# Patient Record
Sex: MALE | Race: WHITE | NOT HISPANIC OR LATINO | URBAN - METROPOLITAN AREA
[De-identification: names, ages, dates, MRNs, and addresses within clinical notes are randomized per-mention and may not be internally consistent; named-entity substitution may affect disease eponyms.]

---

## 2021-01-01 ENCOUNTER — IMMUNIZATION (OUTPATIENT)
Dept: PRIMARY CARE CLINIC | Facility: CLINIC | Age: 61
End: 2021-01-01
Payer: MEDICAID

## 2021-01-01 DIAGNOSIS — Z23 NEED FOR VACCINATION: Primary | ICD-10-CM

## 2021-01-01 PROCEDURE — 0003A COVID-19, MRNA, LNP-S, PF, 30 MCG/0.3 ML DOSE VACCINE: CPT | Mod: PBBFAC,PN

## 2021-01-01 PROCEDURE — 91300 COVID-19, MRNA, LNP-S, PF, 30 MCG/0.3 ML DOSE VACCINE: CPT | Mod: PBBFAC,PN

## 2021-04-26 ENCOUNTER — IMMUNIZATION (OUTPATIENT)
Dept: PRIMARY CARE CLINIC | Facility: CLINIC | Age: 61
End: 2021-04-26
Payer: MEDICAID

## 2021-04-26 DIAGNOSIS — Z23 NEED FOR VACCINATION: Primary | ICD-10-CM

## 2021-04-26 PROCEDURE — 91300 COVID-19, MRNA, LNP-S, PF, 30 MCG/0.3 ML DOSE VACCINE: ICD-10-PCS | Mod: S$GLB,,, | Performed by: EMERGENCY MEDICINE

## 2021-04-26 PROCEDURE — 0001A COVID-19, MRNA, LNP-S, PF, 30 MCG/0.3 ML DOSE VACCINE: ICD-10-PCS | Mod: CV19,S$GLB,, | Performed by: EMERGENCY MEDICINE

## 2021-04-26 PROCEDURE — 0001A COVID-19, MRNA, LNP-S, PF, 30 MCG/0.3 ML DOSE VACCINE: CPT | Mod: CV19,S$GLB,, | Performed by: EMERGENCY MEDICINE

## 2021-04-26 PROCEDURE — 91300 COVID-19, MRNA, LNP-S, PF, 30 MCG/0.3 ML DOSE VACCINE: CPT | Mod: S$GLB,,, | Performed by: EMERGENCY MEDICINE

## 2021-05-17 ENCOUNTER — IMMUNIZATION (OUTPATIENT)
Dept: PRIMARY CARE CLINIC | Facility: CLINIC | Age: 61
End: 2021-05-17
Payer: MEDICAID

## 2021-05-17 DIAGNOSIS — Z23 NEED FOR VACCINATION: Primary | ICD-10-CM

## 2021-05-17 PROCEDURE — 0002A COVID-19, MRNA, LNP-S, PF, 30 MCG/0.3 ML DOSE VACCINE: ICD-10-PCS | Mod: CV19,S$GLB,, | Performed by: FAMILY MEDICINE

## 2021-05-17 PROCEDURE — 91300 COVID-19, MRNA, LNP-S, PF, 30 MCG/0.3 ML DOSE VACCINE: CPT | Mod: S$GLB,,, | Performed by: FAMILY MEDICINE

## 2021-05-17 PROCEDURE — 0002A COVID-19, MRNA, LNP-S, PF, 30 MCG/0.3 ML DOSE VACCINE: CPT | Mod: CV19,S$GLB,, | Performed by: FAMILY MEDICINE

## 2021-05-17 PROCEDURE — 91300 COVID-19, MRNA, LNP-S, PF, 30 MCG/0.3 ML DOSE VACCINE: ICD-10-PCS | Mod: S$GLB,,, | Performed by: FAMILY MEDICINE

## 2022-01-01 ENCOUNTER — PATIENT MESSAGE (OUTPATIENT)
Dept: PSYCHOLOGY | Facility: HOSPITAL | Age: 62
End: 2022-01-01
Payer: MEDICAID

## 2022-01-01 ENCOUNTER — ANESTHESIA EVENT (OUTPATIENT)
Dept: SURGERY | Facility: HOSPITAL | Age: 62
DRG: 521 | End: 2022-01-01
Payer: MEDICAID

## 2022-01-01 ENCOUNTER — ANESTHESIA (OUTPATIENT)
Dept: SURGERY | Facility: HOSPITAL | Age: 62
DRG: 521 | End: 2022-01-01
Payer: MEDICAID

## 2022-01-01 ENCOUNTER — TELEPHONE (OUTPATIENT)
Dept: ORTHOPEDICS | Facility: CLINIC | Age: 62
End: 2022-01-01
Payer: MEDICAID

## 2022-01-01 ENCOUNTER — DOCUMENTATION ONLY (OUTPATIENT)
Dept: CARDIOLOGY | Facility: HOSPITAL | Age: 62
End: 2022-01-01
Payer: MEDICAID

## 2022-01-01 ENCOUNTER — ANESTHESIA EVENT (OUTPATIENT)
Dept: MEDSURG UNIT | Facility: HOSPITAL | Age: 62
DRG: 871 | End: 2022-01-01
Payer: MEDICAID

## 2022-01-01 ENCOUNTER — PATIENT OUTREACH (OUTPATIENT)
Dept: ADMINISTRATIVE | Facility: CLINIC | Age: 62
End: 2022-01-01
Payer: MEDICAID

## 2022-01-01 ENCOUNTER — HOSPITAL ENCOUNTER (INPATIENT)
Facility: HOSPITAL | Age: 62
LOS: 9 days | Discharge: HOME | DRG: 521 | End: 2022-08-27
Attending: ORTHOPAEDIC SURGERY | Admitting: ORTHOPAEDIC SURGERY
Payer: MEDICAID

## 2022-01-01 ENCOUNTER — DOCUMENTATION ONLY (OUTPATIENT)
Dept: ELECTROPHYSIOLOGY | Facility: CLINIC | Age: 62
End: 2022-01-01
Payer: MEDICAID

## 2022-01-01 ENCOUNTER — HOSPITAL ENCOUNTER (INPATIENT)
Facility: HOSPITAL | Age: 62
LOS: 17 days | DRG: 871 | End: 2022-09-21
Attending: EMERGENCY MEDICINE | Admitting: EMERGENCY MEDICINE
Payer: MEDICAID

## 2022-01-01 ENCOUNTER — ANESTHESIA (OUTPATIENT)
Dept: MEDSURG UNIT | Facility: HOSPITAL | Age: 62
DRG: 871 | End: 2022-01-01
Payer: MEDICAID

## 2022-01-01 VITALS
TEMPERATURE: 97 F | HEART RATE: 60 BPM | BODY MASS INDEX: 18.68 KG/M2 | HEIGHT: 69 IN | OXYGEN SATURATION: 95 % | SYSTOLIC BLOOD PRESSURE: 70 MMHG | DIASTOLIC BLOOD PRESSURE: 36 MMHG | WEIGHT: 126.13 LBS | RESPIRATION RATE: 40 BRPM

## 2022-01-01 VITALS
RESPIRATION RATE: 16 BRPM | DIASTOLIC BLOOD PRESSURE: 62 MMHG | TEMPERATURE: 98 F | HEART RATE: 88 BPM | OXYGEN SATURATION: 92 % | WEIGHT: 119.94 LBS | BODY MASS INDEX: 16.24 KG/M2 | HEIGHT: 72 IN | SYSTOLIC BLOOD PRESSURE: 131 MMHG

## 2022-01-01 VITALS
DIASTOLIC BLOOD PRESSURE: 54 MMHG | RESPIRATION RATE: 26 BRPM | HEART RATE: 62 BPM | OXYGEN SATURATION: 100 % | SYSTOLIC BLOOD PRESSURE: 90 MMHG

## 2022-01-01 DIAGNOSIS — R06.02 SOB (SHORTNESS OF BREATH): ICD-10-CM

## 2022-01-01 DIAGNOSIS — J96.01 SEPSIS WITH ACUTE HYPOXIC RESPIRATORY FAILURE: ICD-10-CM

## 2022-01-01 DIAGNOSIS — Z01.811 PRE-OP CHEST EXAM: ICD-10-CM

## 2022-01-01 DIAGNOSIS — I72.9 PSEUDOANEURYSM: ICD-10-CM

## 2022-01-01 DIAGNOSIS — I48.92 ATRIAL FLUTTER: ICD-10-CM

## 2022-01-01 DIAGNOSIS — S06.5XAA SUBDURAL HEMATOMA: ICD-10-CM

## 2022-01-01 DIAGNOSIS — M89.8X5 LYTIC BONE LESION OF HIP: ICD-10-CM

## 2022-01-01 DIAGNOSIS — J96.01 ACUTE HYPOXEMIC RESPIRATORY FAILURE: ICD-10-CM

## 2022-01-01 DIAGNOSIS — Z95.2 H/O MITRAL VALVE REPLACEMENT WITH MECHANICAL VALVE: ICD-10-CM

## 2022-01-01 DIAGNOSIS — Z79.01 ANTICOAGULATED ON WARFARIN: ICD-10-CM

## 2022-01-01 DIAGNOSIS — R65.20 SEPSIS WITH ACUTE HYPOXIC RESPIRATORY FAILURE: ICD-10-CM

## 2022-01-01 DIAGNOSIS — R52 PAIN: ICD-10-CM

## 2022-01-01 DIAGNOSIS — C79.51 METASTASIS TO SPINAL COLUMN: ICD-10-CM

## 2022-01-01 DIAGNOSIS — R91.8 MASS OF LEFT LUNG: ICD-10-CM

## 2022-01-01 DIAGNOSIS — A41.9 SEPSIS WITH ACUTE HYPOXIC RESPIRATORY FAILURE: ICD-10-CM

## 2022-01-01 DIAGNOSIS — E87.5 HYPERKALEMIA: ICD-10-CM

## 2022-01-01 DIAGNOSIS — C34.90 SMALL CELL CARCINOMA OF LUNG: ICD-10-CM

## 2022-01-01 DIAGNOSIS — I63.89 ACUTE ARTERIAL ISCHEMIC STROKE, MULTIFOCAL, MULTIPLE VASCULAR TERRITORIES: ICD-10-CM

## 2022-01-01 DIAGNOSIS — J90 EXUDATIVE PLEURAL EFFUSION: ICD-10-CM

## 2022-01-01 DIAGNOSIS — J80 ARDS (ADULT RESPIRATORY DISTRESS SYNDROME): Primary | ICD-10-CM

## 2022-01-01 DIAGNOSIS — R53.81 DEBILITY: ICD-10-CM

## 2022-01-01 DIAGNOSIS — I48.91 ATRIAL FIBRILLATION WITH RVR: ICD-10-CM

## 2022-01-01 DIAGNOSIS — R94.02 ABNORMAL BRAIN SCAN: ICD-10-CM

## 2022-01-01 DIAGNOSIS — R07.9 CHEST PAIN: ICD-10-CM

## 2022-01-01 DIAGNOSIS — Z71.89 ADVANCE CARE PLANNING: ICD-10-CM

## 2022-01-01 DIAGNOSIS — M84.451A: ICD-10-CM

## 2022-01-01 DIAGNOSIS — M84.451D PATHOLOGICAL FRACTURE OF NECK OF RIGHT FEMUR WITH ROUTINE HEALING, SUBSEQUENT ENCOUNTER: Primary | ICD-10-CM

## 2022-01-01 DIAGNOSIS — I72.4 PSEUDOANEURYSM OF RIGHT FEMORAL ARTERY: ICD-10-CM

## 2022-01-01 DIAGNOSIS — F41.9 ANXIETY: ICD-10-CM

## 2022-01-01 DIAGNOSIS — L30.8 DERMATITIS ASSOCIATED WITH MOISTURE: ICD-10-CM

## 2022-01-01 DIAGNOSIS — E83.52 HYPERCALCEMIA OF MALIGNANCY: ICD-10-CM

## 2022-01-01 DIAGNOSIS — C79.51 MALIGNANT NEOPLASM METASTATIC TO BONE: ICD-10-CM

## 2022-01-01 DIAGNOSIS — Z71.89 GOALS OF CARE, COUNSELING/DISCUSSION: ICD-10-CM

## 2022-01-01 DIAGNOSIS — Z51.5 PALLIATIVE CARE ENCOUNTER: ICD-10-CM

## 2022-01-01 LAB
ABO + RH BLD: NORMAL
ALBUMIN SERPL BCP-MCNC: 1.6 G/DL (ref 3.5–5.2)
ALBUMIN SERPL BCP-MCNC: 1.7 G/DL (ref 3.5–5.2)
ALBUMIN SERPL BCP-MCNC: 1.8 G/DL (ref 3.5–5.2)
ALBUMIN SERPL BCP-MCNC: 1.9 G/DL (ref 3.5–5.2)
ALBUMIN SERPL BCP-MCNC: 2 G/DL (ref 3.5–5.2)
ALBUMIN SERPL BCP-MCNC: 2.1 G/DL (ref 3.5–5.2)
ALBUMIN SERPL BCP-MCNC: 2.1 G/DL (ref 3.5–5.2)
ALBUMIN SERPL BCP-MCNC: 2.2 G/DL (ref 3.5–5.2)
ALBUMIN SERPL BCP-MCNC: 2.3 G/DL (ref 3.5–5.2)
ALBUMIN SERPL BCP-MCNC: 2.4 G/DL (ref 3.5–5.2)
ALBUMIN SERPL BCP-MCNC: 2.5 G/DL (ref 3.5–5.2)
ALBUMIN SERPL BCP-MCNC: 2.7 G/DL (ref 3.5–5.2)
ALBUMIN SERPL BCP-MCNC: 2.8 G/DL (ref 3.5–5.2)
ALBUMIN SERPL BCP-MCNC: 2.8 G/DL (ref 3.5–5.2)
ALBUMIN SERPL ELPH-MCNC: 2.74 G/DL (ref 3.35–5.55)
ALLENS TEST: ABNORMAL
ALP SERPL-CCNC: 101 U/L (ref 55–135)
ALP SERPL-CCNC: 102 U/L (ref 55–135)
ALP SERPL-CCNC: 102 U/L (ref 55–135)
ALP SERPL-CCNC: 109 U/L (ref 55–135)
ALP SERPL-CCNC: 109 U/L (ref 55–135)
ALP SERPL-CCNC: 111 U/L (ref 55–135)
ALP SERPL-CCNC: 111 U/L (ref 55–135)
ALP SERPL-CCNC: 114 U/L (ref 55–135)
ALP SERPL-CCNC: 114 U/L (ref 55–135)
ALP SERPL-CCNC: 120 U/L (ref 55–135)
ALP SERPL-CCNC: 124 U/L (ref 55–135)
ALP SERPL-CCNC: 129 U/L (ref 55–135)
ALP SERPL-CCNC: 133 U/L (ref 55–135)
ALP SERPL-CCNC: 136 U/L (ref 55–135)
ALP SERPL-CCNC: 137 U/L (ref 55–135)
ALP SERPL-CCNC: 139 U/L (ref 55–135)
ALP SERPL-CCNC: 142 U/L (ref 55–135)
ALP SERPL-CCNC: 145 U/L (ref 55–135)
ALP SERPL-CCNC: 148 U/L (ref 55–135)
ALP SERPL-CCNC: 151 U/L (ref 55–135)
ALP SERPL-CCNC: 153 U/L (ref 55–135)
ALP SERPL-CCNC: 163 U/L (ref 55–135)
ALP SERPL-CCNC: 172 U/L (ref 55–135)
ALP SERPL-CCNC: 172 U/L (ref 55–135)
ALP SERPL-CCNC: 180 U/L (ref 55–135)
ALP SERPL-CCNC: 186 U/L (ref 55–135)
ALP SERPL-CCNC: 200 U/L (ref 55–135)
ALP SERPL-CCNC: 229 U/L (ref 55–135)
ALP SERPL-CCNC: 91 U/L (ref 55–135)
ALPHA1 GLOB SERPL ELPH-MCNC: 0.65 G/DL (ref 0.17–0.41)
ALPHA2 GLOB SERPL ELPH-MCNC: 0.62 G/DL (ref 0.43–0.99)
ALT SERPL W/O P-5'-P-CCNC: 11 U/L (ref 10–44)
ALT SERPL W/O P-5'-P-CCNC: 11 U/L (ref 10–44)
ALT SERPL W/O P-5'-P-CCNC: 12 U/L (ref 10–44)
ALT SERPL W/O P-5'-P-CCNC: 12 U/L (ref 10–44)
ALT SERPL W/O P-5'-P-CCNC: 13 U/L (ref 10–44)
ALT SERPL W/O P-5'-P-CCNC: 14 U/L (ref 10–44)
ALT SERPL W/O P-5'-P-CCNC: 15 U/L (ref 10–44)
ALT SERPL W/O P-5'-P-CCNC: 18 U/L (ref 10–44)
ALT SERPL W/O P-5'-P-CCNC: 19 U/L (ref 10–44)
ALT SERPL W/O P-5'-P-CCNC: 20 U/L (ref 10–44)
ALT SERPL W/O P-5'-P-CCNC: 23 U/L (ref 10–44)
ALT SERPL W/O P-5'-P-CCNC: 24 U/L (ref 10–44)
ALT SERPL W/O P-5'-P-CCNC: 25 U/L (ref 10–44)
ALT SERPL W/O P-5'-P-CCNC: 32 U/L (ref 10–44)
ALT SERPL W/O P-5'-P-CCNC: 38 U/L (ref 10–44)
ALT SERPL W/O P-5'-P-CCNC: 49 U/L (ref 10–44)
ALT SERPL W/O P-5'-P-CCNC: 6 U/L (ref 10–44)
ALT SERPL W/O P-5'-P-CCNC: 7 U/L (ref 10–44)
ALT SERPL W/O P-5'-P-CCNC: 8 U/L (ref 10–44)
ANION GAP SERPL CALC-SCNC: 10 MMOL/L (ref 8–16)
ANION GAP SERPL CALC-SCNC: 11 MMOL/L (ref 8–16)
ANION GAP SERPL CALC-SCNC: 12 MMOL/L (ref 8–16)
ANION GAP SERPL CALC-SCNC: 12 MMOL/L (ref 8–16)
ANION GAP SERPL CALC-SCNC: 14 MMOL/L (ref 8–16)
ANION GAP SERPL CALC-SCNC: 15 MMOL/L (ref 8–16)
ANION GAP SERPL CALC-SCNC: 4 MMOL/L (ref 8–16)
ANION GAP SERPL CALC-SCNC: 4 MMOL/L (ref 8–16)
ANION GAP SERPL CALC-SCNC: 5 MMOL/L (ref 8–16)
ANION GAP SERPL CALC-SCNC: 6 MMOL/L (ref 8–16)
ANION GAP SERPL CALC-SCNC: 7 MMOL/L (ref 8–16)
ANION GAP SERPL CALC-SCNC: 8 MMOL/L (ref 8–16)
ANION GAP SERPL CALC-SCNC: 8 MMOL/L (ref 8–16)
ANION GAP SERPL CALC-SCNC: 9 MMOL/L (ref 8–16)
ANISOCYTOSIS BLD QL SMEAR: SLIGHT
APPEARANCE FLD: CLEAR
APTT BLDCRRT: 32.5 SEC (ref 21–32)
APTT BLDCRRT: 32.5 SEC (ref 21–32)
APTT BLDCRRT: 34.4 SEC (ref 21–32)
APTT BLDCRRT: 34.4 SEC (ref 21–32)
APTT BLDCRRT: 35.9 SEC (ref 21–32)
APTT BLDCRRT: 35.9 SEC (ref 21–32)
APTT BLDCRRT: 36.4 SEC (ref 21–32)
APTT BLDCRRT: 36.5 SEC (ref 21–32)
APTT BLDCRRT: 36.7 SEC (ref 21–32)
APTT BLDCRRT: 37.3 SEC (ref 21–32)
APTT BLDCRRT: 37.9 SEC (ref 21–32)
APTT BLDCRRT: 38.1 SEC (ref 21–32)
APTT BLDCRRT: 38.7 SEC (ref 21–32)
APTT BLDCRRT: 39 SEC (ref 21–32)
APTT BLDCRRT: 40.4 SEC (ref 21–32)
APTT BLDCRRT: 40.7 SEC (ref 21–32)
APTT BLDCRRT: 42.6 SEC (ref 21–32)
APTT BLDCRRT: 43.3 SEC (ref 21–32)
APTT BLDCRRT: 43.6 SEC (ref 21–32)
APTT BLDCRRT: 43.9 SEC (ref 21–32)
APTT BLDCRRT: 45.2 SEC (ref 21–32)
APTT BLDCRRT: 45.8 SEC (ref 21–32)
APTT BLDCRRT: 46.7 SEC (ref 21–32)
APTT BLDCRRT: 48.4 SEC (ref 21–32)
APTT BLDCRRT: 48.7 SEC (ref 21–32)
APTT BLDCRRT: 49.3 SEC (ref 21–32)
APTT BLDCRRT: 49.4 SEC (ref 21–32)
APTT BLDCRRT: 49.5 SEC (ref 21–32)
APTT BLDCRRT: 49.5 SEC (ref 21–32)
APTT BLDCRRT: 50 SEC (ref 21–32)
APTT BLDCRRT: 58.1 SEC (ref 21–32)
APTT BLDCRRT: 62.1 SEC (ref 21–32)
APTT BLDCRRT: 64.3 SEC (ref 21–32)
ASCENDING AORTA: 2.8 CM
AST SERPL-CCNC: 10 U/L (ref 10–40)
AST SERPL-CCNC: 10 U/L (ref 10–40)
AST SERPL-CCNC: 11 U/L (ref 10–40)
AST SERPL-CCNC: 14 U/L (ref 10–40)
AST SERPL-CCNC: 14 U/L (ref 10–40)
AST SERPL-CCNC: 20 U/L (ref 10–40)
AST SERPL-CCNC: 21 U/L (ref 10–40)
AST SERPL-CCNC: 22 U/L (ref 10–40)
AST SERPL-CCNC: 23 U/L (ref 10–40)
AST SERPL-CCNC: 24 U/L (ref 10–40)
AST SERPL-CCNC: 25 U/L (ref 10–40)
AST SERPL-CCNC: 25 U/L (ref 10–40)
AST SERPL-CCNC: 26 U/L (ref 10–40)
AST SERPL-CCNC: 27 U/L (ref 10–40)
AST SERPL-CCNC: 27 U/L (ref 10–40)
AST SERPL-CCNC: 28 U/L (ref 10–40)
AST SERPL-CCNC: 29 U/L (ref 10–40)
AST SERPL-CCNC: 30 U/L (ref 10–40)
AST SERPL-CCNC: 31 U/L (ref 10–40)
AST SERPL-CCNC: 36 U/L (ref 10–40)
AST SERPL-CCNC: 38 U/L (ref 10–40)
AST SERPL-CCNC: 41 U/L (ref 10–40)
AST SERPL-CCNC: 44 U/L (ref 10–40)
AST SERPL-CCNC: 60 U/L (ref 10–40)
AST SERPL-CCNC: 9 U/L (ref 10–40)
AV INDEX (PROSTH): 0.37
AV MEAN GRADIENT: 12 MMHG
AV PEAK GRADIENT: 22 MMHG
AV VALVE AREA: 1.31 CM2
AV VELOCITY RATIO: 0.38
B-GLOBULIN SERPL ELPH-MCNC: 0.87 G/DL (ref 0.5–1.1)
B2 MICROGLOB SERPL-MCNC: 3.5 UG/ML (ref 0–2.5)
BACTERIA #/AREA URNS AUTO: ABNORMAL /HPF
BACTERIA #/AREA URNS AUTO: NORMAL /HPF
BACTERIA BLD CULT: NORMAL
BACTERIA BLD CULT: NORMAL
BACTERIA FLD AEROBE CULT: NO GROWTH
BACTERIA SPEC AEROBE CULT: NORMAL
BACTERIA SPEC AEROBE CULT: NORMAL
BASOPHILS # BLD AUTO: 0 K/UL (ref 0–0.2)
BASOPHILS # BLD AUTO: 0.01 K/UL (ref 0–0.2)
BASOPHILS # BLD AUTO: 0.02 K/UL (ref 0–0.2)
BASOPHILS # BLD AUTO: 0.03 K/UL (ref 0–0.2)
BASOPHILS # BLD AUTO: 0.04 K/UL (ref 0–0.2)
BASOPHILS # BLD AUTO: 0.05 K/UL (ref 0–0.2)
BASOPHILS # BLD AUTO: ABNORMAL K/UL (ref 0–0.2)
BASOPHILS # BLD AUTO: ABNORMAL K/UL (ref 0–0.2)
BASOPHILS NFR BLD: 0 % (ref 0–1.9)
BASOPHILS NFR BLD: 0.1 % (ref 0–1.9)
BASOPHILS NFR BLD: 0.2 % (ref 0–1.9)
BASOPHILS NFR BLD: 0.3 % (ref 0–1.9)
BASOPHILS NFR BLD: 0.5 % (ref 0–1.9)
BASOPHILS NFR BLD: 0.9 % (ref 0–1.9)
BILIRUB SERPL-MCNC: 0.3 MG/DL (ref 0.1–1)
BILIRUB SERPL-MCNC: 0.4 MG/DL (ref 0.1–1)
BILIRUB SERPL-MCNC: 0.5 MG/DL (ref 0.1–1)
BILIRUB SERPL-MCNC: 0.6 MG/DL (ref 0.1–1)
BILIRUB SERPL-MCNC: 0.7 MG/DL (ref 0.1–1)
BILIRUB SERPL-MCNC: 0.8 MG/DL (ref 0.1–1)
BILIRUB SERPL-MCNC: 0.9 MG/DL (ref 0.1–1)
BILIRUB SERPL-MCNC: 0.9 MG/DL (ref 0.1–1)
BILIRUB SERPL-MCNC: 1.1 MG/DL (ref 0.1–1)
BILIRUB SERPL-MCNC: 1.1 MG/DL (ref 0.1–1)
BILIRUB UR QL STRIP: NEGATIVE
BILIRUB UR QL STRIP: NEGATIVE
BLD GP AB SCN CELLS X3 SERPL QL: NORMAL
BLD PROD TYP BPU: NORMAL
BLOOD UNIT EXPIRATION DATE: NORMAL
BLOOD UNIT TYPE CODE: 5100
BLOOD UNIT TYPE: NORMAL
BNP SERPL-MCNC: 336 PG/ML (ref 0–99)
BNP SERPL-MCNC: 350 PG/ML (ref 0–99)
BODY FLD TYPE: ABNORMAL
BODY FLUID SOURCE, LDH: NORMAL
BSA FOR ECHO PROCEDURE: 1.67 M2
BSA FOR ECHO PROCEDURE: 1.67 M2
BUN SERPL-MCNC: 13 MG/DL (ref 6–20)
BUN SERPL-MCNC: 13 MG/DL (ref 6–20)
BUN SERPL-MCNC: 14 MG/DL (ref 6–20)
BUN SERPL-MCNC: 15 MG/DL (ref 6–20)
BUN SERPL-MCNC: 16 MG/DL (ref 6–20)
BUN SERPL-MCNC: 16 MG/DL (ref 6–20)
BUN SERPL-MCNC: 17 MG/DL (ref 6–20)
BUN SERPL-MCNC: 18 MG/DL (ref 6–20)
BUN SERPL-MCNC: 18 MG/DL (ref 6–20)
BUN SERPL-MCNC: 19 MG/DL (ref 6–20)
BUN SERPL-MCNC: 20 MG/DL (ref 6–20)
BUN SERPL-MCNC: 21 MG/DL (ref 6–20)
BUN SERPL-MCNC: 22 MG/DL (ref 6–20)
BUN SERPL-MCNC: 23 MG/DL (ref 6–20)
BUN SERPL-MCNC: 25 MG/DL (ref 6–20)
BUN SERPL-MCNC: 25 MG/DL (ref 6–20)
BUN SERPL-MCNC: 26 MG/DL (ref 6–20)
BUN SERPL-MCNC: 27 MG/DL (ref 6–20)
BUN SERPL-MCNC: 29 MG/DL (ref 6–20)
BUN SERPL-MCNC: 32 MG/DL (ref 6–20)
BUN SERPL-MCNC: 32 MG/DL (ref 6–20)
BUN SERPL-MCNC: 34 MG/DL (ref 6–20)
BUN SERPL-MCNC: 35 MG/DL (ref 6–20)
BUN SERPL-MCNC: 35 MG/DL (ref 6–20)
BUN SERPL-MCNC: 36 MG/DL (ref 6–20)
BUN SERPL-MCNC: 37 MG/DL (ref 6–20)
BUN SERPL-MCNC: 38 MG/DL (ref 6–20)
BUN SERPL-MCNC: 42 MG/DL (ref 6–20)
BUN SERPL-MCNC: 42 MG/DL (ref 6–20)
BUN SERPL-MCNC: 43 MG/DL (ref 6–20)
BURR CELLS BLD QL SMEAR: ABNORMAL
CALCIUM SERPL-MCNC: 10 MG/DL (ref 8.7–10.5)
CALCIUM SERPL-MCNC: 10.2 MG/DL (ref 8.7–10.5)
CALCIUM SERPL-MCNC: 10.4 MG/DL (ref 8.7–10.5)
CALCIUM SERPL-MCNC: 6.7 MG/DL (ref 8.7–10.5)
CALCIUM SERPL-MCNC: 7.3 MG/DL (ref 8.7–10.5)
CALCIUM SERPL-MCNC: 7.4 MG/DL (ref 8.7–10.5)
CALCIUM SERPL-MCNC: 7.5 MG/DL (ref 8.7–10.5)
CALCIUM SERPL-MCNC: 7.6 MG/DL (ref 8.7–10.5)
CALCIUM SERPL-MCNC: 7.7 MG/DL (ref 8.7–10.5)
CALCIUM SERPL-MCNC: 7.8 MG/DL (ref 8.7–10.5)
CALCIUM SERPL-MCNC: 7.9 MG/DL (ref 8.7–10.5)
CALCIUM SERPL-MCNC: 8 MG/DL (ref 8.7–10.5)
CALCIUM SERPL-MCNC: 8.1 MG/DL (ref 8.7–10.5)
CALCIUM SERPL-MCNC: 8.2 MG/DL (ref 8.7–10.5)
CALCIUM SERPL-MCNC: 8.3 MG/DL (ref 8.7–10.5)
CALCIUM SERPL-MCNC: 8.4 MG/DL (ref 8.7–10.5)
CALCIUM SERPL-MCNC: 8.4 MG/DL (ref 8.7–10.5)
CALCIUM SERPL-MCNC: 8.6 MG/DL (ref 8.7–10.5)
CALCIUM SERPL-MCNC: 8.7 MG/DL (ref 8.7–10.5)
CALCIUM SERPL-MCNC: 8.9 MG/DL (ref 8.7–10.5)
CALCIUM SERPL-MCNC: 9.1 MG/DL (ref 8.7–10.5)
CALCIUM SERPL-MCNC: 9.5 MG/DL (ref 8.7–10.5)
CEA SERPL-MCNC: 2.9 NG/ML (ref 0–5)
CHLORIDE SERPL-SCNC: 100 MMOL/L (ref 95–110)
CHLORIDE SERPL-SCNC: 101 MMOL/L (ref 95–110)
CHLORIDE SERPL-SCNC: 102 MMOL/L (ref 95–110)
CHLORIDE SERPL-SCNC: 103 MMOL/L (ref 95–110)
CHLORIDE SERPL-SCNC: 104 MMOL/L (ref 95–110)
CHLORIDE SERPL-SCNC: 105 MMOL/L (ref 95–110)
CHLORIDE SERPL-SCNC: 106 MMOL/L (ref 95–110)
CHLORIDE SERPL-SCNC: 106 MMOL/L (ref 95–110)
CHLORIDE SERPL-SCNC: 107 MMOL/L (ref 95–110)
CHLORIDE SERPL-SCNC: 109 MMOL/L (ref 95–110)
CHLORIDE SERPL-SCNC: 111 MMOL/L (ref 95–110)
CHLORIDE SERPL-SCNC: 97 MMOL/L (ref 95–110)
CHLORIDE SERPL-SCNC: 98 MMOL/L (ref 95–110)
CHLORIDE SERPL-SCNC: 99 MMOL/L (ref 95–110)
CHLORIDE UR-SCNC: 48 MMOL/L (ref 25–200)
CLARITY UR REFRACT.AUTO: ABNORMAL
CLARITY UR REFRACT.AUTO: CLEAR
CO2 SERPL-SCNC: 18 MMOL/L (ref 23–29)
CO2 SERPL-SCNC: 18 MMOL/L (ref 23–29)
CO2 SERPL-SCNC: 19 MMOL/L (ref 23–29)
CO2 SERPL-SCNC: 21 MMOL/L (ref 23–29)
CO2 SERPL-SCNC: 21 MMOL/L (ref 23–29)
CO2 SERPL-SCNC: 22 MMOL/L (ref 23–29)
CO2 SERPL-SCNC: 23 MMOL/L (ref 23–29)
CO2 SERPL-SCNC: 24 MMOL/L (ref 23–29)
CO2 SERPL-SCNC: 25 MMOL/L (ref 23–29)
CO2 SERPL-SCNC: 26 MMOL/L (ref 23–29)
CO2 SERPL-SCNC: 26 MMOL/L (ref 23–29)
CO2 SERPL-SCNC: 27 MMOL/L (ref 23–29)
CO2 SERPL-SCNC: 27 MMOL/L (ref 23–29)
CO2 SERPL-SCNC: 28 MMOL/L (ref 23–29)
CO2 SERPL-SCNC: 29 MMOL/L (ref 23–29)
CODING SYSTEM: NORMAL
COLOR FLD: YELLOW
COLOR UR AUTO: YELLOW
COLOR UR AUTO: YELLOW
CREAT SERPL-MCNC: 0.6 MG/DL (ref 0.5–1.4)
CREAT SERPL-MCNC: 0.6 MG/DL (ref 0.5–1.4)
CREAT SERPL-MCNC: 0.7 MG/DL (ref 0.5–1.4)
CREAT SERPL-MCNC: 0.8 MG/DL (ref 0.5–1.4)
CREAT SERPL-MCNC: 0.9 MG/DL (ref 0.5–1.4)
CREAT SERPL-MCNC: 1 MG/DL (ref 0.5–1.4)
CREAT SERPL-MCNC: 1.1 MG/DL (ref 0.5–1.4)
CREAT SERPL-MCNC: 1.2 MG/DL (ref 0.5–1.4)
CREAT SERPL-MCNC: 1.3 MG/DL (ref 0.5–1.4)
CREAT SERPL-MCNC: 1.6 MG/DL (ref 0.5–1.4)
CREAT UR-MCNC: 98 MG/DL (ref 23–375)
CRP SERPL-MCNC: 76.3 MG/L (ref 0–8.2)
CV ECHO LV RWT: 0.66 CM
DELSYS: ABNORMAL
DIFFERENTIAL METHOD: ABNORMAL
DISPENSE STATUS: NORMAL
DOHLE BOD BLD QL SMEAR: PRESENT
DOHLE BOD BLD QL SMEAR: PRESENT
DOP CALC AO PEAK VEL: 2.33 M/S
DOP CALC AO VTI: 37.48 CM
DOP CALC LVOT AREA: 3.5 CM2
DOP CALC LVOT DIAMETER: 2.12 CM
DOP CALC LVOT PEAK VEL: 0.88 M/S
DOP CALC LVOT STROKE VOLUME: 49.15 CM3
DOP CALC MV VTI: 32.15 CM
DOP CALCLVOT PEAK VEL VTI: 13.93 CM
E WAVE DECELERATION TIME: 231.98 MSEC
E/A RATIO: 1.94
E/E' RATIO: 23.27 M/S
ECHO LV POSTERIOR WALL: 1.09 CM (ref 0.6–1.1)
EJECTION FRACTION: 40 %
EJECTION FRACTION: 50 %
EOSINOPHIL # BLD AUTO: 0 K/UL (ref 0–0.5)
EOSINOPHIL # BLD AUTO: 0.1 K/UL (ref 0–0.5)
EOSINOPHIL # BLD AUTO: 0.2 K/UL (ref 0–0.5)
EOSINOPHIL # BLD AUTO: ABNORMAL K/UL (ref 0–0.5)
EOSINOPHIL # BLD AUTO: ABNORMAL K/UL (ref 0–0.5)
EOSINOPHIL NFR BLD: 0 % (ref 0–8)
EOSINOPHIL NFR BLD: 0.1 % (ref 0–8)
EOSINOPHIL NFR BLD: 0.4 % (ref 0–8)
EOSINOPHIL NFR BLD: 0.5 % (ref 0–8)
EOSINOPHIL NFR BLD: 0.7 % (ref 0–8)
EOSINOPHIL NFR BLD: 0.7 % (ref 0–8)
EOSINOPHIL NFR BLD: 0.9 % (ref 0–8)
EOSINOPHIL NFR BLD: 1.1 % (ref 0–8)
EOSINOPHIL NFR BLD: 1.3 % (ref 0–8)
EOSINOPHIL NFR BLD: 1.5 % (ref 0–8)
EOSINOPHIL NFR BLD: 2 % (ref 0–8)
EOSINOPHIL NFR BLD: 2 % (ref 0–8)
EOSINOPHIL NFR BLD: 4.3 % (ref 0–8)
EOSINOPHIL NFR BLD: 5.9 % (ref 0–8)
EP: 6
EP: 8
ERYTHROCYTE [DISTWIDTH] IN BLOOD BY AUTOMATED COUNT: 17 % (ref 11.5–14.5)
ERYTHROCYTE [DISTWIDTH] IN BLOOD BY AUTOMATED COUNT: 17.1 % (ref 11.5–14.5)
ERYTHROCYTE [DISTWIDTH] IN BLOOD BY AUTOMATED COUNT: 17.2 % (ref 11.5–14.5)
ERYTHROCYTE [DISTWIDTH] IN BLOOD BY AUTOMATED COUNT: 17.2 % (ref 11.5–14.5)
ERYTHROCYTE [DISTWIDTH] IN BLOOD BY AUTOMATED COUNT: 17.3 % (ref 11.5–14.5)
ERYTHROCYTE [DISTWIDTH] IN BLOOD BY AUTOMATED COUNT: 17.4 % (ref 11.5–14.5)
ERYTHROCYTE [DISTWIDTH] IN BLOOD BY AUTOMATED COUNT: 17.6 % (ref 11.5–14.5)
ERYTHROCYTE [DISTWIDTH] IN BLOOD BY AUTOMATED COUNT: 18.4 % (ref 11.5–14.5)
ERYTHROCYTE [DISTWIDTH] IN BLOOD BY AUTOMATED COUNT: 18.7 % (ref 11.5–14.5)
ERYTHROCYTE [DISTWIDTH] IN BLOOD BY AUTOMATED COUNT: 18.9 % (ref 11.5–14.5)
ERYTHROCYTE [DISTWIDTH] IN BLOOD BY AUTOMATED COUNT: 19.3 % (ref 11.5–14.5)
ERYTHROCYTE [DISTWIDTH] IN BLOOD BY AUTOMATED COUNT: 19.6 % (ref 11.5–14.5)
ERYTHROCYTE [DISTWIDTH] IN BLOOD BY AUTOMATED COUNT: 19.8 % (ref 11.5–14.5)
ERYTHROCYTE [DISTWIDTH] IN BLOOD BY AUTOMATED COUNT: 19.9 % (ref 11.5–14.5)
ERYTHROCYTE [DISTWIDTH] IN BLOOD BY AUTOMATED COUNT: 20.1 % (ref 11.5–14.5)
ERYTHROCYTE [DISTWIDTH] IN BLOOD BY AUTOMATED COUNT: 20.1 % (ref 11.5–14.5)
ERYTHROCYTE [DISTWIDTH] IN BLOOD BY AUTOMATED COUNT: 20.3 % (ref 11.5–14.5)
ERYTHROCYTE [DISTWIDTH] IN BLOOD BY AUTOMATED COUNT: 20.3 % (ref 11.5–14.5)
ERYTHROCYTE [DISTWIDTH] IN BLOOD BY AUTOMATED COUNT: 20.4 % (ref 11.5–14.5)
ERYTHROCYTE [DISTWIDTH] IN BLOOD BY AUTOMATED COUNT: 20.4 % (ref 11.5–14.5)
ERYTHROCYTE [DISTWIDTH] IN BLOOD BY AUTOMATED COUNT: 20.6 % (ref 11.5–14.5)
ERYTHROCYTE [DISTWIDTH] IN BLOOD BY AUTOMATED COUNT: 20.8 % (ref 11.5–14.5)
ERYTHROCYTE [DISTWIDTH] IN BLOOD BY AUTOMATED COUNT: 21 % (ref 11.5–14.5)
ERYTHROCYTE [SEDIMENTATION RATE] IN BLOOD BY PHOTOMETRIC METHOD: 21 MM/HR (ref 0–23)
ERYTHROCYTE [SEDIMENTATION RATE] IN BLOOD BY WESTERGREN METHOD: 12 MM/H
ERYTHROCYTE [SEDIMENTATION RATE] IN BLOOD BY WESTERGREN METHOD: 16 MM/H
EST. GFR  (NO RACE VARIABLE): 49 ML/MIN/1.73 M^2
EST. GFR  (NO RACE VARIABLE): >60 ML/MIN/1.73 M^2
FERRITIN SERPL-MCNC: 1900 NG/ML (ref 20–300)
FINAL PATHOLOGIC DIAGNOSIS: ABNORMAL
FINAL PATHOLOGIC DIAGNOSIS: NORMAL
FIO2: 100
FIO2: 21
FIO2: 80
FLOW: 40
FRACTIONAL SHORTENING: 3 % (ref 28–44)
GAMMA GLOB SERPL ELPH-MCNC: 0.83 G/DL (ref 0.67–1.58)
GLUCOSE FLD-MCNC: 135 MG/DL
GLUCOSE SERPL-MCNC: 100 MG/DL (ref 70–110)
GLUCOSE SERPL-MCNC: 100 MG/DL (ref 70–110)
GLUCOSE SERPL-MCNC: 104 MG/DL (ref 70–110)
GLUCOSE SERPL-MCNC: 107 MG/DL (ref 70–110)
GLUCOSE SERPL-MCNC: 108 MG/DL (ref 70–110)
GLUCOSE SERPL-MCNC: 114 MG/DL (ref 70–110)
GLUCOSE SERPL-MCNC: 115 MG/DL (ref 70–110)
GLUCOSE SERPL-MCNC: 116 MG/DL (ref 70–110)
GLUCOSE SERPL-MCNC: 119 MG/DL (ref 70–110)
GLUCOSE SERPL-MCNC: 120 MG/DL (ref 70–110)
GLUCOSE SERPL-MCNC: 124 MG/DL (ref 70–110)
GLUCOSE SERPL-MCNC: 129 MG/DL (ref 70–110)
GLUCOSE SERPL-MCNC: 130 MG/DL (ref 70–110)
GLUCOSE SERPL-MCNC: 133 MG/DL (ref 70–110)
GLUCOSE SERPL-MCNC: 133 MG/DL (ref 70–110)
GLUCOSE SERPL-MCNC: 137 MG/DL (ref 70–110)
GLUCOSE SERPL-MCNC: 146 MG/DL (ref 70–110)
GLUCOSE SERPL-MCNC: 151 MG/DL (ref 70–110)
GLUCOSE SERPL-MCNC: 152 MG/DL (ref 70–110)
GLUCOSE SERPL-MCNC: 158 MG/DL (ref 70–110)
GLUCOSE SERPL-MCNC: 74 MG/DL (ref 70–110)
GLUCOSE SERPL-MCNC: 78 MG/DL (ref 70–110)
GLUCOSE SERPL-MCNC: 81 MG/DL (ref 70–110)
GLUCOSE SERPL-MCNC: 82 MG/DL (ref 70–110)
GLUCOSE SERPL-MCNC: 85 MG/DL (ref 70–110)
GLUCOSE SERPL-MCNC: 86 MG/DL (ref 70–110)
GLUCOSE SERPL-MCNC: 88 MG/DL (ref 70–110)
GLUCOSE SERPL-MCNC: 89 MG/DL (ref 70–110)
GLUCOSE SERPL-MCNC: 90 MG/DL (ref 70–110)
GLUCOSE SERPL-MCNC: 92 MG/DL (ref 70–110)
GLUCOSE SERPL-MCNC: 92 MG/DL (ref 70–110)
GLUCOSE SERPL-MCNC: 94 MG/DL (ref 70–110)
GLUCOSE SERPL-MCNC: 98 MG/DL (ref 70–110)
GLUCOSE SERPL-MCNC: 98 MG/DL (ref 70–110)
GLUCOSE UR QL STRIP: NEGATIVE
GLUCOSE UR QL STRIP: NEGATIVE
GRAM STN SPEC: NORMAL
HCO3 UR-SCNC: 19.2 MMOL/L (ref 24–28)
HCO3 UR-SCNC: 21.8 MMOL/L (ref 24–28)
HCO3 UR-SCNC: 22.4 MMOL/L (ref 24–28)
HCO3 UR-SCNC: 24.9 MMOL/L (ref 24–28)
HCO3 UR-SCNC: 28.3 MMOL/L (ref 24–28)
HCT VFR BLD AUTO: 26.9 % (ref 40–54)
HCT VFR BLD AUTO: 27.5 % (ref 40–54)
HCT VFR BLD AUTO: 27.9 % (ref 40–54)
HCT VFR BLD AUTO: 28.5 % (ref 40–54)
HCT VFR BLD AUTO: 28.5 % (ref 40–54)
HCT VFR BLD AUTO: 28.6 % (ref 40–54)
HCT VFR BLD AUTO: 29.1 % (ref 40–54)
HCT VFR BLD AUTO: 29.6 % (ref 40–54)
HCT VFR BLD AUTO: 30 % (ref 40–54)
HCT VFR BLD AUTO: 30.4 % (ref 40–54)
HCT VFR BLD AUTO: 30.6 % (ref 40–54)
HCT VFR BLD AUTO: 30.9 % (ref 40–54)
HCT VFR BLD AUTO: 31.6 % (ref 40–54)
HCT VFR BLD AUTO: 31.6 % (ref 40–54)
HCT VFR BLD AUTO: 31.8 % (ref 40–54)
HCT VFR BLD AUTO: 31.9 % (ref 40–54)
HCT VFR BLD AUTO: 32.1 % (ref 40–54)
HCT VFR BLD AUTO: 32.4 % (ref 40–54)
HCT VFR BLD AUTO: 33.5 % (ref 40–54)
HCT VFR BLD AUTO: 33.5 % (ref 40–54)
HCT VFR BLD AUTO: 34 % (ref 40–54)
HCT VFR BLD AUTO: 34.2 % (ref 40–54)
HCT VFR BLD AUTO: 35.6 % (ref 40–54)
HCT VFR BLD AUTO: 36.3 % (ref 40–54)
HCT VFR BLD AUTO: 36.4 % (ref 40–54)
HCT VFR BLD AUTO: 36.6 % (ref 40–54)
HCT VFR BLD AUTO: 37.9 % (ref 40–54)
HCT VFR BLD CALC: 28 %PCV (ref 36–54)
HCT VFR BLD CALC: 33 %PCV (ref 36–54)
HCV AB SERPL QL IA: NORMAL
HGB BLD-MCNC: 10.2 G/DL (ref 14–18)
HGB BLD-MCNC: 10.2 G/DL (ref 14–18)
HGB BLD-MCNC: 10.3 G/DL (ref 14–18)
HGB BLD-MCNC: 10.4 G/DL (ref 14–18)
HGB BLD-MCNC: 11.1 G/DL (ref 14–18)
HGB BLD-MCNC: 11.2 G/DL (ref 14–18)
HGB BLD-MCNC: 11.2 G/DL (ref 14–18)
HGB BLD-MCNC: 11.3 G/DL (ref 14–18)
HGB BLD-MCNC: 11.5 G/DL (ref 14–18)
HGB BLD-MCNC: 11.7 G/DL (ref 14–18)
HGB BLD-MCNC: 12.2 G/DL (ref 14–18)
HGB BLD-MCNC: 12.3 G/DL (ref 14–18)
HGB BLD-MCNC: 12.7 G/DL (ref 14–18)
HGB BLD-MCNC: 8.5 G/DL (ref 14–18)
HGB BLD-MCNC: 8.8 G/DL (ref 14–18)
HGB BLD-MCNC: 8.9 G/DL (ref 14–18)
HGB BLD-MCNC: 9 G/DL (ref 14–18)
HGB BLD-MCNC: 9 G/DL (ref 14–18)
HGB BLD-MCNC: 9.2 G/DL (ref 14–18)
HGB BLD-MCNC: 9.4 G/DL (ref 14–18)
HGB BLD-MCNC: 9.4 G/DL (ref 14–18)
HGB BLD-MCNC: 9.6 G/DL (ref 14–18)
HGB BLD-MCNC: 9.7 G/DL (ref 14–18)
HGB BLD-MCNC: 9.8 G/DL (ref 14–18)
HGB BLD-MCNC: 9.9 G/DL (ref 14–18)
HGB UR QL STRIP: ABNORMAL
HGB UR QL STRIP: NEGATIVE
HIV 1+2 AB+HIV1 P24 AG SERPL QL IA: NORMAL
HYALINE CASTS UR QL AUTO: 0 /LPF
HYALINE CASTS UR QL AUTO: 5 /LPF
HYPOCHROMIA BLD QL SMEAR: ABNORMAL
IMM GRANULOCYTES # BLD AUTO: 0 K/UL (ref 0–0.04)
IMM GRANULOCYTES # BLD AUTO: 0.01 K/UL (ref 0–0.04)
IMM GRANULOCYTES # BLD AUTO: 0.02 K/UL (ref 0–0.04)
IMM GRANULOCYTES # BLD AUTO: 0.04 K/UL (ref 0–0.04)
IMM GRANULOCYTES # BLD AUTO: 0.05 K/UL (ref 0–0.04)
IMM GRANULOCYTES # BLD AUTO: 0.06 K/UL (ref 0–0.04)
IMM GRANULOCYTES # BLD AUTO: 0.07 K/UL (ref 0–0.04)
IMM GRANULOCYTES # BLD AUTO: 0.07 K/UL (ref 0–0.04)
IMM GRANULOCYTES # BLD AUTO: 0.08 K/UL (ref 0–0.04)
IMM GRANULOCYTES # BLD AUTO: 0.09 K/UL (ref 0–0.04)
IMM GRANULOCYTES # BLD AUTO: 0.09 K/UL (ref 0–0.04)
IMM GRANULOCYTES # BLD AUTO: 0.11 K/UL (ref 0–0.04)
IMM GRANULOCYTES # BLD AUTO: 0.2 K/UL (ref 0–0.04)
IMM GRANULOCYTES # BLD AUTO: 0.21 K/UL (ref 0–0.04)
IMM GRANULOCYTES # BLD AUTO: 0.29 K/UL (ref 0–0.04)
IMM GRANULOCYTES # BLD AUTO: 0.57 K/UL (ref 0–0.04)
IMM GRANULOCYTES # BLD AUTO: ABNORMAL K/UL (ref 0–0.04)
IMM GRANULOCYTES NFR BLD AUTO: 0 % (ref 0–0.5)
IMM GRANULOCYTES NFR BLD AUTO: 0.4 % (ref 0–0.5)
IMM GRANULOCYTES NFR BLD AUTO: 0.6 % (ref 0–0.5)
IMM GRANULOCYTES NFR BLD AUTO: 0.7 % (ref 0–0.5)
IMM GRANULOCYTES NFR BLD AUTO: 0.8 % (ref 0–0.5)
IMM GRANULOCYTES NFR BLD AUTO: 0.8 % (ref 0–0.5)
IMM GRANULOCYTES NFR BLD AUTO: 0.9 % (ref 0–0.5)
IMM GRANULOCYTES NFR BLD AUTO: 0.9 % (ref 0–0.5)
IMM GRANULOCYTES NFR BLD AUTO: 1.1 % (ref 0–0.5)
IMM GRANULOCYTES NFR BLD AUTO: 1.4 % (ref 0–0.5)
IMM GRANULOCYTES NFR BLD AUTO: 1.8 % (ref 0–0.5)
IMM GRANULOCYTES NFR BLD AUTO: 1.8 % (ref 0–0.5)
IMM GRANULOCYTES NFR BLD AUTO: 1.9 % (ref 0–0.5)
IMM GRANULOCYTES NFR BLD AUTO: 2.8 % (ref 0–0.5)
IMM GRANULOCYTES NFR BLD AUTO: 4.3 % (ref 0–0.5)
IMM GRANULOCYTES NFR BLD AUTO: ABNORMAL % (ref 0–0.5)
INFLUENZA A, MOLECULAR: NEGATIVE
INFLUENZA B, MOLECULAR: NEGATIVE
INR PPP: 1.1 (ref 0.8–1.2)
INR PPP: 1.1 (ref 0.8–1.2)
INR PPP: 1.2 (ref 0.8–1.2)
INR PPP: 1.3 (ref 0.8–1.2)
INR PPP: 1.4 (ref 0.8–1.2)
INR PPP: 1.4 (ref 0.8–1.2)
INR PPP: 1.6 (ref 0.8–1.2)
INR PPP: 1.6 (ref 0.8–1.2)
INR PPP: 1.8 (ref 0.8–1.2)
INR PPP: 2 (ref 0.8–1.2)
INR PPP: 2 (ref 0.8–1.2)
INR PPP: 2.6 (ref 0.8–1.2)
INR PPP: 3.2 (ref 0.8–1.2)
INR PPP: 3.7 (ref 0.8–1.2)
INR PPP: 4 (ref 0.8–1.2)
INR PPP: 4 (ref 0.8–1.2)
INR PPP: 5.5 (ref 0.8–1.2)
INR PPP: 5.8 (ref 0.8–1.2)
INR PPP: 7 (ref 0.8–1.2)
INTERVENTRICULAR SEPTUM: 1.28 CM (ref 0.6–1.1)
IP: 12
IP: 16
IRON SERPL-MCNC: 159 UG/DL (ref 45–160)
KETONES UR QL STRIP: NEGATIVE
KETONES UR QL STRIP: NEGATIVE
LA WIDTH: 4.86 CM
LACTATE SERPL-SCNC: 1.7 MMOL/L (ref 0.5–2.2)
LACTATE SERPL-SCNC: 2.3 MMOL/L (ref 0.5–2.2)
LACTATE SERPL-SCNC: 2.4 MMOL/L (ref 0.5–2.2)
LDH FLD L TO P-CCNC: 295 U/L
LDH SERPL L TO P-CCNC: 620 U/L (ref 110–260)
LDH SERPL L TO P-CCNC: 777 U/L (ref 110–260)
LDH SERPL L TO P-CCNC: 897 U/L (ref 110–260)
LDH SERPL L TO P-CCNC: 991 U/L (ref 110–260)
LEFT ATRIUM SIZE: 3.6 CM
LEFT INTERNAL DIMENSION IN SYSTOLE: 3.2 CM (ref 2.1–4)
LEFT VENTRICLE DIASTOLIC VOLUME INDEX: 26.18 ML/M2
LEFT VENTRICLE DIASTOLIC VOLUME: 44.51 ML
LEFT VENTRICLE MASS INDEX: 72 G/M2
LEFT VENTRICLE SYSTOLIC VOLUME INDEX: 24.1 ML/M2
LEFT VENTRICLE SYSTOLIC VOLUME: 40.92 ML
LEFT VENTRICULAR INTERNAL DIMENSION IN DIASTOLE: 3.31 CM (ref 3.5–6)
LEFT VENTRICULAR MASS: 122.89 G
LEUKOCYTE ESTERASE UR QL STRIP: NEGATIVE
LEUKOCYTE ESTERASE UR QL STRIP: NEGATIVE
LV LATERAL E/E' RATIO: 21.33 M/S
LV SEPTAL E/E' RATIO: 25.6 M/S
LYMPHOCYTES # BLD AUTO: 0.1 K/UL (ref 1–4.8)
LYMPHOCYTES # BLD AUTO: 0.2 K/UL (ref 1–4.8)
LYMPHOCYTES # BLD AUTO: 0.3 K/UL (ref 1–4.8)
LYMPHOCYTES # BLD AUTO: 0.5 K/UL (ref 1–4.8)
LYMPHOCYTES # BLD AUTO: 0.7 K/UL (ref 1–4.8)
LYMPHOCYTES # BLD AUTO: 0.8 K/UL (ref 1–4.8)
LYMPHOCYTES # BLD AUTO: 0.8 K/UL (ref 1–4.8)
LYMPHOCYTES # BLD AUTO: 1 K/UL (ref 1–4.8)
LYMPHOCYTES # BLD AUTO: 1.1 K/UL (ref 1–4.8)
LYMPHOCYTES # BLD AUTO: 1.2 K/UL (ref 1–4.8)
LYMPHOCYTES # BLD AUTO: 1.3 K/UL (ref 1–4.8)
LYMPHOCYTES # BLD AUTO: ABNORMAL K/UL (ref 1–4.8)
LYMPHOCYTES # BLD AUTO: ABNORMAL K/UL (ref 1–4.8)
LYMPHOCYTES NFR BLD: 1 % (ref 18–48)
LYMPHOCYTES NFR BLD: 10.4 % (ref 18–48)
LYMPHOCYTES NFR BLD: 10.6 % (ref 18–48)
LYMPHOCYTES NFR BLD: 10.9 % (ref 18–48)
LYMPHOCYTES NFR BLD: 11 % (ref 18–48)
LYMPHOCYTES NFR BLD: 11.4 % (ref 18–48)
LYMPHOCYTES NFR BLD: 11.5 % (ref 18–48)
LYMPHOCYTES NFR BLD: 11.6 % (ref 18–48)
LYMPHOCYTES NFR BLD: 12.2 % (ref 18–48)
LYMPHOCYTES NFR BLD: 12.9 % (ref 18–48)
LYMPHOCYTES NFR BLD: 13.2 % (ref 18–48)
LYMPHOCYTES NFR BLD: 14 % (ref 18–48)
LYMPHOCYTES NFR BLD: 2.3 % (ref 18–48)
LYMPHOCYTES NFR BLD: 2.4 % (ref 18–48)
LYMPHOCYTES NFR BLD: 2.6 % (ref 18–48)
LYMPHOCYTES NFR BLD: 2.7 % (ref 18–48)
LYMPHOCYTES NFR BLD: 20 % (ref 18–48)
LYMPHOCYTES NFR BLD: 3 % (ref 18–48)
LYMPHOCYTES NFR BLD: 3.1 % (ref 18–48)
LYMPHOCYTES NFR BLD: 3.5 % (ref 18–48)
LYMPHOCYTES NFR BLD: 4 % (ref 18–48)
LYMPHOCYTES NFR BLD: 42.9 % (ref 18–48)
LYMPHOCYTES NFR BLD: 5.9 % (ref 18–48)
LYMPHOCYTES NFR BLD: 7.3 % (ref 18–48)
LYMPHOCYTES NFR BLD: 7.6 % (ref 18–48)
LYMPHOCYTES NFR BLD: 7.9 % (ref 18–48)
LYMPHOCYTES NFR BLD: 73.9 % (ref 18–48)
LYMPHOCYTES NFR BLD: 81.8 % (ref 18–48)
LYMPHOCYTES NFR BLD: 82.4 % (ref 18–48)
LYMPHOCYTES NFR FLD MANUAL: 64 %
Lab: ABNORMAL
Lab: NORMAL
MAGNESIUM SERPL-MCNC: 1.7 MG/DL (ref 1.6–2.6)
MAGNESIUM SERPL-MCNC: 1.7 MG/DL (ref 1.6–2.6)
MAGNESIUM SERPL-MCNC: 1.8 MG/DL (ref 1.6–2.6)
MAGNESIUM SERPL-MCNC: 1.9 MG/DL (ref 1.6–2.6)
MAGNESIUM SERPL-MCNC: 2 MG/DL (ref 1.6–2.6)
MAGNESIUM SERPL-MCNC: 2.1 MG/DL (ref 1.6–2.6)
MAGNESIUM SERPL-MCNC: 2.2 MG/DL (ref 1.6–2.6)
MCH RBC QN AUTO: 28.2 PG (ref 27–31)
MCH RBC QN AUTO: 28.4 PG (ref 27–31)
MCH RBC QN AUTO: 28.5 PG (ref 27–31)
MCH RBC QN AUTO: 28.5 PG (ref 27–31)
MCH RBC QN AUTO: 28.6 PG (ref 27–31)
MCH RBC QN AUTO: 28.7 PG (ref 27–31)
MCH RBC QN AUTO: 28.8 PG (ref 27–31)
MCH RBC QN AUTO: 28.9 PG (ref 27–31)
MCH RBC QN AUTO: 28.9 PG (ref 27–31)
MCH RBC QN AUTO: 29 PG (ref 27–31)
MCH RBC QN AUTO: 29.1 PG (ref 27–31)
MCH RBC QN AUTO: 29.1 PG (ref 27–31)
MCH RBC QN AUTO: 29.2 PG (ref 27–31)
MCH RBC QN AUTO: 29.3 PG (ref 27–31)
MCH RBC QN AUTO: 29.3 PG (ref 27–31)
MCH RBC QN AUTO: 29.4 PG (ref 27–31)
MCH RBC QN AUTO: 29.5 PG (ref 27–31)
MCH RBC QN AUTO: 29.5 PG (ref 27–31)
MCH RBC QN AUTO: 29.6 PG (ref 27–31)
MCH RBC QN AUTO: 29.7 PG (ref 27–31)
MCH RBC QN AUTO: 29.9 PG (ref 27–31)
MCHC RBC AUTO-ENTMCNC: 30.6 G/DL (ref 32–36)
MCHC RBC AUTO-ENTMCNC: 31.6 G/DL (ref 32–36)
MCHC RBC AUTO-ENTMCNC: 31.7 G/DL (ref 32–36)
MCHC RBC AUTO-ENTMCNC: 31.7 G/DL (ref 32–36)
MCHC RBC AUTO-ENTMCNC: 31.8 G/DL (ref 32–36)
MCHC RBC AUTO-ENTMCNC: 31.9 G/DL (ref 32–36)
MCHC RBC AUTO-ENTMCNC: 32 G/DL (ref 32–36)
MCHC RBC AUTO-ENTMCNC: 32 G/DL (ref 32–36)
MCHC RBC AUTO-ENTMCNC: 32.2 G/DL (ref 32–36)
MCHC RBC AUTO-ENTMCNC: 32.3 G/DL (ref 32–36)
MCHC RBC AUTO-ENTMCNC: 32.4 G/DL (ref 32–36)
MCHC RBC AUTO-ENTMCNC: 32.4 G/DL (ref 32–36)
MCHC RBC AUTO-ENTMCNC: 32.9 G/DL (ref 32–36)
MCHC RBC AUTO-ENTMCNC: 32.9 G/DL (ref 32–36)
MCHC RBC AUTO-ENTMCNC: 33 G/DL (ref 32–36)
MCHC RBC AUTO-ENTMCNC: 33.1 G/DL (ref 32–36)
MCHC RBC AUTO-ENTMCNC: 33.3 G/DL (ref 32–36)
MCHC RBC AUTO-ENTMCNC: 33.4 G/DL (ref 32–36)
MCHC RBC AUTO-ENTMCNC: 33.5 G/DL (ref 32–36)
MCHC RBC AUTO-ENTMCNC: 33.6 G/DL (ref 32–36)
MCHC RBC AUTO-ENTMCNC: 33.6 G/DL (ref 32–36)
MCHC RBC AUTO-ENTMCNC: 33.7 G/DL (ref 32–36)
MCHC RBC AUTO-ENTMCNC: 33.8 G/DL (ref 32–36)
MCV RBC AUTO: 85 FL (ref 82–98)
MCV RBC AUTO: 86 FL (ref 82–98)
MCV RBC AUTO: 86 FL (ref 82–98)
MCV RBC AUTO: 87 FL (ref 82–98)
MCV RBC AUTO: 87 FL (ref 82–98)
MCV RBC AUTO: 88 FL (ref 82–98)
MCV RBC AUTO: 89 FL (ref 82–98)
MCV RBC AUTO: 90 FL (ref 82–98)
MCV RBC AUTO: 90 FL (ref 82–98)
MCV RBC AUTO: 91 FL (ref 82–98)
MCV RBC AUTO: 92 FL (ref 82–98)
MCV RBC AUTO: 92 FL (ref 82–98)
MCV RBC AUTO: 93 FL (ref 82–98)
MCV RBC AUTO: 95 FL (ref 82–98)
MCV RBC AUTO: 96 FL (ref 82–98)
METAMYELOCYTES NFR BLD MANUAL: 12 %
MICROSCOPIC COMMENT: ABNORMAL
MICROSCOPIC COMMENT: NORMAL
MODE: ABNORMAL
MONOCYTES # BLD AUTO: 0 K/UL (ref 0.3–1)
MONOCYTES # BLD AUTO: 0.1 K/UL (ref 0.3–1)
MONOCYTES # BLD AUTO: 0.2 K/UL (ref 0.3–1)
MONOCYTES # BLD AUTO: 0.4 K/UL (ref 0.3–1)
MONOCYTES # BLD AUTO: 0.6 K/UL (ref 0.3–1)
MONOCYTES # BLD AUTO: 0.7 K/UL (ref 0.3–1)
MONOCYTES # BLD AUTO: 0.8 K/UL (ref 0.3–1)
MONOCYTES # BLD AUTO: ABNORMAL K/UL (ref 0.3–1)
MONOCYTES # BLD AUTO: ABNORMAL K/UL (ref 0.3–1)
MONOCYTES NFR BLD: 0 % (ref 4–15)
MONOCYTES NFR BLD: 0.1 % (ref 4–15)
MONOCYTES NFR BLD: 1 % (ref 4–15)
MONOCYTES NFR BLD: 1.2 % (ref 4–15)
MONOCYTES NFR BLD: 12 % (ref 4–15)
MONOCYTES NFR BLD: 15.2 % (ref 4–15)
MONOCYTES NFR BLD: 2.1 % (ref 4–15)
MONOCYTES NFR BLD: 2.8 % (ref 4–15)
MONOCYTES NFR BLD: 21.4 % (ref 4–15)
MONOCYTES NFR BLD: 3.1 % (ref 4–15)
MONOCYTES NFR BLD: 3.3 % (ref 4–15)
MONOCYTES NFR BLD: 3.9 % (ref 4–15)
MONOCYTES NFR BLD: 4.3 % (ref 4–15)
MONOCYTES NFR BLD: 4.8 % (ref 4–15)
MONOCYTES NFR BLD: 4.8 % (ref 4–15)
MONOCYTES NFR BLD: 4.9 % (ref 4–15)
MONOCYTES NFR BLD: 6 % (ref 4–15)
MONOCYTES NFR BLD: 6.7 % (ref 4–15)
MONOCYTES NFR BLD: 7 % (ref 4–15)
MONOCYTES NFR BLD: 7.1 % (ref 4–15)
MONOCYTES NFR BLD: 7.3 % (ref 4–15)
MONOCYTES NFR BLD: 7.4 % (ref 4–15)
MONOCYTES NFR BLD: 7.5 % (ref 4–15)
MONOCYTES NFR BLD: 7.5 % (ref 4–15)
MONOCYTES NFR BLD: 7.6 % (ref 4–15)
MONOCYTES NFR BLD: 8.2 % (ref 4–15)
MONOCYTES NFR BLD: 9.1 % (ref 4–15)
MONOS+MACROS NFR FLD MANUAL: 12 %
MV MEAN GRADIENT: 1 MMHG
MV PEAK A VEL: 0.66 M/S
MV PEAK E VEL: 1.28 M/S
MV PEAK GRADIENT: 8 MMHG
MV STENOSIS PRESSURE HALF TIME: 67.27 MS
MV VALVE AREA BY CONTINUITY EQUATION: 1.53 CM2
MV VALVE AREA P 1/2 METHOD: 3.27 CM2
MYELOCYTES NFR BLD MANUAL: 8 %
NEUTROPHILS # BLD AUTO: 0 K/UL (ref 1.8–7.7)
NEUTROPHILS # BLD AUTO: 0.1 K/UL (ref 1.8–7.7)
NEUTROPHILS # BLD AUTO: 0.8 K/UL (ref 1.8–7.7)
NEUTROPHILS # BLD AUTO: 10.3 K/UL (ref 1.8–7.7)
NEUTROPHILS # BLD AUTO: 10.6 K/UL (ref 1.8–7.7)
NEUTROPHILS # BLD AUTO: 12.2 K/UL (ref 1.8–7.7)
NEUTROPHILS # BLD AUTO: 12.8 K/UL (ref 1.8–7.7)
NEUTROPHILS # BLD AUTO: 19.1 K/UL (ref 1.8–7.7)
NEUTROPHILS # BLD AUTO: 7.2 K/UL (ref 1.8–7.7)
NEUTROPHILS # BLD AUTO: 7.2 K/UL (ref 1.8–7.7)
NEUTROPHILS # BLD AUTO: 7.5 K/UL (ref 1.8–7.7)
NEUTROPHILS # BLD AUTO: 7.6 K/UL (ref 1.8–7.7)
NEUTROPHILS # BLD AUTO: 7.8 K/UL (ref 1.8–7.7)
NEUTROPHILS # BLD AUTO: 7.9 K/UL (ref 1.8–7.7)
NEUTROPHILS # BLD AUTO: 7.9 K/UL (ref 1.8–7.7)
NEUTROPHILS # BLD AUTO: 8.1 K/UL (ref 1.8–7.7)
NEUTROPHILS # BLD AUTO: 8.4 K/UL (ref 1.8–7.7)
NEUTROPHILS # BLD AUTO: 8.5 K/UL (ref 1.8–7.7)
NEUTROPHILS # BLD AUTO: 8.8 K/UL (ref 1.8–7.7)
NEUTROPHILS # BLD AUTO: 8.9 K/UL (ref 1.8–7.7)
NEUTROPHILS # BLD AUTO: 8.9 K/UL (ref 1.8–7.7)
NEUTROPHILS NFR BLD: 11.7 % (ref 38–73)
NEUTROPHILS NFR BLD: 13.2 % (ref 38–73)
NEUTROPHILS NFR BLD: 35.7 % (ref 38–73)
NEUTROPHILS NFR BLD: 36 % (ref 38–73)
NEUTROPHILS NFR BLD: 70.5 % (ref 38–73)
NEUTROPHILS NFR BLD: 77.4 % (ref 38–73)
NEUTROPHILS NFR BLD: 77.9 % (ref 38–73)
NEUTROPHILS NFR BLD: 78.1 % (ref 38–73)
NEUTROPHILS NFR BLD: 78.6 % (ref 38–73)
NEUTROPHILS NFR BLD: 79.1 % (ref 38–73)
NEUTROPHILS NFR BLD: 80.1 % (ref 38–73)
NEUTROPHILS NFR BLD: 80.3 % (ref 38–73)
NEUTROPHILS NFR BLD: 81.1 % (ref 38–73)
NEUTROPHILS NFR BLD: 81.2 % (ref 38–73)
NEUTROPHILS NFR BLD: 84 % (ref 38–73)
NEUTROPHILS NFR BLD: 85 % (ref 38–73)
NEUTROPHILS NFR BLD: 85.3 % (ref 38–73)
NEUTROPHILS NFR BLD: 87.6 % (ref 38–73)
NEUTROPHILS NFR BLD: 87.9 % (ref 38–73)
NEUTROPHILS NFR BLD: 9.1 % (ref 38–73)
NEUTROPHILS NFR BLD: 91 % (ref 38–73)
NEUTROPHILS NFR BLD: 91.9 % (ref 38–73)
NEUTROPHILS NFR BLD: 92 % (ref 38–73)
NEUTROPHILS NFR BLD: 92.8 % (ref 38–73)
NEUTROPHILS NFR BLD: 93.4 % (ref 38–73)
NEUTROPHILS NFR BLD: 93.6 % (ref 38–73)
NEUTROPHILS NFR BLD: 94.5 % (ref 38–73)
NEUTROPHILS NFR BLD: 96 % (ref 38–73)
NEUTROPHILS NFR BLD: 97 % (ref 38–73)
NEUTROPHILS NFR FLD MANUAL: 22 %
NEUTS BAND NFR BLD MANUAL: 1 %
NEUTS BAND NFR BLD MANUAL: 12 %
NEUTS BAND NFR BLD MANUAL: 3 %
NITRITE UR QL STRIP: NEGATIVE
NITRITE UR QL STRIP: NEGATIVE
NRBC BLD-RTO: 0 /100 WBC
NUM UNITS TRANS FFP: NORMAL
OSMOLALITY UR: 543 MOSM/KG (ref 50–1200)
OTHER CELLS FLD MANUAL: 2 %
OVALOCYTES BLD QL SMEAR: ABNORMAL
PATH INTERP FLD-IMP: NORMAL
PATHOLOGIST INTERPRETATION SPE: NORMAL
PCO2 BLDA: 111.3 MMHG (ref 35–45)
PCO2 BLDA: 37.6 MMHG (ref 35–45)
PCO2 BLDA: 39.9 MMHG (ref 35–45)
PCO2 BLDA: 44.8 MMHG (ref 35–45)
PCO2 BLDA: 46.4 MMHG (ref 35–45)
PH SMN: 7.01 [PH] (ref 7.35–7.45)
PH SMN: 7.28 [PH] (ref 7.35–7.45)
PH SMN: 7.32 [PH] (ref 7.35–7.45)
PH SMN: 7.35 [PH] (ref 7.35–7.45)
PH SMN: 7.36 [PH] (ref 7.35–7.45)
PH UR STRIP: 6 [PH] (ref 5–8)
PH UR STRIP: 6 [PH] (ref 5–8)
PHOSPHATE SERPL-MCNC: 1.4 MG/DL (ref 2.7–4.5)
PHOSPHATE SERPL-MCNC: 1.8 MG/DL (ref 2.7–4.5)
PHOSPHATE SERPL-MCNC: 2 MG/DL (ref 2.7–4.5)
PHOSPHATE SERPL-MCNC: 2.1 MG/DL (ref 2.7–4.5)
PHOSPHATE SERPL-MCNC: 2.1 MG/DL (ref 2.7–4.5)
PHOSPHATE SERPL-MCNC: 2.4 MG/DL (ref 2.7–4.5)
PHOSPHATE SERPL-MCNC: 2.6 MG/DL (ref 2.7–4.5)
PHOSPHATE SERPL-MCNC: 2.8 MG/DL (ref 2.7–4.5)
PHOSPHATE SERPL-MCNC: 2.8 MG/DL (ref 2.7–4.5)
PHOSPHATE SERPL-MCNC: 2.9 MG/DL (ref 2.7–4.5)
PHOSPHATE SERPL-MCNC: 3.2 MG/DL (ref 2.7–4.5)
PHOSPHATE SERPL-MCNC: 3.3 MG/DL (ref 2.7–4.5)
PHOSPHATE SERPL-MCNC: 3.3 MG/DL (ref 2.7–4.5)
PHOSPHATE SERPL-MCNC: 3.4 MG/DL (ref 2.7–4.5)
PHOSPHATE SERPL-MCNC: 3.5 MG/DL (ref 2.7–4.5)
PHOSPHATE SERPL-MCNC: 3.6 MG/DL (ref 2.7–4.5)
PHOSPHATE SERPL-MCNC: 3.7 MG/DL (ref 2.7–4.5)
PHOSPHATE SERPL-MCNC: 4.2 MG/DL (ref 2.7–4.5)
PHOSPHATE SERPL-MCNC: 4.3 MG/DL (ref 2.7–4.5)
PHOSPHATE SERPL-MCNC: 4.3 MG/DL (ref 2.7–4.5)
PHOSPHATE SERPL-MCNC: 4.4 MG/DL (ref 2.7–4.5)
PHOSPHATE SERPL-MCNC: 4.5 MG/DL (ref 2.7–4.5)
PLATELET # BLD AUTO: 125 K/UL (ref 150–450)
PLATELET # BLD AUTO: 126 K/UL (ref 150–450)
PLATELET # BLD AUTO: 18 K/UL (ref 150–450)
PLATELET # BLD AUTO: 186 K/UL (ref 150–450)
PLATELET # BLD AUTO: 189 K/UL (ref 150–450)
PLATELET # BLD AUTO: 19 K/UL (ref 150–450)
PLATELET # BLD AUTO: 20 K/UL (ref 150–450)
PLATELET # BLD AUTO: 205 K/UL (ref 150–450)
PLATELET # BLD AUTO: 248 K/UL (ref 150–450)
PLATELET # BLD AUTO: 262 K/UL (ref 150–450)
PLATELET # BLD AUTO: 268 K/UL (ref 150–450)
PLATELET # BLD AUTO: 270 K/UL (ref 150–450)
PLATELET # BLD AUTO: 285 K/UL (ref 150–450)
PLATELET # BLD AUTO: 285 K/UL (ref 150–450)
PLATELET # BLD AUTO: 289 K/UL (ref 150–450)
PLATELET # BLD AUTO: 294 K/UL (ref 150–450)
PLATELET # BLD AUTO: 30 K/UL (ref 150–450)
PLATELET # BLD AUTO: 302 K/UL (ref 150–450)
PLATELET # BLD AUTO: 304 K/UL (ref 150–450)
PLATELET # BLD AUTO: 305 K/UL (ref 150–450)
PLATELET # BLD AUTO: 310 K/UL (ref 150–450)
PLATELET # BLD AUTO: 347 K/UL (ref 150–450)
PLATELET # BLD AUTO: 400 K/UL (ref 150–450)
PLATELET # BLD AUTO: 417 K/UL (ref 150–450)
PLATELET # BLD AUTO: 47 K/UL (ref 150–450)
PLATELET # BLD AUTO: 491 K/UL (ref 150–450)
PLATELET # BLD AUTO: 65 K/UL (ref 150–450)
PLATELET # BLD AUTO: 71 K/UL (ref 150–450)
PLATELET # BLD AUTO: 87 K/UL (ref 150–450)
PLATELET BLD QL SMEAR: ABNORMAL
PMV BLD AUTO: 10 FL (ref 9.2–12.9)
PMV BLD AUTO: 10 FL (ref 9.2–12.9)
PMV BLD AUTO: 10.1 FL (ref 9.2–12.9)
PMV BLD AUTO: 10.2 FL (ref 9.2–12.9)
PMV BLD AUTO: 10.2 FL (ref 9.2–12.9)
PMV BLD AUTO: 10.4 FL (ref 9.2–12.9)
PMV BLD AUTO: 10.5 FL (ref 9.2–12.9)
PMV BLD AUTO: 10.9 FL (ref 9.2–12.9)
PMV BLD AUTO: 11 FL (ref 9.2–12.9)
PMV BLD AUTO: 11.4 FL (ref 9.2–12.9)
PMV BLD AUTO: 11.5 FL (ref 9.2–12.9)
PMV BLD AUTO: 11.8 FL (ref 9.2–12.9)
PMV BLD AUTO: 12.1 FL (ref 9.2–12.9)
PMV BLD AUTO: 12.7 FL (ref 9.2–12.9)
PMV BLD AUTO: 9.5 FL (ref 9.2–12.9)
PMV BLD AUTO: 9.5 FL (ref 9.2–12.9)
PMV BLD AUTO: 9.7 FL (ref 9.2–12.9)
PMV BLD AUTO: ABNORMAL FL (ref 9.2–12.9)
PO2 BLDA: 123 MMHG (ref 80–100)
PO2 BLDA: 22 MMHG (ref 40–60)
PO2 BLDA: 57 MMHG (ref 40–60)
PO2 BLDA: 62 MMHG (ref 80–100)
PO2 BLDA: 69 MMHG (ref 80–100)
POC ACTIVATED CLOTTING TIME K: 207 SEC (ref 74–137)
POC ACTIVATED CLOTTING TIME K: 242 SEC (ref 74–137)
POC BE: -1 MMOL/L
POC BE: -3 MMOL/L
POC BE: -3 MMOL/L
POC BE: -5 MMOL/L
POC BE: -7 MMOL/L
POC IONIZED CALCIUM: 1.03 MMOL/L (ref 1.06–1.42)
POC IONIZED CALCIUM: 1.23 MMOL/L (ref 1.06–1.42)
POC SATURATED O2: 30 % (ref 95–100)
POC SATURATED O2: 71 % (ref 95–100)
POC SATURATED O2: 89 % (ref 95–100)
POC SATURATED O2: 93 % (ref 95–100)
POC SATURATED O2: 99 % (ref 95–100)
POC TCO2: 20 MMOL/L (ref 23–27)
POC TCO2: 23 MMOL/L (ref 24–29)
POC TCO2: 24 MMOL/L (ref 23–27)
POC TCO2: 26 MMOL/L (ref 23–27)
POC TCO2: 32 MMOL/L (ref 24–29)
POCT GLUCOSE: 102 MG/DL (ref 70–110)
POCT GLUCOSE: 111 MG/DL (ref 70–110)
POCT GLUCOSE: 137 MG/DL (ref 70–110)
POCT GLUCOSE: 72 MG/DL (ref 70–110)
POCT GLUCOSE: 78 MG/DL (ref 70–110)
POCT GLUCOSE: 79 MG/DL (ref 70–110)
POCT GLUCOSE: 88 MG/DL (ref 70–110)
POCT GLUCOSE: 91 MG/DL (ref 70–110)
POCT GLUCOSE: 91 MG/DL (ref 70–110)
POCT GLUCOSE: 92 MG/DL (ref 70–110)
POCT GLUCOSE: 94 MG/DL (ref 70–110)
POCT GLUCOSE: 95 MG/DL (ref 70–110)
POIKILOCYTOSIS BLD QL SMEAR: SLIGHT
POLYCHROMASIA BLD QL SMEAR: ABNORMAL
POTASSIUM BLD-SCNC: 3.7 MMOL/L (ref 3.5–5.1)
POTASSIUM BLD-SCNC: 4.4 MMOL/L (ref 3.5–5.1)
POTASSIUM SERPL-SCNC: 3.1 MMOL/L (ref 3.5–5.1)
POTASSIUM SERPL-SCNC: 3.2 MMOL/L (ref 3.5–5.1)
POTASSIUM SERPL-SCNC: 3.4 MMOL/L (ref 3.5–5.1)
POTASSIUM SERPL-SCNC: 3.4 MMOL/L (ref 3.5–5.1)
POTASSIUM SERPL-SCNC: 3.5 MMOL/L (ref 3.5–5.1)
POTASSIUM SERPL-SCNC: 3.5 MMOL/L (ref 3.5–5.1)
POTASSIUM SERPL-SCNC: 3.6 MMOL/L (ref 3.5–5.1)
POTASSIUM SERPL-SCNC: 3.7 MMOL/L (ref 3.5–5.1)
POTASSIUM SERPL-SCNC: 3.9 MMOL/L (ref 3.5–5.1)
POTASSIUM SERPL-SCNC: 3.9 MMOL/L (ref 3.5–5.1)
POTASSIUM SERPL-SCNC: 4.1 MMOL/L (ref 3.5–5.1)
POTASSIUM SERPL-SCNC: 4.2 MMOL/L (ref 3.5–5.1)
POTASSIUM SERPL-SCNC: 4.3 MMOL/L (ref 3.5–5.1)
POTASSIUM SERPL-SCNC: 4.4 MMOL/L (ref 3.5–5.1)
POTASSIUM SERPL-SCNC: 4.4 MMOL/L (ref 3.5–5.1)
POTASSIUM SERPL-SCNC: 4.5 MMOL/L (ref 3.5–5.1)
POTASSIUM SERPL-SCNC: 4.6 MMOL/L (ref 3.5–5.1)
POTASSIUM SERPL-SCNC: 4.7 MMOL/L (ref 3.5–5.1)
POTASSIUM SERPL-SCNC: 4.8 MMOL/L (ref 3.5–5.1)
POTASSIUM SERPL-SCNC: 5.2 MMOL/L (ref 3.5–5.1)
POTASSIUM SERPL-SCNC: 5.3 MMOL/L (ref 3.5–5.1)
POTASSIUM SERPL-SCNC: 5.3 MMOL/L (ref 3.5–5.1)
POTASSIUM SERPL-SCNC: 5.4 MMOL/L (ref 3.5–5.1)
PREALB SERPL-MCNC: 20 MG/DL (ref 20–43)
PROSTATE SPECIFIC ANTIGEN, TOTAL: 1.9 NG/ML (ref 0–4)
PROT FLD-MCNC: 2.1 G/DL
PROT SERPL-MCNC: 3.7 G/DL (ref 6–8.4)
PROT SERPL-MCNC: 4.2 G/DL (ref 6–8.4)
PROT SERPL-MCNC: 4.2 G/DL (ref 6–8.4)
PROT SERPL-MCNC: 4.3 G/DL (ref 6–8.4)
PROT SERPL-MCNC: 4.4 G/DL (ref 6–8.4)
PROT SERPL-MCNC: 4.5 G/DL (ref 6–8.4)
PROT SERPL-MCNC: 4.5 G/DL (ref 6–8.4)
PROT SERPL-MCNC: 4.7 G/DL (ref 6–8.4)
PROT SERPL-MCNC: 4.9 G/DL (ref 6–8.4)
PROT SERPL-MCNC: 4.9 G/DL (ref 6–8.4)
PROT SERPL-MCNC: 5 G/DL (ref 6–8.4)
PROT SERPL-MCNC: 5 G/DL (ref 6–8.4)
PROT SERPL-MCNC: 5.2 G/DL (ref 6–8.4)
PROT SERPL-MCNC: 5.3 G/DL (ref 6–8.4)
PROT SERPL-MCNC: 5.4 G/DL (ref 6–8.4)
PROT SERPL-MCNC: 5.5 G/DL (ref 6–8.4)
PROT SERPL-MCNC: 5.6 G/DL (ref 6–8.4)
PROT SERPL-MCNC: 5.6 G/DL (ref 6–8.4)
PROT SERPL-MCNC: 5.7 G/DL (ref 6–8.4)
PROT SERPL-MCNC: 5.8 G/DL (ref 6–8.4)
PROT SERPL-MCNC: 5.9 G/DL (ref 6–8.4)
PROT SERPL-MCNC: 5.9 G/DL (ref 6–8.4)
PROT SERPL-MCNC: 6.2 G/DL (ref 6–8.4)
PROT SERPL-MCNC: 6.3 G/DL (ref 6–8.4)
PROT UR QL STRIP: ABNORMAL
PROT UR QL STRIP: ABNORMAL
PROTHROMBIN TIME: 11.4 SEC (ref 9–12.5)
PROTHROMBIN TIME: 11.6 SEC (ref 9–12.5)
PROTHROMBIN TIME: 11.9 SEC (ref 9–12.5)
PROTHROMBIN TIME: 12.2 SEC (ref 9–12.5)
PROTHROMBIN TIME: 12.3 SEC (ref 9–12.5)
PROTHROMBIN TIME: 12.4 SEC (ref 9–12.5)
PROTHROMBIN TIME: 13 SEC (ref 9–12.5)
PROTHROMBIN TIME: 13 SEC (ref 9–12.5)
PROTHROMBIN TIME: 13.8 SEC (ref 9–12.5)
PROTHROMBIN TIME: 13.9 SEC (ref 9–12.5)
PROTHROMBIN TIME: 13.9 SEC (ref 9–12.5)
PROTHROMBIN TIME: 16 SEC (ref 9–12.5)
PROTHROMBIN TIME: 16.2 SEC (ref 9–12.5)
PROTHROMBIN TIME: 18.5 SEC (ref 9–12.5)
PROTHROMBIN TIME: 19.6 SEC (ref 9–12.5)
PROTHROMBIN TIME: 20.4 SEC (ref 9–12.5)
PROTHROMBIN TIME: 25.2 SEC (ref 9–12.5)
PROTHROMBIN TIME: 31.5 SEC (ref 9–12.5)
PROTHROMBIN TIME: 36.1 SEC (ref 9–12.5)
PROTHROMBIN TIME: 38.3 SEC (ref 9–12.5)
PROTHROMBIN TIME: 38.3 SEC (ref 9–12.5)
PROTHROMBIN TIME: 52.5 SEC (ref 9–12.5)
PROTHROMBIN TIME: 55 SEC (ref 9–12.5)
PROTHROMBIN TIME: 66.2 SEC (ref 9–12.5)
PSA FREE MFR SERPL: 15.79 %
PSA FREE SERPL-MCNC: 0.3 NG/ML (ref 0–1.5)
PTH-INTACT SERPL-MCNC: 23.2 PG/ML (ref 9–77)
RA MAJOR: 4.95 CM
RA PRESSURE: 3 MMHG
RBC # BLD AUTO: 2.88 M/UL (ref 4.6–6.2)
RBC # BLD AUTO: 2.97 M/UL (ref 4.6–6.2)
RBC # BLD AUTO: 3.01 M/UL (ref 4.6–6.2)
RBC # BLD AUTO: 3.04 M/UL (ref 4.6–6.2)
RBC # BLD AUTO: 3.06 M/UL (ref 4.6–6.2)
RBC # BLD AUTO: 3.15 M/UL (ref 4.6–6.2)
RBC # BLD AUTO: 3.25 M/UL (ref 4.6–6.2)
RBC # BLD AUTO: 3.27 M/UL (ref 4.6–6.2)
RBC # BLD AUTO: 3.28 M/UL (ref 4.6–6.2)
RBC # BLD AUTO: 3.3 M/UL (ref 4.6–6.2)
RBC # BLD AUTO: 3.31 M/UL (ref 4.6–6.2)
RBC # BLD AUTO: 3.32 M/UL (ref 4.6–6.2)
RBC # BLD AUTO: 3.39 M/UL (ref 4.6–6.2)
RBC # BLD AUTO: 3.43 M/UL (ref 4.6–6.2)
RBC # BLD AUTO: 3.44 M/UL (ref 4.6–6.2)
RBC # BLD AUTO: 3.48 M/UL (ref 4.6–6.2)
RBC # BLD AUTO: 3.5 M/UL (ref 4.6–6.2)
RBC # BLD AUTO: 3.58 M/UL (ref 4.6–6.2)
RBC # BLD AUTO: 3.58 M/UL (ref 4.6–6.2)
RBC # BLD AUTO: 3.59 M/UL (ref 4.6–6.2)
RBC # BLD AUTO: 3.83 M/UL (ref 4.6–6.2)
RBC # BLD AUTO: 3.9 M/UL (ref 4.6–6.2)
RBC # BLD AUTO: 3.91 M/UL (ref 4.6–6.2)
RBC # BLD AUTO: 3.94 M/UL (ref 4.6–6.2)
RBC # BLD AUTO: 3.95 M/UL (ref 4.6–6.2)
RBC # BLD AUTO: 4.11 M/UL (ref 4.6–6.2)
RBC # BLD AUTO: 4.21 M/UL (ref 4.6–6.2)
RBC # BLD AUTO: 4.25 M/UL (ref 4.6–6.2)
RBC # BLD AUTO: 4.32 M/UL (ref 4.6–6.2)
RBC #/AREA URNS AUTO: 2 /HPF (ref 0–4)
RBC #/AREA URNS AUTO: 3 /HPF (ref 0–4)
RIGHT VENTRICULAR END-DIASTOLIC DIMENSION: 2.85 CM
RV TISSUE DOPPLER FREE WALL SYSTOLIC VELOCITY 1 (APICAL 4 CHAMBER VIEW): 9.36 CM/S
SAMPLE: ABNORMAL
SARS-COV-2 RDRP RESP QL NAA+PROBE: NEGATIVE
SATURATED IRON: 54 % (ref 20–50)
SINUS: 3.4 CM
SITE: ABNORMAL
SODIUM BLD-SCNC: 134 MMOL/L (ref 136–145)
SODIUM BLD-SCNC: 136 MMOL/L (ref 136–145)
SODIUM SERPL-SCNC: 131 MMOL/L (ref 136–145)
SODIUM SERPL-SCNC: 132 MMOL/L (ref 136–145)
SODIUM SERPL-SCNC: 133 MMOL/L (ref 136–145)
SODIUM SERPL-SCNC: 133 MMOL/L (ref 136–145)
SODIUM SERPL-SCNC: 134 MMOL/L (ref 136–145)
SODIUM SERPL-SCNC: 135 MMOL/L (ref 136–145)
SODIUM SERPL-SCNC: 136 MMOL/L (ref 136–145)
SODIUM SERPL-SCNC: 137 MMOL/L (ref 136–145)
SODIUM SERPL-SCNC: 137 MMOL/L (ref 136–145)
SODIUM SERPL-SCNC: 138 MMOL/L (ref 136–145)
SODIUM SERPL-SCNC: 139 MMOL/L (ref 136–145)
SODIUM SERPL-SCNC: 140 MMOL/L (ref 136–145)
SODIUM SERPL-SCNC: 141 MMOL/L (ref 136–145)
SODIUM SERPL-SCNC: 142 MMOL/L (ref 136–145)
SODIUM UR-SCNC: 57 MMOL/L (ref 20–250)
SP GR UR STRIP: 1.02 (ref 1–1.03)
SP GR UR STRIP: 1.02 (ref 1–1.03)
SPECIMEN SOURCE: NORMAL
SPHEROCYTES BLD QL SMEAR: ABNORMAL
SPHEROCYTES BLD QL SMEAR: ABNORMAL
SQUAMOUS #/AREA URNS AUTO: 0 /HPF
SQUAMOUS #/AREA URNS AUTO: 0 /HPF
STJ: 2.7 CM
TDI LATERAL: 0.06 M/S
TDI SEPTAL: 0.05 M/S
TDI: 0.06 M/S
TOTAL IRON BINDING CAPACITY: 295 UG/DL (ref 250–450)
TOXIC GRANULES BLD QL SMEAR: PRESENT
TRANS ERYTHROCYTES VOL PATIENT: NORMAL ML
TRANS ERYTHROCYTES VOL PATIENT: NORMAL ML
TRANSFERRIN SERPL-MCNC: 199 MG/DL (ref 200–375)
TRANSFERRIN SERPL-MCNC: 210 MG/DL (ref 200–375)
TRICUSPID ANNULAR PLANE SYSTOLIC EXCURSION: 1.69 CM
TROPONIN I SERPL DL<=0.01 NG/ML-MCNC: 0.01 NG/ML (ref 0–0.03)
TROPONIN I SERPL DL<=0.01 NG/ML-MCNC: 0.03 NG/ML (ref 0–0.03)
TROPONIN I SERPL DL<=0.01 NG/ML-MCNC: 0.03 NG/ML (ref 0–0.03)
TSH SERPL DL<=0.005 MIU/L-ACNC: 1.54 UIU/ML (ref 0.4–4)
URATE SERPL-MCNC: 10.2 MG/DL (ref 3.4–7)
URATE SERPL-MCNC: 4.4 MG/DL (ref 3.4–7)
URATE SERPL-MCNC: 4.4 MG/DL (ref 3.4–7)
URATE SERPL-MCNC: 5.1 MG/DL (ref 3.4–7)
URATE SERPL-MCNC: 5.3 MG/DL (ref 3.4–7)
URATE SERPL-MCNC: 6.5 MG/DL (ref 3.4–7)
URATE SERPL-MCNC: 7.7 MG/DL (ref 3.4–7)
URATE SERPL-MCNC: 7.7 MG/DL (ref 3.4–7)
URATE SERPL-MCNC: 8.9 MG/DL (ref 3.4–7)
URATE SERPL-MCNC: 9.4 MG/DL (ref 3.4–7)
URN SPEC COLLECT METH UR: ABNORMAL
URN SPEC COLLECT METH UR: ABNORMAL
UUN UR-MCNC: 763 MG/DL (ref 140–1050)
VANCOMYCIN TROUGH SERPL-MCNC: 10.6 UG/ML (ref 10–22)
VANCOMYCIN TROUGH SERPL-MCNC: 17.8 UG/ML (ref 10–22)
WBC # BLD AUTO: 0.11 K/UL (ref 3.9–12.7)
WBC # BLD AUTO: 0.14 K/UL (ref 3.9–12.7)
WBC # BLD AUTO: 0.17 K/UL (ref 3.9–12.7)
WBC # BLD AUTO: 0.23 K/UL (ref 3.9–12.7)
WBC # BLD AUTO: 0.37 K/UL (ref 3.9–12.7)
WBC # BLD AUTO: 1.12 K/UL (ref 3.9–12.7)
WBC # BLD AUTO: 1.69 K/UL (ref 3.9–12.7)
WBC # BLD AUTO: 10 K/UL (ref 3.9–12.7)
WBC # BLD AUTO: 10.07 K/UL (ref 3.9–12.7)
WBC # BLD AUTO: 10.22 K/UL (ref 3.9–12.7)
WBC # BLD AUTO: 10.34 K/UL (ref 3.9–12.7)
WBC # BLD AUTO: 10.95 K/UL (ref 3.9–12.7)
WBC # BLD AUTO: 11.08 K/UL (ref 3.9–12.7)
WBC # BLD AUTO: 11.52 K/UL (ref 3.9–12.7)
WBC # BLD AUTO: 13.89 K/UL (ref 3.9–12.7)
WBC # BLD AUTO: 15.02 K/UL (ref 3.9–12.7)
WBC # BLD AUTO: 17.06 K/UL (ref 3.9–12.7)
WBC # BLD AUTO: 20.24 K/UL (ref 3.9–12.7)
WBC # BLD AUTO: 6.79 K/UL (ref 3.9–12.7)
WBC # BLD AUTO: 8.64 K/UL (ref 3.9–12.7)
WBC # BLD AUTO: 8.81 K/UL (ref 3.9–12.7)
WBC # BLD AUTO: 8.98 K/UL (ref 3.9–12.7)
WBC # BLD AUTO: 9.16 K/UL (ref 3.9–12.7)
WBC # BLD AUTO: 9.17 K/UL (ref 3.9–12.7)
WBC # BLD AUTO: 9.18 K/UL (ref 3.9–12.7)
WBC # BLD AUTO: 9.46 K/UL (ref 3.9–12.7)
WBC # BLD AUTO: 9.5 K/UL (ref 3.9–12.7)
WBC # BLD AUTO: 9.69 K/UL (ref 3.9–12.7)
WBC # BLD AUTO: 9.71 K/UL (ref 3.9–12.7)
WBC # FLD: 325 /CU MM
WBC #/AREA URNS AUTO: 1 /HPF (ref 0–5)
WBC #/AREA URNS AUTO: 3 /HPF (ref 0–5)

## 2022-01-01 PROCEDURE — 25000003 PHARM REV CODE 250: Performed by: STUDENT IN AN ORGANIZED HEALTH CARE EDUCATION/TRAINING PROGRAM

## 2022-01-01 PROCEDURE — 94761 N-INVAS EAR/PLS OXIMETRY MLT: CPT

## 2022-01-01 PROCEDURE — 63600175 PHARM REV CODE 636 W HCPCS: Performed by: NURSE ANESTHETIST, CERTIFIED REGISTERED

## 2022-01-01 PROCEDURE — 99233 SBSQ HOSP IP/OBS HIGH 50: CPT | Mod: 95,,, | Performed by: CLINICAL NURSE SPECIALIST

## 2022-01-01 PROCEDURE — 25000242 PHARM REV CODE 250 ALT 637 W/ HCPCS: Performed by: INTERNAL MEDICINE

## 2022-01-01 PROCEDURE — 76937 US GUIDE VASCULAR ACCESS: CPT | Mod: 26,,, | Performed by: ANESTHESIOLOGY

## 2022-01-01 PROCEDURE — 93308 PR ECHO HEART XTHORACIC,LIMITED: ICD-10-PCS | Mod: 26,,, | Performed by: EMERGENCY MEDICINE

## 2022-01-01 PROCEDURE — 84100 ASSAY OF PHOSPHORUS: CPT | Mod: 91

## 2022-01-01 PROCEDURE — 88311 DECALCIFY TISSUE: CPT | Mod: 26,,, | Performed by: PATHOLOGY

## 2022-01-01 PROCEDURE — 63600175 PHARM REV CODE 636 W HCPCS: Performed by: STUDENT IN AN ORGANIZED HEALTH CARE EDUCATION/TRAINING PROGRAM

## 2022-01-01 PROCEDURE — 85025 COMPLETE CBC W/AUTO DIFF WBC: CPT

## 2022-01-01 PROCEDURE — 82803 BLOOD GASES ANY COMBINATION: CPT

## 2022-01-01 PROCEDURE — 97535 SELF CARE MNGMENT TRAINING: CPT

## 2022-01-01 PROCEDURE — 87070 CULTURE OTHR SPECIMN AEROBIC: CPT | Performed by: INTERNAL MEDICINE

## 2022-01-01 PROCEDURE — 63600175 PHARM REV CODE 636 W HCPCS: Performed by: INTERNAL MEDICINE

## 2022-01-01 PROCEDURE — 83605 ASSAY OF LACTIC ACID: CPT | Performed by: STUDENT IN AN ORGANIZED HEALTH CARE EDUCATION/TRAINING PROGRAM

## 2022-01-01 PROCEDURE — 64999 UNLISTED PX NERVOUS SYSTEM: CPT | Mod: ,,, | Performed by: ANESTHESIOLOGY

## 2022-01-01 PROCEDURE — 94668 MNPJ CHEST WALL SBSQ: CPT

## 2022-01-01 PROCEDURE — 85610 PROTHROMBIN TIME: CPT | Performed by: HOSPITALIST

## 2022-01-01 PROCEDURE — 85610 PROTHROMBIN TIME: CPT | Mod: 91

## 2022-01-01 PROCEDURE — 99900035 HC TECH TIME PER 15 MIN (STAT)

## 2022-01-01 PROCEDURE — 36430 TRANSFUSION BLD/BLD COMPNT: CPT

## 2022-01-01 PROCEDURE — 20000000 HC ICU ROOM

## 2022-01-01 PROCEDURE — 36000707: Performed by: SURGERY

## 2022-01-01 PROCEDURE — 99233 PR SUBSEQUENT HOSPITAL CARE,LEVL III: ICD-10-PCS | Mod: 95,,, | Performed by: CLINICAL NURSE SPECIALIST

## 2022-01-01 PROCEDURE — 88341 IMHCHEM/IMCYTCHM EA ADD ANTB: CPT | Mod: 26,,, | Performed by: PATHOLOGY

## 2022-01-01 PROCEDURE — 84153 ASSAY OF PSA TOTAL: CPT

## 2022-01-01 PROCEDURE — 80053 COMPREHEN METABOLIC PANEL: CPT | Performed by: STUDENT IN AN ORGANIZED HEALTH CARE EDUCATION/TRAINING PROGRAM

## 2022-01-01 PROCEDURE — 63600175 PHARM REV CODE 636 W HCPCS

## 2022-01-01 PROCEDURE — 25000003 PHARM REV CODE 250: Performed by: INTERNAL MEDICINE

## 2022-01-01 PROCEDURE — 85027 COMPLETE CBC AUTOMATED: CPT

## 2022-01-01 PROCEDURE — 88305 TISSUE EXAM BY PATHOLOGIST: CPT | Performed by: PATHOLOGY

## 2022-01-01 PROCEDURE — 27100171 HC OXYGEN HIGH FLOW UP TO 24 HOURS

## 2022-01-01 PROCEDURE — 85730 THROMBOPLASTIN TIME PARTIAL: CPT | Mod: 91 | Performed by: HOSPITALIST

## 2022-01-01 PROCEDURE — 99291 PR CRITICAL CARE, E/M 30-74 MINUTES: ICD-10-PCS | Mod: ,,, | Performed by: NURSE PRACTITIONER

## 2022-01-01 PROCEDURE — 99233 PR SUBSEQUENT HOSPITAL CARE,LEVL III: ICD-10-PCS | Mod: ,,, | Performed by: NURSE PRACTITIONER

## 2022-01-01 PROCEDURE — 87040 BLOOD CULTURE FOR BACTERIA: CPT

## 2022-01-01 PROCEDURE — 99223 PR INITIAL HOSPITAL CARE,LEVL III: ICD-10-PCS | Mod: ,,, | Performed by: PSYCHIATRY & NEUROLOGY

## 2022-01-01 PROCEDURE — 27000221 HC OXYGEN, UP TO 24 HOURS

## 2022-01-01 PROCEDURE — 97165 OT EVAL LOW COMPLEX 30 MIN: CPT

## 2022-01-01 PROCEDURE — 99233 PR SUBSEQUENT HOSPITAL CARE,LEVL III: ICD-10-PCS | Mod: ,,, | Performed by: INTERNAL MEDICINE

## 2022-01-01 PROCEDURE — 94660 CPAP INITIATION&MGMT: CPT

## 2022-01-01 PROCEDURE — 99232 PR SUBSEQUENT HOSPITAL CARE,LEVL II: ICD-10-PCS | Mod: ,,, | Performed by: INTERNAL MEDICINE

## 2022-01-01 PROCEDURE — 75710 PR  ANGIO EXTREMITY UNILAT: ICD-10-PCS | Mod: 26,59,, | Performed by: SURGERY

## 2022-01-01 PROCEDURE — P9612 CATHETERIZE FOR URINE SPEC: HCPCS

## 2022-01-01 PROCEDURE — 85610 PROTHROMBIN TIME: CPT | Performed by: STUDENT IN AN ORGANIZED HEALTH CARE EDUCATION/TRAINING PROGRAM

## 2022-01-01 PROCEDURE — 85025 COMPLETE CBC W/AUTO DIFF WBC: CPT | Performed by: NURSE PRACTITIONER

## 2022-01-01 PROCEDURE — 36415 COLL VENOUS BLD VENIPUNCTURE: CPT | Performed by: NURSE PRACTITIONER

## 2022-01-01 PROCEDURE — 25000003 PHARM REV CODE 250

## 2022-01-01 PROCEDURE — 99233 PR SUBSEQUENT HOSPITAL CARE,LEVL III: ICD-10-PCS | Mod: ,,, | Performed by: STUDENT IN AN ORGANIZED HEALTH CARE EDUCATION/TRAINING PROGRAM

## 2022-01-01 PROCEDURE — 63600175 PHARM REV CODE 636 W HCPCS: Performed by: NURSE PRACTITIONER

## 2022-01-01 PROCEDURE — 84466 ASSAY OF TRANSFERRIN: CPT | Performed by: INTERNAL MEDICINE

## 2022-01-01 PROCEDURE — 11000001 HC ACUTE MED/SURG PRIVATE ROOM

## 2022-01-01 PROCEDURE — 93010 ELECTROCARDIOGRAM REPORT: CPT | Mod: ,,, | Performed by: INTERNAL MEDICINE

## 2022-01-01 PROCEDURE — 84100 ASSAY OF PHOSPHORUS: CPT

## 2022-01-01 PROCEDURE — 93010 EKG 12-LEAD: ICD-10-PCS | Mod: ,,, | Performed by: INTERNAL MEDICINE

## 2022-01-01 PROCEDURE — 71000033 HC RECOVERY, INTIAL HOUR: Performed by: ORTHOPAEDIC SURGERY

## 2022-01-01 PROCEDURE — 99497 PR ADVNCD CARE PLAN 30 MIN: ICD-10-PCS | Mod: ,,, | Performed by: CLINICAL NURSE SPECIALIST

## 2022-01-01 PROCEDURE — 99291 CRITICAL CARE FIRST HOUR: CPT | Mod: ,,, | Performed by: NURSE PRACTITIONER

## 2022-01-01 PROCEDURE — 82570 ASSAY OF URINE CREATININE: CPT | Performed by: INTERNAL MEDICINE

## 2022-01-01 PROCEDURE — 20600001 HC STEP DOWN PRIVATE ROOM

## 2022-01-01 PROCEDURE — 32551 INSERTION OF CHEST TUBE: CPT | Mod: LT,,, | Performed by: INTERNAL MEDICINE

## 2022-01-01 PROCEDURE — 83735 ASSAY OF MAGNESIUM: CPT

## 2022-01-01 PROCEDURE — 89051 BODY FLUID CELL COUNT: CPT | Performed by: INTERNAL MEDICINE

## 2022-01-01 PROCEDURE — 88342 IMHCHEM/IMCYTCHM 1ST ANTB: CPT | Mod: 26,,, | Performed by: PATHOLOGY

## 2022-01-01 PROCEDURE — 92960 CARDIOVERSION ELECTRIC EXT: CPT | Performed by: INTERNAL MEDICINE

## 2022-01-01 PROCEDURE — 64448 NJX AA&/STRD FEM NRV NFS IMG: CPT | Performed by: STUDENT IN AN ORGANIZED HEALTH CARE EDUCATION/TRAINING PROGRAM

## 2022-01-01 PROCEDURE — 63600175 PHARM REV CODE 636 W HCPCS: Mod: JG | Performed by: INTERNAL MEDICINE

## 2022-01-01 PROCEDURE — 88305 TISSUE EXAM BY PATHOLOGIST: CPT | Mod: 26,,, | Performed by: PATHOLOGY

## 2022-01-01 PROCEDURE — 83615 LACTATE (LD) (LDH) ENZYME: CPT | Performed by: STUDENT IN AN ORGANIZED HEALTH CARE EDUCATION/TRAINING PROGRAM

## 2022-01-01 PROCEDURE — 36620 ARTERIAL: ICD-10-PCS | Mod: 59,,, | Performed by: ANESTHESIOLOGY

## 2022-01-01 PROCEDURE — 84157 ASSAY OF PROTEIN OTHER: CPT | Performed by: INTERNAL MEDICINE

## 2022-01-01 PROCEDURE — 25000003 PHARM REV CODE 250: Performed by: HOSPITALIST

## 2022-01-01 PROCEDURE — 99152 PR MOD CONSCIOUS SEDATION, SAME PHYS, 5+ YRS, FIRST 15 MIN: ICD-10-PCS | Mod: ,,, | Performed by: EMERGENCY MEDICINE

## 2022-01-01 PROCEDURE — 83735 ASSAY OF MAGNESIUM: CPT | Performed by: INTERNAL MEDICINE

## 2022-01-01 PROCEDURE — 99239 PR HOSPITAL DISCHARGE DAY,>30 MIN: ICD-10-PCS | Mod: ,,, | Performed by: INTERNAL MEDICINE

## 2022-01-01 PROCEDURE — 99233 SBSQ HOSP IP/OBS HIGH 50: CPT | Mod: ,,, | Performed by: INTERNAL MEDICINE

## 2022-01-01 PROCEDURE — 36415 COLL VENOUS BLD VENIPUNCTURE: CPT | Performed by: HOSPITALIST

## 2022-01-01 PROCEDURE — D9220A PRA ANESTHESIA: Mod: ANES,,, | Performed by: ANESTHESIOLOGY

## 2022-01-01 PROCEDURE — 84100 ASSAY OF PHOSPHORUS: CPT | Performed by: HOSPITALIST

## 2022-01-01 PROCEDURE — 31720 CLEARANCE OF AIRWAYS: CPT

## 2022-01-01 PROCEDURE — 37000008 HC ANESTHESIA 1ST 15 MINUTES: Performed by: SURGERY

## 2022-01-01 PROCEDURE — C9113 INJ PANTOPRAZOLE SODIUM, VIA: HCPCS

## 2022-01-01 PROCEDURE — 37000009 HC ANESTHESIA EA ADD 15 MINS: Performed by: SURGERY

## 2022-01-01 PROCEDURE — 99497 PR ADVNCD CARE PLAN 30 MIN: ICD-10-PCS | Mod: ,,, | Performed by: STUDENT IN AN ORGANIZED HEALTH CARE EDUCATION/TRAINING PROGRAM

## 2022-01-01 PROCEDURE — P9017 PLASMA 1 DONOR FRZ W/IN 8 HR: HCPCS | Performed by: INTERNAL MEDICINE

## 2022-01-01 PROCEDURE — 93005 ELECTROCARDIOGRAM TRACING: CPT

## 2022-01-01 PROCEDURE — D9220A PRA ANESTHESIA: ICD-10-PCS | Mod: CRNA,,, | Performed by: NURSE ANESTHETIST, CERTIFIED REGISTERED

## 2022-01-01 PROCEDURE — 36247 PR PLACE CATH SUBSUBSELECT ART,ABD/PEL: ICD-10-PCS | Mod: 51,RT,, | Performed by: SURGERY

## 2022-01-01 PROCEDURE — C1751 CATH, INF, PER/CENT/MIDLINE: HCPCS | Performed by: STUDENT IN AN ORGANIZED HEALTH CARE EDUCATION/TRAINING PROGRAM

## 2022-01-01 PROCEDURE — 94640 AIRWAY INHALATION TREATMENT: CPT

## 2022-01-01 PROCEDURE — 76937 ARTERIAL: ICD-10-PCS | Mod: 26,,, | Performed by: ANESTHESIOLOGY

## 2022-01-01 PROCEDURE — 80053 COMPREHEN METABOLIC PANEL: CPT

## 2022-01-01 PROCEDURE — 97530 THERAPEUTIC ACTIVITIES: CPT

## 2022-01-01 PROCEDURE — D9220A PRA ANESTHESIA: Mod: CRNA,,, | Performed by: NURSE ANESTHETIST, CERTIFIED REGISTERED

## 2022-01-01 PROCEDURE — 97535 SELF CARE MNGMENT TRAINING: CPT | Mod: CO

## 2022-01-01 PROCEDURE — 99497 PR ADVNCD CARE PLAN 30 MIN: ICD-10-PCS | Mod: 25,,, | Performed by: CLINICAL NURSE SPECIALIST

## 2022-01-01 PROCEDURE — 85025 COMPLETE CBC W/AUTO DIFF WBC: CPT | Performed by: STUDENT IN AN ORGANIZED HEALTH CARE EDUCATION/TRAINING PROGRAM

## 2022-01-01 PROCEDURE — 25000003 PHARM REV CODE 250: Performed by: NURSE ANESTHETIST, CERTIFIED REGISTERED

## 2022-01-01 PROCEDURE — 76942 ECHO GUIDE FOR BIOPSY: CPT | Mod: 26,,, | Performed by: ANESTHESIOLOGY

## 2022-01-01 PROCEDURE — 90791 PR PSYCHIATRIC DIAGNOSTIC EVALUATION: ICD-10-PCS | Mod: AH,HB,, | Performed by: PSYCHOLOGIST

## 2022-01-01 PROCEDURE — 99497 ADVNCD CARE PLAN 30 MIN: CPT | Mod: ,,, | Performed by: CLINICAL NURSE SPECIALIST

## 2022-01-01 PROCEDURE — 85610 PROTHROMBIN TIME: CPT | Performed by: NURSE PRACTITIONER

## 2022-01-01 PROCEDURE — 94664 DEMO&/EVAL PT USE INHALER: CPT

## 2022-01-01 PROCEDURE — 99152 MOD SED SAME PHYS/QHP 5/>YRS: CPT | Mod: ,,, | Performed by: EMERGENCY MEDICINE

## 2022-01-01 PROCEDURE — 27236 TREAT THIGH FRACTURE: CPT | Mod: RT,,, | Performed by: ORTHOPAEDIC SURGERY

## 2022-01-01 PROCEDURE — 99233 PR SUBSEQUENT HOSPITAL CARE,LEVL III: ICD-10-PCS | Mod: FS,,, | Performed by: INTERNAL MEDICINE

## 2022-01-01 PROCEDURE — 85730 THROMBOPLASTIN TIME PARTIAL: CPT | Performed by: INTERNAL MEDICINE

## 2022-01-01 PROCEDURE — 27800903 OPTIME MED/SURG SUP & DEVICES OTHER IMPLANTS: Performed by: SURGERY

## 2022-01-01 PROCEDURE — 86901 BLOOD TYPING SEROLOGIC RH(D): CPT | Performed by: INTERNAL MEDICINE

## 2022-01-01 PROCEDURE — 99223 PR INITIAL HOSPITAL CARE,LEVL III: ICD-10-PCS | Mod: ,,, | Performed by: CLINICAL NURSE SPECIALIST

## 2022-01-01 PROCEDURE — 75710 ARTERY X-RAYS ARM/LEG: CPT | Mod: 26,59,, | Performed by: SURGERY

## 2022-01-01 PROCEDURE — 97161 PT EVAL LOW COMPLEX 20 MIN: CPT

## 2022-01-01 PROCEDURE — 32554 PR THORACEN W/O IMAG GUIDANC: ICD-10-PCS | Mod: 59,RT,, | Performed by: INTERNAL MEDICINE

## 2022-01-01 PROCEDURE — 85730 THROMBOPLASTIN TIME PARTIAL: CPT | Performed by: NURSE PRACTITIONER

## 2022-01-01 PROCEDURE — 63600175 PHARM REV CODE 636 W HCPCS: Mod: JG | Performed by: STUDENT IN AN ORGANIZED HEALTH CARE EDUCATION/TRAINING PROGRAM

## 2022-01-01 PROCEDURE — A4216 STERILE WATER/SALINE, 10 ML: HCPCS | Performed by: INTERNAL MEDICINE

## 2022-01-01 PROCEDURE — 88311 DECALCIFY TISSUE: CPT | Performed by: PATHOLOGY

## 2022-01-01 PROCEDURE — 25000242 PHARM REV CODE 250 ALT 637 W/ HCPCS: Performed by: STUDENT IN AN ORGANIZED HEALTH CARE EDUCATION/TRAINING PROGRAM

## 2022-01-01 PROCEDURE — 88112 PR  CYTOPATH, CELL ENHANCE TECH: ICD-10-PCS | Mod: 26,,, | Performed by: PATHOLOGY

## 2022-01-01 PROCEDURE — 82232 ASSAY OF BETA-2 PROTEIN: CPT

## 2022-01-01 PROCEDURE — 84100 ASSAY OF PHOSPHORUS: CPT | Performed by: INTERNAL MEDICINE

## 2022-01-01 PROCEDURE — 99233 SBSQ HOSP IP/OBS HIGH 50: CPT | Mod: ,,, | Performed by: HOSPITALIST

## 2022-01-01 PROCEDURE — D9220A PRA ANESTHESIA: ICD-10-PCS | Mod: ANES,,, | Performed by: ANESTHESIOLOGY

## 2022-01-01 PROCEDURE — 99231 SBSQ HOSP IP/OBS SF/LOW 25: CPT | Mod: ,,, | Performed by: ANESTHESIOLOGY

## 2022-01-01 PROCEDURE — C1887 CATHETER, GUIDING: HCPCS | Performed by: SURGERY

## 2022-01-01 PROCEDURE — 92960 PR CARDIOVERSION, ELECTIVE;EXTERN: ICD-10-PCS | Mod: ,,, | Performed by: INTERNAL MEDICINE

## 2022-01-01 PROCEDURE — 36620 INSERTION CATHETER ARTERY: CPT | Mod: 59,,, | Performed by: ANESTHESIOLOGY

## 2022-01-01 PROCEDURE — 92960 PR CARDIOVERSION, ELECTIVE;EXTERN: ICD-10-PCS | Mod: ,,, | Performed by: EMERGENCY MEDICINE

## 2022-01-01 PROCEDURE — 80053 COMPREHEN METABOLIC PANEL: CPT | Mod: 91 | Performed by: INTERNAL MEDICINE

## 2022-01-01 PROCEDURE — 81001 URINALYSIS AUTO W/SCOPE: CPT | Performed by: STUDENT IN AN ORGANIZED HEALTH CARE EDUCATION/TRAINING PROGRAM

## 2022-01-01 PROCEDURE — 99233 SBSQ HOSP IP/OBS HIGH 50: CPT | Mod: ,,, | Performed by: STUDENT IN AN ORGANIZED HEALTH CARE EDUCATION/TRAINING PROGRAM

## 2022-01-01 PROCEDURE — 93308 TTE F-UP OR LMTD: CPT | Mod: 26,,, | Performed by: EMERGENCY MEDICINE

## 2022-01-01 PROCEDURE — 37000008 HC ANESTHESIA 1ST 15 MINUTES: Performed by: INTERNAL MEDICINE

## 2022-01-01 PROCEDURE — 99222 1ST HOSP IP/OBS MODERATE 55: CPT | Mod: ,,, | Performed by: INTERNAL MEDICINE

## 2022-01-01 PROCEDURE — 27000190 HC CPAP FULL FACE MASK W/VALVE

## 2022-01-01 PROCEDURE — 80202 ASSAY OF VANCOMYCIN: CPT | Performed by: INTERNAL MEDICINE

## 2022-01-01 PROCEDURE — 99223 PR INITIAL HOSPITAL CARE,LEVL III: ICD-10-PCS | Mod: ,,, | Performed by: INTERNAL MEDICINE

## 2022-01-01 PROCEDURE — 84165 PROTEIN E-PHORESIS SERUM: CPT

## 2022-01-01 PROCEDURE — 99233 PR SUBSEQUENT HOSPITAL CARE,LEVL III: ICD-10-PCS | Mod: 95,,, | Performed by: INTERNAL MEDICINE

## 2022-01-01 PROCEDURE — 90832 PR PSYCHOTHERAPY W/PATIENT, 30 MIN: ICD-10-PCS | Mod: AH,HB,, | Performed by: PSYCHOLOGIST

## 2022-01-01 PROCEDURE — 85347 COAGULATION TIME ACTIVATED: CPT

## 2022-01-01 PROCEDURE — 27236 PR FEMORAL FX, OPEN TX: ICD-10-PCS | Mod: RT,,, | Performed by: ORTHOPAEDIC SURGERY

## 2022-01-01 PROCEDURE — 86900 BLOOD TYPING SEROLOGIC ABO: CPT

## 2022-01-01 PROCEDURE — 85730 THROMBOPLASTIN TIME PARTIAL: CPT | Mod: 91

## 2022-01-01 PROCEDURE — 84100 ASSAY OF PHOSPHORUS: CPT | Performed by: STUDENT IN AN ORGANIZED HEALTH CARE EDUCATION/TRAINING PROGRAM

## 2022-01-01 PROCEDURE — 99291 CRITICAL CARE FIRST HOUR: CPT | Mod: 25,CS,, | Performed by: EMERGENCY MEDICINE

## 2022-01-01 PROCEDURE — 99232 SBSQ HOSP IP/OBS MODERATE 35: CPT | Mod: ,,, | Performed by: INTERNAL MEDICINE

## 2022-01-01 PROCEDURE — 27000646 HC AEROBIKA DEVICE

## 2022-01-01 PROCEDURE — 25000003 PHARM REV CODE 250: Performed by: NURSE PRACTITIONER

## 2022-01-01 PROCEDURE — 76937 US GUIDE VASCULAR ACCESS: CPT

## 2022-01-01 PROCEDURE — 86901 BLOOD TYPING SEROLOGIC RH(D): CPT

## 2022-01-01 PROCEDURE — 85730 THROMBOPLASTIN TIME PARTIAL: CPT | Mod: 91 | Performed by: INTERNAL MEDICINE

## 2022-01-01 PROCEDURE — 84100 ASSAY OF PHOSPHORUS: CPT | Mod: 91 | Performed by: INTERNAL MEDICINE

## 2022-01-01 PROCEDURE — 32554 ASPIRATE PLEURA W/O IMAGING: CPT | Mod: 59,RT,, | Performed by: INTERNAL MEDICINE

## 2022-01-01 PROCEDURE — P9047 ALBUMIN (HUMAN), 25%, 50ML: HCPCS | Mod: JG | Performed by: INTERNAL MEDICINE

## 2022-01-01 PROCEDURE — 99497 ADVNCD CARE PLAN 30 MIN: CPT | Mod: 25,,, | Performed by: CLINICAL NURSE SPECIALIST

## 2022-01-01 PROCEDURE — 25500020 PHARM REV CODE 255: Performed by: STUDENT IN AN ORGANIZED HEALTH CARE EDUCATION/TRAINING PROGRAM

## 2022-01-01 PROCEDURE — 88342 CHG IMMUNOCYTOCHEMISTRY: ICD-10-PCS | Mod: 26,,, | Performed by: PATHOLOGY

## 2022-01-01 PROCEDURE — 87205 SMEAR GRAM STAIN: CPT

## 2022-01-01 PROCEDURE — 36247 INS CATH ABD/L-EXT ART 3RD: CPT | Mod: 51,RT,, | Performed by: SURGERY

## 2022-01-01 PROCEDURE — 83735 ASSAY OF MAGNESIUM: CPT | Performed by: STUDENT IN AN ORGANIZED HEALTH CARE EDUCATION/TRAINING PROGRAM

## 2022-01-01 PROCEDURE — 37000009 HC ANESTHESIA EA ADD 15 MINS: Performed by: INTERNAL MEDICINE

## 2022-01-01 PROCEDURE — 85610 PROTHROMBIN TIME: CPT | Performed by: INTERNAL MEDICINE

## 2022-01-01 PROCEDURE — 64999 RIGHT SIFI CATHETER: ICD-10-PCS | Mod: ,,, | Performed by: ANESTHESIOLOGY

## 2022-01-01 PROCEDURE — 88305 TISSUE EXAM BY PATHOLOGIST: ICD-10-PCS | Mod: 26,,, | Performed by: PATHOLOGY

## 2022-01-01 PROCEDURE — 83615 LACTATE (LD) (LDH) ENZYME: CPT

## 2022-01-01 PROCEDURE — 37000008 HC ANESTHESIA 1ST 15 MINUTES: Performed by: ORTHOPAEDIC SURGERY

## 2022-01-01 PROCEDURE — C1729 CATH, DRAINAGE: HCPCS

## 2022-01-01 PROCEDURE — 99233 SBSQ HOSP IP/OBS HIGH 50: CPT | Mod: 25,,, | Performed by: INTERNAL MEDICINE

## 2022-01-01 PROCEDURE — 97116 GAIT TRAINING THERAPY: CPT | Mod: CQ

## 2022-01-01 PROCEDURE — 20982 PR ABLATE, BONE TUMOR(S) PERQ: ICD-10-PCS | Mod: 51,,, | Performed by: ORTHOPAEDIC SURGERY

## 2022-01-01 PROCEDURE — 92960 CARDIOVERSION ELECTRIC EXT: CPT | Mod: ,,, | Performed by: INTERNAL MEDICINE

## 2022-01-01 PROCEDURE — 94799 UNLISTED PULMONARY SVC/PX: CPT

## 2022-01-01 PROCEDURE — 82945 GLUCOSE OTHER FLUID: CPT | Performed by: INTERNAL MEDICINE

## 2022-01-01 PROCEDURE — 97530 THERAPEUTIC ACTIVITIES: CPT | Mod: CQ

## 2022-01-01 PROCEDURE — 96365 THER/PROPH/DIAG IV INF INIT: CPT

## 2022-01-01 PROCEDURE — 97112 NEUROMUSCULAR REEDUCATION: CPT

## 2022-01-01 PROCEDURE — 83615 LACTATE (LD) (LDH) ENZYME: CPT | Performed by: INTERNAL MEDICINE

## 2022-01-01 PROCEDURE — 87205 SMEAR GRAM STAIN: CPT | Performed by: INTERNAL MEDICINE

## 2022-01-01 PROCEDURE — 99292 CRITICAL CARE ADDL 30 MIN: CPT | Mod: 25

## 2022-01-01 PROCEDURE — 32551 PR TUBE THORACOSTOMY INCLUDES WATER SEAL: ICD-10-PCS | Mod: LT,,, | Performed by: INTERNAL MEDICINE

## 2022-01-01 PROCEDURE — 99233 PR SUBSEQUENT HOSPITAL CARE,LEVL III: ICD-10-PCS | Mod: ,,, | Performed by: PSYCHIATRY & NEUROLOGY

## 2022-01-01 PROCEDURE — 94667 MNPJ CHEST WALL 1ST: CPT

## 2022-01-01 PROCEDURE — U0002 COVID-19 LAB TEST NON-CDC: HCPCS | Performed by: STUDENT IN AN ORGANIZED HEALTH CARE EDUCATION/TRAINING PROGRAM

## 2022-01-01 PROCEDURE — 82962 GLUCOSE BLOOD TEST: CPT | Performed by: ORTHOPAEDIC SURGERY

## 2022-01-01 PROCEDURE — 84165 PATHOLOGIST INTERPRETATION SPE: ICD-10-PCS | Mod: 26,,, | Performed by: PATHOLOGY

## 2022-01-01 PROCEDURE — 76942 RIGHT SIFI CATHETER: ICD-10-PCS | Mod: 26,,, | Performed by: ANESTHESIOLOGY

## 2022-01-01 PROCEDURE — 27201423 OPTIME MED/SURG SUP & DEVICES STERILE SUPPLY: Performed by: SURGERY

## 2022-01-01 PROCEDURE — 99223 PR INITIAL HOSPITAL CARE,LEVL III: ICD-10-PCS | Mod: ,,, | Performed by: STUDENT IN AN ORGANIZED HEALTH CARE EDUCATION/TRAINING PROGRAM

## 2022-01-01 PROCEDURE — 25000242 PHARM REV CODE 250 ALT 637 W/ HCPCS: Performed by: NURSE PRACTITIONER

## 2022-01-01 PROCEDURE — 99233 SBSQ HOSP IP/OBS HIGH 50: CPT | Mod: FS,,, | Performed by: INTERNAL MEDICINE

## 2022-01-01 PROCEDURE — 20982 ABLATE BONE TUMOR(S) PERQ: CPT | Mod: 51,,, | Performed by: ORTHOPAEDIC SURGERY

## 2022-01-01 PROCEDURE — 51798 US URINE CAPACITY MEASURE: CPT

## 2022-01-01 PROCEDURE — 97116 GAIT TRAINING THERAPY: CPT

## 2022-01-01 PROCEDURE — 99223 PR INITIAL HOSPITAL CARE,LEVL III: ICD-10-PCS | Mod: 25,,, | Performed by: SURGERY

## 2022-01-01 PROCEDURE — 99233 PR SUBSEQUENT HOSPITAL CARE,LEVL III: ICD-10-PCS | Mod: 25,,, | Performed by: INTERNAL MEDICINE

## 2022-01-01 PROCEDURE — 88341 IMHCHEM/IMCYTCHM EA ADD ANTB: CPT | Performed by: PATHOLOGY

## 2022-01-01 PROCEDURE — 92960 CARDIOVERSION ELECTRIC EXT: CPT | Mod: ,,, | Performed by: EMERGENCY MEDICINE

## 2022-01-01 PROCEDURE — 27200966 HC CLOSED SUCTION SYSTEM

## 2022-01-01 PROCEDURE — 99223 1ST HOSP IP/OBS HIGH 75: CPT | Mod: ,,, | Performed by: NURSE PRACTITIONER

## 2022-01-01 PROCEDURE — 85610 PROTHROMBIN TIME: CPT

## 2022-01-01 PROCEDURE — 25500020 PHARM REV CODE 255: Performed by: ORTHOPAEDIC SURGERY

## 2022-01-01 PROCEDURE — 36600 WITHDRAWAL OF ARTERIAL BLOOD: CPT

## 2022-01-01 PROCEDURE — 25500020 PHARM REV CODE 255: Performed by: SURGERY

## 2022-01-01 PROCEDURE — 71000016 HC POSTOP RECOV ADDL HR: Performed by: ORTHOPAEDIC SURGERY

## 2022-01-01 PROCEDURE — 99223 PR INITIAL HOSPITAL CARE,LEVL III: ICD-10-PCS | Mod: ,,, | Performed by: NURSE PRACTITIONER

## 2022-01-01 PROCEDURE — D9220A PRA ANESTHESIA: ICD-10-PCS | Mod: CRNA,,, | Performed by: STUDENT IN AN ORGANIZED HEALTH CARE EDUCATION/TRAINING PROGRAM

## 2022-01-01 PROCEDURE — 80048 BASIC METABOLIC PNL TOTAL CA: CPT | Mod: XB

## 2022-01-01 PROCEDURE — 84550 ASSAY OF BLOOD/URIC ACID: CPT | Performed by: STUDENT IN AN ORGANIZED HEALTH CARE EDUCATION/TRAINING PROGRAM

## 2022-01-01 PROCEDURE — 99223 1ST HOSP IP/OBS HIGH 75: CPT | Mod: ,,, | Performed by: INTERNAL MEDICINE

## 2022-01-01 PROCEDURE — 37242 PR VASC EMB/OCC INCL S&I/ROADMAPP/IMG GUIDE ART OTHR THAN HEMORR/TUMOR: ICD-10-PCS | Mod: RT,,, | Performed by: SURGERY

## 2022-01-01 PROCEDURE — 36415 COLL VENOUS BLD VENIPUNCTURE: CPT | Performed by: INTERNAL MEDICINE

## 2022-01-01 PROCEDURE — 82378 CARCINOEMBRYONIC ANTIGEN: CPT

## 2022-01-01 PROCEDURE — 80053 COMPREHEN METABOLIC PANEL: CPT | Mod: 91 | Performed by: HOSPITALIST

## 2022-01-01 PROCEDURE — 84484 ASSAY OF TROPONIN QUANT: CPT | Performed by: STUDENT IN AN ORGANIZED HEALTH CARE EDUCATION/TRAINING PROGRAM

## 2022-01-01 PROCEDURE — 36415 COLL VENOUS BLD VENIPUNCTURE: CPT

## 2022-01-01 PROCEDURE — 93926 LOWER EXTREMITY STUDY: CPT | Performed by: SURGERY

## 2022-01-01 PROCEDURE — 84466 ASSAY OF TRANSFERRIN: CPT

## 2022-01-01 PROCEDURE — 85730 THROMBOPLASTIN TIME PARTIAL: CPT | Performed by: HOSPITALIST

## 2022-01-01 PROCEDURE — 71000015 HC POSTOP RECOV 1ST HR: Performed by: ORTHOPAEDIC SURGERY

## 2022-01-01 PROCEDURE — 88342 IMHCHEM/IMCYTCHM 1ST ANTB: CPT | Performed by: PATHOLOGY

## 2022-01-01 PROCEDURE — 99497 ADVNCD CARE PLAN 30 MIN: CPT | Mod: ,,, | Performed by: STUDENT IN AN ORGANIZED HEALTH CARE EDUCATION/TRAINING PROGRAM

## 2022-01-01 PROCEDURE — 81001 URINALYSIS AUTO W/SCOPE: CPT

## 2022-01-01 PROCEDURE — 99233 PR SUBSEQUENT HOSPITAL CARE,LEVL III: ICD-10-PCS | Mod: ,,, | Performed by: HOSPITALIST

## 2022-01-01 PROCEDURE — D9220A PRA ANESTHESIA: Mod: CRNA,,, | Performed by: STUDENT IN AN ORGANIZED HEALTH CARE EDUCATION/TRAINING PROGRAM

## 2022-01-01 PROCEDURE — 63600175 PHARM REV CODE 636 W HCPCS: Performed by: ANESTHESIOLOGY

## 2022-01-01 PROCEDURE — 88112 CYTOPATH CELL ENHANCE TECH: CPT | Mod: 26,,, | Performed by: PATHOLOGY

## 2022-01-01 PROCEDURE — 37000009 HC ANESTHESIA EA ADD 15 MINS: Performed by: ORTHOPAEDIC SURGERY

## 2022-01-01 PROCEDURE — 87502 INFLUENZA DNA AMP PROBE: CPT | Performed by: STUDENT IN AN ORGANIZED HEALTH CARE EDUCATION/TRAINING PROGRAM

## 2022-01-01 PROCEDURE — 99223 PR INITIAL HOSPITAL CARE,LEVL III: ICD-10-PCS | Mod: ,,, | Performed by: NEUROLOGICAL SURGERY

## 2022-01-01 PROCEDURE — 36000706: Performed by: SURGERY

## 2022-01-01 PROCEDURE — 80053 COMPREHEN METABOLIC PANEL: CPT | Performed by: INTERNAL MEDICINE

## 2022-01-01 PROCEDURE — 90791 PSYCH DIAGNOSTIC EVALUATION: CPT | Mod: AH,HB,, | Performed by: PSYCHOLOGIST

## 2022-01-01 PROCEDURE — 99223 1ST HOSP IP/OBS HIGH 75: CPT | Mod: 25,,, | Performed by: SURGERY

## 2022-01-01 PROCEDURE — 84550 ASSAY OF BLOOD/URIC ACID: CPT | Mod: 91 | Performed by: INTERNAL MEDICINE

## 2022-01-01 PROCEDURE — 86803 HEPATITIS C AB TEST: CPT | Performed by: PHYSICIAN ASSISTANT

## 2022-01-01 PROCEDURE — 83735 ASSAY OF MAGNESIUM: CPT | Mod: 91

## 2022-01-01 PROCEDURE — 83735 ASSAY OF MAGNESIUM: CPT | Performed by: HOSPITALIST

## 2022-01-01 PROCEDURE — 96375 TX/PRO/DX INJ NEW DRUG ADDON: CPT

## 2022-01-01 PROCEDURE — 36415 COLL VENOUS BLD VENIPUNCTURE: CPT | Performed by: ORTHOPAEDIC SURGERY

## 2022-01-01 PROCEDURE — 25500020 PHARM REV CODE 255: Performed by: INTERNAL MEDICINE

## 2022-01-01 PROCEDURE — 85007 BL SMEAR W/DIFF WBC COUNT: CPT

## 2022-01-01 PROCEDURE — 99233 SBSQ HOSP IP/OBS HIGH 50: CPT | Mod: 95,,, | Performed by: INTERNAL MEDICINE

## 2022-01-01 PROCEDURE — C1751 CATH, INF, PER/CENT/MIDLINE: HCPCS

## 2022-01-01 PROCEDURE — C1713 ANCHOR/SCREW BN/BN,TIS/BN: HCPCS | Performed by: ORTHOPAEDIC SURGERY

## 2022-01-01 PROCEDURE — 83935 ASSAY OF URINE OSMOLALITY: CPT | Performed by: INTERNAL MEDICINE

## 2022-01-01 PROCEDURE — 99233 SBSQ HOSP IP/OBS HIGH 50: CPT | Mod: ,,, | Performed by: NURSE PRACTITIONER

## 2022-01-01 PROCEDURE — 97110 THERAPEUTIC EXERCISES: CPT

## 2022-01-01 PROCEDURE — 84540 ASSAY OF URINE/UREA-N: CPT | Performed by: INTERNAL MEDICINE

## 2022-01-01 PROCEDURE — 99291 CRITICAL CARE FIRST HOUR: CPT | Mod: 25

## 2022-01-01 PROCEDURE — 82728 ASSAY OF FERRITIN: CPT | Performed by: INTERNAL MEDICINE

## 2022-01-01 PROCEDURE — C1884 EMBOLIZATION PROTECT SYST: HCPCS | Performed by: SURGERY

## 2022-01-01 PROCEDURE — 93010 RHYTHM STRIP: ICD-10-PCS | Mod: ,,, | Performed by: INTERNAL MEDICINE

## 2022-01-01 PROCEDURE — 84295 ASSAY OF SERUM SODIUM: CPT | Performed by: INTERNAL MEDICINE

## 2022-01-01 PROCEDURE — 99239 HOSP IP/OBS DSCHRG MGMT >30: CPT | Mod: ,,, | Performed by: INTERNAL MEDICINE

## 2022-01-01 PROCEDURE — 82800 BLOOD PH: CPT

## 2022-01-01 PROCEDURE — 27299: ICD-10-PCS | Mod: ,,, | Performed by: ORTHOPAEDIC SURGERY

## 2022-01-01 PROCEDURE — 83880 ASSAY OF NATRIURETIC PEPTIDE: CPT | Performed by: INTERNAL MEDICINE

## 2022-01-01 PROCEDURE — 85025 COMPLETE CBC W/AUTO DIFF WBC: CPT | Mod: 91 | Performed by: NURSE PRACTITIONER

## 2022-01-01 PROCEDURE — 12000002 HC ACUTE/MED SURGE SEMI-PRIVATE ROOM

## 2022-01-01 PROCEDURE — 87040 BLOOD CULTURE FOR BACTERIA: CPT | Performed by: STUDENT IN AN ORGANIZED HEALTH CARE EDUCATION/TRAINING PROGRAM

## 2022-01-01 PROCEDURE — 99222 PR INITIAL HOSPITAL CARE,LEVL II: ICD-10-PCS | Mod: ,,, | Performed by: INTERNAL MEDICINE

## 2022-01-01 PROCEDURE — 99291 CRITICAL CARE FIRST HOUR: CPT | Mod: ,,, | Performed by: INTERNAL MEDICINE

## 2022-01-01 PROCEDURE — 86140 C-REACTIVE PROTEIN: CPT

## 2022-01-01 PROCEDURE — 88311 PR  DECALCIFY TISSUE: ICD-10-PCS | Mod: 26,,, | Performed by: PATHOLOGY

## 2022-01-01 PROCEDURE — 99223 1ST HOSP IP/OBS HIGH 75: CPT | Mod: ,,, | Performed by: STUDENT IN AN ORGANIZED HEALTH CARE EDUCATION/TRAINING PROGRAM

## 2022-01-01 PROCEDURE — 82436 ASSAY OF URINE CHLORIDE: CPT | Performed by: INTERNAL MEDICINE

## 2022-01-01 PROCEDURE — 90832 PSYTX W PT 30 MINUTES: CPT | Mod: AH,HB,, | Performed by: PSYCHOLOGIST

## 2022-01-01 PROCEDURE — 84443 ASSAY THYROID STIM HORMONE: CPT

## 2022-01-01 PROCEDURE — C1776 JOINT DEVICE (IMPLANTABLE): HCPCS | Performed by: ORTHOPAEDIC SURGERY

## 2022-01-01 PROCEDURE — C1894 INTRO/SHEATH, NON-LASER: HCPCS | Performed by: SURGERY

## 2022-01-01 PROCEDURE — 86850 RBC ANTIBODY SCREEN: CPT

## 2022-01-01 PROCEDURE — 99291 PR CRITICAL CARE, E/M 30-74 MINUTES: ICD-10-PCS | Mod: 25,CS,, | Performed by: EMERGENCY MEDICINE

## 2022-01-01 PROCEDURE — 84165 PROTEIN E-PHORESIS SERUM: CPT | Mod: 26,,, | Performed by: PATHOLOGY

## 2022-01-01 PROCEDURE — 85025 COMPLETE CBC W/AUTO DIFF WBC: CPT | Performed by: HOSPITALIST

## 2022-01-01 PROCEDURE — 99233 SBSQ HOSP IP/OBS HIGH 50: CPT | Mod: ,,, | Performed by: PSYCHIATRY & NEUROLOGY

## 2022-01-01 PROCEDURE — 99223 1ST HOSP IP/OBS HIGH 75: CPT | Mod: ,,, | Performed by: PSYCHIATRY & NEUROLOGY

## 2022-01-01 PROCEDURE — 99292 CRITICAL CARE ADDL 30 MIN: CPT | Mod: 25,CS,, | Performed by: EMERGENCY MEDICINE

## 2022-01-01 PROCEDURE — 32551 INSERTION OF CHEST TUBE: CPT

## 2022-01-01 PROCEDURE — 37242 VASC EMBOLIZE/OCCLUDE ARTERY: CPT | Mod: RT,,, | Performed by: SURGERY

## 2022-01-01 PROCEDURE — 99223 1ST HOSP IP/OBS HIGH 75: CPT | Mod: ,,, | Performed by: NEUROLOGICAL SURGERY

## 2022-01-01 PROCEDURE — 88112 CYTOPATH CELL ENHANCE TECH: CPT | Performed by: PATHOLOGY

## 2022-01-01 PROCEDURE — C1769 GUIDE WIRE: HCPCS | Performed by: SURGERY

## 2022-01-01 PROCEDURE — 99291 PR CRITICAL CARE, E/M 30-74 MINUTES: ICD-10-PCS | Mod: ,,, | Performed by: INTERNAL MEDICINE

## 2022-01-01 PROCEDURE — 84300 ASSAY OF URINE SODIUM: CPT | Performed by: INTERNAL MEDICINE

## 2022-01-01 PROCEDURE — 99223 1ST HOSP IP/OBS HIGH 75: CPT | Mod: ,,, | Performed by: CLINICAL NURSE SPECIALIST

## 2022-01-01 PROCEDURE — 88341 PR IHC OR ICC EACH ADD'L SINGLE ANTIBODY  STAINPR: ICD-10-PCS | Mod: 26,,, | Performed by: PATHOLOGY

## 2022-01-01 PROCEDURE — 83880 ASSAY OF NATRIURETIC PEPTIDE: CPT | Performed by: STUDENT IN AN ORGANIZED HEALTH CARE EDUCATION/TRAINING PROGRAM

## 2022-01-01 PROCEDURE — 99231 PR SUBSEQUENT HOSPITAL CARE,LEVL I: ICD-10-PCS | Mod: ,,, | Performed by: ANESTHESIOLOGY

## 2022-01-01 PROCEDURE — 83970 ASSAY OF PARATHORMONE: CPT

## 2022-01-01 PROCEDURE — 87070 CULTURE OTHR SPECIMN AEROBIC: CPT

## 2022-01-01 PROCEDURE — 86920 COMPATIBILITY TEST SPIN: CPT

## 2022-01-01 PROCEDURE — 99292 PR CRITICAL CARE, ADDL 30 MIN: ICD-10-PCS | Mod: 25,CS,, | Performed by: EMERGENCY MEDICINE

## 2022-01-01 PROCEDURE — 94644 CONT INHLJ TX 1ST HOUR: CPT

## 2022-01-01 PROCEDURE — 27299 UNLISTED PX PELVIS/HIP JOINT: CPT | Mod: ,,, | Performed by: ORTHOPAEDIC SURGERY

## 2022-01-01 PROCEDURE — C1886 CATHETER, ABLATION: HCPCS | Performed by: ORTHOPAEDIC SURGERY

## 2022-01-01 PROCEDURE — 85652 RBC SED RATE AUTOMATED: CPT

## 2022-01-01 PROCEDURE — 27201423 OPTIME MED/SURG SUP & DEVICES STERILE SUPPLY: Performed by: ORTHOPAEDIC SURGERY

## 2022-01-01 PROCEDURE — 25000003 PHARM REV CODE 250: Performed by: EMERGENCY MEDICINE

## 2022-01-01 PROCEDURE — 63600175 PHARM REV CODE 636 W HCPCS: Performed by: SURGERY

## 2022-01-01 PROCEDURE — 36000710: Performed by: ORTHOPAEDIC SURGERY

## 2022-01-01 PROCEDURE — 97110 THERAPEUTIC EXERCISES: CPT | Mod: CQ

## 2022-01-01 PROCEDURE — 97530 THERAPEUTIC ACTIVITIES: CPT | Mod: CO

## 2022-01-01 PROCEDURE — 27000207 HC ISOLATION

## 2022-01-01 PROCEDURE — 36573 INSJ PICC RS&I 5 YR+: CPT

## 2022-01-01 PROCEDURE — 84134 ASSAY OF PREALBUMIN: CPT

## 2022-01-01 PROCEDURE — 36000711: Performed by: ORTHOPAEDIC SURGERY

## 2022-01-01 PROCEDURE — A9585 GADOBUTROL INJECTION: HCPCS | Performed by: INTERNAL MEDICINE

## 2022-01-01 PROCEDURE — C1760 CLOSURE DEV, VASC: HCPCS | Performed by: SURGERY

## 2022-01-01 PROCEDURE — 71000033 HC RECOVERY, INTIAL HOUR: Performed by: SURGERY

## 2022-01-01 PROCEDURE — 87389 HIV-1 AG W/HIV-1&-2 AB AG IA: CPT | Performed by: PHYSICIAN ASSISTANT

## 2022-01-01 DEVICE — IMPLANTABLE DEVICE: Type: IMPLANTABLE DEVICE | Site: OTHER (ADD COMMENT) | Status: FUNCTIONAL

## 2022-01-01 DEVICE — CEMENT KYPHOPLASTY HI VISC: Type: IMPLANTABLE DEVICE | Site: HIP | Status: FUNCTIONAL

## 2022-01-01 DEVICE — CEMENT BONE SMPLX HV GENTMYCN: Type: IMPLANTABLE DEVICE | Site: HIP | Status: FUNCTIONAL

## 2022-01-01 DEVICE — STEM FEMORAL CEM SZ 7 132 DEG: Type: IMPLANTABLE DEVICE | Site: HIP | Status: FUNCTIONAL

## 2022-01-01 DEVICE — PLUG BONE: Type: IMPLANTABLE DEVICE | Site: HIP | Status: FUNCTIONAL

## 2022-01-01 DEVICE — SLEEVE FEM C-TAPER UNI +0MM: Type: IMPLANTABLE DEVICE | Site: HIP | Status: FUNCTIONAL

## 2022-01-01 DEVICE — HEAD FEM ENDO UNI MOD 50MM: Type: IMPLANTABLE DEVICE | Site: HIP | Status: FUNCTIONAL

## 2022-01-01 RX ORDER — HYDROMORPHONE HYDROCHLORIDE 1 MG/ML
0.5 INJECTION, SOLUTION INTRAMUSCULAR; INTRAVENOUS; SUBCUTANEOUS ONCE
Status: COMPLETED | OUTPATIENT
Start: 2022-01-01 | End: 2022-01-01

## 2022-01-01 RX ORDER — OXYCODONE HYDROCHLORIDE 5 MG/1
5 TABLET ORAL EVERY 4 HOURS PRN
Status: DISCONTINUED | OUTPATIENT
Start: 2022-01-01 | End: 2022-01-01

## 2022-01-01 RX ORDER — CELECOXIB 200 MG/1
400 CAPSULE ORAL ONCE
Status: DISCONTINUED | OUTPATIENT
Start: 2022-01-01 | End: 2022-01-01

## 2022-01-01 RX ORDER — MORPHINE SULFATE 15 MG/1
30 TABLET, FILM COATED, EXTENDED RELEASE ORAL EVERY 12 HOURS
Status: DISCONTINUED | OUTPATIENT
Start: 2022-01-01 | End: 2022-09-22 | Stop reason: HOSPADM

## 2022-01-01 RX ORDER — HYDROCODONE BITARTRATE AND ACETAMINOPHEN 5; 325 MG/1; MG/1
1 TABLET ORAL EVERY 4 HOURS PRN
Status: DISCONTINUED | OUTPATIENT
Start: 2022-01-01 | End: 2022-01-01

## 2022-01-01 RX ORDER — CEFAZOLIN SODIUM 2 G/50ML
2 SOLUTION INTRAVENOUS ONCE
Status: DISCONTINUED | OUTPATIENT
Start: 2022-01-01 | End: 2022-01-01

## 2022-01-01 RX ORDER — SODIUM CHLORIDE 0.9 % (FLUSH) 0.9 %
10 SYRINGE (ML) INJECTION
Status: DISCONTINUED | OUTPATIENT
Start: 2022-01-01 | End: 2022-01-01

## 2022-01-01 RX ORDER — METHOCARBAMOL 500 MG/1
1000 TABLET, FILM COATED ORAL EVERY 6 HOURS PRN
Status: DISCONTINUED | OUTPATIENT
Start: 2022-01-01 | End: 2022-01-01

## 2022-01-01 RX ORDER — TRAMADOL HYDROCHLORIDE 50 MG/1
50 TABLET ORAL EVERY 4 HOURS PRN
Status: DISCONTINUED | OUTPATIENT
Start: 2022-01-01 | End: 2022-01-01

## 2022-01-01 RX ORDER — SODIUM CHLORIDE 0.9 % (FLUSH) 0.9 %
3 SYRINGE (ML) INJECTION
Status: DISCONTINUED | OUTPATIENT
Start: 2022-01-01 | End: 2022-01-01 | Stop reason: HOSPADM

## 2022-01-01 RX ORDER — MIDAZOLAM HYDROCHLORIDE 1 MG/ML
1 INJECTION INTRAMUSCULAR; INTRAVENOUS EVERY 4 HOURS PRN
Status: DISCONTINUED | OUTPATIENT
Start: 2022-01-01 | End: 2022-01-01

## 2022-01-01 RX ORDER — CEFEPIME HYDROCHLORIDE 1 G/50ML
1 INJECTION, SOLUTION INTRAVENOUS
Status: DISCONTINUED | OUTPATIENT
Start: 2022-01-01 | End: 2022-01-01

## 2022-01-01 RX ORDER — MIDAZOLAM HYDROCHLORIDE 1 MG/ML
1 INJECTION INTRAMUSCULAR; INTRAVENOUS 4 TIMES DAILY PRN
Status: DISCONTINUED | OUTPATIENT
Start: 2022-01-01 | End: 2022-01-01

## 2022-01-01 RX ORDER — KETAMINE HYDROCHLORIDE 50 MG/ML
0.4 INJECTION, SOLUTION INTRAMUSCULAR; INTRAVENOUS
Status: DISCONTINUED | OUTPATIENT
Start: 2022-01-01 | End: 2022-01-01

## 2022-01-01 RX ORDER — AMIODARONE HYDROCHLORIDE 200 MG/1
400 TABLET ORAL DAILY
Status: DISCONTINUED | OUTPATIENT
Start: 2022-10-03 | End: 2022-01-01

## 2022-01-01 RX ORDER — FUROSEMIDE 10 MG/ML
40 INJECTION INTRAMUSCULAR; INTRAVENOUS ONCE
Status: COMPLETED | OUTPATIENT
Start: 2022-01-01 | End: 2022-01-01

## 2022-01-01 RX ORDER — FENTANYL CITRATE 50 UG/ML
50 INJECTION, SOLUTION INTRAMUSCULAR; INTRAVENOUS ONCE
Status: COMPLETED | OUTPATIENT
Start: 2022-01-01 | End: 2022-01-01

## 2022-01-01 RX ORDER — EPINEPHRINE 1 MG/ML
0.3 INJECTION INTRAMUSCULAR; INTRAVENOUS; SUBCUTANEOUS ONCE AS NEEDED
Status: DISCONTINUED | OUTPATIENT
Start: 2022-01-01 | End: 2022-01-01

## 2022-01-01 RX ORDER — AMIODARONE HYDROCHLORIDE 200 MG/1
200 TABLET ORAL DAILY
Status: DISCONTINUED | OUTPATIENT
Start: 2022-10-03 | End: 2022-01-01

## 2022-01-01 RX ORDER — HYDROMORPHONE HYDROCHLORIDE 1 MG/ML
1 INJECTION, SOLUTION INTRAMUSCULAR; INTRAVENOUS; SUBCUTANEOUS EVERY 4 HOURS PRN
Status: DISCONTINUED | OUTPATIENT
Start: 2022-01-01 | End: 2022-01-01

## 2022-01-01 RX ORDER — IBUPROFEN 200 MG
600 TABLET ORAL EVERY 6 HOURS PRN
Status: DISCONTINUED | OUTPATIENT
Start: 2022-01-01 | End: 2022-01-01

## 2022-01-01 RX ORDER — FENTANYL CITRATE 50 UG/ML
INJECTION, SOLUTION INTRAMUSCULAR; INTRAVENOUS
Status: DISCONTINUED | OUTPATIENT
Start: 2022-01-01 | End: 2022-01-01

## 2022-01-01 RX ORDER — MUPIROCIN 20 MG/G
OINTMENT TOPICAL 2 TIMES DAILY
Status: DISPENSED | OUTPATIENT
Start: 2022-01-01 | End: 2022-01-01

## 2022-01-01 RX ORDER — ESCITALOPRAM OXALATE 20 MG/1
20 TABLET ORAL DAILY
Status: DISCONTINUED | OUTPATIENT
Start: 2022-01-01 | End: 2022-01-01 | Stop reason: HOSPADM

## 2022-01-01 RX ORDER — ACETAMINOPHEN 325 MG/1
650 TABLET ORAL EVERY 6 HOURS SCHEDULED
Status: DISCONTINUED | OUTPATIENT
Start: 2022-01-01 | End: 2022-01-01

## 2022-01-01 RX ORDER — HEPARIN 100 UNIT/ML
500 SYRINGE INTRAVENOUS
Status: DISCONTINUED | OUTPATIENT
Start: 2022-01-01 | End: 2022-09-22 | Stop reason: HOSPADM

## 2022-01-01 RX ORDER — HEPARIN SODIUM 5000 [USP'U]/ML
5000 INJECTION, SOLUTION INTRAVENOUS; SUBCUTANEOUS EVERY 8 HOURS
Status: DISCONTINUED | OUTPATIENT
Start: 2022-01-01 | End: 2022-01-01

## 2022-01-01 RX ORDER — DRONABINOL 5 MG/1
10 CAPSULE ORAL ONCE
Status: COMPLETED | OUTPATIENT
Start: 2022-01-01 | End: 2022-01-01

## 2022-01-01 RX ORDER — SODIUM CHLORIDE 9 MG/ML
INJECTION, SOLUTION INTRAVENOUS CONTINUOUS
Status: DISCONTINUED | OUTPATIENT
Start: 2022-01-01 | End: 2022-01-01

## 2022-01-01 RX ORDER — OXYCODONE HYDROCHLORIDE 5 MG/1
5 TABLET ORAL EVERY 4 HOURS PRN
Status: DISCONTINUED | OUTPATIENT
Start: 2022-01-01 | End: 2022-01-01 | Stop reason: HOSPADM

## 2022-01-01 RX ORDER — PREGABALIN 75 MG/1
75 CAPSULE ORAL DAILY
Status: DISCONTINUED | OUTPATIENT
Start: 2022-01-01 | End: 2022-09-22 | Stop reason: HOSPADM

## 2022-01-01 RX ORDER — ONDANSETRON 8 MG/1
8 TABLET, ORALLY DISINTEGRATING ORAL EVERY 8 HOURS PRN
Status: DISCONTINUED | OUTPATIENT
Start: 2022-01-01 | End: 2022-01-01 | Stop reason: HOSPADM

## 2022-01-01 RX ORDER — ROPIVACAINE HYDROCHLORIDE 5 MG/ML
INJECTION, SOLUTION EPIDURAL; INFILTRATION; PERINEURAL
Status: COMPLETED | OUTPATIENT
Start: 2022-01-01 | End: 2022-01-01

## 2022-01-01 RX ORDER — SUMATRIPTAN SUCCINATE 25 MG/1
25 TABLET ORAL
Status: ON HOLD | COMMUNITY
Start: 2022-01-01 | End: 2022-01-01 | Stop reason: HOSPADM

## 2022-01-01 RX ORDER — POTASSIUM CHLORIDE 20 MEQ/1
40 TABLET, EXTENDED RELEASE ORAL ONCE
Status: COMPLETED | OUTPATIENT
Start: 2022-01-01 | End: 2022-01-01

## 2022-01-01 RX ORDER — SODIUM CHLORIDE 0.9 % (FLUSH) 0.9 %
10 SYRINGE (ML) INJECTION EVERY 6 HOURS
Status: DISCONTINUED | OUTPATIENT
Start: 2022-01-01 | End: 2022-09-22 | Stop reason: HOSPADM

## 2022-01-01 RX ORDER — DEXAMETHASONE SODIUM PHOSPHATE 4 MG/ML
INJECTION, SOLUTION INTRA-ARTICULAR; INTRALESIONAL; INTRAMUSCULAR; INTRAVENOUS; SOFT TISSUE
Status: DISCONTINUED | OUTPATIENT
Start: 2022-01-01 | End: 2022-01-01

## 2022-01-01 RX ORDER — CEFAZOLIN SODIUM 1 G/3ML
INJECTION, POWDER, FOR SOLUTION INTRAMUSCULAR; INTRAVENOUS
Status: DISCONTINUED | OUTPATIENT
Start: 2022-01-01 | End: 2022-01-01

## 2022-01-01 RX ORDER — LORAZEPAM 1 MG/1
1 TABLET ORAL 3 TIMES DAILY PRN
Status: ON HOLD | COMMUNITY
Start: 2022-01-01 | End: 2022-01-01 | Stop reason: HOSPADM

## 2022-01-01 RX ORDER — IODIXANOL 320 MG/ML
INJECTION, SOLUTION INTRAVASCULAR
Status: DISCONTINUED | OUTPATIENT
Start: 2022-01-01 | End: 2022-01-01 | Stop reason: HOSPADM

## 2022-01-01 RX ORDER — HYDROMORPHONE HYDROCHLORIDE 1 MG/ML
0.5 INJECTION, SOLUTION INTRAMUSCULAR; INTRAVENOUS; SUBCUTANEOUS EVERY 4 HOURS PRN
Status: DISCONTINUED | OUTPATIENT
Start: 2022-01-01 | End: 2022-01-01

## 2022-01-01 RX ORDER — SODIUM CHLORIDE 0.9 % (FLUSH) 0.9 %
10 SYRINGE (ML) INJECTION
Status: DISCONTINUED | OUTPATIENT
Start: 2022-01-01 | End: 2022-09-22 | Stop reason: HOSPADM

## 2022-01-01 RX ORDER — METHOCARBAMOL 500 MG/1
500 TABLET, FILM COATED ORAL EVERY 6 HOURS PRN
Status: DISCONTINUED | OUTPATIENT
Start: 2022-01-01 | End: 2022-09-22 | Stop reason: HOSPADM

## 2022-01-01 RX ORDER — LORAZEPAM 2 MG/ML
2 INJECTION INTRAMUSCULAR ONCE
Status: COMPLETED | OUTPATIENT
Start: 2022-01-01 | End: 2022-01-01

## 2022-01-01 RX ORDER — IBUPROFEN 200 MG
24 TABLET ORAL
Status: DISCONTINUED | OUTPATIENT
Start: 2022-01-01 | End: 2022-01-01 | Stop reason: HOSPADM

## 2022-01-01 RX ORDER — SODIUM CHLORIDE 0.9 % (FLUSH) 0.9 %
10 SYRINGE (ML) INJECTION
Status: CANCELLED | OUTPATIENT
Start: 2022-01-01

## 2022-01-01 RX ORDER — ONDANSETRON 2 MG/ML
8 INJECTION INTRAMUSCULAR; INTRAVENOUS ONCE
Status: COMPLETED | OUTPATIENT
Start: 2022-01-01 | End: 2022-01-01

## 2022-01-01 RX ORDER — DEXAMETHASONE SODIUM PHOSPHATE 4 MG/ML
12 INJECTION, SOLUTION INTRA-ARTICULAR; INTRALESIONAL; INTRAMUSCULAR; INTRAVENOUS; SOFT TISSUE
Status: COMPLETED | OUTPATIENT
Start: 2022-01-01 | End: 2022-01-01

## 2022-01-01 RX ORDER — PHENYLEPHRINE HYDROCHLORIDE 10 MG/ML
INJECTION INTRAVENOUS
Status: DISCONTINUED | OUTPATIENT
Start: 2022-01-01 | End: 2022-01-01

## 2022-01-01 RX ORDER — ACETAMINOPHEN 500 MG
1000 TABLET ORAL EVERY 8 HOURS PRN
Refills: 0 | Status: ON HOLD | COMMUNITY
Start: 2022-01-01 | End: 2022-01-01 | Stop reason: HOSPADM

## 2022-01-01 RX ORDER — METHOCARBAMOL 500 MG/1
500 TABLET, FILM COATED ORAL EVERY 6 HOURS
Status: DISCONTINUED | OUTPATIENT
Start: 2022-01-01 | End: 2022-01-01

## 2022-01-01 RX ORDER — ALBUTEROL SULFATE 2.5 MG/.5ML
10 SOLUTION RESPIRATORY (INHALATION) ONCE
Status: DISCONTINUED | OUTPATIENT
Start: 2022-01-01 | End: 2022-01-01

## 2022-01-01 RX ORDER — ONDANSETRON 8 MG/1
8 TABLET, ORALLY DISINTEGRATING ORAL EVERY 8 HOURS PRN
Status: DISCONTINUED | OUTPATIENT
Start: 2022-01-01 | End: 2022-01-01

## 2022-01-01 RX ORDER — DOCUSATE SODIUM 100 MG/1
100 CAPSULE, LIQUID FILLED ORAL DAILY
Status: DISCONTINUED | OUTPATIENT
Start: 2022-01-01 | End: 2022-01-01 | Stop reason: HOSPADM

## 2022-01-01 RX ORDER — HYDROMORPHONE HYDROCHLORIDE 1 MG/ML
0.6 INJECTION, SOLUTION INTRAMUSCULAR; INTRAVENOUS; SUBCUTANEOUS EVERY 4 HOURS PRN
Status: DISCONTINUED | OUTPATIENT
Start: 2022-01-01 | End: 2022-01-01

## 2022-01-01 RX ORDER — MORPHINE SULFATE 2 MG/ML
3 INJECTION, SOLUTION INTRAMUSCULAR; INTRAVENOUS
Status: DISCONTINUED | OUTPATIENT
Start: 2022-01-01 | End: 2022-01-01

## 2022-01-01 RX ORDER — CHOLECALCIFEROL (VITAMIN D3) 25 MCG
1000 TABLET ORAL DAILY
Status: DISCONTINUED | OUTPATIENT
Start: 2022-01-01 | End: 2022-01-01

## 2022-01-01 RX ORDER — SODIUM CHLORIDE FOR INHALATION 3 %
4 VIAL, NEBULIZER (ML) INHALATION
Status: DISCONTINUED | OUTPATIENT
Start: 2022-01-01 | End: 2022-01-01

## 2022-01-01 RX ORDER — ETOMIDATE 2 MG/ML
INJECTION INTRAVENOUS
Status: DISCONTINUED | OUTPATIENT
Start: 2022-01-01 | End: 2022-01-01

## 2022-01-01 RX ORDER — ESCITALOPRAM OXALATE 20 MG/1
20 TABLET ORAL NIGHTLY
Status: ON HOLD | COMMUNITY
Start: 2022-01-01 | End: 2022-01-01 | Stop reason: HOSPADM

## 2022-01-01 RX ORDER — PROPOFOL 10 MG/ML
VIAL (ML) INTRAVENOUS CONTINUOUS PRN
Status: DISCONTINUED | OUTPATIENT
Start: 2022-01-01 | End: 2022-01-01

## 2022-01-01 RX ORDER — HYDROGEN PEROXIDE 3 %
20 SOLUTION, NON-ORAL MISCELLANEOUS DAILY
Status: ON HOLD | COMMUNITY
End: 2022-01-01 | Stop reason: HOSPADM

## 2022-01-01 RX ORDER — KETAMINE HYDROCHLORIDE 50 MG/ML
0.2 INJECTION, SOLUTION INTRAMUSCULAR; INTRAVENOUS
Status: DISCONTINUED | OUTPATIENT
Start: 2022-01-01 | End: 2022-01-01

## 2022-01-01 RX ORDER — AMIODARONE HYDROCHLORIDE 200 MG/1
400 TABLET ORAL 2 TIMES DAILY
Status: DISCONTINUED | OUTPATIENT
Start: 2022-01-01 | End: 2022-01-01

## 2022-01-01 RX ORDER — LIDOCAINE HYDROCHLORIDE 10 MG/ML
5 INJECTION INFILTRATION; PERINEURAL ONCE
Status: COMPLETED | OUTPATIENT
Start: 2022-01-01 | End: 2022-01-01

## 2022-01-01 RX ORDER — METHOCARBAMOL 500 MG/1
1000 TABLET, FILM COATED ORAL EVERY 6 HOURS
Status: DISCONTINUED | OUTPATIENT
Start: 2022-01-01 | End: 2022-01-01 | Stop reason: HOSPADM

## 2022-01-01 RX ORDER — LORAZEPAM 0.5 MG/1
1 TABLET ORAL
Status: DISCONTINUED | OUTPATIENT
Start: 2022-01-01 | End: 2022-01-01

## 2022-01-01 RX ORDER — MIDAZOLAM HYDROCHLORIDE 1 MG/ML
1 INJECTION INTRAMUSCULAR; INTRAVENOUS
Status: DISCONTINUED | OUTPATIENT
Start: 2022-01-01 | End: 2022-09-22 | Stop reason: HOSPADM

## 2022-01-01 RX ORDER — ESCITALOPRAM OXALATE 10 MG/1
20 TABLET ORAL NIGHTLY
Status: DISCONTINUED | OUTPATIENT
Start: 2022-01-01 | End: 2022-09-22 | Stop reason: HOSPADM

## 2022-01-01 RX ORDER — FUROSEMIDE 10 MG/ML
40 INJECTION INTRAMUSCULAR; INTRAVENOUS
Status: DISCONTINUED | OUTPATIENT
Start: 2022-01-01 | End: 2022-01-01

## 2022-01-01 RX ORDER — WARFARIN SODIUM 5 MG/1
5 TABLET ORAL DAILY
Status: DISCONTINUED | OUTPATIENT
Start: 2022-01-01 | End: 2022-01-01

## 2022-01-01 RX ORDER — HEPARIN SODIUM 1000 [USP'U]/ML
INJECTION, SOLUTION INTRAVENOUS; SUBCUTANEOUS
Status: DISCONTINUED | OUTPATIENT
Start: 2022-01-01 | End: 2022-01-01

## 2022-01-01 RX ORDER — FERROUS GLUCONATE 324(38)MG
1 TABLET ORAL DAILY
Status: ON HOLD | COMMUNITY
Start: 2022-01-01 | End: 2022-01-01 | Stop reason: HOSPADM

## 2022-01-01 RX ORDER — TRANEXAMIC ACID 100 MG/ML
INJECTION, SOLUTION INTRAVENOUS
Status: DISCONTINUED | OUTPATIENT
Start: 2022-01-01 | End: 2022-01-01

## 2022-01-01 RX ORDER — HYDROMORPHONE HYDROCHLORIDE 1 MG/ML
1 INJECTION, SOLUTION INTRAMUSCULAR; INTRAVENOUS; SUBCUTANEOUS
Status: DISCONTINUED | OUTPATIENT
Start: 2022-01-01 | End: 2022-01-01

## 2022-01-01 RX ORDER — PANTOPRAZOLE SODIUM 40 MG/1
40 TABLET, DELAYED RELEASE ORAL DAILY
Status: DISCONTINUED | OUTPATIENT
Start: 2022-01-01 | End: 2022-01-01 | Stop reason: HOSPADM

## 2022-01-01 RX ORDER — OXYCODONE HYDROCHLORIDE 5 MG/1
5 TABLET ORAL EVERY 4 HOURS PRN
Qty: 30 TABLET | Refills: 0 | Status: ON HOLD | OUTPATIENT
Start: 2022-01-01 | End: 2022-01-01 | Stop reason: HOSPADM

## 2022-01-01 RX ORDER — CEFEPIME HYDROCHLORIDE 1 G/50ML
2 INJECTION, SOLUTION INTRAVENOUS
Status: DISCONTINUED | OUTPATIENT
Start: 2022-01-01 | End: 2022-01-01

## 2022-01-01 RX ORDER — HEPARIN SODIUM,PORCINE/D5W 25000/250
0-40 INTRAVENOUS SOLUTION INTRAVENOUS CONTINUOUS
Status: DISCONTINUED | OUTPATIENT
Start: 2022-01-01 | End: 2022-01-01

## 2022-01-01 RX ORDER — METHOCARBAMOL 750 MG/1
750 TABLET, FILM COATED ORAL EVERY 6 HOURS
Status: DISCONTINUED | OUTPATIENT
Start: 2022-01-01 | End: 2022-01-01

## 2022-01-01 RX ORDER — MORPHINE SULFATE 4 MG/ML
4 INJECTION, SOLUTION INTRAMUSCULAR; INTRAVENOUS ONCE
Status: COMPLETED | OUTPATIENT
Start: 2022-01-01 | End: 2022-01-01

## 2022-01-01 RX ORDER — ACETAMINOPHEN 500 MG
1000 TABLET ORAL EVERY 8 HOURS PRN
Status: DISCONTINUED | OUTPATIENT
Start: 2022-01-01 | End: 2022-09-22 | Stop reason: HOSPADM

## 2022-01-01 RX ORDER — ACETAMINOPHEN AND CODEINE PHOSPHATE 300; 60 MG/1; MG/1
1 TABLET ORAL EVERY 6 HOURS PRN
Status: ON HOLD | COMMUNITY
Start: 2022-01-01 | End: 2022-01-01 | Stop reason: HOSPADM

## 2022-01-01 RX ORDER — LABETALOL HCL 20 MG/4 ML
10 SYRINGE (ML) INTRAVENOUS EVERY 6 HOURS PRN
Status: DISCONTINUED | OUTPATIENT
Start: 2022-01-01 | End: 2022-01-01

## 2022-01-01 RX ORDER — PREGABALIN 75 MG/1
75 CAPSULE ORAL 2 TIMES DAILY
Qty: 60 CAPSULE | Refills: 0 | Status: ON HOLD | OUTPATIENT
Start: 2022-01-01 | End: 2022-01-01 | Stop reason: HOSPADM

## 2022-01-01 RX ORDER — SODIUM CHLORIDE 9 MG/ML
INJECTION, SOLUTION INTRAVENOUS CONTINUOUS
Status: DISCONTINUED | OUTPATIENT
Start: 2022-01-01 | End: 2022-09-22 | Stop reason: HOSPADM

## 2022-01-01 RX ORDER — MIDAZOLAM HYDROCHLORIDE 1 MG/ML
INJECTION, SOLUTION INTRAMUSCULAR; INTRAVENOUS
Status: DISCONTINUED | OUTPATIENT
Start: 2022-01-01 | End: 2022-01-01

## 2022-01-01 RX ORDER — MORPHINE SULFATE 2 MG/ML
2 INJECTION, SOLUTION INTRAMUSCULAR; INTRAVENOUS
Status: DISCONTINUED | OUTPATIENT
Start: 2022-01-01 | End: 2022-01-01

## 2022-01-01 RX ORDER — AMIODARONE HYDROCHLORIDE 100 MG/1
TABLET ORAL DAILY
Status: CANCELLED | OUTPATIENT
Start: 2022-01-01

## 2022-01-01 RX ORDER — POLYETHYLENE GLYCOL 3350 17 G/17G
17 POWDER, FOR SOLUTION ORAL DAILY
Status: DISCONTINUED | OUTPATIENT
Start: 2022-01-01 | End: 2022-01-01

## 2022-01-01 RX ORDER — SODIUM CHLORIDE FOR INHALATION 3 %
4 VIAL, NEBULIZER (ML) INHALATION EVERY 8 HOURS
Status: DISCONTINUED | OUTPATIENT
Start: 2022-01-01 | End: 2022-09-22 | Stop reason: HOSPADM

## 2022-01-01 RX ORDER — HYDROMORPHONE HYDROCHLORIDE 1 MG/ML
0.2 INJECTION, SOLUTION INTRAMUSCULAR; INTRAVENOUS; SUBCUTANEOUS ONCE
Status: COMPLETED | OUTPATIENT
Start: 2022-01-01 | End: 2022-01-01

## 2022-01-01 RX ORDER — DEXAMETHASONE SODIUM PHOSPHATE 4 MG/ML
12 INJECTION, SOLUTION INTRA-ARTICULAR; INTRALESIONAL; INTRAMUSCULAR; INTRAVENOUS; SOFT TISSUE ONCE
Status: DISCONTINUED | OUTPATIENT
Start: 2022-01-01 | End: 2022-01-01

## 2022-01-01 RX ORDER — SODIUM CHLORIDE 9 MG/ML
INJECTION, SOLUTION INTRAVENOUS CONTINUOUS
Status: ACTIVE | OUTPATIENT
Start: 2022-01-01 | End: 2022-01-01

## 2022-01-01 RX ORDER — IPRATROPIUM BROMIDE AND ALBUTEROL SULFATE 2.5; .5 MG/3ML; MG/3ML
3 SOLUTION RESPIRATORY (INHALATION) EVERY 8 HOURS
Status: DISCONTINUED | OUTPATIENT
Start: 2022-01-01 | End: 2022-01-01

## 2022-01-01 RX ORDER — ACETAMINOPHEN 500 MG
500 TABLET ORAL 2 TIMES DAILY PRN
Status: ON HOLD | COMMUNITY
End: 2022-01-01 | Stop reason: HOSPADM

## 2022-01-01 RX ORDER — TALC
6 POWDER (GRAM) TOPICAL NIGHTLY
Status: DISCONTINUED | OUTPATIENT
Start: 2022-01-01 | End: 2022-01-01

## 2022-01-01 RX ORDER — PREGABALIN 75 MG/1
75 CAPSULE ORAL 2 TIMES DAILY
Status: DISCONTINUED | OUTPATIENT
Start: 2022-01-01 | End: 2022-01-01

## 2022-01-01 RX ORDER — PROPOFOL 10 MG/ML
VIAL (ML) INTRAVENOUS
Status: DISCONTINUED | OUTPATIENT
Start: 2022-01-01 | End: 2022-01-01

## 2022-01-01 RX ORDER — KETAMINE HCL IN 0.9 % NACL 50 MG/5 ML
50 SYRINGE (ML) INTRAVENOUS ONCE
Status: COMPLETED | OUTPATIENT
Start: 2022-01-01 | End: 2022-01-01

## 2022-01-01 RX ORDER — PANTOPRAZOLE SODIUM 40 MG/1
40 TABLET, DELAYED RELEASE ORAL DAILY
Status: DISCONTINUED | OUTPATIENT
Start: 2022-01-01 | End: 2022-01-01

## 2022-01-01 RX ORDER — MAGNESIUM SULFATE HEPTAHYDRATE 40 MG/ML
2 INJECTION, SOLUTION INTRAVENOUS ONCE
Status: COMPLETED | OUTPATIENT
Start: 2022-01-01 | End: 2022-01-01

## 2022-01-01 RX ORDER — HYDROMORPHONE HYDROCHLORIDE 1 MG/ML
1 INJECTION, SOLUTION INTRAMUSCULAR; INTRAVENOUS; SUBCUTANEOUS
Status: DISCONTINUED | OUTPATIENT
Start: 2022-01-01 | End: 2022-09-22 | Stop reason: HOSPADM

## 2022-01-01 RX ORDER — TALC
9 POWDER (GRAM) TOPICAL NIGHTLY
Status: DISCONTINUED | OUTPATIENT
Start: 2022-01-01 | End: 2022-01-01 | Stop reason: HOSPADM

## 2022-01-01 RX ORDER — METHOCARBAMOL 500 MG/1
1000 TABLET, FILM COATED ORAL EVERY 6 HOURS
Status: DISCONTINUED | OUTPATIENT
Start: 2022-01-01 | End: 2022-01-01

## 2022-01-01 RX ORDER — METHOCARBAMOL 500 MG/1
500 TABLET, FILM COATED ORAL 4 TIMES DAILY PRN
Status: DISCONTINUED | OUTPATIENT
Start: 2022-01-01 | End: 2022-01-01

## 2022-01-01 RX ORDER — ACETAMINOPHEN 325 MG/1
650 TABLET ORAL EVERY 8 HOURS PRN
Status: DISCONTINUED | OUTPATIENT
Start: 2022-01-01 | End: 2022-01-01

## 2022-01-01 RX ORDER — AMIODARONE HYDROCHLORIDE 200 MG/1
400 TABLET ORAL DAILY
Status: DISCONTINUED | OUTPATIENT
Start: 2022-09-26 | End: 2022-01-01

## 2022-01-01 RX ORDER — SODIUM CHLORIDE 0.9 % (FLUSH) 0.9 %
10 SYRINGE (ML) INJECTION
Status: DISCONTINUED | OUTPATIENT
Start: 2022-01-01 | End: 2022-01-01 | Stop reason: HOSPADM

## 2022-01-01 RX ORDER — HEPARIN SODIUM,PORCINE/D5W 25000/250
0-40 INTRAVENOUS SOLUTION INTRAVENOUS CONTINUOUS
Status: DISCONTINUED | OUTPATIENT
Start: 2022-01-01 | End: 2022-01-01 | Stop reason: HOSPADM

## 2022-01-01 RX ORDER — ACETAMINOPHEN 500 MG
1000 TABLET ORAL EVERY 6 HOURS SCHEDULED
Status: DISCONTINUED | OUTPATIENT
Start: 2022-01-01 | End: 2022-01-01 | Stop reason: HOSPADM

## 2022-01-01 RX ORDER — ACETAMINOPHEN 10 MG/ML
INJECTION, SOLUTION INTRAVENOUS
Status: DISCONTINUED | OUTPATIENT
Start: 2022-01-01 | End: 2022-01-01

## 2022-01-01 RX ORDER — ONDANSETRON 2 MG/ML
8 INJECTION INTRAMUSCULAR; INTRAVENOUS ONCE
Status: DISCONTINUED | OUTPATIENT
Start: 2022-01-01 | End: 2022-01-01

## 2022-01-01 RX ORDER — ACETAMINOPHEN 325 MG/1
650 TABLET ORAL EVERY 4 HOURS PRN
Status: DISCONTINUED | OUTPATIENT
Start: 2022-01-01 | End: 2022-01-01

## 2022-01-01 RX ORDER — CHOLECALCIFEROL (VITAMIN D3) 25 MCG
1000 TABLET ORAL DAILY
Status: DISCONTINUED | OUTPATIENT
Start: 2022-01-01 | End: 2022-01-01 | Stop reason: HOSPADM

## 2022-01-01 RX ORDER — FAMOTIDINE 10 MG/ML
INJECTION INTRAVENOUS
Status: DISCONTINUED | OUTPATIENT
Start: 2022-01-01 | End: 2022-01-01

## 2022-01-01 RX ORDER — SODIUM,POTASSIUM PHOSPHATES 280-250MG
2 POWDER IN PACKET (EA) ORAL ONCE
Status: COMPLETED | OUTPATIENT
Start: 2022-01-01 | End: 2022-01-01

## 2022-01-01 RX ORDER — IPRATROPIUM BROMIDE AND ALBUTEROL SULFATE 2.5; .5 MG/3ML; MG/3ML
3 SOLUTION RESPIRATORY (INHALATION) EVERY 6 HOURS
Status: DISCONTINUED | OUTPATIENT
Start: 2022-01-01 | End: 2022-01-01

## 2022-01-01 RX ORDER — PROTAMINE SULFATE 10 MG/ML
INJECTION, SOLUTION INTRAVENOUS
Status: DISCONTINUED | OUTPATIENT
Start: 2022-01-01 | End: 2022-01-01

## 2022-01-01 RX ORDER — METHYLPREDNISOLONE SOD SUCC 125 MG
125 VIAL (EA) INJECTION ONCE
Status: COMPLETED | OUTPATIENT
Start: 2022-01-01 | End: 2022-01-01

## 2022-01-01 RX ORDER — PRAVASTATIN SODIUM 40 MG/1
40 TABLET ORAL NIGHTLY
Status: DISCONTINUED | OUTPATIENT
Start: 2022-01-01 | End: 2022-01-01

## 2022-01-01 RX ORDER — ALLOPURINOL 300 MG/1
300 TABLET ORAL DAILY
Status: DISCONTINUED | OUTPATIENT
Start: 2022-01-01 | End: 2022-01-01

## 2022-01-01 RX ORDER — ONDANSETRON 2 MG/ML
8 INJECTION INTRAMUSCULAR; INTRAVENOUS
Status: COMPLETED | OUTPATIENT
Start: 2022-01-01 | End: 2022-01-01

## 2022-01-01 RX ORDER — CELECOXIB 200 MG/1
200 CAPSULE ORAL DAILY
Status: DISCONTINUED | OUTPATIENT
Start: 2022-01-01 | End: 2022-01-01

## 2022-01-01 RX ORDER — ROCURONIUM BROMIDE 10 MG/ML
INJECTION, SOLUTION INTRAVENOUS
Status: DISCONTINUED | OUTPATIENT
Start: 2022-01-01 | End: 2022-01-01

## 2022-01-01 RX ORDER — METHOCARBAMOL 500 MG/1
500 TABLET, FILM COATED ORAL EVERY 8 HOURS
Status: DISCONTINUED | OUTPATIENT
Start: 2022-01-01 | End: 2022-01-01

## 2022-01-01 RX ORDER — ENOXAPARIN SODIUM 100 MG/ML
1 INJECTION SUBCUTANEOUS
Status: DISCONTINUED | OUTPATIENT
Start: 2022-01-01 | End: 2022-01-01

## 2022-01-01 RX ORDER — ONDANSETRON 2 MG/ML
4 INJECTION INTRAMUSCULAR; INTRAVENOUS EVERY 12 HOURS PRN
Status: DISCONTINUED | OUTPATIENT
Start: 2022-01-01 | End: 2022-01-01

## 2022-01-01 RX ORDER — HEPARIN SODIUM 1000 [USP'U]/ML
INJECTION, SOLUTION INTRAVENOUS; SUBCUTANEOUS
Status: DISCONTINUED | OUTPATIENT
Start: 2022-01-01 | End: 2022-01-01 | Stop reason: HOSPADM

## 2022-01-01 RX ORDER — MUPIROCIN 20 MG/G
OINTMENT TOPICAL 2 TIMES DAILY
Status: DISCONTINUED | OUTPATIENT
Start: 2022-01-01 | End: 2022-01-01 | Stop reason: HOSPADM

## 2022-01-01 RX ORDER — KETAMINE HCL IN 0.9 % NACL 50 MG/5 ML
20 SYRINGE (ML) INTRAVENOUS ONCE
Status: COMPLETED | OUTPATIENT
Start: 2022-01-01 | End: 2022-01-01

## 2022-01-01 RX ORDER — LIDOCAINE HYDROCHLORIDE 10 MG/ML
INJECTION INFILTRATION; PERINEURAL
Status: DISPENSED
Start: 2022-01-01 | End: 2022-01-01

## 2022-01-01 RX ORDER — OXYCODONE HYDROCHLORIDE 5 MG/1
10 TABLET ORAL EVERY 4 HOURS PRN
Status: DISCONTINUED | OUTPATIENT
Start: 2022-01-01 | End: 2022-01-01

## 2022-01-01 RX ORDER — SODIUM,POTASSIUM PHOSPHATES 280-250MG
1 POWDER IN PACKET (EA) ORAL
Status: COMPLETED | OUTPATIENT
Start: 2022-01-01 | End: 2022-01-01

## 2022-01-01 RX ORDER — ONDANSETRON 2 MG/ML
INJECTION INTRAMUSCULAR; INTRAVENOUS
Status: DISCONTINUED | OUTPATIENT
Start: 2022-01-01 | End: 2022-01-01

## 2022-01-01 RX ORDER — PREGABALIN 75 MG/1
75 CAPSULE ORAL 2 TIMES DAILY
Status: DISCONTINUED | OUTPATIENT
Start: 2022-01-01 | End: 2022-01-01 | Stop reason: HOSPADM

## 2022-01-01 RX ORDER — POLYETHYLENE GLYCOL 3350 17 G/17G
17 POWDER, FOR SOLUTION ORAL 2 TIMES DAILY
Status: DISCONTINUED | OUTPATIENT
Start: 2022-01-01 | End: 2022-09-22 | Stop reason: HOSPADM

## 2022-01-01 RX ORDER — ACETAMINOPHEN 500 MG
1000 TABLET ORAL EVERY 6 HOURS SCHEDULED
Status: COMPLETED | OUTPATIENT
Start: 2022-01-01 | End: 2022-01-01

## 2022-01-01 RX ORDER — WARFARIN 7.5 MG/1
7.5 TABLET ORAL DAILY
Status: DISCONTINUED | OUTPATIENT
Start: 2022-01-01 | End: 2022-01-01

## 2022-01-01 RX ORDER — MIDAZOLAM HYDROCHLORIDE 1 MG/ML
INJECTION INTRAMUSCULAR; INTRAVENOUS
Status: DISCONTINUED | OUTPATIENT
Start: 2022-01-01 | End: 2022-01-01

## 2022-01-01 RX ORDER — LORAZEPAM 0.5 MG/1
1 TABLET ORAL 3 TIMES DAILY PRN
Status: DISCONTINUED | OUTPATIENT
Start: 2022-01-01 | End: 2022-01-01

## 2022-01-01 RX ORDER — PRAVASTATIN SODIUM 40 MG/1
40 TABLET ORAL NIGHTLY
Status: ON HOLD | COMMUNITY
Start: 2022-01-01 | End: 2022-01-01 | Stop reason: HOSPADM

## 2022-01-01 RX ORDER — LIDOCAINE HYDROCHLORIDE 10 MG/ML
10 INJECTION INFILTRATION; PERINEURAL ONCE
Status: DISCONTINUED | OUTPATIENT
Start: 2022-01-01 | End: 2022-01-01

## 2022-01-01 RX ORDER — WARFARIN 3 MG/1
3 TABLET ORAL DAILY
Status: ON HOLD | COMMUNITY
Start: 2022-01-01 | End: 2022-01-01 | Stop reason: SDUPTHER

## 2022-01-01 RX ORDER — CEFAZOLIN SODIUM/WATER 2 G/20 ML
SYRINGE (ML) INTRAVENOUS
Status: DISCONTINUED | OUTPATIENT
Start: 2022-01-01 | End: 2022-01-01

## 2022-01-01 RX ORDER — IPRATROPIUM BROMIDE AND ALBUTEROL SULFATE 2.5; .5 MG/3ML; MG/3ML
3 SOLUTION RESPIRATORY (INHALATION) EVERY 4 HOURS
Status: DISCONTINUED | OUTPATIENT
Start: 2022-01-01 | End: 2022-01-01

## 2022-01-01 RX ORDER — LIDOCAINE HYDROCHLORIDE 10 MG/ML
INJECTION, SOLUTION INTRAVENOUS
Status: DISCONTINUED | OUTPATIENT
Start: 2022-01-01 | End: 2022-01-01

## 2022-01-01 RX ORDER — FENTANYL CITRATE 50 UG/ML
25-200 INJECTION, SOLUTION INTRAMUSCULAR; INTRAVENOUS
Status: DISCONTINUED | OUTPATIENT
Start: 2022-01-01 | End: 2022-01-01 | Stop reason: HOSPADM

## 2022-01-01 RX ORDER — DIAZEPAM 10 MG/2ML
2.5 INJECTION INTRAMUSCULAR ONCE
Status: COMPLETED | OUTPATIENT
Start: 2022-01-01 | End: 2022-01-01

## 2022-01-01 RX ORDER — MIDAZOLAM HYDROCHLORIDE 1 MG/ML
.5-4 INJECTION INTRAMUSCULAR; INTRAVENOUS
Status: DISCONTINUED | OUTPATIENT
Start: 2022-01-01 | End: 2022-01-01 | Stop reason: HOSPADM

## 2022-01-01 RX ORDER — PREGABALIN 75 MG/1
75 CAPSULE ORAL NIGHTLY
Status: DISCONTINUED | OUTPATIENT
Start: 2022-01-01 | End: 2022-01-01

## 2022-01-01 RX ORDER — SUMATRIPTAN SUCCINATE 25 MG/1
25 TABLET ORAL
Status: DISCONTINUED | OUTPATIENT
Start: 2022-01-01 | End: 2022-09-22 | Stop reason: HOSPADM

## 2022-01-01 RX ORDER — ONDANSETRON 2 MG/ML
4 INJECTION INTRAMUSCULAR; INTRAVENOUS DAILY PRN
Status: DISCONTINUED | OUTPATIENT
Start: 2022-01-01 | End: 2022-01-01 | Stop reason: HOSPADM

## 2022-01-01 RX ORDER — TALC
6 POWDER (GRAM) TOPICAL NIGHTLY PRN
Status: DISCONTINUED | OUTPATIENT
Start: 2022-01-01 | End: 2022-01-01

## 2022-01-01 RX ORDER — PANTOPRAZOLE SODIUM 40 MG/10ML
40 INJECTION, POWDER, LYOPHILIZED, FOR SOLUTION INTRAVENOUS DAILY
Status: DISCONTINUED | OUTPATIENT
Start: 2022-01-01 | End: 2022-09-22 | Stop reason: HOSPADM

## 2022-01-01 RX ORDER — FERROUS GLUCONATE 324(37.5)
324 TABLET ORAL DAILY
Status: DISCONTINUED | OUTPATIENT
Start: 2022-01-01 | End: 2022-01-01

## 2022-01-01 RX ORDER — SODIUM,POTASSIUM PHOSPHATES 280-250MG
2 POWDER IN PACKET (EA) ORAL EVERY 4 HOURS
Status: COMPLETED | OUTPATIENT
Start: 2022-01-01 | End: 2022-01-01

## 2022-01-01 RX ORDER — CHOLECALCIFEROL (VITAMIN D3) 25 MCG
1000 TABLET ORAL DAILY
Qty: 30 TABLET | Refills: 11 | Status: ON HOLD | OUTPATIENT
Start: 2022-01-01 | End: 2022-01-01 | Stop reason: HOSPADM

## 2022-01-01 RX ORDER — METRONIDAZOLE 500 MG/1
500 TABLET ORAL EVERY 8 HOURS
Status: DISCONTINUED | OUTPATIENT
Start: 2022-01-01 | End: 2022-01-01

## 2022-01-01 RX ORDER — METHOCARBAMOL 500 MG/1
1000 TABLET, FILM COATED ORAL EVERY 6 HOURS
Qty: 80 TABLET | Refills: 0 | Status: ON HOLD | OUTPATIENT
Start: 2022-01-01 | End: 2022-01-01 | Stop reason: HOSPADM

## 2022-01-01 RX ORDER — NEOSTIGMINE METHYLSULFATE 0.5 MG/ML
INJECTION, SOLUTION INTRAVENOUS
Status: DISCONTINUED | OUTPATIENT
Start: 2022-01-01 | End: 2022-01-01

## 2022-01-01 RX ORDER — ALBUMIN HUMAN 250 G/1000ML
25 SOLUTION INTRAVENOUS ONCE
Status: COMPLETED | OUTPATIENT
Start: 2022-01-01 | End: 2022-01-01

## 2022-01-01 RX ORDER — HYDROCODONE BITARTRATE AND ACETAMINOPHEN 500; 5 MG/1; MG/1
TABLET ORAL
Status: DISCONTINUED | OUTPATIENT
Start: 2022-01-01 | End: 2022-01-01

## 2022-01-01 RX ORDER — LIDOCAINE HYDROCHLORIDE 10 MG/ML
1 INJECTION INFILTRATION; PERINEURAL ONCE
Status: DISCONTINUED | OUTPATIENT
Start: 2022-01-01 | End: 2022-01-01

## 2022-01-01 RX ORDER — HYDROMORPHONE HYDROCHLORIDE 1 MG/ML
0.2 INJECTION, SOLUTION INTRAMUSCULAR; INTRAVENOUS; SUBCUTANEOUS EVERY 5 MIN PRN
Status: DISCONTINUED | OUTPATIENT
Start: 2022-01-01 | End: 2022-01-01 | Stop reason: HOSPADM

## 2022-01-01 RX ORDER — TALC
6 POWDER (GRAM) TOPICAL NIGHTLY PRN
Status: DISCONTINUED | OUTPATIENT
Start: 2022-01-01 | End: 2022-09-22 | Stop reason: HOSPADM

## 2022-01-01 RX ORDER — CEFAZOLIN SODIUM/D5W 2 G/100 ML
2 PLASTIC BAG, INJECTION (ML) INTRAVENOUS ONCE
Status: DISCONTINUED | OUTPATIENT
Start: 2022-01-01 | End: 2022-01-01 | Stop reason: HOSPADM

## 2022-01-01 RX ORDER — FENTANYL CITRATE 50 UG/ML
25 INJECTION, SOLUTION INTRAMUSCULAR; INTRAVENOUS EVERY 5 MIN PRN
Status: DISCONTINUED | OUTPATIENT
Start: 2022-01-01 | End: 2022-01-01 | Stop reason: HOSPADM

## 2022-01-01 RX ORDER — PROMETHAZINE HYDROCHLORIDE 25 MG/1
25 TABLET ORAL EVERY 6 HOURS PRN
Status: DISCONTINUED | OUTPATIENT
Start: 2022-01-01 | End: 2022-01-01 | Stop reason: HOSPADM

## 2022-01-01 RX ORDER — GLUCAGON 1 MG
1 KIT INJECTION
Status: DISCONTINUED | OUTPATIENT
Start: 2022-01-01 | End: 2022-01-01 | Stop reason: HOSPADM

## 2022-01-01 RX ORDER — MORPHINE SULFATE 15 MG/1
15 TABLET, FILM COATED, EXTENDED RELEASE ORAL EVERY 12 HOURS
Status: DISCONTINUED | OUTPATIENT
Start: 2022-01-01 | End: 2022-01-01

## 2022-01-01 RX ORDER — HALOPERIDOL 5 MG/ML
1 INJECTION INTRAMUSCULAR EVERY 4 HOURS PRN
Status: DISCONTINUED | OUTPATIENT
Start: 2022-01-01 | End: 2022-09-22 | Stop reason: HOSPADM

## 2022-01-01 RX ORDER — WARFARIN SODIUM 5 MG/1
5 TABLET ORAL DAILY
Status: DISCONTINUED | OUTPATIENT
Start: 2022-01-01 | End: 2022-01-01 | Stop reason: HOSPADM

## 2022-01-01 RX ORDER — GADOBUTROL 604.72 MG/ML
6 INJECTION INTRAVENOUS
Status: COMPLETED | OUTPATIENT
Start: 2022-01-01 | End: 2022-01-01

## 2022-01-01 RX ORDER — ROPIVACAINE HYDROCHLORIDE 2 MG/ML
2 INJECTION, SOLUTION EPIDURAL; INFILTRATION; PERINEURAL CONTINUOUS
Status: DISCONTINUED | OUTPATIENT
Start: 2022-01-01 | End: 2022-01-01

## 2022-01-01 RX ORDER — DIPHENHYDRAMINE HYDROCHLORIDE 50 MG/ML
50 INJECTION INTRAMUSCULAR; INTRAVENOUS ONCE AS NEEDED
Status: DISCONTINUED | OUTPATIENT
Start: 2022-01-01 | End: 2022-01-01

## 2022-01-01 RX ORDER — DEXMEDETOMIDINE HYDROCHLORIDE 100 UG/ML
INJECTION, SOLUTION INTRAVENOUS
Status: DISCONTINUED | OUTPATIENT
Start: 2022-01-01 | End: 2022-01-01

## 2022-01-01 RX ORDER — OXYCODONE HYDROCHLORIDE 5 MG/1
5 TABLET ORAL ONCE
Status: COMPLETED | OUTPATIENT
Start: 2022-01-01 | End: 2022-01-01

## 2022-01-01 RX ORDER — AMOXICILLIN 250 MG
1 CAPSULE ORAL 2 TIMES DAILY
Status: DISCONTINUED | OUTPATIENT
Start: 2022-01-01 | End: 2022-09-22 | Stop reason: HOSPADM

## 2022-01-01 RX ORDER — MIDAZOLAM HYDROCHLORIDE 1 MG/ML
2 INJECTION INTRAMUSCULAR; INTRAVENOUS ONCE
Status: COMPLETED | OUTPATIENT
Start: 2022-01-01 | End: 2022-01-01

## 2022-01-01 RX ORDER — WARFARIN 3 MG/1
4.5 TABLET ORAL DAILY
Status: ON HOLD
Start: 2022-01-01 | End: 2022-01-01 | Stop reason: HOSPADM

## 2022-01-01 RX ORDER — ONDANSETRON 2 MG/ML
4 INJECTION INTRAMUSCULAR; INTRAVENOUS EVERY 8 HOURS PRN
Status: DISCONTINUED | OUTPATIENT
Start: 2022-01-01 | End: 2022-09-22 | Stop reason: HOSPADM

## 2022-01-01 RX ORDER — HYDROCODONE BITARTRATE AND ACETAMINOPHEN 500; 5 MG/1; MG/1
TABLET ORAL
Status: DISCONTINUED | OUTPATIENT
Start: 2022-01-01 | End: 2022-01-01 | Stop reason: HOSPADM

## 2022-01-01 RX ORDER — IBUPROFEN 200 MG
16 TABLET ORAL
Status: DISCONTINUED | OUTPATIENT
Start: 2022-01-01 | End: 2022-01-01 | Stop reason: HOSPADM

## 2022-01-01 RX ORDER — PHENYLEPHRINE HCL IN 0.9% NACL 1 MG/10 ML
SYRINGE (ML) INTRAVENOUS
Status: DISCONTINUED | OUTPATIENT
Start: 2022-01-01 | End: 2022-01-01

## 2022-01-01 RX ORDER — ACETAMINOPHEN 500 MG
1000 TABLET ORAL EVERY 6 HOURS SCHEDULED
Status: DISCONTINUED | OUTPATIENT
Start: 2022-01-01 | End: 2022-01-01

## 2022-01-01 RX ADMIN — PANTOPRAZOLE SODIUM 40 MG: 40 TABLET, DELAYED RELEASE ORAL at 08:09

## 2022-01-01 RX ADMIN — Medication 10 ML: at 06:09

## 2022-01-01 RX ADMIN — METHYLPREDNISOLONE SODIUM SUCCINATE 80 MG: 40 INJECTION, POWDER, FOR SOLUTION INTRAMUSCULAR; INTRAVENOUS at 08:09

## 2022-01-01 RX ADMIN — IOHEXOL 75 ML: 350 INJECTION, SOLUTION INTRAVENOUS at 02:08

## 2022-01-01 RX ADMIN — SENNOSIDES AND DOCUSATE SODIUM 1 TABLET: 50; 8.6 TABLET ORAL at 08:09

## 2022-01-01 RX ADMIN — METHOCARBAMOL 1000 MG: 500 TABLET ORAL at 11:08

## 2022-01-01 RX ADMIN — HYDROMORPHONE HYDROCHLORIDE 1 MG: 1 INJECTION, SOLUTION INTRAMUSCULAR; INTRAVENOUS; SUBCUTANEOUS at 01:09

## 2022-01-01 RX ADMIN — IPRATROPIUM BROMIDE AND ALBUTEROL SULFATE 3 ML: 2.5; .5 SOLUTION RESPIRATORY (INHALATION) at 08:09

## 2022-01-01 RX ADMIN — PREGABALIN 75 MG: 75 CAPSULE ORAL at 09:08

## 2022-01-01 RX ADMIN — MUPIROCIN: 20 OINTMENT TOPICAL at 09:09

## 2022-01-01 RX ADMIN — DEXMEDETOMIDINE HYDROCHLORIDE 8 MCG: 100 INJECTION, SOLUTION, CONCENTRATE INTRAVENOUS at 05:08

## 2022-01-01 RX ADMIN — METRONIDAZOLE 500 MG: 500 TABLET ORAL at 05:09

## 2022-01-01 RX ADMIN — OXYCODONE 5 MG: 5 TABLET ORAL at 08:08

## 2022-01-01 RX ADMIN — HYDROMORPHONE HYDROCHLORIDE 1 MG: 1 INJECTION, SOLUTION INTRAMUSCULAR; INTRAVENOUS; SUBCUTANEOUS at 05:09

## 2022-01-01 RX ADMIN — SODIUM CHLORIDE SOLN NEBU 3% 4 ML: 3 NEBU SOLN at 03:09

## 2022-01-01 RX ADMIN — POLYETHYLENE GLYCOL 3350 17 G: 17 POWDER, FOR SOLUTION ORAL at 08:09

## 2022-01-01 RX ADMIN — IPRATROPIUM BROMIDE AND ALBUTEROL SULFATE 3 ML: 2.5; .5 SOLUTION RESPIRATORY (INHALATION) at 12:09

## 2022-01-01 RX ADMIN — FENTANYL CITRATE 50 MCG: 50 INJECTION, SOLUTION INTRAMUSCULAR; INTRAVENOUS at 01:08

## 2022-01-01 RX ADMIN — METHOCARBAMOL 500 MG: 500 TABLET ORAL at 03:09

## 2022-01-01 RX ADMIN — AMIODARONE HYDROCHLORIDE 0.5 MG/MIN: 1.8 INJECTION, SOLUTION INTRAVENOUS at 02:09

## 2022-01-01 RX ADMIN — PIPERACILLIN SODIUM AND TAZOBACTAM SODIUM 4.5 G: 4; .5 INJECTION, POWDER, LYOPHILIZED, FOR SOLUTION INTRAVENOUS at 07:09

## 2022-01-01 RX ADMIN — HEPARIN SODIUM 27.94 UNITS/KG/HR: 5000 INJECTION INTRAVENOUS; SUBCUTANEOUS at 03:08

## 2022-01-01 RX ADMIN — ROPIVACAINE HYDROCHLORIDE 20 ML: 5 INJECTION, SOLUTION EPIDURAL; INFILTRATION; PERINEURAL at 01:08

## 2022-01-01 RX ADMIN — FUROSEMIDE 40 MG: 10 INJECTION, SOLUTION INTRAMUSCULAR; INTRAVENOUS at 04:09

## 2022-01-01 RX ADMIN — HYDROMORPHONE HYDROCHLORIDE 0.5 MG: 1 INJECTION, SOLUTION INTRAMUSCULAR; INTRAVENOUS; SUBCUTANEOUS at 08:09

## 2022-01-01 RX ADMIN — SENNOSIDES AND DOCUSATE SODIUM 1 TABLET: 50; 8.6 TABLET ORAL at 09:09

## 2022-01-01 RX ADMIN — CEFEPIME HYDROCHLORIDE 1 G: 1 INJECTION, SOLUTION INTRAVENOUS at 05:09

## 2022-01-01 RX ADMIN — AMIODARONE HYDROCHLORIDE 400 MG: 200 TABLET ORAL at 08:09

## 2022-01-01 RX ADMIN — CEFTRIAXONE 2 G: 2 INJECTION, SOLUTION INTRAVENOUS at 04:09

## 2022-01-01 RX ADMIN — ROCURONIUM BROMIDE 50 MG: 10 INJECTION, SOLUTION INTRAVENOUS at 05:08

## 2022-01-01 RX ADMIN — TRANEXAMIC ACID 1000 MG: 100 INJECTION, SOLUTION INTRAVENOUS at 06:08

## 2022-01-01 RX ADMIN — CEFEPIME HYDROCHLORIDE 1 G: 1 INJECTION, SOLUTION INTRAVENOUS at 12:09

## 2022-01-01 RX ADMIN — HYDROMORPHONE HYDROCHLORIDE 0.2 MG: 1 INJECTION, SOLUTION INTRAMUSCULAR; INTRAVENOUS; SUBCUTANEOUS at 01:08

## 2022-01-01 RX ADMIN — HEPARIN SODIUM 30 UNITS/KG/HR: 5000 INJECTION INTRAVENOUS; SUBCUTANEOUS at 07:08

## 2022-01-01 RX ADMIN — SODIUM CHLORIDE SOLN NEBU 3% 4 ML: 3 NEBU SOLN at 11:09

## 2022-01-01 RX ADMIN — PROPOFOL 40 MG: 10 INJECTION, EMULSION INTRAVENOUS at 03:08

## 2022-01-01 RX ADMIN — PREGABALIN 75 MG: 75 CAPSULE ORAL at 08:09

## 2022-01-01 RX ADMIN — MIDAZOLAM 1 MG: 1 INJECTION INTRAMUSCULAR; INTRAVENOUS at 01:09

## 2022-01-01 RX ADMIN — OXYCODONE 5 MG: 5 TABLET ORAL at 10:08

## 2022-01-01 RX ADMIN — ONDANSETRON 8 MG: 8 TABLET, ORALLY DISINTEGRATING ORAL at 08:08

## 2022-01-01 RX ADMIN — CEFEPIME HYDROCHLORIDE 2 G: 2 INJECTION, SOLUTION INTRAVENOUS at 04:09

## 2022-01-01 RX ADMIN — WARFARIN SODIUM 5 MG: 5 TABLET ORAL at 05:09

## 2022-01-01 RX ADMIN — LORAZEPAM 1 MG: 1 TABLET ORAL at 09:09

## 2022-01-01 RX ADMIN — THERA TABS 1 TABLET: TAB at 09:08

## 2022-01-01 RX ADMIN — ACETAMINOPHEN 1000 MG: 325 TABLET ORAL at 11:08

## 2022-01-01 RX ADMIN — SODIUM CHLORIDE SOLN NEBU 3% 4 ML: 3 NEBU SOLN at 07:09

## 2022-01-01 RX ADMIN — ZOLEDRONIC ACID 3.5 MG: 4 INJECTION, SOLUTION, CONCENTRATE INTRAVENOUS at 01:08

## 2022-01-01 RX ADMIN — OXYCODONE 5 MG: 5 TABLET ORAL at 01:08

## 2022-01-01 RX ADMIN — ESCITALOPRAM 20 MG: 5 TABLET, FILM COATED ORAL at 09:09

## 2022-01-01 RX ADMIN — OXYCODONE 5 MG: 5 TABLET ORAL at 05:08

## 2022-01-01 RX ADMIN — AMIODARONE HYDROCHLORIDE 400 MG: 200 TABLET ORAL at 09:09

## 2022-01-01 RX ADMIN — ACETAMINOPHEN 1000 MG: 500 TABLET ORAL at 11:08

## 2022-01-01 RX ADMIN — PANTOPRAZOLE SODIUM 40 MG: 40 TABLET, DELAYED RELEASE ORAL at 09:09

## 2022-01-01 RX ADMIN — HYDROMORPHONE HYDROCHLORIDE 0.5 MG: 1 INJECTION, SOLUTION INTRAMUSCULAR; INTRAVENOUS; SUBCUTANEOUS at 05:09

## 2022-01-01 RX ADMIN — PIPERACILLIN SODIUM AND TAZOBACTAM SODIUM 4.5 G: 4; .5 INJECTION, POWDER, LYOPHILIZED, FOR SOLUTION INTRAVENOUS at 02:09

## 2022-01-01 RX ADMIN — MUPIROCIN: 20 OINTMENT TOPICAL at 08:08

## 2022-01-01 RX ADMIN — Medication 20 MG: at 08:09

## 2022-01-01 RX ADMIN — MORPHINE SULFATE 15 MG: 15 TABLET, EXTENDED RELEASE ORAL at 08:09

## 2022-01-01 RX ADMIN — HYDROMORPHONE HYDROCHLORIDE 1 MG: 1 INJECTION, SOLUTION INTRAMUSCULAR; INTRAVENOUS; SUBCUTANEOUS at 02:09

## 2022-01-01 RX ADMIN — MORPHINE SULFATE 30 MG: 30 TABLET, FILM COATED, EXTENDED RELEASE ORAL at 08:09

## 2022-01-01 RX ADMIN — PREGABALIN 75 MG: 75 CAPSULE ORAL at 09:09

## 2022-01-01 RX ADMIN — OXYCODONE HYDROCHLORIDE 10 MG: 10 TABLET ORAL at 01:08

## 2022-01-01 RX ADMIN — SODIUM CHLORIDE SOLN NEBU 3% 4 ML: 3 NEBU SOLN at 12:09

## 2022-01-01 RX ADMIN — VANCOMYCIN HYDROCHLORIDE 750 MG: 750 INJECTION, POWDER, LYOPHILIZED, FOR SOLUTION INTRAVENOUS at 09:09

## 2022-01-01 RX ADMIN — HEPARIN SODIUM 16.08 UNITS/KG/HR: 5000 INJECTION INTRAVENOUS; SUBCUTANEOUS at 12:09

## 2022-01-01 RX ADMIN — IPRATROPIUM BROMIDE AND ALBUTEROL SULFATE 3 ML: 2.5; .5 SOLUTION RESPIRATORY (INHALATION) at 05:09

## 2022-01-01 RX ADMIN — IPRATROPIUM BROMIDE AND ALBUTEROL SULFATE 3 ML: 2.5; .5 SOLUTION RESPIRATORY (INHALATION) at 01:09

## 2022-01-01 RX ADMIN — METHYLPREDNISOLONE SODIUM SUCCINATE 125 MG: 125 INJECTION, POWDER, FOR SOLUTION INTRAMUSCULAR; INTRAVENOUS at 02:09

## 2022-01-01 RX ADMIN — METHOCARBAMOL 1000 MG: 500 TABLET ORAL at 05:08

## 2022-01-01 RX ADMIN — HYDROMORPHONE HYDROCHLORIDE 1 MG: 1 INJECTION, SOLUTION INTRAMUSCULAR; INTRAVENOUS; SUBCUTANEOUS at 09:09

## 2022-01-01 RX ADMIN — Medication 10 ML: at 05:09

## 2022-01-01 RX ADMIN — NEOSTIGMINE METHYLSULFATE 3 MG: 0.5 INJECTION INTRAVENOUS at 04:08

## 2022-01-01 RX ADMIN — PREGABALIN 75 MG: 25 CAPSULE ORAL at 10:08

## 2022-01-01 RX ADMIN — HEPARIN SODIUM 16.08 UNITS/KG/HR: 5000 INJECTION INTRAVENOUS; SUBCUTANEOUS at 05:09

## 2022-01-01 RX ADMIN — HYDROMORPHONE HYDROCHLORIDE 1 MG: 1 INJECTION, SOLUTION INTRAMUSCULAR; INTRAVENOUS; SUBCUTANEOUS at 10:09

## 2022-01-01 RX ADMIN — ACETAMINOPHEN 1000 MG: 500 TABLET ORAL at 05:08

## 2022-01-01 RX ADMIN — OXYCODONE HYDROCHLORIDE 10 MG: 10 TABLET ORAL at 12:08

## 2022-01-01 RX ADMIN — AMIODARONE HYDROCHLORIDE 1 MG/MIN: 1.8 INJECTION, SOLUTION INTRAVENOUS at 09:09

## 2022-01-01 RX ADMIN — HEPARIN SODIUM 16 UNITS/KG/HR: 5000 INJECTION INTRAVENOUS; SUBCUTANEOUS at 08:09

## 2022-01-01 RX ADMIN — FAMOTIDINE 20 MG: 10 INJECTION, SOLUTION INTRAVENOUS at 08:08

## 2022-01-01 RX ADMIN — CHOLECALCIFEROL TAB 25 MCG (1000 UNIT) 1000 UNITS: 25 TAB at 08:09

## 2022-01-01 RX ADMIN — PIPERACILLIN SODIUM AND TAZOBACTAM SODIUM 4.5 G: 4; .5 INJECTION, POWDER, LYOPHILIZED, FOR SOLUTION INTRAVENOUS at 09:09

## 2022-01-01 RX ADMIN — METHYLPREDNISOLONE SODIUM SUCCINATE 80 MG: 40 INJECTION, POWDER, FOR SOLUTION INTRAMUSCULAR; INTRAVENOUS at 10:09

## 2022-01-01 RX ADMIN — MORPHINE SULFATE 2 MG: 2 INJECTION, SOLUTION INTRAMUSCULAR; INTRAVENOUS at 12:09

## 2022-01-01 RX ADMIN — ESCITALOPRAM OXALATE 20 MG: 20 TABLET ORAL at 08:08

## 2022-01-01 RX ADMIN — SODIUM CHLORIDE, SODIUM GLUCONATE, SODIUM ACETATE, POTASSIUM CHLORIDE, MAGNESIUM CHLORIDE, SODIUM PHOSPHATE, DIBASIC, AND POTASSIUM PHOSPHATE: .53; .5; .37; .037; .03; .012; .00082 INJECTION, SOLUTION INTRAVENOUS at 02:08

## 2022-01-01 RX ADMIN — HEPARIN SODIUM 20 UNITS/KG/HR: 5000 INJECTION INTRAVENOUS; SUBCUTANEOUS at 09:08

## 2022-01-01 RX ADMIN — FILGRASTIM-SNDZ 300 MCG: 300 INJECTION, SOLUTION INTRAVENOUS; SUBCUTANEOUS at 03:09

## 2022-01-01 RX ADMIN — METRONIDAZOLE 500 MG: 500 TABLET ORAL at 11:09

## 2022-01-01 RX ADMIN — SODIUM CHLORIDE SOLN NEBU 3% 4 ML: 3 NEBU SOLN at 08:09

## 2022-01-01 RX ADMIN — POTASSIUM BICARBONATE 40 MEQ: 391 TABLET, EFFERVESCENT ORAL at 05:08

## 2022-01-01 RX ADMIN — AMIODARONE HYDROCHLORIDE 0.5 MG/MIN: 1.8 INJECTION, SOLUTION INTRAVENOUS at 12:09

## 2022-01-01 RX ADMIN — HYDROMORPHONE HYDROCHLORIDE 0.5 MG: 1 INJECTION, SOLUTION INTRAMUSCULAR; INTRAVENOUS; SUBCUTANEOUS at 02:09

## 2022-01-01 RX ADMIN — HEPARIN SODIUM 24 UNITS/KG/HR: 5000 INJECTION INTRAVENOUS; SUBCUTANEOUS at 12:08

## 2022-01-01 RX ADMIN — LORAZEPAM 1 MG: 1 TABLET ORAL at 03:09

## 2022-01-01 RX ADMIN — PRAVASTATIN SODIUM 40 MG: 40 TABLET ORAL at 08:09

## 2022-01-01 RX ADMIN — AMIODARONE HYDROCHLORIDE 0.5 MG/MIN: 1.8 INJECTION, SOLUTION INTRAVENOUS at 03:09

## 2022-01-01 RX ADMIN — HYDROMORPHONE HYDROCHLORIDE 1 MG: 1 INJECTION, SOLUTION INTRAMUSCULAR; INTRAVENOUS; SUBCUTANEOUS at 07:09

## 2022-01-01 RX ADMIN — PROPOFOL 20 MG: 10 INJECTION, EMULSION INTRAVENOUS at 09:09

## 2022-01-01 RX ADMIN — MORPHINE SULFATE 2 MG: 2 INJECTION, SOLUTION INTRAMUSCULAR; INTRAVENOUS at 08:09

## 2022-01-01 RX ADMIN — OXYCODONE HYDROCHLORIDE 10 MG: 10 TABLET ORAL at 08:08

## 2022-01-01 RX ADMIN — LIDOCAINE HYDROCHLORIDE 5 ML: 10 INJECTION, SOLUTION INFILTRATION; PERINEURAL at 10:09

## 2022-01-01 RX ADMIN — ROCURONIUM BROMIDE 20 MG: 10 INJECTION, SOLUTION INTRAVENOUS at 07:08

## 2022-01-01 RX ADMIN — Medication 10 ML: at 12:09

## 2022-01-01 RX ADMIN — PRAVASTATIN SODIUM 40 MG: 40 TABLET ORAL at 09:09

## 2022-01-01 RX ADMIN — Medication 324 MG: at 04:09

## 2022-01-01 RX ADMIN — HEPARIN SODIUM 16 UNITS/KG/HR: 5000 INJECTION INTRAVENOUS; SUBCUTANEOUS at 02:09

## 2022-01-01 RX ADMIN — DEXAMETHASONE SODIUM PHOSPHATE 12 MG: 4 INJECTION INTRA-ARTICULAR; INTRALESIONAL; INTRAMUSCULAR; INTRAVENOUS; SOFT TISSUE at 11:09

## 2022-01-01 RX ADMIN — IPRATROPIUM BROMIDE AND ALBUTEROL SULFATE 3 ML: 2.5; .5 SOLUTION RESPIRATORY (INHALATION) at 07:09

## 2022-01-01 RX ADMIN — MORPHINE SULFATE 3 MG: 2 INJECTION, SOLUTION INTRAMUSCULAR; INTRAVENOUS at 12:08

## 2022-01-01 RX ADMIN — ROPIVACAINE HYDROCHLORIDE 2 ML/HR: 2 INJECTION, SOLUTION EPIDURAL; INFILTRATION at 12:08

## 2022-01-01 RX ADMIN — PHENYLEPHRINE HYDROCHLORIDE 100 MCG: 10 INJECTION INTRAVENOUS at 03:08

## 2022-01-01 RX ADMIN — IPRATROPIUM BROMIDE AND ALBUTEROL SULFATE 3 ML: 2.5; .5 SOLUTION RESPIRATORY (INHALATION) at 03:09

## 2022-01-01 RX ADMIN — Medication 10 ML: at 01:09

## 2022-01-01 RX ADMIN — MUPIROCIN: 20 OINTMENT TOPICAL at 10:08

## 2022-01-01 RX ADMIN — HYDROMORPHONE HYDROCHLORIDE 1 MG: 1 INJECTION, SOLUTION INTRAMUSCULAR; INTRAVENOUS; SUBCUTANEOUS at 08:09

## 2022-01-01 RX ADMIN — SUGAMMADEX 200 MG: 100 INJECTION, SOLUTION INTRAVENOUS at 09:08

## 2022-01-01 RX ADMIN — SODIUM CHLORIDE SOLN NEBU 3% 4 ML: 3 NEBU SOLN at 04:09

## 2022-01-01 RX ADMIN — IPRATROPIUM BROMIDE AND ALBUTEROL SULFATE 3 ML: 2.5; .5 SOLUTION RESPIRATORY (INHALATION) at 11:09

## 2022-01-01 RX ADMIN — MORPHINE SULFATE 3 MG: 2 INJECTION, SOLUTION INTRAMUSCULAR; INTRAVENOUS at 10:08

## 2022-01-01 RX ADMIN — FENTANYL CITRATE 25 MCG: 50 INJECTION, SOLUTION INTRAMUSCULAR; INTRAVENOUS at 08:08

## 2022-01-01 RX ADMIN — HYDROMORPHONE HYDROCHLORIDE 1 MG: 1 INJECTION, SOLUTION INTRAMUSCULAR; INTRAVENOUS; SUBCUTANEOUS at 04:09

## 2022-01-01 RX ADMIN — DRONABINOL 10 MG: 5 CAPSULE ORAL at 08:08

## 2022-01-01 RX ADMIN — CEFEPIME HYDROCHLORIDE 1 G: 1 INJECTION, SOLUTION INTRAVENOUS at 08:09

## 2022-01-01 RX ADMIN — SODIUM CHLORIDE SOLN NEBU 3% 4 ML: 3 NEBU SOLN at 01:09

## 2022-01-01 RX ADMIN — WARFARIN SODIUM 5 MG: 5 TABLET ORAL at 05:08

## 2022-01-01 RX ADMIN — MIDAZOLAM 1 MG: 1 INJECTION INTRAMUSCULAR; INTRAVENOUS at 06:09

## 2022-01-01 RX ADMIN — MUPIROCIN: 20 OINTMENT TOPICAL at 09:08

## 2022-01-01 RX ADMIN — MORPHINE SULFATE 3 MG: 2 INJECTION, SOLUTION INTRAMUSCULAR; INTRAVENOUS at 09:08

## 2022-01-01 RX ADMIN — Medication 324 MG: at 09:09

## 2022-01-01 RX ADMIN — AMIODARONE HYDROCHLORIDE 150 MG: 1.5 INJECTION, SOLUTION INTRAVENOUS at 09:09

## 2022-01-01 RX ADMIN — POTASSIUM CHLORIDE 40 MEQ: 1500 TABLET, EXTENDED RELEASE ORAL at 10:08

## 2022-01-01 RX ADMIN — HYDROMORPHONE HYDROCHLORIDE 0.5 MG: 1 INJECTION, SOLUTION INTRAMUSCULAR; INTRAVENOUS; SUBCUTANEOUS at 04:09

## 2022-01-01 RX ADMIN — HYDROCODONE BITARTRATE AND ACETAMINOPHEN 1 TABLET: 5; 325 TABLET ORAL at 05:08

## 2022-01-01 RX ADMIN — ROPIVACAINE HYDROCHLORIDE 2 ML/HR: 2 INJECTION, SOLUTION EPIDURAL; INFILTRATION at 04:08

## 2022-01-01 RX ADMIN — Medication 2 G: at 06:08

## 2022-01-01 RX ADMIN — MORPHINE SULFATE 2 MG: 2 INJECTION, SOLUTION INTRAMUSCULAR; INTRAVENOUS at 05:09

## 2022-01-01 RX ADMIN — FENTANYL CITRATE 50 MCG: 50 INJECTION, SOLUTION INTRAMUSCULAR; INTRAVENOUS at 02:08

## 2022-01-01 RX ADMIN — SODIUM CHLORIDE: 0.9 INJECTION, SOLUTION INTRAVENOUS at 08:09

## 2022-01-01 RX ADMIN — OXYCODONE 5 MG: 5 TABLET ORAL at 04:08

## 2022-01-01 RX ADMIN — POLYETHYLENE GLYCOL 3350 17 G: 17 POWDER, FOR SOLUTION ORAL at 12:09

## 2022-01-01 RX ADMIN — MIDAZOLAM 2 MG: 1 INJECTION INTRAMUSCULAR; INTRAVENOUS at 05:08

## 2022-01-01 RX ADMIN — METHOCARBAMOL 500 MG: 500 TABLET ORAL at 12:08

## 2022-01-01 RX ADMIN — Medication 6 MG: at 08:09

## 2022-01-01 RX ADMIN — IOHEXOL 100 ML: 350 INJECTION, SOLUTION INTRAVENOUS at 04:09

## 2022-01-01 RX ADMIN — OXYCODONE HYDROCHLORIDE 10 MG: 10 TABLET ORAL at 03:08

## 2022-01-01 RX ADMIN — SODIUM CHLORIDE SOLN NEBU 3% 4 ML: 3 NEBU SOLN at 05:09

## 2022-01-01 RX ADMIN — Medication 324 MG: at 08:09

## 2022-01-01 RX ADMIN — POTASSIUM & SODIUM PHOSPHATES POWDER PACK 280-160-250 MG 2 PACKET: 280-160-250 PACK at 09:09

## 2022-01-01 RX ADMIN — LIDOCAINE HYDROCHLORIDE 5 ML: 10 INJECTION, SOLUTION INFILTRATION; PERINEURAL at 01:09

## 2022-01-01 RX ADMIN — SODIUM CHLORIDE: 0.9 INJECTION, SOLUTION INTRAVENOUS at 02:08

## 2022-01-01 RX ADMIN — Medication 6 MG: at 07:09

## 2022-01-01 RX ADMIN — FUROSEMIDE 40 MG: 10 INJECTION, SOLUTION INTRAMUSCULAR; INTRAVENOUS at 10:09

## 2022-01-01 RX ADMIN — PHYTONADIONE 1 MG: 10 INJECTION, EMULSION INTRAMUSCULAR; INTRAVENOUS; SUBCUTANEOUS at 12:09

## 2022-01-01 RX ADMIN — VANCOMYCIN HYDROCHLORIDE 750 MG: 750 INJECTION, POWDER, LYOPHILIZED, FOR SOLUTION INTRAVENOUS at 01:09

## 2022-01-01 RX ADMIN — FENTANYL CITRATE 25 MCG: 50 INJECTION, SOLUTION INTRAMUSCULAR; INTRAVENOUS at 04:08

## 2022-01-01 RX ADMIN — Medication 100 MCG: at 07:08

## 2022-01-01 RX ADMIN — HEPARIN SODIUM 1000 UNITS: 1000 INJECTION, SOLUTION INTRAVENOUS; SUBCUTANEOUS at 04:08

## 2022-01-01 RX ADMIN — WARFARIN SODIUM 5 MG: 5 TABLET ORAL at 12:09

## 2022-01-01 RX ADMIN — METHYLPREDNISOLONE SODIUM SUCCINATE 80 MG: 40 INJECTION, POWDER, FOR SOLUTION INTRAMUSCULAR; INTRAVENOUS at 03:09

## 2022-01-01 RX ADMIN — ESCITALOPRAM OXALATE 20 MG: 20 TABLET ORAL at 09:08

## 2022-01-01 RX ADMIN — ACETAMINOPHEN 1000 MG: 325 TABLET ORAL at 06:08

## 2022-01-01 RX ADMIN — HYDROCODONE BITARTRATE AND ACETAMINOPHEN 1 TABLET: 5; 325 TABLET ORAL at 08:08

## 2022-01-01 RX ADMIN — HEPARIN SODIUM 22 UNITS/KG/HR: 5000 INJECTION INTRAVENOUS; SUBCUTANEOUS at 05:08

## 2022-01-01 RX ADMIN — LORAZEPAM 1 MG: 1 TABLET ORAL at 08:09

## 2022-01-01 RX ADMIN — ACETAMINOPHEN 1000 MG: 325 TABLET ORAL at 05:08

## 2022-01-01 RX ADMIN — DEXAMETHASONE SODIUM PHOSPHATE 4 MG: 4 INJECTION, SOLUTION INTRAMUSCULAR; INTRAVENOUS at 02:08

## 2022-01-01 RX ADMIN — Medication 100 MCG: at 06:08

## 2022-01-01 RX ADMIN — FENTANYL CITRATE 25 MCG: 50 INJECTION, SOLUTION INTRAMUSCULAR; INTRAVENOUS at 06:08

## 2022-01-01 RX ADMIN — ALLOPURINOL 300 MG: 300 TABLET ORAL at 05:09

## 2022-01-01 RX ADMIN — LIDOCAINE HYDROCHLORIDE 50 MG: 10 INJECTION, SOLUTION INTRAVENOUS at 02:08

## 2022-01-01 RX ADMIN — DOCUSATE SODIUM 100 MG: 100 CAPSULE ORAL at 09:08

## 2022-01-01 RX ADMIN — CHOLECALCIFEROL TAB 25 MCG (1000 UNIT) 1000 UNITS: 25 TAB at 09:08

## 2022-01-01 RX ADMIN — MUPIROCIN: 20 OINTMENT TOPICAL at 08:09

## 2022-01-01 RX ADMIN — FILGRASTIM-SNDZ 300 MCG: 300 INJECTION, SOLUTION INTRAVENOUS; SUBCUTANEOUS at 08:09

## 2022-01-01 RX ADMIN — LORAZEPAM 1 MG: 1 TABLET ORAL at 11:09

## 2022-01-01 RX ADMIN — HYDROMORPHONE HYDROCHLORIDE 1 MG: 1 INJECTION, SOLUTION INTRAMUSCULAR; INTRAVENOUS; SUBCUTANEOUS at 06:09

## 2022-01-01 RX ADMIN — IOHEXOL 50 ML: 350 INJECTION, SOLUTION INTRAVENOUS at 02:08

## 2022-01-01 RX ADMIN — ALBUMIN HUMAN 25 G: 0.25 SOLUTION INTRAVENOUS at 12:09

## 2022-01-01 RX ADMIN — SODIUM CHLORIDE: 0.9 INJECTION, SOLUTION INTRAVENOUS at 01:09

## 2022-01-01 RX ADMIN — METHOCARBAMOL 500 MG: 500 TABLET ORAL at 02:09

## 2022-01-01 RX ADMIN — MIDAZOLAM 1 MG: 1 INJECTION INTRAMUSCULAR; INTRAVENOUS at 09:09

## 2022-01-01 RX ADMIN — MIDAZOLAM 1 MG: 1 INJECTION INTRAMUSCULAR; INTRAVENOUS at 01:08

## 2022-01-01 RX ADMIN — ESCITALOPRAM OXALATE 20 MG: 20 TABLET ORAL at 02:08

## 2022-01-01 RX ADMIN — METHYLPREDNISOLONE SODIUM SUCCINATE 60 MG: 40 INJECTION, POWDER, FOR SOLUTION INTRAMUSCULAR; INTRAVENOUS at 03:09

## 2022-01-01 RX ADMIN — ALTEPLASE 2 MG: 2.2 INJECTION, POWDER, LYOPHILIZED, FOR SOLUTION INTRAVENOUS at 05:09

## 2022-01-01 RX ADMIN — Medication 10 ML: at 04:09

## 2022-01-01 RX ADMIN — ESCITALOPRAM 20 MG: 5 TABLET, FILM COATED ORAL at 08:09

## 2022-01-01 RX ADMIN — ACETAMINOPHEN 1000 MG: 500 TABLET ORAL at 06:08

## 2022-01-01 RX ADMIN — AMIODARONE HYDROCHLORIDE 400 MG: 200 TABLET ORAL at 10:09

## 2022-01-01 RX ADMIN — PANTOPRAZOLE SODIUM 40 MG: 40 TABLET, DELAYED RELEASE ORAL at 08:08

## 2022-01-01 RX ADMIN — POLYETHYLENE GLYCOL 3350 17 G: 17 POWDER, FOR SOLUTION ORAL at 09:09

## 2022-01-01 RX ADMIN — CEFEPIME HYDROCHLORIDE 1 G: 1 INJECTION, SOLUTION INTRAVENOUS at 01:09

## 2022-01-01 RX ADMIN — FUROSEMIDE 40 MG: 10 INJECTION, SOLUTION INTRAMUSCULAR; INTRAVENOUS at 11:09

## 2022-01-01 RX ADMIN — MORPHINE SULFATE 2 MG: 2 INJECTION, SOLUTION INTRAMUSCULAR; INTRAVENOUS at 02:09

## 2022-01-01 RX ADMIN — MORPHINE SULFATE 3 MG: 2 INJECTION, SOLUTION INTRAMUSCULAR; INTRAVENOUS at 03:08

## 2022-01-01 RX ADMIN — LORAZEPAM 1 MG: 1 TABLET ORAL at 10:09

## 2022-01-01 RX ADMIN — OXYCODONE HYDROCHLORIDE 10 MG: 10 TABLET ORAL at 04:08

## 2022-01-01 RX ADMIN — OXYCODONE HYDROCHLORIDE 10 MG: 10 TABLET ORAL at 11:08

## 2022-01-01 RX ADMIN — PROTAMINE SULFATE 30 MG: 10 INJECTION, SOLUTION INTRAVENOUS at 04:08

## 2022-01-01 RX ADMIN — IPRATROPIUM BROMIDE AND ALBUTEROL SULFATE 3 ML: 2.5; .5 SOLUTION RESPIRATORY (INHALATION) at 02:09

## 2022-01-01 RX ADMIN — ALLOPURINOL 300 MG: 300 TABLET ORAL at 08:09

## 2022-01-01 RX ADMIN — ROCURONIUM BROMIDE 10 MG: 10 INJECTION, SOLUTION INTRAVENOUS at 08:08

## 2022-01-01 RX ADMIN — Medication 25 MG: at 08:09

## 2022-01-01 RX ADMIN — IPRATROPIUM BROMIDE AND ALBUTEROL SULFATE 3 ML: 2.5; .5 SOLUTION RESPIRATORY (INHALATION) at 04:09

## 2022-01-01 RX ADMIN — Medication 6 MG: at 09:08

## 2022-01-01 RX ADMIN — CHOLECALCIFEROL TAB 25 MCG (1000 UNIT) 1000 UNITS: 25 TAB at 08:08

## 2022-01-01 RX ADMIN — HEPARIN SODIUM 16.08 UNITS/KG/HR: 5000 INJECTION INTRAVENOUS; SUBCUTANEOUS at 01:09

## 2022-01-01 RX ADMIN — MIDAZOLAM 2 MG: 1 INJECTION INTRAMUSCULAR; INTRAVENOUS at 01:08

## 2022-01-01 RX ADMIN — MORPHINE SULFATE 2 MG: 2 INJECTION, SOLUTION INTRAMUSCULAR; INTRAVENOUS at 03:09

## 2022-01-01 RX ADMIN — POTASSIUM CHLORIDE 40 MEQ: 1500 TABLET, EXTENDED RELEASE ORAL at 09:08

## 2022-01-01 RX ADMIN — Medication 10 ML: at 11:09

## 2022-01-01 RX ADMIN — MIDAZOLAM 1 MG: 1 INJECTION INTRAMUSCULAR; INTRAVENOUS at 08:09

## 2022-01-01 RX ADMIN — ETOPOSIDE 168 MG: 20 INJECTION INTRAVENOUS at 12:09

## 2022-01-01 RX ADMIN — MORPHINE SULFATE 4 MG: 4 INJECTION INTRAVENOUS at 11:08

## 2022-01-01 RX ADMIN — METHOCARBAMOL 500 MG: 500 TABLET ORAL at 11:09

## 2022-01-01 RX ADMIN — OXYCODONE HYDROCHLORIDE 10 MG: 10 TABLET ORAL at 09:08

## 2022-01-01 RX ADMIN — HYDROMORPHONE HYDROCHLORIDE 1 MG: 1 INJECTION, SOLUTION INTRAMUSCULAR; INTRAVENOUS; SUBCUTANEOUS at 12:09

## 2022-01-01 RX ADMIN — MIDAZOLAM HYDROCHLORIDE 2 MG: 1 INJECTION, SOLUTION INTRAMUSCULAR; INTRAVENOUS at 02:08

## 2022-01-01 RX ADMIN — METHOCARBAMOL 1000 MG: 500 TABLET ORAL at 06:08

## 2022-01-01 RX ADMIN — MORPHINE SULFATE 3 MG: 2 INJECTION, SOLUTION INTRAMUSCULAR; INTRAVENOUS at 01:08

## 2022-01-01 RX ADMIN — AMIODARONE HYDROCHLORIDE 0.5 MG/MIN: 1.8 INJECTION, SOLUTION INTRAVENOUS at 01:09

## 2022-01-01 RX ADMIN — PREDNISONE 60 MG: 50 TABLET ORAL at 01:09

## 2022-01-01 RX ADMIN — ACETAMINOPHEN 1000 MG: 10 INJECTION, SOLUTION INTRAVENOUS at 08:08

## 2022-01-01 RX ADMIN — PHENYLEPHRINE HYDROCHLORIDE 100 MCG: 10 INJECTION, SOLUTION INTRAMUSCULAR; INTRAVENOUS; SUBCUTANEOUS at 10:09

## 2022-01-01 RX ADMIN — SODIUM CHLORIDE: 0.9 INJECTION, SOLUTION INTRAVENOUS at 07:09

## 2022-01-01 RX ADMIN — CHOLECALCIFEROL TAB 25 MCG (1000 UNIT) 1000 UNITS: 25 TAB at 09:09

## 2022-01-01 RX ADMIN — LORAZEPAM 1 MG: 1 TABLET ORAL at 04:09

## 2022-01-01 RX ADMIN — METHOCARBAMOL 1000 MG: 500 TABLET ORAL at 01:08

## 2022-01-01 RX ADMIN — ROPIVACAINE HYDROCHLORIDE 2 ML/HR: 2 INJECTION, SOLUTION EPIDURAL; INFILTRATION at 09:08

## 2022-01-01 RX ADMIN — POTASSIUM & SODIUM PHOSPHATES POWDER PACK 280-160-250 MG 1 PACKET: 280-160-250 PACK at 03:08

## 2022-01-01 RX ADMIN — SODIUM CHLORIDE: 0.9 INJECTION, SOLUTION INTRAVENOUS at 04:09

## 2022-01-01 RX ADMIN — PHENYLEPHRINE HYDROCHLORIDE 100 MCG: 10 INJECTION INTRAVENOUS at 04:08

## 2022-01-01 RX ADMIN — DOCUSATE SODIUM 100 MG: 100 CAPSULE ORAL at 08:08

## 2022-01-01 RX ADMIN — PIPERACILLIN SODIUM AND TAZOBACTAM SODIUM 4.5 G: 4; .5 INJECTION, POWDER, LYOPHILIZED, FOR SOLUTION INTRAVENOUS at 04:09

## 2022-01-01 RX ADMIN — OXYCODONE 5 MG: 5 TABLET ORAL at 09:08

## 2022-01-01 RX ADMIN — ONDANSETRON 4 MG: 2 INJECTION INTRAMUSCULAR; INTRAVENOUS at 03:08

## 2022-01-01 RX ADMIN — MORPHINE SULFATE 30 MG: 30 TABLET, FILM COATED, EXTENDED RELEASE ORAL at 09:09

## 2022-01-01 RX ADMIN — AZITHROMYCIN 500 MG: 500 INJECTION, POWDER, LYOPHILIZED, FOR SOLUTION INTRAVENOUS at 11:09

## 2022-01-01 RX ADMIN — LORAZEPAM 1 MG: 2 INJECTION INTRAMUSCULAR; INTRAVENOUS at 06:08

## 2022-01-01 RX ADMIN — FENTANYL CITRATE 50 MCG: 0.05 INJECTION, SOLUTION INTRAMUSCULAR; INTRAVENOUS at 12:09

## 2022-01-01 RX ADMIN — HYDROMORPHONE HYDROCHLORIDE 0.5 MG: 1 INJECTION, SOLUTION INTRAMUSCULAR; INTRAVENOUS; SUBCUTANEOUS at 09:09

## 2022-01-01 RX ADMIN — DEXAMETHASONE SODIUM PHOSPHATE 12 MG: 4 INJECTION INTRA-ARTICULAR; INTRALESIONAL; INTRAMUSCULAR; INTRAVENOUS; SOFT TISSUE at 03:09

## 2022-01-01 RX ADMIN — LORAZEPAM 1 MG: 1 TABLET ORAL at 06:09

## 2022-01-01 RX ADMIN — HEPARIN SODIUM 16.08 UNITS/KG/HR: 5000 INJECTION INTRAVENOUS; SUBCUTANEOUS at 06:09

## 2022-01-01 RX ADMIN — METHOCARBAMOL 500 MG: 500 TABLET ORAL at 07:09

## 2022-01-01 RX ADMIN — PIPERACILLIN SODIUM AND TAZOBACTAM SODIUM 4.5 G: 4; .5 INJECTION, POWDER, LYOPHILIZED, FOR SOLUTION INTRAVENOUS at 05:09

## 2022-01-01 RX ADMIN — ACETAMINOPHEN 650 MG: 325 TABLET ORAL at 12:08

## 2022-01-01 RX ADMIN — ETOMIDATE 6 MG: 2 INJECTION, SOLUTION INTRAVENOUS at 09:09

## 2022-01-01 RX ADMIN — ESCITALOPRAM 20 MG: 5 TABLET, FILM COATED ORAL at 10:09

## 2022-01-01 RX ADMIN — HEPARIN SODIUM 4500 UNITS: 1000 INJECTION, SOLUTION INTRAVENOUS; SUBCUTANEOUS at 03:08

## 2022-01-01 RX ADMIN — OXYCODONE 5 MG: 5 TABLET ORAL at 02:09

## 2022-01-01 RX ADMIN — CEFEPIME HYDROCHLORIDE 2 G: 2 INJECTION, SOLUTION INTRAVENOUS at 09:09

## 2022-01-01 RX ADMIN — OXYCODONE HYDROCHLORIDE 10 MG: 10 TABLET ORAL at 05:08

## 2022-01-01 RX ADMIN — ALTEPLASE 2 MG: 2.2 INJECTION, POWDER, LYOPHILIZED, FOR SOLUTION INTRAVENOUS at 01:09

## 2022-01-01 RX ADMIN — DIAZEPAM 2.5 MG: 5 INJECTION, SOLUTION INTRAMUSCULAR; INTRAVENOUS at 02:09

## 2022-01-01 RX ADMIN — IOHEXOL 70 ML: 350 INJECTION, SOLUTION INTRAVENOUS at 05:09

## 2022-01-01 RX ADMIN — METHOCARBAMOL 500 MG: 500 TABLET ORAL at 06:08

## 2022-01-01 RX ADMIN — METHOCARBAMOL 500 MG: 500 TABLET ORAL at 04:09

## 2022-01-01 RX ADMIN — SODIUM CHLORIDE SOLN NEBU 3% 4 ML: 3 NEBU SOLN at 02:09

## 2022-01-01 RX ADMIN — HYDROMORPHONE HYDROCHLORIDE 0.5 MG: 1 INJECTION, SOLUTION INTRAMUSCULAR; INTRAVENOUS; SUBCUTANEOUS at 01:09

## 2022-01-01 RX ADMIN — IBUPROFEN 600 MG: 600 TABLET, FILM COATED ORAL at 10:08

## 2022-01-01 RX ADMIN — HYDROMORPHONE HYDROCHLORIDE 0.5 MG: 1 INJECTION, SOLUTION INTRAMUSCULAR; INTRAVENOUS; SUBCUTANEOUS at 04:08

## 2022-01-01 RX ADMIN — MAGNESIUM SULFATE 2 G: 2 INJECTION INTRAVENOUS at 09:09

## 2022-01-01 RX ADMIN — POTASSIUM & SODIUM PHOSPHATES POWDER PACK 280-160-250 MG 2 PACKET: 280-160-250 PACK at 08:09

## 2022-01-01 RX ADMIN — POTASSIUM & SODIUM PHOSPHATES POWDER PACK 280-160-250 MG 1 PACKET: 280-160-250 PACK at 09:08

## 2022-01-01 RX ADMIN — MORPHINE SULFATE 3 MG: 2 INJECTION, SOLUTION INTRAMUSCULAR; INTRAVENOUS at 06:08

## 2022-01-01 RX ADMIN — PANTOPRAZOLE SODIUM 40 MG: 40 TABLET, DELAYED RELEASE ORAL at 09:08

## 2022-01-01 RX ADMIN — THERA TABS 1 TABLET: TAB at 11:08

## 2022-01-01 RX ADMIN — ACETAMINOPHEN 650 MG: 325 TABLET ORAL at 06:08

## 2022-01-01 RX ADMIN — METHOCARBAMOL 1000 MG: 500 TABLET ORAL at 01:09

## 2022-01-01 RX ADMIN — SODIUM CHLORIDE, SODIUM GLUCONATE, SODIUM ACETATE, POTASSIUM CHLORIDE, MAGNESIUM CHLORIDE, SODIUM PHOSPHATE, DIBASIC, AND POTASSIUM PHOSPHATE: .53; .5; .37; .037; .03; .012; .00082 INJECTION, SOLUTION INTRAVENOUS at 05:08

## 2022-01-01 RX ADMIN — DOCUSATE SODIUM 100 MG: 100 CAPSULE ORAL at 05:08

## 2022-01-01 RX ADMIN — FILGRASTIM-SNDZ 300 MCG: 300 INJECTION, SOLUTION INTRAVENOUS; SUBCUTANEOUS at 09:09

## 2022-01-01 RX ADMIN — DEXMEDETOMIDINE HYDROCHLORIDE 8 MCG: 100 INJECTION, SOLUTION, CONCENTRATE INTRAVENOUS at 03:08

## 2022-01-01 RX ADMIN — PROPOFOL 40 MG: 10 INJECTION, EMULSION INTRAVENOUS at 04:08

## 2022-01-01 RX ADMIN — MORPHINE SULFATE 30 MG: 30 TABLET, FILM COATED, EXTENDED RELEASE ORAL at 10:09

## 2022-01-01 RX ADMIN — PREGABALIN 75 MG: 25 CAPSULE ORAL at 09:08

## 2022-01-01 RX ADMIN — PREGABALIN 75 MG: 75 CAPSULE ORAL at 08:08

## 2022-01-01 RX ADMIN — HYDROMORPHONE HYDROCHLORIDE 0.5 MG: 1 INJECTION, SOLUTION INTRAMUSCULAR; INTRAVENOUS; SUBCUTANEOUS at 12:09

## 2022-01-01 RX ADMIN — CEFTRIAXONE 2 G: 2 INJECTION, SOLUTION INTRAVENOUS at 02:09

## 2022-01-01 RX ADMIN — PANTOPRAZOLE SODIUM 40 MG: 40 INJECTION, POWDER, FOR SOLUTION INTRAVENOUS at 09:09

## 2022-01-01 RX ADMIN — VANCOMYCIN HYDROCHLORIDE 1500 MG: 1.5 INJECTION, POWDER, LYOPHILIZED, FOR SOLUTION INTRAVENOUS at 07:09

## 2022-01-01 RX ADMIN — MIDAZOLAM 1 MG: 1 INJECTION INTRAMUSCULAR; INTRAVENOUS at 02:09

## 2022-01-01 RX ADMIN — MUPIROCIN: 20 OINTMENT TOPICAL at 10:09

## 2022-01-01 RX ADMIN — HEPARIN SODIUM 27.94 UNITS/KG/HR: 5000 INJECTION INTRAVENOUS; SUBCUTANEOUS at 08:08

## 2022-01-01 RX ADMIN — THERA TABS 1 TABLET: TAB at 08:08

## 2022-01-01 RX ADMIN — MORPHINE SULFATE 15 MG: 15 TABLET, EXTENDED RELEASE ORAL at 09:09

## 2022-01-01 RX ADMIN — HEPARIN SODIUM 24 UNITS/KG/HR: 5000 INJECTION INTRAVENOUS; SUBCUTANEOUS at 05:08

## 2022-01-01 RX ADMIN — LORAZEPAM 1 MG: 1 TABLET ORAL at 12:09

## 2022-01-01 RX ADMIN — SODIUM ZIRCONIUM CYCLOSILICATE 10 G: 5 POWDER, FOR SUSPENSION ORAL at 12:09

## 2022-01-01 RX ADMIN — HYDROCODONE BITARTRATE AND ACETAMINOPHEN 1 TABLET: 5; 325 TABLET ORAL at 07:08

## 2022-01-01 RX ADMIN — ALLOPURINOL 300 MG: 300 TABLET ORAL at 09:09

## 2022-01-01 RX ADMIN — ETOMIDATE 18 MG: 2 INJECTION, SOLUTION INTRAVENOUS at 05:08

## 2022-01-01 RX ADMIN — POTASSIUM & SODIUM PHOSPHATES POWDER PACK 280-160-250 MG 2 PACKET: 280-160-250 PACK at 07:09

## 2022-01-01 RX ADMIN — HYDROMORPHONE HYDROCHLORIDE 0.6 MG: 1 INJECTION, SOLUTION INTRAMUSCULAR; INTRAVENOUS; SUBCUTANEOUS at 01:09

## 2022-01-01 RX ADMIN — WARFARIN SODIUM 7.5 MG: 7.5 TABLET ORAL at 04:09

## 2022-01-01 RX ADMIN — METHOCARBAMOL 1000 MG: 500 TABLET ORAL at 12:08

## 2022-01-01 RX ADMIN — CEFEPIME HYDROCHLORIDE 1 G: 1 INJECTION, SOLUTION INTRAVENOUS at 04:09

## 2022-01-01 RX ADMIN — HEPARIN SODIUM 12 UNITS/KG/HR: 5000 INJECTION INTRAVENOUS; SUBCUTANEOUS at 06:09

## 2022-01-01 RX ADMIN — HEPARIN SODIUM 12 UNITS/KG/HR: 5000 INJECTION INTRAVENOUS; SUBCUTANEOUS at 05:09

## 2022-01-01 RX ADMIN — MUPIROCIN: 20 OINTMENT TOPICAL at 12:09

## 2022-01-01 RX ADMIN — MIDAZOLAM 1 MG: 1 INJECTION INTRAMUSCULAR; INTRAVENOUS at 03:09

## 2022-01-01 RX ADMIN — MIDAZOLAM 2 MG: 1 INJECTION INTRAMUSCULAR; INTRAVENOUS at 12:09

## 2022-01-01 RX ADMIN — CHOLECALCIFEROL TAB 25 MCG (1000 UNIT) 1000 UNITS: 25 TAB at 04:09

## 2022-01-01 RX ADMIN — PANTOPRAZOLE SODIUM 40 MG: 40 INJECTION, POWDER, FOR SOLUTION INTRAVENOUS at 10:09

## 2022-01-01 RX ADMIN — GADOBUTROL 6 ML: 604.72 INJECTION INTRAVENOUS at 04:08

## 2022-01-01 RX ADMIN — ACETAMINOPHEN 1000 MG: 500 TABLET ORAL at 01:08

## 2022-01-01 RX ADMIN — CARBOPLATIN 415 MG: 10 INJECTION INTRAVENOUS at 12:09

## 2022-01-01 RX ADMIN — HYDROMORPHONE HYDROCHLORIDE 0.5 MG: 1 INJECTION, SOLUTION INTRAMUSCULAR; INTRAVENOUS; SUBCUTANEOUS at 07:09

## 2022-01-01 RX ADMIN — Medication 9 MG: at 08:08

## 2022-01-01 RX ADMIN — HYDROMORPHONE HYDROCHLORIDE 1 MG: 1 INJECTION, SOLUTION INTRAMUSCULAR; INTRAVENOUS; SUBCUTANEOUS at 11:09

## 2022-01-01 RX ADMIN — HEPARIN SODIUM 5000 UNITS: 5000 INJECTION INTRAVENOUS; SUBCUTANEOUS at 05:08

## 2022-01-01 RX ADMIN — GLYCOPYRROLATE 0.4 MG: 0.2 INJECTION, SOLUTION INTRAMUSCULAR; INTRAVENOUS at 04:08

## 2022-01-01 RX ADMIN — DEXMEDETOMIDINE HYDROCHLORIDE 4 MCG: 100 INJECTION, SOLUTION, CONCENTRATE INTRAVENOUS at 04:08

## 2022-01-01 RX ADMIN — PROPOFOL 160 MG: 10 INJECTION, EMULSION INTRAVENOUS at 02:08

## 2022-01-01 RX ADMIN — MIDAZOLAM 1 MG: 1 INJECTION INTRAMUSCULAR; INTRAVENOUS at 04:09

## 2022-01-01 RX ADMIN — MIDAZOLAM HYDROCHLORIDE 2 MG: 1 INJECTION, SOLUTION INTRAMUSCULAR; INTRAVENOUS at 09:09

## 2022-01-01 RX ADMIN — MIDAZOLAM 1 MG: 1 INJECTION INTRAMUSCULAR; INTRAVENOUS at 11:09

## 2022-01-01 RX ADMIN — SODIUM CHLORIDE SOLN NEBU 3% 4 ML: 3 NEBU SOLN at 09:09

## 2022-01-01 RX ADMIN — CEFAZOLIN 2 G: 330 INJECTION, POWDER, FOR SOLUTION INTRAMUSCULAR; INTRAVENOUS at 03:08

## 2022-01-01 RX ADMIN — FILGRASTIM-SNDZ 300 MCG: 300 INJECTION, SOLUTION INTRAVENOUS; SUBCUTANEOUS at 04:09

## 2022-01-01 RX ADMIN — LORAZEPAM 1 MG: 1 TABLET ORAL at 03:08

## 2022-01-01 RX ADMIN — ROCURONIUM BROMIDE 50 MG: 10 INJECTION, SOLUTION INTRAVENOUS at 02:08

## 2022-01-01 RX ADMIN — CHOLECALCIFEROL TAB 25 MCG (1000 UNIT) 1000 UNITS: 25 TAB at 12:08

## 2022-01-01 RX ADMIN — MORPHINE SULFATE 3 MG: 2 INJECTION, SOLUTION INTRAMUSCULAR; INTRAVENOUS at 11:08

## 2022-01-01 RX ADMIN — SODIUM CHLORIDE: 9 INJECTION, SOLUTION INTRAVENOUS at 09:09

## 2022-01-01 RX ADMIN — ONDANSETRON 8 MG: 2 INJECTION INTRAMUSCULAR; INTRAVENOUS at 04:09

## 2022-01-01 RX ADMIN — ETOPOSIDE 168 MG: 20 INJECTION INTRAVENOUS at 04:09

## 2022-01-01 RX ADMIN — METHOCARBAMOL 500 MG: 500 TABLET ORAL at 09:09

## 2022-01-01 RX ADMIN — SODIUM CHLORIDE: 0.9 INJECTION, SOLUTION INTRAVENOUS at 09:09

## 2022-01-01 RX ADMIN — HEPARIN SODIUM 27.94 UNITS/KG/HR: 5000 INJECTION INTRAVENOUS; SUBCUTANEOUS at 10:08

## 2022-01-01 RX ADMIN — AZITHROMYCIN 500 MG: 500 INJECTION, POWDER, LYOPHILIZED, FOR SOLUTION INTRAVENOUS at 12:09

## 2022-01-01 RX ADMIN — LORAZEPAM 1 MG: 1 TABLET ORAL at 07:09

## 2022-01-01 RX ADMIN — OXYCODONE 5 MG: 5 TABLET ORAL at 11:08

## 2022-01-01 RX ADMIN — MORPHINE SULFATE 2 MG: 2 INJECTION, SOLUTION INTRAMUSCULAR; INTRAVENOUS at 10:09

## 2022-01-01 RX ADMIN — LIDOCAINE HYDROCHLORIDE 50 MG: 10 INJECTION, SOLUTION INTRAVENOUS at 09:09

## 2022-01-01 RX ADMIN — OXYCODONE HYDROCHLORIDE 10 MG: 10 TABLET ORAL at 02:08

## 2022-01-01 RX ADMIN — DEXAMETHASONE SODIUM PHOSPHATE 12 MG: 4 INJECTION INTRA-ARTICULAR; INTRALESIONAL; INTRAMUSCULAR; INTRAVENOUS; SOFT TISSUE at 10:09

## 2022-01-01 RX ADMIN — PANTOPRAZOLE SODIUM 40 MG: 40 INJECTION, POWDER, FOR SOLUTION INTRAVENOUS at 08:09

## 2022-01-01 RX ADMIN — PROPOFOL 50 MCG/KG/MIN: 10 INJECTION, EMULSION INTRAVENOUS at 10:09

## 2022-01-01 RX ADMIN — PHENYLEPHRINE HYDROCHLORIDE 200 MCG: 10 INJECTION INTRAVENOUS at 02:08

## 2022-01-01 RX ADMIN — PIPERACILLIN SODIUM AND TAZOBACTAM SODIUM 4.5 G: 4; .5 INJECTION, POWDER, LYOPHILIZED, FOR SOLUTION INTRAVENOUS at 01:09

## 2022-01-01 RX ADMIN — PANTOPRAZOLE SODIUM 40 MG: 40 TABLET, DELAYED RELEASE ORAL at 04:09

## 2022-01-01 RX ADMIN — HEPARIN SODIUM 16 UNITS/KG/HR: 5000 INJECTION INTRAVENOUS; SUBCUTANEOUS at 09:08

## 2022-01-01 RX ADMIN — SODIUM CHLORIDE: 0.9 INJECTION, SOLUTION INTRAVENOUS at 05:09

## 2022-01-01 RX ADMIN — ONDANSETRON 4 MG: 2 INJECTION INTRAMUSCULAR; INTRAVENOUS at 08:08

## 2022-01-01 RX ADMIN — HEPARIN SODIUM 30 UNITS/KG/HR: 5000 INJECTION INTRAVENOUS; SUBCUTANEOUS at 01:08

## 2022-01-01 RX ADMIN — VANCOMYCIN HYDROCHLORIDE 1250 MG: 1.25 INJECTION, POWDER, LYOPHILIZED, FOR SOLUTION INTRAVENOUS at 01:09

## 2022-01-01 RX ADMIN — SODIUM CHLORIDE: 0.9 INJECTION, SOLUTION INTRAVENOUS at 12:09

## 2022-01-01 RX ADMIN — TRAMADOL HYDROCHLORIDE 50 MG: 50 TABLET, COATED ORAL at 02:08

## 2022-01-01 RX ADMIN — HYDROCODONE BITARTRATE AND ACETAMINOPHEN 1 TABLET: 5; 325 TABLET ORAL at 03:08

## 2022-01-01 RX ADMIN — HEPARIN SODIUM 25.73 UNITS/KG/HR: 5000 INJECTION INTRAVENOUS; SUBCUTANEOUS at 12:08

## 2022-01-01 RX ADMIN — PROTAMINE SULFATE 5 MG: 10 INJECTION, SOLUTION INTRAVENOUS at 04:08

## 2022-01-01 RX ADMIN — HEPARIN SODIUM 5000 UNITS: 5000 INJECTION INTRAVENOUS; SUBCUTANEOUS at 09:08

## 2022-01-01 RX ADMIN — ONDANSETRON 8 MG: 2 INJECTION INTRAMUSCULAR; INTRAVENOUS at 11:09

## 2022-01-01 RX ADMIN — OXYCODONE 5 MG: 5 TABLET ORAL at 06:08

## 2022-01-01 RX ADMIN — PREGABALIN 75 MG: 75 CAPSULE ORAL at 10:09

## 2022-01-01 RX ADMIN — HEPARIN SODIUM 5000 UNITS: 5000 INJECTION INTRAVENOUS; SUBCUTANEOUS at 04:08

## 2022-01-01 RX ADMIN — FENTANYL CITRATE 50 MCG: 50 INJECTION, SOLUTION INTRAMUSCULAR; INTRAVENOUS at 09:08

## 2022-01-01 RX ADMIN — POTASSIUM PHOSPHATE, MONOBASIC AND POTASSIUM PHOSPHATE, DIBASIC 20 MMOL: 224; 236 INJECTION, SOLUTION, CONCENTRATE INTRAVENOUS at 03:08

## 2022-01-01 RX ADMIN — Medication 6 MG: at 08:08

## 2022-01-01 RX ADMIN — SODIUM CHLORIDE: 9 INJECTION, SOLUTION INTRAVENOUS at 05:08

## 2022-01-01 RX ADMIN — DEXAMETHASONE SODIUM PHOSPHATE 4 MG: 4 INJECTION, SOLUTION INTRAMUSCULAR; INTRAVENOUS at 07:08

## 2022-01-01 RX ADMIN — HEPARIN SODIUM 16.08 UNITS/KG/HR: 5000 INJECTION INTRAVENOUS; SUBCUTANEOUS at 09:09

## 2022-08-18 PROBLEM — M84.451A: Status: ACTIVE | Noted: 2022-01-01

## 2022-08-18 NOTE — CONSULTS
[facility] - [facility]  Orthopedics  Consult Note    Patient Name: Donis Jimenez  MRN: [id number]  Admission Date: 8/18/2022  Hospital Length of Stay: 0 days  Attending Provider: Yung Flores MD  Primary Care Provider: Primary Doctor No        Inpatient consult to Orthopedic Surgery  Consult performed by: Turner Haji MD  Consult ordered by: Turner Haji MD        Subjective:     Principal Problem:Pathologic fracture of neck of right femur    Chief Complaint: No chief complaint on file.       HPI: Donis Jimenez is a 61 y.o. male with PMH significant for bacterial endocarditis, s/p AV and MV mechanical valve replacement (on warfarin), CKD, tobacco abuse presenting with R hip pain. Pt with several weeks R hip pain, found to have bone lesions and was pending outpatient workup / IR biopsy. However, pt experienced a fall yesterday - prior to getting biopsy. Unknown primary neoplasm. He has pain, inability to bear weight on right leg. Patient denies any head trauma or LOC. The patient denies prior hx of falls. Patient denies numbness and tingling. No known prior right hip injury or surgery. Outside radiology reports indicate lytic femoral neck lesion, lesions in pelvis, and lung mass seen on xrays. Transferred for ortho oncology evaluation and management.    Walks w/out assisted devices at baseline. On warfarin at baseline.  1 ppd smoker      Past Medical History:   Diagnosis Date    Endocarditis     Pacemaker     Pneumonia        No past surgical history on file.    Review of patient's allergies indicates:  No Known Allergies    Current Facility-Administered Medications   Medication    acetaminophen tablet 650 mg    ibuprofen tablet 600 mg    melatonin tablet 6 mg    ondansetron disintegrating tablet 8 mg    oxyCODONE immediate release tablet 5 mg    oxyCODONE immediate release tablet Tab 10 mg    promethazine tablet 25 mg    sodium chloride 0.9% flush 10 mL     Family History    None       Tobacco Use     Smoking status: Not on file    Smokeless tobacco: Not on file   Substance and Sexual Activity    Alcohol use: Not on file    Drug use: Not on file    Sexual activity: Not on file     ROS  Constitutional: negative for fevers  Eyes: negative visual changes  ENT: negative for hearing loss  Respiratory: negative for dyspnea  Cardiovascular: negative for chest pain  Gastrointestinal: negative for abdominal pain  Genitourinary: negative for dysuria  Neurological: negative for headaches  Behavioral/Psych: negative for hallucinations  Endocrine: negative for temperature intolerance    Objective:     Vital Signs (Most Recent):  Temp: 98.1 °F (36.7 °C) (08/18/22 0450)  Pulse: 85 (08/18/22 0450)  Resp: 18 (08/18/22 0450)  BP: (!) 171/81 (08/18/22 0450)  SpO2: (!) 90 % (08/18/22 0450)   Vital Signs (24h Range):  Temp:  [96.9 °F (36.1 °C)-98.1 °F (36.7 °C)] 98.1 °F (36.7 °C)  Pulse:  [80-94] 85  Resp:  [16-18] 18  SpO2:  [90 %-95 %] 90 %  BP: (125-171)/(74-81) 171/81     Weight: 54.4 kg (119 lb 14.9 oz)  Height: 6' (182.9 cm)  Body mass index is 16.27 kg/m².    No intake or output data in the 24 hours ending 08/18/22 0646    Ortho/SPM Exam  General:  no acute distress, appears stated age   Neuro: alert and oriented x3  Psych: normal mood  Head: normocephalic, atraumatic.  Eyes: no scleral icterus  Mouth: moist mucous membranes  Cardiovascular: extremities warm and well perfused  Lungs: breathing comfortably, equal chest rise bilat  Skin: clean, dry, intact (any exceptions noted in below musculoskeletal exam)    MSK:  RUE:  - Skin intact throughout, no open wounds  - No swelling  - No ecchymosis, erythema, or signs of cellulitis  - NonTTP throughout  - AROM and PROM of the shoulder, elbow, wrist, and hand intact without pain  - Axillary/AIN/PIN/Radial/Median/Ulnar Nerves assessed in isolation without deficit  - SILT throughout  - Compartments soft  - Radial artery palpated   - Capillary Refill <3s    LUE:  - Skin intact  throughout, no open wounds  - No swelling  - No ecchymosis, erythema, or signs of cellulitis  - NonTTP throughout  - AROM and PROM of the shoulder, elbow, wrist, and hand intact without pain  - Axillary/AIN/PIN/Radial/Median/Ulnar Nerves assessed in isolation without deficit  - SILT throughout  - Compartments soft  - Radial artery palpated   - Capillary Refill <3s    RLE:  Skin intact throughout, no scars present  No swelling  No ecchymosis, erythema, or signs of cellulitis  TTP at hip/proximal femur  No tenderness to palpation of middle or distal femur  No tenderness to palpation of proximal, middle, or distal aspects of tibia or fibula  No tenderness to palpation of foot  Pain with any ROM at hip  Full painless ROM of knee and ankle  Compartments soft  SILT Sa/Kenny/DP/SP/T  Motor intact EHL/FHL/TA/Gastroc  2+ DP  Brisk capillary refill      LLE:  - Skin intact throughout, no open wounds  - No swelling  - No ecchymosis, erythema, or signs of cellulitis  - NonTTP throughout  - AROM and PROM of the hip, knee, ankle, and foot intact without pain  - TA/EHL/Gastroc/FHL assessed in isolation without deficit  - SILT throughout  - Compartments soft  - DP and PT palpated  - Capillary Refill <3s  - Negative Log roll  - Negative Stinchfield    Spine/pelvis/axial body:  No tenderness to palpation of cervical, thoracic, or lumbar spine  No pain with compression of pelvis  No chest wall or abdominal tenderness  No decubitus ulcers    Significant Labs:   Recent Lab Results         08/18/22  0419   08/18/22  0418   08/18/22  0417   08/18/22  0415        Albumin   2.8           Alkaline Phosphatase   148           ALT   8           Anion Gap   11           Appearance, UA       Clear       AST   24           Bacteria, UA       Rare       Baso #   0.03           Basophil %   0.3           Beta-2 Microglobulin   3.5           Bilirubin (UA)       Negative       BILIRUBIN TOTAL   0.7  Comment: For infants and newborns, interpretation of  results should be based  on gestational age, weight and in agreement with clinical  observations.    Premature Infant recommended reference ranges:  Up to 24 hours.............<8.0 mg/dL  Up to 48 hours............<12.0 mg/dL  3-5 days..................<15.0 mg/dL  6-29 days.................<15.0 mg/dL             BUN   16           Calcium   10.4           Chloride   100           CO2   29           Color, UA       Yellow       Creatinine   1.0           CRP   76.3           Differential Method   Automated           eGFR   >60.0           Eos #   0.1           Eosinophil %   0.7           Glucose   94           Glucose, UA       Negative       Gran # (ANC)   8.4           Gran %   81.1           Group & Rh   O POS           Hematocrit   36.3           Hemoglobin   12.2           Hyaline Casts, UA       0       Immature Grans (Abs)   0.06  Comment: Mild elevation in immature granulocytes is non specific and   can be seen in a variety of conditions including stress response,   acute inflammation, trauma and pregnancy. Correlation with other   laboratory and clinical findings is essential.             Immature Granulocytes   0.6           INDIRECT JOEY   NEG           INR     1.8  Comment: Coumadin Therapy:  2.0 - 3.0 for INR for all indicators except mechanical heart valves  and antiphospholipid syndromes which should use 2.5 - 3.5.           Ketones, UA       Negative       LD   620  Comment: Results are increased in hemolyzed samples.           Leukocytes, UA       Negative       Lymph #   1.1           Lymph %   10.6           Magnesium   1.7           MCH   29.0           MCHC   33.6           MCV   86           Microscopic Comment       SEE COMMENT  Comment: Other formed elements not mentioned in the report are not   present in the microscopic examination.          Mono #   0.7           Mono %   6.7           MPV   9.5           NITRITE UA       Negative       nRBC   0           Occult Blood UA       Trace        pH, UA       6.0       Phosphorus   3.2           Platelets   289           Potassium   3.2           Prealbumin   20           PROTEIN TOTAL   6.2           Protein, UA       2+  Comment: Recommend a 24 hour urine protein or a urine   protein/creatinine ratio if globulin induced proteinuria is  clinically suspected.         Protein, Serum   5.7  Comment: Serum protein electrophoresis and immunofixation results should be   interpreted in clinical context in that some therapeutic agents can   result   in false positive results (example, daratumumab). Correlation with   the   patient s therapeutic regimen is required.             Protime     18.5         PSA, Free     0.30  Comment: The testing method is a chemiluminescent microparticle immunoassay   manufactured by [employer] and performed on the [employer]   or   [employer] system. Values obtained with different assay manufacturers   for   methods may be different and cannot be used interchangeably.           PSA, Free %     15.79         PSA Total     1.9  Comment: The testing method is a chemiluminescent microparticle immunoassay   manufactured by [employer] and performed on the [employer]   or   [employer] system. Values obtained with different assay manufacturers   for   methods may be different and cannot be used interchangeably.  PSA Expected levels:  Hormonal Therapy: <0.05 ng/ml  Prostatectomy: <0.01 ng/ml  Radiation Therapy: <1.00 ng/ml           PTH 23.2             RBC   4.21           RBC, UA       3       RDW   17.0           Sed Rate     21         Sodium   140           Specific Gravity, UA       1.020       Specimen UA       Urine, Clean Catch       Squam Epithel, UA       0       Transferrin   210           TSH   1.538           WBC, UA       1       WBC   10.34                 All pertinent labs within the past 24 hours have been reviewed.    Significant Imaging: I have reviewed all pertinent imaging  results/findings.    Assessment/Plan:     * Pathologic fracture of neck of right femur  Donis Jimenez is a 61 y.o. male with PMH of bacterial endocarditis, s/p AV and MV mechanical valve replacement (on warfarin), CKD, tobacco abuse presenting with pathologic R femur fracture. Bony lesions in femur and pelvis, as well as lung mass on CXR at outside hospital. Imaging studies pending. Unknown primary neoplasm. Transferred for ortho oncology evaluation and management. On warfarin. Extensive smoking history.     Metastatic workup initiated  Bed rest, santiago  NPO as precaution  Multimodal pain control  INR 1.8. Will start SQH for short half-life, unsure timing of surgery. Will defer further decisions regarding anticoagulation to    Spoke to  regarding transfer of primary team / medical optimization  Final plan pending imaging, discussion with staff            Turner Haji MD  Orthopedics  [facility] - Surgery

## 2022-08-18 NOTE — NURSING
Nurses Note -- 4 Eyes      8/18/2022   1:30 AM      Skin assessed during: Admit      [x] No Pressure Injuries Present    []Prevention Measures Documented      [] Yes- Altered Skin Integrity Present or Discovered   [] LDA Added if Not in Epic (Describe Wound)   [] New Altered Skin Integrity was Present on Admit and Documented in LDA   [] Wound Image Taken    Wound Care Consulted? No    Attending Nurse:  Rashida Turner RN     Second RN/Staff Member:  Sandra Reeves

## 2022-08-18 NOTE — NURSING
Paged Dr Flores's team again regarding pain meds, anxiety meds and patient back from imaging and wants to know if he can eat.     1630 VTO Spoke with Dr Hagan okay to give oxcycodone 5mg now and regular diet, will message back with clarification on ativan.     1630 messaged Dr Hagan with clarification on Ativan patient and spouse states, patient takes Ativan 1mg three times daily as needed.

## 2022-08-18 NOTE — SUBJECTIVE & OBJECTIVE
Past Medical History:   Diagnosis Date    Endocarditis     Pacemaker     Pneumonia        No past surgical history on file.    Review of patient's allergies indicates:  No Known Allergies    Current Facility-Administered Medications   Medication    acetaminophen tablet 650 mg    ibuprofen tablet 600 mg    melatonin tablet 6 mg    ondansetron disintegrating tablet 8 mg    oxyCODONE immediate release tablet 5 mg    oxyCODONE immediate release tablet Tab 10 mg    promethazine tablet 25 mg    sodium chloride 0.9% flush 10 mL     Family History    None       Tobacco Use    Smoking status: Not on file    Smokeless tobacco: Not on file   Substance and Sexual Activity    Alcohol use: Not on file    Drug use: Not on file    Sexual activity: Not on file     ROS  Constitutional: negative for fevers  Eyes: negative visual changes  ENT: negative for hearing loss  Respiratory: negative for dyspnea  Cardiovascular: negative for chest pain  Gastrointestinal: negative for abdominal pain  Genitourinary: negative for dysuria  Neurological: negative for headaches  Behavioral/Psych: negative for hallucinations  Endocrine: negative for temperature intolerance    Objective:     Vital Signs (Most Recent):  Temp: 98.1 °F (36.7 °C) (08/18/22 0450)  Pulse: 85 (08/18/22 0450)  Resp: 18 (08/18/22 0450)  BP: (!) 171/81 (08/18/22 0450)  SpO2: (!) 90 % (08/18/22 0450)   Vital Signs (24h Range):  Temp:  [96.9 °F (36.1 °C)-98.1 °F (36.7 °C)] 98.1 °F (36.7 °C)  Pulse:  [80-94] 85  Resp:  [16-18] 18  SpO2:  [90 %-95 %] 90 %  BP: (125-171)/(74-81) 171/81     Weight: 54.4 kg (119 lb 14.9 oz)  Height: 6' (182.9 cm)  Body mass index is 16.27 kg/m².    No intake or output data in the 24 hours ending 08/18/22 0646    Ortho/SPM Exam  General:  no acute distress, appears stated age   Neuro: alert and oriented x3  Psych: normal mood  Head: normocephalic, atraumatic.  Eyes: no scleral icterus  Mouth: moist mucous membranes  Cardiovascular: extremities warm and  well perfused  Lungs: breathing comfortably, equal chest rise bilat  Skin: clean, dry, intact (any exceptions noted in below musculoskeletal exam)    MSK:  RUE:  - Skin intact throughout, no open wounds  - No swelling  - No ecchymosis, erythema, or signs of cellulitis  - NonTTP throughout  - AROM and PROM of the shoulder, elbow, wrist, and hand intact without pain  - Axillary/AIN/PIN/Radial/Median/Ulnar Nerves assessed in isolation without deficit  - SILT throughout  - Compartments soft  - Radial artery palpated   - Capillary Refill <3s    LUE:  - Skin intact throughout, no open wounds  - No swelling  - No ecchymosis, erythema, or signs of cellulitis  - NonTTP throughout  - AROM and PROM of the shoulder, elbow, wrist, and hand intact without pain  - Axillary/AIN/PIN/Radial/Median/Ulnar Nerves assessed in isolation without deficit  - SILT throughout  - Compartments soft  - Radial artery palpated   - Capillary Refill <3s    RLE:  Skin intact throughout, no scars present  No swelling  No ecchymosis, erythema, or signs of cellulitis  TTP at hip/proximal femur  No tenderness to palpation of middle or distal femur  No tenderness to palpation of proximal, middle, or distal aspects of tibia or fibula  No tenderness to palpation of foot  Pain with any ROM at hip  Full painless ROM of knee and ankle  Compartments soft  SILT Sa/Kenny/DP/SP/T  Motor intact EHL/FHL/TA/Gastroc  2+ DP  Brisk capillary refill      LLE:  - Skin intact throughout, no open wounds  - No swelling  - No ecchymosis, erythema, or signs of cellulitis  - NonTTP throughout  - AROM and PROM of the hip, knee, ankle, and foot intact without pain  - TA/EHL/Gastroc/FHL assessed in isolation without deficit  - SILT throughout  - Compartments soft  - DP and PT palpated  - Capillary Refill <3s  - Negative Log roll  - Negative Stinchfield    Spine/pelvis/axial body:  No tenderness to palpation of cervical, thoracic, or lumbar spine  No pain with compression of pelvis  No  chest wall or abdominal tenderness  No decubitus ulcers    Significant Labs:   Recent Lab Results         08/18/22  0419   08/18/22  0418   08/18/22  0417   08/18/22  0415        Albumin   2.8           Alkaline Phosphatase   148           ALT   8           Anion Gap   11           Appearance, UA       Clear       AST   24           Bacteria, UA       Rare       Baso #   0.03           Basophil %   0.3           Beta-2 Microglobulin   3.5           Bilirubin (UA)       Negative       BILIRUBIN TOTAL   0.7  Comment: For infants and newborns, interpretation of results should be based  on gestational age, weight and in agreement with clinical  observations.    Premature Infant recommended reference ranges:  Up to 24 hours.............<8.0 mg/dL  Up to 48 hours............<12.0 mg/dL  3-5 days..................<15.0 mg/dL  6-29 days.................<15.0 mg/dL             BUN   16           Calcium   10.4           Chloride   100           CO2   29           Color, UA       Yellow       Creatinine   1.0           CRP   76.3           Differential Method   Automated           eGFR   >60.0           Eos #   0.1           Eosinophil %   0.7           Glucose   94           Glucose, UA       Negative       Gran # (ANC)   8.4           Gran %   81.1           Group & Rh   O POS           Hematocrit   36.3           Hemoglobin   12.2           Hyaline Casts, UA       0       Immature Grans (Abs)   0.06  Comment: Mild elevation in immature granulocytes is non specific and   can be seen in a variety of conditions including stress response,   acute inflammation, trauma and pregnancy. Correlation with other   laboratory and clinical findings is essential.             Immature Granulocytes   0.6           INDIRECT JOEY   NEG           INR     1.8  Comment: Coumadin Therapy:  2.0 - 3.0 for INR for all indicators except mechanical heart valves  and antiphospholipid syndromes which should use 2.5 - 3.5.           Ketones, UA        Negative       LD   620  Comment: Results are increased in hemolyzed samples.           Leukocytes, UA       Negative       Lymph #   1.1           Lymph %   10.6           Magnesium   1.7           MCH   29.0           MCHC   33.6           MCV   86           Microscopic Comment       SEE COMMENT  Comment: Other formed elements not mentioned in the report are not   present in the microscopic examination.          Mono #   0.7           Mono %   6.7           MPV   9.5           NITRITE UA       Negative       nRBC   0           Occult Blood UA       Trace       pH, UA       6.0       Phosphorus   3.2           Platelets   289           Potassium   3.2           Prealbumin   20           PROTEIN TOTAL   6.2           Protein, UA       2+  Comment: Recommend a 24 hour urine protein or a urine   protein/creatinine ratio if globulin induced proteinuria is  clinically suspected.         Protein, Serum   5.7  Comment: Serum protein electrophoresis and immunofixation results should be   interpreted in clinical context in that some therapeutic agents can   result   in false positive results (example, daratumumab). Correlation with   the   patient s therapeutic regimen is required.             Protime     18.5         PSA, Free     0.30  Comment: The testing method is a chemiluminescent microparticle immunoassay   manufactured by [employer] and performed on the [employer]   or   [employer] system. Values obtained with different assay manufacturers   for   methods may be different and cannot be used interchangeably.           PSA, Free %     15.79         PSA Total     1.9  Comment: The testing method is a chemiluminescent microparticle immunoassay   manufactured by [employer] and performed on the [employer]   or   [employer] system. Values obtained with different assay manufacturers   for   methods may be different and cannot be used interchangeably.  PSA Expected levels:  Hormonal Therapy: <0.05  ng/ml  Prostatectomy: <0.01 ng/ml  Radiation Therapy: <1.00 ng/ml           PTH 23.2             RBC   4.21           RBC, UA       3       RDW   17.0           Sed Rate     21         Sodium   140           Specific Gravity, UA       1.020       Specimen UA       Urine, Clean Catch       Squam Epithel, UA       0       Transferrin   210           TSH   1.538           WBC, UA       1       WBC   10.34                 All pertinent labs within the past 24 hours have been reviewed.    Significant Imaging: I have reviewed all pertinent imaging results/findings.

## 2022-08-18 NOTE — NURSING
Paged Dr Flores's team regarding pain medication, patient pain level post oxycodone still 9-10.     1045am paged Dr Stallings's team regarding above    1052am VTO Dr Haji Morphine 4mg IV x1 dose

## 2022-08-18 NOTE — PLAN OF CARE
Patient AAOX4, call light and belongings in reach, siderails up x2, spouse at bedside.  RLE pain not well controlled this shift, patient had multiple images this shift requiring movement, pain meds as per order, (+) pulses.  Patient with non productive cough, encouraged IS as patient states unable to cough deep due to rib pain.   Pending dinner delivery, voiding per urinal and output charted, no BM this shift.  [Patient extremely tearful throughout shift, MD notified of home meds. Remains free from falls and safety maintained.     Outcome: Ongoing, Progressing  Problem: Adult Inpatient Plan of Care  Goal: Plan of Care Review  8/18/2022 1713 by Shreya Molina LPN  Outcome: Ongoing, Progressing    Problem: Adult Inpatient Plan of Care  Goal: Patient-Specific Goal (Individualized)  8/18/2022 1713 by Shreya Molina LPN  Outcome: Ongoing, Progressing    Problem: Adult Inpatient Plan of Care  Goal: Absence of Hospital-Acquired Illness or Injury  8/18/2022 1713 by Shreya Molina LPN  Outcome: Ongoing, Progressing    Problem: Adult Inpatient Plan of Care  Goal: Optimal Comfort and Wellbeing  8/18/2022 1713 by Shreya oMlina LPN  Outcome: Ongoing, Not Progressing    Problem: Skin Injury Risk Increased  Goal: Skin Health and Integrity  8/18/2022 1713 by Shreya Molina LPN  Outcome: Ongoing, Progressing    Problem: Fall Injury Risk  Goal: Absence of Fall and Fall-Related Injury  8/18/2022 1713 by Shreya Molina LPN  Outcome: Ongoing, Progressing    Problem: Anxiety  Goal: Anxiety Reduction or Resolution  8/18/2022 1713 by Shreya Molina LPN  Outcome: Ongoing, Not Progressing    Problem: Coping Ineffective  Goal: Effective Coping  8/18/2022 1713 by Shreya Molina LPN  Outcome: Ongoing, Not Progressing    Problem: Pain Acute  Goal: Acceptable Pain Control and Functional Ability  8/18/2022 1713 by Shreya Molina LPN  Outcome: Ongoing, Not Progressing

## 2022-08-18 NOTE — NURSING
Paged Dr Flores's team to notify of patient anxiety and request medication, patient unwilling to get testing completed at this time, Nuclear medicine arrived to give patient contrast via IV, patient became very emotional and distraught about  Current situation leg pain, stating he is worried about pain, stating he is worried about dying, patient was tearful and not consolable, partner was present. Radiology reps gave number to call when patient is ready for test.     1134 notified Bridget in [place] that patient is ready for contrast    -patient continues to be extremely tearful and not consolable, partner states patient has been on ativan three times daily at home.    -continued explaining care to patient and listening, providing quiet, calm environment  -paged into Dr Flores's team regarding anxiety medications

## 2022-08-18 NOTE — PLAN OF CARE
The PRN SW went to see the pt to complete his assessment.  Pt was off of the unit.  SW will f/u at a later time.    Sonia Hernandez, JASPREETW   PRN

## 2022-08-18 NOTE — HPI
Donis Jimenez is a 61 y.o. male with PMH significant for bacterial endocarditis, s/p AV and MV mechanical valve replacement (on warfarin), CKD, tobacco abuse presenting with R hip pain. Pt with several weeks R hip pain, found to have bone lesions and was pending outpatient workup / IR biopsy. However, pt experienced a fall yesterday - prior to getting biopsy. Unknown primary neoplasm. He has pain, inability to bear weight on right leg. Patient denies any head trauma or LOC. The patient denies prior hx of falls. Patient denies numbness and tingling. No known prior right hip injury or surgery. Outside radiology reports indicate lytic femoral neck lesion, lesions in pelvis, and lung mass seen on xrays. Transferred for ortho oncology evaluation and management.    Walks w/out assisted devices at baseline. On warfarin at baseline.  1 ppd smoker

## 2022-08-18 NOTE — ASSESSMENT & PLAN NOTE
Doins Jimenez is a 61 y.o. male with PMH of bacterial endocarditis, s/p AV and MV mechanical valve replacement (on warfarin), CKD, tobacco abuse presenting with pathologic R femur fracture. Bony lesions in femur and pelvis, as well as lung mass on CXR at outside hospital. Imaging studies pending. Unknown primary neoplasm. Transferred for ortho oncology evaluation and management. On warfarin. Extensive smoking history.     Metastatic workup initiated  Bed rest, santiago  NPO as precaution  Multimodal pain control  INR 1.8. Will start SQH for short half-life, unsure timing of surgery. Will defer further decisions regarding anticoagulation to HM   Spoke to  regarding transfer of primary team / medical optimization  Final plan pending imaging, discussion with staff

## 2022-08-19 NOTE — ANESTHESIA PREPROCEDURE EVALUATION
08/19/2022  Donis Jimenez is a 61 y.o., male.  Pre-operative evaluation for Procedure(s) (LRB):  REPAIR, PSEUDOANEURYSM, ARTERY, FEMORAL (Right)  HPI per vascular surgery:Mr Jimenez is a 61 year old male with a pmh of smoking 40+ pack years, history of bacterial endocarditis s/p AVR and MVR mechanical on warfarin who presents with a pathological right femur neck fracture. Metastatic work up is pending but patient has a suspicious lung mass and supraclavicular lymphadenopathy. Vascular surgery was consulted for an incidentally discovered right femoral artery pseudoaneurysm which appears to be originating off of the posterior branch of the profunda femoris.        Donis Jimenez is a 61 y.o. male     Patient Active Problem List   Diagnosis    Pathologic fracture of neck of right femur    Pseudoaneurysm       Review of patient's allergies indicates:  No Known Allergies    No current facility-administered medications on file prior to encounter.     No current outpatient medications on file prior to encounter.       History reviewed. No pertinent surgical history.    Social History     Socioeconomic History    Marital status:    Tobacco Use    Smoking status: Current Every Day Smoker     Packs/day: 1.00     Types: Cigarettes    Smokeless tobacco: Never Used         CBC:   Recent Labs     08/18/22 0418 08/19/22  0357   WBC 10.34 9.18   RBC 4.21* 4.25*   HGB 12.2* 12.3*   HCT 36.3* 36.4*    294   MCV 86 86   MCH 29.0 28.9   MCHC 33.6 33.8       CMP:   Recent Labs     08/18/22 0418 08/19/22  0357    141   K 3.2* 3.1*    102   CO2 29 27   BUN 16 16   CREATININE 1.0 0.9   GLU 94 88   MG 1.7  --    PHOS 3.2  --    CALCIUM 10.4 10.2   ALBUMIN 2.8* 2.5*   PROT 6.2 5.8*   ALKPHOS 148* 142*   ALT 8* 6*   AST 24 23   BILITOT 0.7 0.8       INR  Recent Labs     08/18/22 0417   INR 1.8*            Diagnostic Studies:      EKD Echo:  No results found for this or any previous visit.        Pre-op Assessment    I have reviewed the Patient Summary Reports.     I have reviewed the Nursing Notes. I have reviewed the NPO Status.   I have reviewed the Medications.     Review of Systems      Physical Exam    Airway:  Mallampati: I   Mouth Opening: Normal  TM Distance: Normal  Tongue: Normal        Anesthesia Plan  Type of Anesthesia, risks & benefits discussed:    Anesthesia Type: Gen ETT  Intra-op Monitoring Plan: Standard ASA Monitors and Art Line  Post Op Pain Control Plan: multimodal analgesia  Induction:  IV  Airway Plan: Direct, Post-Induction  Informed Consent: Informed consent signed with the Patient and all parties understand the risks and agree with anesthesia plan.  All questions answered.   ASA Score: 3 Emergent  Day of Surgery Review of History & Physical: H&P Update referred to the surgeon/provider.    Ready For Surgery From Anesthesia Perspective.     .

## 2022-08-19 NOTE — CONSULTS
[facility] - [facility]  Vascular Surgery  Consult Note    Inpatient consult to Vascular Surgery  Consult performed by: Cruz Marquez MD  Consult ordered by: Cortez Hagan MD        Subjective:     Chief Complaint/Reason for Admission: Right Femur neck fracture    History of Present Illness: Mr Jimenez is a 61 year old male with a pmh of smoking 40+ pack years, history of bacterial endocarditis s/p AVR and MVR mechanical on warfarin who presents with a pathological right femur neck fracture. Metastatic work up is pending but patient has a suspicious lung mass and supraclavicular lymphadenopathy. Vascular surgery was consulted for an incidentally discovered right femoral artery pseudoaneurysm which appears to be originating off of the posterior branch of the profunda femoris.       No medications prior to admission.       Review of patient's allergies indicates:  No Known Allergies    Past Medical History:   Diagnosis Date    Endocarditis     Pacemaker     Pneumonia      No past surgical history on file.  Family History    None       Tobacco Use    Smoking status: Not on file    Smokeless tobacco: Not on file   Substance and Sexual Activity    Alcohol use: Not on file    Drug use: Not on file    Sexual activity: Not on file     Review of Systems   Constitutional: Negative.    Musculoskeletal:  Positive for gait problem and myalgias.   Neurological:  Negative for numbness.   Objective:     Vital Signs (Most Recent):  Temp: 97.7 °F (36.5 °C) (08/19/22 0807)  Pulse: 89 (08/19/22 0807)  Resp: 18 (08/19/22 0851)  BP: (!) 148/78 (08/19/22 0807)  SpO2: (!) 93 % (08/19/22 0807)   Vital Signs (24h Range):  Temp:  [97.3 °F (36.3 °C)-98.2 °F (36.8 °C)] 97.7 °F (36.5 °C)  Pulse:  [77-89] 89  Resp:  [16-20] 18  SpO2:  [90 %-93 %] 93 %  BP: (139-172)/(74-91) 148/78     Weight: 54.4 kg (119 lb 14.9 oz)  Body mass index is 16.27 kg/m².    Physical Exam  Vitals and nursing note reviewed.   Constitutional:       Appearance: Normal  appearance.   Cardiovascular:      Comments: 2+ Femoral pulses bilaterally  2+ DP pulses bilaterally    Palpable pulsatile mass right lower groin  Pulmonary:      Effort: Pulmonary effort is normal.   Musculoskeletal:         General: Signs of injury present.      Comments: Right femur fracture   Skin:     General: Skin is warm and dry.      Capillary Refill: Capillary refill takes less than 2 seconds.   Neurological:      General: No focal deficit present.      Mental Status: He is alert and oriented to person, place, and time. Mental status is at baseline.       Significant Labs:  CBC:   Recent Labs   Lab 08/19/22  0357   WBC 9.18   RBC 4.25*   HGB 12.3*   HCT 36.4*      MCV 86   MCH 28.9   MCHC 33.8     CMP:   Recent Labs   Lab 08/19/22 0357   GLU 88   CALCIUM 10.2   ALBUMIN 2.5*   PROT 5.8*      K 3.1*   CO2 27      BUN 16   CREATININE 0.9   ALKPHOS 142*   ALT 6*   AST 23   BILITOT 0.8       Significant Diagnostics:  CT: No results found in the last 24 hours.  U/S: No results found in the last 24 hours.    Assessment/Plan:     Mr Jimenez is a 61 year old male with a pmh of smoking, bacterial endocarditis on warfarin who presents with a pathologic femur fracture    Right Profunda Femoris Pseudoaneurysm    -- Vas US right groin  -- Stat CTA of lower extremity with runoff  -- Will need intervention today, plan for endovascular approach with covered stent for coverage Left CFA access  -- NPO  -- obtain consent      Thank you for your consult. I will follow-up with patient. Please contact us if you have any additional questions.    Cruz Marquez MD  Vascular Surgery  [facility] - Surgery    Vascular Attending  Agree with above  Traumatic R PFA (3rd branch) pseudoaneurysm --- to OR 11 for emergent coil embolization (or glue) with IR. If I see there is a good distal target, may use a covered stent (Viabahn 5mm)    Thomas Nicolas MD [facility]   Vascular/Endovascular Surgery    I have seen the patient  and reviewed the resident's/fellow's note. I have also personally interviewed and examined the patient at bedside and agree with the findings.

## 2022-08-19 NOTE — HPI
Mr Jimenez is a 61 year old male with a pmh of smoking 40+ pack years, history of bacterial endocarditis s/p AVR and MVR mechanical on warfarin who presents with a pathological right femur neck fracture. Metastatic work up is pending but patient has a suspicious lung mass and supraclavicular lymphadenopathy. Vascular surgery was consulted for an incidentally discovered right femoral artery pseudoaneurysm which appears to be originating off of the posterior branch of the profunda femoris.

## 2022-08-19 NOTE — ASSESSMENT & PLAN NOTE
Donis Jimenez is a 61 y.o. male with PMH of bacterial endocarditis, s/p AV and MV mechanical valve replacement (on warfarin), CKD, tobacco abuse presenting with pathologic R femur fracture. Bony lesions in femur and pelvis, as well as lung mass on CXR at outside hospital. Imaging studies pending. Unknown primary neoplasm. Transferred for ortho oncology evaluation and management. On warfarin. Extensive smoking history.     Metastatic workup initiated  Bed rest, santiago  NPO as precaution  Multimodal pain control  INR 1.8. Will start SQH for short half-life, unsure timing of surgery. Will defer further decisions regarding anticoagulation to    Spoke to  regarding transfer of primary team / medical optimization  Vascular surgery consult for femoral artery pseudoaneurysm   Final plan pending imaging, discussion with staff

## 2022-08-19 NOTE — SUBJECTIVE & OBJECTIVE
No medications prior to admission.       Review of patient's allergies indicates:  No Known Allergies    Past Medical History:   Diagnosis Date    Endocarditis     Pacemaker     Pneumonia      No past surgical history on file.  Family History    None       Tobacco Use    Smoking status: Not on file    Smokeless tobacco: Not on file   Substance and Sexual Activity    Alcohol use: Not on file    Drug use: Not on file    Sexual activity: Not on file     Review of Systems   Constitutional: Negative.    Musculoskeletal:  Positive for gait problem and myalgias.   Neurological:  Negative for numbness.   Objective:     Vital Signs (Most Recent):  Temp: 97.7 °F (36.5 °C) (08/19/22 0807)  Pulse: 89 (08/19/22 0807)  Resp: 18 (08/19/22 0851)  BP: (!) 148/78 (08/19/22 0807)  SpO2: (!) 93 % (08/19/22 0807)   Vital Signs (24h Range):  Temp:  [97.3 °F (36.3 °C)-98.2 °F (36.8 °C)] 97.7 °F (36.5 °C)  Pulse:  [77-89] 89  Resp:  [16-20] 18  SpO2:  [90 %-93 %] 93 %  BP: (139-172)/(74-91) 148/78     Weight: 54.4 kg (119 lb 14.9 oz)  Body mass index is 16.27 kg/m².    Physical Exam  Vitals and nursing note reviewed.   Constitutional:       Appearance: Normal appearance.   Cardiovascular:      Comments: 2+ Femoral pulses bilaterally  2+ DP pulses bilaterally    Palpable pulsatile mass right lower groin  Pulmonary:      Effort: Pulmonary effort is normal.   Musculoskeletal:         General: Signs of injury present.      Comments: Right femur fracture   Skin:     General: Skin is warm and dry.      Capillary Refill: Capillary refill takes less than 2 seconds.   Neurological:      General: No focal deficit present.      Mental Status: He is alert and oriented to person, place, and time. Mental status is at baseline.       Significant Labs:  CBC:   Recent Labs   Lab 08/19/22 0357   WBC 9.18   RBC 4.25*   HGB 12.3*   HCT 36.4*      MCV 86   MCH 28.9   MCHC 33.8     CMP:   Recent Labs   Lab 08/19/22 0357   GLU 88   CALCIUM 10.2    ALBUMIN 2.5*   PROT 5.8*      K 3.1*   CO2 27      BUN 16   CREATININE 0.9   ALKPHOS 142*   ALT 6*   AST 23   BILITOT 0.8       Significant Diagnostics:  CT: No results found in the last 24 hours.  U/S: No results found in the last 24 hours.

## 2022-08-19 NOTE — ANESTHESIA PROCEDURE NOTES
Arterial    Diagnosis: psudoaneurysm    Patient location during procedure: done in OR  Procedure start time: 8/19/2022 2:54 PM  Timeout: 8/19/2022 2:54 PM  Procedure end time: 8/19/2022 2:58 PM    Staffing  Authorizing Provider: Shimon Paez MD  Performing Provider: Neelima Gomez CRNA    Anesthesiologist was present at the time of the procedure.    Preanesthetic Checklist  Completed: patient identified, IV checked, site marked, risks and benefits discussed, surgical consent, monitors and equipment checked, pre-op evaluation, timeout performed and anesthesia consent givenArterial  Skin Prep: chlorhexidine gluconate  Local Infiltration: none  Orientation: left  Location: radial    Catheter Size: 20 G  Catheter placement by Anatomical landmarks. Heme positive aspiration all ports. Insertion Attempts: 1  Assessment  Dressing: secured with tape and tegaderm  Patient: Tolerated well

## 2022-08-19 NOTE — HPI
Dnois Jimenez is a 61 y.o. male with PMH significant for bacterial endocarditis, s/p AV and MV mechanical valve replacement (on warfarin), CKD, tobacco abuse presenting with R hip pain. Pt with several weeks R hip pain, found to have bone lesions and was pending outpatient workup / IR biopsy. However, pt experienced a fall yesterday - prior to getting biopsy. Unknown primary neoplasm. He has pain, inability to bear weight on right leg. Patient denies any head trauma or LOC. The patient denies prior hx of falls. Patient denies numbness and tingling. No known prior right hip injury or surgery. Outside radiology reports indicate lytic femoral neck lesion, lesions in pelvis, and lung mass seen on xrays. Ortho primary team following.     consulted for eval for transfer to  primary team in setting of extensive metastatic dz w/ unclear primary origin.

## 2022-08-19 NOTE — CONSULTS
Medical Oncology Consult Note    Inpatient consult to Hematology/Oncology  Consult performed by: Piper Ann MD  Consult ordered by: Cortez Hagan MD        SUBJECTIVE:     History of Present Illness:  Patient is a 61 y.o. male with hx of smoking for 40+ years and hx of worsening right hip pain for the past few weeks. Xray imaging showed lytic lesions in the pelvic area suspicious for metastatic disease. There was a plan for IR biopsy but it was delayed. He presented to OSH for worsening pain, imaging showed Right femoral neck fracture with possible pathologic component. Lytic and sclerotic changes involving the right iliac bone concerning for infiltrating/neoplastic process. He was transferred to [facility] for higher level of care. Orthopaedic surgery on board and patient is currently in surgery. Medical oncology consulted for co evaluation of the malignancy.    NM bone scan:  Numerous tracer avid foci throughout the axial and appendicular skeleton consistent with metastatic disease.  These lesions correspond to mixed lytic and sclerotic lesions on same day CT chest abdomen pelvis.    CT CAP:  1. Findings of extensive metastatic disease involving soft tissues above and below the diaphragm and osseous structures.  Extensive involvement of the mediastinum with encasement of bronchovascular structures as detailed above.  Suggested potential primary lung and renal with left upper lobe and left renal masses.  2. Pathologic fracture of the right femoral head/neck, L1 vertebral body, and left 6th rib.  3. Small to moderate pericardial effusion, likely malignant.  4. Small left pleural effusion.  5. Heterogeneous opacification of the right jugular vein which could represent contrast mixing however cannot exclude thrombus.  Recommend correlation with upper extremity venous ultrasound.      Review of patient's allergies indicates:  No Known Allergies  Past Medical History:   Diagnosis Date    Endocarditis      Pacemaker     Pneumonia      No past surgical history on file.  No family history on file.       OBJECTIVE:     Vital Signs:  Temp:  [96.7 °F (35.9 °C)-97.7 °F (36.5 °C)]   Pulse:  [84-89]   Resp:  [17-18]   BP: (146-153)/(74-78)   SpO2:  [90 %-93 %]     Laboratory:  Lab Results   Component Value Date    WBC 9.18 08/19/2022    HGB 12.3 (L) 08/19/2022    HCT 36.4 (L) 08/19/2022    MCV 86 08/19/2022     08/19/2022       CMP  Sodium   Date Value Ref Range Status   08/19/2022 141 136 - 145 mmol/L Final     Potassium   Date Value Ref Range Status   08/19/2022 3.1 (L) 3.5 - 5.1 mmol/L Final     Chloride   Date Value Ref Range Status   08/19/2022 102 95 - 110 mmol/L Final     CO2   Date Value Ref Range Status   08/19/2022 27 23 - 29 mmol/L Final     Glucose   Date Value Ref Range Status   08/19/2022 88 70 - 110 mg/dL Final     BUN   Date Value Ref Range Status   08/19/2022 16 6 - 20 mg/dL Final     Creatinine   Date Value Ref Range Status   08/19/2022 0.9 0.5 - 1.4 mg/dL Final     Calcium   Date Value Ref Range Status   08/19/2022 10.2 8.7 - 10.5 mg/dL Final     Total Protein   Date Value Ref Range Status   08/19/2022 5.8 (L) 6.0 - 8.4 g/dL Final     Albumin   Date Value Ref Range Status   08/19/2022 2.5 (L) 3.5 - 5.2 g/dL Final     Total Bilirubin   Date Value Ref Range Status   08/19/2022 0.8 0.1 - 1.0 mg/dL Final     Comment:     For infants and newborns, interpretation of results should be based  on gestational age, weight and in agreement with clinical  observations.    Premature Infant recommended reference ranges:  Up to 24 hours.............<8.0 mg/dL  Up to 48 hours............<12.0 mg/dL  3-5 days..................<15.0 mg/dL  6-29 days.................<15.0 mg/dL       Alkaline Phosphatase   Date Value Ref Range Status   08/19/2022 142 (H) 55 - 135 U/L Final     AST   Date Value Ref Range Status   08/19/2022 23 10 - 40 U/L Final     ALT   Date Value Ref Range Status   08/19/2022 6 (L) 10 - 44 U/L Final      Anion Gap   Date Value Ref Range Status   08/19/2022 12 8 - 16 mmol/L Final       Diagnostic Results:  CT: Reviewed    ASSESSMENT/PLAN:     Overall picture concerning for metastatic lung cancer in patient with hx of smoking  Hypercalcemia likely related to malignancy     1. Please send tissue for pathological evaluation, will need molecular and pdl-1 testing   2. Please start patient on IVF with normal saline for hypercalcemia treatment and recommend Zometa 4 mg IV once. Monitor cmp daily   3. Recommend obtaining MRI brain w/wo contrast for full staging  4. Oncology team will see patient tomorrow    Piper Ann MD  Hematology and Medical Oncology fellow

## 2022-08-19 NOTE — ANESTHESIA PROCEDURE NOTES
Intubation    Date/Time: 8/19/2022 2:52 PM  Performed by: Neelima Gomez CRNA  Authorized by: Shimon Paez MD     Intubation:     Induction:  Intravenous    Intubated:  Postinduction    Mask Ventilation:  Easy mask    Attempts:  1    Attempted By:  CRNA    Method of Intubation:  Direct    Blade:  Crum 2    Laryngeal View Grade: Grade I - full view of cords      Difficult Airway Encountered?: No      Complications:  None    Airway Device:  Oral endotracheal tube    Airway Device Size:  7.5    Style/Cuff Inflation:  Cuffed (inflated to minimal occlusive pressure)    Secured at:  The lips    Placement Verified By:  Capnometry    Complicating Factors:  None    Findings Post-Intubation:  BS equal bilateral and atraumatic/condition of teeth unchanged

## 2022-08-19 NOTE — PLAN OF CARE
Plan of care:    Case discussed with vascular surgery.  Unless there is a complication, vascular surgical team is okay with initiation of heparin drip 6 hours after end of procedure given that the patient has a mechanical mitral valve.  The on-call medical team has been notified of such.

## 2022-08-19 NOTE — SUBJECTIVE & OBJECTIVE
Principal Problem:Pathologic fracture of neck of right femur    Principal Orthopedic Problem: same    Interval History: Pt seen and examined at bedside. THUYEON, VSS, AF. Pain controlled. Denies fevers, chills, chest pain, SOB, N/V/D.   X ray confirmed right femoral neck fracture. Workup ongoing, will discuss transfer to [facility].    Review of patient's allergies indicates:  No Known Allergies    Current Facility-Administered Medications   Medication    acetaminophen tablet 650 mg    heparin (porcine) injection 5,000 Units    ibuprofen tablet 600 mg    melatonin tablet 6 mg    ondansetron disintegrating tablet 8 mg    oxyCODONE immediate release tablet 5 mg    oxyCODONE immediate release tablet Tab 10 mg    promethazine tablet 25 mg    sodium chloride 0.9% flush 10 mL     Objective:     Vital Signs (Most Recent):  Temp: 97.5 °F (36.4 °C) (08/19/22 0351)  Pulse: 84 (08/19/22 0351)  Resp: 18 (08/19/22 0501)  BP: (!) 146/74 (08/19/22 0351)  SpO2: (!) 90 % (08/19/22 0351)   Vital Signs (24h Range):  Temp:  [97.2 °F (36.2 °C)-98.2 °F (36.8 °C)] 97.5 °F (36.4 °C)  Pulse:  [77-89] 84  Resp:  [12-20] 18  SpO2:  [90 %-93 %] 90 %  BP: (139-172)/(74-91) 146/74     Weight: 54.4 kg (119 lb 14.9 oz)  Height: 6' (182.9 cm)  Body mass index is 16.27 kg/m².      Intake/Output Summary (Last 24 hours) at 8/19/2022 0556  Last data filed at 8/18/2022 1700  Gross per 24 hour   Intake 200 ml   Output 600 ml   Net -400 ml       Ortho/SPM Exam  RLE:  Skin intact throughout, no scars present  No swelling  No ecchymosis, erythema, or signs of cellulitis  TTP at hip/proximal femur  No tenderness to palpation of middle or distal femur  No tenderness to palpation of proximal, middle, or distal aspects of tibia or fibula  No tenderness to palpation of foot  Pain with any ROM at hip  Full painless ROM of foot and ankle  Compartments soft  SILT Sa/Kenny/DP/SP/T  Motor intact EHL/FHL/TA/Gastroc  2+ DP  Brisk capillary refill     Significant Labs: CBC:    Recent Labs   Lab 08/18/22  0418 08/19/22  0357   WBC 10.34 9.18   HGB 12.2* 12.3*   HCT 36.3* 36.4*    294     CMP:   Recent Labs   Lab 08/18/22  0418 08/19/22  0357    141   K 3.2* 3.1*    102   CO2 29 27   GLU 94 88   BUN 16 16   CREATININE 1.0 0.9   CALCIUM 10.4 10.2   PROT 6.2 5.8*   ALBUMIN 2.8* 2.5*   BILITOT 0.7 0.8   ALKPHOS 148* 142*   AST 24 23   ALT 8* 6*   ANIONGAP 11 12     All pertinent labs within the past 24 hours have been reviewed.    Significant Imaging: X-Ray: I have reviewed all pertinent results/findings and my personal findings are:  right transcervical femoral neck fracture    Results for orders placed or performed during the hospital encounter of 08/18/22 (from the past 2160 hour(s))   CT Chest Abdomen Pelvis With Contrast (xpd)    Impression    1. Findings of extensive metastatic disease involving soft tissues above and below the diaphragm and osseous structures.  Extensive involvement of the mediastinum with encasement of bronchovascular structures as detailed above.  Suggested potential primary lung and renal with left upper lobe and left renal masses.  2. Pathologic fracture of the right femoral head/neck, L1 vertebral body, and left 6th rib.  3. Small to moderate pericardial effusion, likely malignant.  4. Small left pleural effusion.  5. Heterogeneous opacification of the right jugular vein which could represent contrast mixing however cannot exclude thrombus.  Recommend correlation with upper extremity venous ultrasound.  6. Please see above for additional details.  This report was flagged in Epic as abnormal.    Electronically signed by resident: Jaclyn Ware  Date:    08/18/2022  Time:    15:12    Electronically signed by: George Molina  Date:    08/18/2022  Time:    17:29   CT Thigh W W/O Contrast Right    Impression    1. Heterogeneous appearance of the right femoral head and neck with displaced likely pathologic subcapital fracture of the femoral head.  2.  3.0 cm soft tissue lesion with underlying cortical destruction of the anterior ilium concerning for additional metastases.  3. Enhancing 3.2 cm high density structure in close proximity to the femoral vessels concerning for pseudoaneurysm.  Correlation advised.  4. Additional findings detailed above.  This report was flagged in Epic as abnormal.    Emil Echevarria MD discussed findings with Cortez Hagan MD via [facility] secure chat on 08/18/2022 at 16:47.    Electronically signed by resident: Emil Echevarria MD  Date:    08/18/2022  Time:    15:10    Electronically signed by: Ole Hoff MD  Date:    08/18/2022  Time:    17:04

## 2022-08-19 NOTE — BRIEF OP NOTE
[facility] - Surgery ([facility])  Brief Operative Note    SUMMARY     Surgery Date: 8/19/2022     Surgeon(s) and Role:     * Thomas Nicolas MD - Primary     * Cruz Marquez MD - Fellow     * Josiah Hale MD - IR     * Delvin Steward MD - IR    Assisting Surgeon:     Pre-op Diagnosis:  Pseudoaneurysm [I72.9]    Post-op Diagnosis:  Post-Op Diagnosis Codes:     * Pseudoaneurysm [I72.9]    Procedure(s) (LRB):  1. Aortogram  2. Right lower extremity angiogram, selection of third order vessel Profunda artery  3. Embolization of 3rd order right profunda femoral artery pseudoaneurysm  With N-BCA glue and 5mm coil  4. 5Fr Mynx closure device    Anesthesia: General    Operative Findings: US guided 5Fr access from left CFA, selection of third order profunda vessel, successful coil embolization of right Profunda PSA, no extravasation. Small hematoma in left groin after mynx failed.    Estimated Blood Loss: * No values recorded between 8/19/2022  3:21 PM and 8/19/2022  5:47 PM *    Estimated Blood Loss has not been documented. EBL = 5cc.         Specimens:   Specimen (24h ago, onward)            None          [id number]      Cruz Marquez MD PGY7  Vascular Surgery Fellow  [phone]

## 2022-08-19 NOTE — SUBJECTIVE & OBJECTIVE
Interval History:  No acute events overnight.  Reports pain much better today.  Has ROM of the right limb.  Reports he wants to go home with HH as his significant other would quit work to be with him.  He is very concerned about the diagnosis of possible cancer.    Review of Systems   Constitutional:  Negative for chills, fatigue and fever.   Respiratory:  Negative for cough and shortness of breath.    Cardiovascular:  Negative for chest pain, palpitations and leg swelling.   Gastrointestinal:  Negative for abdominal pain, diarrhea, nausea and vomiting.   Genitourinary:  Negative for dysuria and urgency.   Musculoskeletal:  Positive for gait problem.   Neurological:  Negative for dizziness and headaches.   All other systems reviewed and are negative.  Objective:     Vital Signs (Most Recent):  Temp: 97.7 °F (36.5 °C) (08/19/22 0807)  Pulse: 89 (08/19/22 0807)  Resp: 18 (08/19/22 0851)  BP: (!) 148/78 (08/19/22 0807)  SpO2: (!) 93 % (08/19/22 0807)   Vital Signs (24h Range):  Temp:  [97.5 °F (36.4 °C)-98.2 °F (36.8 °C)] 97.7 °F (36.5 °C)  Pulse:  [77-89] 89  Resp:  [16-18] 18  SpO2:  [90 %-93 %] 93 %  BP: (139-172)/(74-91) 148/78     Weight: 54.4 kg (119 lb 14.9 oz)  Body mass index is 16.27 kg/m².    Physical Exam  Constitutional:       Appearance: Normal appearance. He is well-developed.   HENT:      Head: Normocephalic and atraumatic.   Cardiovascular:      Rate and Rhythm: Normal rate and regular rhythm.      Heart sounds: No murmur heard.  Pulmonary:      Effort: Pulmonary effort is normal. No respiratory distress.      Breath sounds: Normal breath sounds. No wheezing or rales.   Abdominal:      General: There is no distension.      Palpations: Abdomen is soft.      Tenderness: There is no abdominal tenderness.   Musculoskeletal:         General: No deformity.      Comments: Right hip bandages C/D/I   Skin:     General: Skin is warm.   Neurological:      General: No focal deficit present.      Mental Status: He  is alert and oriented to person, place, and time. Mental status is at baseline.       Significant Labs: All pertinent labs within the past 24 hours have been reviewed.    Significant Imaging: I have reviewed all pertinent imaging results/findings within the past 24 hours.

## 2022-08-19 NOTE — PROVIDER TRANSFER
Inpatient consult to Hospital Medicine-General  Consult performed by: Royce Rooney MD  Consult ordered by: Cortez Hagan MD      Hospital medicine consult service note. See previous Progress Notes for full HPI.    Briefly, Mr. Donis Jimenez is a 61 y.o. male w/ PMH significant for tobacco abuse, IE s/p AVR & MVR (mechanical) on warfarin; who presented to [facility] for R hip pain found to have pathologic FNF & R femoral artery pseudoaneurysm now s/p aneurysm repair;,  consulted for eval for transfer to  primary team for management of concerning extensive metastatic disease w/ possible lung or renal primary malignancy.    Of note, CT CAP showed the followin. Findings of extensive metastatic disease involving soft tissues above and below the diaphragm and osseous structures.  Extensive involvement of the mediastinum with encasement of bronchovascular structures as detailed above.  Suggested potential primary lung and renal with left upper lobe and left renal masses.  2. Pathologic fracture of the right femoral head/neck, L1 vertebral body, and left 6th rib.  3. Small to moderate pericardial effusion, likely malignant.  4. Small left pleural effusion.  5. Heterogeneous opacification of the right jugular vein which could represent contrast mixing however cannot exclude thrombus. Recommend correlation with upper extremity venous ultrasound.    Given his dual mechanical valve history, we strongly recommend that pt is restarted on anticoagulation as soon as possible, likely therapeutic heparin given possible Orthopedic intervention in the future. Case discussed w/ Vascular surgery team, Orthopedic surgery team, and  primary team to determine optimal time to restart anticoagulation s/p aneurysm repair procedure.    Additionally, we recommend consulting Palliative Care as well to hold GOC discussions given pt's likely poor prognosis in the setting of extensive metastatic disease.    Thank you for your consult. I will  sign off. Please contact us if you have any additional questions.    Royce Rooney MD

## 2022-08-19 NOTE — PROGRESS NOTES
[facility] - Surgery  Orthopedics  Progress Note    Patient Name: Donis Jimenez  MRN: [id number]  Admission Date: 8/18/2022  Hospital Length of Stay: 1 days  Attending Provider: Yung Flores MD  Primary Care Provider: Primary Doctor No    Subjective:     Principal Problem:Pathologic fracture of neck of right femur    Principal Orthopedic Problem: same    Interval History: Pt seen and examined at bedside. NAEON, VSS, AF. Pain controlled. Denies fevers, chills, chest pain, SOB, N/V/D.   X ray confirmed right femoral neck fracture. Workup ongoing, will discuss transfer to  primary.    Review of patient's allergies indicates:  No Known Allergies    Current Facility-Administered Medications   Medication    acetaminophen tablet 650 mg    heparin (porcine) injection 5,000 Units    ibuprofen tablet 600 mg    melatonin tablet 6 mg    ondansetron disintegrating tablet 8 mg    oxyCODONE immediate release tablet 5 mg    oxyCODONE immediate release tablet Tab 10 mg    promethazine tablet 25 mg    sodium chloride 0.9% flush 10 mL     Objective:     Vital Signs (Most Recent):  Temp: 97.5 °F (36.4 °C) (08/19/22 0351)  Pulse: 84 (08/19/22 0351)  Resp: 18 (08/19/22 0501)  BP: (!) 146/74 (08/19/22 0351)  SpO2: (!) 90 % (08/19/22 0351)   Vital Signs (24h Range):  Temp:  [97.2 °F (36.2 °C)-98.2 °F (36.8 °C)] 97.5 °F (36.4 °C)  Pulse:  [77-89] 84  Resp:  [12-20] 18  SpO2:  [90 %-93 %] 90 %  BP: (139-172)/(74-91) 146/74     Weight: 54.4 kg (119 lb 14.9 oz)  Height: 6' (182.9 cm)  Body mass index is 16.27 kg/m².      Intake/Output Summary (Last 24 hours) at 8/19/2022 0556  Last data filed at 8/18/2022 1700  Gross per 24 hour   Intake 200 ml   Output 600 ml   Net -400 ml       Ortho/SPM Exam  RLE:  Skin intact throughout, no scars present  No swelling  No ecchymosis, erythema, or signs of cellulitis  TTP at hip/proximal femur  No tenderness to palpation of middle or distal femur  No tenderness to palpation of proximal, middle,  or distal aspects of tibia or fibula  No tenderness to palpation of foot  Pain with any ROM at hip  Full painless ROM of foot and ankle  Compartments soft  SILT Sa/Kenny/DP/SP/T  Motor intact EHL/FHL/TA/Gastroc  2+ DP       Significant Labs: CBC:   Recent Labs   Lab 08/18/22 0418 08/19/22  0357   WBC 10.34 9.18   HGB 12.2* 12.3*   HCT 36.3* 36.4*    294     CMP:   Recent Labs   Lab 08/18/22 0418 08/19/22  0357    141   K 3.2* 3.1*    102   CO2 29 27   GLU 94 88   BUN 16 16   CREATININE 1.0 0.9   CALCIUM 10.4 10.2   PROT 6.2 5.8*   ALBUMIN 2.8* 2.5*   BILITOT 0.7 0.8   ALKPHOS 148* 142*   AST 24 23   ALT 8* 6*   ANIONGAP 11 12     All pertinent labs within the past 24 hours have been reviewed.    Significant Imaging: X-Ray: I have reviewed all pertinent results/findings and my personal findings are:  right transcervical femoral neck fracture    Results for orders placed or performed during the hospital encounter of 08/18/22 (from the past 2160 hour(s))   CT Chest Abdomen Pelvis With Contrast (xpd)    Impression    1. Findings of extensive metastatic disease involving soft tissues above and below the diaphragm and osseous structures.  Extensive involvement of the mediastinum with encasement of bronchovascular structures as detailed above.  Suggested potential primary lung and renal with left upper lobe and left renal masses.  2. Pathologic fracture of the right femoral head/neck, L1 vertebral body, and left 6th rib.  3. Small to moderate pericardial effusion, likely malignant.  4. Small left pleural effusion.  5. Heterogeneous opacification of the right jugular vein which could represent contrast mixing however cannot exclude thrombus.  Recommend correlation with upper extremity venous ultrasound.  6. Please see above for additional details.  This report was flagged in Epic as abnormal.    Electronically signed by resident: Jaclyn Ware  Date:    08/18/2022  Time:    15:12    Electronically signed  by: George Molina  Date:    08/18/2022  Time:    17:29   CT Thigh W W/O Contrast Right    Impression    1. Heterogeneous appearance of the right femoral head and neck with displaced likely pathologic subcapital fracture of the femoral head.  2. 3.0 cm soft tissue lesion with underlying cortical destruction of the anterior ilium concerning for additional metastases.  3. Enhancing 3.2 cm high density structure in close proximity to the femoral vessels concerning for pseudoaneurysm.  Correlation advised.  4. Additional findings detailed above.  This report was flagged in Epic as abnormal.    Emil Echevarria MD discussed findings with Cortez Hagan MD via [facility] secure chat on 08/18/2022 at 16:47.    Electronically signed by resident: Emil Echevarria MD  Date:    08/18/2022  Time:    15:10    Electronically signed by: Ole Hoff MD  Date:    08/18/2022  Time:    17:04         Assessment/Plan:     * Pathologic fracture of neck of right femur  Donis Jimenez is a 61 y.o. male with PMH of bacterial endocarditis, s/p AV and MV mechanical valve replacement (on warfarin), CKD, tobacco abuse presenting with pathologic R femur fracture. Bony lesions in femur and pelvis, as well as lung mass on CXR at outside hospital. Imaging studies pending. Unknown primary neoplasm. Transferred for ortho oncology evaluation and management. On warfarin. Extensive smoking history.     Metastatic workup initiated  Bed rest, santiago  NPO as precaution  Multimodal pain control  INR 1.8. Will start SQH for short half-life, unsure timing of surgery. Will defer further decisions regarding anticoagulation to    Spoke to  regarding transfer of primary team / medical optimization  Vascular surgery consult for femoral artery pseudoaneurysm   Final plan pending imaging, discussion with staff          Cortez Hagan MD  Orthopedics  [facility] - Surgery

## 2022-08-19 NOTE — TRANSFER OF CARE
Anesthesia Transfer of Care Note    Patient: Donis Jimenez    Procedure(s) Performed: Procedure(s) (LRB):  REPAIR, PSEUDOANEURYSM, ARTERY, FEMORAL (Right)    Patient location: PACU    Anesthesia Type: general    Transport from OR: Transported from OR on 6-10 L/min O2 by face mask with adequate spontaneous ventilation    Post pain: adequate analgesia    Post assessment: no apparent anesthetic complications and tolerated procedure well    Post vital signs: stable    Level of consciousness: sedated    Nausea/Vomiting: no nausea/vomiting    Complications: none    Transfer of care protocol was followed      Last vitals:   Visit Vitals  /61   Pulse 67   Temp 36.7 °C (98 °F) (Oral)   Resp 13   Ht 6' (1.829 m)   Wt 54.4 kg (119 lb 14.9 oz)   SpO2 97%   BMI 16.27 kg/m²

## 2022-08-20 NOTE — ASSESSMENT & PLAN NOTE
Donis Jimenez is a 61 y.o. male with PMH of bacterial endocarditis, s/p AV and MV mechanical valve replacement (on warfarin), CKD, tobacco abuse presenting with pathologic R femur fracture. Bony lesions in femur and pelvis, as well as lung mass on CXR at outside hospital. Imaging studies pending. Unknown primary neoplasm. Transferred for ortho oncology evaluation and management. On warfarin. Extensive smoking history. Deep femoral artery pseudoaneurysm ablation by vascular on 8/19.     DVT ppx: heparin drip per vascular  Multimodal pain control  Metastatic workup ongoing, likely lung adenocarcinoma  Final plan pending discussion with staff, possible OR Tuesday

## 2022-08-20 NOTE — NURSING
LEFT GROIN DRESSING UNCHANGED. RAISED THE HEAD OF THE BED TO 30 DEGREES. EDUCATED PT, THIS IST THE BED CAN BE RAISED.  WILL CONTINUE TO MONITOR. ZULEMA OREILLY

## 2022-08-20 NOTE — PROGRESS NOTES
[facility] Medicine  [facility]  Daily Progress Note      Recent 24-Hour Events      No pain or shortness of breath. Tolerating oral intake but reduced. C/o b/l UE swelling;     PHYSICAL EXAM    Vitals: /75 (BP Location: Right arm, Patient Position: Lying)   Pulse 73   Temp 98.2 °F (36.8 °C) (Oral)   Resp 14   Ht 6' (1.829 m)   Wt 54.4 kg (119 lb 14.9 oz)   SpO2 95%   BMI 16.27 kg/m²  Body mass index is 16.27 kg/m².    General: NAD, AO3, thin  HEENT: PERRL, EOMI.   Cardiovascular: RRR  Respiratory: lungs CTAB  GI: abdomen S/NT/ND, +BS  Extremities:  B/l UE edema  MSK: no gross joint abnormality    Neuro/Psych: A&OX3. CNII-XII grossly intact.          Medications      Scheduled Meds:   EScitalopram oxalate  20 mg Oral Daily     Continuous Infusions:   heparin (porcine) in D5W 16 Units/kg/hr (08/20/22 1653)     PRN Meds:.acetaminophen, acetaminophen, heparin (PORCINE), heparin (PORCINE), HYDROcodone-acetaminophen, ibuprofen, LORazepam, melatonin, morphine, ondansetron, ondansetron, oxyCODONE, oxyCODONE, promethazine, sodium chloride 0.9%, sodium chloride 0.9%        Labs & Diagnostics    Labs:      Recent Labs     08/18/22  0417 08/18/22  0418 08/18/22  0418 08/19/22  0357 08/19/22  2320 08/20/22  0821   WBC  --  10.34   < > 9.18 8.98 9.71   HGB  --  12.2*   < > 12.3* 11.7* 12.7*   HCT  --  36.3*   < > 36.4* 36.6* 37.9*   MCV  --  86   < > 86 89 88   PLT  --  289   < > 294 310 302   NA  --  140  --  141  --  136   K  --  3.2*  --  3.1*  --  4.1   CO2  --  29  --  27  --  19*   BUN  --  16  --  16  --  21*   CREATININE  --  1.0  --  0.9  --  1.1   ANIONGAP  --  11  --  12  --  14   MG  --  1.7  --   --   --  2.0   PHOS  --  3.2  --   --   --  4.3   ALT  --  8*  --  6*  --  6*   AST  --  24  --  23  --  23   ALKPHOS  --  148*  --  142*  --  139*   BILITOT  --  0.7  --  0.8  --  0.8   PROT  --  6.2  --  5.8*  --  5.9*   INR 1.8*  --   --   --  1.3*  --     < > = values in this interval not  displayed.          EKG, labs and radiographs from the past 24h reviewed and personally interpreted.       Assessment & Plan      1. Pseudoaneurysm   -s/p embolization of right profunda femoral artery pseudoaneurysm with 5 mm coil  -VSx following    2. Possible metastatic malignancy  -MRI per onc  -Has pacemaker but has pacemaker card with him     3. Hip fracture  -Ortho following. Possible surgery Tuesday     4. H/o mechanical MV replacement  -heparin gtt.     5. Hypercalcemia likely of malignancy   -zometa given 8/20    Full a/c.         Dispo/Code status: Full Code.     ?    Miguel Guerra    Date: 8/20/2022    Time: 6:14 PM

## 2022-08-20 NOTE — NURSING
PT TRANSFERRED TO THE UNIT VIA BED.  PT IS FLAT IN [place]SE [place].  PT VERY DROWSY. C/O PAIN IN MEDIAL CHEST.   PROVIDER IS AWARE OF PT C/O PAIN.  DRESSING TO LEFT GROIN CLEAN DRY AND INTACT. AREA IS SOFT AND CLEAN AND INTACT. WILL CONTINUE TO MONITOR SITE AND REMIND PT OF HAVING TO LAY FLAT TIL 2330. PT HAS O2 AT 2L VIA BNC. IV IN LEFT FOREARM AND RIGHT AC SALINE LOCKED.  NO S/S COMPLICATIONS NOTED. ZULEMA OREILLY

## 2022-08-20 NOTE — SUBJECTIVE & OBJECTIVE
Principal Problem:Pathologic fracture of neck of right femur    Principal Orthopedic Problem: same    Interval History: Pt seen and examined at bedside. NAINA, VSS, AF. Pain controlled. S/p deep femoral artery pseudoaneurysm ablation with vascular surgery. Lay flat precautions per vascular.     X ray confirmed right femoral neck fracture. Ortho surgery plan pending.    Review of patient's allergies indicates:  No Known Allergies    Current Facility-Administered Medications   Medication    acetaminophen tablet 650 mg    acetaminophen tablet 650 mg    heparin 25,000 units in dextrose 5% (100 units/ml) IV bolus from bag - ADDITIONAL PRN BOLUS - 30 units/kg    heparin 25,000 units in dextrose 5% (100 units/ml) IV bolus from bag - ADDITIONAL PRN BOLUS - 60 units/kg    heparin 25,000 units in dextrose 5% 250 mL (100 units/mL) infusion LOW INTENSITY nomogram - [facility]    HYDROcodone-acetaminophen 5-325 mg per tablet 1 tablet    ibuprofen tablet 600 mg    melatonin tablet 6 mg    morphine injection 3 mg    ondansetron disintegrating tablet 8 mg    ondansetron disintegrating tablet 8 mg    oxyCODONE immediate release tablet 5 mg    oxyCODONE immediate release tablet Tab 10 mg    promethazine tablet 25 mg    sodium chloride 0.9% flush 10 mL    sodium chloride 0.9% flush 10 mL     Objective:     Vital Signs (Most Recent):  Temp: 96.8 °F (36 °C) (08/20/22 0458)  Pulse: 78 (08/20/22 0458)  Resp: 18 (08/20/22 0458)  BP: (!) 155/83 (08/20/22 0458)  SpO2: 96 % (08/20/22 0458)   Vital Signs (24h Range):  Temp:  [96.5 °F (35.8 °C)-98 °F (36.7 °C)] 96.8 °F (36 °C)  Pulse:  [67-89] 78  Resp:  [13-22] 18  SpO2:  [92 %-98 %] 96 %  BP: (107-159)/(61-90) 155/83     Weight: 54.4 kg (119 lb 14.9 oz)  Height: 6' (182.9 cm)  Body mass index is 16.27 kg/m².      Intake/Output Summary (Last 24 hours) at 8/20/2022 0528  Last data filed at 8/20/2022 0589  Gross per 24 hour   Intake 1185.44 ml   Output 700 ml   Net 485.44 ml         Ortho/SPM  Exam  RLE:  Skin intact throughout, no scars present  No swelling  No ecchymosis, erythema, or signs of cellulitis  TTP at hip/proximal femur  No tenderness to palpation of middle or distal femur  No tenderness to palpation of proximal, middle, or distal aspects of tibia or fibula  No tenderness to palpation of foot  Pain with any ROM at hip  Full painless ROM of foot and ankle  Compartments soft  SILT Sa/Kenny/DP/SP/T  Motor intact EHL/FHL/TA/Gastroc  2+ DP  Brisk capillary refill     Significant Labs: CBC:   Recent Labs   Lab 08/19/22  0357 08/19/22  2320   WBC 9.18 8.98   HGB 12.3* 11.7*   HCT 36.4* 36.6*    310       CMP:   Recent Labs   Lab 08/19/22  0357      K 3.1*      CO2 27   GLU 88   BUN 16   CREATININE 0.9   CALCIUM 10.2   PROT 5.8*   ALBUMIN 2.5*   BILITOT 0.8   ALKPHOS 142*   AST 23   ALT 6*   ANIONGAP 12       All pertinent labs within the past 24 hours have been reviewed.    Significant Imaging: X-Ray: I have reviewed all pertinent results/findings and my personal findings are:  right transcervical femoral neck fracture    Results for orders placed or performed during the hospital encounter of 08/18/22 (from the past 2160 hour(s))   CT Chest Abdomen Pelvis With Contrast (xpd)    Impression    1. Findings of extensive metastatic disease involving soft tissues above and below the diaphragm and osseous structures.  Extensive involvement of the mediastinum with encasement of bronchovascular structures as detailed above.  Suggested potential primary lung and renal with left upper lobe and left renal masses.  2. Pathologic fracture of the right femoral head/neck, L1 vertebral body, and left 6th rib.  3. Small to moderate pericardial effusion, likely malignant.  4. Small left pleural effusion.  5. Heterogeneous opacification of the right jugular vein which could represent contrast mixing however cannot exclude thrombus.  Recommend correlation with upper extremity venous ultrasound.  6.  Please see above for additional details.  This report was flagged in Epic as abnormal.    Electronically signed by resident: Jaclyn Ware  Date:    08/18/2022  Time:    15:12    Electronically signed by: George Molina  Date:    08/18/2022  Time:    17:29   CT Thigh W W/O Contrast Right    Impression    1. Heterogeneous appearance of the right femoral head and neck with displaced likely pathologic subcapital fracture of the femoral head.  2. 3.0 cm soft tissue lesion with underlying cortical destruction of the anterior ilium concerning for additional metastases.  3. Enhancing 3.2 cm high density structure in close proximity to the femoral vessels concerning for pseudoaneurysm.  Correlation advised.  4. Additional findings detailed above.  This report was flagged in Epic as abnormal.    Emil Echevarria MD discussed findings with Cortez Hagan MD via [facility] secure chat on 08/18/2022 at 16:47.    Electronically signed by resident: Emil Echevarria MD  Date:    08/18/2022  Time:    15:10    Electronically signed by: Ole Hoff MD  Date:    08/18/2022  Time:    17:04   CTA Runoff ABD PEL Bilat Lower Ext    Impression    1. 3.2 cm enhancing structure arising from a branch of the right deep femoral artery concerning for pseudoaneurysm.  2. Atherosclerosis of the bilateral lower extremity arterial system.  Prominent plaque within the distal right femoral artery with short segment near occlusion and distal reconstitution.  Multifocal short segment occlusions of the left anterior tibial artery.  3. Right femoral head and neck fracture with heterogeneity suspicious for underlying neoplasm.  4. Destructive lytic lesions of the right ilium and L1 vertebral bodies/posterior elements concerning for metastasis.  5. Left renal, multiple bilateral pararenal, and right adrenal lesions concerning for metastasis.  6. Small pericardial effusion and small left pleural effusion.  7. Please see CT chest abdomen pelvis report 08/18/2022  for more complete assessment.  This report was flagged in Epic as abnormal.      Electronically signed by: George Molina  Date:    08/19/2022  Time:    13:14

## 2022-08-20 NOTE — PROGRESS NOTES
Pharmacist Renal Dose Adjustment Note    Donis Jimenez is a 61 y.o. male being treated with the medication zoledronic acid for bone metastases    Patient Data:    Vital Signs (Most Recent):  Temp: 97.1 °F (36.2 °C) (08/20/22 0729)  Pulse: 81 (08/20/22 0729)  Resp: 17 (08/20/22 1002)  BP: (!) 149/74 (08/20/22 0729)  SpO2: 97 % (08/20/22 0759)   Vital Signs (72h Range):  Temp:  [96.5 °F (35.8 °C)-98.2 °F (36.8 °C)]   Pulse:  [67-94]   Resp:  [12-22]   BP: (107-172)/(61-91)   SpO2:  [90 %-98 %]      Recent Labs   Lab 08/18/22  0418 08/19/22  0357 08/20/22  0821   CREATININE 1.0 0.9 1.1     Serum creatinine: 1.1 mg/dL 08/20/22 0821  Estimated creatinine clearance: 54.9 mL/min    Medication:zoledronic acid  dose: 4mg  frequency once will be changed to medication:zoledronic acid dose:3.5 frequency:once    Pharmacist's Name: Love Tran  Pharmacist's Extension: [phone]

## 2022-08-20 NOTE — NURSING
Nurses Note -- 4 Eyes      8/19/2022   11:08 PM      Skin assessed during: Transfer      [x] No Pressure Injuries Present    [x]Prevention Measures Documented      [] Yes- Altered Skin Integrity Present or Discovered   [] LDA Added if Not in Epic (Describe Wound)   [] New Altered Skin Integrity was Present on Admit and Documented in LDA   [] Wound Image Taken    Wound Care Consulted? No    Attending Nurse:  Beny Lin RN     Second RN/Staff Member:  BRETT GARCÍA RN

## 2022-08-20 NOTE — PROGRESS NOTES
[facility]  Vascular Surgery  Progress Note    Patient Name: Donis Jimenez  MRN: [id number]  Admission Date: 8/18/2022  Primary Care Provider: Primary Doctor No    Subjective:     Interval History: Doing well post op, no issues overnight. Groin is soft flat no hematoma    Post-Op Info:  Procedure(s) (LRB):  REPAIR, PSEUDOANEURYSM, ARTERY, FEMORAL (Right)  COIL EMBOLIZATION (Right)  ANGIOGRAM, LOWER EXTREMITY (Right)   1 Day Post-Op      Medications:  Continuous Infusions:   heparin (porcine) in D5W 14 Units/kg/hr (08/20/22 0950)     Scheduled Meds:   zoledronic acid (ZOMETA) IVPB  4 mg Intravenous Once     PRN Meds:acetaminophen, acetaminophen, heparin (PORCINE), heparin (PORCINE), HYDROcodone-acetaminophen, ibuprofen, melatonin, morphine, ondansetron, ondansetron, oxyCODONE, oxyCODONE, promethazine, sodium chloride 0.9%, sodium chloride 0.9%     Objective:     Vital Signs (Most Recent):  Temp: 97.1 °F (36.2 °C) (08/20/22 0729)  Pulse: 81 (08/20/22 0729)  Resp: 17 (08/20/22 1002)  BP: (!) 149/74 (08/20/22 0729)  SpO2: 97 % (08/20/22 0759) Vital Signs (24h Range):  Temp:  [96.5 °F (35.8 °C)-98 °F (36.7 °C)] 97.1 °F (36.2 °C)  Pulse:  [67-88] 81  Resp:  [13-22] 17  SpO2:  [92 %-98 %] 97 %  BP: (107-159)/(61-90) 149/74     Date 08/20/22 0700 - 08/21/22 0659   Shift 0937-8203 4570-9113 4705-6620 24 Hour Total   INTAKE   Shift Total(mL/kg)       OUTPUT   Urine(mL/kg/hr) 200   200   Shift Total(mL/kg) 200(3.7)   200(3.7)   Weight (kg) 54.4 54.4 54.4 54.4       Physical Exam  Vitals and nursing note reviewed.   Constitutional:       Appearance: Normal appearance.   Cardiovascular:      Rate and Rhythm: Normal rate.      Pulses: Normal pulses.      Comments: 2+ DP on the right  1+ DP on the left  2+ femoral bilateral  Pulmonary:      Effort: Pulmonary effort is normal.   Abdominal:      General: Abdomen is flat.   Musculoskeletal:         General: Tenderness and signs of injury present.   Skin:     General: Skin is  warm and dry.      Capillary Refill: Capillary refill takes less than 2 seconds.      Comments: Groin is soft no erythema no bruising no hematoma    Neurological:      General: No focal deficit present.      Mental Status: He is alert and oriented to person, place, and time.         Significant Labs:  CBC:   Recent Labs   Lab 08/20/22  0821   WBC 9.71   RBC 4.32*   HGB 12.7*   HCT 37.9*      MCV 88   MCH 29.4   MCHC 33.5     CMP:   Recent Labs   Lab 08/20/22  0821   GLU 92   CALCIUM 10.0   ALBUMIN 2.5*   PROT 5.9*      K 4.1   CO2 19*      BUN 21*   CREATININE 1.1   ALKPHOS 139*   ALT 6*   AST 23   BILITOT 0.8       Significant Diagnostics:  I have reviewed all pertinent imaging results/findings within the past 24 hours.    Assessment/Plan:     Active Diagnoses:    Diagnosis Date Noted POA    PRINCIPAL PROBLEM:  Pathologic fracture of neck of right femur [M84.451A] 08/18/2022 Yes    Pseudoaneurysm [I72.9]  Unknown      Problems Resolved During this Admission:       61 year old male with a pmh of pathologic fracture of his right femoral neck with possible metastatic cancer who had a traumatic PSA of his right third order profunda branch  S/P coil embolization with n-BCA and coil of the third order right profunda artery branch. 8/19/22      -- Doing very well this am  -- ok to cont anticoagulation  -- ok to sit up no longer needs to lie flat  -- plan per primary team and ortho       Call if any questions or concerns we will sign off for now.     Cruz Marquez MD  Vascular Surgery  [facility]

## 2022-08-20 NOTE — NURSING TRANSFER
Nursing Transfer Note      8/19/2022     Reason patient is being transferred: post-procedure    Transfer To: 528    Transfer via bed    Transfer with cardiac monitoring    Transported by chaitanya    Medicines sent: none    Any special needs or follow-up needed: routine, vascular checks    Chart send with patient: Yes    Notified: spouse    Patient reassessed at: 1930, 8/19

## 2022-08-20 NOTE — OP NOTE
Surgery Date: 8/19/2022      Surgeon(s) and Role:     * Thomas Nicolas MD - Primary     * Cruz Marquez MD - Fellow     * Josiah Hale MD - IR     * Delvin Steward MD - IR     Assisting Surgeon:      Pre-op Diagnosis:  Pseudoaneurysm [I72.9]     Post-op Diagnosis:  Post-Op Diagnosis Codes:     * Pseudoaneurysm [I72.9]     Procedure(s) (LRB):  1. Ultrasound-guided access to the left common femoral artery.   2. Right lower extremity angiogram with selection of profunda / third order vessel; Of note,a pre-operative angiogram / CTA was available.  3. Embolization of 3rd order right profunda femoral artery pseudoaneurysm  With N-BCA glue and 5mm coil  4. 5-fr Mynx closure of Left CFA      Anesthesia: General     Operative Findings: US guided 5Fr access from left CFA, selection of third order profunda vessel, successful coil embolization of right Profunda PSA, no extravasation. Small hematoma in left groin after mynx failed.     Estimated Blood Loss: * No values recorded between 8/19/2022  3:21 PM and 8/19/2022  5:47 PM *     Estimated Blood Loss has not been documented. EBL = 5cc.         Specimens:       Specimen (24h ago, onward)                 None         Complications:  Small hematoma; patient tolerated the procedure well.    Disposition: Recovery- hemodynamically stable in good condition    Attending Attestation: I was present and scrubbed for the entire procedure.  After an informed consent was obtained the patient was brought to the interventional suite and placed in the supine position. Both the patient and procedure were confirmed and identified during timeout process. The patient received perioperative antibiotics. The patient's bilateral groins were prepped and draped in usual sterile fashion. Using ultrasound guidance, the left common femoral artery vessel patency was confirmed. Then direct ultrasound guidance the left CFA was entered with a 21-G needle, Mandril wire and 4-Fr micropuncture needle. Then  under fluoroscopic guidance, an 0.035-in wire was placed into the distal aorta. The micropuncture dilator was then exchanged over the wire for a 5-Swiss sheath. Sheathogram demonstrated access in the CFA. Next a VCF-catheter was placed over the wire and into the distal aorta under fluoroscopy and an aortogram right leg was performed by selecting the right external iliac arterywhich demonstrated:  Aorto-iliac vessels: Patent  Right Common femoral, profunda femoral arteries: patent  Right Superficial femoral artery: patent  NA Popliteal artery: NA  Tibials: NA  Based on the images from this diagnostic angio, a decision was made to intervene. We then systemically heparinized the patient with 5500u of heparin.   Next, we placed a up-and-over sheath and used a 0.18-inch Glidewire Advantage hybdrid wire to selected the Profunda femoral artery. An angiogram revealed a large pseudoaneurysm coming off of a third order branch Treatment of this pseudoaneurysm artery was done with 5mL of N-BCA glue . This was performed under fluoroscopy good sealing of the pseudoaneurysm was done. A 5mm coil was then placed just proximal to the pseudoaneurysm. A follow-up angiogram showed resolution of the pseudoaneurysm. Heparin was reversed with protamine. We then deployed a 5-Swiss Mynx device a small hematoma was noticed after deployment. Manual pressure was then held for 40 minutes with good result. Left groin was thus soft with no hematoma erythema or bruising. At the conclusion of the case the patient was noted to have no evidence of a groin hematoma. All instrument and sponge counts were correct at the end of the case. The patient tolerated the procedure well and was transferred to the pacu for further recovery.       Cruz Marquez MD PGY7  Vascular Surgery Fellow  [phone]    Thomas Nicolas MD DFS FACS   Vascular/Endovascular Surgery

## 2022-08-20 NOTE — PROGRESS NOTES
[facility]  Orthopedics  Progress Note    Patient Name: Donis Jimenez  MRN: [id number]  Admission Date: 8/18/2022  Hospital Length of Stay: 2 days  Attending Provider: Miguel Guerra MD  Primary Care Provider: Primary Doctor No  Follow-up For: Procedure(s) (LRB):  REPAIR, PSEUDOANEURYSM, ARTERY, FEMORAL (Right)  COIL EMBOLIZATION (Right)  ANGIOGRAM, LOWER EXTREMITY (Right)    Post-Operative Day: 1 Day Post-Op  Subjective:     Principal Problem:Pathologic fracture of neck of right femur    Principal Orthopedic Problem: same    Interval History: Pt seen and examined at bedside. NAINA VSS, AF. Pain controlled. S/p deep femoral artery pseudoaneurysm ablation with vascular surgery. Lay flat precautions per vascular.     X ray confirmed right femoral neck fracture. Ortho surgery plan pending.    Review of patient's allergies indicates:  No Known Allergies    Current Facility-Administered Medications   Medication    acetaminophen tablet 650 mg    acetaminophen tablet 650 mg    heparin 25,000 units in dextrose 5% (100 units/ml) IV bolus from bag - ADDITIONAL PRN BOLUS - 30 units/kg    heparin 25,000 units in dextrose 5% (100 units/ml) IV bolus from bag - ADDITIONAL PRN BOLUS - 60 units/kg    heparin 25,000 units in dextrose 5% 250 mL (100 units/mL) infusion LOW INTENSITY nomogram - [facility]    HYDROcodone-acetaminophen 5-325 mg per tablet 1 tablet    ibuprofen tablet 600 mg    melatonin tablet 6 mg    morphine injection 3 mg    ondansetron disintegrating tablet 8 mg    ondansetron disintegrating tablet 8 mg    oxyCODONE immediate release tablet 5 mg    oxyCODONE immediate release tablet Tab 10 mg    promethazine tablet 25 mg    sodium chloride 0.9% flush 10 mL    sodium chloride 0.9% flush 10 mL     Objective:     Vital Signs (Most Recent):  Temp: 96.8 °F (36 °C) (08/20/22 0458)  Pulse: 78 (08/20/22 0458)  Resp: 18 (08/20/22 0458)  BP: (!) 155/83 (08/20/22 0458)  SpO2: 96 % (08/20/22 0458)   Vital Signs  (24h Range):  Temp:  [96.5 °F (35.8 °C)-98 °F (36.7 °C)] 96.8 °F (36 °C)  Pulse:  [67-89] 78  Resp:  [13-22] 18  SpO2:  [92 %-98 %] 96 %  BP: (107-159)/(61-90) 155/83     Weight: 54.4 kg (119 lb 14.9 oz)  Height: 6' (182.9 cm)  Body mass index is 16.27 kg/m².      Intake/Output Summary (Last 24 hours) at 8/20/2022 0559  Last data filed at 8/20/2022 0538  Gross per 24 hour   Intake 1185.44 ml   Output 700 ml   Net 485.44 ml         Ortho/SPM Exam  RLE:  Skin intact throughout, no scars present  No swelling  No ecchymosis, erythema, or signs of cellulitis  TTP at hip/proximal femur  No tenderness to palpation of middle or distal femur  No tenderness to palpation of proximal, middle, or distal aspects of tibia or fibula  No tenderness to palpation of foot  Pain with any ROM at hip  Full painless ROM of foot and ankle  Compartments soft  SILT Sa/Kenny/DP/SP/T  Motor intact EHL/FHL/TA/Gastroc  2+ DP  Brisk capillary refill     Significant Labs: CBC:   Recent Labs   Lab 08/19/22  0357 08/19/22  2320   WBC 9.18 8.98   HGB 12.3* 11.7*   HCT 36.4* 36.6*    310       CMP:   Recent Labs   Lab 08/19/22  0357      K 3.1*      CO2 27   GLU 88   BUN 16   CREATININE 0.9   CALCIUM 10.2   PROT 5.8*   ALBUMIN 2.5*   BILITOT 0.8   ALKPHOS 142*   AST 23   ALT 6*   ANIONGAP 12       All pertinent labs within the past 24 hours have been reviewed.    Significant Imaging: X-Ray: I have reviewed all pertinent results/findings and my personal findings are:  right transcervical femoral neck fracture    Results for orders placed or performed during the hospital encounter of 08/18/22 (from the past 2160 hour(s))   CT Chest Abdomen Pelvis With Contrast (xpd)    Impression    1. Findings of extensive metastatic disease involving soft tissues above and below the diaphragm and osseous structures.  Extensive involvement of the mediastinum with encasement of bronchovascular structures as detailed above.  Suggested potential primary  lung and renal with left upper lobe and left renal masses.  2. Pathologic fracture of the right femoral head/neck, L1 vertebral body, and left 6th rib.  3. Small to moderate pericardial effusion, likely malignant.  4. Small left pleural effusion.  5. Heterogeneous opacification of the right jugular vein which could represent contrast mixing however cannot exclude thrombus.  Recommend correlation with upper extremity venous ultrasound.  6. Please see above for additional details.  This report was flagged in Epic as abnormal.    Electronically signed by resident: Jaclyn Ware  Date:    08/18/2022  Time:    15:12    Electronically signed by: George Molina  Date:    08/18/2022  Time:    17:29   CT Thigh W W/O Contrast Right    Impression    1. Heterogeneous appearance of the right femoral head and neck with displaced likely pathologic subcapital fracture of the femoral head.  2. 3.0 cm soft tissue lesion with underlying cortical destruction of the anterior ilium concerning for additional metastases.  3. Enhancing 3.2 cm high density structure in close proximity to the femoral vessels concerning for pseudoaneurysm.  Correlation advised.  4. Additional findings detailed above.  This report was flagged in Epic as abnormal.    Emil Echevarria MD discussed findings with Cortez Hagan MD via [facility] secure chat on 08/18/2022 at 16:47.    Electronically signed by resident: Emil Echevarria MD  Date:    08/18/2022  Time:    15:10    Electronically signed by: Ole Hoff MD  Date:    08/18/2022  Time:    17:04   CTA Runoff ABD PEL Bilat Lower Ext    Impression    1. 3.2 cm enhancing structure arising from a branch of the right deep femoral artery concerning for pseudoaneurysm.  2. Atherosclerosis of the bilateral lower extremity arterial system.  Prominent plaque within the distal right femoral artery with short segment near occlusion and distal reconstitution.  Multifocal short segment occlusions of the left anterior tibial  artery.  3. Right femoral head and neck fracture with heterogeneity suspicious for underlying neoplasm.  4. Destructive lytic lesions of the right ilium and L1 vertebral bodies/posterior elements concerning for metastasis.  5. Left renal, multiple bilateral pararenal, and right adrenal lesions concerning for metastasis.  6. Small pericardial effusion and small left pleural effusion.  7. Please see CT chest abdomen pelvis report 08/18/2022 for more complete assessment.  This report was flagged in Epic as abnormal.      Electronically signed by: George Molina  Date:    08/19/2022  Time:    13:14         Assessment/Plan:     * Pathologic fracture of neck of right femur  Donis Jimenez is a 61 y.o. male with PMH of bacterial endocarditis, s/p AV and MV mechanical valve replacement (on warfarin), CKD, tobacco abuse presenting with pathologic R femur fracture. Bony lesions in femur and pelvis, as well as lung mass on CXR at outside hospital. Imaging studies pending. Unknown primary neoplasm. Transferred for ortho oncology evaluation and management. On warfarin. Extensive smoking history. Deep femoral artery pseudoaneurysm ablation by vascular on 8/19.     DVT ppx: heparin drip per vascular  Multimodal pain control  Metastatic workup ongoing, likely lung adenocarcinoma  Final plan pending discussion with staff, possible OR Tuesrafael Haji MD  Orthopedics  [facility] - Surgery

## 2022-08-21 NOTE — ASSESSMENT & PLAN NOTE
Donis Jimenez is a 61 y.o. male with PMH of bacterial endocarditis, s/p AV and MV mechanical valve replacement (on warfarin), CKD, tobacco abuse presenting with pathologic R femur fracture. Bony lesions in femur and pelvis, as well as lung mass on CXR at outside hospital. Imaging studies pending. 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 Transferred for ortho oncology evaluation and management. Extensive smoking history. Deep femoral artery pseudoaneurysm ablation by vascular on 8/19. Pain well controlled.     Plan:  DVT ppx: heparin drip per vascular  Multimodal pain control  Metastatic workup ongoing, likely lung adenocarcinoma  Final plan pending discussion with staff, possible OR Tuesday

## 2022-08-21 NOTE — PROGRESS NOTES
[facility] Medicine  [facility]  Daily Progress Note      Recent 24-Hour Events      No pain or shortness of breath. Tolerating oral intake but reduced. C/o b/l UE swelling;     PHYSICAL EXAM    Vitals: /74 (BP Location: Left arm)   Pulse 78   Temp 99 °F (37.2 °C) (Oral)   Resp 18   Ht 6' (1.829 m)   Wt 54.4 kg (119 lb 14.9 oz)   SpO2 95%   BMI 16.27 kg/m²  Body mass index is 16.27 kg/m².    General: NAD, AO3, thin  HEENT: PERRL, EOMI.   Cardiovascular: RRR  Respiratory: lungs CTAB  GI: abdomen S/NT/ND, +BS  Extremities:  B/l UE edema  MSK: no gross joint abnormality    Neuro/Psych: A&OX3. CNII-XII grossly intact.          Medications      Scheduled Meds:   EScitalopram oxalate  20 mg Oral Daily    potassium, sodium phosphates  1 packet Oral QID (AC & HS)     Continuous Infusions:   heparin (porcine) in D5W 20 Units/kg/hr (08/21/22 0929)     PRN Meds:.acetaminophen, acetaminophen, heparin (PORCINE), heparin (PORCINE), HYDROcodone-acetaminophen, ibuprofen, LORazepam, melatonin, morphine, ondansetron, ondansetron, oxyCODONE, oxyCODONE, promethazine, sodium chloride 0.9%, sodium chloride 0.9%        Labs & Diagnostics    Labs:      Recent Labs     08/19/22  0357 08/19/22  2320 08/20/22  0821 08/21/22  0445   WBC 9.18 8.98 9.71 9.69   HGB 12.3* 11.7* 12.7* 11.5*   HCT 36.4* 36.6* 37.9* 34.2*   MCV 86 89 88 87    310 302 305     --  136 136   K 3.1*  --  4.1 3.6   CO2 27  --  19* 26   BUN 16  --  21* 22*   CREATININE 0.9  --  1.1 0.9   ANIONGAP 12  --  14 8   MG  --   --  2.0 2.0   PHOS  --   --  4.3 2.0*   ALT 6*  --  6* 6*   AST 23  --  23 23   ALKPHOS 142*  --  139* 129   BILITOT 0.8  --  0.8 0.9   PROT 5.8*  --  5.9* 5.7*   INR  --  1.3*  --  1.2          EKG, labs and radiographs from the past 24h reviewed and personally interpreted.       Assessment & Plan      1. Pseudoaneurysm   -s/p embolization of right profunda femoral artery pseudoaneurysm with 5 mm coil  -VSx  following    2. Possible metastatic malignancy  -MRI per onc  -Has pacemaker but has pacemaker card with him     3. Hip fracture  -Ortho following. Possible surgery Tuesday     4. H/o mechanical MV replacement  -heparin gtt.     5. Hypercalcemia likely of malignancy   -zometa given 8/20    6. Hypophosphatemia  -replete.     7. Moderate Protein-Calorie malnutrition   -breeze    Full a/c.         Dispo/Code status: Full Code.     ?    Miguel Guerra    Date: 8/21/2022

## 2022-08-21 NOTE — SUBJECTIVE & OBJECTIVE
Principal Problem:Pathologic fracture of neck of right femur    Principal Orthopedic Problem: same    Interval History: NAEON. VSS. Pain well controlled in right hip.    Review of patient's allergies indicates:  No Known Allergies    Current Facility-Administered Medications   Medication    acetaminophen tablet 650 mg    acetaminophen tablet 650 mg    EScitalopram oxalate tablet 20 mg    heparin 25,000 units in dextrose 5% (100 units/ml) IV bolus from bag - ADDITIONAL PRN BOLUS - 30 units/kg    heparin 25,000 units in dextrose 5% (100 units/ml) IV bolus from bag - ADDITIONAL PRN BOLUS - 60 units/kg    heparin 25,000 units in dextrose 5% 250 mL (100 units/mL) infusion LOW INTENSITY nomogram - [facility]    HYDROcodone-acetaminophen 5-325 mg per tablet 1 tablet    ibuprofen tablet 600 mg    LORazepam tablet 1 mg    melatonin tablet 6 mg    morphine injection 3 mg    ondansetron disintegrating tablet 8 mg    ondansetron disintegrating tablet 8 mg    oxyCODONE immediate release tablet 5 mg    oxyCODONE immediate release tablet Tab 10 mg    promethazine tablet 25 mg    sodium chloride 0.9% flush 10 mL    sodium chloride 0.9% flush 10 mL     Objective:     Vital Signs (Most Recent):  Temp: 98.3 °F (36.8 °C) (08/21/22 0446)  Pulse: 80 (08/21/22 0446)  Resp: 20 (08/21/22 0602)  BP: 132/66 (08/21/22 0446)  SpO2: (!) 93 % (08/21/22 0446)   Vital Signs (24h Range):  Temp:  [97.1 °F (36.2 °C)-98.4 °F (36.9 °C)] 98.3 °F (36.8 °C)  Pulse:  [66-81] 80  Resp:  [14-20] 20  SpO2:  [93 %-97 %] 93 %  BP: (129-149)/(66-75) 132/66     Weight: 54.4 kg (119 lb 14.9 oz)  Height: 6' (182.9 cm)  Body mass index is 16.27 kg/m².      Intake/Output Summary (Last 24 hours) at 8/21/2022 0621  Last data filed at 8/21/2022 0511  Gross per 24 hour   Intake --   Output 800 ml   Net -800 ml         Ortho/SPM Exam  Gen: NAD, sitting comfortably in bed  CV: regular rate  Resp: non-labored respirations    RLE:  Skin intact throughout, no scars present  No  swelling  No ecchymosis, erythema, or signs of cellulitis  TTP at hip/proximal femur  No tenderness to palpation of middle or distal femur  No tenderness to palpation of proximal, middle, or distal aspects of tibia or fibula  No tenderness to palpation of foot  Pain with any ROM at hip  Full painless ROM of foot and ankle  Compartments soft  SILT Sa/Kenny/DP/SP/T  Motor intact EHL/FHL/TA/Gastroc  2+ DP  Brisk capillary refill     Significant Labs: CBC:   Recent Labs   Lab 08/19/22  2320 08/20/22  0821 08/21/22  0445   WBC 8.98 9.71 9.69   HGB 11.7* 12.7* 11.5*   HCT 36.6* 37.9* 34.2*    302 305       CMP:   Recent Labs   Lab 08/20/22  0821 08/21/22  0445    136   K 4.1 3.6    102   CO2 19* 26   GLU 92 92   BUN 21* 22*   CREATININE 1.1 0.9   CALCIUM 10.0 9.5   PROT 5.9* 5.7*   ALBUMIN 2.5* 2.4*   BILITOT 0.8 0.9   ALKPHOS 139* 129   AST 23 23   ALT 6* 6*   ANIONGAP 14 8       All pertinent labs within the past 24 hours have been reviewed.    Significant Imaging:   All pertinent imaging has been reviewed.

## 2022-08-21 NOTE — PLAN OF CARE
Patient will state stressors and talk about them freely. Patients anxiety will be limited to none. Patient continue to show good coping skills by stating stressors and asking questions about care.

## 2022-08-21 NOTE — PROGRESS NOTES
[facility] - [facility]  Orthopedics  Progress Note    Patient Name: Donis Jimenez  MRN: [id number]  Admission Date: 8/18/2022  Hospital Length of Stay: 3 days  Attending Provider: Miguel Guerra MD  Primary Care Provider: Primary Doctor No  Follow-up For: Procedure(s) (LRB):  REPAIR, PSEUDOANEURYSM, ARTERY, FEMORAL (Right)  COIL EMBOLIZATION (Right)  ANGIOGRAM, LOWER EXTREMITY (Right)    Post-Operative Day: 2 Days Post-Op  Subjective:     Principal Problem:Pathologic fracture of neck of right femur    Principal Orthopedic Problem: same    Interval History: NAEON. VSS. Pain well controlled in right hip.    Review of patient's allergies indicates:  No Known Allergies    Current Facility-Administered Medications   Medication    acetaminophen tablet 650 mg    acetaminophen tablet 650 mg    EScitalopram oxalate tablet 20 mg    heparin 25,000 units in dextrose 5% (100 units/ml) IV bolus from bag - ADDITIONAL PRN BOLUS - 30 units/kg    heparin 25,000 units in dextrose 5% (100 units/ml) IV bolus from bag - ADDITIONAL PRN BOLUS - 60 units/kg    heparin 25,000 units in dextrose 5% 250 mL (100 units/mL) infusion LOW INTENSITY nomogram - [facility]    HYDROcodone-acetaminophen 5-325 mg per tablet 1 tablet    ibuprofen tablet 600 mg    LORazepam tablet 1 mg    melatonin tablet 6 mg    morphine injection 3 mg    ondansetron disintegrating tablet 8 mg    ondansetron disintegrating tablet 8 mg    oxyCODONE immediate release tablet 5 mg    oxyCODONE immediate release tablet Tab 10 mg    promethazine tablet 25 mg    sodium chloride 0.9% flush 10 mL    sodium chloride 0.9% flush 10 mL     Objective:     Vital Signs (Most Recent):  Temp: 98.3 °F (36.8 °C) (08/21/22 0446)  Pulse: 80 (08/21/22 0446)  Resp: 20 (08/21/22 0602)  BP: 132/66 (08/21/22 0446)  SpO2: (!) 93 % (08/21/22 0446)   Vital Signs (24h Range):  Temp:  [97.1 °F (36.2 °C)-98.4 °F (36.9 °C)] 98.3 °F (36.8 °C)  Pulse:  [66-81] 80  Resp:  [14-20] 20  SpO2:  [93 %-97  %] 93 %  BP: (129-149)/(66-75) 132/66     Weight: 54.4 kg (119 lb 14.9 oz)  Height: 6' (182.9 cm)  Body mass index is 16.27 kg/m².      Intake/Output Summary (Last 24 hours) at 8/21/2022 0647  Last data filed at 8/21/2022 0511  Gross per 24 hour   Intake --   Output 800 ml   Net -800 ml         Ortho/SPM Exam  Gen: NAD, sitting comfortably in bed  CV: regular rate  Resp: non-labored respirations    RLE:  Skin intact throughout, no scars present  No swelling  No ecchymosis, erythema, or signs of cellulitis  TTP at hip/proximal femur  No tenderness to palpation of middle or distal femur  No tenderness to palpation of proximal, middle, or distal aspects of tibia or fibula  No tenderness to palpation of foot  Pain with any ROM at hip  Full painless ROM of foot and ankle  Compartments soft  SILT Sa/Kenny/DP/SP/T  Motor intact EHL/FHL/TA/Gastroc  2+ DP  Brisk capillary refill     Significant Labs: CBC:   Recent Labs   Lab 08/19/22  2320 08/20/22  0821 08/21/22  0445   WBC 8.98 9.71 9.69   HGB 11.7* 12.7* 11.5*   HCT 36.6* 37.9* 34.2*    302 305       CMP:   Recent Labs   Lab 08/20/22  0821 08/21/22  0445    136   K 4.1 3.6    102   CO2 19* 26   GLU 92 92   BUN 21* 22*   CREATININE 1.1 0.9   CALCIUM 10.0 9.5   PROT 5.9* 5.7*   ALBUMIN 2.5* 2.4*   BILITOT 0.8 0.9   ALKPHOS 139* 129   AST 23 23   ALT 6* 6*   ANIONGAP 14 8       All pertinent labs within the past 24 hours have been reviewed.    Significant Imaging:   All pertinent imaging has been reviewed.        Assessment/Plan:     * Pathologic fracture of neck of right femur  Donis Jimenez is a 61 y.o. male with PMH of bacterial endocarditis, s/p AV and MV mechanical valve replacement (on warfarin), CKD, tobacco abuse presenting with pathologic R femur fracture. Bony lesions in femur and pelvis, as well as lung mass on CXR at outside hospital. Imaging studies pending. 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 Transferred for ortho  oncology evaluation and management. Extensive smoking history. Deep femoral artery pseudoaneurysm ablation by vascular on 8/19. Pain well controlled.     Plan:  DVT ppx: heparin drip per vascular  Multimodal pain control  Metastatic workup ongoing, likely lung adenocarcinoma  Final plan pending discussion with staff, possible OR Meron Kim MD  Orthopedics  [facility] - Surgery

## 2022-08-22 NOTE — PROGRESS NOTES
ADD-ON Inpatient MRI    Received a call from Cody in the MRI Department in relation to this Inpatient needing to have a MRI and has a [employer] Pacemaker.  Please see information below as it relates to patient's Device being MRI compatible/conditional.  To meet protocol the Pacemaker/ICD must have been implanted no less than 6 weeks prior to scheduled date of Scan.  ICD/PPM and leads must be from same .  MRI informed ordering MD must input a Cardiac Device Check in clinic and hospital order, patient must have an xray within 6 months or less prior to MRI reprogramming.  Chest xray to be reviewed by Radiologist.    The following device information provided by Cody in MRI.    Medtronic Saxman [id number]  RA Lead: 5076/45  RV Lead: 5076/52    As per [employer]'s MR-Conditional product search tool this pt's Pacing system IS MR-Conditional.  MRI scheduled for this afternoon ~ 3:30 pm.      [employer] Rep Nydia P was notified and confirmed will be available.

## 2022-08-22 NOTE — ANESTHESIA POSTPROCEDURE EVALUATION
Anesthesia Post Evaluation    Patient: Donis Jimenez    Procedure(s) Performed: Procedure(s) (LRB):  REPAIR, PSEUDOANEURYSM, ARTERY, FEMORAL (Right)  COIL EMBOLIZATION (Right)  ANGIOGRAM, LOWER EXTREMITY (Right)    Final Anesthesia Type: general      Patient location during evaluation: PACU  Patient participation: Yes- Able to Participate  Level of consciousness: awake and alert and oriented  Pain management: adequate  Airway patency: patent    PONV status at discharge: No PONV  Anesthetic complications: no      Cardiovascular status: blood pressure returned to baseline and hemodynamically stable  Respiratory status: unassisted  Hydration status: euvolemic  Follow-up not needed.          Vitals Value Taken Time   /71 08/22/22 0750   Temp 36.3 °C (97.4 °F) 08/22/22 0750   Pulse 78 08/22/22 0750   Resp 18 08/22/22 0813   SpO2 93 % 08/22/22 0750         Event Time   Out of Recovery 08/19/2022 19:38:00         Pain/Adebayo Score: Pain Rating Prior to Med Admin: 8 (8/22/2022  8:13 AM)

## 2022-08-22 NOTE — PROGRESS NOTES
Scan completed / pt w/ NAD and no acute change noted / placed back to Telemetry and Nydia placed device to normal mode / pt waiting on transport to room / family member waiting with pt in Zone 3

## 2022-08-22 NOTE — NURSING
Nurses Note -- 4 Eyes      8/22/2022   6:23 PM      Skin assessed during: Admit      [x] No Pressure Injuries Present    []Prevention Measures Documented      [] Yes- Altered Skin Integrity Present or Discovered   [] LDA Added if Not in Epic (Describe Wound)   [] New Altered Skin Integrity was Present on Admit and Documented in LDA   [] Wound Image Taken    Wound Care Consulted? No    Attending Nurse:  Karoline Swanson RN     Second RN/Staff Member: Dorota Everett RN

## 2022-08-22 NOTE — PROGRESS NOTES
[facility] - [facility]  Orthopedics  Progress Note    Patient Name: Donis Jimenez  MRN: [id number]  Admission Date: 8/18/2022  Hospital Length of Stay: 4 days  Attending Provider: Miguel Guerra MD  Primary Care Provider: Primary Doctor No  Follow-up For: Procedure(s) (LRB):  REPAIR, PSEUDOANEURYSM, ARTERY, FEMORAL (Right)  COIL EMBOLIZATION (Right)  ANGIOGRAM, LOWER EXTREMITY (Right)    Post-Operative Day: 3 Days Post-Op  Subjective:     Principal Problem:Pathologic fracture of neck of right femur    Principal Orthopedic Problem: same    Interval History: NAEON. VSS. Pain well controlled in right hip. Will discuss with primary possible OR tomorrow for R hemiarthroplasty with ablation of acetabular lesions.     Review of patient's allergies indicates:  No Known Allergies    Current Facility-Administered Medications   Medication    acetaminophen tablet 650 mg    acetaminophen tablet 650 mg    EScitalopram oxalate tablet 20 mg    heparin 25,000 units in dextrose 5% (100 units/ml) IV bolus from bag - ADDITIONAL PRN BOLUS - 30 units/kg    heparin 25,000 units in dextrose 5% (100 units/ml) IV bolus from bag - ADDITIONAL PRN BOLUS - 60 units/kg    heparin 25,000 units in dextrose 5% 250 mL (100 units/mL) infusion LOW INTENSITY nomogram - [facility]    HYDROcodone-acetaminophen 5-325 mg per tablet 1 tablet    ibuprofen tablet 600 mg    LORazepam tablet 1 mg    melatonin tablet 6 mg    morphine injection 3 mg    ondansetron disintegrating tablet 8 mg    ondansetron disintegrating tablet 8 mg    oxyCODONE immediate release tablet 5 mg    oxyCODONE immediate release tablet Tab 10 mg    promethazine tablet 25 mg    sodium chloride 0.9% flush 10 mL    sodium chloride 0.9% flush 10 mL     Objective:     Vital Signs (Most Recent):  Temp: 97.4 °F (36.3 °C) (08/22/22 0750)  Pulse: 78 (08/22/22 0750)  Resp: 18 (08/22/22 0813)  BP: 125/71 (08/22/22 0750)  SpO2: (!) 93 % (08/22/22 0750)   Vital Signs (24h Range):  Temp:  [97.4  °F (36.3 °C)-99 °F (37.2 °C)] 97.4 °F (36.3 °C)  Pulse:  [75-81] 78  Resp:  [14-20] 18  SpO2:  [90 %-95 %] 93 %  BP: (125-167)/(71-81) 125/71     Weight: 54.4 kg (119 lb 14.9 oz)  Height: 6' (182.9 cm)  Body mass index is 16.27 kg/m².      Intake/Output Summary (Last 24 hours) at 8/22/2022 0956  Last data filed at 8/22/2022 0612  Gross per 24 hour   Intake --   Output 1300 ml   Net -1300 ml         Ortho/SPM Exam  Gen: NAD, sitting comfortably in bed  CV: regular rate  Resp: non-labored respirations    RLE:  Skin intact throughout, no scars present  No swelling  No ecchymosis, erythema, or signs of cellulitis  TTP at hip/proximal femur  No tenderness to palpation of middle or distal femur  No tenderness to palpation of proximal, middle, or distal aspects of tibia or fibula  No tenderness to palpation of foot  Pain with any ROM at hip  Full painless ROM of foot and ankle  Compartments soft  SILT Sa/Kenny/DP/SP/T  Motor intact EHL/FHL/TA/Gastroc  2+ DP      Significant Labs: CBC:   Recent Labs   Lab 08/21/22  0445 08/22/22  0534   WBC 9.69 9.46   HGB 11.5* 11.2*   HCT 34.2* 34.0*    285       CMP:   Recent Labs   Lab 08/21/22  0445 08/22/22  0534    131*   K 3.6 3.4*    98   CO2 26 24   GLU 92 85   BUN 22* 15   CREATININE 0.9 0.7   CALCIUM 9.5 8.9   PROT 5.7* 5.6*   ALBUMIN 2.4* 2.3*   BILITOT 0.9 1.1*   ALKPHOS 129 114   AST 23 22   ALT 6* 7*   ANIONGAP 8 9       All pertinent labs within the past 24 hours have been reviewed.    Significant Imaging:   All pertinent imaging has been reviewed.        Assessment/Plan:     * Pathologic fracture of neck of right femur  Donis Jimenez is a 61 y.o. male with PMH of bacterial endocarditis, s/p AV and MV mechanical valve replacement (on warfarin), CKD, tobacco abuse presenting with pathologic R femur fracture. Bony lesions in femur and pelvis, as well as lung mass on CXR at outside hospital. Imaging studies pending. Unknown primary neoplasm - CT C/A/P  suggestive of lumg primary. Transferred for ortho oncology evaluation and management. Extensive smoking history. Deep femoral artery pseudoaneurysm ablation by vascular on 8/19. Pain well controlled.     Plan:  DVT ppx: heparin drip per vascular  Multimodal pain control  Metastatic workup ongoing, MRI brain ordered, PET/CT scan completed, heme/onc consulted  Tentative plan for OR tomorrow for right hip hemiarthroplasty pending discussion with primary team          Cortez Hagan MD  Orthopedics  [facility] - Surgery

## 2022-08-22 NOTE — ASSESSMENT & PLAN NOTE
Donis Jimenez is a 61 y.o. male with PMH of bacterial endocarditis, s/p AV and MV mechanical valve replacement (on warfarin), CKD, tobacco abuse presenting with pathologic R femur fracture. Bony lesions in femur and pelvis, as well as lung mass on CXR at outside hospital. Imaging studies pending. 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 Transferred for ortho oncology evaluation and management. Extensive smoking history. Deep femoral artery pseudoaneurysm ablation by vascular on 8/19. Pain well controlled.     Plan:  DVT ppx: heparin drip per vascular  Multimodal pain control  Metastatic workup ongoing, MRI brain ordered, PET/CT scan completed, heme/onc consulted  Tentative plan for OR tomorrow for right hip hemiarthroplasty pending discussion with primary team

## 2022-08-22 NOTE — PROGRESS NOTES
War Room called and informed pt off Tele monitor / moved to MRI table and placed on monitor    Extension tubing added to Heparin IV infusion / pump settings unchanged     Nydia with [employer] placed cardiac device to MRI safe mode

## 2022-08-22 NOTE — SUBJECTIVE & OBJECTIVE
Principal Problem:Pathologic fracture of neck of right femur    Principal Orthopedic Problem: same    Interval History: NAEON. VSS. Pain well controlled in right hip. Will discuss with primary possible OR tomorrow for R hemiarthroplasty with ablation of acetabular lesions.     Review of patient's allergies indicates:  No Known Allergies    Current Facility-Administered Medications   Medication    acetaminophen tablet 650 mg    acetaminophen tablet 650 mg    EScitalopram oxalate tablet 20 mg    heparin 25,000 units in dextrose 5% (100 units/ml) IV bolus from bag - ADDITIONAL PRN BOLUS - 30 units/kg    heparin 25,000 units in dextrose 5% (100 units/ml) IV bolus from bag - ADDITIONAL PRN BOLUS - 60 units/kg    heparin 25,000 units in dextrose 5% 250 mL (100 units/mL) infusion LOW INTENSITY nomogram - [facility]    HYDROcodone-acetaminophen 5-325 mg per tablet 1 tablet    ibuprofen tablet 600 mg    LORazepam tablet 1 mg    melatonin tablet 6 mg    morphine injection 3 mg    ondansetron disintegrating tablet 8 mg    ondansetron disintegrating tablet 8 mg    oxyCODONE immediate release tablet 5 mg    oxyCODONE immediate release tablet Tab 10 mg    promethazine tablet 25 mg    sodium chloride 0.9% flush 10 mL    sodium chloride 0.9% flush 10 mL     Objective:     Vital Signs (Most Recent):  Temp: 97.4 °F (36.3 °C) (08/22/22 0750)  Pulse: 78 (08/22/22 0750)  Resp: 18 (08/22/22 0813)  BP: 125/71 (08/22/22 0750)  SpO2: (!) 93 % (08/22/22 0750)   Vital Signs (24h Range):  Temp:  [97.4 °F (36.3 °C)-99 °F (37.2 °C)] 97.4 °F (36.3 °C)  Pulse:  [75-81] 78  Resp:  [14-20] 18  SpO2:  [90 %-95 %] 93 %  BP: (125-167)/(71-81) 125/71     Weight: 54.4 kg (119 lb 14.9 oz)  Height: 6' (182.9 cm)  Body mass index is 16.27 kg/m².      Intake/Output Summary (Last 24 hours) at 8/22/2022 0956  Last data filed at 8/22/2022 0612  Gross per 24 hour   Intake --   Output 1300 ml   Net -1300 ml         Ortho/SPM Exam  Gen: NAD, sitting comfortably in  bed  CV: regular rate  Resp: non-labored respirations    RLE:  Skin intact throughout, no scars present  No swelling  No ecchymosis, erythema, or signs of cellulitis  TTP at hip/proximal femur  No tenderness to palpation of middle or distal femur  No tenderness to palpation of proximal, middle, or distal aspects of tibia or fibula  No tenderness to palpation of foot  Pain with any ROM at hip  Full painless ROM of foot and ankle  Compartments soft  SILT Sa/Kenny/DP/SP/T  Motor intact EHL/FHL/TA/Gastroc  2+ DP      Significant Labs: CBC:   Recent Labs   Lab 08/21/22  0445 08/22/22  0534   WBC 9.69 9.46   HGB 11.5* 11.2*   HCT 34.2* 34.0*    285       CMP:   Recent Labs   Lab 08/21/22  0445 08/22/22  0534    131*   K 3.6 3.4*    98   CO2 26 24   GLU 92 85   BUN 22* 15   CREATININE 0.9 0.7   CALCIUM 9.5 8.9   PROT 5.7* 5.6*   ALBUMIN 2.4* 2.3*   BILITOT 0.9 1.1*   ALKPHOS 129 114   AST 23 22   ALT 6* 7*   ANIONGAP 8 9       All pertinent labs within the past 24 hours have been reviewed.    Significant Imaging:   All pertinent imaging has been reviewed.

## 2022-08-22 NOTE — PROGRESS NOTES
[facility]  [facility]  Daily Progress Note      Recent 24-Hour Events      No pain or shortness of breath. Tolerating oral intake but reduced. C/o b/l UE swelling; stable.     PHYSICAL EXAM    Vitals: /69 (BP Location: Right arm, Patient Position: Sitting)   Pulse 64   Temp 96 °F (35.6 °C) (Oral)   Resp 18   Ht 6' (1.829 m)   Wt 54.4 kg (119 lb 14.9 oz)   SpO2 (!) 94%   BMI 16.27 kg/m²  Body mass index is 16.27 kg/m².    General: NAD, AO3, thin  HEENT: PERRL, EOMI.   Cardiovascular: RRR  Respiratory: lungs CTAB  GI: abdomen S/NT/ND, +BS  Extremities:  B/l UE edema  MSK: no gross joint abnormality    Neuro/Psych: A&OX3. CNII-XII grossly intact.          Medications      Scheduled Meds:   EScitalopram oxalate  20 mg Oral Daily    potassium bicarbonate  40 mEq Oral Once     Continuous Infusions:   heparin (porcine) in D5W 24 Units/kg/hr (08/22/22 1221)     PRN Meds:.acetaminophen, heparin (PORCINE), heparin (PORCINE), HYDROcodone-acetaminophen, ibuprofen, LORazepam, melatonin, morphine, ondansetron, ondansetron, oxyCODONE, oxyCODONE, promethazine, sodium chloride 0.9%, sodium chloride 0.9%        Labs & Diagnostics    Labs:      Recent Labs     08/19/22  2320 08/20/22  0821 08/21/22  0445 08/22/22  0534   WBC 8.98 9.71 9.69 9.46   HGB 11.7* 12.7* 11.5* 11.2*   HCT 36.6* 37.9* 34.2* 34.0*   MCV 89 88 87 87    302 305 285   NA  --  136 136 131*   K  --  4.1 3.6 3.4*   CO2  --  19* 26 24   BUN  --  21* 22* 15   CREATININE  --  1.1 0.9 0.7   ANIONGAP  --  14 8 9   MG  --  2.0 2.0 1.8   PHOS  --  4.3 2.0* 1.8*   ALT  --  6* 6* 7*   AST  --  23 23 22   ALKPHOS  --  139* 129 114   BILITOT  --  0.8 0.9 1.1*   PROT  --  5.9* 5.7* 5.6*   INR 1.3*  --  1.2  --           EKG, labs and radiographs from the past 24h reviewed and personally interpreted.       Assessment & Plan      1. Pseudoaneurysm   -s/p embolization of right profunda femoral artery pseudoaneurysm with 5 mm coil  -VSx  following    2. Possible metastatic malignancy  -MRI per onc  -Has pacemaker but has pacemaker card with him. Radiology to be contacted regarding scheduling this     3. Hip fracture  -Ortho following. Surgery Tuesday, likely at 1.  Heparin drip to be held at 7 in the morning.    4. H/o mechanical MV replacement  -heparin gtt.     5. Hypercalcemia likely of malignancy   -zometa given 8/20    6. Hypophosphatemia  -replete.     7. Moderate Protein-Calorie malnutrition   -breeze    8. Hyponatremia   -SSRI is a home medication has been tolerated to this point.    -poor oral intake noted.    -urine stays requested.      9. Hypokalemia   -replete      Full a/c.         Dispo/Code status: Full Code.     ?    Miguel Guerra    Date: 8/22/2022

## 2022-08-22 NOTE — ANESTHESIA PREPROCEDURE EVALUATION
[facility]  Anesthesia Pre-Operative Evaluation         Patient Name: Donis Jimenez  YOB: 1960  MRN: [id number]    SUBJECTIVE:     Pre-operative evaluation for Procedure(s) (LRB):  HEMIARTHROPLASTY - right, lateral, bean bag, ablation supine after, ancef, prevena (Right)     08/22/2022    Donis Jimenez is a 61 y.o. male w/ a significant PMHx of tobacco use (40+ pack years), CKD, Bacterial endocarditis (s/p AVR and MVR on Coumadin) admitted with pathological right femur neck fracture. Metastatic work up pending but patient has a suspicious lung mass and supraclavicular lymphadenopathy. CT C/A/P suggestive of lung primary. Incidentally discovered right femoral artery pseudoaneurysm s/p repair with vascular surgery on 8/19/22.    Patient now presents for the above procedure(s).    Prev airway:   Intubation    Date/Time: 8/19/2022 2:52 PM  Performed by: Neelima Gomez CRNA  Authorized by: Shimon Paez MD      Intubation:     Induction:  Intravenous    Intubated:  Postinduction    Mask Ventilation:  Easy mask    Attempts:  1    Attempted By:  CRNA    Method of Intubation:  Direct    Blade:  Crum 2    Laryngeal View Grade: Grade I - full view of cords      Difficult Airway Encountered?: No      Complications:  None    Airway Device:  Oral endotracheal tube    Airway Device Size:  7.5    Style/Cuff Inflation:  Cuffed (inflated to minimal occlusive pressure)    Secured at:  The lips    Placement Verified By:  Capnometry    Complicating Factors:  None    Findings Post-Intubation:  BS equal bilateral and atraumatic/condition of teeth unchanged    LDA:       Peripheral IV - Single Lumen 08/17/22 2200 20 G Right Antecubital (Active)   Site Assessment Clean;Dry;Intact 08/22/22 0813   Extremity Assessment Distal to IV Warm 08/22/22 0813   Line Status Saline locked 08/22/22 0813   Dressing Status Clean;Dry;Intact 08/22/22 0813   Dressing Intervention Integrity maintained 08/22/22 0813    Reason Not Rotated Not due 08/21/22 2000   Number of days: 4            Peripheral IV - Single Lumen 08/19/22 1456 18 G Left Forearm (Active)   Site Assessment Clean;Dry;Intact 08/22/22 0813   Extremity Assessment Distal to IV No abnormal discoloration;No redness;No swelling;No warmth 08/22/22 0813   Line Status Infusing 08/22/22 0813   Dressing Status Clean;Dry;Intact 08/22/22 0813   Dressing Intervention Integrity maintained 08/22/22 0813   Reason Not Rotated Not due 08/21/22 2000   Number of days: 2       Drips:    heparin (porcine) in D5W 24 Units/kg/hr (08/22/22 1221)       Patient Active Problem List   Diagnosis    Pathologic fracture of neck of right femur    Pseudoaneurysm       Review of patient's allergies indicates:  No Known Allergies    Current Inpatient Medications:   EScitalopram oxalate  20 mg Oral Daily       No current facility-administered medications on file prior to encounter.     No current outpatient medications on file prior to encounter.       Past Surgical History:   Procedure Laterality Date    ANGIOGRAPHY OF LOWER EXTREMITY Right 8/19/2022    Procedure: ANGIOGRAM, LOWER EXTREMITY;  Surgeon: Thomas Nicolas MD;  Location: [facility] OR [address] [place] [address];  Service: Peripheral Vascular;  Laterality: Right;  30.0 min  315.10 mGy  26.1755 Gycm2  84 ml dye    REPAIR, PSEUDOANEURYSM, ARTERY, FEMORAL Right 8/19/2022    Procedure: REPAIR, PSEUDOANEURYSM, ARTERY, FEMORAL;  Surgeon: Thomas Nicolas MD;  [facility];  Service: Peripheral Vascular;  Laterality: Right;  R PFA (3rd branch) pseudoaneurysm        Social History     Socioeconomic History    Marital status:    Tobacco Use    Smoking status: Current Every Day Smoker     Packs/day: 1.00     Types: Cigarettes    Smokeless tobacco: Never Used       OBJECTIVE:     Vital Signs Range (Last 24H):  Temp:  [35.6 °C (96 °F)-36.9 °C (98.4 °F)]   Pulse:  [64-81]   Resp:  [14-20]   BP: (125-167)/(69-81)   SpO2:  [90 %-94 %]        Significant Labs:  Lab Results   Component Value Date    WBC 9.46 08/22/2022    HGB 11.2 (L) 08/22/2022    HCT 34.0 (L) 08/22/2022     08/22/2022    ALT 7 (L) 08/22/2022    AST 22 08/22/2022     (L) 08/22/2022    K 3.4 (L) 08/22/2022    CL 98 08/22/2022    CREATININE 0.7 08/22/2022    BUN 15 08/22/2022    CO2 24 08/22/2022    TSH 1.538 08/18/2022    INR 1.2 08/21/2022       Diagnostic Studies: No relevant studies.    EKG:   No results found for this or any previous visit.    2D ECHO:  TTE:  No results found for this or any previous visit.    MARIE:  No results found for this or any previous visit.    ASSESSMENT/PLAN:                                                                                                                  08/22/2022  Donis Jimenez is a 61 y.o., male.      Pre-op Assessment    I have reviewed the Patient Summary Reports.     I have reviewed the Nursing Notes. I have reviewed the NPO Status.   I have reviewed the Medications.     Review of Systems  Anesthesia Hx:  No problems with previous Anesthesia Denies Hx of Anesthetic complications  History of prior surgery of interest to airway management or planning: Denies Family Hx of Anesthesia complications.   Denies Personal Hx of Anesthesia complications.   Social:  Smoker    Hematology/Oncology:        Current/Recent Cancer.   Cardiovascular:   Exercise tolerance: good Denies Hypertension. Valvular problems/Murmurs (s/p AVR and MVR)  Denies CAD.    Denies CABG/stent.   Denies CHF. no hyperlipidemia Right femoral artery psuedoaneurysm (s/p repair)    Endocarditis   Pulmonary:   Pneumonia Denies COPD.  Denies Asthma.  Denies Sleep Apnea.    Renal/:   Chronic Renal Disease, CRI    Hepatic/GI:   Denies GERD.    Musculoskeletal:   Right femur fracture   Neurological:   Denies CVA.  Denies Headaches. Denies Seizures.    Endocrine:   Denies Diabetes.  Denies Obesity / BMI > 30  Psych:   Denies Psychiatric History.          Physical  Exam  General: Well nourished, Cooperative, Alert and Oriented    Airway:  Mallampati: I / I  Mouth Opening: Normal  TM Distance: Normal  Tongue: Normal  Neck ROM: Normal ROM    Dental:  Intact    Chest/Lungs:  Clear to auscultation, Normal Respiratory Rate    Heart:  Rate: Normal  Rhythm: Regular Rhythm        Anesthesia Plan  Type of Anesthesia, risks & benefits discussed:    Anesthesia Type: Gen ETT  Intra-op Monitoring Plan: Standard ASA Monitors and Art Line  Post Op Pain Control Plan: multimodal analgesia and IV/PO Opioids PRN  Induction:  IV  Airway Plan: Direct and Video, Post-Induction  Informed Consent: Informed consent signed with the Patient and all parties understand the risks and agree with anesthesia plan.  All questions answered. Patient consented to blood products? Yes  ASA Score: 3  Day of Surgery Review of History & Physical: H&P Update referred to the surgeon/provider.    Ready For Surgery From Anesthesia Perspective.     .

## 2022-08-22 NOTE — PROGRESS NOTES
Called by radiology for a possible intracranial hemorrhage seen on MRI. Neurosurgery consult placed. I spoke with the neurosurgery team and they are aware of the consult and are awaiting final radiology read to be published.

## 2022-08-22 NOTE — PLAN OF CARE
[facility]  Initial Discharge Assessment       Primary Care Provider: Primary Doctor No    Admission Diagnosis: Lytic bone lesion of hip [M89.8X5]    Admission Date: 8/18/2022  Expected Discharge Date: 8/25/2022    Discharge Barriers Identified: None    Payor: MEDICAID / Plan: [place] / Product Type: Managed Medicaid /     Extended Emergency Contact Information  Primary Emergency Contact: Aston Collins  Mobile Phone: [phone]  Relation: Spouse  Preferred language: English   needed? No    Discharge Plan A: Home Health, Home with family  Discharge Plan B: Rehab    No Pharmacies Listed    Initial Assessment (most recent)     Adult Discharge Assessment - 08/22/22 1306        Discharge Assessment    Assessment Type Discharge Planning Assessment     Confirmed/corrected address, phone number and insurance Yes     Confirmed Demographics Correct on Facesheet     Source of Information patient;family       Communicated VIKI with patient/caregiver Yes     Lives With spouse     Do you expect to return to your current living situation? Yes     Do you have help at home or someone to help you manage your care at home? Yes     Who are your caregiver(s) and their phone number(s)? spouse     Prior to hospitilization cognitive status: Alert/Oriented     Current cognitive status: Alert/Oriented     Equipment Currently Used at Home cane, straight;walker, rolling   Transport chair    Do you currently have service(s) that help you manage your care at home? No     Do you take prescription medications? Yes     Do you have prescription coverage? Yes     Do you have any problems affording any of your prescribed medications? No     Is the patient taking medications as prescribed? yes     How do you get to doctors appointments? family or friend will provide     Are you on dialysis? No     Do you take coumadin? Yes     Who monitors your labs? Mike- cardiologist Dr. Ferreira at      Discharge Plan A  Home Health;Home with family     Discharge Plan B Rehab     Discharge Plan discussed with: Patient;Spouse/sig other     Discharge Barriers Identified None               Patient lives with his  in a single story home with four entry steps. Patient amendable to therapy recommendations post surgery. Patient primary CG is spouse Aston Dennis. Mr Collins will provide transportation home.

## 2022-08-23 PROBLEM — I63.89 ACUTE ARTERIAL ISCHEMIC STROKE, MULTIFOCAL, MULTIPLE VASCULAR TERRITORIES: Status: ACTIVE | Noted: 2022-01-01

## 2022-08-23 PROBLEM — S06.5XAA SUBDURAL HEMATOMA: Status: ACTIVE | Noted: 2022-01-01

## 2022-08-23 NOTE — CONSULTS
[facility] - Surgery ([facility])  Neurosurgery  Consult Note    Inpatient consult to Neurosurgery  Consult performed by: Marc Resendiz MD  Consult ordered by: Miguel Guerra MD        Subjective:     Chief Complaint/Reason for Admission: Femur fracture    History of Present Illness: 61M h/o endocarditis s/p AV/MV mechanical valve replacement (on warfarin), CKD, tobacco abuse admitted to hospital medicine for R femoral head fx 8/18/22 after presenting after a fall in his bathroom, and for workup for metastatic cancer with unknown origin. Per partner he had held his warfarin because he was going to surgery for hernia repair and they were transitioning him to lovenox. On admission INR was subtherapeutic at 1.8 and patient was started on hep gtt on 8/20/22. As part of his workup he had CT thigh that showed displaced fracture, soft tissue lesion c/f mets as well as pseudoaneurysm at femoral vessels for which patient had vascular surgery 8/19/22.  MRI brain w/wo con completed on 8/22/22 that showed multi territory embolic infarcts as well as SDHs (frontal and occipital), and calvarial metastatic involvement, and  requesting NSY consultation for surgical clearance today. He denies any neurological symptoms such as weakness, numbness, vision changes, slurred speech.        No medications prior to admission.       Review of patient's allergies indicates:  No Known Allergies    Past Medical History:   Diagnosis Date    Endocarditis     Pacemaker     Pneumonia      Past Surgical History:   Procedure Laterality Date    ANGIOGRAPHY OF LOWER EXTREMITY Right 8/19/2022    Procedure: ANGIOGRAM, LOWER EXTREMITY;  Surgeon: Thomas Nicolas MD;  Location: [facility] OR [address] [place] [address];  Service: Peripheral Vascular;  Laterality: Right;  30.0 min  315.10 mGy  26.1755 Gycm2  84 ml dye    REPAIR, PSEUDOANEURYSM, ARTERY, FEMORAL Right 8/19/2022    Procedure: REPAIR, PSEUDOANEURYSM, ARTERY, FEMORAL;  Surgeon: Thomas Nicolas MD;  Location:  [facility] OR 2ND FLR;  Service: Peripheral Vascular;  Laterality: Right;  R PFA (3rd branch) pseudoaneurysm      Family History    None       Tobacco Use    Smoking status: Current Every Day Smoker     Packs/day: 1.00     Types: Cigarettes    Smokeless tobacco: Never Used   Substance and Sexual Activity    Alcohol use: Not on file    Drug use: Not on file    Sexual activity: Not on file     Review of Systems: see HPI for pertinent positives and negative.   Objective:     Weight: 54.4 kg (119 lb 14.9 oz)  Body mass index is 16.27 kg/m².  Vital Signs (Most Recent):  Temp: 99 °F (37.2 °C) (08/23/22 1025)  Pulse: 72 (08/23/22 1640)  Resp: 16 (08/23/22 1640)  BP: (!) 153/77 (08/23/22 1640)  SpO2: (!) 94 % (08/23/22 1640)   Vital Signs (24h Range):  Temp:  [96.5 °F (35.8 °C)-99 °F (37.2 °C)] 99 °F (37.2 °C)  Pulse:  [72-99] 72  Resp:  [15-24] 16  SpO2:  [92 %-95 %] 94 %  BP: (124-153)/(65-82) 153/77     Date 08/23/22 0700 - 08/24/22 0659   Shift 3825-4017 0803-4799 3958-6446 24 Hour Total   INTAKE   Shift Total(mL/kg)       OUTPUT   Urine(mL/kg/hr)  600  600   Shift Total(mL/kg)  600(11)  600(11)   Weight (kg) 54.4 54.4 54.4 54.4                        Physical Exam    Neurosurgery Physical Exam    GENERAL: resting comfortably  HEENT: NCAT, PERRL, mucous membranes moist  NECK: supple, trachea midline  CV: normal capillary refill  PULM: aerating well, symmetric expansion, no distress  ABD: soft, NT, ND  EXT: no c/c/e    NEURO:    AAO x 3  CN II-XII grossly intact  Fc x 4 antigravity  SILT    No drift or dysmetria      Significant Labs:  Recent Labs   Lab 08/22/22  0534 08/22/22  1420 08/23/22  0320   GLU 85  --  82   * 136 132*   K 3.4*  --  3.5   CL 98  --  97   CO2 24  --  25   BUN 15  --  14   CREATININE 0.7  --  0.7   CALCIUM 8.9  --  8.7   MG 1.8  --  1.9     Recent Labs   Lab 08/22/22  0534 08/23/22  0320   WBC 9.46 9.17   HGB 11.2* 11.1*   HCT 34.0* 33.5*    262     Recent Labs   Lab 08/22/22  1341  08/22/22 2049 08/23/22  0320   APTT 43.9* 50.0* 38.1*     Microbiology Results (last 7 days)       ** No results found for the last 168 hours. **          All pertinent labs from the last 24 hours have been reviewed.    Significant Diagnostics:  I have reviewed all pertinent imaging results/findings within the past 24 hours.  No results found in the last 24 hours.    Assessment/Plan:     Subdural hematoma  61M h/o endocarditis s/p AV/MV mechanical valve replacement (on warfarin), CKD, tobacco abuse admitted to [facility] medicine for R femoral head fx 8/18/22. MRI brain w/wo con completed on 8/22/22 that showed multi territory embolic infarcts as well as SDHs (frontal and occipital), and calvarial metastatic involvement, and  requesting NSY consultation for surgical clearance today.     --No acute NSY intervention.  --All labs and diagnostics reviewed.   --No absolute contraindication to orthopaedic intervention.   --Benefit of therapeutic anticoagulation appears to outweigh risk of worsening SDH, however we caution against bolus or loading doses of anticoagulant. Additionally, we recommend obtaining CTH postoperatively and once therapeutic on anticoagulation.   --NSY will continue to follow peripherally to assess for interval stability on imaging. Please page with questions.     Plan d/w Dr. Menon.     Dispo: per primary.         Thank you for your consult. I will follow-up with patient. Please contact us if you have any additional questions.    Marc Resendiz MD  Neurosurgery  [facility] - Surgery (2nd Fl)

## 2022-08-23 NOTE — ANESTHESIA PROCEDURE NOTES
Arterial    Diagnosis: Stroke    Patient location during procedure: done in OR  Procedure start time: 8/23/2022 5:53 PM  Timeout: 8/23/2022 5:52 PM  Procedure end time: 8/23/2022 5:54 PM    Staffing  Authorizing Provider: Himanshu Lu MD  Performing Provider: Himanshu Lu MD    Anesthesiologist was present at the time of the procedure.    Preanesthetic Checklist  Completed: patient identified, IV checked, site marked, risks and benefits discussed, surgical consent, monitors and equipment checked, pre-op evaluation, timeout performed and anesthesia consent givenArterial  Skin Prep: chlorhexidine gluconate  Local Infiltration: none  Orientation: right  Location: radial    Catheter Size: 20 G  Catheter placement by Ultrasound guidance. Heme positive aspiration all ports.   Vessel Caliber: small, medium, patent, compressibility normal  Vascular Doppler:  not done  Needle advanced into vessel with real time Ultrasound guidance.  Guidewire confirmed in vessel.  Sterile sheath used.  Image recorded and saved.Insertion Attempts: 1  Assessment  Dressing: secured with tape and tegaderm  Patient: Tolerated well

## 2022-08-23 NOTE — PROGRESS NOTES
[facility]  Orthopedics  Progress Note    Patient Name: Donis Jimenez  MRN: [id number]  Admission Date: 8/18/2022  Hospital Length of Stay: 5 days  Attending Provider: Miguel Guerra MD  Primary Care Provider: Primary Doctor No  Follow-up For: Procedure(s) (LRB):  REPAIR, PSEUDOANEURYSM, ARTERY, FEMORAL (Right)  COIL EMBOLIZATION (Right)  ANGIOGRAM, LOWER EXTREMITY (Right)    Post-Operative Day: 4 Days Post-Op  Subjective:     Principal Problem:Pathologic fracture of neck of right femur    Principal Orthopedic Problem: same    Interval History: NAEON. VSS. Pain well controlled in right hip. Plan for OR today for R hemiarthroplasty with ablation of acetabular lesions.   NSGY consult pending. Consented and marked this AM. Informed of reason for surgery and current ongoing workup for metastatic cancer, likely lung. Pt emotional this morning, partner at bedside.    Review of patient's allergies indicates:  No Known Allergies    Current Facility-Administered Medications   Medication    0.9%  NaCl infusion (for blood administration)    acetaminophen tablet 650 mg    EScitalopram oxalate tablet 20 mg    heparin 25,000 units in dextrose 5% (100 units/ml) IV bolus from bag - ADDITIONAL PRN BOLUS - 30 units/kg    heparin 25,000 units in dextrose 5% (100 units/ml) IV bolus from bag - ADDITIONAL PRN BOLUS - 60 units/kg    heparin 25,000 units in dextrose 5% 250 mL (100 units/mL) infusion LOW INTENSITY nomogram - [facility]    HYDROcodone-acetaminophen 5-325 mg per tablet 1 tablet    ibuprofen tablet 600 mg    LORazepam tablet 1 mg    melatonin tablet 6 mg    morphine injection 3 mg    ondansetron disintegrating tablet 8 mg    ondansetron disintegrating tablet 8 mg    oxyCODONE immediate release tablet 5 mg    oxyCODONE immediate release tablet Tab 10 mg    promethazine tablet 25 mg    sodium chloride 0.9% flush 10 mL    sodium chloride 0.9% flush 10 mL     Objective:     Vital Signs (Most Recent):  Temp:  96.5 °F (35.8 °C) (08/23/22 0810)  Pulse: 78 (08/23/22 0810)  Resp: 17 (08/23/22 0810)  BP: 133/72 (08/23/22 0810)  SpO2: (!) 94 % (08/23/22 0810)   Vital Signs (24h Range):  Temp:  [96 °F (35.6 °C)-98.5 °F (36.9 °C)] 96.5 °F (35.8 °C)  Pulse:  [64-90] 78  Resp:  [15-18] 17  SpO2:  [92 %-94 %] 94 %  BP: (128-134)/(68-81) 133/72     Weight: 54.4 kg (119 lb 14.9 oz)  Height: 6' (182.9 cm)  Body mass index is 16.27 kg/m².      Intake/Output Summary (Last 24 hours) at 8/23/2022 0855  Last data filed at 8/23/2022 0030  Gross per 24 hour   Intake --   Output 300 ml   Net -300 ml         Ortho/SPM Exam  Gen: NAD, sitting comfortably in bed  CV: regular rate  Resp: non-labored respirations    RLE:  Skin intact throughout, no scars present  No swelling  No ecchymosis, erythema, or signs of cellulitis    Significant Labs: CBC:   Recent Labs   Lab 08/22/22  0534 08/23/22  0320   WBC 9.46 9.17   HGB 11.2* 11.1*   HCT 34.0* 33.5*    262       CMP:   Recent Labs   Lab 08/22/22  0534 08/22/22  1420 08/23/22  0320   * 136 132*   K 3.4*  --  3.5   CL 98  --  97   CO2 24  --  25   GLU 85  --  82   BUN 15  --  14   CREATININE 0.7  --  0.7   CALCIUM 8.9  --  8.7   PROT 5.6*  --  5.8*   ALBUMIN 2.3*  --  2.4*   BILITOT 1.1*  --  1.1*   ALKPHOS 114  --  129   AST 22  --  25   ALT 7*  --  8*   ANIONGAP 9  --  10       All pertinent labs within the past 24 hours have been reviewed.    Significant Imaging:   All pertinent imaging has been reviewed.        Assessment/Plan:     * Pathologic fracture of neck of right femur  Donis Jimenez is a 61 y.o. male with PMH of bacterial endocarditis, s/p AV and MV mechanical valve replacement (on warfarin), CKD, tobacco abuse presenting with pathologic R femur fracture. Bony lesions in femur and pelvis, as well as lung mass on CXR at outside hospital. Imaging studies pending. 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 Transferred for ortho oncology evaluation and  management. Extensive smoking history. Deep femoral artery pseudoaneurysm ablation by vascular on 8/19. Pain well controlled.     Plan:  DVT ppx: heparin drip per vascular, held this AM for OR  Multimodal pain control  Metastatic workup ongoing, MRI brain completed (NSGY recs pending), PET/CT scan completed, heme/onc consulted  OR today for right hemiarthroplasty, biopsy, and ablation          Cortez Hagan MD  Orthopedics  [facility] - Surgery

## 2022-08-23 NOTE — HPI
61M h/o endocarditis s/p AV/MV mechanical valve replacement (on warfarin), CKD, tobacco abuse admitted to hospital medicine for R femoral head fx 8/18/22 after presenting after a fall in his bathroom, and for workup for metastatic cancer with unknown origin. Per partner he had held his warfarin because he was going to surgery for hernia repair and they were transitioning him to lovenox. On admission INR was subtherapeutic at 1.8 and patient was started on hep gtt on 8/20/22. As part of his workup he had CT thigh that showed displaced fracture, soft tissue lesion c/f mets as well as pseudoaneurysm at femoral vessels for which patient had vascular surgery 8/19/22.  MRI brain w/wo con completed on 8/22/22 that showed multi territory embolic infarcts as well as SDHs (frontal and occipital), and calvarial metastatic involvement, and  requesting NSY consultation for surgical clearance today. He denies any neurological symptoms such as weakness, numbness, vision changes, slurred speech.

## 2022-08-23 NOTE — PROGRESS NOTES
Right sided dual chamber [employer] pacemaker, followed elsewhere.  No pacing noted on recent EKG.  Since surgery is below the waist, no reprogramming or magnet use is required

## 2022-08-23 NOTE — ASSESSMENT & PLAN NOTE
Donis Jimenez is a 61 y.o. male with PMH of bacterial endocarditis, s/p AV and MV mechanical valve replacement (on warfarin), CKD, tobacco abuse presenting with pathologic R femur fracture. Bony lesions in femur and pelvis, as well as lung mass on CXR at outside hospital. Imaging studies pending. 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 Transferred for ortho oncology evaluation and management. Extensive smoking history. Deep femoral artery pseudoaneurysm ablation by vascular on 8/19. Pain well controlled.     Plan:  DVT ppx: heparin drip per vascular, held this AM for OR  Multimodal pain control  Metastatic workup ongoing, MRI brain completed (NSGY recs pending), PET/CT scan completed, heme/onc consulted  OR today for right hemiarthroplasty, biopsy, and ablation

## 2022-08-23 NOTE — NURSING
This nurse confirmed with Dr. Toro that patient is to be NPO at 0000 on 8/23 and that heparin drip is to be turned off at 0700, am, 08/23.

## 2022-08-23 NOTE — SUBJECTIVE & OBJECTIVE
Principal Problem:Pathologic fracture of neck of right femur    Principal Orthopedic Problem: same    Interval History: NAEON. VSS. Pain well controlled in right hip. Plan for OR today for R hemiarthroplasty with ablation of acetabular lesions.   NSGY consult pending. Consented and marked this AM. Informed of reason for surgery and current ongoing workup for metastatic cancer, likely lung. Pt emotional this morning, partner at bedside.    Review of patient's allergies indicates:  No Known Allergies    Current Facility-Administered Medications   Medication    0.9%  NaCl infusion (for blood administration)    acetaminophen tablet 650 mg    EScitalopram oxalate tablet 20 mg    heparin 25,000 units in dextrose 5% (100 units/ml) IV bolus from bag - ADDITIONAL PRN BOLUS - 30 units/kg    heparin 25,000 units in dextrose 5% (100 units/ml) IV bolus from bag - ADDITIONAL PRN BOLUS - 60 units/kg    heparin 25,000 units in dextrose 5% 250 mL (100 units/mL) infusion LOW INTENSITY nomogram - [facility]    HYDROcodone-acetaminophen 5-325 mg per tablet 1 tablet    ibuprofen tablet 600 mg    LORazepam tablet 1 mg    melatonin tablet 6 mg    morphine injection 3 mg    ondansetron disintegrating tablet 8 mg    ondansetron disintegrating tablet 8 mg    oxyCODONE immediate release tablet 5 mg    oxyCODONE immediate release tablet Tab 10 mg    promethazine tablet 25 mg    sodium chloride 0.9% flush 10 mL    sodium chloride 0.9% flush 10 mL     Objective:     Vital Signs (Most Recent):  Temp: 96.5 °F (35.8 °C) (08/23/22 0810)  Pulse: 78 (08/23/22 0810)  Resp: 17 (08/23/22 0810)  BP: 133/72 (08/23/22 0810)  SpO2: (!) 94 % (08/23/22 0810)   Vital Signs (24h Range):  Temp:  [96 °F (35.6 °C)-98.5 °F (36.9 °C)] 96.5 °F (35.8 °C)  Pulse:  [64-90] 78  Resp:  [15-18] 17  SpO2:  [92 %-94 %] 94 %  BP: (128-134)/(68-81) 133/72     Weight: 54.4 kg (119 lb 14.9 oz)  Height: 6' (182.9 cm)  Body mass index is 16.27 kg/m².      Intake/Output Summary (Last 24  hours) at 8/23/2022 0855  Last data filed at 8/23/2022 0030  Gross per 24 hour   Intake --   Output 300 ml   Net -300 ml         Ortho/SPM Exam  Gen: NAD, sitting comfortably in bed  CV: regular rate  Resp: non-labored respirations    RLE:  Skin intact throughout, no scars present  No swelling  No ecchymosis, erythema, or signs of cellulitis    Significant Labs: CBC:   Recent Labs   Lab 08/22/22  0534 08/23/22  0320   WBC 9.46 9.17   HGB 11.2* 11.1*   HCT 34.0* 33.5*    262       CMP:   Recent Labs   Lab 08/22/22  0534 08/22/22  1420 08/23/22  0320   * 136 132*   K 3.4*  --  3.5   CL 98  --  97   CO2 24  --  25   GLU 85  --  82   BUN 15  --  14   CREATININE 0.7  --  0.7   CALCIUM 8.9  --  8.7   PROT 5.6*  --  5.8*   ALBUMIN 2.3*  --  2.4*   BILITOT 1.1*  --  1.1*   ALKPHOS 114  --  129   AST 22  --  25   ALT 7*  --  8*   ANIONGAP 9  --  10       All pertinent labs within the past 24 hours have been reviewed.    Significant Imaging:   All pertinent imaging has been reviewed.

## 2022-08-23 NOTE — HPI
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     Patient has been admitted since 8/18/22 after presenting after a fall in his bathroom, partner at bedside denies any head trauma. He has been suffering from R hip pain and had known bone lesions concerning for mets. He denies any neurological symptoms such as weakness, numbness, vision changes, slurred speech. Per partner he had held his warfarin because he was going to surgery for hernia repair and they were transitioning him to lovenox. On admission INR was subtherapeutic at 1.8 and patient was started on hep gtt on 8/20/22. As part of his workup he had CT thigh that showed displaced fracture, soft tissue lesion c/f mets as well as pseudoaneurysm at femoral vessels for which patient had vascular surgery 8/19/22. Per team no episodes of low BP or complications. Hem/onc consulted and he had MRI brain w/wo contrast on 8/22/22 that showed multiple scattered embolic infarcts including R cerebellum, left erin and L as well as SDH R occipital lobe, there are also areas at calvarial marrow c/f metastatic disease.     Stroke team called on 8/23/22 afternoon while patient was waiting to go for surgery and had already received his nerve block for surgical clearance. NSGY was also consulted for SDH.

## 2022-08-23 NOTE — ASSESSMENT & PLAN NOTE
61M h/o endocarditis s/p AV/MV mechanical valve replacement (on warfarin), CKD, tobacco abuse admitted to [facility] medicine for R femoral head fx 8/18/22. MRI brain w/wo con completed on 8/22/22 that showed multi territory embolic infarcts as well as SDHs (frontal and occipital), and calvarial metastatic involvement, and HM requesting NSY consultation for surgical clearance today.     --No acute NSY intervention.  --All labs and diagnostics reviewed.   --No absolute contraindication to orthopaedic intervention.   --Benefit of therapeutic anticoagulation appears to outweigh risk of worsening SDH, however we caution against bolus or loading doses of anticoagulant. Additionally, we recommend obtaining CTH postoperatively and once therapeutic on anticoagulation.   --NSY will continue to follow peripherally to assess for interval stability on imaging. Please page with questions.     Plan d/w Dr. Menon.     Dispo: per primary.    Ivan Anne(Attending)

## 2022-08-23 NOTE — PLAN OF CARE
"Spoke to Dr Guerra who states "Neurology has cleared Mr. Jimenez for surgery." OR desk and Anesthesia desk notified.   "

## 2022-08-23 NOTE — CONSULTS
[facility] - Surgery ([facility])  Vascular Neurology  Com[facility] Stroke Center  Consult Note    Inpatient consult to Vascular (Stroke) Neurology  Consult performed by: Belén Flores MD  Consult ordered by: Miguel Guerra MD        Assessment/Plan:     Patient is a 61 y.o. 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 Prior to admission patient was transitioning from warfarin to lovenox for possible hernia repair and INR on arrival was 1.8. His only symptoms on admission included right hip pain, no focal neurological deficits such as vision changes, weakness, numbness, dysarthria. DOA patient had vascular surgery for pseudoaneurysm at femoral vessels and since then he has been on hep gtt.     As mentioned above stroke team called today for MRI brain w/wo con as oncology workup that was completed yesterday at 4:00pm and showed multiple embolic infarcts that with patient's known history of mechanical valve that was not adequately AC, cardio embolic etiology would be most likely given inclusion of anterior and posterior circulation for which vascular etiology would be less likely. Also on current MRI we are able to see a blood vessels, specifically on MP rage sequence that does not show any significant high grade stenosis that would put the patient at risk of worsening infarction in setting of low BP related to intraoperative anesthesia.   At the moment patient is prepped for surgery and the  longer he waits the longer he is off AC which he needs as stroke prevention. Unfortunately patient also has areas with SDH that likely to not need surgical intervention but need close monitoring in setting of needed anticoagulation.   Overall patient can be cleared to get surgery with close neuro monitoring intra and post op.     Recommendations   -- Okay for surgery as mentioned above  -- NSGY consult as typically we would hold AC in setting of SDH but patient needs to be on it for mechanical valve and stroke prevention   -- Resume AC as soon as appropriate per NSGY and surgical team   -- This was discussed with Dr. Guerra along with Dr. Rodriguez and patient was seen at surgical floor prior to going to OR.  updated at bedside   -- Will continue to monitor     Antithrombotics for secondary stroke prevention: Anticoagulants: Warfarin INR adjusted target    Statins for secondary stroke prevention and hyperlipidemia, if present:   Statins: Atorvastatin- 40 mg daily    Aggressive risk factor modification: Smoking, mechanical valve      Rehab efforts: The patient has been evaluated by a stroke team provider and the therapy needs have been fully considered based off the presenting complaints and exam findings. The following therapy evaluations are needed: PT evaluate and treat, OT evaluate and treat    Diagnostics ordered/pending: None     VTE prophylaxis: None: Reason for No Pharmacological VTE Prophylaxis: Currently on anticoagulation    BP parameters: Infarct: No intervention, SBP <220            STROKE DOCUMENTATION          NIH Scale:  1a. Level of Consciousness: 0-->Alert, keenly responsive  1b. LOC Questions: 0-->Answers both questions correctly  1c. LOC Commands: 0-->Performs both tasks correctly  2. Best Gaze: 0-->Normal  3. Visual: 0-->No visual loss  4. Facial Palsy: 0-->Normal symmetrical movements  5a. Motor Arm, Left: 0-->No drift, limb holds 90 (or 45) degrees for full 10 secs  5b. Motor Arm, Right:  0-->No drift, limb holds 90 (or 45) degrees for full 10 secs  6a. Motor Leg, Left: 1-->Drift, leg falls by the end of the 5-sec period but does not hit bed (effort dependent 2/2 hip pain and recent nerve block )  6b. Motor Leg, Right: 3-->No effort against gravity, leg falls to bed immediately (effort dependent 2.2 hip fracture and nerve block )  7. Limb Ataxia: 1-->Present in one limb  8. Sensory: 0-->Normal, no sensory loss  9. Best Language: 0-->No aphasia, normal  10. Dysarthria: 0-->Normal  11. Extinction and Inattention (formerly Neglect): 0-->No abnormality  Total (NIH Stroke Scale): 5    Modified Sheba Score: 0  Marie Coma Scale:    ABCD2 Score:    [id number] Score:   HAS -BLED Score:   ICH Score:   Hunt & Fry Classification:       Thrombolysis Candidate? No, Out of window , Current use of direct thrombin inhibitors (dabigatran) or direct factor Xa inhibitors (rivaroxaban, apixaban, edoxaban) with elevated sensitive laboratory tests     Delays to Thrombolysis?  No    Interventional Revascularization Candidate?   Is the patient eligible for mechanical endovascular reperfusion (MARIANA)?  No; at this time symptoms not suggestive of large vessel occlusion    Delays to Thrombectomy? No    Hemorrhagic change of an Ischemic Stroke: Does this patient have an ischemic stroke with hemorrhagic changes? 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     Patient has been admitted since 8/18/22 after presenting after a fall in his bathroom, partner at bedside denies any head  trauma. He has been suffering from R hip pain and had known bone lesions concerning for mets. He denies any neurological symptoms such as weakness, numbness, vision changes, slurred speech. Per partner he had held his warfarin because he was going to surgery for hernia repair and they were transitioning him to lovenox. On admission INR was subtherapeutic at 1.8 and patient was started on hep gtt on 8/20/22. As part of his workup he had CT thigh that showed displaced fracture, soft tissue lesion c/f mets as well as pseudoaneurysm at femoral vessels for which patient had vascular surgery 8/19/22. Per team no episodes of low BP or complications. Hem/onc consulted and he had MRI brain w/wo contrast on 8/22/22 that showed multiple scattered embolic infarcts including R cerebellum, left erin and L as well as SDH R occipital lobe, there are also areas at calvarial marrow c/f metastatic disease.     Stroke team called on 8/23/22 afternoon while patient was waiting to go for surgery and had already received his nerve block for surgical clearance. NSGY was also consulted for SDH.             Past Medical History:   Diagnosis Date    Endocarditis     Pacemaker     Pneumonia      Past Surgical History:   Procedure Laterality Date    ANGIOGRAPHY OF LOWER EXTREMITY Right 8/19/2022    Procedure: ANGIOGRAM, LOWER EXTREMITY;  Surgeon: Thomas Nicoals MD;  Location: [facility] OR [address] [place] [address];  Service: Peripheral Vascular;  Laterality: Right;  30.0 min  315.10 mGy  26.1755 Gycm2  84 ml dye    REPAIR, PSEUDOANEURYSM, ARTERY, FEMORAL Right 8/19/2022    Procedure: REPAIR, PSEUDOANEURYSM, ARTERY, FEMORAL;  Surgeon: Thomas Nicolas MD;  Location: [facility] OR [address] [place] [address];  Service: Peripheral Vascular;  Laterality: Right;  R PFA (3rd branch) pseudoaneurysm      History reviewed. No pertinent family history.  Social History     Tobacco Use    Smoking status: Current Every Day Smoker     Packs/day: 1.00     Types: Cigarettes    Smokeless tobacco:  Never Used     Review of patient's allergies indicates:  No Known Allergies    Medications: I have reviewed the current medication administration record.    No medications prior to admission.       Review of Systems   Constitutional:  Negative for appetite change, chills and fever.   HENT:  Negative for congestion, sore throat, trouble swallowing and voice change.    Eyes: Negative.    Respiratory:  Negative for cough and shortness of breath.    Cardiovascular:  Negative for chest pain and leg swelling.   Gastrointestinal:  Negative for abdominal distention, abdominal pain, constipation, nausea and vomiting.   Endocrine: Negative.    Genitourinary: Negative.    Musculoskeletal:  Positive for arthralgias, gait problem and joint swelling. Negative for back pain.   Skin: Negative.    Neurological:  Negative for weakness and headaches.   Psychiatric/Behavioral: Negative.     Objective:     Vital Signs (Most Recent):  Temp: 99 °F (37.2 °C) (08/23/22 1025)  Pulse: 74 (08/23/22 1625)  Resp: 16 (08/23/22 1625)  BP: (!) 148/76 (08/23/22 1625)  SpO2: 95 % (08/23/22 1625)    Vital Signs Range (Last 24H):  Temp:  [96.5 °F (35.8 °C)-99 °F (37.2 °C)]   Pulse:  [72-99]   Resp:  [15-24]   BP: (124-151)/(65-82)   SpO2:  [92 %-95 %]     Physical Exam  Vitals and nursing note reviewed.   Constitutional:       Appearance: Normal appearance.   HENT:      Head: Normocephalic and atraumatic.      Nose: Nose normal.      Mouth/Throat:      Mouth: Mucous membranes are moist.   Eyes:      Extraocular Movements: Extraocular movements intact.      Pupils: Pupils are equal, round, and reactive to light.   Cardiovascular:      Rate and Rhythm: Normal rate and regular rhythm.   Pulmonary:      Effort: No respiratory distress.   Abdominal:      General: Bowel sounds are normal. There is no distension.      Palpations: Abdomen is soft.      Tenderness: There is no abdominal tenderness.   Musculoskeletal:         General: Tenderness (right hip)  present. No swelling. Normal range of motion.      Cervical back: Normal range of motion.   Skin:     General: Skin is warm.      Capillary Refill: Capillary refill takes less than 2 seconds.   Neurological:      Mental Status: He is alert.      Motor: Weakness (2/2 pain) present.       Neurological Exam:   LOC: alert  Attention Span: Good   Language: No aphasia  Articulation: No dysarthria  Orientation: Person, Place, Time   EOM (CN III, IV, VI): Full/intact  Facial Movement (CN VII): Symmetric facial expression    Motor: Arm left  Normal 5/5  Leg left  Paresis: 4/5  Arm right  Normal 5/5  Leg right Paresis: 3/5  Sensation: Intact to light touch, temperature and vibration      Laboratory:  CMP:   Recent Labs   Lab 08/23/22  0320   CALCIUM 8.7   ALBUMIN 2.4*   PROT 5.8*   *   K 3.5   CO2 25   CL 97   BUN 14   CREATININE 0.7   ALKPHOS 129   ALT 8*   AST 25   BILITOT 1.1*     CBC:   Recent Labs   Lab 08/23/22  0320   WBC 9.17   RBC 3.83*   HGB 11.1*   HCT 33.5*      MCV 88   MCH 29.0   MCHC 33.1     Lipid Panel: No results for input(s): CHOL, LDLCALC, HDL, TRIG in the last 168 hours.  Coagulation:   Recent Labs   Lab 08/21/22  0445 08/21/22  0800 08/23/22  0320   INR 1.2  --   --    APTT  --    < > 38.1*    < > = values in this interval not displayed.     Hgb A1C: No results for input(s): HGBA1C in the last 168 hours.  TSH:   Recent Labs   Lab 08/18/22  0418   TSH 1.538       Diagnostic Results:      Brain imaging:  Impression:     Multiple punctate scattered foci of diffusion restriction within the right cerebellar hemisphere, left erin and left corona radiata potentially representing acute embolic type infarcts.     FLAIR hyperintense, T1 isointense fluid overlying the left cerebral convexity and posterior to the right occipital lobe raising concern for small acute subdural hemorrhage.     Heterogeneous areas of calvarial marrow with at least two focal lesions concerning for metastatic disease involving  the left occipital and right parietal calvarium.     Partially visualized C3 metastatic disease.  Dedicated contrast enhanced MRI of the cervical spine may be performed for further evaluation.    Vessel Imaging:  None     Cardiac Evaluation:           Belén Flores MD  [facility]  Department of Vascular Neurology   [facility] - Surgery (2nd Fl)

## 2022-08-23 NOTE — SUBJECTIVE & OBJECTIVE
Past Medical History:   Diagnosis Date    Endocarditis     Pacemaker     Pneumonia      Past Surgical History:   Procedure Laterality Date    ANGIOGRAPHY OF LOWER EXTREMITY Right 8/19/2022    Procedure: ANGIOGRAM, LOWER EXTREMITY;  Surgeon: Thomas Nicolas MD;  Location: [facility] OR [address] [place] [address];  Service: Peripheral Vascular;  Laterality: Right;  30.0 min  315.10 mGy  26.1755 Gycm2  84 ml dye    REPAIR, PSEUDOANEURYSM, ARTERY, FEMORAL Right 8/19/2022    Procedure: REPAIR, PSEUDOANEURYSM, ARTERY, FEMORAL;  Surgeon: Thomas Nicolas MD;  Location: [facility] OR [address] [place] [address];  Service: Peripheral Vascular;  Laterality: Right;  R PFA (3rd branch) pseudoaneurysm      History reviewed. No pertinent family history.  Social History     Tobacco Use    Smoking status: Current Every Day Smoker     Packs/day: 1.00     Types: Cigarettes    Smokeless tobacco: Never Used     Review of patient's allergies indicates:  No Known Allergies    Medications: I have reviewed the current medication administration record.    No medications prior to admission.       Review of Systems   Constitutional:  Negative for appetite change, chills and fever.   HENT:  Negative for congestion, sore throat, trouble swallowing and voice change.    Eyes: Negative.    Respiratory:  Negative for cough and shortness of breath.    Cardiovascular:  Negative for chest pain and leg swelling.   Gastrointestinal:  Negative for abdominal distention, abdominal pain, constipation, nausea and vomiting.   Endocrine: Negative.    Genitourinary: Negative.    Musculoskeletal:  Positive for arthralgias, gait problem and joint swelling. Negative for back pain.   Skin: Negative.    Neurological:  Negative for weakness and headaches.   Psychiatric/Behavioral: Negative.     Objective:     Vital Signs (Most Recent):  Temp: 99 °F (37.2 °C) (08/23/22 1025)  Pulse: 74 (08/23/22 1625)  Resp: 16 (08/23/22 1625)  BP: (!) 148/76 (08/23/22 1625)  SpO2: 95 % (08/23/22 1625)    Vital Signs Range (Last  24H):  Temp:  [96.5 °F (35.8 °C)-99 °F (37.2 °C)]   Pulse:  [72-99]   Resp:  [15-24]   BP: (124-151)/(65-82)   SpO2:  [92 %-95 %]     Physical Exam  Vitals and nursing note reviewed.   Constitutional:       Appearance: Normal appearance.   HENT:      Head: Normocephalic and atraumatic.      Nose: Nose normal.      Mouth/Throat:      Mouth: Mucous membranes are moist.   Eyes:      Extraocular Movements: Extraocular movements intact.      Pupils: Pupils are equal, round, and reactive to light.   Cardiovascular:      Rate and Rhythm: Normal rate and regular rhythm.   Pulmonary:      Effort: No respiratory distress.   Abdominal:      General: Bowel sounds are normal. There is no distension.      Palpations: Abdomen is soft.      Tenderness: There is no abdominal tenderness.   Musculoskeletal:         General: Tenderness (right hip) present. No swelling. Normal range of motion.      Cervical back: Normal range of motion.   Skin:     General: Skin is warm.      Capillary Refill: Capillary refill takes less than 2 seconds.   Neurological:      Mental Status: He is alert.      Motor: Weakness (2/2 pain) present.       Neurological Exam:   LOC: alert  Attention Span: Good   Language: No aphasia  Articulation: No dysarthria  Orientation: Person, Place, Time   EOM (CN III, IV, VI): Full/intact  Facial Movement (CN VII): Symmetric facial expression    Motor: Arm left  Normal 5/5  Leg left  Paresis: 4/5  Arm right  Normal 5/5  Leg right Paresis: 3/5  Sensation: Intact to light touch, temperature and vibration      Laboratory:  CMP:   Recent Labs   Lab 08/23/22  0320   CALCIUM 8.7   ALBUMIN 2.4*   PROT 5.8*   *   K 3.5   CO2 25   CL 97   BUN 14   CREATININE 0.7   ALKPHOS 129   ALT 8*   AST 25   BILITOT 1.1*     CBC:   Recent Labs   Lab 08/23/22  0320   WBC 9.17   RBC 3.83*   HGB 11.1*   HCT 33.5*      MCV 88   MCH 29.0   MCHC 33.1     Lipid Panel: No results for input(s): CHOL, LDLCALC, HDL, TRIG in the last 168  hours.  Coagulation:   Recent Labs   Lab 08/21/22  0445 08/21/22  0800 08/23/22  0320   INR 1.2  --   --    APTT  --    < > 38.1*    < > = values in this interval not displayed.     Hgb A1C: No results for input(s): HGBA1C in the last 168 hours.  TSH:   Recent Labs   Lab 08/18/22  0418   TSH 1.538       Diagnostic Results:      Brain imaging:  Impression:     Multiple punctate scattered foci of diffusion restriction within the right cerebellar hemisphere, left erin and left corona radiata potentially representing acute embolic type infarcts.     FLAIR hyperintense, T1 isointense fluid overlying the left cerebral convexity and posterior to the right occipital lobe raising concern for small acute subdural hemorrhage.     Heterogeneous areas of calvarial marrow with at least two focal lesions concerning for metastatic disease involving the left occipital and right parietal calvarium.     Partially visualized C3 metastatic disease.  Dedicated contrast enhanced MRI of the cervical spine may be performed for further evaluation.    Vessel Imaging:  None     Cardiac Evaluation:

## 2022-08-23 NOTE — ANESTHESIA PROCEDURE NOTES
Right SIFI catheter    Patient location during procedure: pre-op   Block not for primary anesthetic.  Reason for block: at surgeon's request and post-op pain management   Post-op Pain Location: Right hip pain   Start time: 8/23/2022 1:11 PM  Timeout: 8/23/2022 1:10 PM   End time: 8/23/2022 1:25 PM    Staffing  Authorizing Provider: Chrissy Handy MD  Performing Provider: Vicente Johnston MD    Preanesthetic Checklist  Completed: patient identified, IV checked, site marked, risks and benefits discussed, surgical consent, monitors and equipment checked, pre-op evaluation and timeout performed  Peripheral Block  Patient position: supine  Prep: ChloraPrep and site prepped and draped  Patient monitoring: heart rate, cardiac monitor, continuous pulse ox, continuous capnometry and frequent blood pressure checks  Block type: fascia iliaca  Laterality: right  Injection technique: continuous  Needle  Needle type: Tuohy   Needle gauge: 17 G  Needle length: 3.5 in  Needle localization: anatomical landmarks and ultrasound guidance  Catheter type: spring wound  Catheter size: 19 G  Test dose: lidocaine 1.5% with Epi 1-to-200,000 and negative   -ultrasound image captured on disc.  Assessment  Injection assessment: negative aspiration, negative parasthesia and local visualized surrounding nerve  Paresthesia pain: none  Heart rate change: no  Slow fractionated injection: yes  Pain Tolerance: comfortable throughout block and no complaints  Medications:    Medications: ropivacaine (NAROPIN) injection 0.5% - Perineural   20 mL - 8/23/2022 1:25:00 PM    Additional Notes  VSS.  [facility] RN monitoring vitals throughout procedure.  Patient tolerated procedure well.

## 2022-08-23 NOTE — ANESTHESIA PROCEDURE NOTES
35 minutes was spent in face to face dialogue with patient. Intubation    Date/Time: 8/23/2022 5:44 PM  Performed by: Himanshu Gallardo CRNA  Authorized by: Himanshu Lu MD     Intubation:     Induction:  Intravenous    Intubated:  Postinduction    Mask Ventilation:  Easy mask    Attempts:  1    Attempted By:  CRNA    Blade:  Crum 2    Laryngeal View Grade: Grade I - full view of cords      Difficult Airway Encountered?: No      Complications:  None    Airway Device:  Oral endotracheal tube    Airway Device Size:  7.0    Style/Cuff Inflation:  Cuffed    Inflation Amount (mL):  7    Tube secured:  22    Secured at:  The lips    Placement Verified By:  Capnometry    Complicating Factors:  None    Findings Post-Intubation:  BS equal bilateral

## 2022-08-23 NOTE — SUBJECTIVE & OBJECTIVE
No medications prior to admission.       Review of patient's allergies indicates:  No Known Allergies    Past Medical History:   Diagnosis Date    Endocarditis     Pacemaker     Pneumonia      Past Surgical History:   Procedure Laterality Date    ANGIOGRAPHY OF LOWER EXTREMITY Right 8/19/2022    Procedure: ANGIOGRAM, LOWER EXTREMITY;  Surgeon: Thomas Nicolas MD;  Location: [facility] OR [address] [place] [address];  Service: Peripheral Vascular;  Laterality: Right;  30.0 min  315.10 mGy  26.1755 Gycm2  84 ml dye    REPAIR, PSEUDOANEURYSM, ARTERY, FEMORAL Right 8/19/2022    Procedure: REPAIR, PSEUDOANEURYSM, ARTERY, FEMORAL;  Surgeon: Thomas Nicolas MD;  Location: [facility] OR [address] [place] [address];  Service: Peripheral Vascular;  Laterality: Right;  R PFA (3rd branch) pseudoaneurysm      Family History    None       Tobacco Use    Smoking status: Current Every Day Smoker     Packs/day: 1.00     Types: Cigarettes    Smokeless tobacco: Never Used   Substance and Sexual Activity    Alcohol use: Not on file    Drug use: Not on file    Sexual activity: Not on file     Review of Systems: see HPI for pertinent positives and negative.   Objective:     Weight: 54.4 kg (119 lb 14.9 oz)  Body mass index is 16.27 kg/m².  Vital Signs (Most Recent):  Temp: 99 °F (37.2 °C) (08/23/22 1025)  Pulse: 72 (08/23/22 1640)  Resp: 16 (08/23/22 1640)  BP: (!) 153/77 (08/23/22 1640)  SpO2: (!) 94 % (08/23/22 1640)   Vital Signs (24h Range):  Temp:  [96.5 °F (35.8 °C)-99 °F (37.2 °C)] 99 °F (37.2 °C)  Pulse:  [72-99] 72  Resp:  [15-24] 16  SpO2:  [92 %-95 %] 94 %  BP: (124-153)/(65-82) 153/77     Date 08/23/22 0700 - 08/24/22 0659   Shift 4793-3599 0688-6184 6575-0196 24 Hour Total   INTAKE   Shift Total(mL/kg)       OUTPUT   Urine(mL/kg/hr)  600  600   Shift Total(mL/kg)  600(11)  600(11)   Weight (kg) 54.4 54.4 54.4 54.4                        Physical Exam    Neurosurgery Physical Exam    GENERAL: resting comfortably  HEENT: NCAT, PERRL, mucous membranes moist  NECK: supple,  trachea midline  CV: normal capillary refill  PULM: aerating well, symmetric expansion, no distress  ABD: soft, NT, ND  EXT: no c/c/e    NEURO:    AAO x 3  CN II-XII grossly intact  Fc x 4 antigravity  SILT    No drift or dysmetria      Significant Labs:  Recent Labs   Lab 08/22/22  0534 08/22/22  1420 08/23/22  0320   GLU 85  --  82   * 136 132*   K 3.4*  --  3.5   CL 98  --  97   CO2 24  --  25   BUN 15  --  14   CREATININE 0.7  --  0.7   CALCIUM 8.9  --  8.7   MG 1.8  --  1.9     Recent Labs   Lab 08/22/22  0534 08/23/22  0320   WBC 9.46 9.17   HGB 11.2* 11.1*   HCT 34.0* 33.5*    262     Recent Labs   Lab 08/22/22  1341 08/22/22  2049 08/23/22  0320   APTT 43.9* 50.0* 38.1*     Microbiology Results (last 7 days)       ** No results found for the last 168 hours. **          All pertinent labs from the last 24 hours have been reviewed.    Significant Diagnostics:  I have reviewed all pertinent imaging results/findings within the past 24 hours.  No results found in the last 24 hours.

## 2022-08-23 NOTE — CARE UPDATE
Spoke with NSGY on call. Ok to proceed to OR. Will need CT head after OR for subdural monitoring.

## 2022-08-23 NOTE — NURSING
Patent's PTT level of 38.1 resulted. Per nomogram, rate to be increased by 2 units, from 24 to 26 units, 14.1mL/ hr, 30 unit bolus to be administered as well.

## 2022-08-23 NOTE — ASSESSMENT & PLAN NOTE
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 Prior to admission patient was transitioning from warfarin to lovenox for possible hernia repair and INR on arrival was 1.8. His only symptoms on admission included right hip pain, no focal neurological deficits such as vision changes, weakness, numbness, dysarthria. DOA patient had vascular surgery for pseudoaneurysm at femoral vessels and since then he has been on hep gtt.     As mentioned above stroke team called today for MRI brain w/wo con as oncology workup that was completed yesterday at 4:00pm and showed multiple embolic infarcts that with patient's known history of mechanical valve that was not adequately AC, cardio embolic etiology would be most likely given inclusion of anterior and posterior circulation for which vascular etiology would be less likely. Also on current MRI we are able to see a blood vessels, specifically on MP rage sequence that does not show any significant high grade stenosis that would put the patient at risk of worsening infarction in setting of low BP related to intraoperative anesthesia.   At the moment patient is prepped for surgery and the longer he waits the longer he is off AC which he needs as stroke prevention. Unfortunately patient also has areas with SDH that likely to not need surgical intervention but need close monitoring in setting of needed anticoagulation.   Overall patient can be cleared to get surgery with close neuro monitoring intra and post op.     Recommendations   -- Okay for surgery as mentioned  above  -- NSGY consult as typically we would hold AC in setting of SDH but patient needs to be on it for mechanical valve and stroke prevention   -- Resume AC as soon as appropriate per NSGY and surgical team   -- This was discussed with Dr. Guerra along with Dr. Rodriguez and patient was seen at surgical floor prior to going to OR.  updated at bedside   -- Will continue to monitor     Antithrombotics for secondary stroke prevention: Anticoagulants: Warfarin INR adjusted target    Statins for secondary stroke prevention and hyperlipidemia, if present:   Statins: Atorvastatin- 40 mg daily    Aggressive risk factor modification: Smoking, mechanical valve      Rehab efforts: The patient has been evaluated by a stroke team provider and the therapy needs have been fully considered based off the presenting complaints and exam findings. The following therapy evaluations are needed: PT evaluate and treat, OT evaluate and treat    Diagnostics ordered/pending: None     VTE prophylaxis: None: Reason for No Pharmacological VTE Prophylaxis: Currently on anticoagulation    BP parameters: Infarct: No intervention, SBP <220

## 2022-08-24 PROBLEM — E83.52 HYPERCALCEMIA OF MALIGNANCY: Status: ACTIVE | Noted: 2022-01-01

## 2022-08-24 PROBLEM — Z95.2 H/O MITRAL VALVE REPLACEMENT WITH MECHANICAL VALVE: Status: ACTIVE | Noted: 2022-01-01

## 2022-08-24 NOTE — OP NOTE
OPERATIVE REPORT     NAME: Donis Jimenez    DATE OF SERVICE: 8/23/22    SERVICE: Orthopedic Oncology.     PREOPERATIVE DIAGNOSIS: Pathologic right femoral neck fracture, right periacetabular lytic lesions     POSTOPERATIVE DIAGNOSIS: same    PROCEDURES:   1. Right hemiarthroplasty as management of hip fracture - 15644  2. Right pelvis lesion RFA  46493  3. Cement Osteoplasty right pelvis  50895    ATTENDING SURGEON: Yung Flores MD    ASSISTANT SURGEON: Cortez Hagan    ANESTHESIA: General endotracheal    IMPLANTS:  [place] Omnifit Tyler cemented stem, size #7, 130mm length  50mm Unitrax femoral head 0mm offset  Simplex HV with gentamicin cement  [place] Asnis III cannulated screw 6.5mm x 75mm fully threaded  11c Kyphon bone cement    SPECIMEN: Right femoral head with lytic lesion, proximal femur curettings, sent together routine to surgical pathology    INDICATIONS FOR PROCEDURE:   The patient is a 62yo male presenting with presumed metastatic disease versus marrow-based neoplasm, with a pathologic right hip fracture and lytic lesions in the pelvis.  Staging workup reveals multipls soft tissue masses in the mediastinum and abdomen, and evidence of mini-strokes and subdural hematoma.  We are offering cemented hemiarthroplasty with radiofrequency ablation of the pelvis periacetabulum lesion and stabilization with balloon osteoplasty and cementation.  This will also obtain biopsy specimens for diagnosis.  The risks, benefits, and alternatives to the procedure were discussed with the patient, in addition to the possible complications including but not limited to infection, neurovascular injury, hematoma and wound complications, pain, DVT, pulmonary embolus, perioperative hip dislocation, and death.  Thereafter, operative consent was obtained and the patient elected to proceed.    PROCEDURE IN DETAIL:   The patient was met and identified in the peroperative area, the surgical site was marked. They were brought to  the operating theater, general anesthesia was induced on the patient gurney, santiago catheter placed, and prophylactic antibiotics given, Ancef.  The patient was transferred to a well-padded OR table with beanbag and gel mat, placed in the lateral decubitus position, axillary role placed, peroneal nerve padded, and 4 pegs placed in appropriate positions to position the pelvis orthogonal to the floor.  TXA was administered by anesthesia.  The surgical site was prepped with Alcohol, allowed to dry, and Chloraprep.  The operative limb was draped sterilely with split-sheets in the standard fashion, a stockinette placed, and all exposed skin covered in [place].  Hoods were utilized for the hip replacement portion of the surgery.  A time out was performed verifying the patient, surgical site, procedure to be performed, and surgical team.    A standard posterior approach to the hip was utilized.  A longitudinal incision was made over the posterior 1/3 of the greater trochanter in-line with the femoral shaft, and curving posteriorly towards the posterior superior iliac spine.  The iliotibial band was incised directly over the greater trochanter, and fibers of the gluteus rona split bluntly and with electrocautery.  The trochanteric bursa was excised.  The piriformis tendon identified, tagged with #2 ethibond and transected.  The external hip rotators and quadratus femorus were tagged similarly and taken directly off of the proximal femur while internally rotating the hip.  A De Jesus was placed under the gluteus medius, above the minimus to aid with exposure.  The hip capsule was already torn posteriorly over the fracture site, it was taken off of the superior and inferior femoral neck to expose the fracture.  The fracture was subcapital in nature.  The femoral neck marked 10mm above the lesser trochanter, and transected with oscillating bone saw.  The femoral head was thinned and easily fragmented, removed piecemeal and sent as  specimen.  The leg was extended and externally rotated, anterior and inferior acetabular retractors placed.  The ligamentum teres removed, and the acetabulum inspected for any damage or possible tumor involvement, none was apparent.  The acetabulum was sized for a 50mm head, achieving good suction seal.  The femur was prepared in the standard fashion, using square chistle to clear trochanteric bone, and then a curette to remove all metaphyseal bone that was involved with tumor, it was all sent as specimen.  Once removed, the proximal femur was sequentially broached to a size 7 cemented stem which attained good canal fill.  Cement was mixed under vacuum.  The proximal femur cleaned and irrigated.  A cement restrictor placed.  A size 7 Omnifit Tyler stem was cemented using standard technique, injecting and pressurizing the canal and slowly inserting the stem and holding it laterallized and at appropriate version until cement was cured.  Trial head 0mm offset was placed, and the hip reduced and stability and range of motion evaluated.  Hip could be fully extended. Limb length was near equal, hip remained stable through complete ROM.  The trial was dislocated, the head removed, neck cleaned and dried, and final femoral head impacted, and hip reduced.  The field was irrigated, the external rotators were repaired through bone tunnels in the greater trochanter with #5 ethibond.  The IT band closed with 0 Stratafix, the skin closed with 3-0 vicryl buried subdermal sutures, and skin closed with 3-0 nylon running.  Dressing placed.    The beanbag was deflated and the patient positioned prone while maintaining sterility.  C-arm views used to obtain Judet views of the hip, and the AIIS approached percutaneously from the anterolateral groin using a stab incision.  A guidepin for a 6.5mm cannulated screw was advanced into the pelvis directly above the acetabulum in the LC2 column of the pelvis, it measured 75mm.  The outer cortex  drilled, and a 6.5mm x 75mm FT screw inserted and partly withdrawn.  The lesion to be ablated was in the anterior superior azael-acetabulum.  Two 15mm Osteocool probes were inserted in staggered formation to this area via the cannulated screw, and a full ablation cycle performed successfully.  Balloon osteoplasty was then performed, and Kyphon cement injected into the defect under fluoroscopic guidance.  The bone defect was well filled, with a small amount of cement extruding through a cortical defect laterally to reinforce the region along the iliac wing.  No articular extravasation was noted.  The screw was fully advanced prior to cement curing.  The stab incision closed with 3-0 vicryl.    Patient then awoken from anesthesia.    ESTIMATED BLOOD LOSS: 100cc    COMPLICATIONS:  No untoward events    DISPOSITION: Transfer back to inpatient floor under medicine service.  Anticoagulation per medical service preference for 4 weeks postop, lovenox upon discharge.  Postoperative antibiotics x 2 doses.  Posterior hip precautions.  Weight bearing as tolerated.  Rodgers catheter out AM on POD#1.  Followup on pathology results.

## 2022-08-24 NOTE — PROGRESS NOTES
[facility] Medicine  Progress Note    Patient Name: Donis Jimenez  MRN: [id number]  Patient Class: IP- Inpatient   Admission Date: 8/18/2022  Length of Stay: 6 days  Attending Physician: Radhika Jackson MD  Primary Care Provider: Primary Doctor No        Subjective:     Principal Problem:Pathologic fracture of neck of right femur        HPI:  Donis Jimenez is a 61 y.o. male with PMH significant for bacterial endocarditis, s/p AV and MV mechanical valve replacement (on warfarin), CKD, tobacco abuse presenting with R hip pain. Pt with several weeks R hip pain, found to have bone lesions and was pending outpatient workup / IR biopsy. However, pt experienced a fall yesterday - prior to getting biopsy. Unknown primary neoplasm. He has pain, inability to bear weight on right leg. Patient denies any head trauma or LOC. The patient denies prior hx of falls. Patient denies numbness and tingling. No known prior right hip injury or surgery. Outside radiology reports indicate lytic femoral neck lesion, lesions in pelvis, and lung mass seen on xrays. Ortho primary team following.     consulted for eval for transfer to  primary team in setting of extensive metastatic dz w/ unclear primary origin.      Overview/Hospital Course:  61 y.o. male w/ PMH significant for tobacco abuse, IE s/p AVR & MVR (mechanical) on warfarin (subtherapeutic on admission) who [facility] pain found to have pathologic FNF & R femoral artery pseudoaneurysm now s/p aneurysm repair in addition to being found to have imaging suggestive of metastatic malignant disease of unknown primary.  Following his pseudoaneurysm repair, patient was placed on heparin drip.  Oncology was consulted and during the workup the patient was identified to have a small acute subdural hematoma in addition to multifocal punctate acute embolic infarcts.   Neurology was consulted and believed his stroke to be the result of a cardioembolic phenomenon  in the setting of a subaortic INR.  Regarding the subdural hematoma, NSGY was consulted.  No surgical intervention has been recommended at this point.  He went to the OR with Ortho on 8/23 for R hip hemiarthroplasty with ablation, cementoplasty, and percutaneous fixation of pelvis.  Heparin drip was resumed following the procedure by NSGY recommendation, with CT of the head has been ordered for the morning of 8/24.  PT/OT recs pending, but he wants to go home with HH as his significant other will stop working to be with him if he goes to a facility.      Interval History:  No acute events overnight.  Reports pain much better today.  Has ROM of the right limb.  Reports he wants to go home with HH as his significant other would quit work to be with him.  He is very concerned about the diagnosis of possible cancer.    Review of Systems   Constitutional:  Negative for chills, fatigue and fever.   Respiratory:  Negative for cough and shortness of breath.    Cardiovascular:  Negative for chest pain, palpitations and leg swelling.   Gastrointestinal:  Negative for abdominal pain, diarrhea, nausea and vomiting.   Genitourinary:  Negative for dysuria and urgency.   Musculoskeletal:  Positive for gait problem.   Neurological:  Negative for dizziness and headaches.   All other systems reviewed and are negative.  Objective:     Vital Signs (Most Recent):  Temp: 97.7 °F (36.5 °C) (08/19/22 0807)  Pulse: 89 (08/19/22 0807)  Resp: 18 (08/19/22 0851)  BP: (!) 148/78 (08/19/22 0807)  SpO2: (!) 93 % (08/19/22 0807)   Vital Signs (24h Range):  Temp:  [97.5 °F (36.4 °C)-98.2 °F (36.8 °C)] 97.7 °F (36.5 °C)  Pulse:  [77-89] 89  Resp:  [16-18] 18  SpO2:  [90 %-93 %] 93 %  BP: (139-172)/(74-91) 148/78     Weight: 54.4 kg (119 lb 14.9 oz)  Body mass index is 16.27 kg/m².    Physical Exam  Constitutional:       Appearance: Normal appearance. He is well-developed.   HENT:      Head: Normocephalic and atraumatic.   Cardiovascular:      Rate  and Rhythm: Normal rate and regular rhythm.      Heart sounds: No murmur heard.  Pulmonary:      Effort: Pulmonary effort is normal. No respiratory distress.      Breath sounds: Normal breath sounds. No wheezing or rales.   Abdominal:      General: There is no distension.      Palpations: Abdomen is soft.      Tenderness: There is no abdominal tenderness.   Musculoskeletal:         General: No deformity.      Comments: Right hip bandages C/D/I   Skin:     General: Skin is warm.   Neurological:      General: No focal deficit present.      Mental Status: He is alert and oriented to person, place, and time. Mental status is at baseline.       Significant Labs: All pertinent labs within the past 24 hours have been reviewed.    Significant Imaging: I have reviewed all pertinent imaging results/findings within the past 24 hours.      Assessment/Plan:      * Pathologic fracture of neck of right femur  · Orthopedics consulted.  · S/p R hip hemiarthroplasty with ablation, cementoplasty, and percutaneous fixation of pelvis 8/23 - POD1  · For probable senile osteoporosis, check Vitamin D level.  If/for Vit D deficiency and probable senile osteopenia/osteoporosis, start Vit D and Ca, follow up in Ortho clinic per new protocol  · DVT prophylaxis for 28 days post-op to start POD 1 - Continue Heparin gtt for now, restart Warfarin.  PharmD for dosing  · PT/OT to start on POD 1  · Rodgers to be removed on POD 1  · Pain control:  Tylenol 1g TID, Robaxin 1000mg QID, Lyrica 75mg PO qHS, Ropivicaine gtt per Anesthesia Pain Service (to be removed POD3)    Hypercalcemia of malignancy  · Given Zometa 8/20      H/O mitral valve replacement with mechanical valve  · 2/2 endocarditis  · Heparin bridge to Warfarin  · PharmD consult for dosing      Body mass index (BMI) less than 19  · Body mass index is 16.27 kg/m².  · Possibly 2/2 malignancy  · Dietary consult    Subdural hematoma  · Seen on MRI brain  · NSGY was consulted.  No  intervention  · Repeat head CT when therapeutic on Heparin      Acute arterial ischemic stroke, multifocal, multiple vascular territories  · Seen on MRI brain  · Vascular Neurology consulted:  Feel as though his stroke to be the result of a cardioembolic phenomenon in the setting of a subaortic INR  · Continue Heparin  · Not on Statin      Pseudoaneurysm  · s/p embolization of right profunda femoral artery pseudoaneurysm with 5 mm coil 8/20  · Vascular Surgery consulted        VTE Risk Mitigation (From admission, onward)         Ordered     warfarin (COUMADIN) tablet 5 mg  Daily         08/24/22 1142     heparin 25,000 units in dextrose 5% 250 mL (100 units/mL) infusion LOW INTENSITY nomogram - [facility]  Continuous        Question Answer Comment   Heparin Infusion Adjustment (DO NOT MODIFY ANSWER) [email or web address]\[email or web address] LOW INTENSITY nomogram for [facility] [email or web address]    Begin at (in units/kg/hr) 12        08/19/22 2301     Place sequential compression device  Until discontinued         08/18/22 0331     IP VTE LOW RISK PATIENT  Once         08/18/22 0331                Discharge Planning   VIKI: 8/25/2022     Code Status: Full Code   Is the patient medically ready for discharge?: No    Reason for patient still in hospital (select all that apply): Patient trending condition  Discharge Plan A: Home Health, Home with family                  Radhika Jackson MD  Department of Hospital Medicine   [facility] - Surgery

## 2022-08-24 NOTE — PHARMACY MED REC
"Admission Medication History     The home medication history was taken by Samantha Gotti.    You may go to "Admission" then "Reconcile Home Medications" tabs to review and/or act upon these items.      The home medication list has been updated by the Pharmacy department.    Please read ALL comments highlighted in yellow.    Please address this information as you see fit.     Feel free to contact us if you have any questions or require assistance.        Medications listed below were obtained from: Patient/family  PTA Medications   Medication Sig    acetaminophen (TYLENOL) 500 MG tablet Take 500 mg by mouth 2 (two) times daily as needed (headache).    EScitalopram oxalate (LEXAPRO) 20 MG tablet Take 20 mg by mouth every evening.    esomeprazole (NEXIUM) 20 MG capsule Take 20 mg by mouth once daily.    ferrous gluconate (FERGON) 324 MG tablet Take 1 tablet by mouth once daily.    LORazepam (ATIVAN) 1 MG tablet Take 1 mg by mouth 3 (three) times daily as needed for Anxiety.    pravastatin (PRAVACHOL) 40 MG tablet Take 40 mg by mouth every evening.    sumatriptan (IMITREX) 25 MG Tab Take 25 mg by mouth as needed for Migraine.    warfarin (COUMADIN) 3 MG tablet Take 3 mg by mouth Daily.    acetaminophen-codeine 300-60mg (TYLENOL #4) 300-60 mg Tab Take 1 tablet by mouth every 6 (six) hours as needed for Pain.           Samantha Gotti  EXT [id number]                  .          "

## 2022-08-24 NOTE — PLAN OF CARE
PT evaluation complete - see note for details. POC and goals established.     Problem: Physical Therapy  Goal: Physical Therapy Goal  Description: Goals to be met by: 22    Patient will increase functional independence with mobility by performin. Supine to sit with Stand-by Assistance  2. Sit to stand transfer with Stand-by Assistance  3. Bed to chair transfer with Supervision using Rolling Walker  4. Gait  x 150 feet with Supervision using Rolling Walker.   5. Ascend/descend 4 stairs with no handrails Contact Guard Assistance using No Assistive Device.   6. Lower extremity exercise program x30 reps per handout, with independence    2022

## 2022-08-24 NOTE — ASSESSMENT & PLAN NOTE
· Seen on MRI brain  · Vascular Neurology consulted:  Feel as though his stroke to be the result of a cardioembolic phenomenon in the setting of a subaortic INR  · Continue Heparin  · Not on Statin

## 2022-08-24 NOTE — ASSESSMENT & PLAN NOTE
· Seen on MRI brain  · NSGY was consulted.  No intervention  · Repeat head CT when therapeutic on Heparin

## 2022-08-24 NOTE — HOSPITAL COURSE
61 y.o. 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 Orthopedics consulted concerning right femoral pathologic fracture and Vascular surgery consulted concerning pseudoaneurysm.  Patient taken to oR on 8/19 by Vascular Surgery and had embolization of 3rd order right profunda femoral artery pseudoaneurysm with N-BCA glue and 5mm coil aneurysm repair. Following his pseudoaneurysm repair, patient was placed on IV Heparin drip. Metastatic work-up done.   CT scan of chest/abdomen and pelvis done and showed:  1. Findings of extensive metastatic disease involving soft tissues above and below the diaphragm and osseous structures. Extensive involvement of the mediastinum with encasement of bronchovascular structures as detailed above. Suggested potential primary lung and renal with left upper lobe and left renal masses.  2. Pathologic fracture of the right femoral head/neck, L1 vertebral body, and left 6th rib.  3. Small to moderate pericardial effusion, likely malignant.  4. Small left pleural effusion.  5. Heterogeneous opacification of the right jugular vein which could represent contrast mixing however cannot exclude thrombus.    Oncology was consulted and recommended MRI of brain as part of metastatic work-up and showed multiple punctate scattered foci of diffusion restriction within the right cerebellar hemisphere, left erin and left corona radiata potentially representing acute embolic type infarcts. FLAIR hyperintense, T1 isointense fluid overlying the left cerebral convexity and posterior to the right occipital lobe raising concern for small acute subdural hemorrhage. Heterogeneous areas of calvarial marrow with at least two focal lesions concerning for metastatic disease involving the left  occipital and right parietal calvarium. Partially visualized C3 metastatic disease.    Vascular Neurology was consulted and believed embolic infarcts on MRI to be result of a cardioembolic phenomenon in the setting of a subtherapeutic INR in patient with known mechanical valves. Regarding the subdural hematoma, NSGY was consulted. No surgical intervention has been recommended at this point for small subdural hematoma noted on MRI. Patient was eventually taken to OR by Orthopedics on 8/23 for right hip hemiarthroplasty with ablation, cementoplasty, and percutaneous fixation of pelvis for pathologic fracture and metastatic disease. IV Heparin drip was resumed following the procedure by NSGY recommendation, with CT of the head has been ordered for the morning of 8/24 and done and showed no changes from pre-op MRI. PT/OT consulted post-op and recommending IP Rehab on discharge but patient prefers home with outpatient therapy on discharge when medically ready. Arrangements made for outpatient PT and DME of beside commode and rolling walker when patient medically ready to be discharged from hospital but needs INR > 2 to discharge home. INR 1.6 on Warfarin on 8/26. INR 2.0 on 8/27. Patient discharged home in good condition with his spouse on 8/27. Surgical bandages to remain in place until Orthopedic clinic follow-up. Pathology pending from right femur bone biopsy. Patient to follow-up with Oncology and Orthopedic Oncology as outpatient. Patient discharged on Robaxin, Lyrica and tylenol and Oxy IR prn for pain control. Patient to resume home regimen of Coumadin 4.5 mg po daily on discharge with goal INR 2.5-3.5.

## 2022-08-24 NOTE — ASSESSMENT & PLAN NOTE
· s/p embolization of right profunda femoral artery pseudoaneurysm with 5 mm coil 8/20  · Vascular Surgery consulted

## 2022-08-24 NOTE — SUBJECTIVE & OBJECTIVE
Principal Problem:Pathologic fracture of neck of right femur    Principal Orthopedic Problem: same, s/p R hip hemiarthroplasty with ablation, cementoplasty, and percutaneous fixation of pelvis 8/23    Interval History: NAEON. VSS. Did very well in OR with minimal blood loss, no need for transfusion. Pain in right hip is much improved today and he has increased ROM of the RLE. Some knee pain this AM, likely due to manipulation during surgery. To work with PT/OT today.    Review of patient's allergies indicates:  No Known Allergies    Current Facility-Administered Medications   Medication    0.9%  NaCl infusion (for blood administration)    acetaminophen tablet 1,000 mg    ceFAZolin 2 gram in dextrose 5% 100 mL IVPB (premix)    dextrose 10% bolus 125 mL    dextrose 10% bolus 250 mL    EScitalopram oxalate tablet 20 mg    glucagon (human recombinant) injection 1 mg    glucose chewable tablet 16 g    glucose chewable tablet 24 g    heparin 25,000 units in dextrose 5% 250 mL (100 units/mL) infusion LOW INTENSITY nomogram - [facility]    melatonin tablet 6 mg    methocarbamoL tablet 1,000 mg    mupirocin 2 % ointment    ondansetron disintegrating tablet 8 mg    pregabalin capsule 75 mg    promethazine tablet 25 mg    ROPIvacaine (PF) 2 mg/ml (0.2%) solution    sodium chloride 0.9% flush 10 mL    sodium chloride 0.9% flush 10 mL    sodium chloride 0.9% flush 3 mL    traMADoL tablet 50 mg     Objective:     Vital Signs (Most Recent):  Temp: 98.1 °F (36.7 °C) (08/24/22 0829)  Pulse: 88 (08/24/22 0943)  Resp: 12 (08/24/22 0829)  BP: (!) 145/68 (08/24/22 0829)  SpO2: 96 % (08/24/22 0829)   Vital Signs (24h Range):  Temp:  [97.4 °F (36.3 °C)-99.1 °F (37.3 °C)] 98.1 °F (36.7 °C)  Pulse:  [72-99] 88  Resp:  [12-24] 12  SpO2:  [90 %-97 %] 96 %  BP: (101-153)/(42-90) 145/68  Arterial Line BP: (118-154)/(56-76) 154/76     Weight: 54.4 kg (119 lb 14.9 oz)  Height: 6' (182.9 cm)  Body mass index is 16.27 kg/m².      Intake/Output Summary  (Last 24 hours) at 8/24/2022 1052  Last data filed at 8/24/2022 0100  Gross per 24 hour   Intake 1680 ml   Output 1100 ml   Net 580 ml         Ortho/SPM Exam  Gen:  No acute distress, well-developed, well nourished.  CV:  Peripherally well-perfused.   Respiratory:  Normal respiratory effort. No accessory muscle use.   Head/Neck:  Normocephalic.  Atraumatic. Sclera anicteric. TM. Neck supple.  Neuro: CN 2-12 grossly intact. No FND. Awake. Alert. Oriented to person, place, time, and situation.      MSK:  Right Lower Extremity  Inspection  - Dressings c/d/i  - No swelling  - No ecchymosis, erythema, or signs of cellulitis  Palpation  -  Appropriately TTP at incision sites  Range of motion  - AROM and PROM of the ankle and foot intact without pain, increasing ROM of the femur at hip and knee than pre-operative exam though still decreased from normal/baseline  Stability  - No evidence of joint dislocation or abnormal laxity  Neurovascular  - TA/EHL/Gastroc/FHL assessed in isolation without deficit  - SILT throughout  - Compartments soft  - DP palpated   - Muscle tone decreased        Significant Labs: CBC:   Recent Labs   Lab 08/23/22  0320 08/23/22  1906 08/24/22  0838   WBC 9.17  --  10.00   HGB 11.1*  --  11.2*   HCT 33.5* 28* 33.5*     --  268       CMP:   Recent Labs   Lab 08/22/22  1420 08/23/22  0320 08/24/22  0838    132* 136  134*   K  --  3.5 4.3  4.4   CL  --  97 103  102   CO2  --  25 22*  21*   GLU  --  82 119*  120*   BUN  --  14 15  15   CREATININE  --  0.7 0.8  0.8   CALCIUM  --  8.7 7.7*  7.8*   PROT  --  5.8* 5.7*  5.8*   ALBUMIN  --  2.4* 2.3*  2.2*   BILITOT  --  1.1* 0.7  0.7   ALKPHOS  --  129 124  120   AST  --  25 29  31   ALT  --  8* 8*  8*   ANIONGAP  --  10 11  11       All pertinent labs within the past 24 hours have been reviewed.    Significant Imaging:   All pertinent imaging has been reviewed.

## 2022-08-24 NOTE — PLAN OF CARE
Problem: Occupational Therapy  Goal: Occupational Therapy Goal  Description: Goals to be met by: 9/24/2022 (1 month)     Patient will increase functional independence with ADLs by performing:    UE Dressing with [place].  LE Dressing with Minimal Assistance using hip kit.  Grooming while standing at sink with Supervision using LRAD.  Toileting from toilet with Supervision for hygiene and clothing management.   Rolling to Bilateral with [place].   Supine to sit with Supervision.  Step transfer with Supervision using LRAD.  Toilet transfer to toilet with Supervision using LRAD.  Pt to adhere to posterior hip precautions during fx'l mobility with 100% accuracy.  Pt to demo safe understanding and recite posterior hip precautions with 100% accuracy.     Evaluated pt and established OT POC. Continue OT as tolerated.  Ingrid Caba OT  8/24/2022    Outcome: Ongoing, Progressing

## 2022-08-24 NOTE — PROGRESS NOTES
[facility] - Surgery  Vascular Neurology  [facility]  Progress Note    Assessment/Plan:     Acute arterial ischemic stroke, multifocal, multiple vascular territories  60 y/o M w PMH of bacterial endocarditis s/p AV and MV mechanical valve replacement (on warfarin), CKD, tobacco abuse admitted to hospital medicine (8/18/22) for R femoral head structure w workup c/f  metastatic cancer with unknown origin for whom stroke got consulted after MRI brain w/wo con completed on 8/22/22 that showed multi territory embolic infarcts as well as SDHs (frontal and occipital) and HM requesting surgical clearance today while patient is down at OR. Prior to admission patient was transitioning from warfarin to lovenox for possible hernia repair and INR on arrival was 1.8. His only symptoms on admission included right hip pain, no focal neurological deficits such as vision changes, weakness, numbness, dysarthria. DOA patient had vascular surgery for pseudoaneurysm at femoral vessels and since then he has been on hep gtt.     As mentioned above stroke team called today for MRI brain w/wo con as oncology workup that was completed yesterday at 4:00pm and showed multiple embolic infarcts that with patient's known history of mechanical valve that was not adequately AC, cardio embolic etiology would be most likely given inclusion of anterior and posterior circulation for which vascular etiology would be less likely. Also on current MRI we are able to see a blood vessels, specifically on MP rage sequence that does not show any significant high grade stenosis that would put the patient at risk of worsening infarction in setting of low BP related to intraoperative anesthesia.   At the moment patient is prepped for surgery and the longer he waits the longer he is off AC which he needs as stroke prevention. Unfortunately patient also has areas with SDH that likely to not need surgical intervention but need close monitoring in  setting of needed anticoagulation.   Overall patient can be cleared to get surgery with close neuro monitoring intra and post op.     Recommendations   -- Resume AC as soon as appropriate per NSGY and surgical team   -- The vascular neurology team will sign off    Antithrombotics for secondary stroke prevention: Anticoagulants: Warfarin INR adjusted target    Statins for secondary stroke prevention and hyperlipidemia, if present:   Statins: Atorvastatin- 40 mg daily    Aggressive risk factor modification: Smoking, mechanical valve      Rehab efforts: The patient has been evaluated by a stroke team provider and the therapy needs have been fully considered based off the presenting complaints and exam findings. The following therapy evaluations are needed: PT evaluate and treat, OT evaluate and treat    Diagnostics ordered/pending: None     VTE prophylaxis: None: Reason for No Pharmacological VTE Prophylaxis: Currently on anticoagulation    BP parameters: Infarct: No intervention, SBP <220             No notes on file    STROKE DOCUMENTATION        NIH Scale:      1a. Level of Consciousness: 0-->Alert, keenly responsive  1b. LOC Questions: 0-->Answers both questions correctly  1c. LOC Commands: 0-->Performs both tasks correctly  2. Best Gaze: 0-->Normal  3. Visual: 0-->No visual loss  4. Facial Palsy: 0-->Normal symmetrical movements  5a. Motor Arm, Left: 0-->No drift, limb holds 90 (or 45) degrees for full 10 secs  5b. Motor Arm, Right: 0-->No drift, limb holds 90 (or 45) degrees for full 10 secs  6a. Motor Leg, Left: 1-->Drift, leg falls by the end of the 5-sec period but does not hit bed (effort dependent 2/2 hip pain and recent nerve block )  6b. Motor Leg, Right: 3-->No effort against gravity, leg falls to bed immediately (effort dependent 2.2 hip fracture and nerve block )  7. Limb Ataxia: 1-->Present in one limb  8. Sensory: 0-->Normal, no sensory loss  9. Best Language: 0-->No aphasia, normal  10. Dysarthria:  0-->Normal  11. Extinction and Inattention (formerly Neglect): 0-->No abnormality  Total (NIH Stroke Scale): 5    Modified [place] Score: 0  Marie Coma Scale:    ABCD2 Score:    [id number] Score:   HAS -BLED Score:   ICH Score:   Hunt & Fry Classification:        Neurologic Chief Complaint: Possible ICH on AC    Subjective:     Interval History: Patient is seen for follow-up neurological assessment and treatment recommendations: Taken for hemiarthroplasty and RFA of bone    HPI, Past Medical, Family, and Social History remains the same as documented in the initial encounter.     Review of Systems  Constitutional:  Negative for appetite change, chills and fever.   HENT:  Negative for congestion, sore throat, trouble swallowing and voice change.    Eyes: Negative.    Respiratory:  Negative for cough and shortness of breath.    Cardiovascular:  Negative for chest pain and leg swelling.   Gastrointestinal:  Negative for abdominal distention, abdominal pain, constipation, nausea and vomiting.   Endocrine: Negative.    Genitourinary: Negative.    Musculoskeletal:  Positive for arthralgias, gait problem and joint swelling. Negative for back pain.   Skin: Negative.    Neurological:  Negative for weakness and headaches.   Psychiatric/Behavioral: Negative.     Scheduled Meds:   acetaminophen  650 mg Oral 4 times per day    ceFAZolin (ANCEF) IVPB  2 g Intravenous Once    EScitalopram oxalate  20 mg Oral Daily    methocarbamoL  500 mg Oral Q6H    mupirocin   Nasal BID    pregabalin  75 mg Oral QHS     Continuous Infusions:   heparin (porcine) in D5W 30 Units/kg/hr (08/24/22 0132)    ROPIvacaine (PF) 2 mg/ml (0.2%) 2 mL/hr (08/23/22 2136)     PRN Meds:sodium chloride, dextrose 10%, dextrose 10%, glucagon (human recombinant), glucose, glucose, melatonin, ondansetron, promethazine, sodium chloride 0.9%, sodium chloride 0.9%, sodium chloride 0.9%    Objective:     Vital Signs (Most Recent):  Temp: 97.7 °F (36.5 °C)  (08/24/22 0509)  Pulse: 75 (08/24/22 0509)  Resp: 18 (08/24/22 0509)  BP: 132/70 (08/24/22 0509)  SpO2: 97 % (08/24/22 0509)  BP Location: Right arm    Vital Signs Range (Last 24H):  Temp:  [96.5 °F (35.8 °C)-99.1 °F (37.3 °C)]   Pulse:  [72-99]   Resp:  [14-24]   BP: (101-153)/(42-90)   SpO2:  [90 %-97 %]   Arterial Line BP: (118-154)/(56-76)   BP Location: Right arm    Physical Exam  Constitutional:       Appearance: Normal appearance.   HENT:      Head: Normocephalic and atraumatic.      Nose: Nose normal.      Mouth/Throat:      Mouth: Mucous membranes are moist.   Eyes:      Extraocular Movements: Extraocular movements intact.      Pupils: Pupils are equal, round, and reactive to light.   Cardiovascular:      Rate and Rhythm: Normal rate and regular rhythm.   Pulmonary:      Effort: No respiratory distress.   Abdominal:      General: Bowel sounds are normal. There is no distension.      Palpations: Abdomen is soft.      Tenderness: There is no abdominal tenderness.   Musculoskeletal:         General: Tenderness (right hip) present. No swelling. Normal range of motion.      Cervical back: Normal range of motion.   Skin:     General: Skin is warm.      Capillary Refill: Capillary refill takes less than 2 seconds.   Neurological:      Mental Status: He is alert.      Motor: Weakness (2/2 pain) present.   Neurological Exam:     1a. Level of Consciousness: 0-->Alert, keenly responsive  1b. LOC Questions: 0-->Answers both questions correctly  1c. LOC Commands: 0-->Performs both tasks correctly  2. Best Gaze: 0-->Normal  3. Visual: 0-->No visual loss  4. Facial Palsy: 0-->Normal symmetrical movements  5a. Motor Arm, Left: 0-->No drift, limb holds 90 (or 45) degrees for full 10 secs  5b. Motor Arm, Right: 0-->No drift, limb holds 90 (or 45) degrees for full 10 secs  6a. Motor Leg, Left: 1-->Drift, leg falls by the end of the 5-sec period but does not hit bed (effort dependent 2/2 hip pain and recent nerve block )  6b.  Motor Leg, Right: 3-->No effort against gravity, leg falls to bed immediately (effort dependent 2.2 hip fracture and nerve block )  7. Limb Ataxia: 1-->Present in one limb  8. Sensory: 0-->Normal, no sensory loss  9. Best Language: 0-->No aphasia, normal  10. Dysarthria: 0-->Normal  11. Extinction and Inattention (formerly Neglect): 0-->No abnormality  Total (NIH Stroke Scale): 5  Laboratory:  CMP: No results for input(s): GLUCOSE, CALCIUM, ALBUMIN, PROT, NA, K, CO2, CL, BUN, CREATININE, ALKPHOS, ALT, AST, BILITOT in the last 24 hours.  CBC:   Recent Labs   Lab 08/23/22  0320 08/23/22  1906   WBC 9.17  --    RBC 3.83*  --    HGB 11.1*  --    HCT 33.5* 28*     --    MCV 88  --    MCH 29.0  --    MCHC 33.1  --        Diagnostic Results     Brain Imaging   Impression:     Multiple punctate scattered foci of diffusion restriction within the right cerebellar hemisphere, left erin and left corona radiata potentially representing acute embolic type infarcts.     FLAIR hyperintense, T1 isointense fluid overlying the left cerebral convexity and posterior to the right occipital lobe raising concern for small acute subdural hemorrhage.     Heterogeneous areas of calvarial marrow with at least two focal lesions concerning for metastatic disease involving the left occipital and right parietal calvarium.     Partially visualized C3 metastatic disease.  Dedicated contrast enhanced MRI of the cervical spine may be performed for further evaluation.    CTH 8/23  Tiny hyperdensity in the left parietal lobe.  Could represent microhemorrhage versus calcification.  Recommend repeat head CT in 8 hours to evaluate for stability.  No corresponding finding identified on prior day's MRI.     Two extra-axial soft tissue masses invading into the overlying calvarium concerning for metastatic disease, corresponding to lesions identified on prior day's MRI.      Vessel Imaging     Duplex imaging of the right groin reveals a structure with  mixed echoes that measures 2.4 cm x 2.9 cm and blood flow adjacent to   the DFA. A neck is also visualized in the sagittal plane and measures 5.6 mm in width. These findings are consistent with the   ultrasonic appearance of a pseudoaneurysm.   Cardiac Imaging   Vent. Rate : 083 BPM     Atrial Rate : 083 BPM      P-R Int : 136 ms          QRS Dur : 106 ms       QT Int : 308 ms       P-R-T Axes : 000 036 181 degrees      QTc Int : 361 ms     Sinus rhythm with Premature atrial complexes   Incomplete right bundle branch block   Nonspecific ST and T wave abnormality   Abnormal ECG       Socrates Mccartney MD  [facility]  Department of Vascular Neurology   [facility] - Surgery

## 2022-08-24 NOTE — SUBJECTIVE & OBJECTIVE
Neurologic Chief Complaint: Stroke    Subjective:     Interval History: Patient is seen for follow-up neurological assessment and treatment recommendations: Taken for hemiarthroplasty and RFA of bone    HPI, Past Medical, Family, and Social History remains the same as documented in the initial encounter.     Review of Systems  Constitutional:  Negative for appetite change, chills and fever.   HENT:  Negative for congestion, sore throat, trouble swallowing and voice change.    Eyes: Negative.    Respiratory:  Negative for cough and shortness of breath.    Cardiovascular:  Negative for chest pain and leg swelling.   Gastrointestinal:  Negative for abdominal distention, abdominal pain, constipation, nausea and vomiting.   Endocrine: Negative.    Genitourinary: Negative.    Musculoskeletal:  Positive for arthralgias, gait problem and joint swelling. Negative for back pain.   Skin: Negative.    Neurological:  Negative for weakness and headaches.   Psychiatric/Behavioral: Negative.     Scheduled Meds:   acetaminophen  650 mg Oral 4 times per day    ceFAZolin (ANCEF) IVPB  2 g Intravenous Once    EScitalopram oxalate  20 mg Oral Daily    methocarbamoL  500 mg Oral Q6H    mupirocin   Nasal BID    pregabalin  75 mg Oral QHS     Continuous Infusions:   heparin (porcine) in D5W 30 Units/kg/hr (08/24/22 0132)    ROPIvacaine (PF) 2 mg/ml (0.2%) 2 mL/hr (08/23/22 2136)     PRN Meds:sodium chloride, dextrose 10%, dextrose 10%, glucagon (human recombinant), glucose, glucose, melatonin, ondansetron, promethazine, sodium chloride 0.9%, sodium chloride 0.9%, sodium chloride 0.9%    Objective:     Vital Signs (Most Recent):  Temp: 97.7 °F (36.5 °C) (08/24/22 0509)  Pulse: 75 (08/24/22 0509)  Resp: 18 (08/24/22 0509)  BP: 132/70 (08/24/22 0509)  SpO2: 97 % (08/24/22 0509)  BP Location: Right arm    Vital Signs Range (Last 24H):  Temp:  [96.5 °F (35.8 °C)-99.1 °F (37.3 °C)]   Pulse:  [72-99]   Resp:  [14-24]   BP: (101-153)/(42-90)    SpO2:  [90 %-97 %]   Arterial Line BP: (118-154)/(56-76)   BP Location: Right arm    Physical Exam  Constitutional:       Appearance: Normal appearance.   HENT:      Head: Normocephalic and atraumatic.      Nose: Nose normal.      Mouth/Throat:      Mouth: Mucous membranes are moist.   Eyes:      Extraocular Movements: Extraocular movements intact.      Pupils: Pupils are equal, round, and reactive to light.   Cardiovascular:      Rate and Rhythm: Normal rate and regular rhythm.   Pulmonary:      Effort: No respiratory distress.   Abdominal:      General: Bowel sounds are normal. There is no distension.      Palpations: Abdomen is soft.      Tenderness: There is no abdominal tenderness.   Musculoskeletal:         General: Tenderness (right hip) present. No swelling. Normal range of motion.      Cervical back: Normal range of motion.   Skin:     General: Skin is warm.      Capillary Refill: Capillary refill takes less than 2 seconds.   Neurological:      Mental Status: He is alert.      Motor: Weakness (2/2 pain) present.   Neurological Exam:     1a. Level of Consciousness: 0-->Alert, keenly responsive  1b. LOC Questions: 0-->Answers both questions correctly  1c. LOC Commands: 0-->Performs both tasks correctly  2. Best Gaze: 0-->Normal  3. Visual: 0-->No visual loss  4. Facial Palsy: 0-->Normal symmetrical movements  5a. Motor Arm, Left: 0-->No drift, limb holds 90 (or 45) degrees for full 10 secs  5b. Motor Arm, Right: 0-->No drift, limb holds 90 (or 45) degrees for full 10 secs  6a. Motor Leg, Left: 1-->Drift, leg falls by the end of the 5-sec period but does not hit bed (effort dependent 2/2 hip pain and recent nerve block )  6b. Motor Leg, Right: 3-->No effort against gravity, leg falls to bed immediately (effort dependent 2.2 hip fracture and nerve block )  7. Limb Ataxia: 1-->Present in one limb  8. Sensory: 0-->Normal, no sensory loss  9. Best Language: 0-->No aphasia, normal  10. Dysarthria: 0-->Normal  11.  Extinction and Inattention (formerly Neglect): 0-->No abnormality  Total (NIH Stroke Scale): 5  Laboratory:  CMP: No results for input(s): GLUCOSE, CALCIUM, ALBUMIN, PROT, NA, K, CO2, CL, BUN, CREATININE, ALKPHOS, ALT, AST, BILITOT in the last 24 hours.  CBC:   Recent Labs   Lab 08/23/22  0320 08/23/22  1906   WBC 9.17  --    RBC 3.83*  --    HGB 11.1*  --    HCT 33.5* 28*     --    MCV 88  --    MCH 29.0  --    MCHC 33.1  --        Diagnostic Results     Brain Imaging   Impression:     Multiple punctate scattered foci of diffusion restriction within the right cerebellar hemisphere, left erin and left corona radiata potentially representing acute embolic type infarcts.     FLAIR hyperintense, T1 isointense fluid overlying the left cerebral convexity and posterior to the right occipital lobe raising concern for small acute subdural hemorrhage.     Heterogeneous areas of calvarial marrow with at least two focal lesions concerning for metastatic disease involving the left occipital and right parietal calvarium.     Partially visualized C3 metastatic disease.  Dedicated contrast enhanced MRI of the cervical spine may be performed for further evaluation.    CTH 8/23  Tiny hyperdensity in the left parietal lobe.  Could represent microhemorrhage versus calcification.  Recommend repeat head CT in 8 hours to evaluate for stability.  No corresponding finding identified on prior day's MRI.     Two extra-axial soft tissue masses invading into the overlying calvarium concerning for metastatic disease, corresponding to lesions identified on prior day's MRI.      Vessel Imaging     Duplex imaging of the right groin reveals a structure with mixed echoes that measures 2.4 cm x 2.9 cm and blood flow adjacent to   the DFA. A neck is also visualized in the sagittal plane and measures 5.6 mm in width. These findings are consistent with the   ultrasonic appearance of a pseudoaneurysm.   Cardiac Imaging   Vent. Rate : 083 BPM      Atrial Rate : 083 BPM      P-R Int : 136 ms          QRS Dur : 106 ms       QT Int : 308 ms       P-R-T Axes : 000 036 181 degrees      QTc Int : 361 ms     Sinus rhythm with Premature atrial complexes   Incomplete right bundle branch block   Nonspecific ST and T wave abnormality   Abnormal ECG

## 2022-08-24 NOTE — NURSING
Patient unable to have labs collected due to inability to collect appropriate venipuncture. Lab representative stated she will come back soon to obtain blood work. MD to be notified as patient has a timed PTT and is receiving continuous heparin therapy infusion.

## 2022-08-24 NOTE — ASSESSMENT & PLAN NOTE
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 Prior to admission patient was transitioning from warfarin to lovenox for possible hernia repair and INR on arrival was 1.8. His only symptoms on admission included right hip pain, no focal neurological deficits such as vision changes, weakness, numbness, dysarthria. DOA patient had vascular surgery for pseudoaneurysm at femoral vessels and since then he has been on hep gtt.     As mentioned above stroke team called today for MRI brain w/wo con as oncology workup that was completed yesterday at 4:00pm and showed multiple embolic infarcts that with patient's known history of mechanical valve that was not adequately AC, cardio embolic etiology would be most likely given inclusion of anterior and posterior circulation for which vascular etiology would be less likely. Also on current MRI we are able to see a blood vessels, specifically on MP rage sequence that does not show any significant high grade stenosis that would put the patient at risk of worsening infarction in setting of low BP related to intraoperative anesthesia.   At the moment patient is prepped for surgery and the longer he waits the longer he is off AC which he needs as stroke prevention. Unfortunately patient also has areas with SDH that likely to not need surgical intervention but need close monitoring in setting of needed anticoagulation.   Overall patient can be cleared to get surgery with close neuro monitoring intra and post op.     Recommendations   -- Resume AC as soon as appropriate  per NSGY and surgical team   -- Will continue to monitor     Antithrombotics for secondary stroke prevention: Anticoagulants: Warfarin INR adjusted target    Statins for secondary stroke prevention and hyperlipidemia, if present:   Statins: Atorvastatin- 40 mg daily    Aggressive risk factor modification: Smoking, mechanical valve      Rehab efforts: The patient has been evaluated by a stroke team provider and the therapy needs have been fully considered based off the presenting complaints and exam findings. The following therapy evaluations are needed: PT evaluate and treat, OT evaluate and treat    Diagnostics ordered/pending: None     VTE prophylaxis: None: Reason for No Pharmacological VTE Prophylaxis: Currently on anticoagulation    BP parameters: Infarct: No intervention, SBP <220

## 2022-08-24 NOTE — CONSULTS
PharmD Consult received for:    1.) Admit medication history/reconciliation:  · In-process      Thank you for the consult,  Gladys Valentine PharmD, BCPS  [id number]     **Note: Consults are reviewed Monday-Friday 7:00am-3:30pm. The above recommendations are only suggested. The recommendations should be considered in conjunction with all patient factors.**

## 2022-08-24 NOTE — ANESTHESIA POST-OP PAIN MANAGEMENT
Acute Pain Service Progress Note    Donis Jimenez is a 61 y.o., male, [id number].    Surgery:    HEMIARTHROPLASTY - right (Right Pelvis)   RADIOFREQUENCY ABLATION,BONE (Right Pelvis)    Post Op Day #: 1    Catheter type: perineural  R SIFI    Infusion type: Ropivacaine 0.2% 2cc/hr continuous + 10cc/hr intermittent bolus    Problem List:    Active Hospital Problems    Diagnosis  POA    *Pathologic fracture of neck of right femur [M84.451A]  Yes    Body mass index (BMI) less than 19 [Z68.1]  Not Applicable    H/O mitral valve replacement with mechanical valve [Z95.2]  Not Applicable    Hypercalcemia of malignancy [E83.52]  Unknown    Acute arterial ischemic stroke, multifocal, multiple vascular territories [I63.89]  Yes    Subdural hematoma [S06.5X9A]  Yes    Pseudoaneurysm [I72.9]  Yes      Resolved Hospital Problems   No resolved problems to display.       Subjective:     General appearance of alert, oriented, no complaints   Pain with rest: 2    Numbers   Pain with movement: 4    Numbers   Side Effects    1. Pruritis No    2. Nausea No    3. Motor Blockade No, 0=Ability to raise lower extremities off bed    4. Sedation No, 1=awake and alert    Objective:     Catheter site clean, dry, intact      Vitals   Vitals:    08/24/22 1410   BP:    Pulse:    Resp: 16   Temp:        Lab Results   Component Value Date    WBC 10.00 08/24/2022    HGB 11.2 (L) 08/24/2022    HCT 33.5 (L) 08/24/2022    MCV 85 08/24/2022     08/24/2022       CMP  Sodium   Date Value Ref Range Status   08/24/2022 136 136 - 145 mmol/L Final   08/24/2022 134 (L) 136 - 145 mmol/L Final     Potassium   Date Value Ref Range Status   08/24/2022 4.3 3.5 - 5.1 mmol/L Final   08/24/2022 4.4 3.5 - 5.1 mmol/L Final     Chloride   Date Value Ref Range Status   08/24/2022 103 95 - 110 mmol/L Final   08/24/2022 102 95 - 110 mmol/L Final     CO2   Date Value Ref Range Status   08/24/2022 22 (L) 23 - 29 mmol/L Final   08/24/2022 21 (L) 23 - 29 mmol/L  Final     Glucose   Date Value Ref Range Status   08/24/2022 119 (H) 70 - 110 mg/dL Final   08/24/2022 120 (H) 70 - 110 mg/dL Final     BUN   Date Value Ref Range Status   08/24/2022 15 6 - 20 mg/dL Final   08/24/2022 15 6 - 20 mg/dL Final     Creatinine   Date Value Ref Range Status   08/24/2022 0.8 0.5 - 1.4 mg/dL Final   08/24/2022 0.8 0.5 - 1.4 mg/dL Final     Calcium   Date Value Ref Range Status   08/24/2022 7.7 (L) 8.7 - 10.5 mg/dL Final   08/24/2022 7.8 (L) 8.7 - 10.5 mg/dL Final     Total Protein   Date Value Ref Range Status   08/24/2022 5.7 (L) 6.0 - 8.4 g/dL Final   08/24/2022 5.8 (L) 6.0 - 8.4 g/dL Final     Albumin   Date Value Ref Range Status   08/24/2022 2.3 (L) 3.5 - 5.2 g/dL Final   08/24/2022 2.2 (L) 3.5 - 5.2 g/dL Final     Total Bilirubin   Date Value Ref Range Status   08/24/2022 0.7 0.1 - 1.0 mg/dL Final     Comment:     For infants and newborns, interpretation of results should be based  on gestational age, weight and in agreement with clinical  observations.    Premature Infant recommended reference ranges:  Up to 24 hours.............<8.0 mg/dL  Up to 48 hours............<12.0 mg/dL  3-5 days..................<15.0 mg/dL  6-29 days.................<15.0 mg/dL     08/24/2022 0.7 0.1 - 1.0 mg/dL Final     Comment:     For infants and newborns, interpretation of results should be based  on gestational age, weight and in agreement with clinical  observations.    Premature Infant recommended reference ranges:  Up to 24 hours.............<8.0 mg/dL  Up to 48 hours............<12.0 mg/dL  3-5 days..................<15.0 mg/dL  6-29 days.................<15.0 mg/dL       Alkaline Phosphatase   Date Value Ref Range Status   08/24/2022 124 55 - 135 U/L Final   08/24/2022 120 55 - 135 U/L Final     AST   Date Value Ref Range Status   08/24/2022 29 10 - 40 U/L Final   08/24/2022 31 10 - 40 U/L Final     Comment:     *Result may be interfered by visible hemolysis     ALT   Date Value Ref Range Status    08/24/2022 8 (L) 10 - 44 U/L Final   08/24/2022 8 (L) 10 - 44 U/L Final     Anion Gap   Date Value Ref Range Status   08/24/2022 11 8 - 16 mmol/L Final   08/24/2022 11 8 - 16 mmol/L Final        Meds   Current Facility-Administered Medications   Medication Dose Route Frequency Provider Last Rate Last Admin    0.9%  NaCl infusion (for blood administration)   Intravenous Q24H PRN Cortez Hagan MD        acetaminophen tablet 1,000 mg  1,000 mg Oral 4 times per day Damian iRce MD   1,000 mg at 08/24/22 1159    ceFAZolin 2 gram in dextrose 5% 100 mL IVPB (premix)  2 g Intravenous Once Miguel Guerra MD        dextrose 10% bolus 125 mL  12.5 g Intravenous PRN Himanshu Lu MD        dextrose 10% bolus 250 mL  25 g Intravenous PRN Himanshu Lu MD        EScitalopram oxalate tablet 20 mg  20 mg Oral Daily Miguel Guerra MD   20 mg at 08/24/22 0837    glucagon (human recombinant) injection 1 mg  1 mg Intramuscular PRN Himanshu Lu MD        glucose chewable tablet 16 g  16 g Oral PRN Himanshu Lu MD        glucose chewable tablet 24 g  24 g Oral PRN Himanshu Lu MD        heparin 25,000 units in dextrose 5% 250 mL (100 units/mL) infusion LOW INTENSITY nomogram - [facility]  0-40 Units/kg/hr (Adjusted) Intravenous Continuous Yung Waggoner MD 16.3 mL/hr at 08/24/22 0132 30 Units/kg/hr at 08/24/22 0132    melatonin tablet 6 mg  6 mg Oral Nightly PRN Cruz Marquez MD   6 mg at 08/19/22 2142    methocarbamoL tablet 1,000 mg  1,000 mg Oral Q6H Damian Rice MD   1,000 mg at 08/24/22 1200    multivitamin tablet  1 tablet Oral Daily Cortez Hagan MD   1 tablet at 08/24/22 1159    mupirocin 2 % ointment   Nasal BID Miguel Guerra MD   Given at 08/24/22 0837    ondansetron disintegrating tablet 8 mg  8 mg Oral Q8H PRN Cruz Marquez MD   8 mg at 08/19/22 0817    pregabalin capsule 75 mg  75 mg Oral QHS Lino Xiao MD   75 mg at 08/23/22 2207    promethazine tablet 25 mg  25 mg Oral Q6H PRN Cruz  MD [place]        ROPIvacaine (PF) 2 mg/ml (0.2%) solution  2 mL/hr Perineural Continuous Lino KASSIDY Xiao MD 2 mL/hr at 08/24/22 1258 2 mL/hr at 08/24/22 1258    sodium chloride 0.9% flush 10 mL  10 mL Intravenous PRN Cruz Marquez MD        sodium chloride 0.9% flush 10 mL  10 mL Intravenous PRN Cruz Marquez MD        sodium chloride 0.9% flush 3 mL  3 mL Intravenous PRN Himanshu Lu MD        traMADoL tablet 50 mg  50 mg Oral Q4H PRN Damian Rice MD   50 mg at 08/24/22 1410    vitamin D 1000 units tablet 1,000 Units  1,000 Units Oral Daily Cortez Hagan MD   1,000 Units at 08/24/22 1202    warfarin (COUMADIN) tablet 5 mg  5 mg Oral Daily Radhika Jackson MD             Assessment:     Pain control adequate. Patient reports pain well-controlled, level of 2 at best and 4 at worst. No other complaints.     Plan:    - Continue R SIFI PNC at 2cc/hr continuous + 10cc/hr intermittent bolus    - Continue schedule multimodal analgesics:   - tylenol 1g q6h   - robaxin 1g q6h   - lyrica 75mg qhs    - PRN tramadol 50mg q4h for severe pain      Will continue to follow. Please call APS/anesthesia with any questions or concerns regarding pain control.        Damian Rice MD (PGY-3)  Anesthesiology

## 2022-08-24 NOTE — PROGRESS NOTES
[facility]  Orthopedics  Progress Note    Patient Name: Donis Jimenez  MRN: [id number]  Admission Date: 8/18/2022  Hospital Length of Stay: 6 days  Attending Provider: Radhika Jackson MD  Primary Care Provider: Primary Doctor No  Follow-up For: Procedure(s) (LRB):  HEMIARTHROPLASTY - right, lateral, bean bag, ablation supine after, ancef, prevena (Right)  cemontoplasty (Right)  RADIOFREQUENCY ABLATION,BONE (Right)    Post-Operative Day: 1 Day Post-Op  Subjective:     Principal Problem:Pathologic fracture of neck of right femur    Principal Orthopedic Problem: same, s/p R hip hemiarthroplasty with ablation, cementoplasty, and percutaneous fixation of pelvis 8/23    Interval History: NAEON. VSS. Did very well in OR with minimal blood loss, no need for transfusion. Pain in right hip is much improved today and he has increased ROM of the RLE. Some knee pain this AM, likely due to manipulation during surgery. To work with PT/OT today.    Review of patient's allergies indicates:  No Known Allergies    Current Facility-Administered Medications   Medication    0.9%  NaCl infusion (for blood administration)    acetaminophen tablet 1,000 mg    ceFAZolin 2 gram in dextrose 5% 100 mL IVPB (premix)    dextrose 10% bolus 125 mL    dextrose 10% bolus 250 mL    EScitalopram oxalate tablet 20 mg    glucagon (human recombinant) injection 1 mg    glucose chewable tablet 16 g    glucose chewable tablet 24 g    heparin 25,000 units in dextrose 5% 250 mL (100 units/mL) infusion LOW INTENSITY nomogram - [facility]    melatonin tablet 6 mg    methocarbamoL tablet 1,000 mg    mupirocin 2 % ointment    ondansetron disintegrating tablet 8 mg    pregabalin capsule 75 mg    promethazine tablet 25 mg    ROPIvacaine (PF) 2 mg/ml (0.2%) solution    sodium chloride 0.9% flush 10 mL    sodium chloride 0.9% flush 10 mL    sodium chloride 0.9% flush 3 mL    traMADoL tablet 50 mg     Objective:     Vital Signs (Most Recent):  Temp:  98.1 °F (36.7 °C) (08/24/22 0829)  Pulse: 88 (08/24/22 0943)  Resp: 12 (08/24/22 0829)  BP: (!) 145/68 (08/24/22 0829)  SpO2: 96 % (08/24/22 0829)   Vital Signs (24h Range):  Temp:  [97.4 °F (36.3 °C)-99.1 °F (37.3 °C)] 98.1 °F (36.7 °C)  Pulse:  [72-99] 88  Resp:  [12-24] 12  SpO2:  [90 %-97 %] 96 %  BP: (101-153)/(42-90) 145/68  Arterial Line BP: (118-154)/(56-76) 154/76     Weight: 54.4 kg (119 lb 14.9 oz)  Height: 6' (182.9 cm)  Body mass index is 16.27 kg/m².      Intake/Output Summary (Last 24 hours) at 8/24/2022 1052  Last data filed at 8/24/2022 0100  Gross per 24 hour   Intake 1680 ml   Output 1100 ml   Net 580 ml         Ortho/SPM Exam  Gen:  No acute distress, well-developed, well nourished.  CV:  Peripherally well-perfused.   Respiratory:  Normal respiratory effort. No accessory muscle use.   Head/Neck:  Normocephalic.  Atraumatic. Sclera anicteric. TM. Neck supple.  Neuro: CN 2-12 grossly intact. No FND. Awake. Alert. Oriented to person, place, time, and situation.      MSK:  Right Lower Extremity  Inspection  - Dressings c/d/i  - No swelling  - No ecchymosis, erythema, or signs of cellulitis  Palpation  -  Appropriately TTP at incision sites  Range of motion  - AROM and PROM of the ankle and foot intact without pain, increasing ROM of the femur at hip and knee than pre-operative exam though still decreased from normal/baseline  Stability  - No evidence of joint dislocation or abnormal laxity  Neurovascular  - TA/EHL/Gastroc/FHL assessed in isolation without deficit  - SILT throughout  - Compartments soft  - DP palpated   - Muscle tone decreased        Significant Labs: CBC:   Recent Labs   Lab 08/23/22  0320 08/23/22  1906 08/24/22  0838   WBC 9.17  --  10.00   HGB 11.1*  --  11.2*   HCT 33.5* 28* 33.5*     --  268       CMP:   Recent Labs   Lab 08/22/22  1420 08/23/22  0320 08/24/22  0838    132* 136  134*   K  --  3.5 4.3  4.4   CL  --  97 103  102   CO2  --  25 22*  21*   GLU  --   82 119*  120*   BUN  --  14 15  15   CREATININE  --  0.7 0.8  0.8   CALCIUM  --  8.7 7.7*  7.8*   PROT  --  5.8* 5.7*  5.8*   ALBUMIN  --  2.4* 2.3*  2.2*   BILITOT  --  1.1* 0.7  0.7   ALKPHOS  --  129 124  120   AST  --  25 29  31   ALT  --  8* 8*  8*   ANIONGAP  --  10 11  11       All pertinent labs within the past 24 hours have been reviewed.    Significant Imaging:   All pertinent imaging has been reviewed.        Assessment/Plan:     * Pathologic fracture of neck of right femur  Donis Jimenez is a 61 y.o. male with PMH of bacterial endocarditis, s/p AV and MV mechanical valve replacement (on warfarin), CKD, tobacco abuse presenting with pathologic R femur fracture. Bony lesions in femur and pelvis, as well as lung mass on CXR at outside hospital. Imaging studies pending. 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 Transferred for ortho oncology evaluation and management. Extensive smoking history. Deep femoral artery pseudoaneurysm ablation by vascular on 8/19. Pain well controlled.     Plan:  DVT ppx: heparin drip per vascular, ok to continue per NSGY, additional CT head pending  Multimodal pain control  Metastatic workup ongoing, MRI brain completed (NSGY recs completed), PET/CT scan completed, heme/onc consulted  WBAT with walker, posterior hip precautions  Will add boost/dionna to diet for nutritional supplementation  Will consult wound care for developing sacral wound noticed in OR          Cortez Hagan MD  Orthopedics  [facility] - Surgery

## 2022-08-24 NOTE — ASSESSMENT & PLAN NOTE
Donis Jimenez is a 61 y.o. male with PMH of bacterial endocarditis, s/p AV and MV mechanical valve replacement (on warfarin), CKD, tobacco abuse presenting with pathologic R femur fracture. Bony lesions in femur and pelvis, as well as lung mass on CXR at outside hospital. Imaging studies pending. 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 Transferred for ortho oncology evaluation and management. Extensive smoking history. Deep femoral artery pseudoaneurysm ablation by vascular on 8/19. Pain well controlled.     Plan:  DVT ppx: heparin drip per vascular, ok to continue per NSGY, additional CT head pending  Multimodal pain control  Metastatic workup ongoing, MRI brain completed (NSGY recs completed), PET/CT scan completed, heme/onc consulted  WBAT with walker, posterior hip precautions  Will add boost/dionna to diet for nutritional supplementation  Will consult wound care for developing sacral wound noticed in OR

## 2022-08-24 NOTE — PROGRESS NOTES
[facility]  [facility]  Daily Progress Note      Recent 24-Hour Events      No pain or shortness of breath. Tolerating oral intake but reduced. C/o b/l UE swelling; stable.     PHYSICAL EXAM    Vitals: BP (!) 153/77 (BP Location: Right arm, Patient Position: Lying)   Pulse 72   Temp 99 °F (37.2 °C)   Resp 16   Ht 6' (1.829 m)   Wt 54.4 kg (119 lb 14.9 oz)   SpO2 (!) 94%   BMI 16.27 kg/m²  Body mass index is 16.27 kg/m².    General: NAD, AO3, thin  HEENT: PERRL, EOMI.   Cardiovascular: RRR  Respiratory: lungs CTAB  GI: abdomen S/NT/ND, +BS  Extremities:  B/l UE edema  MSK: no gross joint abnormality    Neuro/Psych: A&OX3. CNII-XII grossly intact.          Medications      Scheduled Meds:   acetaminophen  650 mg Oral 4 times per day    ceFAZolin (ANCEF) IVPB  2 g Intravenous Once    EScitalopram oxalate  20 mg Oral Daily    [START ON 8/24/2022] methocarbamoL  500 mg Oral Q6H    mupirocin   Nasal BID    pregabalin  75 mg Oral QHS     Continuous Infusions:   heparin (porcine) in D5W Stopped (08/23/22 0700)    ROPIvacaine (PF) 2 mg/ml (0.2%)       PRN Meds:.sodium chloride, dextrose 10%, dextrose 10%, fentaNYL, glucagon (human recombinant), glucose, glucose, HYDROmorphone, iodixanoL, melatonin, midazolam, ondansetron, promethazine, sodium chloride 0.9%, sodium chloride 0.9%, sodium chloride 0.9%        Labs & Diagnostics    Labs:      Recent Labs     08/21/22  0445 08/22/22  0534 08/22/22  1420 08/23/22  0320   WBC 9.69 9.46  --  9.17   HGB 11.5* 11.2*  --  11.1*   HCT 34.2* 34.0*  --  33.5*   MCV 87 87  --  88    285  --  262    131* 136 132*   K 3.6 3.4*  --  3.5   CO2 26 24  --  25   BUN 22* 15  --  14   CREATININE 0.9 0.7  --  0.7   ANIONGAP 8 9  --  10   MG 2.0 1.8  --  1.9   PHOS 2.0* 1.8*  --  1.4*   ALT 6* 7*  --  8*   AST 23 22  --  25   ALKPHOS 129 114  --  129   BILITOT 0.9 1.1*  --  1.1*   PROT 5.7* 5.6*  --  5.8*   INR 1.2  --   --   --           EKG, labs and  radiographs from the past 24h reviewed and personally interpreted.       Assessment & Plan      61 y.o. 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     Following his pseudoaneurysm repair, patient was placed on heparin drip.  Oncology was consulted and during the workup the patient was identified to have a small acute subdural hematoma in addition to multifocal punctate acute embolic infarcts.    Regarding the acute infarcts, Neurology was consulted and is believed to be the result of a cardioembolic phenomenon in the setting of a subaortic INR.    Regarding the subdural hematoma, neurosurgery has been consulted.  No surgical intervention has been recommended at this point.  Hip fracture repair is to occur the evening of 8/23.  Should ago without complication, the heparin drip is to be resumed following the procedure.  By neuro surgery recommendation, CT of the head has been ordered for the morning of 8/24.    1. Pseudoaneurysm   -s/p embolization of right profunda femoral artery pseudoaneurysm with 5 mm coil  -VSx following    2. Possible metastatic malignancy  -oncology consulted  -follow pathology result from 8/23 surgery    3. Hip fracture  -Ortho following. To surgery on 8/23.     4. H/o mechanical MV replacement  -heparin gtt as above.     5. Hypercalcemia likely of malignancy   -zometa given 8/20    6. Hypophosphatemia  -replete.     7. Moderate Protein-Calorie malnutrition   -breeze    8. Hyponatremia   -stable  -SSRI is a home med previously tolerated  -urine studies requested     9. Hypokalemia   -replete as needed     9. Acute subdural hematoma  -as above    10. Acute cardioembolic ischemic CVA  -as above            Dispo/Code status: Full Code.     ?    Miguel Guerra    Date: 8/23/2022

## 2022-08-24 NOTE — ASSESSMENT & PLAN NOTE
· Orthopedics consulted.  · S/p R hip hemiarthroplasty with ablation, cementoplasty, and percutaneous fixation of pelvis 8/23 - POD1  · For probable senile osteoporosis, check Vitamin D level.  If/for Vit D deficiency and probable senile osteopenia/osteoporosis, start Vit D and Ca, follow up in Ortho clinic per new protocol  · DVT prophylaxis for 28 days post-op to start POD 1 - Continue Heparin gtt for now, restart Warfarin.  PharmD for dosing  · PT/OT to start on POD 1  · Rodgers to be removed on POD 1  · Pain control:  Tylenol 1g TID, Robaxin 1000mg QID, Lyrica 75mg PO qHS, Ropivicaine gtt per Anesthesia Pain Service (to be removed POD3)

## 2022-08-24 NOTE — PT/OT/SLP EVAL
Occupational Therapy   Co-Evaluation and Co-Treat  Co-treatment with PT for maximal pt participation, safety, and activity tolerance       Name: Donis Jimenez  MRN: [id number]  Admitting Diagnosis:  Pathologic fracture of neck of right femur  Recent Surgery: Procedure(s) (LRB):  HEMIARTHROPLASTY - right, lateral, bean bag, ablation supine after, ancef, prevena (Right)  cemontoplasty (Right)  RADIOFREQUENCY ABLATION,BONE (Right) 1 Day Post-Op    Recommendations:     Discharge Recommendations: rehabilitation facility  Discharge Equipment Recommendations:  hip kit, bedside commode, bath bench  Barriers to discharge:       Assessment:     Donis Jimenez is a 61 y.o. male with a medical diagnosis of Pathologic fracture of neck of right femur.  He presents with impaired ADL and mobility performance deficits. Pt found upright in bed with pt's significant other present and supportive. Pt very motivated to participate today with min cues required for maintaining WBAT and posterior hip precautions to RLE. Pt required min A for bed mobility, sat EOB with CGA, and stood from bed with mod A using RW. Pt tolerated 6 steps with pivot to bedside chair with min A requiring cues for RW management and posturing. Pt left upright in bed and agreeable for therapy progression. Pt with ADL/IADL goals of mobilizing in grocery store and improvingindependence in bathing and dressing.  PTA, pt was demonstrating a significant decline in (I) since July 2022 ultimately requiring RW-transport chair for mobility. At this time, pt is a high fall risk and is not safe to return home. Pt would benefit from continued OT skilled services daily to improve daily living skills to optimize QOL. Pt is recommended to discharge to rehab at this time.    Performance deficits affecting function: weakness, gait instability, impaired endurance, impaired balance, impaired self care skills, impaired functional mobility, decreased safety awareness, pain.      Rehab  "Prognosis: Good; patient would benefit from acute skilled OT services to address these deficits and reach maximum level of function.       Plan:     Patient to be seen daily to address the above listed problems via self-care/home management, therapeutic activities, therapeutic exercises, wheelchair management/training  · Plan of Care Expires: 09/24/22  · Plan of Care Reviewed with: patient, significant other    Subjective     Chief Complaint: discomfort in RLE  Patient/Family Comments/goals: "You guys really don't know how much this means to me. Thank you so much"  "I want to be able to walk around in [facility] and get In my car and drive there"    Occupational Profile:  Living Environment: Pt lives with his significant other in a [facility] with 4 SEBAS. Pt has no HR. Pt has a shower/tub combo for bathing.   Previous level of function: Progressive decline since July 2022. 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 Since then, pt completing short distance t/f with RW and most recently to fall using a transport wc.   Roles and Routines: Pt was driving prior to July 2022 and working prior to illness in hair salon.  Equipment Used at Home:  cane, straight, walker, rolling, other (see comments) (transport w/c, chair in shower)  Assistance upon Discharge: Significant other     Pain/Comfort:  · Pain Rating 1: 0/10  · Pain Rating Post-Intervention 1: 0/10  · Pain Rating Post-Intervention 2: 0/10    Patients cultural, spiritual, Holiness conflicts given the current situation: no    Objective:     Communicated with: RN prior to session.  Patient found HOB elevated with FCD, perineural catheter, telemetry, SCD upon OT entry to room.    General Precautions: Standard, fall   Orthopedic Precautions:RLE posterior precautions, RLE weight bearing as tolerated   Braces: N/A  Respiratory Status: Nasal cannula, flow 2 L/min    Occupational Performance:    Bed Mobility:    · Patient completed Rolling/Turning to Right " with minimum assistance  · Patient completed Scooting/Bridging with minimum assistance  · Patient completed Supine to Sit with minimum assistance    Functional Mobility/Transfers:  · Patient completed Sit <> Stand Transfer with moderate assistance  with  rolling walker   · Patient completed Bed <> Chair Transfer using Stand Pivot technique with minimum assistance with rolling walker  · Functional Mobility: Pt stood with mod A using RW and took 6 steps with min A using RW. Pt required min cues for maintaining WB to RLE.    Activities of Daily Living:  · Grooming: setup A to comb hair at EOB   · Upper Body Dressing: minimum assistance donning gown at EOB   · Lower Body Dressing: total assistance donning  socks   · Toileting: delivered [facility]     Cognitive/Visual Perceptual:  Cognitive/Psychosocial Skills:     -       Oriented to: Person, Place, Time and Situation   -       Follows Commands/attention:Follows multistep  commands  -       Communication: clear/fluent  -       Memory: No Deficits noted  -       Safety awareness/insight to disability: intact   -       Mood/Affect/Coping skills/emotional control: Pleasant    Physical Exam:  Balance:    -       demo good sitting balance with RW   Upper Extremity Range of Motion:     -       Right Upper Extremity: WFL  -       Left Upper Extremity: WFL  Upper Extremity Strength:    -       Right Upper Extremity: WFL  -       Left Upper Extremity: WFL   Strength:    -       Right Upper Extremity: WFL  -       Left Upper Extremity: WFL    AMPAC 6 Click ADL:  AMPAC Total Score: 17    Treatment & Education:  Pt educated on role of occupational therapy, POC, and safety during ADLs and functional mobility. Pt and OT discussed importance of safe, continued mobility to optimize daily living skills. Pt verbalized understanding. White board updated during session. Pt given instruction to call for medical staff/nurse for assistance.     Education:    Patient left up in chair with  all lines intact, call button in reach and RN notified    GOALS:   Multidisciplinary Problems     Occupational Therapy Goals        Problem: Occupational Therapy    Goal Priority Disciplines Outcome Interventions   Occupational Therapy Goal     OT, PT/OT Ongoing, Progressing    Description: Goals to be met by: 9/24/2022 (1 month)     Patient will increase functional independence with ADLs by performing:    UE Dressing with [place].  LE Dressing with Minimal Assistance using hip kit.  Grooming while standing at sink with Supervision using LRAD.  Toileting from toilet with Supervision for hygiene and clothing management.   Rolling to Bilateral with [place].   Supine to sit with Supervision.  Step transfer with Supervision using LRAD.  Toilet transfer to toilet with Supervision using LRAD.  Pt to adhere to posterior hip precautions during fx'l mobility with 100% accuracy.  Pt to demo safe understanding and recite posterior hip precautions with 100% accuracy.                      History:     Past Medical History:   Diagnosis Date    Endocarditis     Pacemaker     Pneumonia        Past Surgical History:   Procedure Laterality Date    ANGIOGRAPHY OF LOWER EXTREMITY Right 8/19/2022    Procedure: ANGIOGRAM, LOWER EXTREMITY;  Surgeon: Thomas Nicolas MD;  Location: [address];  Service: Peripheral Vascular;  Laterality: Right;  30.0 min  315.10 mGy  26.1755 Gycm2  84 ml dye    HIP RESURFACING Right 8/23/2022    Procedure: cemontoplasty;  Surgeon: Yung Flores MD;  Location: [address];  Service: Orthopedics;  Laterality: Right;    RADIOFREQUENCY ABLATION, BONE, PERCUTANEOUS Right 8/23/2022    Procedure: RADIOFREQUENCY ABLATION,BONE;  Surgeon: Yung Flores MD;  Location: [address];  Service: Orthopedics;  Laterality: Right;    REPAIR, PSEUDOANEURYSM, ARTERY, FEMORAL Right 8/19/2022    Procedure: REPAIR, PSEUDOANEURYSM, ARTERY, FEMORAL;  Surgeon: Thomas Nicolas MD;  Location: [facility]  OR 2ND FLR;  Service: Peripheral Vascular;  Laterality: Right;  R PFA (3rd branch) pseudoaneurysm        Time Tracking:     OT Date of Treatment: 08/24/22  OT Start Time: 1301  OT Stop Time: 1344  OT Total Time (min): 43 min    Billable Minutes:Evaluation 10 min  Self Care/Home Management 33 min    8/24/2022

## 2022-08-24 NOTE — PT/OT/SLP EVAL
Physical Therapy Co-Evaluation with OT and Treatment     Patient Name:  Donis Jimenez  MRN: [id number]    Admit Date: 8/18/2022  Admitting Diagnosis:  Pathologic fracture of neck of right femur  Length of Stay: 6 days  Recent Surgery: Procedure(s) (LRB):  HEMIARTHROPLASTY - right, lateral, bean bag, ablation supine after, ancef, prevena (Right)  cemontoplasty (Right)  RADIOFREQUENCY ABLATION,BONE (Right) 1 Day Post-Op    Recommendations:     Discharge Recommendations: Inpatient Rehabilitation Facility   Equipment recommendations: rolling walker, bedside commode and hip kit  Barriers to discharge: Increased level of skilled assistance required    Assessment:     Donis Jimenez is a 61 y.o. male [place] [facility] [place] with medical diagnosis of Pathologic fracture of neck of right femur. Pt presents with weakness, impaired endurance, impaired self care skills, impaired functional mobility, gait instability, impaired balance, pain, decreased ROM, orthopedic precautions. These deficits effect their roles and responsibilities in which they were able to complete prior to admit. PTA, pt began needing increased amount of assistance, going from SPC, RW, and transport w/c. Pt's , Aston, is involved in his care and can assist at home if needed. Pt has fallen 1 time in past 90 days, leading to current admission. Pt is extremely pleased with his progress this session and is extremely motivated to continue progressing with therapy. Pt educated on posterior hip precautions with sign posted in room. Donis Jimenez would benefit from acute PT intervention to improve quality of life, focus on recovery of impairments, provide patient/caregiver education, reduce fall risk, and maximize (I) and safety with functional mobility. Once medically stable, recommending pt discharge to Inpatient Rehabilitation Facility .      Rehab Prognosis: Good    Plan:     During this hospitalization, patient to be seen daily to address the  "identified rehab impairments via therapeutic activities, gait training, therapeutic exercises, neuromuscular re-education and progress towards stated goals.     Plan of Care Expires:  09/23/22  Plan of Care reviewed with: patient, significant other    This plan of care has been discussed with the patient/caregiver, who was included in its development and is in agreement with the identified goals and treatment plan.     Subjective     Communicated with RN prior to session.  Patient found supine upon PT entry to room, agreeable to evaluation. Pt's  present during session.    Chief Complaint: R hip pain    Patient/Family Comments/goals: "Oh my gosh my feet are flat on the ground!"    Pain/Comfort:  · Pain Rating 1: 8/10  · Location - Side 1: Right  · Location 1: hip  · Pain Addressed 1: Reposition, Distraction, Cessation of Activity  · Pain Rating Post-Intervention 1:  (pt did not rate)    Patients cultural, spiritual, Jewish conflicts given the current situation: None identified     Patient History: information obtained from pt     Living Environment: Pt lives with , Aston, in single level home  with 4 SEBAS (no HR). Bathroom set-up: tub/shower combo  Prior Level of Function: independent with mobility and ADLs; since July has required more assistance (progression from SPC, RW, and transport w/c)  DME owned: rolling walker, single point cane and transport w/c and shower chair  Support Available/Caregiver Assistance: , Aston, can assist as needed; states that he has a good support system    Objective:   OT present for coeval due to pt's multiple medical comorbidities and functional/cognition deficits requiring two skilled therapists to appropriately progress pt's musculoskeletal strength, neuromuscular control, and endurance while taking into consideration medical acuity and pt safety.    Patient found with: FCD, SCD, perineural catheter, peripheral IV, telemetry    Recent Surgery: Procedure(s) " (LRB):  HEMIARTHROPLASTY - right, lateral, bean bag, ablation supine after, ancef, prevena (Right)  cemontoplasty (Right)  RADIOFREQUENCY ABLATION,BONE (Right) 1 Day Post-Op  General Precautions: Standard, fall   Orthopedic Precautions:RLE posterior precautions, RLE weight bearing as tolerated   Braces: N/A   Oxygen Device: nasal cannula - 93% on 2L      Exams:     Cognition:  · Alert  · Command following: Follows multistep verbal commands  · Communication: clear/fluent     Sensation:   o Light touch sensation: Intact BLEs     Gross Motor Coordination: No deficits noted during functional mobility tasks      Edema/Skin Integrity: None noted; Visible skin intact     Postural examination/scapula alignment: no observable deficits     Lower Extremity Range of Motion:  o Right Lower Extremity: WFL  o Left Lower Extremity: WFL     Lower Extremity Strength:  o Right Lower Extremity: knee ext: 3+/5, knee flex: 4/5  o Left Lower Extremity: grossly 4/5    Functional Mobility:    Bed Mobility:  · Scooting anteriorly with contact guard assistance  · Supine > Sit with minimum assistance   · Assist with RLE    Transfers:   · Sit <> Stand Transfer: Moderate Assistance x 1 trials from eob with RW AD   · Assistance required to complete stand and transition R hand to RW  · Bed <> Chair: Minimal Assistance with RW AD   · Via step t/f  · Cueing for RW management and step sequencing             Gait:  · Distance: 6 steps  · Assistance level: Contact Guard Assistance  · Assistive Device: rolling walker  · Gait Assessment: decreased step length , decreased gait speed and narrow base of support   · Cueing to widen THERESA    Balance:  · Dynamic Sitting: FAIR+: Maintains balance through MINIMAL excursions of active trunk motion  · Standing:  · Static: POOR+: Needs MINIMAL assist to maintain   · Dynamic: POOR+: Needs MIN (minimal ) assist during gait    Outcome Measure: AM-PAC 6 CLICK MOBILITY  Total Score:16     Patient/Caregiver  Education:     Therapist educated pt/caregiver regarding:    PT POC and goals for therapy    Safety with mobility and fall risk    Instruction on use of call button and importance of calling nursing staff for assistance with mobility    Time provided for therapeutic counseling and discussion of current health disposition. All questions answered to satisfaction, within scope of PT practice    Pt safe to t/f with RN/PCT with RW   RW and BSC in room    Patient/caregiver able to verbalize understanding and expressed no further questions this visit; will follow-up with pt/caregiver during current admit for additional questions/concerns within scope of practice.     White board updated.     Patient left up in chair with all lines intact, call button in reach and  present.    GOALS:   Multidisciplinary Problems     Physical Therapy Goals        Problem: Physical Therapy    Goal Priority Disciplines Outcome Goal Variances Interventions   Physical Therapy Goal     PT, PT/OT Ongoing, Progressing     Description: Goals to be met by: 22    Patient will increase functional independence with mobility by performin. Supine to sit with Stand-by Assistance  2. Sit to stand transfer with Stand-by Assistance  3. Bed to chair transfer with Supervision using Rolling Walker  4. Gait  x 150 feet with Supervision using Rolling Walker.   5. Ascend/descend 4 stairs with no handrails Contact Guard Assistance using No Assistive Device.   6. Lower extremity exercise program x30 reps per handout, with independence                       History:     Past Medical History:   Diagnosis Date    Endocarditis     Pacemaker     Pneumonia        Past Surgical History:   Procedure Laterality Date    ANGIOGRAPHY OF LOWER EXTREMITY Right 2022    Procedure: ANGIOGRAM, LOWER EXTREMITY;  Surgeon: Thomas Nicolas MD;  Location: [facility] OR [address] [place] [address];  Service: Peripheral Vascular;  Laterality: Right;  30.0 min  315.10 mGy  26.1755  Gycm2  84 ml dye    HIP RESURFACING Right 8/23/2022    Procedure: cemontoplasty;  Surgeon: Yung Flores MD;  Location: [facility] OR 2ND FLR;  Service: Orthopedics;  Laterality: Right;    RADIOFREQUENCY ABLATION, BONE, PERCUTANEOUS Right 8/23/2022    Procedure: RADIOFREQUENCY ABLATION,BONE;  Surgeon: Yung Flores MD;  Location: [facility] OR [facility] FLR;  Service: Orthopedics;  Laterality: Right;    REPAIR, PSEUDOANEURYSM, ARTERY, FEMORAL Right 8/19/2022    Procedure: REPAIR, PSEUDOANEURYSM, ARTERY, FEMORAL;  Surgeon: Thomas Nicolas MD;  [facility];  Service: Peripheral Vascular;  Laterality: Right;  R PFA (3rd branch) pseudoaneurysm        Time Tracking:     PT Received On: 08/24/22  PT Start Time: 1300     PT Stop Time: 1344  PT Total Time (min): 44 min     Billable Minutes: Evaluation 8 and Therapeutic Activity 36    08/24/2022

## 2022-08-24 NOTE — NURSING TRANSFER
Nursing Transfer Note      8/23/2022     Reason patient is being transferred: post op    Transfer : 528    Transfer via bed    Transfer with chart/meds    Transported by PCT    Medicines sent: Heparin gtt    Any special needs or follow-up needed: CT and x-ray enroute to room. Start Heparin gtt when PTT results.    Chart send with patient: YES     Notified: 8/23/2022 @ 2300 (Aston) - no answer    Patient reassessed at: 2300

## 2022-08-24 NOTE — TRANSFER OF CARE
Anesthesia Transfer of Care Note    Patient: Donis Jimenez    Procedure(s) Performed: Procedure(s) (LRB):  HEMIARTHROPLASTY - right, lateral, bean bag, ablation supine after, ancef, prevena (Right)  cemontoplasty (Right)  RADIOFREQUENCY ABLATION,BONE (Right)    Patient location: PACU    Anesthesia Type: general    Transport from OR: Transported from OR on 6-10 L/min O2 by face mask with adequate spontaneous ventilation    Post pain: adequate analgesia    Post assessment: no apparent anesthetic complications and tolerated procedure well    Post vital signs: stable    Level of consciousness: sedated    Nausea/Vomiting: no nausea/vomiting    Complications: none    Transfer of care protocol was followed      Last vitals:   Visit Vitals  BP (!) 106/55   Pulse 75   Temp 37.3 °C (99.1 °F)   Resp 20   Ht 6' (1.829 m)   Wt 54.4 kg (119 lb 14.9 oz)   SpO2 (!) 93%   BMI 16.27 kg/m²

## 2022-08-25 PROBLEM — C79.51 MALIGNANT NEOPLASM METASTATIC TO BONE: Status: ACTIVE | Noted: 2022-01-01

## 2022-08-25 PROBLEM — Z79.01 ANTICOAGULATED ON WARFARIN: Status: ACTIVE | Noted: 2022-01-01

## 2022-08-25 NOTE — PROGRESS NOTES
[facility] - [facility]  Orthopedics  Progress Note    Patient Name: Donis Jimenez  MRN: [id number]  Admission Date: 8/18/2022  Hospital Length of Stay: 7 days  Attending Provider: Katina Khoury MD  Primary Care Provider: Primary Doctor No  Follow-up For: Procedure(s) (LRB):  HEMIARTHROPLASTY - right, lateral, bean bag, ablation supine after, ancef, prevena (Right)  cemontoplasty (Right)  RADIOFREQUENCY ABLATION,BONE (Right)    Post-Operative Day: 2 Days Post-Op  Subjective:     Principal Problem:Pathologic fracture of neck of right femur    Principal Orthopedic Problem: same, s/p R hip hemiarthroplasty with ablation, cementoplasty, and percutaneous fixation of pelvis 8/23    Interval History: NAEON. VSS. Did very well in OR with minimal blood loss, no need for transfusion. Pain in right hip is much improved today and he has increased ROM of the RLE, able to flex and extend knee.   Ambulated 10 feet twice with PT.        Review of patient's allergies indicates:  No Known Allergies    Current Facility-Administered Medications   Medication    0.9%  NaCl infusion (for blood administration)    acetaminophen tablet 1,000 mg    ceFAZolin 2 gram in dextrose 5% 100 mL IVPB (premix)    dextrose 10% bolus 125 mL    dextrose 10% bolus 250 mL    docusate sodium capsule 100 mg    EScitalopram oxalate tablet 20 mg    glucagon (human recombinant) injection 1 mg    glucose chewable tablet 16 g    glucose chewable tablet 24 g    heparin 25,000 units in dextrose 5% 250 mL (100 units/mL) infusion LOW INTENSITY nomogram - [facility]    melatonin tablet 6 mg    methocarbamoL tablet 1,000 mg    multivitamin tablet    mupirocin 2 % ointment    ondansetron disintegrating tablet 8 mg    oxyCODONE immediate release tablet 5 mg    pregabalin capsule 75 mg    promethazine tablet 25 mg    ROPIvacaine (PF) 2 mg/ml (0.2%) solution    sodium chloride 0.9% flush 10 mL    sodium chloride 0.9% flush 10 mL    sodium chloride 0.9% flush  3 mL    vitamin D 1000 units tablet 1,000 Units    warfarin (COUMADIN) tablet 5 mg     Objective:     Vital Signs (Most Recent):  Temp: 98.1 °F (36.7 °C) (08/25/22 1529)  Pulse: 75 (08/25/22 1529)  Resp: 12 (08/25/22 1335)  BP: 124/68 (08/25/22 1529)  SpO2: 95 % (08/25/22 1529)   Vital Signs (24h Range):  Temp:  [96.8 °F (36 °C)-98.8 °F (37.1 °C)] 98.1 °F (36.7 °C)  Pulse:  [67-75] 75  Resp:  [12-20] 12  SpO2:  [93 %-98 %] 95 %  BP: (104-126)/(56-70) 124/68     Weight: 54.4 kg (119 lb 14.9 oz)  Height: 6' (182.9 cm)  Body mass index is 16.27 kg/m².    No intake or output data in the 24 hours ending 08/25/22 1556      Ortho/SPM Exam  Gen:  No acute distress, well-developed, well nourished.  CV:  Peripherally well-perfused.   Respiratory:  Normal respiratory effort. No accessory muscle use.   Head/Neck:  Normocephalic.  Atraumatic. Sclera anicteric. TM. Neck supple.  Neuro: CN 2-12 grossly intact. No FND. Awake. Alert. Oriented to person, place, time, and situation.      MSK:  Right Lower Extremity  Inspection  - Dressings c/d/i  - No swelling  - No ecchymosis, erythema, or signs of cellulitis  Palpation  -  Appropriately TTP at incision sites  Range of motion  - AROM and PROM of the ankle and foot intact without pain, able to flex and extend knee, minimal hip ROM at this time  Stability  - No evidence of joint dislocation or abnormal laxity  Neurovascular  - TA/EHL/Gastroc/FHL assessed in isolation without deficit  - SILT throughout  - Compartments soft  - DP palpated   - Muscle tone decreased        Significant Labs: CBC:   Recent Labs   Lab 08/23/22  1906 08/24/22  0838 08/25/22  0843   WBC  --  10.00 10.95   HGB  --  11.2* 10.4*   HCT 28* 33.5* 31.6*   PLT  --  268 248       CMP:   Recent Labs   Lab 08/24/22  0838 08/25/22  0843     134* 135*   K 4.3  4.4 3.5     102 103   CO2 22*  21* 21*   *  120* 107   BUN 15  15 15   CREATININE 0.8  0.8 0.8   CALCIUM 7.7*  7.8* 7.6*   PROT 5.7*   5.8* 5.6*   ALBUMIN 2.3*  2.2* 2.3*   BILITOT 0.7  0.7 0.6   ALKPHOS 124  120 137*   AST 29  31 44*   ALT 8*  8* 13   ANIONGAP 11  11 11       All pertinent labs within the past 24 hours have been reviewed.    Significant Imaging:   All pertinent imaging has been reviewed.        Assessment/Plan:     * Pathologic fracture of neck of right femur s/p hemiarthroplasty on 8/23/2022  Donis Jimenez is a 61 y.o. male with PMH of bacterial endocarditis, s/p AV and MV mechanical valve replacement (on warfarin), CKD, tobacco abuse presenting with pathologic R femur fracture. Bony lesions in femur and pelvis, as well as lung mass on CXR at outside hospital. Imaging studies pending. 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 Transferred for ortho oncology evaluation and management. Extensive smoking history. Deep femoral artery pseudoaneurysm ablation by vascular on 8/19. Pain well controlled.     Plan:  DVT ppx: heparin drip per vascular, stable head CT, NSGY signed off  Multimodal pain control  Metastatic workup ongoing, MRI brain completed, PET/CT scan completed, heme/onc consulted, MRI entire spine pending  WBAT with walker, posterior hip precautions              Cortez Hagan MD  Orthopedics  [facility] - Surgery

## 2022-08-25 NOTE — CONSULTS
PharmD Consult received for:    1.) Admit medication history/reconciliation:   Medication history completed by Samantha Chisholm, pharmacy technician. Please see the pharmacy med rec note documented on 08/24/2022 for the updated medication list.    Potentially problematic discrepancies with current MAR  o Patient IS taking the following which was not ordered upon admit  Medication Sig    ferrous gluconate (FERGON) 324 MG tablet Take 1 tablet by mouth once daily.    LORazepam (ATIVAN) 1 MG tablet Take 1 mg by mouth 3 (three) times daily as needed for Anxiety.    pravastatin (PRAVACHOL) 40 MG tablet Take 40 mg by mouth every evening.     Non-formulary Medication Formulary Alternative     esomeprazole (NEXIUM) 20 MG capsule  Take 20 mg by mouth once daily. Pantoprazole 20 mg tablet  Take 20 mg by mouth once daily.     o Patient is taking a drug DIFFERENTLY than how ordered upon admit   warfarin (COUMADIN) 3 MG tablet Take 3 mg by mouth Daily.     Pharmacy will sign-off, please re-consult as needed    Thank you for the consult,  Gladys Valentine, PharmD, BCPS  [id number]     **Note: Consults are reviewed Monday-Friday 7:00am-3:30pm. The above recommendations are only suggested. The recommendations should be considered in conjunction with all patient factors.**

## 2022-08-25 NOTE — PROGRESS NOTES
[facility] - Surgery  Wound Care    Patient Name:  Donis Jimenez   MRN:  [id number]  Date: 8/25/2022  Diagnosis: Pathologic fracture of neck of right femur    History:     Past Medical History:   Diagnosis Date    Endocarditis     Pacemaker     Pneumonia        Social History     Socioeconomic History    Marital status:    Tobacco Use    Smoking status: Current Every Day Smoker     Packs/day: 1.00     Types: Cigarettes    Smokeless tobacco: Never Used       Precautions:     Allergies as of 08/17/2022    (No Known Allergies)       WO Assessment Details/Treatment   Patient seen for woundcare consult. Patient can stand with assist. Sacrum was assessed and family present. The wound is in the gluteal cleft possibly due to moisture. The sacrum area is bony so a mepilex foam was changed to apply a new one. Recommend tried for the wound as well as a mepilex foam to protect the bony area.Recommend a wedge per patient request and a waffle mattress to protect the prominences.     08/25/22 1200   WOCN Assessment   WOCN Total Time (mins) 30   Visit Date 08/25/22   Visit Time 1200   Consult Type New   WOCN Speciality Wound   Wound moisture   Number of Wounds 1   Intervention assessed;changed;applied;chart review;orders   Teaching on-going   Skin Interventions   Skin Protection silicone foam dressing in place;skin sealant/moisture barrier applied   Positioning   Body Position supine   Pressure Injury Prevention    Sacral Foam Dressing Check        Altered Skin Integrity 08/24/22 1200 Gluteal cleft #1 Moisture associated dermatitis   Date First Assessed/Time First Assessed: 08/24/22 1200   Altered Skin Integrity Present on Admission: suspected hospital acquired  Location: Gluteal cleft  Wound Number: #1  Is this injury device related?: No  Primary Wound Type: Moisture associated d...   Wound Image    Description of Altered Skin Integrity Partial thickness tissue loss. Shallow open ulcer with a red or pink wound bed, without  slough. Intact or Open/Ruptured Serum-filled blister.   Dressing Appearance Dry;Intact;Clean   Drainage Amount None   Appearance Red;Blistered   Tissue loss description Partial thickness   Black (%), Wound Tissue Color 0 %   Red (%), Wound Tissue Color 100 %   Yellow (%), Wound Tissue Color 0 %   Periwound Area Blistered;Intact   Wound Edges Other (see comments)   Wound Length (cm) 4.5 cm   Wound Width (cm) 3 cm   Wound Depth (cm) 0.5 cm   Wound Volume (cm^3) 6.75 cm^3   Wound Surface Area (cm^2) 13.5 cm^2   Dressing Removed;Applied;Changed;Foam;Silicone   Dressing Change Due 08/26/22 08/25/2022

## 2022-08-25 NOTE — PLAN OF CARE
Problem: Occupational Therapy  Goal: Occupational Therapy Goal  Description: Goals to be met by: 9/24/2022 (1 month)     Patient will increase functional independence with ADLs by performing:    UE Dressing with [place].  LE Dressing with Minimal Assistance using hip kit.  Grooming while standing at sink with Supervision using LRAD.  Toileting from toilet with Supervision for hygiene and clothing management.   Rolling to Bilateral with [place].   Supine to sit with Supervision.  Step transfer with Supervision using LRAD.  Toilet transfer to toilet with Supervision using LRAD.  Pt to adhere to posterior hip precautions during fx'l mobility with 100% accuracy.  Pt to demo safe understanding and recite posterior hip precautions with 100% accuracy.     Outcome: Ongoing, Progressing

## 2022-08-25 NOTE — ASSESSMENT & PLAN NOTE
· Found on routine imaging of brain as part of metastatic work-up on brain MRI on 8/22. MRI showed left cerebral convexity and posterior to the right occipital lobe raising concern for small acute subdural hemorrhage.   · Neurosurgery was consulted and no surgical intervention needed and okay with continuing with anticoagulation for his mechanical heart valves.   · Repeat CT scan of head and MRI of brain on 8/24 after therapeutic  IV Heparin drip restarted for anticoagulation was stable and okay to continue with anticoagulation as per Neurosurgery on 8/25.

## 2022-08-25 NOTE — PT/OT/SLP PROGRESS
Occupational Therapy   Co-Treatment with Physical Therapy    Name: Donis Jimenez  MRN: [id number]  Admitting Diagnosis:  Pathologic fracture of neck of right femur  2 Days Post-Op   Procedure(s):  HEMIARTHROPLASTY - right, lateral, bean bag, ablation supine after, ancef, prevena  cemontoplasty  RADIOFREQUENCY ABLATION,BONE     Recommendations:     Discharge Recommendations: rehabilitation facility  Discharge Equipment Recommendations:  bedside commode, walker, rolling, hip kit  Barriers to discharge:  Other (Comment) (increased level of (A) for ADLs and mobility required at this time)    Assessment:     Donis Jimenez is a 61 y.o. male with a medical diagnosis of Pathologic fracture of neck of right femur.  He presents with the following performance deficits affecting function are weakness, impaired endurance, impaired self care skills, impaired functional mobility, gait instability, impaired cardiopulmonary response to activity, impaired balance, decreased lower extremity function, decreased ROM, orthopedic precautions, pain, decreased safety awareness.     Patient agreeable to OT session with improved tolerance. Patient participative throughout with good compliance with orthopedic precautions. Patient primarily limited by pain today, but made good progress towards goals. Patient would benefit from continued OT services to address deficits and progress towards goals. Continue OT POC.    Rehab Prognosis:  Good; patient would benefit from acute skilled OT services to address these deficits and reach maximum level of function.       Plan:     Patient to be seen 4 x/week to address the above listed problems via self-care/home management, therapeutic activities, therapeutic exercises, wheelchair management/training  · Plan of Care Expires: 09/24/22  · Plan of Care Reviewed with: patient    Subjective   Patient agreeable to therapy.    Pain/Comfort:  · Pain Rating 1: 6/10  · Location - Side 1: Right  · Location -  Orientation 1: generalized  · Location 1: hip  · Pain Addressed 1: Reposition, Distraction, Cessation of Activity  · Pain Rating Post-Intervention 1: 6/10    Objective:     Communicated with: RN and OTR prior to session.  Patient found HOB elevated with FCD, SCD, perineural catheter, peripheral IV, telemetry upon OT entry to room.    General Precautions: Standard, fall   Orthopedic Precautions:RLE posterior precautions, RLE weight bearing as tolerated   Braces: N/A  Respiratory Status: Nasal cannula, flow 3 L/min     Occupational Performance:     Bed Mobility:    · Patient completed Scooting/Bridging with contact guard assistance and min cues for sequencing and weight shifting  · Patient completed Supine to Sit with minimum assistance and HOB elevated with mod cues for hand placement, sequencing, and (R)LE management     Functional Mobility/Transfers:  · Patient completed Sit <> Stand Transfer with minimum assistance  with  rolling walker   · Patient completed Chair Transfer using Step Transfer technique with minimum assistance with rolling walker  · Patient completed Toilet Transfer Step Transfer technique with minimum assistance with  rolling walker and 3:1 raised toilet commode  · Functional Mobility: to/from bathroom using RW with CGA    Activities of Daily Living:  · Grooming: minimum assistance for dynamic standing balance to complete oral, hand, and facial hygiene standing sink side  · Tolerated ~5 min standing sink side; no rest breaks  · Toileting: contact guard assistance for balance to manage gown, safety and cues for technique to maximize functional reach       [facility] 6 Click ADL: 17    Treatment & Education:  · Education provided on:  · OT POC, goals, and current progress  · Hip kit and utilization to maintain posterior hip precautions. Patient's spouse encouraged to bring clothing garments to practice and instruct using hip kit.   · ADL re-training as indicated above  · Functional transfer/mobility  training as indicated above  · Addressed all patient questions/concerns within AP scope of practice.   · Co-treatment performed with PT due to patient's complexity and benefit of 2 skilled therapists to facilitate functional and safe occupational performance, accommodate patient's activity tolerance, and maximize patient's participation in therapy.     Patient left up in chair with all lines intact, call button in reach and spouse presentEducation:      GOALS:   Multidisciplinary Problems     Occupational Therapy Goals        Problem: Occupational Therapy    Goal Priority Disciplines Outcome Interventions   Occupational Therapy Goal     OT, PT/OT Ongoing, Progressing    Description: Goals to be met by: 9/24/2022 (1 month)     Patient will increase functional independence with ADLs by performing:    UE Dressing with [place].  LE Dressing with Minimal Assistance using hip kit.  Grooming while standing at sink with Supervision using LRAD.  Toileting from toilet with Supervision for hygiene and clothing management.   Rolling to Bilateral with [place].   Supine to sit with Supervision.  Step transfer with Supervision using LRAD.  Toilet transfer to toilet with Supervision using LRAD.  Pt to adhere to posterior hip precautions during fx'l mobility with 100% accuracy.  Pt to demo safe understanding and recite posterior hip precautions with 100% accuracy.                      Time Tracking:     OT Date of Treatment: 08/25/22  OT Start Time: 1125  OT Stop Time: 1219  OT Total Time (min): 54 min    Billable Minutes:Self Care/Home Management 38  Therapeutic Activity 16    OT/AP: AP     AP Visit Number: 1    8/25/2022

## 2022-08-25 NOTE — PT/OT/SLP PROGRESS
Physical Therapy   Progress Note    Patient Name:  Donis Jimenez  MRN: [id number]    Admit Date: 8/18/2022  Admitting Diagnosis:  Pathologic fracture of neck of right femur  Length of Stay: 7 days  Recent Surgery: Procedure(s) (LRB):  HEMIARTHROPLASTY - right, lateral, bean bag, ablation supine after, ancef, prevena (Right)  cemontoplasty (Right)  RADIOFREQUENCY ABLATION,BONE (Right) 2 Days Post-Op    Recommendations:     Discharge Recommendations: Inpatient Rehabilitation Facility   Equipment recommendations: rolling walker, bedside commode and hip kit  Barriers to discharge: Increased level of skilled assistance required    Assessment:     Donis Jimenez is a 61 y.o. male admitted to [facility] on 8/18/2022 with medical diagnosis of Pathologic fracture of neck of right femur. Pt presents with weakness, impaired endurance, impaired functional mobility, gait instability, impaired balance, impaired self care skills, pain, orthopedic precautions. Pt is progressing towards goals, but has not yet reached prior level of function. Pt tolerated session well today, able to ambulate to restroom, performing standing ADLs, and return back to recliner. Pt had 2 posterior mild LOB while standing at sink performing ADLs but was able to recover without (A). Donis Jimenez would benefit from continued acute PT intervention to improve quality of life, focus on recovery of impairments, provide patient/caregiver education, reduce fall risk, and maximize (I) and safety with functional mobility. Once medically stable, recommending pt discharge to Inpatient Rehabilitation Facility .      Rehab Prognosis: Good    Plan:     During this hospitalization, patient to be seen daily to address the identified rehab impairments via gait training, therapeutic activities, therapeutic exercises, neuromuscular re-education and progress towards stated goals.     Plan of Care Expires:  09/23/22  Plan of Care reviewed with: patient and spouse    This plan of care  "has been discussed with the patient/caregiver, who was included in its development and is in agreement with the identified goals and treatment plan.     Subjective     Communicated with RN prior to session.  Patient found supine upon PT entry to room, agreeable to therapy session. Pt's  present during session.    Patient/Family Comments/goals: "Oh my gosh yes I would like to go to the bathroom and freshen up"    Pain/Comfort:  · Pain Rating 1: 6/10  · Location - Side 1: Right  · Location - Orientation 1: generalized  · Location 1: hip  · Pain Addressed 1: Reposition, Distraction, Cessation of Activity  · Pain Rating Post-Intervention 1: 6/10    Patients cultural, spiritual, Mormonism conflicts given the current situation: None identified     Objective:     Patient found with: SCD, peripheral IV, perineural catheter, telemetry    General Precautions: Standard, fall   Orthopedic Precautions:RLE posterior precautions, RLE weight bearing as tolerated   Braces: N/A   Oxygen Device: nasal cannula 3L; 92-96% on RA (RN notified pt taken off O2)    Cognition:   Pt is Alert during session.    Therapist provided skilled verbal and tactile cueing to facilitate the following functional mobility tasks. Listed tasks are focused on recovery of impairments and improving pt's independence and efficiency with bed mobility, transfers and ambulation as able.     Bed Mobility:  · Supine > Sit: Minimal Assistance   · Cueing for sequencing to maintain hip precautions; assist with moving R leg    Transfers:   · Sit <> Stand Transfer: Minimal Assistance   · x1 trial from eob with RW  · x2 trials from bsc  · Increased time spent rising with v/c and segmented motion of kicking RLE out; difficulty transitioning R hand from support surface to RW  · Bed <> Chair: Minimal Assistance-CGA with RW AD   · V/c for sequencing of steps  · Pt likes to confirm with therapist correct sequencing for forward vs. Backwards steps               "   Gait:  · Distance: 10'x2  · Assistance level: Contact Guard Assistance  · Assistive Device: rolling walker  · Gait Assessment: decreased step length , decreased gait speed and antalgic gait pattern   · No LOB while ambulating    Balance:  · Dynamic Sitting: FAIR+: Maintains balance through MINIMAL excursions of active trunk motion  · Standing:  · Static: FAIR+: Takes MINIMAL challenges from all directions with occasional LOB posteriorly while performing ADLs at sink  · Dynamic: FAIR: Needs CONTACT GUARD during gait    Outcome Measure: AM-PAC 6 CLICK MOBILITY  Total Score:17     Patient/Caregiver Education and Additional Therapeutic Activities/Exercises   Education provided regarding hip kit use. Spouse plans to brings pt's clothes tomorrow for demonstration of use. Education provided energy conservation when pt d/c home to ensure pt is safe while ambulating and performing dynamic activities.    Provided pt/caregiver education regarding:    PT POC and goals for therapy    Safety with mobility and fall risk    Safe management of AD as needed    Energy conservation techniques    Instruction on use of call button and importance of calling nursing staff for assistance with mobility     Patient/caregiver able to verbalize understanding; will follow-up with pt/caregiver during current admit for additional questions/concerns within scope of practice.     White board updated.     Patient left up in chair with all lines intact, call button in reach and  present.    Goals:     Multidisciplinary Problems     Physical Therapy Goals        Problem: Physical Therapy    Goal Priority Disciplines Outcome Goal Variances Interventions   Physical Therapy Goal     PT, PT/OT Ongoing, Progressing     Description: Goals to be met by: 22    Patient will increase functional independence with mobility by performin. Supine to sit with Stand-by Assistance - not met  2. Sit to stand transfer with Stand-by Assistance -  not met  3. Bed to chair transfer with Supervision using Rolling Walker - not met  4. Gait  x 150 feet with Supervision using Rolling Walker. - not met  5. Ascend/descend 4 stairs with no handrails Contact Guard Assistance using No Assistive Device.  - not met  6. Lower extremity exercise program x30 reps per handout, with independence - not met                     Time Tracking:       PT Received On: 08/25/22  PT Start Time: 1125     PT Stop Time: 1219  PT Total Time (min): 54 min     Billable Minutes: Gait Training 15, Therapeutic Activity 25 and Neuromuscular Re-education 14    08/25/2022

## 2022-08-25 NOTE — ASSESSMENT & PLAN NOTE
· Stable and controlled.   · Found on imaging on admit and Vascular Surgery consulted.   · Patient taken to OR on 8/19 and had embolization of ight profunda femoral artery pseudoaneurysm with 5 mm coil.

## 2022-08-25 NOTE — PROGRESS NOTES
Received a call/message from Fernando in the MRI Department in relation to this patient needing to be scheduled for a MRI and has a Medtronic ICD/Pacemaker.  Patient's Device is MRI compatible/conditional.  To meet protocol the Pacemaker/ICD must have been implanted no less than 6 weeks prior to scheduled date of Scan.  ICD/PPM and leads must be from same .  MRI informed ordering MD must input a Cardiac Device Check in clinic and hospital order, patient must have an xray within 6 months or less prior to MRI reprogramming.  Chest xray to be reviewed by Radiologist.      Medtronic [place] [id number]  RA Lead: [id number]  RV Lead: [id number]/52

## 2022-08-25 NOTE — SUBJECTIVE & OBJECTIVE
Interval History: Patient reports mild soreness in right lateral thigh area. Patient remains on IV Heparin drip as INR remains subtherapeutic on Warfarin at 1.3 this am. Repeat CT scan of head done yesterday on IV Heparin drip stable with no expansion of SDH noted by Radiology or Neurosurgery. Patient POD #2 from right hip hemiarthroplasty for pathologic femoral neck fracture by Dr. Flores. Therapy recommending IP rehab on discharge but patient insistent he wants to go home with outpatient PT/OT on discharge and states his  will be around to help him 24/7 at home.      Review of Systems   Constitutional:  Negative for chills and fever.   Respiratory:  Negative for cough and shortness of breath.    Cardiovascular:  Negative for chest pain and leg swelling.   Gastrointestinal:  Negative for abdominal pain, diarrhea, nausea and vomiting.   Musculoskeletal:  Positive for arthralgias (Right hip).   Skin:  Negative for rash.   Neurological:  Negative for dizziness and headaches.   Psychiatric/Behavioral:  Negative for agitation and confusion.    All other systems reviewed and are negative.  Objective:     Vital Signs (Most Recent):  Temp: 98.1 °F (36.7 °C) (08/25/22 1529)  Pulse: 75 (08/25/22 1642)  Resp: 18 (08/25/22 1820)  BP: 124/68 (08/25/22 1529)  SpO2: 95 % (08/25/22 1529) on room air Vital Signs (24h Range):  Temp:  [96.8 °F (36 °C)-98.8 °F (37.1 °C)] 98.1 °F (36.7 °C)  Pulse:  [67-75] 75  Resp:  [12-18] 18  SpO2:  [93 %-98 %] 95 %  BP: (117-126)/(59-70) 124/68     Weight: 54.4 kg (119 lb 14.9 oz)  Body mass index is 16.27 kg/m².  No intake or output data in the 24 hours ending 08/25/22 1831   Physical Exam  Constitutional:       General: He is awake.      Appearance: Normal appearance. He is underweight. He is ill-appearing (Chronic).   Eyes:      General: No scleral icterus.  Cardiovascular:      Rate and Rhythm: Normal rate and regular rhythm.      Heart sounds: No murmur heard.  Pulmonary:      Effort:  Pulmonary effort is normal. No respiratory distress.      Breath sounds: Normal breath sounds. No wheezing or rales.   Abdominal:      General: Abdomen is flat. Bowel sounds are normal. There is no distension.      Palpations: Abdomen is soft.      Tenderness: There is no abdominal tenderness.   Musculoskeletal:      Right lower leg: No edema.      Left lower leg: No edema.      Comments: Right hip bandages C/D/I   Skin:     General: Skin is warm.      Findings: No rash.   Neurological:      General: No focal deficit present.      Mental Status: He is alert and oriented to person, place, and time.   Psychiatric:         Mood and Affect: Mood normal.         Behavior: Behavior normal. Behavior is cooperative.         Thought Content: Thought content normal.         Judgment: Judgment normal.       Significant Labs: CBC:   Recent Labs   Lab 08/23/22  1906 08/24/22  0838 08/25/22  0843   WBC  --  10.00 10.95   HGB  --  11.2* 10.4*   HCT 28* 33.5* 31.6*   PLT  --  268 248     CMP:   Recent Labs   Lab 08/24/22  0838 08/25/22  0843     134* 135*   K 4.3  4.4 3.5     102 103   CO2 22*  21* 21*   *  120* 107   BUN 15  15 15   CREATININE 0.8  0.8 0.8   CALCIUM 7.7*  7.8* 7.6*   PROT 5.7*  5.8* 5.6*   ALBUMIN 2.3*  2.2* 2.3*   BILITOT 0.7  0.7 0.6   ALKPHOS 124  120 137*   AST 29  31 44*   ALT 8*  8* 13   ANIONGAP 11  11 11     Coagulation:   Recent Labs   Lab 08/25/22  0843   INR 1.3*   APTT 49.5*     Magnesium:   Recent Labs   Lab 08/24/22  0838   MG 2.1       Significant Imaging: I have reviewed all pertinent imaging results/findings within the past 24 hours.

## 2022-08-25 NOTE — ASSESSMENT & PLAN NOTE
· Orthopedics consulted on admit after patient found to have extensive osseous mets to right femur and pelvis with pathologic femoral neck fracture.   · Patient taken to OR on 8/23 by Orthopedic Oncology with Dr. Flores and underwent right hip hemiarthroplasty with ablation, cementoplasty, and percutaneous fixation of pelvis.  · Post-op, patient is weight bearing as tolerated with posterior hip precautions to right lower extremity as per Orthopedics.   · Patient to resume therapeutic Warfarin post-op for his mechanical aortic and mitral shane that were held pre-op so and bridging with IV heparin while subtherapeutic on Warfarin so mo other chemical DVT prophylaxis needed post-op.   · PT/OT consulted post-op and recommending IP Rehab but patient prefers home with outpatient therapy and states his spouse will be available to provide care for him at home.   · Pain controled post-op and continue multimodal pain management withTylenol 1 gram po TID, Robaxin 1000 mg po 4 times daily, Lyrica 75 mg po BID. Continue Ropivicaine infusion via perineural pain catheter per Anesthesia Pain Service.   · Orthopedics following and managing surgical site.

## 2022-08-25 NOTE — ASSESSMENT & PLAN NOTE
· Resolved.   · Present on admit and related to malignancy. Corrected calcium 11.4 on admit. Patient given dose of IV Zometa 8/20 to treat.

## 2022-08-25 NOTE — SUBJECTIVE & OBJECTIVE
Principal Problem:Pathologic fracture of neck of right femur    Principal Orthopedic Problem: same, s/p R hip hemiarthroplasty with ablation, cementoplasty, and percutaneous fixation of pelvis 8/23    Interval History: NAEON. VSS. Did very well in OR with minimal blood loss, no need for transfusion. Pain in right hip is much improved today and he has increased ROM of the RLE, able to flex and extend knee.   Ambulated 10 feet twice with PT.        Review of patient's allergies indicates:  No Known Allergies    Current Facility-Administered Medications   Medication    0.9%  NaCl infusion (for blood administration)    acetaminophen tablet 1,000 mg    ceFAZolin 2 gram in dextrose 5% 100 mL IVPB (premix)    dextrose 10% bolus 125 mL    dextrose 10% bolus 250 mL    docusate sodium capsule 100 mg    EScitalopram oxalate tablet 20 mg    glucagon (human recombinant) injection 1 mg    glucose chewable tablet 16 g    glucose chewable tablet 24 g    heparin 25,000 units in dextrose 5% 250 mL (100 units/mL) infusion LOW INTENSITY nomogram - [facility]    melatonin tablet 6 mg    methocarbamoL tablet 1,000 mg    multivitamin tablet    mupirocin 2 % ointment    ondansetron disintegrating tablet 8 mg    oxyCODONE immediate release tablet 5 mg    pregabalin capsule 75 mg    promethazine tablet 25 mg    ROPIvacaine (PF) 2 mg/ml (0.2%) solution    sodium chloride 0.9% flush 10 mL    sodium chloride 0.9% flush 10 mL    sodium chloride 0.9% flush 3 mL    vitamin D 1000 units tablet 1,000 Units    warfarin (COUMADIN) tablet 5 mg     Objective:     Vital Signs (Most Recent):  Temp: 98.1 °F (36.7 °C) (08/25/22 1529)  Pulse: 75 (08/25/22 1529)  Resp: 12 (08/25/22 1335)  BP: 124/68 (08/25/22 1529)  SpO2: 95 % (08/25/22 1529)   Vital Signs (24h Range):  Temp:  [96.8 °F (36 °C)-98.8 °F (37.1 °C)] 98.1 °F (36.7 °C)  Pulse:  [67-75] 75  Resp:  [12-20] 12  SpO2:  [93 %-98 %] 95 %  BP: (104-126)/(56-70) 124/68     Weight: 54.4 kg (119 lb 14.9  oz)  Height: 6' (182.9 cm)  Body mass index is 16.27 kg/m².    No intake or output data in the 24 hours ending 08/25/22 1556      Ortho/SPM Exam  Gen:  No acute distress, well-developed, well nourished.  CV:  Peripherally well-perfused.   Respiratory:  Normal respiratory effort. No accessory muscle use.   Head/Neck:  Normocephalic.  Atraumatic. Sclera anicteric. TM. Neck supple.  Neuro: CN 2-12 grossly intact. No FND. Awake. Alert. Oriented to person, place, time, and situation.      MSK:  Right Lower Extremity  Inspection  - Dressings c/d/i  - No swelling  - No ecchymosis, erythema, or signs of cellulitis  Palpation  -  Appropriately TTP at incision sites  Range of motion  - AROM and PROM of the ankle and foot intact without pain, able to flex and extend knee, minimal hip ROM at this time  Stability  - No evidence of joint dislocation or abnormal laxity  Neurovascular  - TA/EHL/Gastroc/FHL assessed in isolation without deficit  - SILT throughout  - Compartments soft  - DP palpated   - Muscle tone decreased        Significant Labs: CBC:   Recent Labs   Lab 08/23/22  1906 08/24/22  0838 08/25/22  0843   WBC  --  10.00 10.95   HGB  --  11.2* 10.4*   HCT 28* 33.5* 31.6*   PLT  --  268 248       CMP:   Recent Labs   Lab 08/24/22  0838 08/25/22  0843     134* 135*   K 4.3  4.4 3.5     102 103   CO2 22*  21* 21*   *  120* 107   BUN 15  15 15   CREATININE 0.8  0.8 0.8   CALCIUM 7.7*  7.8* 7.6*   PROT 5.7*  5.8* 5.6*   ALBUMIN 2.3*  2.2* 2.3*   BILITOT 0.7  0.7 0.6   ALKPHOS 124  120 137*   AST 29  31 44*   ALT 8*  8* 13   ANIONGAP 11  11 11       All pertinent labs within the past 24 hours have been reviewed.    Significant Imaging:   All pertinent imaging has been reviewed.

## 2022-08-25 NOTE — ANESTHESIA POST-OP PAIN MANAGEMENT
Acute Pain Service Progress Note    Donis Jimenez is a 61 y.o., male, [id number].    Surgery:    HEMIARTHROPLASTY - right (Right Pelvis)  RADIOFREQUENCY ABLATION,BONE (Right Pelvis)     Post Op Day #: 2     Catheter type: perineural  R SIFI     Infusion type: Ropivacaine 0.2% 15cc q3h intermittent bolus      Problem List:    Active Hospital Problems    Diagnosis  POA    *Pathologic fracture of neck of right femur s/p hemiarthroplasty on 8/23/2022 [M84.451A]  Yes    Body mass index (BMI) less than 19 [Z68.1]  Not Applicable    H/O mitral valve replacement with mechanical valve [Z95.2]  Not Applicable    Hypercalcemia of malignancy [E83.52]  Yes    Lytic bone lesion of hip [M89.8X5]  Yes    Acute arterial ischemic stroke, multifocal, multiple vascular territories [I63.89]  Yes    Subdural hematoma [S06.5X9A]  Yes    Pseudoaneurysm of right femoral artery [I72.4]  Yes      Resolved Hospital Problems   No resolved problems to display.       Subjective:     General appearance: alert, oriented. Overall doing well, slight increase in pain compared to yesterday   Pain with rest: 4    Numbers   Pain with movement: 6    Numbers   Side Effects    1. Pruritis No    2. Nausea No    3. Motor Blockade No, 0=Ability to raise lower extremities off bed    4. Sedation No, 1=awake and alert    Objective:     Catheter site: leaking from catheter insertion site, though appears to remain in adequate position. Reinforced dressing with tegaderms.       Vitals   Vitals:    08/25/22 1335   BP:    Pulse:    Resp: 12   Temp:      Lab Results   Component Value Date    WBC 10.95 08/25/2022    HGB 10.4 (L) 08/25/2022    HCT 31.6 (L) 08/25/2022    MCV 88 08/25/2022     08/25/2022       CMP  Sodium   Date Value Ref Range Status   08/25/2022 135 (L) 136 - 145 mmol/L Final     Potassium   Date Value Ref Range Status   08/25/2022 3.5 3.5 - 5.1 mmol/L Final     Chloride   Date Value Ref Range Status   08/25/2022 103 95 - 110 mmol/L Final      CO2   Date Value Ref Range Status   08/25/2022 21 (L) 23 - 29 mmol/L Final     Glucose   Date Value Ref Range Status   08/25/2022 107 70 - 110 mg/dL Final     BUN   Date Value Ref Range Status   08/25/2022 15 6 - 20 mg/dL Final     Creatinine   Date Value Ref Range Status   08/25/2022 0.8 0.5 - 1.4 mg/dL Final     Calcium   Date Value Ref Range Status   08/25/2022 7.6 (L) 8.7 - 10.5 mg/dL Final     Total Protein   Date Value Ref Range Status   08/25/2022 5.6 (L) 6.0 - 8.4 g/dL Final     Albumin   Date Value Ref Range Status   08/25/2022 2.3 (L) 3.5 - 5.2 g/dL Final     Total Bilirubin   Date Value Ref Range Status   08/25/2022 0.6 0.1 - 1.0 mg/dL Final     Comment:     For infants and newborns, interpretation of results should be based  on gestational age, weight and in agreement with clinical  observations.    Premature Infant recommended reference ranges:  Up to 24 hours.............<8.0 mg/dL  Up to 48 hours............<12.0 mg/dL  3-5 days..................<15.0 mg/dL  6-29 days.................<15.0 mg/dL       Alkaline Phosphatase   Date Value Ref Range Status   08/25/2022 137 (H) 55 - 135 U/L Final     AST   Date Value Ref Range Status   08/25/2022 44 (H) 10 - 40 U/L Final     ALT   Date Value Ref Range Status   08/25/2022 13 10 - 44 U/L Final     Anion Gap   Date Value Ref Range Status   08/25/2022 11 8 - 16 mmol/L Final        Meds   Current Facility-Administered Medications   Medication Dose Route Frequency Provider Last Rate Last Admin    0.9%  NaCl infusion (for blood administration)   Intravenous Q24H PRN Cortez Hagan MD        ceFAZolin 2 gram in dextrose 5% 100 mL IVPB (premix)  2 g Intravenous Once Miguel Guerra MD        dextrose 10% bolus 125 mL  12.5 g Intravenous PRN Himanshu Lu MD        dextrose 10% bolus 250 mL  25 g Intravenous PRN Himanshu Lu MD        docusate sodium capsule 100 mg  100 mg Oral Daily Radhika Jackson MD   100 mg at 08/24/22 3847    EScitalopram  oxalate tablet 20 mg  20 mg Oral Daily Miguel Guerra MD   20 mg at 08/25/22 0820    glucagon (human recombinant) injection 1 mg  1 mg Intramuscular PRN Himanshu Lu MD        glucose chewable tablet 16 g  16 g Oral PRN Himanshu Lu MD        glucose chewable tablet 24 g  24 g Oral PRN Himanshu Lu MD        heparin 25,000 units in dextrose 5% 250 mL (100 units/mL) infusion LOW INTENSITY nomogram - [facility]  0-40 Units/kg/hr (Adjusted) Intravenous Continuous Yung Waggoner MD 15.2 mL/hr at 08/25/22 1019 27.941 Units/kg/hr at 08/25/22 1019    melatonin tablet 6 mg  6 mg Oral Nightly Damian Rice MD        methocarbamoL tablet 1,000 mg  1,000 mg Oral Q6H Damian Rice MD   1,000 mg at 08/25/22 1128    multivitamin tablet  1 tablet Oral Daily Cortez Hagan MD   1 tablet at 08/25/22 0820    mupirocin 2 % ointment   Nasal BID Miguel Guerra MD   Given at 08/25/22 0825    ondansetron disintegrating tablet 8 mg  8 mg Oral Q8H PRN Cruz Marquez MD   8 mg at 08/19/22 0817    oxyCODONE immediate release tablet 5 mg  5 mg Oral Q4H PRN Damian Rice MD   5 mg at 08/25/22 1335    pregabalin capsule 75 mg  75 mg Oral BID Damian Rice MD        promethazine tablet 25 mg  25 mg Oral Q6H PRN Cruz Marquez MD        ROPIvacaine (PF) 2 mg/ml (0.2%) solution  2 mL/hr Perineural Continuous Damian Rice MD 2 mL/hr at 08/25/22 0401 2 mL/hr at 08/25/22 0401    sodium chloride 0.9% flush 10 mL  10 mL Intravenous PRN Cruz Marquez MD        sodium chloride 0.9% flush 10 mL  10 mL Intravenous PRN Cruz Marquez MD        sodium chloride 0.9% flush 3 mL  3 mL Intravenous PRN Himanshu uL MD        vitamin D 1000 units tablet 1,000 Units  1,000 Units Oral Daily Cortez Hagan MD   1,000 Units at 08/25/22 0820    warfarin (COUMADIN) tablet 5 mg  5 mg Oral Daily Radhika Jackson MD   5 mg at 08/24/22 5574       Assessment:     Slight increase in pain compared to yesterday, likely 2/2 increased activity and movement. Per  patient, pain still well-tolerated and responds to low dose prn oxy's. Leakage around catheter insertion site, appears to remain in adequate position, reinforced with tegaderms.        Plan:     - Continue R SIFI PNC at 15cc q3h intermittent bolus   - Will pause and pull catheter tomorrow (POD 3), so patient can undergo MRI prior to discharge     - Continue schedule multimodal analgesics:              - tylenol 1g q6h              - robaxin 1g q6h              - increased lyrica 75mg to BID   - melatonin 6mg qhs     - PRN oxycodone 5mg q4h for moderate and severe pain        Will continue to follow. Please call APS/anesthesia with any questions or concerns regarding pain control.           Damian Rice MD (PGY-4)  Anesthesiology

## 2022-08-25 NOTE — ASSESSMENT & PLAN NOTE
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 Paient on Warfarin as outpatient with goal INR 2.5-3.5.   · Warfarin held for surgery and placed on IV Heparin drip as INR subtherapeutic. IV Heparin held for surgery but resumed post-op as INR remains subtherapeutic.Contineu IV heparin while patient with subtherapeutic INR.   · Continue Warfarin 5 mg po daily for now and pharmacy assisting in adjusting Warfarin dosing for goal INR 2.5-3.5.   · Monitor daily PT/INR.   · PharmD consult for Warfarin dosing and adjustments.

## 2022-08-25 NOTE — ASSESSMENT & PLAN NOTE
Donis Jimenez is a 61 y.o. male with PMH of bacterial endocarditis, s/p AV and MV mechanical valve replacement (on warfarin), CKD, tobacco abuse presenting with pathologic R femur fracture. Bony lesions in femur and pelvis, as well as lung mass on CXR at outside hospital. Imaging studies pending. 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 Transferred for ortho oncology evaluation and management. Extensive smoking history. Deep femoral artery pseudoaneurysm ablation by vascular on 8/19. Pain well controlled.     Plan:  DVT ppx: heparin drip per vascular, stable head CT, NSGY signed off  Multimodal pain control  Metastatic workup ongoing, MRI brain completed, PET/CT scan completed, heme/onc consulted, MRI entire spine pending  WBAT with walker, posterior hip precautions

## 2022-08-26 PROBLEM — E83.52 HYPERCALCEMIA OF MALIGNANCY: Status: RESOLVED | Noted: 2022-01-01 | Resolved: 2022-01-01

## 2022-08-26 NOTE — ASSESSMENT & PLAN NOTE
· Seen on MRI brain of brain on 8/22 as part of metastatic work-up and evaluation. MRI of brain showed multiple punctate scattered foci of diffusion restriction within the right cerebellar hemisphere, left erin and left corona radiata potentially representing acute embolic type infarcts.   · Vascular Neurology consulted:  Vascular Neurology  [place] strokes cardioembolic in nature due to patient with mechanical heart valves and not having therapeutic INR on admit.   · Continue IV Heparin drip as INR remains subtherapeutic as patient is being restarted back on Warfarin with goal therapeutic INR 2.5-3.5.   · Patient neurologically intact with no obvious neurologic deficits relate to embolic strokes.   · Repeat MRI of brain on 8/24 showed no worsening of strokes or hemorrhagic conversion after placed on therapeutic IV Heparin drip.   · Appreciate Vascular Neurology assistance.

## 2022-08-26 NOTE — PT/OT/SLP PROGRESS
"Occupational Therapy   Treatment    Name: Donis Jimenez  MRN: [id number]  Admitting Diagnosis:  Pathologic fracture of neck of right femur  3 Days Post-Op    Recommendations:     Discharge Recommendations: rehabilitation facility  Discharge Equipment Recommendations:  bedside commode, walker, rolling, hip kit  Barriers to discharge:  Other (Comment) (increased level of (A) for ADLs and mobility required at this time)    Assessment:     Donis Jimenez is a 61 y.o. male with a medical diagnosis of Pathologic fracture of neck of right femur.  He presents with the following performance deficits affecting function are weakness, impaired endurance, impaired functional mobility, gait instability, impaired self care skills, decreased ROM, decreased lower extremity function, orthopedic precautions, pain, decreased safety awareness, impaired balance.     Rehab Prognosis:  Good; patient would benefit from acute skilled OT services to address these deficits and reach maximum level of function.       Plan:     Patient to be seen 4 x/week to address the above listed problems via self-care/home management, therapeutic activities, therapeutic exercises, wheelchair management/training  · Plan of Care Expires: 09/24/22  · Plan of Care Reviewed with: patient, significant other    Subjective   "sign my poster!"    Pain/Comfort:  · Pain Rating 1: 0/10  · Pain Rating Post-Intervention 1: 0/10    Objective:     Communicated with: RN  prior to session.  Patient found HOB elevated with telemetry, peripheral IV upon OT entry to room.    General Precautions: Standard, fall   Orthopedic Precautions:RLE posterior precautions, RLE weight bearing as tolerated   Braces: N/A  Respiratory Status: Room air     Occupational Performance:     Bed Mobility:    · Patient completed Supine to Sit with stand by assistance  · Patient completed Sit to Supine with stand by assistance     Functional Mobility/Transfers:  · Patient completed Sit <> Stand Transfer " with contact guard assistance  with  rolling walker   · Functional Mobility: not assessed today    Activities of Daily Living:  · Lower Body Dressing: contact guard assistance for balance to pull up waist' instruction and facilitation in LB dressing techniques using hip kit and proper technique. patient receptive towards education with good carry over demonstrated      [facility] 6 Click ADL: 21    Treatment & Education:  ADL re-training  Education on OT POC, goals, current progress, and hip kit  Addressed all patient questions/concerns within AP scope of practice      Patient left HOB elevated with all lines intact, call button in reach and family presentEducation:      GOALS:   Multidisciplinary Problems     Occupational Therapy Goals        Problem: Occupational Therapy    Goal Priority Disciplines Outcome Interventions   Occupational Therapy Goal     OT, PT/OT Ongoing, Progressing    Description: Goals to be met by: 9/24/2022 (1 month)     Patient will increase functional independence with ADLs by performing:    UE Dressing with [place].  LE Dressing with Minimal Assistance using hip kit.  Grooming while standing at sink with Supervision using LRAD.  Toileting from toilet with Supervision for hygiene and clothing management.   Rolling to Bilateral with [place].   Supine to sit with Supervision.  Step transfer with Supervision using LRAD.  Toilet transfer to toilet with Supervision using LRAD.  Pt to adhere to posterior hip precautions during fx'l mobility with 100% accuracy.  Pt to demo safe understanding and recite posterior hip precautions with 100% accuracy.                      Time Tracking:     OT Date of Treatment: 08/26/22  OT Start Time: 1424  OT Stop Time: 1510  OT Total Time (min): 46 min    Billable Minutes:Self Care/Home Management 46    OT/AP: AP     AP Visit Number: 2    8/26/2022

## 2022-08-26 NOTE — PROGRESS NOTES
[facility] - [facility] Medicine  Progress Note    Patient Name: Donis Jimenez  MRN: [id number]  Patient Class: IP- Inpatient   Admission Date: 8/18/2022  Length of Stay: 8 days  Attending Physician: Katina Khoury MD  Primary Care Provider: Primary Doctor No        Subjective:     Principal Problem:Pathologic fracture of neck of right femur        HPI:  Donis Jimenez is a 61 y.o. male with PMH significant for bacterial endocarditis, s/p AV and MV mechanical valve replacement (on warfarin), CKD, tobacco abuse presenting with R hip pain. Pt with several weeks R hip pain, found to have bone lesions and was pending outpatient workup / IR biopsy. However, pt experienced a fall yesterday - prior to getting biopsy. Unknown primary neoplasm. He has pain, inability to bear weight on right leg. Patient denies any head trauma or LOC. The patient denies prior hx of falls. Patient denies numbness and tingling. No known prior right hip injury or surgery. Outside radiology reports indicate lytic femoral neck lesion, lesions in pelvis, and lung mass seen on xrays. Ortho primary team following.     consulted for eval for transfer to  primary team in setting of extensive metastatic dz w/ unclear primary origin.      Overview/Hospital Course:  61 y.o. male with significant for tobacco abuse, bacterial endocarditis s/p AVR & MVR (mechanical) on Warfarin (subtherapeutic on admission) who presented to [facility] for right hip pain found to have pathologic right femoral neck fracture and right femoral artery pseudoaneurysm. Orthopedics consulted concerning right femoral pathologic fracture and Vascular surgery consulted concerning pseudoaneurysm.  Patient taken to oR on 8/19 by Vascular Surgery and had embolization of 3rd order right profunda femoral artery pseudoaneurysm with N-BCA glue and 5mm coil aneurysm repair. Following his pseudoaneurysm repair, patient was placed on IV Heparin drip. Metastatic work-up done.   CT scan of  chest/abdomen and pelvis done and showed:  1. Findings of extensive metastatic disease involving soft tissues above and below the diaphragm and osseous structures. Extensive involvement of the mediastinum with encasement of bronchovascular structures as detailed above. Suggested potential primary lung and renal with left upper lobe and left renal masses.  2. Pathologic fracture of the right femoral head/neck, L1 vertebral body, and left 6th rib.  3. Small to moderate pericardial effusion, likely malignant.  4. Small left pleural effusion.  5. Heterogeneous opacification of the right jugular vein which could represent contrast mixing however cannot exclude thrombus.    Oncology was consulted and recommended MRI of brain as part of metastatic work-up and showed multiple punctate scattered foci of diffusion restriction within the right cerebellar hemisphere, left erin and left corona radiata potentially representing acute embolic type infarcts. FLAIR hyperintense, T1 isointense fluid overlying the left cerebral convexity and posterior to the right occipital lobe raising concern for small acute subdural hemorrhage. Heterogeneous areas of calvarial marrow with at least two focal lesions concerning for metastatic disease involving the left occipital and right parietal calvarium. Partially visualized C3 metastatic disease.    Vascular Neurology was consulted and believed embolic infarcts on MRI to be result of a cardioembolic phenomenon in the setting of a subtherapeutic INR in patient with known mechanical valves. Regarding the subdural hematoma, NSGY was consulted. No surgical intervention has been recommended at this point for small subdural hematoma noted on MRI. Patient was eventually taken to OR by Orthopedics on 8/23 for right hip hemiarthroplasty with ablation, cementoplasty, and percutaneous fixation of pelvis for pathologic fracture and metastatic disease. IV Heparin drip was resumed following the procedure by  NSGY recommendation, with CT of the head has been ordered for the morning of 8/24 and done and showed no changes from pre-op MRI. PT/OT consulted post-op and recommending IP Rehab on discharge but patient prefers home with outpatient therapy on discharge when medically ready. Arrangements made for outpatient PT and DME of beside commode and rolling walker when patient medically ready to be discharged from hospital but needs INR > 2 to discharge home. INR 1.6 on Warfarin on 8/26.       Interval History: Patient asking about going home today. INR still subtherapeutic at 1.6 but improved from 1.3 from yesterday. Patient remains on warfarin for his mechanical valves. I explained to patient salvador his INR to be at least 2 to stop Heparin drip and send home on Warfarin alone especially in light of recent cardio embolic strokes noted on MRI of brain felt related to patient having subtherapeutic INR level. Need INR at least at 2 to be comfortable sending patient home. Patient to remains on IV Heparin drip while subtherapeutic. Patient doing well with therapy and planning outpatient PT on discharge and DME with rolling walker and bedside commode and  made arrangements as hopefully to discharge home this weekend with his partner once get therapeutic INR. Pathology from bone biopsy remains pending and he will need to follow-up with Oncology as well as Orthopedic Oncology as outpatient and hopefully will get pathology results in ext 1-2 weeks and then can discuss treatment options for patient. 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 Patient has good baseline functional status so hopeful he will be a good candidate for outpatient palliative therapy for his cancer. Pain in right hip area controlled. Hgb stable at 9.4.     Review of Systems   Constitutional:  Negative for chills and fever.   Respiratory:  Negative for cough and shortness of breath.    Cardiovascular:   Negative for chest pain and leg swelling.   Gastrointestinal:  Negative for abdominal pain, diarrhea, nausea and vomiting.   Musculoskeletal:  Positive for arthralgias (Right hip).   Skin:  Negative for rash.   Neurological:  Negative for dizziness and headaches.   Psychiatric/Behavioral:  Negative for agitation and confusion.    All other systems reviewed and are negative.  Objective:     Vital Signs (Most Recent):  Temp: 97.2 °F (36.2 °C) (08/26/22 1207)  Pulse: 78 (08/26/22 1207)  Resp: 12 (08/26/22 1316)  BP: 129/65 (08/26/22 1207)  SpO2: 92 % (08/26/22 1207) on room air   Vital Signs (24h Range):  Temp:  [97.2 °F (36.2 °C)-98.6 °F (37 °C)] 97.2 °F (36.2 °C)  Pulse:  [71-78] 78  Resp:  [12-18] 12  SpO2:  [92 %-96 %] 92 %  BP: (104-137)/(59-69) 129/65     Weight: 54.4 kg (119 lb 14.9 oz)  Body mass index is 16.27 kg/m².    Intake/Output Summary (Last 24 hours) at 8/26/2022 1547  Last data filed at 8/26/2022 0900  Gross per 24 hour   Intake --   Output 950 ml   Net -950 ml      Physical Exam  Constitutional:       General: He is awake.      Appearance: Normal appearance. He is underweight. He is ill-appearing (Chronic).   Eyes:      General: No scleral icterus.  Cardiovascular:      Rate and Rhythm: Normal rate and regular rhythm.      Heart sounds: No murmur heard.  Pulmonary:      Effort: Pulmonary effort is normal. No respiratory distress.      Breath sounds: Normal breath sounds. No wheezing or rales.   Abdominal:      General: Abdomen is flat. Bowel sounds are normal. There is no distension.      Palpations: Abdomen is soft.      Tenderness: There is no abdominal tenderness.   Musculoskeletal:      Right lower leg: No edema.      Left lower leg: No edema.      Comments: Right hip bandages C/D/I   Skin:     General: Skin is warm.      Findings: No rash.   Neurological:      General: No focal deficit present.      Mental Status: He is alert and oriented to person, place, and time.   Psychiatric:         Mood  and Affect: Mood normal.         Behavior: Behavior normal. Behavior is cooperative.         Thought Content: Thought content normal.         Judgment: Judgment normal.       Significant Labs: CBC:   Recent Labs   Lab 08/25/22  0843 08/26/22  0447   WBC 10.95 10.22   HGB 10.4* 9.4*   HCT 31.6* 27.9*    270     CMP:   Recent Labs   Lab 08/25/22  0843 08/26/22  0447   * 136   K 3.5 3.4*    104   CO2 21* 24    90   BUN 15 13   CREATININE 0.8 0.7   CALCIUM 7.6* 7.4*   PROT 5.6* 5.2*   ALBUMIN 2.3* 2.0*   BILITOT 0.6 0.5   ALKPHOS 137* 148*   AST 44* 38   ALT 13 14   ANIONGAP 11 8       Significant Imaging: I have reviewed all pertinent imaging results/findings within the past 24 hours.      Assessment/Plan:      * Pathologic fracture of neck of right femur s/p hemiarthroplasty on 8/23/2022  · Orthopedics consulted on admit after patient found to have extensive osseous mets to right femur and pelvis with pathologic femoral neck fracture. Suspect based on imaging primary lung cancer with diffuse osseous mets.   · Patient taken to OR on 8/23 by Orthopedic Oncology with Dr. Flores and underwent right hip hemiarthroplasty with ablation, cementoplasty, and percutaneous fixation of pelvis for pathologic fracture.   · Post-op, patient is weight bearing as tolerated with posterior hip precautions to right lower extremity as per Orthopedics.   · Patient to resume therapeutic Warfarin post-op for his mechanical aortic and mitral shane that were held pre-op so and bridging with IV heparin while subtherapeutic on Warfarin so mo other chemical DVT prophylaxis needed post-op.   · PT/OT consulted post-op and recommending IP Rehab but patient prefers home with outpatient therapy and states his spouse will be available to provide care for him at home. Arrangements made by CM for outpatient PT as well as DME on discharge of RW and bedside commode.   · Pain controlled post-op and continue multimodal pain management  withTylenol 1 gram po TID, Robaxin 1000 mg po 4 times daily, Lyrica 75 mg po BID.   · Orthopedics following and managing surgical site.     Malignant neoplasm metastatic to bone  · Found on admit. Patient found on CT scan of chest/abdomen and pelvis as part of metastatic work-up to have extensive metastatic disease involving soft tissues above and below the diaphragm and osseous structures. Extensive involvement of the mediastinum with encasement of bronchovascular structures. Suggested potential primary lung and renal with left upper lobe and left renal masses. Pathologic fracture of the right femoral head/neck, L1 vertebral body, and left 6th rib. Small to moderate pericardial effusion, likely malignant. Small left pleural effusion.  · Heme/Onc consulted on admit and recommended MRI of brain to look for brain mets and none noted to brain itself but concerning osseous mets to skull itself.   · Bone scan done showed numerous tracer avid foci throughout the axial and appendicular skeleton consistent with metastatic disease. These lesions correspond to mixed lytic and sclerotic lesions on same day CT chest abdomen pelvis.  · Heme/Onc recommended bone biopsy and done by Orthopedics on 8/23 for diagnosis as primary tumor unknown and pathology pending. Patient will need to follow-up with Oncology and Orthopedic Oncology on discharge to discuss future treatment options once pathology returns.     H/O mitral valve replacement with mechanical valve  Anticoagulated on warfarin  · Patient with prior mechanical heart valves to acrotic valve and mitral valve due to bacterial endocarditis. Paient on Warfarin as outpatient with goal INR 2.5-3.5.   · Warfarin held for surgery and placed on IV Heparin drip as INR subtherapeutic. IV Heparin held for surgery but resumed post-op as INR remains subtherapeutic.Contineu IV heparin while patient with subtherapeutic INR.   · Continue Warfarin 5 mg po daily for now and pharmacy assisting in  adjusting Warfarin dosing for goal INR 2.5-3.5.   · Monitor daily PT/INR.   · PharmD consult for Warfarin dosing and adjustments.     Acute arterial ischemic stroke, multifocal, multiple vascular territories  · Seen on MRI brain of brain on 8/22 as part of metastatic work-up and evaluation. MRI of brain showed multiple punctate scattered foci of diffusion restriction within the right cerebellar hemisphere, left erin and left corona radiata potentially representing acute embolic type infarcts.   · Vascular Neurology consulted:  Vascular Neurology  Excello strokes cardioembolic in nature due to patient with mechanical heart valves and not having therapeutic INR on admit.   · Continue IV Heparin drip as INR remains subtherapeutic as patient is being restarted back on Warfarin with goal therapeutic INR 2.5-3.5. Needs INR of at least 2 to stop Heparin drip and discharge home on Warfarin.   · Patient neurologically intact with no obvious neurologic deficits relate to embolic strokes.   · Repeat MRI of brain on 8/24 showed no worsening of strokes or hemorrhagic conversion after placed on therapeutic IV Heparin drip.   · Appreciate Vascular Neurology assistance.       Pseudoaneurysm of right femoral artery  · Stable and controlled.   · Found on imaging on admit and Vascular Surgery consulted.   · Patient taken to OR on 8/19 and had embolization of ight profunda femoral artery pseudoaneurysm with 5 mm coil.    Subdural hematoma  · Found on routine imaging of brain as part of metastatic work-up on brain MRI on 8/22. MRI showed left cerebral convexity and posterior to the right occipital lobe raising concern for small acute subdural hemorrhage.   · Neurosurgery was consulted and no surgical intervention needed and okay with continuing with anticoagulation for his mechanical heart valves.   · Repeat CT scan of head and MRI of brain on 8/24 after therapeutic  IV Heparin drip restarted for anticoagulation was stable and okay to  continue with anticoagulation as per Neurosurgery on 8/25.     Body mass index (BMI) less than 19  Body mass index is 16.27 kg/m².  · Likely related to his metastatic and advanced malignancy causing reduced BMI and weight loss.   · Nutrition consulted for evaluation.       VTE Risk Mitigation (From admission, onward)         Ordered     warfarin (COUMADIN) tablet 5 mg  Daily         08/24/22 1142     heparin 25,000 units in dextrose 5% 250 mL (100 units/mL) infusion LOW INTENSITY nomogram - [facility]  Continuous        Question Answer Comment   Heparin Infusion Adjustment (DO NOT MODIFY ANSWER) [email or web address] LOW INTENSITY nomogram for [facility] [email or web address]    Begin at (in units/kg/hr) 12        08/19/22 2301     Place sequential compression device  Until discontinued         08/18/22 0331     IP VTE LOW RISK PATIENT  Once         08/18/22 0331                Discharge Planning   VIKI: 8/27/2022     Code Status: Full Code   Is the patient medically ready for discharge?: No    Reason for patient still in hospital (select all that apply): Patient trending condition  Discharge Plan A: Home Health, Home with family          Katina Khoury MD  Department of Hospital Medicine   [facility] - Surgery

## 2022-08-26 NOTE — ASSESSMENT & PLAN NOTE
Body mass index is 16.27 kg/m².  · Likely related to his metastatic and advanced malignancy causing reduced BMI and weight loss.   · Nutrition consulted for evaluation.

## 2022-08-26 NOTE — ASSESSMENT & PLAN NOTE
· Found on admit. Patient found on CT scan of chest/abdomen and pelvis as part of metastatic work-up to have extensive metastatic disease involving soft tissues above and below the diaphragm and osseous structures. Extensive involvement of the mediastinum with encasement of bronchovascular structures. Suggested potential primary lung and renal with left upper lobe and left renal masses. Pathologic fracture of the right femoral head/neck, L1 vertebral body, and left 6th rib. Small to moderate pericardial effusion, likely malignant. Small left pleural effusion.  · Heme/Onc consulted on admit and recommended MRI of brain to look for brain mets and none noted to brain itself but concerning osseous mets to skull itself.   · Bone scan done showed numerous tracer avid foci throughout the axial and appendicular skeleton consistent with metastatic disease. These lesions correspond to mixed lytic and sclerotic lesions on same day CT chest abdomen pelvis.  · Heme/Onc recommended bone biopsy and done by Orthopedics on 8/23 for diagnosis as primary tumor unknown and pathology pending. Patient will need to follow-up with Oncology and Orthopedic Oncology on discharge to discuss future treatment options once pathology returns.

## 2022-08-26 NOTE — PLAN OF CARE
Problem: Adult Inpatient Plan of Care  Goal: Plan of Care Review  Outcome: Ongoing, Progressing  Goal: Patient-Specific Goal (Individualized)  Outcome: Ongoing, Progressing  Goal: Absence of Hospital-Acquired Illness or Injury  Outcome: Ongoing, Progressing  Goal: Optimal Comfort and Wellbeing  Outcome: Ongoing, Progressing  Goal: Readiness for Transition of Care  Outcome: Ongoing, Progressing     Problem: Skin Injury Risk Increased  Goal: Skin Health and Integrity  Outcome: Ongoing, Progressing     Problem: Fall Injury Risk  Goal: Absence of Fall and Fall-Related Injury  Outcome: Ongoing, Progressing     Problem: Anxiety  Goal: Anxiety Reduction or Resolution  Outcome: Ongoing, Progressing     Problem: Coping Ineffective  Goal: Effective Coping  Outcome: Ongoing, Progressing     Problem: Pain Acute  Goal: Acceptable Pain Control and Functional Ability  Outcome: Ongoing, Progressing     Problem: Infection  Goal: Absence of Infection Signs and Symptoms  Outcome: Ongoing, Progressing     Problem: Impaired Wound Healing  Goal: Optimal Wound Healing  Outcome: Ongoing, Progressing

## 2022-08-26 NOTE — ASSESSMENT & PLAN NOTE
· Orthopedics consulted on admit after patient found to have extensive osseous mets to right femur and pelvis with pathologic femoral neck fracture. Suspect based on imaging primary lung cancer with diffuse osseous mets.   · Patient taken to OR on 8/23 by Orthopedic Oncology with Dr. Flores and underwent right hip hemiarthroplasty with ablation, cementoplasty, and percutaneous fixation of pelvis for pathologic fracture.   · Post-op, patient is weight bearing as tolerated with posterior hip precautions to right lower extremity as per Orthopedics.   · Patient to resume therapeutic Warfarin post-op for his mechanical aortic and mitral shane that were held pre-op so and bridging with IV heparin while subtherapeutic on Warfarin so mo other chemical DVT prophylaxis needed post-op.   · PT/OT consulted post-op and recommending IP Rehab but patient prefers home with outpatient therapy and states his spouse will be available to provide care for him at home. Arrangements made by CM for outpatient PT as well as DME on discharge of RW and bedside commode.   · Pain controlled post-op and continue multimodal pain management withTylenol 1 gram po TID, Robaxin 1000 mg po 4 times daily, Lyrica 75 mg po BID.   · Orthopedics following and managing surgical site.

## 2022-08-26 NOTE — PROGRESS NOTES
[facility] - [facility]  Orthopedics  Progress Note    Patient Name: Donis Jimenez  MRN: [id number]  Admission Date: 8/18/2022  Hospital Length of Stay: 8 days  Attending Provider: Katina Khoury MD  Primary Care Provider: Primary Doctor No  Follow-up For: Procedure(s) (LRB):  HEMIARTHROPLASTY - right, lateral, bean bag, ablation supine after, ancef, prevena (Right)  cemontoplasty (Right)  RADIOFREQUENCY ABLATION,BONE (Right)    Post-Operative Day: 3 Days Post-Op  Subjective:     Principal Problem:Pathologic fracture of neck of right femur    Principal Orthopedic Problem: same, s/p R hip hemiarthroplasty with ablation, cementoplasty, and percutaneous fixation of pelvis 8/23    Interval History: NAEON. VSS.   Pain in right hip is much improved today and he has increased ROM of the RLE, able to flex and extend knee.   Ambulated 10 feet twice with PT, recommending IP rehab.   Pathology from OR in process.        Review of patient's allergies indicates:  No Known Allergies    Current Facility-Administered Medications   Medication    0.9%  NaCl infusion (for blood administration)    acetaminophen tablet 1,000 mg    ceFAZolin 2 gram in dextrose 5% 100 mL IVPB (premix)    dextrose 10% bolus 125 mL    dextrose 10% bolus 250 mL    docusate sodium capsule 100 mg    EScitalopram oxalate tablet 20 mg    glucagon (human recombinant) injection 1 mg    glucose chewable tablet 16 g    glucose chewable tablet 24 g    heparin 25,000 units in dextrose 5% 250 mL (100 units/mL) infusion LOW INTENSITY nomogram - [facility]    melatonin tablet 6 mg    methocarbamoL tablet 1,000 mg    multivitamin tablet    mupirocin 2 % ointment    ondansetron disintegrating tablet 8 mg    oxyCODONE immediate release tablet 5 mg    pantoprazole EC tablet 40 mg    pregabalin capsule 75 mg    promethazine tablet 25 mg    ROPIvacaine (PF) 2 mg/ml (0.2%) solution    sodium chloride 0.9% flush 10 mL    sodium chloride 0.9% flush 10 mL    sodium  chloride 0.9% flush 3 mL    vitamin D 1000 units tablet 1,000 Units    warfarin (COUMADIN) tablet 5 mg     Objective:     Vital Signs (Most Recent):  Temp: 98.2 °F (36.8 °C) (08/26/22 0512)  Pulse: 76 (08/26/22 0512)  Resp: 16 (08/26/22 0512)  BP: 137/68 (08/26/22 0512)  SpO2: (!) 94 % (08/26/22 0512)   Vital Signs (24h Range):  Temp:  [96.8 °F (36 °C)-98.8 °F (37.1 °C)] 98.2 °F (36.8 °C)  Pulse:  [71-76] 76  Resp:  [12-18] 16  SpO2:  [94 %-97 %] 94 %  BP: (104-137)/(59-69) 137/68     Weight: 54.4 kg (119 lb 14.9 oz)  Height: 6' (182.9 cm)  Body mass index is 16.27 kg/m².      Intake/Output Summary (Last 24 hours) at 8/26/2022 0531  Last data filed at 8/25/2022 2324  Gross per 24 hour   Intake --   Output 200 ml   Net -200 ml         Ortho/SPM Exam  Gen:  No acute distress, well-developed, well nourished.  CV:  Peripherally well-perfused.   Respiratory:  Normal respiratory effort. No accessory muscle use.   Head/Neck:  Normocephalic.  Atraumatic. Sclera anicteric. TM. Neck supple.  Neuro: CN 2-12 grossly intact. No FND. Awake. Alert. Oriented to person, place, time, and situation.      MSK:  Right Lower Extremity  Inspection  - Dressings c/d/i  - No swelling  - No ecchymosis, erythema, or signs of cellulitis  Palpation  -  Appropriately TTP at incision sites  Range of motion  - AROM and PROM of the ankle and foot intact without pain, able to flex and extend knee, minimal hip ROM at this time  Stability  - No evidence of joint dislocation or abnormal laxity  Neurovascular  - TA/EHL/Gastroc/FHL assessed in isolation without deficit  - SILT throughout  - Compartments soft  - DP palpated   - Muscle tone decreased        Significant Labs: CBC:   Recent Labs   Lab 08/24/22  0838 08/25/22  0843   WBC 10.00 10.95   HGB 11.2* 10.4*   HCT 33.5* 31.6*    248       CMP:   Recent Labs   Lab 08/24/22  0838 08/25/22  0843     134* 135*   K 4.3  4.4 3.5     102 103   CO2 22*  21* 21*   *  120* 107    BUN 15  15 15   CREATININE 0.8  0.8 0.8   CALCIUM 7.7*  7.8* 7.6*   PROT 5.7*  5.8* 5.6*   ALBUMIN 2.3*  2.2* 2.3*   BILITOT 0.7  0.7 0.6   ALKPHOS 124  120 137*   AST 29  31 44*   ALT 8*  8* 13   ANIONGAP 11  11 11       All pertinent labs within the past 24 hours have been reviewed.    Significant Imaging:   All pertinent imaging has been reviewed.        Assessment/Plan:     * Pathologic fracture of neck of right femur s/p hemiarthroplasty on 8/23/2022  Donis Jimenez is a 61 y.o. male with PMH of bacterial endocarditis, s/p AV and MV mechanical valve replacement (on warfarin), CKD, tobacco abuse presenting with pathologic R femur fracture. Bony lesions in femur and pelvis, as well as lung mass on CXR at outside hospital. Imaging studies pending. 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 Transferred for ortho oncology evaluation and management. Extensive smoking history. Deep femoral artery pseudoaneurysm ablation by vascular on 8/19. Pain well controlled.     Plan:  DVT ppx: heparin drip per vascular, stable head CT, NSGY signed off  Multimodal pain control  Metastatic workup ongoing, MRI brain completed, PET/CT scan completed, heme/onc consulted, MRI entire spine pending  WBAT with walker, posterior hip precautions    Overall, I believe that the patient's desire to go home with HH is reasonable from an orthopedic prospective only given the current projected survival/trajectory of patient, I informed the patient that this is pending overall medical stabilization and that final dispo would be made by primary physician, would need referral to heme onc once pathology is returned              Cortez Hagan MD  Orthopedics  [facility] - Surgery

## 2022-08-26 NOTE — PLAN OF CARE
Out Patient referral faxed to [facility] in [place] [phone].

## 2022-08-26 NOTE — SUBJECTIVE & OBJECTIVE
Principal Problem:Pathologic fracture of neck of right femur    Principal Orthopedic Problem: same, s/p R hip hemiarthroplasty with ablation, cementoplasty, and percutaneous fixation of pelvis 8/23    Interval History: NAEON. VSS.   Pain in right hip is much improved today and he has increased ROM of the RLE, able to flex and extend knee.   Ambulated 10 feet twice with PT, recommending IP rehab.   Pathology from OR in process.        Review of patient's allergies indicates:  No Known Allergies    Current Facility-Administered Medications   Medication    0.9%  NaCl infusion (for blood administration)    acetaminophen tablet 1,000 mg    ceFAZolin 2 gram in dextrose 5% 100 mL IVPB (premix)    dextrose 10% bolus 125 mL    dextrose 10% bolus 250 mL    docusate sodium capsule 100 mg    EScitalopram oxalate tablet 20 mg    glucagon (human recombinant) injection 1 mg    glucose chewable tablet 16 g    glucose chewable tablet 24 g    heparin 25,000 units in dextrose 5% 250 mL (100 units/mL) infusion LOW INTENSITY nomogram - [facility]    melatonin tablet 6 mg    methocarbamoL tablet 1,000 mg    multivitamin tablet    mupirocin 2 % ointment    ondansetron disintegrating tablet 8 mg    oxyCODONE immediate release tablet 5 mg    pantoprazole EC tablet 40 mg    pregabalin capsule 75 mg    promethazine tablet 25 mg    ROPIvacaine (PF) 2 mg/ml (0.2%) solution    sodium chloride 0.9% flush 10 mL    sodium chloride 0.9% flush 10 mL    sodium chloride 0.9% flush 3 mL    vitamin D 1000 units tablet 1,000 Units    warfarin (COUMADIN) tablet 5 mg     Objective:     Vital Signs (Most Recent):  Temp: 98.2 °F (36.8 °C) (08/26/22 0512)  Pulse: 76 (08/26/22 0512)  Resp: 16 (08/26/22 0512)  BP: 137/68 (08/26/22 0512)  SpO2: (!) 94 % (08/26/22 0512)   Vital Signs (24h Range):  Temp:  [96.8 °F (36 °C)-98.8 °F (37.1 °C)] 98.2 °F (36.8 °C)  Pulse:  [71-76] 76  Resp:  [12-18] 16  SpO2:  [94 %-97 %] 94 %  BP: (104-137)/(59-69) 137/68     Weight: 54.4 kg  (119 lb 14.9 oz)  Height: 6' (182.9 cm)  Body mass index is 16.27 kg/m².      Intake/Output Summary (Last 24 hours) at 8/26/2022 0531  Last data filed at 8/25/2022 2324  Gross per 24 hour   Intake --   Output 200 ml   Net -200 ml         Ortho/SPM Exam  Gen:  No acute distress, well-developed, well nourished.  CV:  Peripherally well-perfused.   Respiratory:  Normal respiratory effort. No accessory muscle use.   Head/Neck:  Normocephalic.  Atraumatic. Sclera anicteric. TM. Neck supple.  Neuro: CN 2-12 grossly intact. No FND. Awake. Alert. Oriented to person, place, time, and situation.      MSK:  Right Lower Extremity  Inspection  - Dressings c/d/i  - No swelling  - No ecchymosis, erythema, or signs of cellulitis  Palpation  -  Appropriately TTP at incision sites  Range of motion  - AROM and PROM of the ankle and foot intact without pain, able to flex and extend knee, minimal hip ROM at this time  Stability  - No evidence of joint dislocation or abnormal laxity  Neurovascular  - TA/EHL/Gastroc/FHL assessed in isolation without deficit  - SILT throughout  - Compartments soft  - DP palpated   - Muscle tone decreased        Significant Labs: CBC:   Recent Labs   Lab 08/24/22  0838 08/25/22  0843   WBC 10.00 10.95   HGB 11.2* 10.4*   HCT 33.5* 31.6*    248       CMP:   Recent Labs   Lab 08/24/22  0838 08/25/22  0843     134* 135*   K 4.3  4.4 3.5     102 103   CO2 22*  21* 21*   *  120* 107   BUN 15  15 15   CREATININE 0.8  0.8 0.8   CALCIUM 7.7*  7.8* 7.6*   PROT 5.7*  5.8* 5.6*   ALBUMIN 2.3*  2.2* 2.3*   BILITOT 0.7  0.7 0.6   ALKPHOS 124  120 137*   AST 29  31 44*   ALT 8*  8* 13   ANIONGAP 11  11 11       All pertinent labs within the past 24 hours have been reviewed.    Significant Imaging:   All pertinent imaging has been reviewed.

## 2022-08-26 NOTE — CARE UPDATE
BRITTNEY JEFFERSON PNC paused this morning. Patient continues with adequate pain control. PNC removed and discontinued. Catheter tip confirmed intact. Patient tolerated well. Educated regarding continued pain management. Understanding verbalized.    Can proceed with MRI prior to discharge.    APS will sign off at this time. Please call with any questions or concerns.      Damian Rice MD (PGY-4)  Anesthesiology

## 2022-08-26 NOTE — PT/OT/SLP PROGRESS
Physical Therapy   Progress Note    Patient Name:  Donis Jimenez  MRN: [id number]    Admit Date: 8/18/2022  Admitting Diagnosis:  Pathologic fracture of neck of right femur  Length of Stay: 8 days  Recent Surgery: Procedure(s) (LRB):  HEMIARTHROPLASTY - right, lateral, bean bag, ablation supine after, ancef, prevena (Right)  cemontoplasty (Right)  RADIOFREQUENCY ABLATION,BONE (Right) 3 Days Post-Op    Recommendations:     Discharge Recommendations: Inpatient Rehabilitation Facility   Equipment recommendations: rolling walker, bedside commode and hip kit  Barriers to discharge: None Identified     Assessment:     Donis Jimenez is a 61 y.o. male admitted to [facility] on 8/18/2022 with medical diagnosis of Pathologic fracture of neck of right femur. Pt presents with weakness, impaired endurance, impaired functional mobility, pain, orthopedic precautions. Pt is progressing towards goals, but has not yet reached prior level of function. Pt ambulated 150 ft with SBA using RW today. Continues to remain motivated and demonstrate improved tolerance with functional mobility. Donis Jimenez would benefit from continued acute PT intervention to improve quality of life, focus on recovery of impairments, provide patient/caregiver education, reduce fall risk, and maximize (I) and safety with functional mobility. Once medically stable, recommending pt discharge to Inpatient Rehabilitation Facility .      Rehab Prognosis: Good    Plan:     During this hospitalization, patient to be seen 4 x/week to address the identified rehab impairments via gait training, therapeutic activities, therapeutic exercises, neuromuscular re-education and progress towards stated goals.     Plan of Care Expires:  09/23/22  Plan of Care reviewed with: patient and spouse    This plan of care has been discussed with the patient/caregiver, who was included in its development and is in agreement with the identified goals and treatment plan.     Subjective  "    Communicated with RN prior to session.  Patient found supine upon PT entry to room, agreeable to therapy session. Pt's  present during session.    Patient/Family Comments/goals: "I never thought I would be able to walk this far"      Pain/Comfort:  · Pain Rating 1:  (pt did not rate)  · Location - Side 1: Right  · Location - Orientation 1: generalized  · Location 1: hip  · Pain Addressed 1: Cessation of Activity, Distraction, Reposition  · Pain Rating Post-Intervention 1:  (pt did not rate)    Patients cultural, spiritual, Church conflicts given the current situation: None identified     Objective:     Patient found with: peripheral IV    General Precautions: Standard, fall   Orthopedic Precautions:RLE posterior precautions, RLE weight bearing as tolerated   Braces: N/A   Oxygen Device: nasal cannula; 97% on RA    Cognition:   Pt is Alert during session.    Therapist provided skilled verbal and tactile cueing to facilitate the following functional mobility tasks. Listed tasks are focused on recovery of impairments and improving pt's independence and efficiency with bed mobility, transfers and ambulation as able.     Bed Mobility:  · Supine > Sit: Stand-by Assistance    Transfers:   · Sit <> Stand Transfer: Contact Guard Assistance from eob with RW AD   · Improvement during stand phase with no assistance shifting R hand from bed to RW  · Bed <> Chair: Stand-by Assistance with RW AD                  Gait:  · Distance: 150 ft  · Assistance level: Stand-by Assistance  · Assistive Device: rolling walker  · Gait Assessment: decreased step length , decreased gait speed and antalgic gait pattern   · Reciprocal gait for ~75% of distance    Balance:  · Dynamic Sitting: GOOD: Maintains balance through MODERATE excursions of active trunk movement  · Standing:  · Static: FAIR+: Takes MINIMAL challenges from all directions   · Dynamic: FAIR+: Needs CLOSE SUPERVISION during gait and is able to right self with minor " LOB    Outcome Measure: AM-PAC 6 CLICK MOBILITY  Total Score:19     Patient/Caregiver Education and Additional Therapeutic Activities/Exercises       Provided pt/caregiver education regarding:    PT POC and goals for therapy    Safety with mobility and fall risk    Safe management of AD as needed    Energy conservation techniques    Instruction on use of call button and importance of calling nursing staff for assistance with mobility     Patient/caregiver able to verbalize understanding; will follow-up with pt/caregiver during current admit for additional questions/concerns within scope of practice.     White board updated.     Patient left up in chair with all lines intact and  present.    Goals:     Multidisciplinary Problems     Physical Therapy Goals        Problem: Physical Therapy    Goal Priority Disciplines Outcome Goal Variances Interventions   Physical Therapy Goal     PT, PT/OT Ongoing, Progressing     Description: Goals to be met by: 22    Patient will increase functional independence with mobility by performin. Supine to sit with Stand-by Assistance - met   2. Sit to stand transfer with Stand-by Assistance - not met  3. Bed to chair transfer with Supervision using Rolling Walker - not met  4. Gait  x 150 feet with Supervision using Rolling Walker. - not met  5. Ascend/descend 4 stairs with no handrails Contact Guard Assistance using No Assistive Device.  - not met  6. Lower extremity exercise program x30 reps per handout, with independence - not met                     Time Tracking:       PT Received On: 22  PT Start Time: 4     PT Stop Time: 1444  PT Total Time (min): 30 min     Billable Minutes: Gait Training 30    2022

## 2022-08-26 NOTE — SUBJECTIVE & OBJECTIVE
Interval History: Patient asking about going home today. INR still subtherapeutic at 1.6 but improved from 1.3 from yesterday. Patient remains on warfarin for his mechanical valves. I explained to patient salvador his INR to be at least 2 to stop Heparin drip and send home on Warfarin alone especially in light of recent cardio embolic strokes noted on MRI of brain felt related to patient having subtherapeutic INR level. Need INR at least at 2 to be comfortable sending patient home. Patient to remains on IV Heparin drip while subtherapeutic. Patient doing well with therapy and planning outpatient PT on discharge and DME with rolling walker and bedside commode and  made arrangements as hopefully to discharge home this weekend with his partner once get therapeutic INR. Pathology from bone biopsy remains pending and he will need to follow-up with Oncology as well as Orthopedic Oncology as outpatient and hopefully will get pathology results in ext 1-2 weeks and then can discuss treatment options for patient. 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 Patient has good baseline functional status so hopeful he will be a good candidate for outpatient palliative therapy for his cancer. Pain in right hip area controlled. Hgb stable at 9.4.     Review of Systems   Constitutional:  Negative for chills and fever.   Respiratory:  Negative for cough and shortness of breath.    Cardiovascular:  Negative for chest pain and leg swelling.   Gastrointestinal:  Negative for abdominal pain, diarrhea, nausea and vomiting.   Musculoskeletal:  Positive for arthralgias (Right hip).   Skin:  Negative for rash.   Neurological:  Negative for dizziness and headaches.   Psychiatric/Behavioral:  Negative for agitation and confusion.    All other systems reviewed and are negative.  Objective:     Vital Signs (Most Recent):  Temp: 97.2 °F (36.2 °C) (08/26/22 1207)  Pulse: 78 (08/26/22  1207)  Resp: 12 (08/26/22 1316)  BP: 129/65 (08/26/22 1207)  SpO2: 92 % (08/26/22 1207) on room air   Vital Signs (24h Range):  Temp:  [97.2 °F (36.2 °C)-98.6 °F (37 °C)] 97.2 °F (36.2 °C)  Pulse:  [71-78] 78  Resp:  [12-18] 12  SpO2:  [92 %-96 %] 92 %  BP: (104-137)/(59-69) 129/65     Weight: 54.4 kg (119 lb 14.9 oz)  Body mass index is 16.27 kg/m².    Intake/Output Summary (Last 24 hours) at 8/26/2022 1547  Last data filed at 8/26/2022 0900  Gross per 24 hour   Intake --   Output 950 ml   Net -950 ml      Physical Exam  Constitutional:       General: He is awake.      Appearance: Normal appearance. He is underweight. He is ill-appearing (Chronic).   Eyes:      General: No scleral icterus.  Cardiovascular:      Rate and Rhythm: Normal rate and regular rhythm.      Heart sounds: No murmur heard.  Pulmonary:      Effort: Pulmonary effort is normal. No respiratory distress.      Breath sounds: Normal breath sounds. No wheezing or rales.   Abdominal:      General: Abdomen is flat. Bowel sounds are normal. There is no distension.      Palpations: Abdomen is soft.      Tenderness: There is no abdominal tenderness.   Musculoskeletal:      Right lower leg: No edema.      Left lower leg: No edema.      Comments: Right hip bandages C/D/I   Skin:     General: Skin is warm.      Findings: No rash.   Neurological:      General: No focal deficit present.      Mental Status: He is alert and oriented to person, place, and time.   Psychiatric:         Mood and Affect: Mood normal.         Behavior: Behavior normal. Behavior is cooperative.         Thought Content: Thought content normal.         Judgment: Judgment normal.       Significant Labs: CBC:   Recent Labs   Lab 08/25/22  0843 08/26/22 0447   WBC 10.95 10.22   HGB 10.4* 9.4*   HCT 31.6* 27.9*    270     CMP:   Recent Labs   Lab 08/25/22  0843 08/26/22 0447   * 136   K 3.5 3.4*    104   CO2 21* 24    90   BUN 15 13   CREATININE 0.8 0.7   CALCIUM  7.6* 7.4*   PROT 5.6* 5.2*   ALBUMIN 2.3* 2.0*   BILITOT 0.6 0.5   ALKPHOS 137* 148*   AST 44* 38   ALT 13 14   ANIONGAP 11 8       Significant Imaging: I have reviewed all pertinent imaging results/findings within the past 24 hours.

## 2022-08-26 NOTE — PROGRESS NOTES
[facility] Medicine  Progress Note    Patient Name: Donis Jimenez  MRN: [id number]  Patient Class: IP- Inpatient   Admission Date: 8/18/2022  Length of Stay: 7 days  Attending Physician: Katina Khoury MD  Primary Care Provider: Primary Doctor No        Subjective:     Principal Problem:Pathologic fracture of neck of right femur        HPI:  Donis Jimenez is a 61 y.o. male with PMH significant for bacterial endocarditis, s/p AV and MV mechanical valve replacement (on warfarin), CKD, tobacco abuse presenting with R hip pain. Pt with several weeks R hip pain, found to have bone lesions and was pending outpatient workup / IR biopsy. However, pt experienced a fall yesterday - prior to getting biopsy. Unknown primary neoplasm. He has pain, inability to bear weight on right leg. Patient denies any head trauma or LOC. The patient denies prior hx of falls. Patient denies numbness and tingling. No known prior right hip injury or surgery. Outside radiology reports indicate lytic femoral neck lesion, lesions in pelvis, and lung mass seen on xrays. Ortho primary team following.     consulted for eval for transfer to  primary team in setting of extensive metastatic dz w/ unclear primary origin.      Overview/Hospital Course:  61 y.o. male with significant for tobacco abuse, bacterial endocarditis s/p AVR & MVR (mechanical) on Warfarin (subtherapeutic on admission) who [place] [facility] [place] pain found to have pathologic right femoral neck fracture and right femoral artery pseudoaneurysm. Orthopedics consulted concerning right femoral pathologic fracture and Vascular surgery consulted concerning pseudoaneurysm.  Patient taken to oR on 8/19 by Vascular Surgery and had embolization of 3rd order right profunda femoral artery pseudoaneurysm with N-BCA glue and 5mm coil aneurysm repair. Following his pseudoaneurysm repair, patient was placed on IV Heparin drip. Metastatic work-up done.   CT scan of  chest/abdomen and pelvis done and showed:  1. Findings of extensive metastatic disease involving soft tissues above and below the diaphragm and osseous structures. Extensive involvement of the mediastinum with encasement of bronchovascular structures as detailed above. Suggested potential primary lung and renal with left upper lobe and left renal masses.  2. Pathologic fracture of the right femoral head/neck, L1 vertebral body, and left 6th rib.  3. Small to moderate pericardial effusion, likely malignant.  4. Small left pleural effusion.  5. Heterogeneous opacification of the right jugular vein which could represent contrast mixing however cannot exclude thrombus.    Oncology was consulted and recommended MRI of brain as part of metastatic work-up and showed multiple punctate scattered foci of diffusion restriction within the right cerebellar hemisphere, left erin and left corona radiata potentially representing acute embolic type infarcts. FLAIR hyperintense, T1 isointense fluid overlying the left cerebral convexity and posterior to the right occipital lobe raising concern for small acute subdural hemorrhage. Heterogeneous areas of calvarial marrow with at least two focal lesions concerning for metastatic disease involving the left occipital and right parietal calvarium. Partially visualized C3 metastatic disease.    Vascular Neurology was consulted and believed embolic infarcts on MRI to be result of a cardioembolic phenomenon in the setting of a subtherapeutic INR in patient with known mechanical valves. Regarding the subdural hematoma, NSGY was consulted. No surgical intervention has been recommended at this point for small subdural hematoma noted on MRI. Patient was eventually taken to OR by Orthopedics on 8/23 for right hip hemiarthroplasty with ablation, cementoplasty, and percutaneous fixation of pelvis for pathologic fracture and metastatic disease. IV Heparin drip was resumed following the procedure by  NSGY recommendation, with CT of the head has been ordered for the morning of 8/24 and done and showed no changes from pre-op MRI. PT/OT consulted post-op and recommending IP Rehab on discharge but patient prefers home with outpatient therapy on discharge when medically ready.        Interval History: Patient reports mild soreness in right lateral thigh area. Patient remains on IV Heparin drip as INR remains subtherapeutic on Warfarin at 1.3 this am. Repeat CT scan of head done yesterday on IV Heparin drip stable with no expansion of SDH noted by Radiology or Neurosurgery. Patient POD #2 from right hip hemiarthroplasty for pathologic femoral neck fracture by Dr. Flores. Therapy recommending IP rehab on discharge but patient insistent he wants to go home with outpatient PT/OT on discharge and states his  will be around to help him 24/7 at home.      Review of Systems   Constitutional:  Negative for chills and fever.   Respiratory:  Negative for cough and shortness of breath.    Cardiovascular:  Negative for chest pain and leg swelling.   Gastrointestinal:  Negative for abdominal pain, diarrhea, nausea and vomiting.   Musculoskeletal:  Positive for arthralgias (Right hip).   Skin:  Negative for rash.   Neurological:  Negative for dizziness and headaches.   Psychiatric/Behavioral:  Negative for agitation and confusion.    All other systems reviewed and are negative.  Objective:     Vital Signs (Most Recent):  Temp: 98.1 °F (36.7 °C) (08/25/22 1529)  Pulse: 75 (08/25/22 1642)  Resp: 18 (08/25/22 1820)  BP: 124/68 (08/25/22 1529)  SpO2: 95 % (08/25/22 1529) on room air Vital Signs (24h Range):  Temp:  [96.8 °F (36 °C)-98.8 °F (37.1 °C)] 98.1 °F (36.7 °C)  Pulse:  [67-75] 75  Resp:  [12-18] 18  SpO2:  [93 %-98 %] 95 %  BP: (117-126)/(59-70) 124/68     Weight: 54.4 kg (119 lb 14.9 oz)  Body mass index is 16.27 kg/m².  No intake or output data in the 24 hours ending 08/25/22 1831   Physical Exam  Constitutional:        General: He is awake.      Appearance: Normal appearance. He is underweight. He is ill-appearing (Chronic).   Eyes:      General: No scleral icterus.  Cardiovascular:      Rate and Rhythm: Normal rate and regular rhythm.      Heart sounds: No murmur heard.  Pulmonary:      Effort: Pulmonary effort is normal. No respiratory distress.      Breath sounds: Normal breath sounds. No wheezing or rales.   Abdominal:      General: Abdomen is flat. Bowel sounds are normal. There is no distension.      Palpations: Abdomen is soft.      Tenderness: There is no abdominal tenderness.   Musculoskeletal:      Right lower leg: No edema.      Left lower leg: No edema.      Comments: Right hip bandages C/D/I   Skin:     General: Skin is warm.      Findings: No rash.   Neurological:      General: No focal deficit present.      Mental Status: He is alert and oriented to person, place, and time.   Psychiatric:         Mood and Affect: Mood normal.         Behavior: Behavior normal. Behavior is cooperative.         Thought Content: Thought content normal.         Judgment: Judgment normal.       Significant Labs: CBC:   Recent Labs   Lab 08/23/22  1906 08/24/22  0838 08/25/22  0843   WBC  --  10.00 10.95   HGB  --  11.2* 10.4*   HCT 28* 33.5* 31.6*   PLT  --  268 248     CMP:   Recent Labs   Lab 08/24/22  0838 08/25/22  0843     134* 135*   K 4.3  4.4 3.5     102 103   CO2 22*  21* 21*   *  120* 107   BUN 15  15 15   CREATININE 0.8  0.8 0.8   CALCIUM 7.7*  7.8* 7.6*   PROT 5.7*  5.8* 5.6*   ALBUMIN 2.3*  2.2* 2.3*   BILITOT 0.7  0.7 0.6   ALKPHOS 124  120 137*   AST 29  31 44*   ALT 8*  8* 13   ANIONGAP 11  11 11     Coagulation:   Recent Labs   Lab 08/25/22  0843   INR 1.3*   APTT 49.5*     Magnesium:   Recent Labs   Lab 08/24/22  0838   MG 2.1       Significant Imaging: I have reviewed all pertinent imaging results/findings within the past 24 hours.      Assessment/Plan:      * Pathologic fracture of  neck of right femur s/p hemiarthroplasty on 8/23/2022  · Orthopedics consulted on admit after patient found to have extensive osseous mets to right femur and pelvis with pathologic femoral neck fracture.   · Patient taken to OR on 8/23 by Orthopedic Oncology with Dr. Flores and underwent right hip hemiarthroplasty with ablation, cementoplasty, and percutaneous fixation of pelvis.  · Post-op, patient is weight bearing as tolerated with posterior hip precautions to right lower extremity as per Orthopedics.   · Patient to resume therapeutic Warfarin post-op for his mechanical aortic and mitral shane that were held pre-op so and bridging with IV heparin while subtherapeutic on Warfarin so mo other chemical DVT prophylaxis needed post-op.   · PT/OT consulted post-op and recommending IP Rehab but patient prefers home with outpatient therapy and states his spouse will be available to provide care for him at home.   · Pain controled post-op and continue multimodal pain management withTylenol 1 gram po TID, Robaxin 1000 mg po 4 times daily, Lyrica 75 mg po BID. Continue Ropivicaine infusion via perineural pain catheter per Anesthesia Pain Service.   · Orthopedics following and managing surgical site.     Malignant neoplasm metastatic to bone  · Found on admit. Patient found on CT scan of chest/abdomen and pelvis as part of metastatic work-up to have extensive metastatic disease involving soft tissues above and below the diaphragm and osseous structures. Extensive involvement of the mediastinum with encasement of bronchovascular structures. Suggested potential primary lung and renal with left upper lobe and left renal masses. Pathologic fracture of the right femoral head/neck, L1 vertebral body, and left 6th rib. Small to moderate pericardial effusion, likely malignant. Small left pleural effusion.  · Heme/Onc consulted on admit and recommended MRI of brain to look for brain mets and none noted.  · Bone scan done showed  numerous tracer avid foci throughout the axial and appendicular skeleton consistent with metastatic disease. These lesions correspond to mixed lytic and sclerotic lesions on same day CT chest abdomen pelvis.  · Heme/Onc recommended bone biopsy and done by Orthopedics on 8/23 for diagnosis as primary tumor unknown and pathology pending.     Hypercalcemia of malignancy  · Resolved.   · Present on admit and related to malignancy. Corrected calcium 11.4 on admit. Patient given dose of IV Zometa 8/20 to treat.     H/O mitral valve replacement with mechanical valve  Anticoagulated on warfarin  · Patient with prior mechanical heart valves to acrotic valve and mitral valve due to bacterial endocarditis. Paient on Warfarin as outpatient with goal INR 2.5-3.5.   · Warfarin held for surgery and placed on IV Heparin drip as INR subtherapeutic. IV Heparin held for surgery but resumed post-op as INR remains subtherapeutic.Contineu IV heparin while patient with subtherapeutic INR.   · Continue Warfarin 5 mg po daily for now and pharmacy assisting in adjusting Warfarin dosing for goal INR 2.5-3.5.   · Monitor daily PT/INR.   · PharmD consult for Warfarin dosing and adjustments.     Acute arterial ischemic stroke, multifocal, multiple vascular territories  · Seen on MRI brain of brain on 8/22 as part of metastatic work-up and evaluation. MRI of brain showed multiple punctate scattered foci of diffusion restriction within the right cerebellar hemisphere, left erin and left corona radiata potentially representing acute embolic type infarcts.   · Vascular Neurology consulted:  Vascular Neurology  [place] strokes cardioembolic in nature due to patient with mechanical heart valves and not having therapeutic INR on admit.   · Continue IV Heparin drip as INR remains subtherapeutic as patient is being restarted back on Warfarin with goal therapeutic INR 2.5-3.5.   · Patient neurologically intact with no obvious neurologic deficits relate to  embolic strokes.   · Repeat MRI of brain on 8/24 showed no worsening of strokes or hemorrhagic conversion after placed on therapeutic IV Heparin drip.   · Appreciate Vascular Neurology assistance.       Pseudoaneurysm of right femoral artery  · Stable and controlled.   · Found on imaging on admit and Vascular Surgery consulted.   · Patient taken to OR on 8/19 and had embolization of ight profunda femoral artery pseudoaneurysm with 5 mm coil.    Subdural hematoma  · Found on routine imaging of brain as part of metastatic work-up on brain MRI on 8/22. MRI showed left cerebral convexity and posterior to the right occipital lobe raising concern for small acute subdural hemorrhage.   · Neurosurgery was consulted and no surgical intervention needed and okay with continuing with anticoagulation for his mechanical heart valves.   · Repeat CT scan of head and MRI of brain on 8/24 after therapeutic  IV Heparin drip restarted for anticoagulation was stable and okay to continue with anticoagulation as per Neurosurgery on 8/25.     Body mass index (BMI) less than 19  Body mass index is 16.27 kg/m².  · Likely related to his metastatic and advanced malignancy causing reduced BMI and weight loss.   · Nutrition consulted for evaluation.       VTE Risk Mitigation (From admission, onward)         Ordered     warfarin (COUMADIN) tablet 5 mg  Daily         08/24/22 1142     heparin 25,000 units in dextrose 5% 250 mL (100 units/mL) infusion LOW INTENSITY nomogram - [facility]  Continuous        Question Answer Comment   Heparin Infusion Adjustment (DO NOT MODIFY ANSWER) [email or web address] LOW INTENSITY nomogram for [facility] [id number]    Begin at (in units/kg/hr) 12        08/19/22 2301     Place sequential compression device  Until discontinued         08/18/22 0331     IP VTE LOW RISK PATIENT  Once         08/18/22 0331                Discharge Planning   VIKI: 8/26/2022     Code Status: Full Code   Is the  patient medically ready for discharge?: No    Reason for patient still in hospital (select all that apply): Patient trending condition  Discharge Plan A: Home Health, Home with family          Katina Khoury MD  Department of Hospital Medicine   [facility]

## 2022-08-26 NOTE — ASSESSMENT & PLAN NOTE
· Found on admit. Patient found on CT scan of chest/abdomen and pelvis as part of metastatic work-up to have extensive metastatic disease involving soft tissues above and below the diaphragm and osseous structures. Extensive involvement of the mediastinum with encasement of bronchovascular structures. Suggested potential primary lung and renal with left upper lobe and left renal masses. Pathologic fracture of the right femoral head/neck, L1 vertebral body, and left 6th rib. Small to moderate pericardial effusion, likely malignant. Small left pleural effusion.  · Heme/Onc consulted on admit and recommended MRI of brain to look for brain mets and none noted.  · Bone scan done showed numerous tracer avid foci throughout the axial and appendicular skeleton consistent with metastatic disease. These lesions correspond to mixed lytic and sclerotic lesions on same day CT chest abdomen pelvis.  · Heme/Onc recommended bone biopsy and done by Orthopedics on 8/23 for diagnosis as primary tumor unknown and pathology pending.

## 2022-08-26 NOTE — ASSESSMENT & PLAN NOTE
· Seen on MRI brain of brain on 8/22 as part of metastatic work-up and evaluation. MRI of brain showed multiple punctate scattered foci of diffusion restriction within the right cerebellar hemisphere, left erin and left corona radiata potentially representing acute embolic type infarcts.   · Vascular Neurology consulted:  Vascular Neurology  [place] strokes cardioembolic in nature due to patient with mechanical heart valves and not having therapeutic INR on admit.   · Continue IV Heparin drip as INR remains subtherapeutic as patient is being restarted back on Warfarin with goal therapeutic INR 2.5-3.5. Needs INR of at least 2 to stop Heparin drip and discharge home on Warfarin.   · Patient neurologically intact with no obvious neurologic deficits relate to embolic strokes.   · Repeat MRI of brain on 8/24 showed no worsening of strokes or hemorrhagic conversion after placed on therapeutic IV Heparin drip.   · Appreciate Vascular Neurology assistance.

## 2022-08-26 NOTE — ANESTHESIA POST-OP PAIN MANAGEMENT
Acute Pain Service Progress Note    Donis Jimenez is a 61 y.o., male, [id number].    Surgery:    HEMIARTHROPLASTY - right (Right Pelvis)  RADIOFREQUENCY ABLATION,BONE (Right Pelvis)     Post Op Day #: 3     Catheter type: perineural  R SIFI     Infusion type: Ropivacaine 0.2% 15cc q3h intermittent bolus    Problem List:    Active Hospital Problems    Diagnosis  POA    *Pathologic fracture of neck of right femur s/p hemiarthroplasty on 8/23/2022 [M84.451A]  Yes    Malignant neoplasm metastatic to bone [C79.51]  Yes    Anticoagulated on warfarin [Z79.01]  Not Applicable    Body mass index (BMI) less than 19 [Z68.1]  Not Applicable    H/O mitral valve replacement with mechanical valve [Z95.2]  Not Applicable    Hypercalcemia of malignancy [E83.52]  Yes    Lytic bone lesion of hip [M89.8X5]  Yes    Acute arterial ischemic stroke, multifocal, multiple vascular territories [I63.89]  Yes    Subdural hematoma [S06.5X9A]  Yes    Pseudoaneurysm of right femoral artery [I72.4]  Yes      Resolved Hospital Problems   No resolved problems to display.       Subjective:     General appearance of alert, oriented, no complaints   Pain with rest: 3    Numbers   Pain with movement: 6    Numbers   Side Effects    1. Pruritis No    2. Nausea No    3. Motor Blockade No, 0=Ability to raise lower extremities off bed    4. Sedation No, 1=awake and alert    Objective:     Catheter site clean, dry, intact     Vitals   Vitals:    08/26/22 1207   BP: 129/65   Pulse: 78   Resp: 18   Temp: 36.2 °C (97.2 °F)        Labs    No results displayed because visit has over 200 results.           Meds   Current Facility-Administered Medications   Medication Dose Route Frequency Provider Last Rate Last Admin    0.9%  NaCl infusion (for blood administration)   Intravenous Q24H PRN Cortez Hagan MD        acetaminophen tablet 1,000 mg  1,000 mg Oral 4 times per day Damian Rice MD   1,000 mg at 08/26/22 0535    ceFAZolin 2 gram in dextrose 5% 100  mL IVPB (premix)  2 g Intravenous Once Miguel Guerra MD        dextrose 10% bolus 125 mL  12.5 g Intravenous PRN Himanshu Lu MD        dextrose 10% bolus 250 mL  25 g Intravenous PRN Himanshu Lu MD        docusate sodium capsule 100 mg  100 mg Oral Daily Radhika Jackson MD   100 mg at 08/26/22 0824    EScitalopram oxalate tablet 20 mg  20 mg Oral Daily Miguel Guerra MD   20 mg at 08/26/22 0824    glucagon (human recombinant) injection 1 mg  1 mg Intramuscular PRN Himanshu Lu MD        glucose chewable tablet 16 g  16 g Oral PRN Himanshu Lu MD        glucose chewable tablet 24 g  24 g Oral PRN Himanshu Lu MD        heparin 25,000 units in dextrose 5% 250 mL (100 units/mL) infusion LOW INTENSITY nomogram - [facility]  0-40 Units/kg/hr (Adjusted) Intravenous Continuous Yung Waggoner MD 15.2 mL/hr at 08/26/22 0340 27.941 Units/kg/hr at 08/26/22 0340    melatonin tablet 6 mg  6 mg Oral Nightly Damian Rice MD   6 mg at 08/25/22 2033    methocarbamoL tablet 1,000 mg  1,000 mg Oral Q6H Damian Rice MD   1,000 mg at 08/26/22 0536    multivitamin tablet  1 tablet Oral Daily Cortez Hagan MD   1 tablet at 08/26/22 0824    mupirocin 2 % ointment   Nasal BID Miguel Guerra MD   Given at 08/26/22 0828    ondansetron disintegrating tablet 8 mg  8 mg Oral Q8H PRN Cruz Marquez MD   8 mg at 08/19/22 0817    oxyCODONE immediate release tablet 5 mg  5 mg Oral Q4H PRN Damian Rice MD   5 mg at 08/26/22 0824    pantoprazole EC tablet 40 mg  40 mg Oral Daily Katina Khoury MD   40 mg at 08/26/22 0824    pregabalin capsule 75 mg  75 mg Oral BID Damian Rice MD   75 mg at 08/26/22 0824    promethazine tablet 25 mg  25 mg Oral Q6H PRN Cruz Marquez MD        ROPIvacaine (PF) 2 mg/ml (0.2%) solution  2 mL/hr Perineural Continuous Damian Rice MD 2 mL/hr at 08/25/22 0401 2 mL/hr at 08/25/22 0401    sodium chloride 0.9% flush 10 mL  10 mL Intravenous PRN Cruz Marquez MD        sodium chloride 0.9%  flush 10 mL  10 mL Intravenous PRN Cruz Marquez MD        sodium chloride 0.9% flush 3 mL  3 mL Intravenous PRN Himanshu Lu MD        vitamin D 1000 units tablet 1,000 Units  1,000 Units Oral Daily Cortez Hagan MD   1,000 Units at 08/26/22 0824    warfarin (COUMADIN) tablet 5 mg  5 mg Oral Daily Radhiak Jackson MD   5 mg at 08/25/22 1722         Assessment:    Patient doing well this morning, preparing for discharge today vs tomorrow. Required 2x oxycodone 5mg overnight. Otherwise no complaints. Appears comfortable.      Plan:    - R SIFI PNC paused this morning. Will reassess this afternoon and remove if pain continues to be well controlled.   - Can proceed with MRI once nerve catheter removed     - Continue schedule multimodal analgesics:              - tylenol 1g q6h              - robaxin 1g q6h              - increased lyrica 75mg to BID              - melatonin 6mg qhs     - PRN oxycodone 5mg q4h for moderate and severe pain        Please call APS/anesthesia with any questions or concerns regarding pain control.           Damian Rice MD (PGY-4)  Anesthesiology

## 2022-08-27 NOTE — PROGRESS NOTES
[facility] - [facility]  Orthopedics  Progress Note    Patient Name: Donis Jimenez  MRN: [id number]  Admission Date: 8/18/2022  Hospital Length of Stay: 9 days  Attending Provider: Katina Khoury MD  Primary Care Provider: Primary Doctor No  Follow-up For: Procedure(s) (LRB):  HEMIARTHROPLASTY - right, lateral, bean bag, ablation supine after, ancef, prevena (Right)  cemontoplasty (Right)  RADIOFREQUENCY ABLATION,BONE (Right)    Post-Operative Day: 4 Days Post-Op  Subjective:     Principal Problem:Pathologic fracture of neck of right femur    Principal Orthopedic Problem: same, s/p R hip hemiarthroplasty with ablation, cementoplasty, and percutaneous fixation of pelvis 8/23    Interval History: NAEON. VSS.   Pain in right hip is much improved today and he has increased ROM of the RLE, able to flex and extend knee.   Ambulated 150ft with PT, recommending IP rehab.   Pathology from OR in process.  Hb 9.7.  MRI entire spine still pending.     Review of patient's allergies indicates:  No Known Allergies    Current Facility-Administered Medications   Medication    0.9%  NaCl infusion (for blood administration)    acetaminophen tablet 1,000 mg    ceFAZolin 2 gram in dextrose 5% 100 mL IVPB (premix)    dextrose 10% bolus 125 mL    dextrose 10% bolus 250 mL    docusate sodium capsule 100 mg    EScitalopram oxalate tablet 20 mg    glucagon (human recombinant) injection 1 mg    glucose chewable tablet 16 g    glucose chewable tablet 24 g    heparin 25,000 units in dextrose 5% 250 mL (100 units/mL) infusion LOW INTENSITY nomogram - [facility]    melatonin tablet 9 mg    methocarbamoL tablet 1,000 mg    multivitamin tablet    mupirocin 2 % ointment    ondansetron disintegrating tablet 8 mg    oxyCODONE immediate release tablet 5 mg    pantoprazole EC tablet 40 mg    pregabalin capsule 75 mg    promethazine tablet 25 mg    sodium chloride 0.9% flush 10 mL    sodium chloride 0.9% flush 10 mL    sodium chloride 0.9%  flush 3 mL    vitamin D 1000 units tablet 1,000 Units    warfarin (COUMADIN) tablet 5 mg     Objective:     Vital Signs (Most Recent):  Temp: 98 °F (36.7 °C) (08/27/22 0425)  Pulse: 74 (08/27/22 0425)  Resp: 16 (08/27/22 0425)  BP: 126/67 (08/27/22 0425)  SpO2: 96 % (08/27/22 0425)   Vital Signs (24h Range):  Temp:  [97.2 °F (36.2 °C)-98.1 °F (36.7 °C)] 98 °F (36.7 °C)  Pulse:  [69-84] 74  Resp:  [12-18] 16  SpO2:  [92 %-96 %] 96 %  BP: (126-135)/(63-69) 126/67     Weight: 54.4 kg (119 lb 14.9 oz)  Height: 6' (182.9 cm)  Body mass index is 16.27 kg/m².      Intake/Output Summary (Last 24 hours) at 8/27/2022 0538  Last data filed at 8/26/2022 0900  Gross per 24 hour   Intake --   Output 500 ml   Net -500 ml         Ortho/SPM Exam  Gen:  No acute distress, well-developed, well nourished.  CV:  Peripherally well-perfused.   Respiratory:  Normal respiratory effort. No accessory muscle use.   Head/Neck:  Normocephalic.  Atraumatic. Sclera anicteric. TM. Neck supple.  Neuro: CN 2-12 grossly intact. No FND. Awake. Alert. Oriented to person, place, time, and situation.      MSK:  Right Lower Extremity  Inspection  - Dressings c/d/i  - No swelling  - No ecchymosis, erythema, or signs of cellulitis  Palpation  -  Appropriately TTP at incision sites  Range of motion  - AROM and PROM of the ankle and foot intact without pain, able to flex and extend knee, minimal hip ROM at this time  Stability  - No evidence of joint dislocation or abnormal laxity  Neurovascular  - TA/EHL/Gastroc/FHL assessed in isolation without deficit  - SILT throughout  - Compartments soft  - DP palpated   - Muscle tone decreased        Significant Labs: CBC:   Recent Labs   Lab 08/25/22  0843 08/26/22  0447 08/27/22  0335   WBC 10.95 10.22 10.07   HGB 10.4* 9.4* 9.7*   HCT 31.6* 27.9* 29.1*    270 304       CMP:   Recent Labs   Lab 08/25/22  0843 08/26/22  0447 08/27/22  0335   * 136 132*   K 3.5 3.4* 3.7    104 103   CO2 21* 24 23     90 89   BUN 15 13 13   CREATININE 0.8 0.7 0.7   CALCIUM 7.6* 7.4* 7.5*   PROT 5.6* 5.2* 5.4*   ALBUMIN 2.3* 2.0* 2.0*   BILITOT 0.6 0.5 0.5   ALKPHOS 137* 148* 186*   AST 44* 38 31   ALT 13 14 15   ANIONGAP 11 8 6*       All pertinent labs within the past 24 hours have been reviewed.    Significant Imaging:   All pertinent imaging has been reviewed.        Assessment/Plan:     * Pathologic fracture of neck of right femur s/p hemiarthroplasty on 8/23/2022  Donis Jimenez is a 61 y.o. male with PMH of bacterial endocarditis, s/p AV and MV mechanical valve replacement (on warfarin), CKD, tobacco abuse presenting with pathologic R femur fracture. Bony lesions in femur and pelvis, as well as lung mass on CXR at outside hospital. Imaging studies pending. 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 Transferred for ortho oncology evaluation and management. Extensive smoking history. Deep femoral artery pseudoaneurysm ablation by vascular on 8/19. Pain well controlled.     Plan:  DVT ppx: heparin drip per vascular, stable head CT, NSGY signed off  Multimodal pain control  Metastatic workup ongoing, MRI brain completed, PET/CT scan completed, heme/onc consulted, MRI entire spine pending  WBAT with walker, posterior hip precautions              Adam Longoria MD  Orthopedics  [facility] - Surgery

## 2022-08-27 NOTE — ASSESSMENT & PLAN NOTE
· Seen on MRI of brain on 8/22 as part of metastatic work-up and evaluation. MRI of brain showed multiple punctate scattered foci of diffusion restriction within the right cerebellar hemisphere, left erin and left corona radiata potentially representing acute embolic type infarcts.   · Vascular Neurology consulted:  Vascular Neurology  [place] strokes cardioembolic in nature due to patient with mechanical heart valves and not having therapeutic INR on admit.   · Continue IV Heparin drip as INR remains subtherapeutic as patient is being restarted back on Warfarin with goal therapeutic INR 2.5-3.5. Needs INR of at least 2 to stop Heparin drip and discharge home on Warfarin.   · Patient neurologically intact with no obvious neurologic deficits relate to embolic strokes.   · Appreciate Vascular Neurology assistance.   · Patient discharged on therapeutic Warfarin at 4.5 mg po daily on discharge with goal INR 2.5-3.5. patient to continue outpatient monitoring as per his Cardiologist Dr. Ferreira at [facility] who is managing his outpatient Warfarin dosing.

## 2022-08-27 NOTE — SUBJECTIVE & OBJECTIVE
Principal Problem:Pathologic fracture of neck of right femur    Principal Orthopedic Problem: same, s/p R hip hemiarthroplasty with ablation, cementoplasty, and percutaneous fixation of pelvis 8/23    Interval History: NAEON. VSS.   Pain in right hip is much improved today and he has increased ROM of the RLE, able to flex and extend knee.   Ambulated 150ft with PT, recommending IP rehab.   Pathology from OR in process.  Hb 9.7.  MRI entire spine still pending.     Review of patient's allergies indicates:  No Known Allergies    Current Facility-Administered Medications   Medication    0.9%  NaCl infusion (for blood administration)    acetaminophen tablet 1,000 mg    ceFAZolin 2 gram in dextrose 5% 100 mL IVPB (premix)    dextrose 10% bolus 125 mL    dextrose 10% bolus 250 mL    docusate sodium capsule 100 mg    EScitalopram oxalate tablet 20 mg    glucagon (human recombinant) injection 1 mg    glucose chewable tablet 16 g    glucose chewable tablet 24 g    heparin 25,000 units in dextrose 5% 250 mL (100 units/mL) infusion LOW INTENSITY nomogram - [facility]    melatonin tablet 9 mg    methocarbamoL tablet 1,000 mg    multivitamin tablet    mupirocin 2 % ointment    ondansetron disintegrating tablet 8 mg    oxyCODONE immediate release tablet 5 mg    pantoprazole EC tablet 40 mg    pregabalin capsule 75 mg    promethazine tablet 25 mg    sodium chloride 0.9% flush 10 mL    sodium chloride 0.9% flush 10 mL    sodium chloride 0.9% flush 3 mL    vitamin D 1000 units tablet 1,000 Units    warfarin (COUMADIN) tablet 5 mg     Objective:     Vital Signs (Most Recent):  Temp: 98 °F (36.7 °C) (08/27/22 0425)  Pulse: 74 (08/27/22 0425)  Resp: 16 (08/27/22 0425)  BP: 126/67 (08/27/22 0425)  SpO2: 96 % (08/27/22 0425)   Vital Signs (24h Range):  Temp:  [97.2 °F (36.2 °C)-98.1 °F (36.7 °C)] 98 °F (36.7 °C)  Pulse:  [69-84] 74  Resp:  [12-18] 16  SpO2:  [92 %-96 %] 96 %  BP: (126-135)/(63-69) 126/67     Weight: 54.4 kg (119 lb 14.9  oz)  Height: 6' (182.9 cm)  Body mass index is 16.27 kg/m².      Intake/Output Summary (Last 24 hours) at 8/27/2022 0538  Last data filed at 8/26/2022 0900  Gross per 24 hour   Intake --   Output 500 ml   Net -500 ml         Ortho/SPM Exam  Gen:  No acute distress, well-developed, well nourished.  CV:  Peripherally well-perfused.   Respiratory:  Normal respiratory effort. No accessory muscle use.   Head/Neck:  Normocephalic.  Atraumatic. Sclera anicteric. TM. Neck supple.  Neuro: CN 2-12 grossly intact. No FND. Awake. Alert. Oriented to person, place, time, and situation.      MSK:  Right Lower Extremity  Inspection  - Dressings c/d/i  - No swelling  - No ecchymosis, erythema, or signs of cellulitis  Palpation  -  Appropriately TTP at incision sites  Range of motion  - AROM and PROM of the ankle and foot intact without pain, able to flex and extend knee, minimal hip ROM at this time  Stability  - No evidence of joint dislocation or abnormal laxity  Neurovascular  - TA/EHL/Gastroc/FHL assessed in isolation without deficit  - SILT throughout  - Compartments soft  - DP palpated   - Muscle tone decreased        Significant Labs: CBC:   Recent Labs   Lab 08/25/22  0843 08/26/22  0447 08/27/22  0335   WBC 10.95 10.22 10.07   HGB 10.4* 9.4* 9.7*   HCT 31.6* 27.9* 29.1*    270 304       CMP:   Recent Labs   Lab 08/25/22  0843 08/26/22  0447 08/27/22  0335   * 136 132*   K 3.5 3.4* 3.7    104 103   CO2 21* 24 23    90 89   BUN 15 13 13   CREATININE 0.8 0.7 0.7   CALCIUM 7.6* 7.4* 7.5*   PROT 5.6* 5.2* 5.4*   ALBUMIN 2.3* 2.0* 2.0*   BILITOT 0.6 0.5 0.5   ALKPHOS 137* 148* 186*   AST 44* 38 31   ALT 13 14 15   ANIONGAP 11 8 6*       All pertinent labs within the past 24 hours have been reviewed.    Significant Imaging:   All pertinent imaging has been reviewed.

## 2022-08-27 NOTE — ASSESSMENT & PLAN NOTE
· Orthopedics consulted on admit after patient found to have extensive osseous mets to right femur and pelvis with pathologic femoral neck fracture. Suspect based on imaging primary lung cancer with diffuse osseous mets.   · Patient taken to OR on 8/23 by Orthopedic Oncology with Dr. Flores and underwent right hip hemiarthroplasty with ablation, cementoplasty, and percutaneous fixation of pelvis for pathologic fracture.   · Post-op, patient is weight bearing as tolerated with posterior hip precautions to right lower extremity as per Orthopedics and to continue on hospital discharge.   · Patient to resume therapeutic Warfarin post-op for his mechanical aortic and mitral shane that were held pre-op so and bridging with IV Heparin while subtherapeutic on Warfarin so no other chemical DVT prophylaxis needed post-op. Patient therapeutic on Warfarin on discharge so discharged on Warfarin so no other DVT prophylaxis needed on discharge.   · PT/OT consulted post-op and recommending IP Rehab but patient prefers home with outpatient therapy and states his spouse will be available to provide care for him at home. Arrangements made by CM for outpatient PT as well as DME on discharge of RW and bedside commode.   · Pain controlled post-op and continue multimodal pain management withTylenol 1 gram po TID, Robaxin 1000 mg po 4 times daily, Lyrica 75 mg po BID on discharge with Oxy IR 5 mg po every 4 hours prn for breakthrough pain.  · Surgical bandages to remain in place until Orthopedic clinic follow-up.

## 2022-08-27 NOTE — NURSING
Patient AAOx4.VS stable, afrebrile. Pain managed. Neurovascular intact. Free from falls. Frequent rounds made for safety, pain and comfort. Bed at lowest position, call light within reach, side rails up x2.

## 2022-08-27 NOTE — ASSESSMENT & PLAN NOTE
· Found on admit. Patient found on CT scan of chest/abdomen and pelvis as part of metastatic work-up to have extensive metastatic disease involving soft tissues above and below the diaphragm and osseous structures. Extensive involvement of the mediastinum with encasement of bronchovascular structures. Suggested potential primary lung and renal with left upper lobe and left renal masses. Pathologic fracture of the right femoral head/neck, L1 vertebral body, and left 6th rib. Small to moderate pericardial effusion, likely malignant. Small left pleural effusion.  · Heme/Onc consulted on admit and recommended MRI of brain to look for brain mets and none noted to brain itself but concerning osseous mets to skull itself.   · Bone scan done showed numerous tracer avid foci throughout the axial and appendicular skeleton consistent with metastatic disease. These lesions correspond to mixed lytic and sclerotic lesions on same day CT chest abdomen pelvis.  · Heme/Onc recommended bone biopsy and done by Orthopedics on 8/23 for diagnosis as primary tumor unknown and pathology pending on discharge. Patient will need to follow-up with Oncology and Orthopedic Oncology on discharge to discuss future treatment options once pathology returns. Ambulatory referrals sent to [facility] and [facility] for hospital follow-up.   · Patient noted on hospital stay to have swelling to both upper extremities and felt related to vascular compression of veins seen on CT scan of chest and recommended that patient elevate his upper arms on discharge to help with swelling.

## 2022-08-27 NOTE — ASSESSMENT & PLAN NOTE
Body mass index is 16.27 kg/m².  · Likely related to his metastatic and advanced malignancy causing reduced BMI and weight loss.   · Nutrition consulted for evaluation.   · Discussed with patient and his spouse about high protein diet and nutritional supplements on discharge to help with weight gain.

## 2022-08-27 NOTE — ASSESSMENT & PLAN NOTE
· Resolved on discharge.   · Present on admit and related to malignancy. Corrected calcium 11.4 on admit. Patient given dose of IV Zometa 8/20 to treat.    04-May-2022 22:30

## 2022-08-27 NOTE — DISCHARGE SUMMARY
[facility] Medicine  Discharge Summary      Patient Name: Donis Jimenez  MRN: [id number]  Patient Class: IP- Inpatient  Admission Date: 8/18/2022  Hospital Length of Stay: 9 days  Discharge Date and Time: 8/27/2022 12:53 PM  Attending Physician: Katina Khoury MD   Discharging Provider: Katina Khoury MD  Primary Care Provider: Primary Doctor [facility] Medicine Team: [facility]  Katina Khoury MD    HPI:   Donis Jimenez is a 61 y.o. male with PMH significant for bacterial endocarditis, s/p AV and MV mechanical valve replacement (on warfarin), CKD, tobacco abuse presenting with R hip pain. Pt with several weeks R hip pain, found to have bone lesions and was pending outpatient workup / IR biopsy. However, pt experienced a fall yesterday - prior to getting biopsy. Unknown primary neoplasm. He has pain, inability to bear weight on right leg. Patient denies any head trauma or LOC. The patient denies prior hx of falls. Patient denies numbness and tingling. No known prior right hip injury or surgery. Outside radiology reports indicate lytic femoral neck lesion, lesions in pelvis, and lung mass seen on xrays. Ortho primary team following.     consulted for eval for transfer to  primary team in setting of extensive metastatic dz w/ unclear primary origin.      8/23/2022  Procedure(s) (LRB):  HEMIARTHROPLASTY - right, lateral, bean bag, ablation supine after, ancef, prevena (Right)  cemontoplasty (Right)  RADIOFREQUENCY ABLATION,BONE (Right)    Surgeon(s):  MD Cortez Peña MD      Hospital Course:   61 y.o. male with significant for tobacco abuse, bacterial endocarditis s/p AVR & MVR (mechanical) on Warfarin (subtherapeutic on admission) who presented to [facility] [place] pain found to have pathologic right femoral neck fracture and right femoral artery pseudoaneurysm. Orthopedics consulted concerning right femoral pathologic fracture and Vascular surgery  consulted concerning pseudoaneurysm.  Patient taken to oR on 8/19 by Vascular Surgery and had embolization of 3rd order right profunda femoral artery pseudoaneurysm with N-BCA glue and 5mm coil aneurysm repair. Following his pseudoaneurysm repair, patient was placed on IV Heparin drip. Metastatic work-up done.   CT scan of chest/abdomen and pelvis done and showed:  1. Findings of extensive metastatic disease involving soft tissues above and below the diaphragm and osseous structures. Extensive involvement of the mediastinum with encasement of bronchovascular structures as detailed above. Suggested potential primary lung and renal with left upper lobe and left renal masses.  2. Pathologic fracture of the right femoral head/neck, L1 vertebral body, and left 6th rib.  3. Small to moderate pericardial effusion, likely malignant.  4. Small left pleural effusion.  5. Heterogeneous opacification of the right jugular vein which could represent contrast mixing however cannot exclude thrombus.    Oncology was consulted and recommended MRI of brain as part of metastatic work-up and showed multiple punctate scattered foci of diffusion restriction within the right cerebellar hemisphere, left erin and left corona radiata potentially representing acute embolic type infarcts. FLAIR hyperintense, T1 isointense fluid overlying the left cerebral convexity and posterior to the right occipital lobe raising concern for small acute subdural hemorrhage. Heterogeneous areas of calvarial marrow with at least two focal lesions concerning for metastatic disease involving the left occipital and right parietal calvarium. Partially visualized C3 metastatic disease.    Vascular Neurology was consulted and believed embolic infarcts on MRI to be result of a cardioembolic phenomenon in the setting of a subtherapeutic INR in patient with known mechanical valves. Regarding the subdural hematoma, NSGY was consulted. No surgical intervention has been  recommended at this point for small subdural hematoma noted on MRI. Patient was eventually taken to OR by Orthopedics on 8/23 for right hip hemiarthroplasty with ablation, cementoplasty, and percutaneous fixation of pelvis for pathologic fracture and metastatic disease. IV Heparin drip was resumed following the procedure by NSGY recommendation, with CT of the head has been ordered for the morning of 8/24 and done and showed no changes from pre-op MRI. PT/OT consulted post-op and recommending IP Rehab on discharge but patient prefers home with outpatient therapy on discharge when medically ready. Arrangements made for outpatient PT and DME of beside commode and rolling walker when patient medically ready to be discharged from hospital but needs INR > 2 to discharge home. INR 1.6 on Warfarin on 8/26. INR 2.0 on 8/27. Patient discharged home in good condition with his spouse on 8/27. Surgical bandages to remain in place until Orthopedic clinic follow-up. Pathology pending from right femur bone biopsy. Patient to follow-up with Oncology and Orthopedic Oncology as outpatient. Patient discharged on Robaxin, Lyrica and tylenol and Oxy IR prn for pain control. Patient to resume home regimen of Coumadin 4.5 mg po daily on discharge with goal INR 2.5-3.5.        Goals of Care Treatment Preferences:  Code Status: Full Code      Consults:   Consults (From admission, onward)          Status Ordering Provider     [facility] Medicine PharmD Consult  Once        Provider:  (Not yet assigned)    Completed PRINCE SCALES     Inpatient consult to Vascular (Stroke) Neurology  Once        Provider:  (Not yet assigned)    Completed NOEMI ELLIOTT     Inpatient consult to Neurosurgery  Once        Provider:  (Not yet assigned)    Completed NOEMI ELLIOTT     Inpatient consult to Hematology/Oncology  Once        Provider:  (Not yet assigned)    Completed ALONDRA CHO     Inpatient consult to [facility] Medicine-General  Once         Provider:  (Not yet assigned)    Completed ALONDRA CHO     Inpatient consult to Vascular Surgery  Once        Provider:  (Not yet assigned)    Completed ALONDRA CHO     Inpatient consult to Orthopedic Surgery  Once        Provider:  (Not yet assigned)    Completed ELIU KENNEY            * Pathologic fracture of neck of right femur s/p hemiarthroplasty on 8/23/2022  Orthopedics consulted on admit after patient found to have extensive osseous mets to right femur and pelvis with pathologic femoral neck fracture. Suspect based on imaging primary lung cancer with diffuse osseous mets.   Patient taken to OR on 8/23 by Orthopedic Oncology with Dr. Flores and underwent right hip hemiarthroplasty with ablation, cementoplasty, and percutaneous fixation of pelvis for pathologic fracture.   Post-op, patient is weight bearing as tolerated with posterior hip precautions to right lower extremity as per Orthopedics and to continue on hospital discharge.   Patient to resume therapeutic Warfarin post-op for his mechanical aortic and mitral shane that were held pre-op so and bridging with IV Heparin while subtherapeutic on Warfarin so no other chemical DVT prophylaxis needed post-op. Patient therapeutic on Warfarin on discharge so discharged on Warfarin so no other DVT prophylaxis needed on discharge.   PT/OT consulted post-op and recommending IP Rehab but patient prefers home with outpatient therapy and states his spouse will be available to provide care for him at home. Arrangements made by CM for outpatient PT as well as DME on discharge of RW and bedside commode.   Pain controlled post-op and continue multimodal pain management withTylenol 1 gram po TID, Robaxin 1000 mg po 4 times daily, Lyrica 75 mg po BID on discharge with Oxy IR 5 mg po every 4 hours prn for breakthrough pain.  Surgical bandages to remain in place until Orthopedic clinic follow-up.     Malignant neoplasm metastatic to bone  Found on admit. Patient  found on CT scan of chest/abdomen and pelvis as part of metastatic work-up to have extensive metastatic disease involving soft tissues above and below the diaphragm and osseous structures. Extensive involvement of the mediastinum with encasement of bronchovascular structures. Suggested potential primary lung and renal with left upper lobe and left renal masses. Pathologic fracture of the right femoral head/neck, L1 vertebral body, and left 6th rib. Small to moderate pericardial effusion, likely malignant. Small left pleural effusion.  Heme/Onc consulted on admit and recommended MRI of brain to look for brain mets and none noted to brain itself but concerning osseous mets to skull itself.   Bone scan done showed numerous tracer avid foci throughout the axial and appendicular skeleton consistent with metastatic disease. These lesions correspond to mixed lytic and sclerotic lesions on same day CT chest abdomen pelvis.  Heme/Onc recommended bone biopsy and done by Orthopedics on 8/23 for diagnosis as primary tumor unknown and pathology pending on discharge. Patient will need to follow-up with Oncology and Orthopedic Oncology on discharge to discuss future treatment options once pathology returns. Ambulatory referrals sent to [facility] and [facility] for hospital follow-up.   Patient noted on hospital stay to have swelling to both upper extremities and felt related to vascular compression of veins seen on CT scan of chest and recommended that patient elevate his upper arms on discharge to help with swelling.     Hypercalcemia of malignancy-resolved as of 8/26/2022  Resolved on discharge.   Present on admit and related to malignancy. Corrected calcium 11.4 on admit. Patient given dose of IV Zometa 8/20 to treat.     H/O mitral valve replacement with mechanical valve  Anticoagulated on warfarin  INR 2.0 on day of discharge. Patient discharged home on Warfarin 4.5 mg po daily with goal INR 2.5-3.5 and to continue  outpatient monitoring as per his outpatient cardiologist Dr. Melchor Ferreira at [facility].   Patient with prior mechanical heart valves to acrotic valve and mitral valve due to bacterial endocarditis. Paient on Warfarin as outpatient with goal INR 2.5-3.5.   Warfarin held for surgery and placed on IV Heparin drip as INR subtherapeutic. IV Heparin held for surgery but resumed post-op as INR remains subtherapeutic.Continue IV Heparin while patient with subtherapeutic INR.   Continue Warfarin 5 mg po daily for now and pharmacy assisting in adjusting Warfarin dosing for goal INR 2.5-3.5.   Monitor daily PT/INR.   PharmD consult for Warfarin dosing and adjustments.     Acute arterial ischemic stroke, multifocal, multiple vascular territories  Seen on MRI of brain on 8/22 as part of metastatic work-up and evaluation. MRI of brain showed multiple punctate scattered foci of diffusion restriction within the right cerebellar hemisphere, left erin and left corona radiata potentially representing acute embolic type infarcts.   Vascular Neurology consulted:  Vascular Neurology  Sparks strokes cardioembolic in nature due to patient with mechanical heart valves and not having therapeutic INR on admit.   Continue IV Heparin drip as INR remains subtherapeutic as patient is being restarted back on Warfarin with goal therapeutic INR 2.5-3.5. Needs INR of at least 2 to stop Heparin drip and discharge home on Warfarin.   Patient neurologically intact with no obvious neurologic deficits relate to embolic strokes.   Appreciate Vascular Neurology assistance.   Patient discharged on therapeutic Warfarin at 4.5 mg po daily on discharge with goal INR 2.5-3.5. patient to continue outpatient monitoring as per his Cardiologist Dr. Ferreira at [facility] who is managing his outpatient Warfarin dosing.       Pseudoaneurysm of right femoral artery  Stable and controlled.   Found on imaging on admit and Vascular Surgery consulted.   Patient taken to OR on 8/19  and had embolization of ight profunda femoral artery pseudoaneurysm with 5 mm coil.    Subdural hematoma  Found on routine imaging of brain as part of metastatic work-up on brain MRI on 8/22. MRI showed left cerebral convexity and posterior to the right occipital lobe raising concern for small acute subdural hemorrhage.   Neurosurgery was consulted and no surgical intervention needed and okay with continuing with anticoagulation for his mechanical heart valves.   Repeat CT scan of head and MRI of brain on 8/24 after therapeutic  IV Heparin drip restarted for anticoagulation was stable and okay to continue with anticoagulation as per Neurosurgery on 8/25.     Body mass index (BMI) less than 19  Body mass index is 16.27 kg/m².  Likely related to his metastatic and advanced malignancy causing reduced BMI and weight loss.   Nutrition consulted for evaluation.   Discussed with patient and his spouse about high protein diet and nutritional supplements on discharge to help with weight gain.     Final Active Diagnoses:    Diagnosis Date Noted POA    PRINCIPAL PROBLEM:  Pathologic fracture of neck of right femur s/p hemiarthroplasty on 8/23/2022 [M84.451A] 08/18/2022 Yes    Malignant neoplasm metastatic to bone [C79.51] 08/25/2022 Yes    H/O mitral valve replacement with mechanical valve [Z95.2] 08/24/2022 Not Applicable    Acute arterial ischemic stroke, multifocal, multiple vascular territories [I63.89] 08/23/2022 Yes    Pseudoaneurysm of right femoral artery [I72.4]  Yes    Subdural hematoma [S06.5X9A] 08/23/2022 Yes    Body mass index (BMI) less than 19 [Z68.1] 08/24/2022 Not Applicable    Anticoagulated on warfarin [Z79.01] 08/25/2022 Not Applicable    Lytic bone lesion of hip [M89.8X5]  Yes      Problems Resolved During this Admission:    Diagnosis Date Noted Date Resolved POA    Hypercalcemia of malignancy [E83.52] 08/24/2022 08/26/2022 Yes       Discharged Condition: good    Disposition: Home or Self Care with  "outpatient PT    Follow Up:  Follow-up with Dr. Jen Flores in Orthopedic clinic in 2 weeks.    Patient Instructions:      WALKER FOR HOME USE     Order Specific Question Answer Comments   Type of Walker: Adult (5'4"-6'6")    With wheels? Yes    Height: 6' (1.829 m)    Weight: 54.4 kg (119 lb 14.9 oz)    Length of need (1-99 months): 99    Does patient have medical equipment at home? cane, straight transport w/c / transport w/c / transport w/c   Does patient have medical equipment at home? walker, rolling    Does patient have medical equipment at home? shower chair    Please check all that apply: Patient's condition impairs ambulation.    Please check all that apply: Patient is unable to safely ambulate without equipment.      COMMODE FOR HOME USE     Order Specific Question Answer Comments   Type: Standard    Height: 6' (1.829 m)    Weight: 54.4 kg (119 lb 14.9 oz)    Does patient have medical equipment at home? cane, straight transport w/c / transport w/c / transport w/c   Does patient have medical equipment at home? walker, rolling    Does patient have medical equipment at home? shower chair    Length of need (1-99 months): 99      Ambulatory referral/consult to Physical/Occupational Therapy   Standing Status: Future   Referral Priority: Routine Referral Type: Physical Medicine   Referral Reason: Specialty Services Required   Requested Specialty: Physical Therapy   Number of Visits Requested: 1     Ambulatory referral/consult to Orthopedics   Standing Status: Future   Referral Priority: Routine Referral Type: Consultation   Referred to Provider: JEN FLORES Requested Specialty: Orthopedic Surgery   Number of Visits Requested: 1     Ambulatory referral/consult to Hematology / Oncology   Standing Status: Future   Referral Priority: Routine Referral Type: Consultation   Referral Reason: Specialty Services Required   Requested Specialty: Hematology and Oncology   Number of Visits Requested: 1     Diet Adult " Regular     Lifting restrictions   Order Comments: No heavy lifting and no bending past 90 degrees.     No driving until:   Order Comments: Cleared by Orthopedics     Leave dressing on - Keep it clean, dry, and intact until clinic visit     Notify your health care provider if you experience any of the following:  temperature >100.4     Notify your health care provider if you experience any of the following:  increased confusion or weakness     Notify your health care provider if you experience any of the following:  persistent dizziness, light-headedness, or visual disturbances     Notify your health care provider if you experience any of the following:  worsening rash     Notify your health care provider if you experience any of the following:  severe persistent headache     Notify your health care provider if you experience any of the following:  persistent nausea and vomiting or diarrhea     Notify your health care provider if you experience any of the following:  difficulty breathing or increased cough     Notify your health care provider if you experience any of the following:  redness, tenderness, or signs of infection (pain, swelling, redness, odor or green/yellow discharge around incision site)     Notify your health care provider if you experience any of the following:  severe uncontrolled pain     Activity as tolerated     Weight bearing restrictions (specify):   Order Comments: Weight bear as tolerated with posterior hip precautions to right leg       Significant Diagnostic Studies: Labs:   CMP   Recent Labs   Lab 08/26/22 0447 08/27/22  0335    132*   K 3.4* 3.7    103   CO2 24 23   GLU 90 89   BUN 13 13   CREATININE 0.7 0.7   CALCIUM 7.4* 7.5*   PROT 5.2* 5.4*   ALBUMIN 2.0* 2.0*   BILITOT 0.5 0.5   ALKPHOS 148* 186*   AST 38 31   ALT 14 15   ANIONGAP 8 6*    and CBC   Recent Labs   Lab 08/26/22 0447 08/27/22  0335   WBC 10.22 10.07   HGB 9.4* 9.7*   HCT 27.9* 29.1*    304      Recent Labs     08/25/22  0843 08/26/22  0447 08/27/22  0335   LABPROT 13.0* 16.0* 20.4*   INR 1.3* 1.6* 2.0*        Pending Diagnostic Studies:       Procedure Component Value Units Date/Time         Specimen to Pathology, Surgery Orthopedics [[id number]] Collected: 08/23/22 1952    Order Status: Sent Lab Status: In process Updated: 08/24/22 0737    Specimen: Tissue         Medications:  Reconciled Home Medications:      Medication List        START taking these medications      methocarbamoL 500 MG Tab  Commonly known as: ROBAXIN  Take 2 tablets (1,000 mg total) by mouth every 6 (six) hours. for 10 days     oxyCODONE 5 MG immediate release tablet  Commonly known as: ROXICODONE  Take 1 tablet (5 mg total) by mouth every 4 (four) hours as needed for Pain.     pregabalin 75 MG capsule  Commonly known as: LYRICA  Take 1 capsule (75 mg total) by mouth 2 (two) times daily.     vitamin D 1000 units Tab  Commonly known as: VITAMIN D3  Take 1 tablet (1,000 Units total) by mouth once daily.            CHANGE how you take these medications      acetaminophen 500 MG tablet  Commonly known as: TYLENOL  Take 2 tablets (1,000 mg total) by mouth every 8 (eight) hours as needed (Mild to moderate pain).  What changed:   how much to take  when to take this  reasons to take this     warfarin 3 MG tablet  Commonly known as: COUMADIN  Take 1.5 tablets (4.5 mg total) by mouth Daily.  What changed: how much to take            CONTINUE taking these medications      EScitalopram oxalate 20 MG tablet  Commonly known as: LEXAPRO  Take 20 mg by mouth every evening.     esomeprazole 20 MG capsule  Commonly known as: NEXIUM  Take 20 mg by mouth once daily.     ferrous gluconate 324 MG tablet  Commonly known as: FERGON  Take 1 tablet by mouth once daily.     LORazepam 1 MG tablet  Commonly known as: ATIVAN  Take 1 mg by mouth 3 (three) times daily as needed for Anxiety.     pravastatin 40 MG tablet  Commonly known as: PRAVACHOL  Take 40 mg by  mouth every evening.     sumatriptan 25 MG Tab  Commonly known as: IMITREX  Take 25 mg by mouth as needed for Migraine.            STOP taking these medications      acetaminophen-codeine 300-60mg 300-60 mg Tab  Commonly known as: TYLENOL #4              Indwelling Lines/Drains at time of discharge:   Lines/Drains/Airways       None                   Time spent on the discharge of patient: 32 minutes         Katina Khoury MD  Department of Hospital Medicine  [facility] - Surgery

## 2022-08-27 NOTE — ASSESSMENT & PLAN NOTE
Anticoagulated on warfarin  · INR 2.0 on day of discharge. Patient discharged home on Warfarin 4.5 mg po daily with goal INR 2.5-3.5 and to continue outpatient monitoring as per his outpatient cardiologist Dr. Melchor Ferreira at [facility].   · Patient with prior mechanical heart valves to acrotic valve and mitral valve due to bacterial endocarditis. Paient on Warfarin as outpatient with goal INR 2.5-3.5.   · Warfarin held for surgery and placed on IV Heparin drip as INR subtherapeutic. IV Heparin held for surgery but resumed post-op as INR remains subtherapeutic.Continue IV Heparin while patient with subtherapeutic INR.   · Continue Warfarin 5 mg po daily for now and pharmacy assisting in adjusting Warfarin dosing for goal INR 2.5-3.5.   · Monitor daily PT/INR.   · PharmD consult for Warfarin dosing and adjustments.

## 2022-08-29 NOTE — TELEPHONE ENCOUNTER
I Left a message that I'm sending him an appt for Sept 9 @ 1:40 pm  I may have to add an xray -I must check with the DR  I left me name & number if he has questions or concerns

## 2022-08-29 NOTE — TELEPHONE ENCOUNTER
"----- Message from Amaury Everett sent at 8/29/2022  9:25 AM CDT -----  Regarding: ADVISE  Contact:  - Aston   - Aston ask for a call regarding the pt's appointment      Contact info  [phone] Phone              Spoke with Aston  he had questions regarding taking the meds close to each other caused Donis to be  " Loupy"   he had called the resident on call =I think that's who he meant , was told to space the meds out  to take 1/2 Lyrica 1st , then the other half , if needed    I told him the best person to ask is his pharmacist    I also explained how to find us & where to park also we MAY need an xray before his appt on Sept 9     he voiced understanding      He was concerned about home health PTthey do not have health care , we will have to discuss that when we see them-  told to do exactly what the pT people had been doing in the hospital , he    also mentioned a bed sore on his buttock   told him to change positions every hour , not to lay directly on it  ,  get up and walk with his walker ,he voiced understanding   "

## 2022-08-29 NOTE — TELEPHONE ENCOUNTER
Jonathan called   stated [employer] will take medicaid,  I routed the order directly to them via Routing history in this chart ( misc. Reports)

## 2022-09-03 NOTE — ANESTHESIA POSTPROCEDURE EVALUATION
Anesthesia Post Evaluation    Patient: Donis Jimenez    Procedure(s) Performed: Procedure(s) (LRB):  HEMIARTHROPLASTY - right, lateral, bean bag, ablation supine after, ancef, prevena (Right)  cemontoplasty (Right)  RADIOFREQUENCY ABLATION,BONE (Right)    Final Anesthesia Type: general      Patient location during evaluation: PACU  Patient participation: Yes- Able to Participate  Level of consciousness: awake and alert and oriented  Post-procedure vital signs: reviewed and stable  Pain management: adequate  Airway patency: patent    PONV status at discharge: No PONV  Anesthetic complications: no      Cardiovascular status: hemodynamically stable  Respiratory status: unassisted, spontaneous ventilation and room air  Hydration status: euvolemic  Follow-up not needed.          Vitals Value Taken Time   /62 08/27/22 0838   Temp 36.4 °C (97.6 °F) 08/27/22 0838   Pulse 88 08/27/22 0838   Resp 16 08/27/22 0955   SpO2 92 % 08/27/22 0838         Event Time   Out of Recovery 08/23/2022 21:45:00         Pain/Adebayo Score: No data recorded

## 2022-09-04 PROBLEM — I48.92 ATRIAL FLUTTER: Status: ACTIVE | Noted: 2022-01-01

## 2022-09-04 PROBLEM — A41.9 SEPSIS WITH ACUTE HYPOXIC RESPIRATORY FAILURE: Status: ACTIVE | Noted: 2022-01-01

## 2022-09-04 PROBLEM — R06.02 SOB (SHORTNESS OF BREATH): Status: ACTIVE | Noted: 2022-01-01

## 2022-09-04 PROBLEM — R65.20 PUERPERAL SEPSIS WITH ACUTE HYPOXIC RESPIRATORY FAILURE: Status: ACTIVE | Noted: 2022-01-01

## 2022-09-04 PROBLEM — J96.01 PUERPERAL SEPSIS WITH ACUTE HYPOXIC RESPIRATORY FAILURE: Status: ACTIVE | Noted: 2022-01-01

## 2022-09-04 PROBLEM — M79.89 SWELLING OF UPPER EXTREMITY: Status: ACTIVE | Noted: 2022-01-01

## 2022-09-04 PROBLEM — A41.9 SEPSIS: Status: ACTIVE | Noted: 2022-01-01

## 2022-09-04 PROBLEM — Z71.89 ACP (ADVANCE CARE PLANNING): Status: ACTIVE | Noted: 2022-01-01

## 2022-09-04 NOTE — ED NOTES
Dr. Verde stated defibrillation necessary for pt. Placed pt on pacer pads and CO2 monitoring. Pt resting comfortably in bed with O2 sat at 100% on 6 L and CO2 of 20. RR 18.  on monitor.

## 2022-09-04 NOTE — ASSESSMENT & PLAN NOTE
Shortness of breath   Atrial flutter  Acute hypoxic respiratory failure    This patient does have evidence of infective focus  My overall impression is sepsis. Vital signs were reviewed and noted in progress note.  Antibiotics given-   Antibiotics (From admission, onward)    Start     Stop Route Frequency Ordered    09/04/22 2100  vancomycin 750 mg in dextrose 5 % 250 mL IVPB (ready to mix system)         -- IV Every 24 hours (non-standard times) 09/04/22 1537    09/04/22 1600  cefepime in dextrose 5 % IVPB 2 g         -- IV Every 24 hours (non-standard times) 09/04/22 1432        Cultures were taken-   Microbiology Results (last 7 days)     Procedure Component Value Units Date/Time    Blood culture x two cultures. Draw prior to antibiotics. [[id number]] Collected: 09/04/22 0709    Order Status: Completed Specimen: Blood from Peripheral, Upper Arm, Left Updated: 09/04/22 1515     Blood Culture, Routine No Growth to date    Narrative:      Aerobic and anaerobic    Blood culture x two cultures. Draw prior to antibiotics. [[id number]] Collected: 09/04/22 1136    Order Status: Sent Specimen: Blood from Peripheral, Upper Arm, Right Updated: 09/04/22 1140    Influenza A & B by Molecular [[id number]] Collected: 09/04/22 0657    Order Status: Completed Specimen: Nasopharyngeal Swab Updated: 09/04/22 0732     Influenza A, Molecular Negative     Influenza B, Molecular Negative     Flu A & B Source NP        Latest lactate reviewed, they are-  Recent Labs   Lab 09/04/22  0708 09/04/22  1136   LACTATE 2.3* 2.4*       Organ dysfunction indicated by Acute respiratory failure, arrhythmia, leukocytosis, thrombocytosis, lactic acidosis  Source- pulm    Source control Achieved by-   Broad-spectrum antibiotics    Presentation concerning for sepsis of pulmonary origin  Continue broad-spectrum IV antibiotics  Sputum culture  Follow-up blood cultures   Echo   Suspect infection leading to atrial flutter  In normal sinus rhythm following  cardioversion in ED  Patient with pacemaker in place  Cardiology consult; patient previously on amiodarone for atrial fibrillation  Pacemaker interrogation  Supplemental oxygen as indicated; wean as tolerated  CTA chest non coronary; INR therapeutic; however concern for embolic disease in setting of malignancy  Palliative care consult pending clinical course

## 2022-09-04 NOTE — HPI
Donis Jimenez is a 61-year-old matta with PMH significant for bacterial endocarditis, s/p AV and MV mechanical valve replacement (on warfarin), CKD, tobacco abuse with recent complicated hospital course who presents to the emergency department with shortness of breath.  Difficult for patient to provide history due to tachypnea; spouse at bedside assisting with history.    Of note, patient admitted 8/18-8/27/22 following a fall at home. He presented to [facility] [place] for right hip pain; found to have pathologic right femoral neck fracture and right femoral artery pseudoaneurysm. Orthopedics consulted concerning right femoral pathologic fracture and Vascular surgery consulted concerning pseudoaneurysm.  Patient taken to OR on 8/19 by Vascular Surgery and had embolization of 3rd order right profunda femoral artery pseudoaneurysm with N-BCA glue and 5mm coil aneurysm repair. Following his pseudoaneurysm repair, patient was placed on IV Heparin drip.  Metastatic work-up done  with CT scan of chest/abdomen and pelvis showing extensive disease. 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 Regarding the subdural hematoma, NSGY was consulted. No surgical intervention has been recommended at this point for small subdural hematoma noted on MRI. Patient was eventually taken to OR by Orthopedics on 8/23 for right hip hemiarthroplasty with ablation, cementoplasty, and percutaneous fixation of pelvis for pathologic fracture and metastatic disease. PT/OT consulted post-op and recommending IP Rehab on discharge but patient prefers home with outpatient therapy on discharge when medically ready.     Per spouse, patient had overall been improving since discharge.  He was even walking around using walker without issues  in terms of shortness of breath, lightheadedness, dizziness.  Patient corroborates.  Spouse also notes improvement of bilateral upper extremity edema, however this returned the past couple of days.  In terms of the shortness of breath, it suddenly started about 2 days ago.  Spouse notes worsening of shortness of breath since onset; it was particularly bad the night prior to presentation to ED and the reason why they came.  Patient has had an ongoing cough, however lately it seems to be more productive.  However, spouse notes that patient has trouble coughing up sputum.  Patient and spouse deny any fevers, chills, chest pain, palpitations, abdominal pain, diarrhea.    Patient with a flutter in emergency department.  He was cardioverted with return to normal sinus rhythm and improvement of shortness of breath.  While he is still tachypneic following restoration of normal sinus rhythm, per spouse, this is essentially his recent baseline.

## 2022-09-04 NOTE — ACP (ADVANCE CARE PLANNING)
Advance Care Planning     Date: 09/04/2022    Today a meeting took place: bedside    Patient Participation: Patient is able to participate     Attendees (Name and  Relationship to patient):  Aston - Spouse    Staff attendees (Name and  Role): MARTHA Maldonado    ACP Conversation (General): Understanding of current condition poor insight    Code Status: Full Code     ACP Documents: None    Goals of care: The patient and family endorses that what is most important right now is to focus on curative/life-prolongation (regardless of treatment burdens)    Accordingly, we have decided that the best plan to meet the patient's goals includes continuing with treatment      Recommendations/  Follow-up tasks: Other (specify below) Consider pall care consult pending clinical course       Length of ACP   conversation in minutes: 5 minutes

## 2022-09-04 NOTE — SUBJECTIVE & OBJECTIVE
Past Medical History:   Diagnosis Date    Endocarditis     Pacemaker     Pneumonia        Past Surgical History:   Procedure Laterality Date    ANGIOGRAPHY OF LOWER EXTREMITY Right 8/19/2022    Procedure: ANGIOGRAM, LOWER EXTREMITY;  Surgeon: Thomas Nicolas MD;  Location: [facility] OR [address] [place] [address];  Service: Peripheral Vascular;  Laterality: Right;  30.0 min  315.10 mGy  26.1755 Gycm2  84 ml dye    HIP RESURFACING Right 8/23/2022    Procedure: cemontoplasty;  Surgeon: Yung Flores MD;  [facility];  Service: Orthopedics;  Laterality: Right;    RADIOFREQUENCY ABLATION, BONE, PERCUTANEOUS Right 8/23/2022    Procedure: RADIOFREQUENCY ABLATION,BONE;  Surgeon: Yung Flores MD;  [facility];  Service: Orthopedics;  Laterality: Right;    REPAIR, PSEUDOANEURYSM, ARTERY, FEMORAL Right 8/19/2022    Procedure: REPAIR, PSEUDOANEURYSM, ARTERY, FEMORAL;  Surgeon: Thomas Nicolas MD;  [facility];  Service: Peripheral Vascular;  Laterality: Right;  R PFA (3rd branch) pseudoaneurysm        Review of patient's allergies indicates:  No Known Allergies    No current facility-administered medications on file prior to encounter.     Current Outpatient Medications on File Prior to Encounter   Medication Sig    acetaminophen (TYLENOL) 500 MG tablet Take 2 tablets (1,000 mg total) by mouth every 8 (eight) hours as needed (Mild to moderate pain).    EScitalopram oxalate (LEXAPRO) 20 MG tablet Take 20 mg by mouth every evening.    esomeprazole (NEXIUM) 20 MG capsule Take 20 mg by mouth once daily.    ferrous gluconate (FERGON) 324 MG tablet Take 1 tablet by mouth once daily.    LORazepam (ATIVAN) 1 MG tablet Take 1 mg by mouth 3 (three) times daily as needed for Anxiety.    methocarbamoL (ROBAXIN) 500 MG Tab Take 2 tablets (1,000 mg total) by mouth every 6 (six) hours. for 10 days    oxyCODONE (ROXICODONE) 5 MG immediate release tablet Take 1 tablet (5 mg total) by mouth every 4 (four) hours as  needed for Pain.    pravastatin (PRAVACHOL) 40 MG tablet Take 40 mg by mouth every evening.    pregabalin (LYRICA) 75 MG capsule Take 1 capsule (75 mg total) by mouth 2 (two) times daily.    sumatriptan (IMITREX) 25 MG Tab Take 25 mg by mouth as needed for Migraine.    vitamin D (VITAMIN D3) 1000 units Tab Take 1 tablet (1,000 Units total) by mouth once daily.    warfarin (COUMADIN) 3 MG tablet Take 1.5 tablets (4.5 mg total) by mouth Daily.     Family History      Medical History Relation Name Comments   Heart attack Father       Heart disease Father       Asthma Mother       Heart disease Mother            Tobacco Use    Smoking status: Every Day     Packs/day: 1.00     Types: Cigarettes    Smokeless tobacco: Never   Substance and Sexual Activity    Alcohol use: Not on file    Drug use: Not on file    Sexual activity: Not on file     Review of Systems   Constitutional:  Negative for chills, diaphoresis, fatigue and fever.   HENT:  Positive for congestion. Negative for sneezing, sore throat and trouble swallowing.    Eyes:  Negative for visual disturbance.   Respiratory:  Positive for cough and shortness of breath. Negative for wheezing.    Cardiovascular:  Negative for chest pain, palpitations and leg swelling.   Gastrointestinal:  Negative for abdominal pain, constipation, diarrhea, nausea and vomiting.   Genitourinary:  Negative for difficulty urinating and dysuria.   Musculoskeletal:  Positive for arthralgias.   Skin:  Positive for wound.   Neurological:  Negative for dizziness, light-headedness and headaches.   Psychiatric/Behavioral:  Negative for behavioral problems, confusion and decreased concentration.    Objective:     Vital Signs (Most Recent):  Temp: 97.7 °F (36.5 °C) (09/04/22 1348)  Pulse: 94 (09/04/22 1636)  Resp: 18 (09/04/22 1534)  BP: (!) 153/90 (09/04/22 1636)  SpO2: 97 % (09/04/22 1534)   Vital Signs (24h Range):  Temp:  [97.7 °F (36.5 °C)-97.8 °F (36.6 °C)] 97.7 °F (36.5 °C)  Pulse:  []  94  Resp:  [18-24] 18  SpO2:  [88 %-100 %] 97 %  BP: ()/(67-93) 153/90     Weight: 54 kg (119 lb)  Body mass index is 16.14 kg/m².    Physical Exam  Vitals and nursing note reviewed.   Constitutional:       General: He is not in acute distress.     Appearance: He is ill-appearing. He is not diaphoretic.      Comments: Emaciated appearance   HENT:      Head: Normocephalic and atraumatic.      Right Ear: External ear normal.      Left Ear: External ear normal.      Nose: Nose normal.      Mouth/Throat:      Mouth: Mucous membranes are dry.      Pharynx: Oropharynx is clear.   Eyes:      General: No scleral icterus.        Right eye: No discharge.         Left eye: No discharge.      Extraocular Movements: Extraocular movements intact.      Conjunctiva/sclera: Conjunctivae normal.      Pupils: Pupils are equal, round, and reactive to light.   Cardiovascular:      Rate and Rhythm: Normal rate and regular rhythm.      Heart sounds: Murmur heard.   Pulmonary:      Comments: Tachypnea   Diffuse rhonchi bilaterally  Difficulty holding full conversation  Abdominal:      General: Bowel sounds are normal.      Palpations: Abdomen is soft.      Tenderness: There is no abdominal tenderness.      Hernia: A hernia (bilateral femoral) is present.   Musculoskeletal:         General: Swelling (bilateral upper extremities) present.      Cervical back: Normal range of motion and neck supple.      Right lower leg: No edema.      Left lower leg: No edema.   Skin:     General: Skin is warm and dry.      Capillary Refill: Capillary refill takes less than 2 seconds.      Comments: Right hip incision dressing removed; incision clean, dry, intact   Three small stage II sacral ulcers; clean bases without purulence    R chest wall pacemaker   Neurological:      General: No focal deficit present.      Mental Status: He is alert and oriented to person, place, and time.   Psychiatric:         Behavior: Behavior normal.         CRANIAL NERVES      CN III, IV, VI   Pupils are equal, round, and reactive to light.     Significant Labs: All pertinent labs within the past 24 hours have been reviewed.    Recent Results (from the past 24 hour(s))   COVID-19 Rapid Screening    Collection Time: 09/04/22  6:56 AM   Result Value Ref Range    SARS-CoV-2 RNA, Amplification, Qual Negative Negative   Influenza A & B by Molecular    Collection Time: 09/04/22  6:57 AM    Specimen: Nasopharyngeal Swab   Result Value Ref Range    Influenza A, Molecular Negative Negative    Influenza B, Molecular Negative Negative    Flu A & B Source NP    HIV 1/2 Ag/Ab (4th Gen)    Collection Time: 09/04/22  7:08 AM   Result Value Ref Range    HIV 1/2 Ag/Ab Non-reactive Non-reactive   Hepatitis C Antibody    Collection Time: 09/04/22  7:08 AM   Result Value Ref Range    Hepatitis C Ab Non-reactive Non-reactive   CBC auto differential    Collection Time: 09/04/22  7:08 AM   Result Value Ref Range    WBC 15.02 (H) 3.90 - 12.70 K/uL    RBC 3.90 (L) 4.60 - 6.20 M/uL    Hemoglobin 11.3 (L) 14.0 - 18.0 g/dL    Hematocrit 35.6 (L) 40.0 - 54.0 %    MCV 91 82 - 98 fL    MCH 29.0 27.0 - 31.0 pg    MCHC 31.7 (L) 32.0 - 36.0 g/dL    RDW 21.0 (H) 11.5 - 14.5 %    Platelets 491 (H) 150 - 450 K/uL    MPV 9.5 9.2 - 12.9 fL    Immature Granulocytes 1.9 (H) 0.0 - 0.5 %    Gran # (ANC) 12.8 (H) 1.8 - 7.7 K/uL    Immature Grans (Abs) 0.29 (H) 0.00 - 0.04 K/uL    Lymph # 1.2 1.0 - 4.8 K/uL    Mono # 0.7 0.3 - 1.0 K/uL    Eos # 0.0 0.0 - 0.5 K/uL    Baso # 0.03 0.00 - 0.20 K/uL    nRBC 0 0 /100 WBC    Gran % 85.0 (H) 38.0 - 73.0 %    Lymph % 7.9 (L) 18.0 - 48.0 %    Mono % 4.9 4.0 - 15.0 %    Eosinophil % 0.1 0.0 - 8.0 %    Basophil % 0.2 0.0 - 1.9 %    Differential Method Automated    Comprehensive metabolic panel    Collection Time: 09/04/22  7:08 AM   Result Value Ref Range    Sodium 132 (L) 136 - 145 mmol/L    Potassium 4.7 3.5 - 5.1 mmol/L    Chloride 103 95 - 110 mmol/L    CO2 18 (L) 23 - 29 mmol/L     Glucose 100 70 - 110 mg/dL    BUN 35 (H) 6 - 20 mg/dL    Creatinine 1.3 0.5 - 1.4 mg/dL    Calcium 9.1 8.7 - 10.5 mg/dL    Total Protein 6.3 6.0 - 8.4 g/dL    Albumin 2.7 (L) 3.5 - 5.2 g/dL    Total Bilirubin 0.9 0.1 - 1.0 mg/dL    Alkaline Phosphatase 229 (H) 55 - 135 U/L    AST 60 (H) 10 - 40 U/L    ALT 49 (H) 10 - 44 U/L    Anion Gap 11 8 - 16 mmol/L    eGFR >60.0 >60 mL/min/1.73 m^2   Lactic acid, plasma #1    Collection Time: 09/04/22  7:08 AM   Result Value Ref Range    Lactate (Lactic Acid) 2.3 (H) 0.5 - 2.2 mmol/L   Brain natriuretic peptide    Collection Time: 09/04/22  7:08 AM   Result Value Ref Range     (H) 0 - 99 pg/mL   Troponin I    Collection Time: 09/04/22  7:08 AM   Result Value Ref Range    Troponin I 0.033 (H) 0.000 - 0.026 ng/mL   Protime-INR    Collection Time: 09/04/22  7:08 AM   Result Value Ref Range    Prothrombin Time 52.5 (H) 9.0 - 12.5 sec    INR 5.5 (HH) 0.8 - 1.2   Blood culture x two cultures. Draw prior to antibiotics.    Collection Time: 09/04/22  7:09 AM    Specimen: Peripheral, Upper Arm, Left; Blood   Result Value Ref Range    Blood Culture, Routine No Growth to date    Lactic acid, plasma #2    Collection Time: 09/04/22 11:36 AM   Result Value Ref Range    Lactate (Lactic Acid) 2.4 (H) 0.5 - 2.2 mmol/L   ISTAT PROCEDURE    Collection Time: 09/04/22 11:41 AM   Result Value Ref Range    POC PH 7.280 (L) 7.35 - 7.45    POC PCO2 46.4 (H) 35 - 45 mmHg    POC PO2 22 (L) 40 - 60 mmHg    POC HCO3 21.8 (L) 24 - 28 mmol/L    POC BE -5 -2 to 2 mmol/L    POC SATURATED O2 30 (L) 95 - 100 %    POC TCO2 23 (L) 24 - 29 mmol/L    Rate 12     Sample VENOUS     Site Other     Allens Test N/A     DelSys CPAP/BiPAP     Mode BiPAP     FiO2 21     IP 12     EP 6    Urinalysis, Reflex to Urine Culture Urine, Clean Catch    Collection Time: 09/04/22  1:49 PM    Specimen: Urine   Result Value Ref Range    Specimen UA Urine, Catheterized     Color, UA Yellow Yellow, Straw, Theodora    Appearance, UA  Hazy (A) Clear    pH, UA 6.0 5.0 - 8.0    Specific Gravity, UA 1.025 1.005 - 1.030    Protein, UA 1+ (A) Negative    Glucose, UA Negative Negative    Ketones, UA Negative Negative    Bilirubin (UA) Negative Negative    Occult Blood UA Negative Negative    Nitrite, UA Negative Negative    Leukocytes, UA Negative Negative   Urinalysis Microscopic    Collection Time: 09/04/22  1:49 PM   Result Value Ref Range    RBC, UA 2 0 - 4 /hpf    WBC, UA 3 0 - 5 /hpf    Bacteria None None-Occ /hpf    Squam Epithel, UA 0 /hpf    Hyaline Casts, UA 5 (A) 0-1/lpf /lpf    Microscopic Comment SEE COMMENT          Significant Imaging: I have reviewed all pertinent imaging results/findings within the past 24 hours.    Imaging Results               CTA Chest Non Coronary (Final result)  Result time 09/04/22 16:54:09      Final result by Emil Echevarria MD (09/04/22 16:54:09)                   Impression:      1. Diffusely infiltrative mediastinal mass with encasement of mediastinal vascular structures and airways, detailed above.  There is asymmetric left hilar and suprahilar extension with large left upper lobe mass.  2. Compared to recent above CT, enlarging moderate volume left-sided pleural effusion and new moderate volume right-sided pleural effusion.  3. Extensive panlobular emphysematous change with progressive increased attenuation throughout both lungs, and new right upper and middle lobe ground-glass opacities.  4. Bilateral suprarenal masses, fully characterized on recent prior CT.  5. Pathologic fracture of the L1 vertebral body.  6. Additional findings above.  This report was flagged in Epic as abnormal.      Electronically signed by: Emil Echevarria MD  Date:    09/04/2022  Time:    16:54               Narrative:    EXAMINATION:  CTA CHEST NON CORONARY    CLINICAL HISTORY:  Lung/mediastinal abscess;    TECHNIQUE:  Low dose axial images, sagittal and coronal reformations were obtained from the thoracic inlet to the lung  bases following the IV administration of 100 mL of Omnipaque 350.  Contrast timing was optimized to evaluate the pulmonary arteries.  MIP images were performed.    COMPARISON:  Chest radiograph 09/04/2022, CT chest abdomen pelvis 08/18/2022    FINDINGS:  Right-sided pacer device with transvenous leads extend to the heart.  Approximately 4.8 cm left lower cervical soft tissue mass, partially obscured by beam hardening artifact from dense contrast bolus in the left subclavian vein and right chest wall pacer device.  There is encasement and narrowing of the left subclavian artery.    Left-sided nonaneurysmal aortic arch with mild calcific atherosclerosis.  Operative change of aortic and mitral valve repair.  Heart is normal size.  No definite pulmonary arterial filling defect to the level of the segmental arteries. Narrowing of the left upper lobe pulmonary arteries.    There is a large heterogeneous mediastinal mass with extensive mediastinal invasion measuring on the order of 11.2 x 9.8 cm which circumferentially encases the aortic arch as well as the origins of the brachiocephalic, left carotid artery, and proximal right pulmonary artery.  Encasement with severe narrowing of the left superior pulmonary vein.  Encasement with associated narrowing of the trachea.  Lesion involves the bilateral domonique, more so on the left with encasement of the left hilar bronchovascular structures and suprahilar extension to the left lung apex.    Compared to CT 08/18/2022, there is enlarging now moderate volume left-sided pleural effusion, and new moderate volume right-sided pleural effusion with associated compressive atelectasis of the adjacent lung parenchyma.  Extensive bilateral panlobular emphysematous change with progressive increased attenuation throughout both lungs and superimposed right upper and middle lobe ground-glass opacities.  Bilateral tubular bronchiectasis and peribronchial cuffing.  No pneumothorax.    Limited  "evaluation of the abdomen reveals bilateral suprarenal masses measuring up to 2.4 cm on the right and 2.3 cm on the left, fully characterized on prior above comparison CT.    Operative change of median sternotomy.  Stable L1 compression fracture.  Lytic destructive lesion of the left lateral 6th rib.                                       X-Ray Chest AP Portable (Final result)  Result time 09/04/22 07:54:43      Final result by Karson Rios MD (09/04/22 07:54:43)                   Impression:      In this patient with known lung and mediastinal mass, there are worsening bilateral interstitial opacities and worsening bilateral pleural effusions (left side greater than right) when compared with 08/18/2022.      Electronically signed by: Karson Rios MD  Date:    09/04/2022  Time:    07:54               Narrative:    EXAMINATION:  XR CHEST AP PORTABLE    CLINICAL HISTORY:  Provided history is "Sepsis;  ".    TECHNIQUE:  One view of the chest.    COMPARISON:  CT chest, 08/18/2022.  Chest radiograph, 08/18/2022.    FINDINGS:  Cardiomediastinal silhouette is stable, with widening of the superior mediastinum consistent with known mediastinal mass as seen on prior CT.  Persistent opacification in the left upper lung likely related to mass or consolidation as seen previously.  Moderate-sized left pleural effusion and small right pleural effusion.  Patchy interstitial opacities throughout both lungs.  No distinct pneumothorax.  Pulmonary emphysema is suspected.  Postoperative changes of prior median sternotomy and valve replacement.  Right chest wall cardiac pacing device is present.  Left 6th rib fracture is noted, though better evaluated on prior CT.                                      "

## 2022-09-04 NOTE — ED PROVIDER NOTES
Encounter Date: 9/4/2022       History     Chief Complaint   Patient presents with    Shortness of Breath     Pt brought to ED from home via [facility]. Per family pt SOB and productive cough. Pt had double valve replacement, pacemaker and hip surgery 1 week ago.      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 SOB is acute on chronic, worse over the past 2 days, insidious onset, progressive, without relieving factors, aggravated by lying down or with exertion, no relieving factors, and associated with coughing and gurgling voice.  Patient denies chest pain, fever, abdominal pain, nausea, vomiting, dysuria, hematuria, diarrhea, constipation, and black/ bloody stools. Patient unable to provide most of the history secondary to his shortness of breath.  History is obtained from his has been.  Patient had a recent hospitalization with multiple surgeries, including angio of leg and femoral artery pseudoaneurysm repair.     The history is provided by the patient, the spouse and medical records.   Review of patient's allergies indicates:  No Known Allergies  Past Medical History:   Diagnosis Date    Endocarditis     Pacemaker     Pneumonia      Past Surgical History:   Procedure Laterality Date    ANGIOGRAPHY OF LOWER EXTREMITY Right 8/19/2022    Procedure: ANGIOGRAM, LOWER EXTREMITY;  Surgeon: Thomas Nicolas MD;  Location: [address];  Service: Peripheral Vascular;  Laterality: Right;  30.0 min  315.10 mGy  26.1755 Gycm2  84 ml dye    HIP RESURFACING Right 8/23/2022    Procedure: cemontoplasty;  Surgeon: Yung Flores MD;  Location: [address];  Service: Orthopedics;  Laterality: Right;    RADIOFREQUENCY ABLATION, BONE, PERCUTANEOUS Right 8/23/2022    Procedure: RADIOFREQUENCY ABLATION,BONE;  Surgeon: Yung Flores MD;  Location: [address];  Service: Orthopedics;   Laterality: Right;    REPAIR, PSEUDOANEURYSM, ARTERY, FEMORAL Right 8/19/2022    Procedure: REPAIR, PSEUDOANEURYSM, ARTERY, FEMORAL;  Surgeon: Thomas Nicolas MD;  Location: [facility] OR [address] [place] [address];  Service: Peripheral Vascular;  Laterality: Right;  R PFA (3rd branch) pseudoaneurysm      No family history on file.  Social History     Tobacco Use    Smoking status: Every Day     Packs/day: 1.00     Types: Cigarettes    Smokeless tobacco: Never     Review of Systems   Constitutional:  Negative for chills and fever.   HENT:  Negative for congestion and sore throat.    Eyes:  Negative for pain and visual disturbance.   Respiratory:  Positive for cough and shortness of breath.    Cardiovascular:  Negative for chest pain and leg swelling.   Gastrointestinal:  Negative for abdominal pain, constipation, diarrhea, nausea and vomiting.   Endocrine: Negative for polydipsia and polyuria.   Genitourinary:  Negative for dysuria and hematuria.   Musculoskeletal:  Negative for arthralgias and back pain.   Skin:  Negative for color change and rash.   Neurological:  Negative for dizziness and headaches.     Physical Exam     Initial Vitals [09/04/22 0637]   BP Pulse Resp Temp SpO2   130/88 (!) 144 (!) 24 97.8 °F (36.6 °C) 97 %      MAP       --         Physical Exam    Nursing note and vitals reviewed.  Constitutional: He appears well-developed. He is not diaphoretic.   HENT:   Head: Normocephalic and atraumatic.   Eyes: Conjunctivae and EOM are normal. Pupils are equal, round, and reactive to light.   Neck: Neck supple.   Normal range of motion.  Cardiovascular:  Regular rhythm, normal heart sounds and intact distal pulses.           No murmur heard.  Tachycardic   Pulmonary/Chest:   Increased work of breathing and tachypnea.  Very coarse breath sounds which can be heard externally as well. Raspy voice.    Abdominal: Abdomen is soft. He exhibits no distension. There is no abdominal tenderness. There is no rebound and no guarding.  "  Musculoskeletal:         General: No tenderness or edema.      Cervical back: Normal range of motion and neck supple.     Neurological: He is alert and oriented to person, place, and time.   Skin: Skin is warm and dry. Capillary refill takes less than 2 seconds.       ED Course   Cardioversion    Date/Time: 2022 12:53 PM  Location procedure was performed: [facility] EMERGENCY DEPARTMENT  Performed by: Tereso Quezada MD  Authorized by: Zahira Verde MD   Pre-operative diagnosis: Atrial flutter w/RVR  Post-operative diagnosis: Atrial flutter w/RVR  Consent Done: Yes  Consent: Verbal consent obtained.  Risks and benefits: risks, benefits and alternatives were discussed  Consent given by: spouse  Patient identity confirmed: , name and verbally with patient  Time out: Immediately prior to procedure a "time out" was called to verify the correct patient, procedure, equipment, support staff and site/side marked as required.    Patient sedated: yes  Sedation type: moderate (conscious) sedation    Sedatives: ketamine  Sedation start date/time: 2022 8:10 AM  Sedation end date/time: 2022 8:25 AM  Vitals: Vital signs were monitored during sedation.  Cardioversion basis: emergent  Pre-procedure rhythm: atrial flutter  Patient position: patient was placed in a supine position  Chest area: chest area exposed  Electrodes: pads  Electrodes placed: anterior-posterior  Number of attempts: 1  Attempt 1 mode: synchronous  Attempt 1 shock (in Joules): 120  Attempt 1 outcome: conversion to normal sinus rhythm  Post-procedure rhythm: normal sinus rhythm  Complications: no complications  Patient tolerance: Patient tolerated the procedure well with no immediate complications      Labs Reviewed   CBC W/ AUTO DIFFERENTIAL - Abnormal; Notable for the following components:       Result Value    WBC 15.02 (*)     RBC 3.90 (*)     Hemoglobin 11.3 (*)     Hematocrit 35.6 (*)     MCHC 31.7 (*)     RDW 21.0 (*)     Platelets 491 (*)  "    Immature Granulocytes 1.9 (*)     Gran # (ANC) 12.8 (*)     Immature Grans (Abs) 0.29 (*)     Gran % 85.0 (*)     Lymph % 7.9 (*)     All other components within normal limits    Narrative:     Release to patient->Immediate   COMPREHENSIVE METABOLIC PANEL - Abnormal; Notable for the following components:    Sodium 132 (*)     CO2 18 (*)     BUN 35 (*)     Albumin 2.7 (*)     Alkaline Phosphatase 229 (*)     AST 60 (*)     ALT 49 (*)     All other components within normal limits    Narrative:     Release to patient->Immediate   LACTIC ACID, PLASMA - Abnormal; Notable for the following components:    Lactate (Lactic Acid) 2.3 (*)     All other components within normal limits    Narrative:     Release to patient->Immediate   URINALYSIS, REFLEX TO URINE CULTURE - Abnormal; Notable for the following components:    Appearance, UA Hazy (*)     Protein, UA 1+ (*)     All other components within normal limits    Narrative:     Specimen Source->Urine   B-TYPE NATRIURETIC PEPTIDE - Abnormal; Notable for the following components:     (*)     All other components within normal limits    Narrative:     Release to patient->Immediate   TROPONIN I - Abnormal; Notable for the following components:    Troponin I 0.033 (*)     All other components within normal limits    Narrative:     Release to patient->Immediate   PROTIME-INR - Abnormal; Notable for the following components:    Prothrombin Time 52.5 (*)     INR 5.5 (*)     All other components within normal limits    Narrative:     INR   critical result(s) called and verbal readback obtained from   DEBI KWAN RN by PRETTY 09/04/2022 07:35   LACTIC ACID, PLASMA - Abnormal; Notable for the following components:    Lactate (Lactic Acid) 2.4 (*)     All other components within normal limits   URINALYSIS MICROSCOPIC - Abnormal; Notable for the following components:    Hyaline Casts, UA 5 (*)     All other components within normal limits    Narrative:     Specimen Source->Urine    ISTAT PROCEDURE - Abnormal; Notable for the following components:    POC PH 7.280 (*)     POC PCO2 46.4 (*)     POC PO2 22 (*)     POC HCO3 21.8 (*)     POC SATURATED O2 30 (*)     POC TCO2 23 (*)     All other components within normal limits   CULTURE, BLOOD    Narrative:     Aerobic and anaerobic   INFLUENZA A & B BY MOLECULAR   CULTURE, BLOOD   HIV 1 / 2 ANTIBODY    Narrative:     Release to patient->Immediate   HEPATITIS C ANTIBODY    Narrative:     Release to patient->Immediate   SARS-COV-2 RNA AMPLIFICATION, QUAL   ISTAT CHEM8     EKG Readings: (Independently Interpreted)   Atrial flutter at a rate of 144, no STEMI   ECG Results              EKG 12-lead (Final result)  Result time 09/04/22 10:24:49      Final result by Interface, Lab In [facility] (09/04/22 10:24:49)                   Narrative:    Test Reason : R06.02,    Vent. Rate : 144 BPM     Atrial Rate : 288 BPM     P-R Int : 000 ms          QRS Dur : 090 ms      QT Int : 262 ms       P-R-T Axes : 187 074 146 degrees     QTc Int : 405 ms    Atrial flutter with 2:1 A-V conduction  Abnormal ECG  When compared with ECG of 18-AUG-2022 05:38,  Atrial flutter has replaced Sinus rhythm  Vent. rate has increased BY  61 BPM  Tiny inferior Qs  Confirmed by Bubba MORALES MD ([id number]) on 9/4/2022 10:24:42 AM    Referred By: [email or web address]   SELF           Confirmed By:Bubba MORALES MD                                  Imaging Results              X-Ray Chest AP Portable (Final result)  Result time 09/04/22 07:54:43      Final result by Karson Rios MD (09/04/22 07:54:43)                   Impression:      In this patient with known lung and mediastinal mass, there are worsening bilateral interstitial opacities and worsening bilateral pleural effusions (left side greater than right) when compared with 08/18/2022.      Electronically signed by: Karson Rios MD  Date:    09/04/2022  Time:    07:54               Narrative:    EXAMINATION:  XR CHEST AP PORTABLE    CLINICAL  "HISTORY:  Provided history is "Sepsis;  ".    TECHNIQUE:  One view of the chest.    COMPARISON:  CT chest, 08/18/2022.  Chest radiograph, 08/18/2022.    FINDINGS:  Cardiomediastinal silhouette is stable, with widening of the superior mediastinum consistent with known mediastinal mass as seen on prior CT.  Persistent opacification in the left upper lung likely related to mass or consolidation as seen previously.  Moderate-sized left pleural effusion and small right pleural effusion.  Patchy interstitial opacities throughout both lungs.  No distinct pneumothorax.  Pulmonary emphysema is suspected.  Postoperative changes of prior median sternotomy and valve replacement.  Right chest wall cardiac pacing device is present.  Left 6th rib fracture is noted, though better evaluated on prior CT.                                       Medications   melatonin tablet 6 mg (has no administration in time range)   cefepime in dextrose 5 % IVPB 2 g (has no administration in time range)   0.9%  NaCl infusion (has no administration in time range)   acetaminophen tablet 1,000 mg (has no administration in time range)   EScitalopram oxalate tablet 20 mg (has no administration in time range)   pantoprazole EC tablet 40 mg (has no administration in time range)   ferrous gluconate tablet 324 mg (has no administration in time range)   LORazepam tablet 1 mg (has no administration in time range)   methocarbamoL tablet 1,000 mg (has no administration in time range)   oxyCODONE immediate release tablet 5 mg (has no administration in time range)   pravastatin tablet 40 mg (has no administration in time range)   pregabalin capsule 75 mg (has no administration in time range)   sumatriptan tablet 25 mg (has no administration in time range)   vitamin D 1000 units tablet 1,000 Units (has no administration in time range)   sodium chloride 0.9% flush 10 mL (has no administration in time range)   ondansetron injection 4 mg (has no administration in time " "range)   sodium chloride 3% nebulizer solution 4 mL (4 mLs Nebulization Given 9/4/22 1534)   vancomycin 750 mg in dextrose 5 % 250 mL IVPB (ready to mix system) (750 mg Intravenous Trough Due As Scheduled Before Dose 9/5/22 2030)   vancomycin 1.5 g in dextrose 5 % 250 mL IVPB (ready to mix) (0 mg Intravenous Stopped 9/4/22 0928)   ketamine in 0.9 % sod chloride 50 mg/5 mL (10 mg/mL) injection 20 mg (20 mg Intravenous Given 9/4/22 0812)   ketamine in 0.9 % sod chloride 50 mg/5 mL (10 mg/mL) injection 50 mg (25 mg Intravenous Given 9/4/22 0820)     Medical Decision Making:   History:   Old Medical Records: I decided to obtain old medical records.  Old Records Summarized: records from previous admission(s).       <> Summary of Records: Recent admission for valvular repairs complicated by CVA and femoral pseudoaneurysm  Initial Assessment:   61-year-old male with PMH of multiple valvular repairs and endocarditis presents with progressive shortness of breath and increased work of breathing  -labs, chest x-ray, EKG  Differential Diagnosis:   Sepsis, pneumonia, ARDS, CHF, COPD, ACS, PE, dysrhythmia, AFib, a flutter, SVT  Clinical Tests:   Lab Tests: Ordered and Reviewed  Radiological Study: Ordered and Reviewed  Medical Tests: Ordered and Reviewed  Sepsis Perfusion Assessment: "I attest a sepsis perfusion exam was performed within 6 hours of sepsis, severe sepsis, or septic shock presentation, following fluid resuscitation."    Sepsis Perfusion Assessment Complete: 9/4/2022 11:30 AM    ED Management:  See ED course          Attending Attestation:   Physician Attestation Statement for Resident:  As the supervising MD   Physician Attestation Statement: I have personally seen and examined this patient.   I agree with the above history.  -:   As the supervising MD I agree with the above PE.     As the supervising MD I agree with the above treatment, course, plan, and disposition.   I was personally present during the entire " procedure.          Attending Critical Care:   Critical Care Times:   Direct Patient Care (initial evaluation, reassessments, and time considering the case)................................................................45 minutes.   Additional History from reviewing old medical records or taking additional history from the family, EMS, PCP, etc.......................5 minutes.   Ordering, Reviewing, and Interpreting Diagnostic Studies...............................................................................................................5 minutes.   Documentation..................................................................................................................................................................................5 minutes.   Consultation with other Physicians. .................................................................................................................................................5 minutes.   Consultation with the patient's family directly relating to the patient's condition, care, and DNR status (when patient unable)......10 minutes.   ==============================================================  Total Critical Care Time - exclusive of procedural time: 75 minutes.  ==============================================================  Critical care was necessary to treat or prevent imminent or life-threatening deterioration of the following conditions: respiratory failure and cardiac arrhythmia.       Attending ED Notes:   This is a 61-year-old man who presents with acute onset hypoxemic respiratory failure and new a flutter with 2-1 block.  He has profoundly abnormal lung sounds, and is altered, am very concerned about sepsis.  Given his past medical history of pericardial effusion, we did do a bedside ultrasound which was not suggestive of tamponade.  He does have a large left-sided pleural effusion and a small right-sided pleural effusion.  We have given him  broad-spectrum antibiotics.  Given his clinical tenuous status, we consented the patient and performed a synchronized cardioversion for his a flutter, which successfully converted him to sinus rhythm.  He did ultimately require high-flow nasal cannula for oxygen support, after a brief stent on BiPAP, and we will admit him to the step-down unit for further care.    ED Course as of 09/04/22 1557   Sun Sep 04, 2022   0753 INR(!!): 5.5  PE unlikely given supratherapeutic INR [BD]   0753 CBC auto differential(!)  CBC remarkable for leukocytosis of 15 with baseline anemia.  Thrombocytosis of 491 [BD]   0753 SARS-CoV-2 RNA, Amplification, Qual: Negative [BD]   0754 Influenza A, Molecular: Negative [BD]   0942 BNP(!): 350  Chf unlikely [BD]   0943 Troponin I(!): 0.033  Mildly elevated, will repeat after 3 hours [BD]   0943 Lactate, Anthony(!): 2.3  Wihtholding fluids because POCUS echo showed plump IVC and dilated RA [BD]   0943 Comprehensive metabolic panel(!)  CMP remarkable for mild hyponatremia 132 and decreased bicarb of 18.  Normal renal function.  Mildly elevated LFTs [BD]   0944 CXR shows bilateral interstitial opacities and b/l pleura effusions, consistent with ARDS [BD]   0944 Pt successfully electrically cardioverted. See procedure note for full details [BD]   1004 ICU consulted and will see the patient. He remains stable on BiPAP [BD]   1220 ISTAT PROCEDURE(!)  VBG shows pH 7.28, pCO2 46 and bicarb 21.8. Will switch patient to high flow [BD]   1233 Lactate, Anthony(!): 2.4 [BD]   1503 Patient has been tolerating high-flow.  A contacted Hospital Medicine who will admit the patient, as he has remained stable on high-flow.  Patient and  agree with plan. [BD]      ED Course User Index  [BD] Tereso Quezada MD               Clinical Impression:   Final diagnoses:  [R06.02] SOB (shortness of breath)  [J80] ARDS (adult respiratory distress syndrome) (Primary)  [Z95.2] H/O mitral valve replacement with mechanical  valve  [Z79.01] Anticoagulated on warfarin      ED Disposition Condition    Admit                 Tereso Quezada MD  Resident  09/04/22 1510       Zahira Verde MD  09/04/22 1600

## 2022-09-04 NOTE — ED TRIAGE NOTES
Donis Jimenez, a 61 y.o. male presents to the ED w/ complaint of sob    Triage note: Patient presents to ED from home for shortness of breath. Patient has productive cough and is unable to cough it up. Patient with recent hip surgery. Patient tachycardic in 140s.   Chief Complaint   Patient presents with    Shortness of Breath     Pt brought to ED from home via [facility]. Per family pt SOB and productive cough. Pt had double valve replacement, pacemaker and hip surgery 1 week ago.      Review of patient's allergies indicates:  No Known Allergies  Past Medical History:   Diagnosis Date    Endocarditis     Pacemaker     Pneumonia

## 2022-09-04 NOTE — ASSESSMENT & PLAN NOTE
As per previous admit, Patient found on CT scan of chest/abdomen and pelvis as part of metastatic work-up to have extensive metastatic disease involving soft tissues above and below the diaphragm and osseous structures. Extensive involvement of the mediastinum with encasement of bronchovascular structures. Suggested potential primary lung and renal with left upper lobe and left renal masses. Pathologic fracture of the right femoral head/neck, L1 vertebral body, and left 6th rib. Small to moderate pericardial effusion, likely malignant. Small left pleural effusion. Heme/Onc consulted on admit and recommended MRI of brain to look for brain mets and none noted to brain itself but concerning osseous mets to skull itself. Bone scan done showed numerous tracer avid foci throughout the axial and appendicular skeleton consistent with metastatic disease. These lesions correspond to mixed lytic and sclerotic lesions on same day CT chest abdomen pelvis. Heme/Onc recommended bone biopsy and done by Orthopedics on 8/23 for diagnosis as primary tumor unknown and pathology pending on discharge.    Follow pathology result from 8/23 surgery  Palliative care consult pending clinical course

## 2022-09-04 NOTE — ED NOTES
Pt now 88% on 15 L non-rebreather, Dr. Verde aware. Stated to put pt on high flow nasal cannula. Respiratory at bedside

## 2022-09-04 NOTE — PROGRESS NOTES
Pharmacokinetic Initial Assessment & Plan: IV Vancomycin      IV Vancomycin 1500 mg x once given in the ED on 09/04 @ 0758. Plan to continue Vancomycin 750 mg every 24 hours, .(~12 hours post 1st dose, renal stable) Obtain a vancomycin  trough 30 mins prior to the 3rd dose on 09/05 @ 2030. Desired empiric serum trough concentration is 15 to 20 mcg/mL    Pharmacy will continue to follow and monitor vancomycin.  [id number] with any questions regarding this assessment.     Thank you for the consult,   Gautam Torres       Patient brief summary:  Donis Jimenez is a 61 y.o. male initiated on antimicrobial therapy with IV Vancomycin for treatment of suspected  Pneumonia    Drug Allergies:   Review of patient's allergies indicates:  No Known Allergies    Actual Body Weight:   54 kg    Renal Function:   Estimated Creatinine Clearance: 46.2 mL/min (based on SCr of 1.3 mg/dL).,     CBC (last 72 hours):  Recent Labs   Lab Result Units 09/04/22  0708   WBC K/uL 15.02*   Hemoglobin g/dL 11.3*   Hematocrit % 35.6*   Platelets K/uL 491*   Gran % % 85.0*   Lymph % % 7.9*   Mono % % 4.9   Eosinophil % % 0.1   Basophil % % 0.2   Differential Method  Automated       Metabolic Panel (last 72 hours):  Recent Labs   Lab Result Units 09/04/22  0708 09/04/22  1349   Sodium mmol/L 132*  --    Potassium mmol/L 4.7  --    Chloride mmol/L 103  --    CO2 mmol/L 18*  --    Glucose mg/dL 100  --    Glucose, UA   --  Negative   BUN mg/dL 35*  --    Creatinine mg/dL 1.3  --    Albumin g/dL 2.7*  --    Total Bilirubin mg/dL 0.9  --    Alkaline Phosphatase U/L 229*  --    AST U/L 60*  --    ALT U/L 49*  --        Drug levels (last 3 results):  No results for input(s): VANCOMYCINRA, VANCORANDOM, VANCOMYCINPE, VANCOPEAK, VANCOMYCINTR, VANCOTROUGH in the last 72 hours.    Microbiologic Results:  Microbiology Results (last 7 days)       Procedure Component Value Units Date/Time    Blood culture x two cultures. Draw prior to antibiotics. [[id number]] Collected:  09/04/22 0709    Order Status: Completed Specimen: Blood from Peripheral, Upper Arm, Left Updated: 09/04/22 1515     Blood Culture, Routine No Growth to date    Narrative:      Aerobic and anaerobic    Blood culture x two cultures. Draw prior to antibiotics. [[id number]] Collected: 09/04/22 1136    Order Status: Sent Specimen: Blood from Peripheral, Upper Arm, Right Updated: 09/04/22 1140    Influenza A & B by Molecular [[id number]] Collected: 09/04/22 0657    Order Status: Completed Specimen: Nasopharyngeal Swab Updated: 09/04/22 0732     Influenza A, Molecular Negative     Influenza B, Molecular Negative     Flu A & B Source NP

## 2022-09-04 NOTE — ED NOTES
Pt with urine to bed following in and out catheter.  Pt cleaned, gown and linen changed.  Dressing to sacral area changed with wound noted as charted.

## 2022-09-04 NOTE — ASSESSMENT & PLAN NOTE
Body mass index is 16.27 kg/m².  Likely 2/2 his metastatic and advanced malignancy causing reduced BMI and weight loss.   Nutrition consult

## 2022-09-04 NOTE — H&P
Issac Moore - Emergency Dept  [facility]  History & Physical    Patient Name: Donis Jimenez  MRN: [id number]  Patient Class: IP- Inpatient  Admission Date: 9/4/2022  Attending Physician: Samantha Madlonado MD   Primary Care Provider: Elio Farias MD         Patient information was obtained from patient, spouse/SO, past medical records and ER records.     Subjective:     Principal Problem:SOB (shortness of breath)    Chief Complaint:   Chief Complaint   Patient presents with    Shortness of Breath     Pt brought to ED from home via [facility]. Per family pt SOB and productive cough. Pt had double valve replacement, pacemaker and hip surgery 1 week ago.         HPI: Donis Jimenez is a 61-year-old matta with PMH significant for bacterial endocarditis, s/p AV and MV mechanical valve replacement (on warfarin), CKD, tobacco abuse with recent complicated hospital course who presents to the emergency department with shortness of breath.  Difficult for patient to provide history due to tachypnea; spouse at bedside assisting with history.    Of note, patient admitted 8/18-8/27/22 following a fall at home. [place] [facility] [place]; found to have pathologic right femoral neck fracture and right femoral artery pseudoaneurysm. Orthopedics consulted concerning right femoral pathologic fracture and Vascular surgery consulted concerning pseudoaneurysm.  Patient taken to OR on 8/19 by Vascular Surgery and had embolization of 3rd order right profunda femoral artery pseudoaneurysm with N-BCA glue and 5mm coil aneurysm repair. Following his pseudoaneurysm repair, patient was placed on IV Heparin drip.  Metastatic work-up done  with CT scan of chest/abdomen and pelvis showing extensive disease. Oncology was consulted and recommended MRI of brain as part of metastatic work-up and found to have embolic infarcts on MRI to be result of a cardioembolic phenomenon in the setting of a subtherapeutic INR in patient with  known mechanical valves, per vascular surgery evaluation. Regarding the subdural hematoma, NSGY was consulted. No surgical intervention has been recommended at this point for small subdural hematoma noted on MRI. Patient was eventually taken to OR by Orthopedics on 8/23 for right hip hemiarthroplasty with ablation, cementoplasty, and percutaneous fixation of pelvis for pathologic fracture and metastatic disease. PT/OT consulted post-op and recommending IP Rehab on discharge but patient prefers home with outpatient therapy on discharge when medically ready.     Per spouse, patient had overall been improving since discharge.  He was even walking around using walker without issues in terms of shortness of breath, lightheadedness, dizziness.  Patient corroborates.  Spouse also notes improvement of bilateral upper extremity edema, however this returned the past couple of days.  In terms of the shortness of breath, it suddenly started about 2 days ago.  Spouse notes worsening of shortness of breath since onset; it was particularly bad the night prior to presentation to ED and the reason why they came.  Patient has had an ongoing cough, however lately it seems to be more productive.  However, spouse notes that patient has trouble coughing up sputum.  Patient and spouse deny any fevers, chills, chest pain, palpitations, abdominal pain, diarrhea.    Patient with a flutter in emergency department.  He was cardioverted with return to normal sinus rhythm and improvement of shortness of breath.  While he is still tachypneic following restoration of normal sinus rhythm, per spouse, this is essentially his recent baseline.      Past Medical History:   Diagnosis Date    Endocarditis     Pacemaker     Pneumonia        Past Surgical History:   Procedure Laterality Date    ANGIOGRAPHY OF LOWER EXTREMITY Right 8/19/2022    Procedure: ANGIOGRAM, LOWER EXTREMITY;  Surgeon: Thomas Nicolas MD;  Location: [facility] OR [address] [place] [address];  Service:  Peripheral Vascular;  Laterality: Right;  30.0 min  315.10 mGy  26.1755 Gycm2  84 ml dye    HIP RESURFACING Right 8/23/2022    Procedure: cemontoplasty;  Surgeon: Yung Flores MD;  Location: [facility] OR 2ND FLR;  Service: Orthopedics;  Laterality: Right;    RADIOFREQUENCY ABLATION, BONE, PERCUTANEOUS Right 8/23/2022    Procedure: RADIOFREQUENCY ABLATION,BONE;  Surgeon: Yung Flores MD;  Location: [facility] OR [employer] FLR;  Service: Orthopedics;  Laterality: Right;    REPAIR, PSEUDOANEURYSM, ARTERY, FEMORAL Right 8/19/2022    Procedure: REPAIR, PSEUDOANEURYSM, ARTERY, FEMORAL;  Surgeon: Thomas Nicolas MD;  Location: [facility] OR [employer] FLR;  Service: Peripheral Vascular;  Laterality: Right;  R PFA (3rd branch) pseudoaneurysm        Review of patient's allergies indicates:  No Known Allergies    No current facility-administered medications on file prior to encounter.     Current Outpatient Medications on File Prior to Encounter   Medication Sig    acetaminophen (TYLENOL) 500 MG tablet Take 2 tablets (1,000 mg total) by mouth every 8 (eight) hours as needed (Mild to moderate pain).    EScitalopram oxalate (LEXAPRO) 20 MG tablet Take 20 mg by mouth every evening.    esomeprazole (NEXIUM) 20 MG capsule Take 20 mg by mouth once daily.    ferrous gluconate (FERGON) 324 MG tablet Take 1 tablet by mouth once daily.    LORazepam (ATIVAN) 1 MG tablet Take 1 mg by mouth 3 (three) times daily as needed for Anxiety.    methocarbamoL (ROBAXIN) 500 MG Tab Take 2 tablets (1,000 mg total) by mouth every 6 (six) hours. for 10 days    oxyCODONE (ROXICODONE) 5 MG immediate release tablet Take 1 tablet (5 mg total) by mouth every 4 (four) hours as needed for Pain.    pravastatin (PRAVACHOL) 40 MG tablet Take 40 mg by mouth every evening.    pregabalin (LYRICA) 75 MG capsule Take 1 capsule (75 mg total) by mouth 2 (two) times daily.    sumatriptan (IMITREX) 25 MG Tab Take 25 mg by mouth as needed for Migraine.    vitamin D (VITAMIN D3) 1000 units  Tab Take 1 tablet (1,000 Units total) by mouth once daily.    warfarin (COUMADIN) 3 MG tablet Take 1.5 tablets (4.5 mg total) by mouth Daily.     Family History      Medical History Relation Name Comments   Heart attack Father       Heart disease Father       Asthma Mother       Heart disease Mother            Tobacco Use    Smoking status: Every Day     Packs/day: 1.00     Types: Cigarettes    Smokeless tobacco: Never   Substance and Sexual Activity    Alcohol use: Not on file    Drug use: Not on file    Sexual activity: Not on file     Review of Systems   Constitutional:  Negative for chills, diaphoresis, fatigue and fever.   HENT:  Positive for congestion. Negative for sneezing, sore throat and trouble swallowing.    Eyes:  Negative for visual disturbance.   Respiratory:  Positive for cough and shortness of breath. Negative for wheezing.    Cardiovascular:  Negative for chest pain, palpitations and leg swelling.   Gastrointestinal:  Negative for abdominal pain, constipation, diarrhea, nausea and vomiting.   Genitourinary:  Negative for difficulty urinating and dysuria.   Musculoskeletal:  Positive for arthralgias.   Skin:  Positive for wound.   Neurological:  Negative for dizziness, light-headedness and headaches.   Psychiatric/Behavioral:  Negative for behavioral problems, confusion and decreased concentration.    Objective:     Vital Signs (Most Recent):  Temp: 97.7 °F (36.5 °C) (09/04/22 1348)  Pulse: 94 (09/04/22 1636)  Resp: 18 (09/04/22 1534)  BP: (!) 153/90 (09/04/22 1636)  SpO2: 97 % (09/04/22 1534)   Vital Signs (24h Range):  Temp:  [97.7 °F (36.5 °C)-97.8 °F (36.6 °C)] 97.7 °F (36.5 °C)  Pulse:  [] 94  Resp:  [18-24] 18  SpO2:  [88 %-100 %] 97 %  BP: ()/(67-93) 153/90     Weight: 54 kg (119 lb)  Body mass index is 16.14 kg/m².    Physical Exam  Vitals and nursing note reviewed.   Constitutional:       General: He is not in acute distress.     Appearance: He is ill-appearing. He is not  diaphoretic.      Comments: Emaciated appearance   HENT:      Head: Normocephalic and atraumatic.      Right Ear: External ear normal.      Left Ear: External ear normal.      Nose: Nose normal.      Mouth/Throat:      Mouth: Mucous membranes are dry.      Pharynx: Oropharynx is clear.   Eyes:      General: No scleral icterus.        Right eye: No discharge.         Left eye: No discharge.      Extraocular Movements: Extraocular movements intact.      Conjunctiva/sclera: Conjunctivae normal.      Pupils: Pupils are equal, round, and reactive to light.   Cardiovascular:      Rate and Rhythm: Normal rate and regular rhythm.      Heart sounds: Murmur heard.   Pulmonary:      Comments: Tachypnea   Diffuse rhonchi bilaterally  Difficulty holding full conversation  Abdominal:      General: Bowel sounds are normal.      Palpations: Abdomen is soft.      Tenderness: There is no abdominal tenderness.      Hernia: A hernia (bilateral femoral) is present.   Musculoskeletal:         General: Swelling (bilateral upper extremities) present.      Cervical back: Normal range of motion and neck supple.      Right lower leg: No edema.      Left lower leg: No edema.   Skin:     General: Skin is warm and dry.      Capillary Refill: Capillary refill takes less than 2 seconds.      Comments: Right hip incision dressing removed; incision clean, dry, intact   Three small stage II sacral ulcers; clean bases without purulence    R chest wall pacemaker   Neurological:      General: No focal deficit present.      Mental Status: He is alert and oriented to person, place, and time.   Psychiatric:         Behavior: Behavior normal.         CRANIAL NERVES     CN III, IV, VI   Pupils are equal, round, and reactive to light.     Significant Labs: All pertinent labs within the past 24 hours have been reviewed.    Recent Results (from the past 24 hour(s))   COVID-19 Rapid Screening    Collection Time: 09/04/22  6:56 AM   Result Value Ref Range     SARS-CoV-2 RNA, Amplification, Qual Negative Negative   Influenza A & B by Molecular    Collection Time: 09/04/22  6:57 AM    Specimen: Nasopharyngeal Swab   Result Value Ref Range    Influenza A, Molecular Negative Negative    Influenza B, Molecular Negative Negative    Flu A & B Source NP    HIV 1/2 Ag/Ab (4th Gen)    Collection Time: 09/04/22  7:08 AM   Result Value Ref Range    HIV 1/2 Ag/Ab Non-reactive Non-reactive   Hepatitis C Antibody    Collection Time: 09/04/22  7:08 AM   Result Value Ref Range    Hepatitis C Ab Non-reactive Non-reactive   CBC auto differential    Collection Time: 09/04/22  7:08 AM   Result Value Ref Range    WBC 15.02 (H) 3.90 - 12.70 K/uL    RBC 3.90 (L) 4.60 - 6.20 M/uL    Hemoglobin 11.3 (L) 14.0 - 18.0 g/dL    Hematocrit 35.6 (L) 40.0 - 54.0 %    MCV 91 82 - 98 fL    MCH 29.0 27.0 - 31.0 pg    MCHC 31.7 (L) 32.0 - 36.0 g/dL    RDW 21.0 (H) 11.5 - 14.5 %    Platelets 491 (H) 150 - 450 K/uL    MPV 9.5 9.2 - 12.9 fL    Immature Granulocytes 1.9 (H) 0.0 - 0.5 %    Gran # (ANC) 12.8 (H) 1.8 - 7.7 K/uL    Immature Grans (Abs) 0.29 (H) 0.00 - 0.04 K/uL    Lymph # 1.2 1.0 - 4.8 K/uL    Mono # 0.7 0.3 - 1.0 K/uL    Eos # 0.0 0.0 - 0.5 K/uL    Baso # 0.03 0.00 - 0.20 K/uL    nRBC 0 0 /100 WBC    Gran % 85.0 (H) 38.0 - 73.0 %    Lymph % 7.9 (L) 18.0 - 48.0 %    Mono % 4.9 4.0 - 15.0 %    Eosinophil % 0.1 0.0 - 8.0 %    Basophil % 0.2 0.0 - 1.9 %    Differential Method Automated    Comprehensive metabolic panel    Collection Time: 09/04/22  7:08 AM   Result Value Ref Range    Sodium 132 (L) 136 - 145 mmol/L    Potassium 4.7 3.5 - 5.1 mmol/L    Chloride 103 95 - 110 mmol/L    CO2 18 (L) 23 - 29 mmol/L    Glucose 100 70 - 110 mg/dL    BUN 35 (H) 6 - 20 mg/dL    Creatinine 1.3 0.5 - 1.4 mg/dL    Calcium 9.1 8.7 - 10.5 mg/dL    Total Protein 6.3 6.0 - 8.4 g/dL    Albumin 2.7 (L) 3.5 - 5.2 g/dL    Total Bilirubin 0.9 0.1 - 1.0 mg/dL    Alkaline Phosphatase 229 (H) 55 - 135 U/L    AST 60 (H) 10 - 40  U/L    ALT 49 (H) 10 - 44 U/L    Anion Gap 11 8 - 16 mmol/L    eGFR >60.0 >60 mL/min/1.73 m^2   Lactic acid, plasma #1    Collection Time: 09/04/22  7:08 AM   Result Value Ref Range    Lactate (Lactic Acid) 2.3 (H) 0.5 - 2.2 mmol/L   Brain natriuretic peptide    Collection Time: 09/04/22  7:08 AM   Result Value Ref Range     (H) 0 - 99 pg/mL   Troponin I    Collection Time: 09/04/22  7:08 AM   Result Value Ref Range    Troponin I 0.033 (H) 0.000 - 0.026 ng/mL   Protime-INR    Collection Time: 09/04/22  7:08 AM   Result Value Ref Range    Prothrombin Time 52.5 (H) 9.0 - 12.5 sec    INR 5.5 (HH) 0.8 - 1.2   Blood culture x two cultures. Draw prior to antibiotics.    Collection Time: 09/04/22  7:09 AM    Specimen: Peripheral, Upper Arm, Left; Blood   Result Value Ref Range    Blood Culture, Routine No Growth to date    Lactic acid, plasma #2    Collection Time: 09/04/22 11:36 AM   Result Value Ref Range    Lactate (Lactic Acid) 2.4 (H) 0.5 - 2.2 mmol/L   ISTAT PROCEDURE    Collection Time: 09/04/22 11:41 AM   Result Value Ref Range    POC PH 7.280 (L) 7.35 - 7.45    POC PCO2 46.4 (H) 35 - 45 mmHg    POC PO2 22 (L) 40 - 60 mmHg    POC HCO3 21.8 (L) 24 - 28 mmol/L    POC BE -5 -2 to 2 mmol/L    POC SATURATED O2 30 (L) 95 - 100 %    POC TCO2 23 (L) 24 - 29 mmol/L    Rate 12     Sample VENOUS     Site Other     Allens Test N/A     DelSys CPAP/BiPAP     Mode BiPAP     FiO2 21     IP 12     EP 6    Urinalysis, Reflex to Urine Culture Urine, Clean Catch    Collection Time: 09/04/22  1:49 PM    Specimen: Urine   Result Value Ref Range    Specimen UA Urine, Catheterized     Color, UA Yellow Yellow, Straw, Theodora    Appearance, UA Hazy (A) Clear    pH, UA 6.0 5.0 - 8.0    Specific Gravity, UA 1.025 1.005 - 1.030    Protein, UA 1+ (A) Negative    Glucose, UA Negative Negative    Ketones, UA Negative Negative    Bilirubin (UA) Negative Negative    Occult Blood UA Negative Negative    Nitrite, UA Negative Negative     Leukocytes, UA Negative Negative   Urinalysis Microscopic    Collection Time: 09/04/22  1:49 PM   Result Value Ref Range    RBC, UA 2 0 - 4 /hpf    WBC, UA 3 0 - 5 /hpf    Bacteria None None-Occ /hpf    Squam Epithel, UA 0 /hpf    Hyaline Casts, UA 5 (A) 0-1/lpf /lpf    Microscopic Comment SEE COMMENT          Significant Imaging: I have reviewed all pertinent imaging results/findings within the past 24 hours.    Imaging Results               CTA Chest Non Coronary (Final result)  Result time 09/04/22 16:54:09      Final result by Emil Echevarria MD (09/04/22 16:54:09)                   Impression:      1. Diffusely infiltrative mediastinal mass with encasement of mediastinal vascular structures and airways, detailed above.  There is asymmetric left hilar and suprahilar extension with large left upper lobe mass.  2. Compared to recent above CT, enlarging moderate volume left-sided pleural effusion and new moderate volume right-sided pleural effusion.  3. Extensive panlobular emphysematous change with progressive increased attenuation throughout both lungs, and new right upper and middle lobe ground-glass opacities.  4. Bilateral suprarenal masses, fully characterized on recent prior CT.  5. Pathologic fracture of the L1 vertebral body.  6. Additional findings above.  This report was flagged in Epic as abnormal.      Electronically signed by: Emil Echevarria MD  Date:    09/04/2022  Time:    16:54               Narrative:    EXAMINATION:  CTA CHEST NON CORONARY    CLINICAL HISTORY:  Lung/mediastinal abscess;    TECHNIQUE:  Low dose axial images, sagittal and coronal reformations were obtained from the thoracic inlet to the lung bases following the IV administration of 100 mL of Omnipaque 350.  Contrast timing was optimized to evaluate the pulmonary arteries.  MIP images were performed.    COMPARISON:  Chest radiograph 09/04/2022, CT chest abdomen pelvis 08/18/2022    FINDINGS:  Right-sided pacer device with  transvenous leads extend to the heart.  Approximately 4.8 cm left lower cervical soft tissue mass, partially obscured by beam hardening artifact from dense contrast bolus in the left subclavian vein and right chest wall pacer device.  There is encasement and narrowing of the left subclavian artery.    Left-sided nonaneurysmal aortic arch with mild calcific atherosclerosis.  Operative change of aortic and mitral valve repair.  Heart is normal size.  No definite pulmonary arterial filling defect to the level of the segmental arteries. Narrowing of the left upper lobe pulmonary arteries.    There is a large heterogeneous mediastinal mass with extensive mediastinal invasion measuring on the order of 11.2 x 9.8 cm which circumferentially encases the aortic arch as well as the origins of the brachiocephalic, left carotid artery, and proximal right pulmonary artery.  Encasement with severe narrowing of the left superior pulmonary vein.  Encasement with associated narrowing of the trachea.  Lesion involves the bilateral domonique, more so on the left with encasement of the left hilar bronchovascular structures and suprahilar extension to the left lung apex.    Compared to CT 08/18/2022, there is enlarging now moderate volume left-sided pleural effusion, and new moderate volume right-sided pleural effusion with associated compressive atelectasis of the adjacent lung parenchyma.  Extensive bilateral panlobular emphysematous change with progressive increased attenuation throughout both lungs and superimposed right upper and middle lobe ground-glass opacities.  Bilateral tubular bronchiectasis and peribronchial cuffing.  No pneumothorax.    Limited evaluation of the abdomen reveals bilateral suprarenal masses measuring up to 2.4 cm on the right and 2.3 cm on the left, fully characterized on prior above comparison CT.    Operative change of median sternotomy.  Stable L1 compression fracture.  Lytic destructive lesion of the left  "lateral 6th rib.                                       X-Ray Chest AP Portable (Final result)  Result time 09/04/22 07:54:43      Final result by Karson Rios MD (09/04/22 07:54:43)                   Impression:      In this patient with known lung and mediastinal mass, there are worsening bilateral interstitial opacities and worsening bilateral pleural effusions (left side greater than right) when compared with 08/18/2022.      Electronically signed by: Karson Rios MD  Date:    09/04/2022  Time:    07:54               Narrative:    EXAMINATION:  XR CHEST AP PORTABLE    CLINICAL HISTORY:  Provided history is "Sepsis;  ".    TECHNIQUE:  One view of the chest.    COMPARISON:  CT chest, 08/18/2022.  Chest radiograph, 08/18/2022.    FINDINGS:  Cardiomediastinal silhouette is stable, with widening of the superior mediastinum consistent with known mediastinal mass as seen on prior CT.  Persistent opacification in the left upper lung likely related to mass or consolidation as seen previously.  Moderate-sized left pleural effusion and small right pleural effusion.  Patchy interstitial opacities throughout both lungs.  No distinct pneumothorax.  Pulmonary emphysema is suspected.  Postoperative changes of prior median sternotomy and valve replacement.  Right chest wall cardiac pacing device is present.  Left 6th rib fracture is noted, though better evaluated on prior CT.                                        Assessment/Plan:     Sepsis  Shortness of breath   Atrial flutter  Acute hypoxic respiratory failure    This patient does have evidence of infective focus  My overall impression is sepsis. Vital signs were reviewed and noted in progress note.  Antibiotics given-   Antibiotics (From admission, onward)      Start     Stop Route Frequency Ordered    09/04/22 2100  vancomycin 750 mg in dextrose 5 % 250 mL IVPB (ready to mix system)         -- IV Every 24 hours (non-standard times) 09/04/22 1537    09/04/22 1600  " cefepime in dextrose 5 % IVPB 2 g         -- IV Every 24 hours (non-standard times) 09/04/22 1432          Cultures were taken-   Microbiology Results (last 7 days)       Procedure Component Value Units Date/Time    Blood culture x two cultures. Draw prior to antibiotics. [[id number]] Collected: 09/04/22 0709    Order Status: Completed Specimen: Blood from Peripheral, Upper Arm, Left Updated: 09/04/22 1515     Blood Culture, Routine No Growth to date    Narrative:      Aerobic and anaerobic    Blood culture x two cultures. Draw prior to antibiotics. [[id number]] Collected: 09/04/22 1136    Order Status: Sent Specimen: Blood from Peripheral, Upper Arm, Right Updated: 09/04/22 1140    Influenza A & B by Molecular [[id number]] Collected: 09/04/22 0657    Order Status: Completed Specimen: Nasopharyngeal Swab Updated: 09/04/22 0732     Influenza A, Molecular Negative     Influenza B, Molecular Negative     Flu A & B Source NP          Latest lactate reviewed, they are-  Recent Labs   Lab 09/04/22  0708 09/04/22  1136   LACTATE 2.3* 2.4*       Organ dysfunction indicated by Acute respiratory failure, arrhythmia, leukocytosis, thrombocytosis, lactic acidosis  Source- pulm    Source control Achieved by-   Broad-spectrum antibiotics    Presentation concerning for sepsis of pulmonary origin  Worsening bilateral interstitial opacities and worsening bilateral pleural effusions on cxr  Continue broad-spectrum IV antibiotics  Sputum culture  Follow-up blood cultures   Echo   Suspect infection leading to atrial flutter  In normal sinus rhythm following cardioversion in ED  Patient with pacemaker in place  Cardiology consult; patient previously on amiodarone for atrial fibrillation  Pacemaker interrogation  Supplemental oxygen as indicated; wean as tolerated  CTA chest non coronary; INR therapeutic; however concern for embolic disease in setting of malignancy  Palliative care consult pending clinical course    Malignant neoplasm  metastatic to bone  As per previous admit, Patient found on CT scan of chest/abdomen and pelvis as part of metastatic work-up to have extensive metastatic disease involving soft tissues above and below the diaphragm and osseous structures. Extensive involvement of the mediastinum with encasement of bronchovascular structures. Suggested potential primary lung and renal with left upper lobe and left renal masses. Pathologic fracture of the right femoral head/neck, L1 vertebral body, and left 6th rib. Small to moderate pericardial effusion, likely malignant. Small left pleural effusion. Heme/Onc consulted on admit and recommended MRI of brain to look for brain mets and none noted to brain itself but concerning osseous mets to skull itself. Bone scan done showed numerous tracer avid foci throughout the axial and appendicular skeleton consistent with metastatic disease. These lesions correspond to mixed lytic and sclerotic lesions on same day CT chest abdomen pelvis. Heme/Onc recommended bone biopsy and done by Orthopedics on 8/23 for diagnosis as primary tumor unknown and pathology pending on discharge.    Follow pathology result from 8/23 surgery  Palliative care consult pending clinical course      Swelling of upper extremity  Patient noted during recent hospital stay to have swelling to both upper extremities and felt related to vascular compression of veins seen on CT scan of chest and recommended that patient elevate his upper arms on discharge to help with swelling.     Initial improvement at home however worsening in the past few days   Bilateral upper extremity arterial and venous ultrasound      H/O mitral valve replacement with mechanical valve  Goal INR 2.5-3.5.    Supratherapeutic upon admission   Hold warfarin   INR daily   Consult pharmacy for warfarin dosing assistance      Body mass index (BMI) less than 19  Body mass index is 16.27 kg/m².  Likely 2/2 his metastatic and advanced malignancy causing  reduced BMI and weight loss.   Nutrition consult      Pseudoaneurysm of right femoral artery  S/p embolization of right profunda femoral artery pseudoaneurysm with 5 mm coil 8/20        Pathologic fracture of neck of right femur s/p hemiarthroplasty on 8/23/2022  S/p R hip hemiarthroplasty with ablation, cementoplasty, and percutaneous fixation of pelvis 8/23.  PT/OT  Pain control prn        VTE Risk Mitigation (From admission, onward)           Ordered     Reason for no Mechanical VTE Prophylaxis  Once        Question:  Reasons:  Answer:  Risk of Bleeding  Comment:  inr    09/04/22 1442     Place sequential compression device  Until discontinued         09/04/22 1441     IP VTE HIGH RISK PATIENT  Once         09/04/22 1442     Place sequential compression device  Until discontinued         09/04/22 1441                       Samantha Maldonado MD  Department of Hospital Medicine   [facility] Emergency Dept

## 2022-09-04 NOTE — ASSESSMENT & PLAN NOTE
S/p R hip hemiarthroplasty with ablation, cementoplasty, and percutaneous fixation of pelvis 8/23.  PT/OT  Pain control prn

## 2022-09-04 NOTE — ASSESSMENT & PLAN NOTE
Patient noted during recent hospital stay to have swelling to both upper extremities and felt related to vascular compression of veins seen on CT scan of chest and recommended that patient elevate his upper arms on discharge to help with swelling.     Initial improvement at home however worsening in the past few days   Bilateral upper extremity arterial and venous ultrasound

## 2022-09-05 NOTE — CONSULTS
[facility]  Cardiology  Consult Note    Patient Name: Donis Jimenez  MRN: [id number]  Admission Date: 9/4/2022  Hospital Length of Stay: 1 days  Code Status: DNR   Attending Provider: Brannon Gonsalves MD   Consulting Provider: Erwin Medel MD  Primary Care Physician: Elio Farias MD  Principal Problem:Sepsis with acute hypoxic respiratory failure    Patient information was obtained from patient, spouse/SO and ER records.     Inpatient consult to Cardiology  Consult performed by: Erwin Medel MD  Consult ordered by: Samantha Maldonado MD        Subjective:     Chief Complaint:  Dyspnea     HPI:   Donis Jimenez is a 61-year-old matta with PMH significant for bacterial endocarditis, s/p AV and MV mechanical valve replacement (on warfarin), dual chamber PPM placed during perioperative MV/AV replacements per family, CKD, tobacco abuse with recent complicated hospital course who presented to the emergency department with shortness of breath.  Difficult for patient to provide history due to tachypnea; spouse at bedside assisting with history.     Patient was admitted 8/18-8/27/22 after sustaining a pathologic right femoral neck fracture and right femoral artery pseudoaneurysm from a fall at home. Patient taken to OR on 8/19 by Vascular Surgery and had embolization of 3rd order right profunda femoral artery pseudoaneurysm with N-BCA glue and 5mm coil aneurysm repair. Pt underwent ight hip hemiarthroplasty with ablation, cementoplasty, and percutaneous fixation of pelvis for pathologic fracture and metastatic disease with Ortho on 8/23. 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 Metastatic work-up done  with CT scan of chest/abdomen and pelvis showing extensive disease including  mediastinal mass with encasement of mediastinal vascular structures and airways, MARTHA pulmonary mass, bilateral suprarenal and left renal masses, L1 pathologic fx and rib & skull metastatic disease.      In the ED patient was in a flutter and was cardioverted with some improvement in symptoms. He was found to have right sided pneumonia and was started on vanc and cefepime and was initially admitted to Hospital Medicine for further management, but was upgraded to MICU for worsening respiratory distress. Patient now DNR. On high amounts of O2 via CF. Cardiology was consulted given the patient was cardioverted. Tele review with patient in NSR.            Review of Systems   Cardiovascular:  Positive for dyspnea on exertion. Negative for chest pain, irregular heartbeat and palpitations.   Respiratory:  Positive for shortness of breath.    Neurological:  Negative for light-headedness.   Objective:     Vital Signs (Most Recent):  Temp: 98.3 °F (36.8 °C) (09/04/22 1933)  Pulse: 95 (09/04/22 2216)  Resp: (!) 23 (09/04/22 2145)  BP: (!) 184/105 (09/04/22 2216)  SpO2: (!) 94 % (09/04/22 2216)   Vital Signs (24h Range):  Temp:  [97.6 °F (36.4 °C)-98.3 °F (36.8 °C)] 98.3 °F (36.8 °C)  Pulse:  [] 95  Resp:  [18-27] 23  SpO2:  [85 %-100 %] 94 %  BP: ()/() 184/105     Weight: 54 kg (119 lb)  Body mass index is 16.14 kg/m².     SpO2: (!) 94 %  O2 Device (Oxygen Therapy): BiPAP      Intake/Output Summary (Last 24 hours) at 9/4/2022 2237  Last data filed at 9/4/2022 1832  Gross per 24 hour   Intake 46.51 ml   Output 450 ml   Net -403.49 ml       Lines/Drains/Airways       Peripheral Intravenous Line  Duration                  Peripheral IV - Single Lumen 09/04/22 0707 18 G Left Upper Arm <1 day         Peripheral IV - Single Lumen 09/04/22 20 G Anterior;Right Upper Arm <1 day                    Physical Exam  Constitutional:       Appearance: Normal appearance. He is ill-appearing.   HENT:      Head: Normocephalic.       Mouth/Throat:      Mouth: Mucous membranes are moist.   Eyes:      Extraocular Movements: Extraocular movements intact.   Cardiovascular:      Rate and Rhythm: Normal rate and regular rhythm.      Pulses: Normal pulses.      Heart sounds: Normal heart sounds. No murmur heard.  Pulmonary:      Effort: Pulmonary effort is normal.      Breath sounds: Normal breath sounds.      Comments: Coarse breath sounds  Abdominal:      General: Abdomen is flat.   Musculoskeletal:         General: No swelling or tenderness. Normal range of motion.      Cervical back: Normal range of motion.   Skin:     General: Skin is warm.   Neurological:      Mental Status: He is alert and oriented to person, place, and time. Mental status is at baseline.   Psychiatric:         Mood and Affect: Mood normal.         Behavior: Behavior normal.         Thought Content: Thought content normal.       Significant Labs: Blood Culture:   Recent Labs   Lab 09/04/22  0709 09/04/22  1136   LABBLOO No Growth to date  No Growth to date No Growth to date  No Growth to date   , BMP:   Recent Labs   Lab 09/04/22  0708 09/05/22  0341    104   * 134*   K 4.7 4.2    104   CO2 18* 18*   BUN 35* 29*   CREATININE 1.3 1.1   CALCIUM 9.1 8.4*   MG  --  2.1   , CMP   Recent Labs   Lab 09/04/22  0708 09/05/22  0341   * 134*   K 4.7 4.2    104   CO2 18* 18*    104   BUN 35* 29*   CREATININE 1.3 1.1   CALCIUM 9.1 8.4*   PROT 6.3 5.8*   ALBUMIN 2.7* 2.5*   BILITOT 0.9 0.8   ALKPHOS 229* 200*   AST 60* 36   ALT 49* 38   ANIONGAP 11 12   , and CBC   Recent Labs   Lab 09/04/22  0708 09/04/22  2145 09/05/22  0341   WBC 15.02*  --  13.89*   HGB 11.3*  --  10.3*   HCT 35.6*   < > 31.8*   *  --  417    < > = values in this interval not displayed.       Significant Imaging:   Imaging Results              US Upper Extremity Arteries Left (Final result)  Result time 09/05/22 03:41:48      Final result by Godfrey Worrell MD  (09/05/22 03:41:48)                   Impression:      No hemodynamically significant stenosis demonstrated in the bilateral upper extremity arterial system.    Electronically signed by resident: Jean Dsouza  Date:    09/05/2022  Time:    03:25    Electronically signed by: Godfrey Worrell  Date:    09/05/2022  Time:    03:41               Narrative:    EXAMINATION:  US UPPER EXTREMITY ARTERIES RIGHT; US UPPER EXTREMITY ARTERIES LEFT    CLINICAL HISTORY:  r/o clot;    TECHNIQUE:  Bilateral upper extremity arterial duplex ultrasound examination performed. Multiple gray scale and color doppler images were obtained in addition to waveform analysis.    COMPARISON:  Ultrasound upper extremity veins performed same day    FINDINGS:  RIGHT UPPER EXTREMITY:    Normal arterial waveforms are demonstrated.  No significant atherosclerotic plaque.    The peak systolic velocities on the right are as follows, in centimeters/second:    Subclavian artery: 62.7    Axillary artery: 58.4    Brachial artery, proximal: 50.3    Brachial artery, mid: 75.8    Brachial artery, distal: 66.5    Radial artery, proximal: 55.3    Radial artery, distal: 55.9    Ulnar artery, proximal: 73.9    Ulnar artery, distal: 41.2    LEFT UPPER EXTREMITY:    Normal arterial waveforms are demonstrated. No significant atherosclerotic plaque.    The peak systolic velocities on the left are as follows, in centimeters/second:    Subclavian artery: 52.2    Axillary artery: 50.3    Brachial artery, proximal: 67.7    Brachial artery, mid: 70.8    Brachial artery, distal: 59.6    Radial artery, proximal: 48.5    Radial artery, distal: 48.5    Ulnar artery, proximal: 48.5    Ulnar artery, distal: 60.9      Miscellaneous: N/A                                       US Upper Extremity Arteries Right (Final result)  Result time 09/05/22 03:41:48   Procedure changed from US Upper Extremity Arteries Bilateral     Final result by Godfrey Worrell MD (09/05/22 03:41:48)                    Impression:      No hemodynamically significant stenosis demonstrated in the bilateral upper extremity arterial system.    Electronically signed by resident: Jean Dsouza  Date:    09/05/2022  Time:    03:25    Electronically signed by: Godfrey Worrell  Date:    09/05/2022  Time:    03:41               Narrative:    EXAMINATION:  US UPPER EXTREMITY ARTERIES RIGHT; US UPPER EXTREMITY ARTERIES LEFT    CLINICAL HISTORY:  r/o clot;    TECHNIQUE:  Bilateral upper extremity arterial duplex ultrasound examination performed. Multiple gray scale and color doppler images were obtained in addition to waveform analysis.    COMPARISON:  Ultrasound upper extremity veins performed same day    FINDINGS:  RIGHT UPPER EXTREMITY:    Normal arterial waveforms are demonstrated.  No significant atherosclerotic plaque.    The peak systolic velocities on the right are as follows, in centimeters/second:    Subclavian artery: 62.7    Axillary artery: 58.4    Brachial artery, proximal: 50.3    Brachial artery, mid: 75.8    Brachial artery, distal: 66.5    Radial artery, proximal: 55.3    Radial artery, distal: 55.9    Ulnar artery, proximal: 73.9    Ulnar artery, distal: 41.2    LEFT UPPER EXTREMITY:    Normal arterial waveforms are demonstrated. No significant atherosclerotic plaque.    The peak systolic velocities on the left are as follows, in centimeters/second:    Subclavian artery: 52.2    Axillary artery: 50.3    Brachial artery, proximal: 67.7    Brachial artery, mid: 70.8    Brachial artery, distal: 59.6    Radial artery, proximal: 48.5    Radial artery, distal: 48.5    Ulnar artery, proximal: 48.5    Ulnar artery, distal: 60.9      Miscellaneous: N/A                                       US Upper Extremity Veins Left (Final result)  Result time 09/05/22 03:36:30      Final result by Godfrey Worrell MD (09/05/22 03:36:30)                   Impression:      1. No thrombus in central veins of the right or left  upper extremity.  2. Nonocclusive thrombus in the superficial left cephalic vein.  3. Heterogeneous, lobular mass lesion in the left neck, as above.    Electronically signed by resident: Jean Dsouza  Date:    09/05/2022  Time:    03:21    Electronically signed by: Godfrey Worrell  Date:    09/05/2022  Time:    03:36               Narrative:    EXAMINATION:  US UPPER EXTREMITY VEINS RIGHT; US UPPER EXTREMITY VEINS LEFT    CLINICAL HISTORY:  r/o clot;    TECHNIQUE:  Duplex and color flow Doppler evaluation and dynamic compression was performed of the right and left upper extremity veins.    COMPARISON:  None    FINDINGS:  Right central veins: The internal jugular, subclavian, and axillary veins are patent and free of thrombus.    Right arm veins: The brachial, basilic, and cephalic veins are patent and compressible.    Left central veins: The internal jugular, subclavian, and axillary veins are patent and free of thrombus.    Left arm veins: There is a nonocclusive thrombosis in the left cephalic vein.  The brachial, and basilic veins are patent and compressible.    Miscellaneous: There is a heterogeneous, non vascularized lobular lesion measuring 4.2 x 5.1 x 4.3 cm in the left neck.  This correlates with the abnormality noted on the CT examination of the chest September 4, 2022                                       US Upper Extremity Veins Right (Final result)  Result time 09/05/22 03:36:30   Procedure changed from US Upper Extremity Veins Bilateral     Final result by Godfrey Worrell MD (09/05/22 03:36:30)                   Impression:      1. No thrombus in central veins of the right or left upper extremity.  2. Nonocclusive thrombus in the superficial left cephalic vein.  3. Heterogeneous, lobular mass lesion in the left neck, as above.    Electronically signed by resident: Jean Dsouza  Date:    09/05/2022  Time:    03:21    Electronically signed by: Godfrey  Gm  Date:    09/05/2022  Time:    03:36               Narrative:    EXAMINATION:  US UPPER EXTREMITY VEINS RIGHT; US UPPER EXTREMITY VEINS LEFT    CLINICAL HISTORY:  r/o clot;    TECHNIQUE:  Duplex and color flow Doppler evaluation and dynamic compression was performed of the right and left upper extremity veins.    COMPARISON:  None    FINDINGS:  Right central veins: The internal jugular, subclavian, and axillary veins are patent and free of thrombus.    Right arm veins: The brachial, basilic, and cephalic veins are patent and compressible.    Left central veins: The internal jugular, subclavian, and axillary veins are patent and free of thrombus.    Left arm veins: There is a nonocclusive thrombosis in the left cephalic vein.  The brachial, and basilic veins are patent and compressible.    Miscellaneous: There is a heterogeneous, non vascularized lobular lesion measuring 4.2 x 5.1 x 4.3 cm in the left neck.  This correlates with the abnormality noted on the CT examination of the chest September 4, 2022                                        CTA Chest Non Coronary (Final result)  Result time 09/04/22 16:54:09      Final result by Emil Echevarria MD (09/04/22 16:54:09)                   Impression:      1. Diffusely infiltrative mediastinal mass with encasement of mediastinal vascular structures and airways, detailed above.  There is asymmetric left hilar and suprahilar extension with large left upper lobe mass.  2. Compared to recent above CT, enlarging moderate volume left-sided pleural effusion and new moderate volume right-sided pleural effusion.  3. Extensive panlobular emphysematous change with progressive increased attenuation throughout both lungs, and new right upper and middle lobe ground-glass opacities.  4. Bilateral suprarenal masses, fully characterized on recent prior CT.  5. Pathologic fracture of the L1 vertebral body.  6. Additional findings above.  This report was flagged in Epic as  abnormal.      Electronically signed by: Emil Echevarria MD  Date:    09/04/2022  Time:    16:54               Narrative:    EXAMINATION:  CTA CHEST NON CORONARY    CLINICAL HISTORY:  Lung/mediastinal abscess;    TECHNIQUE:  Low dose axial images, sagittal and coronal reformations were obtained from the thoracic inlet to the lung bases following the IV administration of 100 mL of Omnipaque 350.  Contrast timing was optimized to evaluate the pulmonary arteries.  MIP images were performed.    COMPARISON:  Chest radiograph 09/04/2022, CT chest abdomen pelvis 08/18/2022    FINDINGS:  Right-sided pacer device with transvenous leads extend to the heart.  Approximately 4.8 cm left lower cervical soft tissue mass, partially obscured by beam hardening artifact from dense contrast bolus in the left subclavian vein and right chest wall pacer device.  There is encasement and narrowing of the left subclavian artery.    Left-sided nonaneurysmal aortic arch with mild calcific atherosclerosis.  Operative change of aortic and mitral valve repair.  Heart is normal size.  No definite pulmonary arterial filling defect to the level of the segmental arteries. Narrowing of the left upper lobe pulmonary arteries.    There is a large heterogeneous mediastinal mass with extensive mediastinal invasion measuring on the order of 11.2 x 9.8 cm which circumferentially encases the aortic arch as well as the origins of the brachiocephalic, left carotid artery, and proximal right pulmonary artery.  Encasement with severe narrowing of the left superior pulmonary vein.  Encasement with associated narrowing of the trachea.  Lesion involves the bilateral domonique, more so on the left with encasement of the left hilar bronchovascular structures and suprahilar extension to the left lung apex.    Compared to CT 08/18/2022, there is enlarging now moderate volume left-sided pleural effusion, and new moderate volume right-sided pleural effusion with associated  "compressive atelectasis of the adjacent lung parenchyma.  Extensive bilateral panlobular emphysematous change with progressive increased attenuation throughout both lungs and superimposed right upper and middle lobe ground-glass opacities.  Bilateral tubular bronchiectasis and peribronchial cuffing.  No pneumothorax.    Limited evaluation of the abdomen reveals bilateral suprarenal masses measuring up to 2.4 cm on the right and 2.3 cm on the left, fully characterized on prior above comparison CT.    Operative change of median sternotomy.  Stable L1 compression fracture.  Lytic destructive lesion of the left lateral 6th rib.                                       X-Ray Chest AP Portable (Final result)  Result time 09/04/22 07:54:43      Final result by Karson Rios MD (09/04/22 07:54:43)                   Impression:      In this patient with known lung and mediastinal mass, there are worsening bilateral interstitial opacities and worsening bilateral pleural effusions (left side greater than right) when compared with 08/18/2022.      Electronically signed by: Karson Rios MD  Date:    09/04/2022  Time:    07:54               Narrative:    EXAMINATION:  XR CHEST AP PORTABLE    CLINICAL HISTORY:  Provided history is "Sepsis;  ".    TECHNIQUE:  One view of the chest.    COMPARISON:  CT chest, 08/18/2022.  Chest radiograph, 08/18/2022.    FINDINGS:  Cardiomediastinal silhouette is stable, with widening of the superior mediastinum consistent with known mediastinal mass as seen on prior CT.  Persistent opacification in the left upper lung likely related to mass or consolidation as seen previously.  Moderate-sized left pleural effusion and small right pleural effusion.  Patchy interstitial opacities throughout both lungs.  No distinct pneumothorax.  Pulmonary emphysema is suspected.  Postoperative changes of prior median sternotomy and valve replacement.  Right chest wall cardiac pacing device is present.  Left 6th " rib fracture is noted, though better evaluated on prior CT.                                        Assessment and Plan:     Atrial flutter  Donis Jimenez is a 61-year-old matta with PMH significant for bacterial endocarditis, s/p AV and MV mechanical valve replacement (on warfarin), dual chamber PPM placed during perioperative MV/AV replacements per family, CKD, tobacco abuse with recent complicated hospital course who presented to the emergency department with shortness of breath. Patient was cardioverted in the ED for atrial flutter, in the absence of cardiology and is now in NSR. He was upgraded to the MICU for acute respiratory failure, requiring high amounts of oxygen.  Cardiology was consulted for atrial flutter. Patient is DNR.     Recommendations  - Patient remains in NSR. Patient not a candidate for ablation given frail respiratory status  - Current INR 3.2. Resume anticoagulation when safe and if consistent with GOC, especially given recent cardioversion.   - If device interrogation is desired, cardiac device check order with the clinic can be placed for tomorrow.  - Agree with palliative care intervention.         VTE Risk Mitigation (From admission, onward)         Ordered     Reason for no Mechanical VTE Prophylaxis  Once        Question:  Reasons:  Answer:  Risk of Bleeding  Comment:  inr    09/04/22 1442     IP VTE HIGH RISK PATIENT  Once         09/04/22 1442     Place sequential compression device  Until discontinued         09/04/22 1441                Thank you for your consult. I will sign off. Please contact us if you have any additional questions.    Erwin Medel MD  Cardiology   [facility]

## 2022-09-05 NOTE — ASSESSMENT & PLAN NOTE
Advance Care Planning     Date: 09/04/2022    Code Status  In light of the patients advanced and life limiting illness, I engaged the the patient in a conversation about the patient's preferences for care  at the very end of life. The patient wishes to have a natural, peaceful death.  Along those lines, the patient does not wish to have CPR or other invasive treatments performed when his heart and/or breathing stops. I communicated to the patient that a DNR order would be placed in his medical record to reflect this preference.  I spent a total of 10 minutes engaging the patient in this advance care planning discussion.

## 2022-09-05 NOTE — ASSESSMENT & PLAN NOTE
This patient does have evidence of infective focus  My overall impression is sepsis. Vital signs were reviewed and noted in progress note.  Antibiotics given-   Antibiotics (From admission, onward)    Start     Stop Route Frequency Ordered    09/04/22 2100  vancomycin 750 mg in dextrose 5 % 250 mL IVPB (ready to mix system)         -- IV Every 24 hours (non-standard times) 09/04/22 1537    09/04/22 1600  cefepime in dextrose 5 % IVPB 2 g         -- IV Every 24 hours (non-standard times) 09/04/22 1432        Cultures were taken-   Microbiology Results (last 7 days)     Procedure Component Value Units Date/Time    Blood culture x two cultures. Draw prior to antibiotics. [[id number]] Collected: 09/04/22 1136    Order Status: Completed Specimen: Blood from Peripheral, Upper Arm, Right Updated: 09/04/22 1915     Blood Culture, Routine No Growth to date    Narrative:      Aerobic and anaerobic    Culture, Respiratory with Gram Stain [[id number]]     Order Status: No result Specimen: Respiratory     Blood culture x two cultures. Draw prior to antibiotics. [[id number]] Collected: 09/04/22 0709    Order Status: Completed Specimen: Blood from Peripheral, Upper Arm, Left Updated: 09/04/22 1515     Blood Culture, Routine No Growth to date    Narrative:      Aerobic and anaerobic    Influenza A & B by Molecular [[id number]] Collected: 09/04/22 0657    Order Status: Completed Specimen: Nasopharyngeal Swab Updated: 09/04/22 0732     Influenza A, Molecular Negative     Influenza B, Molecular Negative     Flu A & B Source NP        Latest lactate reviewed, they are-  Recent Labs   Lab 09/04/22  0708 09/04/22  1136   LACTATE 2.3* 2.4*       Organ dysfunction indicated by Acute kidney injury and Acute respiratory failure  Source- PNA    Source control Achieved by- broad spectrum abx  - imaging with diffusely infiltrative mediastinal mass with encasement of mediastinal vasculature and airways, L hilar/suprahilar extension with MARTHA mass,  bilateral pleural effusion and diffuse panlobular emphysematous change  - contine vanc/cefepime/azithro  - follow up sputum, blood cultures  - NIV, wean as tolerated

## 2022-09-05 NOTE — PROGRESS NOTES
Vancomycin therapy has been discontinued by provider. Pharmacy will sign off, please re-consult if needed.    Jung Bautista, GabrielD  Inpatient Pharmacy  [facility]

## 2022-09-05 NOTE — PLAN OF CARE
CMICU DAILY GOALS       A: Awake    RASS: Goal - RASS Goal: 0-->alert and calm  Actual - RASS (Garcia Agitation-Sedation Scale): 1-->restless   Restraint necessity:    B: Breathe   SBT: Not intubated   C: Coordinate A & B, analgesics/sedatives   Pain: managed    SAT: Not intubated  D: Delirium   CAM-ICU: Overall CAM-ICU: Negative  E: Early(intubated/ Progressive (non-intubated) Mobility   MOVE Screen: Pass   Activity: Activity Management: Arm raise - L1, Rolling - L1  FAS: Feeding/Nutrition   Diet order: Diet/Nutrition Received: low saturated fat/low cholesterol, no added salt,    T: Thrombus   DVT prophylaxis: VTE Required Core Measure: Pharmacological prophylaxis initiated/maintained  H: HOB Elevation   Head of Bed (HOB) Positioning: HOB at 60 degrees  U: Ulcer Prophylaxis   GI: yes  G: Glucose control   Pt not a diabetic     S: Skin   Bathing/Skin Care: bath, complete, incontinence care  Device Skin Pressure Protection: absorbent pad utilized/changed, adhesive use limited  Pressure Reduction Devices: alternating pressure pump (ADD), positioning supports utilized, foam padding utilized, pressure-redistributing mattress utilized  Pressure Reduction Techniques: frequent weight shift encouraged, weight shift assistance provided, pressure points protected  Skin Protection: adhesive use limited, incontinence pads utilized, protective footwear used, transparent dressing maintained, tubing/devices free from skin contact  B: Bowel Function   no issues   I: Indwelling Catheters   Rodgers necessity:     CVC necessity: No  D: De-escalation Antibiotics   Yes    Family/Goals of care/Code Status   Code Status: DNR    24H Vital Sign Range  Temp:  [96.6 °F (35.9 °C)-98.3 °F (36.8 °C)]   Pulse:  [82-97]   Resp:  [18-42]   BP: (117-184)/()   SpO2:  [85 %-100 %]      Shift Events   Pt tolerating Comfort flow at 40L, FI02 60%; 02 sats remained > 92% throughout this shift. He was medicated with Morphine IVP (as ordered PRN)  before going to CT scan this afternoon, and he reported effective relief of anxiety, pain and ease of breathing. His  stayed at bedside and assisted him with eating and drinking PO fluids. Although he only tolerated small amounts/ bites of food. VSS, afebrile. No distress noted or reported at this time.     VS and assessment per flow sheet, patient progressing towards goals as tolerated, plan of care reviewed with  patient and his spouse at bedside , all concerns addressed, will continue to monitor.    Amparo Garcia RN

## 2022-09-05 NOTE — SUBJECTIVE & OBJECTIVE
Interval History/Significant Events: Patient admitted to [facility] evening of 9/4 for worsening hypoxemic respiratory failure secondary to pneumonia in setting of lung cancer with extensive metastatic disease. [facility] discussed with pt and  who agree that they would not want extreme measures such as CPR and mechanical ventilation given overall poor prognosis. Pt started on vanc, cefepime, azithro and flagyl for pna/post-obstructive pna; alternating between bipap and comfort flow.     Review of Systems   Constitutional:  Negative for chills and fever.   HENT:  Negative for drooling and ear pain.    Eyes:  Negative for pain.   Respiratory:  Positive for cough and shortness of breath. Negative for wheezing.    Cardiovascular:  Negative for chest pain and palpitations.   Gastrointestinal:  Negative for abdominal pain, nausea and vomiting.   Neurological:  Negative for dizziness and headaches.   Psychiatric/Behavioral:  Negative for agitation and confusion.    Objective:     Vital Signs (Most Recent):  Temp: 96.6 °F (35.9 °C) (09/05/22 0400)  Pulse: 86 (09/05/22 0400)  Resp: (!) 30 (09/05/22 0400)  BP: (!) 135/91 (09/05/22 0400)  SpO2: 96 % (09/05/22 0400)   Vital Signs (24h Range):  Temp:  [96.6 °F (35.9 °C)-98.3 °F (36.8 °C)] 96.6 °F (35.9 °C)  Pulse:  [] 86  Resp:  [18-34] 30  SpO2:  [85 %-100 %] 96 %  BP: ()/() 135/91   Weight: 57.5 kg (126 lb 12.2 oz)  Body mass index is 18.72 kg/m².      Intake/Output Summary (Last 24 hours) at 9/5/2022 0435  Last data filed at 9/5/2022 0130  Gross per 24 hour   Intake 46.51 ml   Output 1200 ml   Net -1153.49 ml       Physical Exam  Vitals and nursing note reviewed.   Constitutional:       Appearance: He is ill-appearing.   HENT:      Head: Normocephalic and atraumatic.      Right Ear: External ear normal.      Left Ear: External ear normal.      Nose: Nose normal.      Mouth/Throat:      Pharynx: Oropharynx is clear.   Eyes:      Conjunctiva/sclera: Conjunctivae  normal.   Cardiovascular:      Rate and Rhythm: Normal rate and regular rhythm.      Pulses: Normal pulses.      Heart sounds: Normal heart sounds.   Pulmonary:      Effort: Tachypnea present. No accessory muscle usage.      Comments: Coarse breath sounds throughout  Abdominal:      General: Abdomen is flat. Bowel sounds are normal. There is no distension.      Palpations: Abdomen is soft.      Tenderness: There is no abdominal tenderness.   Musculoskeletal:         General: No deformity.      Right forearm: Edema present.      Left forearm: Edema present.      Cervical back: Normal range of motion and neck supple.   Skin:     Comments: Diffuse mottling   Neurological:      General: No focal deficit present.      Mental Status: He is alert and oriented to person, place, and time.   Psychiatric:         Mood and Affect: Mood normal.         Behavior: Behavior normal.         Thought Content: Thought content normal.         Judgment: Judgment normal.       Vents:  Oxygen Concentration (%): 50 (09/05/22 0400)  Lines/Drains/Airways       Peripheral Intravenous Line  Duration                  Peripheral IV - Single Lumen 09/04/22 20 G Anterior;Right Upper Arm 1 day         Peripheral IV - Single Lumen 09/04/22 0707 18 G Left Upper Arm <1 day                  Significant Labs:    CBC/Anemia Profile:  Recent Labs   Lab 09/04/22  0708 09/04/22  2145 09/05/22  0341   WBC 15.02*  --  13.89*   HGB 11.3*  --  10.3*   HCT 35.6* 33* 31.8*   *  --  417   MCV 91  --  91   RDW 21.0*  --  20.8*        Chemistries:  Recent Labs   Lab 09/04/22  0708 09/05/22  0341   * 134*   K 4.7 4.2    104   CO2 18* 18*   BUN 35* 29*   CREATININE 1.3 1.1   CALCIUM 9.1 8.4*   ALBUMIN 2.7* 2.5*   PROT 6.3 5.8*   BILITOT 0.9 0.8   ALKPHOS 229* 200*   ALT 49* 38   AST 60* 36   MG  --  2.1   PHOS  --  3.6       All pertinent labs within the past 24 hours have been reviewed.    Significant Imaging:  I have reviewed all pertinent imaging  results/findings within the past 24 hours.

## 2022-09-05 NOTE — ASSESSMENT & PLAN NOTE
- previously on amio  - presented in aflutter with RVR, now s/p DCCV in ED with return to NSR  - PPM in place  - cards consulted for PPM interrogation   - formal echo pending for lead infection r/o

## 2022-09-05 NOTE — ACP (ADVANCE CARE PLANNING)
Advance Care Planning  Code Status  In light of the patients advanced and terminal illness, we reviewed what the patients preferences for care would be at the very end of life.  The patient wishes to have a natural, peaceful death.  Along those lines, the patient does not wish to have CPR or other invasive treatments performed when his heart and/or breathing stops.  I communicated to the patient that a DNR order would be placed in his medical record to reflect this preference. Will start morphine for pain/dyspnea.      I spent a total of 15 minutes engaging the patient in this advance care planning discussion.          Cherry Vasquez, NP  Critical Care Medicine

## 2022-09-05 NOTE — CONSULTS
Medical Oncology Consult Note    Inpatient consult to Hematology/Oncology  Consult performed by: Piper Ann MD  Consult ordered by: Cherry Vasquez NP      SUBJECTIVE:     History of Present Illness:  Patient is a 61 y.o. male with hx of smoking for 40+ years and hx of worsening right hip pain for the past few weeks. Xray imaging showed lytic lesions in the pelvic area suspicious for metastatic disease. There was a plan for IR biopsy but it was delayed. He presented to OSH in august for worsening pain, imaging showed Right femoral neck fracture with possible pathologic component. Lytic and sclerotic changes involving the right iliac bone concerning for infiltrating/neoplastic process. He was transferred to [facility] for higher level of care. Underwent hemiarthroplasty 8/23 , pathology is pending. Was dsicharged to rehab. Readmitted for respiratory failure. Chest imaging noted Diffusely infiltrative mediastinal mass with encasement of mediastinal vascular structures and airways, detailed above.  There is asymmetric left hilar and suprahilar extension with large left upper lobe mass and enlarging moderate volume left-sided pleural effusion and new moderate volume right-sided pleural effusion.      Oncological workup:    NM bone scan 8/22:  Numerous tracer avid foci throughout the axial and appendicular skeleton consistent with metastatic disease.  These lesions correspond to mixed lytic and sclerotic lesions on same day CT chest abdomen pelvis.     CT CAP 8/22:  1. Findings of extensive metastatic disease involving soft tissues above and below the diaphragm and osseous structures.  Extensive involvement of the mediastinum with encasement of bronchovascular structures as detailed above.  Suggested potential primary lung and renal with left upper lobe and left renal masses.  2. Pathologic fracture of the right femoral head/neck, L1 vertebral body, and left 6th rib.  3. Small to moderate pericardial  effusion, likely malignant.  4. Small left pleural effusion.    MRI brain 8/22:  Multiple punctate scattered foci of diffusion restriction within the right cerebellar hemisphere, left erin and left corona radiata potentially representing acute embolic type infarcts.  FLAIR hyperintense, T1 isointense fluid overlying the left cerebral convexity and posterior to the right occipital lobe raising concern for small acute subdural hemorrhage.  Heterogeneous areas of calvarial marrow with at least two focal lesions concerning for metastatic disease involving the left occipital and right parietal calvarium.  Partially visualized C3 metastatic disease.      Review of patient's allergies indicates:  No Known Allergies  Past Medical History:   Diagnosis Date    Endocarditis     Pacemaker     Pneumonia      Past Surgical History:   Procedure Laterality Date    ANGIOGRAPHY OF LOWER EXTREMITY Right 8/19/2022    Procedure: ANGIOGRAM, LOWER EXTREMITY;  Surgeon: Thomas Nicolas MD;  [facility];  Service: Peripheral Vascular;  Laterality: Right;  30.0 min  315.10 mGy  26.1755 Gycm2  84 ml dye    HIP RESURFACING Right 8/23/2022    Procedure: cemontoplasty;  Surgeon: Yung Flores MD;  [facility];  Service: Orthopedics;  Laterality: Right;    RADIOFREQUENCY ABLATION, BONE, PERCUTANEOUS Right 8/23/2022    Procedure: RADIOFREQUENCY ABLATION,BONE;  Surgeon: Yung Flores MD;  [facility];  Service: Orthopedics;  Laterality: Right;    REPAIR, PSEUDOANEURYSM, ARTERY, FEMORAL Right 8/19/2022    Procedure: REPAIR, PSEUDOANEURYSM, ARTERY, FEMORAL;  Surgeon: Thomas Nicolas MD;  [facility];  Service: Peripheral Vascular;  Laterality: Right;  R PFA (3rd branch) pseudoaneurysm      No family history on file.  Social History     Tobacco Use    Smoking status: Every Day     Packs/day: 1.00     Types: Cigarettes    Smokeless tobacco: Never     Review of Systems   Constitutional:   Negative for chills and fever.   HENT:  Negative for sore throat.    Eyes:  Negative for blurred vision and double vision.   Respiratory:  Positive for shortness of breath. Negative for cough.    Cardiovascular:  Negative for chest pain, palpitations and leg swelling.   Gastrointestinal:  Negative for abdominal pain, constipation, diarrhea, nausea and vomiting.   Genitourinary:  Negative for dysuria.   Skin:  Negative for rash.   Neurological:  Negative for dizziness, weakness and headaches.   Psychiatric/Behavioral:  The patient is nervous/anxious.    OBJECTIVE:     Vital Signs:  Temp:  [96.6 °F (35.9 °C)-97 °F (36.1 °C)]   Pulse:  [82-95]   Resp:  [18-36]   BP: (120-184)/()   SpO2:  [94 %-100 %]     Physical Exam  Constitutional:       Appearance: Normal appearance.   HENT:      Head: Normocephalic and atraumatic.      Nose: Nose normal.      Mouth/Throat:      Mouth: Mucous membranes are moist.      Pharynx: Oropharynx is clear.   Eyes:      General: No scleral icterus.     Conjunctiva/sclera: Conjunctivae normal.   Cardiovascular:      Rate and Rhythm: Normal rate.   Pulmonary:      Effort: Tachypnea and accessory muscle usage present.      Breath sounds: No wheezing.   Abdominal:      General: There is no distension.      Palpations: Abdomen is soft.      Tenderness: There is no abdominal tenderness.   Musculoskeletal:         General: No tenderness.      Cervical back: Normal range of motion.      Right lower leg: No edema.      Left lower leg: No edema.   Skin:     General: Skin is warm and dry.      Findings: No rash.   Neurological:      Mental Status: He is alert and oriented to person, place, and time.   Psychiatric:         Mood and Affect: Mood normal.         Behavior: Behavior normal.         Thought Content: Thought content normal.     Laboratory:  Lab Results   Component Value Date    WBC 13.89 (H) 09/05/2022    HGB 10.3 (L) 09/05/2022    HCT 31.8 (L) 09/05/2022    MCV 91 09/05/2022      09/05/2022     CMP  Sodium   Date Value Ref Range Status   09/05/2022 134 (L) 136 - 145 mmol/L Final     Potassium   Date Value Ref Range Status   09/05/2022 4.2 3.5 - 5.1 mmol/L Final     Chloride   Date Value Ref Range Status   09/05/2022 104 95 - 110 mmol/L Final     CO2   Date Value Ref Range Status   09/05/2022 18 (L) 23 - 29 mmol/L Final     Glucose   Date Value Ref Range Status   09/05/2022 104 70 - 110 mg/dL Final     BUN   Date Value Ref Range Status   09/05/2022 29 (H) 6 - 20 mg/dL Final     Creatinine   Date Value Ref Range Status   09/05/2022 1.1 0.5 - 1.4 mg/dL Final     Calcium   Date Value Ref Range Status   09/05/2022 8.4 (L) 8.7 - 10.5 mg/dL Final     Total Protein   Date Value Ref Range Status   09/05/2022 5.8 (L) 6.0 - 8.4 g/dL Final     Albumin   Date Value Ref Range Status   09/05/2022 2.5 (L) 3.5 - 5.2 g/dL Final     Total Bilirubin   Date Value Ref Range Status   09/05/2022 0.8 0.1 - 1.0 mg/dL Final     Comment:     For infants and newborns, interpretation of results should be based  on gestational age, weight and in agreement with clinical  observations.    Premature Infant recommended reference ranges:  Up to 24 hours.............<8.0 mg/dL  Up to 48 hours............<12.0 mg/dL  3-5 days..................<15.0 mg/dL  6-29 days.................<15.0 mg/dL       Alkaline Phosphatase   Date Value Ref Range Status   09/05/2022 200 (H) 55 - 135 U/L Final     AST   Date Value Ref Range Status   09/05/2022 36 10 - 40 U/L Final     ALT   Date Value Ref Range Status   09/05/2022 38 10 - 44 U/L Final     Anion Gap   Date Value Ref Range Status   09/05/2022 12 8 - 16 mmol/L Final       Diagnostic Results:  CT: Reviewed    ASSESSMENT/PLAN:     Overall picture concerning for metastatic lung cancer in patient with hx of smoking    Pathology is pending, prelim result is consistent with small cell lung cancer  Recommend thoracentesis for therapeutic purposes if possible, if done send for cytology as  well  Continue broad spectrum abx per primary team  Once patient is stable, please transfer to medical oncology floor for further care and possible inpatient chemotherapy   Agree with palliative medicine consult for symptom control   Oncology will follow     Piper Ann MD  Hematology and Medical Oncology fellow

## 2022-09-05 NOTE — ASSESSMENT & PLAN NOTE
Patient with Hypoxic Respiratory failure which is Acute.  he is not on home oxygen. Supplemental oxygen was provided and noted- Oxygen Concentration (%):  [0.5-100] 50.   Signs/symptoms of respiratory failure include- tachypnea, increased work of breathing and respiratory distress. Contributing diagnoses includes - CHF, Pleural effusion and Pneumonia Labs and images were reviewed. Patient Has recent ABG, which has been reviewed. Will treat underlying causes and adjust management of respiratory failure as follows-   --concern for post-obstructive pna  --f/u sputum cx  --continue vanc, cefepime, flagyl and azithro  --bipap alternating with comfort flow; wean as able

## 2022-09-05 NOTE — ASSESSMENT & PLAN NOTE
Previously admitted last month for R hip pain s/p fall, imaging showing extensive metastatic disease involving soft tissue above and below diaphragm particularly notable for extensive mediastinal involvement with encasement of bronchovascular structures as well as osseous disease.  Unclear etiology of primary malignancy, however MARTHA mass and L renal mass possible primaries.    Pathologic fracture of the right femoral head/neck, L1 vertebral body, and left 6th rib. Small to moderate pericardial effusion, likely malignant. Small left pleural effusion.     Heme/Onc consulted on admit and recommended MRI of brain to look for brain mets and none noted to brain itself but concerning osseous mets to skull itself. Bone scan done showed numerous tracer avid foci throughout the axial and appendicular skeleton consistent with metastatic disease. These lesions correspond to mixed lytic and sclerotic lesions on same day CT chest abdomen pelvis. Heme/Onc recommended bone biopsy and done by Orthopedics on 8/23 for diagnosis as primary tumor unknown and pathology pending on discharge.    - palliative care consulted  - DNR/DNI status as per ACP conversations on admit

## 2022-09-05 NOTE — CONSULTS
Patient evaluated by and admitted to Critical Care Medicine. Full H&P to follow.    Cherry Vasquez NP  Critical Care Medicine

## 2022-09-05 NOTE — PLAN OF CARE
CMICU DAILY GOALS       A: Awake    RASS: Goal -    Actual -     Restraint necessity:    B: Breathe   SBT: Not intubated   C: Coordinate A & B, analgesics/sedatives   Pain: managed    SAT: Not intubated  D: Delirium   CAM-ICU: Overall CAM-ICU: Negative  E: Early(intubated/ Progressive (non-intubated) Mobility   MOVE Screen: Pass   Activity: Activity Management: Rolling - L1  FAS: Feeding/Nutrition   Diet order: Diet/Nutrition Received: no added salt (cardiac),    T: Thrombus   DVT prophylaxis:    H: HOB Elevation   Head of Bed (HOB) Positioning: HOB at 30-45 degrees  U: Ulcer Prophylaxis   GI: yes  G: Glucose control   managed    S: Skin                  B: Bowel Function   diarrhea   I: Indwelling Catheters   Rodgers necessity:     CVC necessity: No  D: De-escalation Antibiotics   Yes    Family/Goals of care/Code Status   Code Status: DNR    24H Vital Sign Range  Temp:  [96.6 °F (35.9 °C)-98.3 °F (36.8 °C)]   Pulse:  []   Resp:  [18-34]   BP: ()/()   SpO2:  [85 %-100 %]      Shift Events   Pt arrived to unit around 2240 last night in stable condition on bipap and  at bedside. Pt reported improvement in SOB with bipap. US of upper extremities completed at bedside. Pt given lasix last night with >1L of UO. Pt had episode of v-tach last night with stable vitals; pt still awake and alert. Team was notified and labs and EKG completed; no other events since.       SULMA ECHEVARRIA

## 2022-09-05 NOTE — SUBJECTIVE & OBJECTIVE
Past Medical History:   Diagnosis Date    Endocarditis     Pacemaker     Pneumonia        Past Surgical History:   Procedure Laterality Date    ANGIOGRAPHY OF LOWER EXTREMITY Right 8/19/2022    Procedure: ANGIOGRAM, LOWER EXTREMITY;  Surgeon: Thomas Nicolas MD;  Location: [facility] OR [address] [place] [address];  Service: Peripheral Vascular;  Laterality: Right;  30.0 min  315.10 mGy  26.1755 Gycm2  84 ml dye    HIP RESURFACING Right 8/23/2022    Procedure: cemontoplasty;  Surgeon: Yung Flores MD;  [facility];  Service: Orthopedics;  Laterality: Right;    RADIOFREQUENCY ABLATION, BONE, PERCUTANEOUS Right 8/23/2022    Procedure: RADIOFREQUENCY ABLATION,BONE;  Surgeon: Yung Flores MD;  [facility];  Service: Orthopedics;  Laterality: Right;    REPAIR, PSEUDOANEURYSM, ARTERY, FEMORAL Right 8/19/2022    Procedure: REPAIR, PSEUDOANEURYSM, ARTERY, FEMORAL;  Surgeon: Thomas Nicolas MD;  Location: [facility] OR [address] [place] [address];  Service: Peripheral Vascular;  Laterality: Right;  R PFA (3rd branch) pseudoaneurysm        Review of patient's allergies indicates:  No Known Allergies    Family History    None       Tobacco Use    Smoking status: Every Day     Packs/day: 1.00     Types: Cigarettes    Smokeless tobacco: Never   Substance and Sexual Activity    Alcohol use: Not on file    Drug use: Not on file    Sexual activity: Not on file      Review of Systems   Constitutional:  Negative for diaphoresis.   HENT:  Negative for drooling and ear discharge.    Eyes:  Negative for discharge and itching.   Respiratory:  Positive for cough and shortness of breath. Negative for apnea.    Cardiovascular:  Negative for chest pain and leg swelling.   Gastrointestinal:  Negative for abdominal pain, nausea and vomiting.   Musculoskeletal:  Negative for arthralgias and myalgias.   Neurological:  Negative for seizures and headaches.   Psychiatric/Behavioral:  Negative for agitation and confusion.    Objective:     Vital Signs (Most  Recent):  Temp: 98.3 °F (36.8 °C) (09/04/22 1933)  Pulse: 96 (09/04/22 2130)  Resp: (!) 27 (09/04/22 2130)  BP: (!) 171/112 (09/04/22 2130)  SpO2: (!) 85 % (09/04/22 2130)   Vital Signs (24h Range):  Temp:  [97.6 °F (36.4 °C)-98.3 °F (36.8 °C)] 98.3 °F (36.8 °C)  Pulse:  [] 96  Resp:  [18-27] 27  SpO2:  [85 %-100 %] 85 %  BP: ()/() 171/112   Weight: 54 kg (119 lb)  Body mass index is 16.14 kg/m².      Intake/Output Summary (Last 24 hours) at 9/4/2022 2210  Last data filed at 9/4/2022 1832  Gross per 24 hour   Intake 46.51 ml   Output 450 ml   Net -403.49 ml       Physical Exam  Vitals and nursing note reviewed.   Constitutional:       Appearance: He is ill-appearing.   HENT:      Head: Normocephalic and atraumatic.      Right Ear: External ear normal.      Left Ear: External ear normal.      Nose: Nose normal.      Mouth/Throat:      Pharynx: Oropharynx is clear.   Eyes:      Conjunctiva/sclera: Conjunctivae normal.   Cardiovascular:      Rate and Rhythm: Normal rate and regular rhythm.      Pulses: Normal pulses.      Heart sounds: Normal heart sounds.   Pulmonary:      Effort: Tachypnea and accessory muscle usage present.      Comments: Coarse breath sounds throughout  Abdominal:      General: Abdomen is flat. Bowel sounds are normal. There is no distension.      Palpations: Abdomen is soft.      Tenderness: There is no abdominal tenderness.   Musculoskeletal:         General: No deformity.      Right forearm: Edema present.      Left forearm: Edema present.      Cervical back: Normal range of motion and neck supple.   Skin:     Comments: Diffuse mottling   Neurological:      General: No focal deficit present.      Mental Status: He is alert and oriented to person, place, and time.   Psychiatric:         Mood and Affect: Mood normal.         Behavior: Behavior normal.         Thought Content: Thought content normal.         Judgment: Judgment normal.       Vents:  Oxygen Concentration (%): 100  (09/04/22 2130)  Lines/Drains/Airways       Peripheral Intravenous Line  Duration                  Peripheral IV - Single Lumen 09/04/22 0707 18 G Left Upper Arm <1 day         Peripheral IV - Single Lumen 09/04/22 20 G Anterior;Right Upper Arm <1 day                  Significant Labs:    CBC/Anemia Profile:  Recent Labs   Lab 09/04/22  0708 09/04/22 2145   WBC 15.02*  --    HGB 11.3*  --    HCT 35.6* 33*   *  --    MCV 91  --    RDW 21.0*  --         Chemistries:  Recent Labs   Lab 09/04/22  0708   *   K 4.7      CO2 18*   BUN 35*   CREATININE 1.3   CALCIUM 9.1   ALBUMIN 2.7*   PROT 6.3   BILITOT 0.9   ALKPHOS 229*   ALT 49*   AST 60*       All pertinent labs within the past 24 hours have been reviewed.    Significant Imaging: I have reviewed all pertinent imaging results/findings within the past 24 hours.

## 2022-09-05 NOTE — H&P
[facility] - Emergency Dept  Critical Care Medicine  History & Physical    Patient Name: Donis Jimenez  MRN: [id number]  Admission Date: 9/4/2022  Hospital Length of Stay: 0 days  Code Status: DNR  Attending Physician: Jesus Unger MD   Primary Care Provider: Elio Farias MD   Principal Problem: SOB (shortness of breath)    Subjective:     HPI:  Donis Jimenez is a 61-year-old matta with PMH significant for bacterial endocarditis, s/p AV and MV mechanical valve replacement (on warfarin), CKD, tobacco abuse with recent complicated hospital course who presents to the emergency department with shortness of breath.  Difficult for patient to provide history due to tachypnea; spouse at bedside assisting with history.     Patient was admitted 8/18-8/27/22 after sustaining a pathologic right femoral neck fracture and right femoral artery pseudoaneurysm from a fall at home. Patient taken to OR on 8/19 by Vascular Surgery and had embolization of 3rd order right profunda femoral artery pseudoaneurysm with N-BCA glue and 5mm coil aneurysm repair. Pt underwent ight hip hemiarthroplasty with ablation, cementoplasty, and percutaneous fixation of pelvis for pathologic fracture and metastatic disease with Ortho on 8/23. 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 Metastatic work-up done  with CT scan of chest/abdomen and pelvis showing extensive disease including mediastinal mass with encasement of mediastinal vascular structures and airways, MARTHA pulmonary mass, bilateral suprarenal and left renal masses, L1 pathologic fx and rib & skull metastatic disease.     Pt's spouse states that pt had been ambulatory with a walker post-discharge, without c/o SOB, lightheadedness or dizziness. SOB started ~ 9/2 with  productive cough. He denies fever/chills, chest pain, abd pain, N/V/D.     In the ED patient was in a flutter and was cardioverted with some improvement in symptoms. He was found to have right sided pneumonia and was started on vanc and cefepime and admitted to Hospital Medicine for further management.     During his time in the ED, the patient had escalating FiO2 requirements, now on 45L 100% comfort flow.     Critical Care Medicine was consulted for worsening hypoxemic respiratory failure.      Hospital/ICU Course:  No notes on file     Past Medical History:   Diagnosis Date    Endocarditis     Pacemaker     Pneumonia        Past Surgical History:   Procedure Laterality Date    ANGIOGRAPHY OF LOWER EXTREMITY Right 8/19/2022    Procedure: ANGIOGRAM, LOWER EXTREMITY;  Surgeon: Thomas Nicolas MD;  Location: [facility] OR [address] [place] [address];  Service: Peripheral Vascular;  Laterality: Right;  30.0 min  315.10 mGy  26.1755 Gycm2  84 ml dye    HIP RESURFACING Right 8/23/2022    Procedure: cemontoplasty;  Surgeon: Yung Flores MD;  [facility];  Service: Orthopedics;  Laterality: Right;    RADIOFREQUENCY ABLATION, BONE, PERCUTANEOUS Right 8/23/2022    Procedure: RADIOFREQUENCY ABLATION,BONE;  Surgeon: Yung lFores MD;  [facility];  Service: Orthopedics;  Laterality: Right;    REPAIR, PSEUDOANEURYSM, ARTERY, FEMORAL Right 8/19/2022    Procedure: REPAIR, PSEUDOANEURYSM, ARTERY, FEMORAL;  Surgeon: Thomas Nicolas MD;  [facility];  Service: Peripheral Vascular;  Laterality: Right;  R PFA (3rd branch) pseudoaneurysm        Review of patient's allergies indicates:  No Known Allergies    Family History    None       Tobacco Use    Smoking status: Every Day     Packs/day: 1.00     Types: Cigarettes    Smokeless tobacco: Never   Substance and Sexual Activity    Alcohol use: Not on file    Drug use: Not on file    Sexual activity: Not on file      Review of Systems   Constitutional:   Negative for diaphoresis.   HENT:  Negative for drooling and ear discharge.    Eyes:  Negative for discharge and itching.   Respiratory:  Positive for cough and shortness of breath. Negative for apnea.    Cardiovascular:  Negative for chest pain and leg swelling.   Gastrointestinal:  Negative for abdominal pain, nausea and vomiting.   Musculoskeletal:  Negative for arthralgias and myalgias.   Neurological:  Negative for seizures and headaches.   Psychiatric/Behavioral:  Negative for agitation and confusion.    Objective:     Vital Signs (Most Recent):  Temp: 98.3 °F (36.8 °C) (09/04/22 1933)  Pulse: 96 (09/04/22 2130)  Resp: (!) 27 (09/04/22 2130)  BP: (!) 171/112 (09/04/22 2130)  SpO2: (!) 85 % (09/04/22 2130)   Vital Signs (24h Range):  Temp:  [97.6 °F (36.4 °C)-98.3 °F (36.8 °C)] 98.3 °F (36.8 °C)  Pulse:  [] 96  Resp:  [18-27] 27  SpO2:  [85 %-100 %] 85 %  BP: ()/() 171/112   Weight: 54 kg (119 lb)  Body mass index is 16.14 kg/m².      Intake/Output Summary (Last 24 hours) at 9/4/2022 2210  Last data filed at 9/4/2022 1832  Gross per 24 hour   Intake 46.51 ml   Output 450 ml   Net -403.49 ml       Physical Exam  Vitals and nursing note reviewed.   Constitutional:       Appearance: He is ill-appearing.   HENT:      Head: Normocephalic and atraumatic.      Right Ear: External ear normal.      Left Ear: External ear normal.      Nose: Nose normal.      Mouth/Throat:      Pharynx: Oropharynx is clear.   Eyes:      Conjunctiva/sclera: Conjunctivae normal.   Cardiovascular:      Rate and Rhythm: Normal rate and regular rhythm.      Pulses: Normal pulses.      Heart sounds: Normal heart sounds.   Pulmonary:      Effort: Tachypnea and accessory muscle usage present.      Comments: Coarse breath sounds throughout  Abdominal:      General: Abdomen is flat. Bowel sounds are normal. There is no distension.      Palpations: Abdomen is soft.      Tenderness: There is no abdominal tenderness.    Musculoskeletal:         General: No deformity.      Right forearm: Edema present.      Left forearm: Edema present.      Cervical back: Normal range of motion and neck supple.   Skin:     Comments: Diffuse mottling   Neurological:      General: No focal deficit present.      Mental Status: He is alert and oriented to person, place, and time.   Psychiatric:         Mood and Affect: Mood normal.         Behavior: Behavior normal.         Thought Content: Thought content normal.         Judgment: Judgment normal.       Vents:  Oxygen Concentration (%): 100 (09/04/22 2130)  Lines/Drains/Airways       Peripheral Intravenous Line  Duration                  Peripheral IV - Single Lumen 09/04/22 0707 18 G Left Upper Arm <1 day         Peripheral IV - Single Lumen 09/04/22 20 G Anterior;Right Upper Arm <1 day                  Significant Labs:    CBC/Anemia Profile:  Recent Labs   Lab 09/04/22  0708 09/04/22 2145   WBC 15.02*  --    HGB 11.3*  --    HCT 35.6* 33*   *  --    MCV 91  --    RDW 21.0*  --         Chemistries:  Recent Labs   Lab 09/04/22  0708   *   K 4.7      CO2 18*   BUN 35*   CREATININE 1.3   CALCIUM 9.1   ALBUMIN 2.7*   PROT 6.3   BILITOT 0.9   ALKPHOS 229*   ALT 49*   AST 60*       All pertinent labs within the past 24 hours have been reviewed.    Significant Imaging: I have reviewed all pertinent imaging results/findings within the past 24 hours.    Assessment/Plan:     Cardiac/Vascular  Atrial flutter  - previously on amio  - presented in aflutter with RVR, now s/p DCCV in ED with return to NSR  - PPM in place  - cards consulted for PPM interrogation   - formal echo pending for lead infection r/o    H/O mitral valve replacement with mechanical valve  - INR supratherapeutic on admit  - hold warfarin for now  - daily INR  - goal INR 2.5-3.5.    Pseudoaneurysm of right femoral artery  - S/p embolization of right profunda femoral artery pseudoaneurysm with 5 mm coil 8/20    ID  Sepsis  with acute hypoxic respiratory failure  Patient with Hypoxic Respiratory failure which is Acute.  he is not on home oxygen. Supplemental oxygen was provided and noted- Oxygen Concentration (%):  [0.5-100] 100.   Signs/symptoms of respiratory failure include- tachypnea, increased work of breathing and respiratory distress. Contributing diagnoses includes - CHF, Pleural effusion and Pneumonia Labs and images were reviewed. Patient Has recent ABG, which has been reviewed. Will treat underlying causes and adjust management of respiratory failure as follows- broad spectrum abx, NIV    Sepsis  This patient does have evidence of infective focus  My overall impression is sepsis. Vital signs were reviewed and noted in progress note.  Antibiotics given-   Antibiotics (From admission, onward)      Start     Stop Route Frequency Ordered    09/04/22 2100  vancomycin 750 mg in dextrose 5 % 250 mL IVPB (ready to mix system)         -- IV Every 24 hours (non-standard times) 09/04/22 1537    09/04/22 1600  cefepime in dextrose 5 % IVPB 2 g         -- IV Every 24 hours (non-standard times) 09/04/22 1432          Cultures were taken-   Microbiology Results (last 7 days)       Procedure Component Value Units Date/Time    Blood culture x two cultures. Draw prior to antibiotics. [[id number]] Collected: 09/04/22 1136    Order Status: Completed Specimen: Blood from Peripheral, Upper Arm, Right Updated: 09/04/22 1915     Blood Culture, Routine No Growth to date    Narrative:      Aerobic and anaerobic    Culture, Respiratory with Gram Stain [[id number]]     Order Status: No result Specimen: Respiratory     Blood culture x two cultures. Draw prior to antibiotics. [[id number]] Collected: 09/04/22 0709    Order Status: Completed Specimen: Blood from Peripheral, Upper Arm, Left Updated: 09/04/22 1515     Blood Culture, Routine No Growth to date    Narrative:      Aerobic and anaerobic    Influenza A & B by Molecular [[id number]] Collected: 09/04/22  0657    Order Status: Completed Specimen: Nasopharyngeal Swab Updated: 09/04/22 0732     Influenza A, Molecular Negative     Influenza B, Molecular Negative     Flu A & B Source NP          Latest lactate reviewed, they are-  Recent Labs   Lab 09/04/22  0708 09/04/22  1136   LACTATE 2.3* 2.4*       Organ dysfunction indicated by Acute kidney injury and Acute respiratory failure  Source- PNA    Source control Achieved by- broad spectrum abx  - imaging with diffusely infiltrative mediastinal mass with encasement of mediastinal vasculature and airways, L hilar/suprahilar extension with MARTHA mass, bilateral pleural effusion and diffuse panlobular emphysematous change  - contine vanc/cefepime/azithro  - follow up sputum, blood cultures  - NIV, wean as tolerated    Oncology  Malignant neoplasm metastatic to bone  Previously admitted last month for R hip pain s/p fall, imaging showing extensive metastatic disease involving soft tissue above and below diaphragm particularly notable for extensive mediastinal involvement with encasement of bronchovascular structures as well as osseous disease.  Unclear etiology of primary malignancy, however MARTHA mass and L renal mass possible primaries.    Pathologic fracture of the right femoral head/neck, L1 vertebral body, and left 6th rib. Small to moderate pericardial effusion, likely malignant. Small left pleural effusion.     Heme/Onc consulted on admit and recommended MRI of brain to look for brain mets and none noted to brain itself but concerning osseous mets to skull itself. Bone scan done showed numerous tracer avid foci throughout the axial and appendicular skeleton consistent with metastatic disease. These lesions correspond to mixed lytic and sclerotic lesions on same day CT chest abdomen pelvis. Heme/Onc recommended bone biopsy and done by Orthopedics on 8/23 for diagnosis as primary tumor unknown and pathology pending on discharge.    - palliative care consulted  - DNR status  as per ACP conversations on admit       Endocrine  Body mass index (BMI) less than 19  - in setting of malignancy  - nutrition consulted    Orthopedic  Swelling of upper extremity  - noted on previous hospitalization, possibly 2/2 to vascular compression  - bilateral upper extremity u/s pending    Pathologic fracture of neck of right femur s/p hemiarthroplasty on 8/23/2022  - S/p R hip hemiarthroplasty with ablation, cementoplasty, and percutaneous fixation of pelvis 8/23.  - PT/OT consulted  - prn pain control    Palliative Care  ACP (advance care planning)  Advance Care Planning     Date: 09/04/2022    Code Status  In light of the patients advanced and life limiting illness, I engaged the the patient in a conversation about the patient's preferences for care  at the very end of life. The patient wishes to have a natural, peaceful death.  Along those lines, the patient does not wish to have CPR or other invasive treatments performed when his heart and/or breathing stops. I communicated to the patient that a DNR order would be placed in his medical record to reflect this preference.  I spent a total of 10 minutes engaging the patient in this advance care planning discussion.             Critical Care Daily Checklist:    A: Awake: RASS Goal/Actual Goal:    Actual:     B: Spontaneous Breathing Trial Performed?  N/a   C: SAT & SBT Coordinated?  N/a                      D: Delirium: CAM-ICU     E: Early Mobility Performed? Yes   F: Feeding Goal:    Status:     Current Diet Order   Procedures    Diet Cardiac      AS: Analgesia/Sedation Oxy, morphine, ativan prn   T: Thromboembolic Prophylaxis Supratherapeutic inr   H: HOB > 300 Yes   U: Stress Ulcer Prophylaxis (if needed)    G: Glucose Control    B: Bowel Function     I: Indwelling Catheter (Lines & Rodgers) Necessity piv   D: De-escalation of Antimicrobials/Pharmacotherapies Vanc, cefepime, azithro    Plan for the day/ETD Tx resp failure    Code Status:  Family/Goals of  Care: DNR  See acp note. Spouse updated at bedside     Case discussed with Dr. Jesus Unger.     Critical Care Time: 50 minutes  Critical secondary to Patient has a condition that poses threat to life and bodily function: worsening hypoxemic respiratory failure     Critical care was time spent personally by me on the following activities: development of treatment plan with patient or surrogate and bedside caregivers, discussions with consultants, evaluation of patient's response to treatment, examination of patient, ordering and performing treatments and interventions, ordering and review of laboratory studies, ordering and review of radiographic studies, pulse oximetry, re-evaluation of patient's condition. This critical care time did not overlap with that of any other provider or involve time for any procedures.     Cherry Vasquez NP  Critical Care Medicine  [facility] - Emergency Dept

## 2022-09-05 NOTE — ASSESSMENT & PLAN NOTE
- S/p R hip hemiarthroplasty with ablation, cementoplasty, and percutaneous fixation of pelvis 8/23.  - PT/OT consulted  - prn pain control

## 2022-09-05 NOTE — ASSESSMENT & PLAN NOTE
Donis Jimenez is a 61-year-old matta with PMH significant for bacterial endocarditis, s/p AV and MV mechanical valve replacement (on warfarin), dual chamber PPM placed during perioperative MV/AV replacements per family, CKD, tobacco abuse with recent complicated hospital course who presented to the emergency department with shortness of breath. Patient was cardioverted in the ED for atrial flutter, in the absence of cardiology and is now in NSR. He was upgraded to the MICU for acute respiratory failure, requiring high amounts of oxygen.  Cardiology was consulted for atrial flutter. Patient is DNR.     Recommendations  - Patient remains in NSR. Patient not a candidate for ablation given frail respiratory status  - Current INR 3.2. Resume anticoagulation when safe and if consistent with GOC, especially given recent cardioversion.   - If device interrogation is desired, cardiac device check order with the clinic can be placed for tomorrow.  - Agree with palliative care intervention.

## 2022-09-05 NOTE — EICU
Rounding (Video Assessment) No    Comments: Juan A day 1 sent to significant other Aston Gutierrez

## 2022-09-05 NOTE — PROGRESS NOTES
09/05/22 1000   WOCN Assessment   WOCN Total Time (mins) 20   Visit Date 09/05/22   Visit Time 1000   Consult Type New   WOCN Speciality Wound   Intervention assessed;changed;chart review;orders   Teaching on-going        Altered Skin Integrity 08/24/22 1200 Gluteal cleft #1 Moisture associated dermatitis   Date First Assessed/Time First Assessed: 08/24/22 1200   Altered Skin Integrity Present on Admission: suspected hospital acquired  Location: Gluteal cleft  Wound Number: #1  Is this injury device related?: No  Primary Wound Type: Moisture associated d...   Wound Image    Description of Altered Skin Integrity Partial thickness tissue loss. Shallow open ulcer with a red or pink wound bed, without slough. Intact or Open/Ruptured Serum-filled blister.   Dressing Appearance Intact;Dry   Drainage Amount None   Appearance Pink   Red (%), Wound Tissue Color 100 %   Wound Length (cm) 5 cm   Wound Width (cm) 4 cm   Wound Depth (cm) 0.1 cm   Wound Volume (cm^3) 2 cm^3   Wound Surface Area (cm^2) 20 cm^2   Care Cleansed with:;Sterile normal saline   Wound consult performed for sacrum.  Recommendation:  Triad and leave AP, turn every 2 hours.  Educated pt and family member on the benefit of repositioning.  Verbalized understanding.  Orders placed.  Will sign off.  Please re consult if needed.

## 2022-09-05 NOTE — PT/OT/SLP PROGRESS
Occupational Therapy      Patient Name:  Donis Jimenez   MRN:  [id number]    Patient not seen today secondary to Nurse/ YASEMIN hold, Other (Comment). Pt with increased anxiety this date, RN administered medication. RN requested OT to follow up 9/6/22 Will follow-up 9/6/22.    9/5/2022

## 2022-09-05 NOTE — HPI
61 year old male with bacterial endocarditis, s/p AV and MV mechanical valve replacement (on warfarin), CKD, tobacco abuse, recent diagnosis of metastatic SCLC (extensive diseases including mediastinal mass, mets to brain, bone) with recent hospital admission for pathologic right femoral neck fracture and right femoral artery pseudoaneurysm requiring hemiarthroplasty with ortho and embolization by vascular surgery presenting with acute hypoxic respiratory failure. He was initially admitted to ICU for bipap and was stepped down to oncology once oxygen requirements improved. Received inpatient chemotherapy with carboplatin and etoposide on 9/7.     Critical Care Medicine was re-consulted on 9/16 for worsening hypoxemic respiratory failure.

## 2022-09-05 NOTE — ASSESSMENT & PLAN NOTE
- INR supratherapeutic on admit  - daily INR  - goal INR 2.5-3.5  - continue holding warfarin in case invasive procedures are needed for malignancy diagnosis  - consider starting therapeutic dose lovenox for anticoagulation tomorrow

## 2022-09-05 NOTE — PROGRESS NOTES
[facility] - Cardiac Medical [facility]  Critical Care Medicine  Progress Note    Patient Name: Donis Jimenez  MRN: [id number]  Admission Date: 9/4/2022  Hospital Length of Stay: 1 days  Code Status: DNR  Attending Provider: Brannon Gonsalves MD  Primary Care Provider: Elio Farias MD   Principal Problem: Sepsis with acute hypoxic respiratory failure    Subjective:     HPI:  Donis Jimenez is a 61-year-old matta with PMH significant for bacterial endocarditis, s/p AV and MV mechanical valve replacement (on warfarin), CKD, tobacco abuse with recent complicated hospital course who presents to the emergency department with shortness of breath.  Difficult for patient to provide history due to tachypnea; spouse at bedside assisting with history.     Patient was admitted 8/18-8/27/22 after sustaining a pathologic right femoral neck fracture and right femoral artery pseudoaneurysm from a fall at home. Patient taken to OR on 8/19 by Vascular Surgery and had embolization of 3rd order right profunda femoral artery pseudoaneurysm with N-BCA glue and 5mm coil aneurysm repair. Pt underwent ight hip hemiarthroplasty with ablation, cementoplasty, and percutaneous fixation of pelvis for pathologic fracture and metastatic disease with Ortho on 8/23. 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 Metastatic work-up done  with CT scan of chest/abdomen and pelvis showing extensive disease including mediastinal mass with encasement of mediastinal vascular structures and airways, MARTHA pulmonary mass, bilateral suprarenal and left renal masses, L1 pathologic fx and rib & skull metastatic disease.     Pt's spouse states that pt had been ambulatory with a walker post-discharge, without c/o SOB, lightheadedness or dizziness. SOB  started ~ 9/2 with productive cough. He denies fever/chills, chest pain, abd pain, N/V/D.     In the ED patient was in a flutter and was cardioverted with some improvement in symptoms. He was found to have right sided pneumonia and was started on vanc and cefepime and admitted to Hospital Medicine for further management.     During his time in the ED, the patient had escalating FiO2 requirements, now on 45L 100% comfort flow.     Critical Care Medicine was consulted for worsening hypoxemic respiratory failure.          Hospital/ICU Course:  Patient admitted to [facility] evening of 9/4 for worsening hypoxemic respiratory failure secondary to pneumonia in setting of lung cancer with extensive metastatic disease. GOC discussed with pt and  who agree that they would not want extreme measures such as CPR and mechanical ventilation given overall poor prognosis. Pt started on vanc, cefepime, azithro and flagyl for pna/post-obstructive pna; alternating between bipap and comfort flow.       Interval History/Significant Events: Patient admitted to [facility] evening of 9/4 for worsening hypoxemic respiratory failure secondary to pneumonia in setting of lung cancer with extensive metastatic disease. GOC discussed with pt and  who agree that they would not want extreme measures such as CPR and mechanical ventilation given overall poor prognosis. Pt started on vanc, cefepime, azithro and flagyl for pna/post-obstructive pna; alternating between bipap and comfort flow.     Review of Systems   Constitutional:  Negative for chills and fever.   HENT:  Negative for drooling and ear pain.    Eyes:  Negative for pain.   Respiratory:  Positive for cough and shortness of breath. Negative for wheezing.    Cardiovascular:  Negative for chest pain and palpitations.   Gastrointestinal:  Negative for abdominal pain, nausea and vomiting.   Neurological:  Negative for dizziness and headaches.   Psychiatric/Behavioral:  Negative for agitation  and confusion.    Objective:     Vital Signs (Most Recent):  Temp: 96.6 °F (35.9 °C) (09/05/22 0400)  Pulse: 86 (09/05/22 0400)  Resp: (!) 30 (09/05/22 0400)  BP: (!) 135/91 (09/05/22 0400)  SpO2: 96 % (09/05/22 0400)   Vital Signs (24h Range):  Temp:  [96.6 °F (35.9 °C)-98.3 °F (36.8 °C)] 96.6 °F (35.9 °C)  Pulse:  [] 86  Resp:  [18-34] 30  SpO2:  [85 %-100 %] 96 %  BP: ()/() 135/91   Weight: 57.5 kg (126 lb 12.2 oz)  Body mass index is 18.72 kg/m².      Intake/Output Summary (Last 24 hours) at 9/5/2022 0435  Last data filed at 9/5/2022 0130  Gross per 24 hour   Intake 46.51 ml   Output 1200 ml   Net -1153.49 ml       Physical Exam  Vitals and nursing note reviewed.   Constitutional:       Appearance: He is ill-appearing.   HENT:      Head: Normocephalic and atraumatic.      Right Ear: External ear normal.      Left Ear: External ear normal.      Nose: Nose normal.      Mouth/Throat:      Pharynx: Oropharynx is clear.   Eyes:      Conjunctiva/sclera: Conjunctivae normal.   Cardiovascular:      Rate and Rhythm: Normal rate and regular rhythm.      Pulses: Normal pulses.      Heart sounds: Normal heart sounds.   Pulmonary:      Effort: Tachypnea present. No accessory muscle usage.      Comments: Coarse breath sounds throughout  Abdominal:      General: Abdomen is flat. Bowel sounds are normal. There is no distension.      Palpations: Abdomen is soft.      Tenderness: There is no abdominal tenderness.   Musculoskeletal:         General: No deformity.      Right forearm: Edema present.      Left forearm: Edema present.      Cervical back: Normal range of motion and neck supple.   Skin:     Comments: Diffuse mottling   Neurological:      General: No focal deficit present.      Mental Status: He is alert and oriented to person, place, and time.   Psychiatric:         Mood and Affect: Mood normal.         Behavior: Behavior normal.         Thought Content: Thought content normal.         Judgment:  Judgment normal.       Vents:  Oxygen Concentration (%): 50 (09/05/22 0400)  Lines/Drains/Airways       Peripheral Intravenous Line  Duration                  Peripheral IV - Single Lumen 09/04/22 20 G Anterior;Right Upper Arm 1 day         Peripheral IV - Single Lumen 09/04/22 0707 18 G Left Upper Arm <1 day                  Significant Labs:    CBC/Anemia Profile:  Recent Labs   Lab 09/04/22  0708 09/04/22 2145 09/05/22 0341   WBC 15.02*  --  13.89*   HGB 11.3*  --  10.3*   HCT 35.6* 33* 31.8*   *  --  417   MCV 91  --  91   RDW 21.0*  --  20.8*        Chemistries:  Recent Labs   Lab 09/04/22  0708 09/05/22 0341   * 134*   K 4.7 4.2    104   CO2 18* 18*   BUN 35* 29*   CREATININE 1.3 1.1   CALCIUM 9.1 8.4*   ALBUMIN 2.7* 2.5*   PROT 6.3 5.8*   BILITOT 0.9 0.8   ALKPHOS 229* 200*   ALT 49* 38   AST 60* 36   MG  --  2.1   PHOS  --  3.6       All pertinent labs within the past 24 hours have been reviewed.    Significant Imaging:  I have reviewed all pertinent imaging results/findings within the past 24 hours.      ABG  Recent Labs   Lab 09/04/22 2145   PH 7.316*   PO2 62*   PCO2 37.6   HCO3 19.2*   BE -7     Assessment/Plan:     Cardiac/Vascular  Atrial flutter  - previously on amio  - presented in aflutter with RVR, now s/p DCCV in ED with return to NSR  - PPM in place  - cards consulted for PPM interrogation   - formal echo pending for lead infection r/o    H/O mitral valve replacement with mechanical valve  - INR supratherapeutic on admit  - daily INR  - goal INR 2.5-3.5  - continue holding warfarin in case invasive procedures are needed for malignancy diagnosis  - consider starting therapeutic dose lovenox for anticoagulation tomorrow    Pseudoaneurysm of right femoral artery  - S/p embolization of right profunda femoral artery pseudoaneurysm with 5 mm coil 8/20    ID  * Sepsis with acute hypoxic respiratory failure  Patient with Hypoxic Respiratory failure which is Acute.  he is not on  home oxygen. Supplemental oxygen was provided and noted- Oxygen Concentration (%):  [0.5-100] 50.   Signs/symptoms of respiratory failure include- tachypnea, increased work of breathing and respiratory distress. Contributing diagnoses includes - CHF, Pleural effusion and Pneumonia Labs and images were reviewed. Patient Has recent ABG, which has been reviewed. Will treat underlying causes and adjust management of respiratory failure as follows-   --concern for post-obstructive pna  --f/u sputum cx  --continue vanc, cefepime, flagyl and azithro  --bipap alternating with comfort flow; wean as able    Sepsis  This patient does have evidence of infective focus  My overall impression is sepsis. Vital signs were reviewed and noted in progress note.  Antibiotics given-   Antibiotics (From admission, onward)    Start     Stop Route Frequency Ordered    09/04/22 2315  azithromycin 500 mg in dextrose 5 % 250 mL IVPB (ready to mix system)         -- IV Every 24 hours (non-standard times) 09/04/22 2236    09/04/22 2300  metroNIDAZOLE tablet 500 mg         -- Oral Every 8 hours 09/04/22 2255    09/04/22 2100  vancomycin 750 mg in dextrose 5 % 250 mL IVPB (ready to mix system)         -- IV Every 24 hours (non-standard times) 09/04/22 1537    09/04/22 1600  cefepime in dextrose 5 % IVPB 2 g         -- IV Every 24 hours (non-standard times) 09/04/22 1432        Cultures were taken-   Microbiology Results (last 7 days)     Procedure Component Value Units Date/Time    Blood culture x two cultures. Draw prior to antibiotics. [[id number]] Collected: 09/04/22 1136    Order Status: Completed Specimen: Blood from Peripheral, Upper Arm, Right Updated: 09/04/22 1915     Blood Culture, Routine No Growth to date    Narrative:      Aerobic and anaerobic    Culture, Respiratory with Gram Stain [[id number]]     Order Status: No result Specimen: Respiratory     Blood culture x two cultures. Draw prior to antibiotics. [[id number]] Collected: 09/04/22  0709    Order Status: Completed Specimen: Blood from Peripheral, Upper Arm, Left Updated: 09/04/22 1515     Blood Culture, Routine No Growth to date    Narrative:      Aerobic and anaerobic    Influenza A & B by Molecular [[id number]] Collected: 09/04/22 0657    Order Status: Completed Specimen: Nasopharyngeal Swab Updated: 09/04/22 0732     Influenza A, Molecular Negative     Influenza B, Molecular Negative     Flu A & B Source NP        Latest lactate reviewed, they are-  Recent Labs   Lab 09/04/22  0708 09/04/22  1136 09/05/22  0341   LACTATE 2.3* 2.4* 1.7       Organ dysfunction indicated by Acute kidney injury and Acute respiratory failure  Source- PNA    Source control Achieved by- broad spectrum abx  - imaging with diffusely infiltrative mediastinal mass with encasement of mediastinal vasculature and airways, L hilar/suprahilar extension with MARTHA mass, bilateral pleural effusion and diffuse panlobular emphysematous change  - contine vanc/cefepime/azithro/flagyl  - follow up sputum, blood cultures  - NIV, wean as tolerated    Oncology  Malignant neoplasm metastatic to bone  Previously admitted last month for R hip pain s/p fall, imaging showing extensive metastatic disease involving soft tissue above and below diaphragm particularly notable for extensive mediastinal involvement with encasement of bronchovascular structures as well as osseous disease.  Unclear etiology of primary malignancy, however MARTHA mass and L renal mass possible primaries.    Pathologic fracture of the right femoral head/neck, L1 vertebral body, and left 6th rib. Small to moderate pericardial effusion, likely malignant. Small left pleural effusion.     Heme/Onc consulted on admit and recommended MRI of brain to look for brain mets and none noted to brain itself but concerning osseous mets to skull itself. Bone scan done showed numerous tracer avid foci throughout the axial and appendicular skeleton consistent with metastatic disease. These  lesions correspond to mixed lytic and sclerotic lesions on same day CT chest abdomen pelvis. Heme/Onc recommended bone biopsy and done by Orthopedics on 8/23 for diagnosis as primary tumor unknown and pathology pending on discharge.    - palliative care consulted  - DNR/DNI status as per ACP conversations on admit       Endocrine  Body mass index (BMI) less than 19  - in setting of malignancy  - nutrition consulted    Orthopedic  Swelling of upper extremity  - noted on previous hospitalization, possibly 2/2 to vascular compression  - non-occlusive thrombus in L cephalic vein    Pathologic fracture of neck of right femur s/p hemiarthroplasty on 8/23/2022  - S/p R hip hemiarthroplasty with ablation, cementoplasty, and percutaneous fixation of pelvis 8/23.  - PT/OT consulted  - prn pain control    Palliative Care  ACP (advance care planning)  Advance Care Planning     Date: 09/04/2022    Code Status  In light of the patients advanced and life limiting illness, I engaged the the patient in a conversation about the patient's preferences for care  at the very end of life. The patient wishes to have a natural, peaceful death.  Along those lines, the patient does not wish to have CPR or other invasive treatments performed when his heart and/or breathing stops. I communicated to the patient that a DNR order would be placed in his medical record to reflect this preference.  I spent a total of 10 minutes engaging the patient in this advance care planning discussion.              Critical Care Daily Checklist:    A: Awake: RASS Goal/Actual Goal:    Actual: Garcia Agitation Sedation Scale (RASS): Alert and calm   B: Spontaneous Breathing Trial Performed?  n/a   C: SAT & SBT Coordinated?  n/a                      D: Delirium: CAM-ICU Overall CAM-ICU: Negative   E: Early Mobility Performed? Yes   F: Feeding Goal:    Status:     Current Diet Order   Procedures    Diet Cardiac      AS: Analgesia/Sedation Prn oxy, morphine &  ativan   T: Thromboembolic Prophylaxis scds   H: HOB > 300 Yes   U: Stress Ulcer Prophylaxis (if needed)    G: Glucose Control    B: Bowel Function     I: Indwelling Catheter (Lines & Rodgers) Necessity piv   D: De-escalation of Antimicrobials/Pharmacotherapies Vanc, cefepime, flagyl, azithro    Plan for the day/ETD tx resp failure, consultant recs    Code Status:  Family/Goals of Care: DNR  Family to be updated by day team     Patient to be evaluated by and further recommendations per Dr. Gonsalves.     Critical Care Time: 40 minutes  Critical secondary to Patient has a condition that poses threat to life and bodily function: respiratory requiring bipap and high comfort flow settings      Critical care was time spent personally by me on the following activities: development of treatment plan with patient or surrogate and bedside caregivers, discussions with consultants, evaluation of patient's response to treatment, examination of patient, ordering and performing treatments and interventions, ordering and review of laboratory studies, ordering and review of radiographic studies, pulse oximetry, re-evaluation of patient's condition. This critical care time did not overlap with that of any other provider or involve time for any procedures.     Cherry Vasquez NP  Critical Care Medicine  [facility] - Cardiac Medical ICU

## 2022-09-05 NOTE — PROGRESS NOTES
RN reported pt w/ increased anxiety this date, medication administered. RN requested OT to return 9/6/22.

## 2022-09-05 NOTE — HPI
Donis Jimenez is a 61-year-old matta with PMH significant for bacterial endocarditis, s/p AV and MV mechanical valve replacement (on warfarin), dual chamber PPM placed during perioperative MV/AV replacements per family, CKD, tobacco abuse with recent complicated hospital course who presented to the emergency department with shortness of breath.  Difficult for patient to provide history due to tachypnea; spouse at bedside assisting with history.     Patient was admitted 8/18-8/27/22 after sustaining a pathologic right femoral neck fracture and right femoral artery pseudoaneurysm from a fall at home. Patient taken to OR on 8/19 by Vascular Surgery and had embolization of 3rd order right profunda femoral artery pseudoaneurysm with N-BCA glue and 5mm coil aneurysm repair. Pt underwent ight hip hemiarthroplasty with ablation, cementoplasty, and percutaneous fixation of pelvis for pathologic fracture and metastatic disease with Ortho on 8/23. 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 Metastatic work-up done  with CT scan of chest/abdomen and pelvis showing extensive disease including mediastinal mass with encasement of mediastinal vascular structures and airways, MARTHA pulmonary mass, bilateral suprarenal and left renal masses, L1 pathologic fx and rib & skull metastatic disease.      In the ED patient was in a flutter and was cardioverted with some improvement in symptoms. He was found to have right sided pneumonia and was started on vanc and cefepime and was initially admitted to Hospital Medicine for further management, but was upgraded to MICU for worsening respiratory distress. Patient now DNR. On high amounts of O2 via CF. Cardiology was consulted given the patient was cardioverted. Tele review  with patient in NSR.

## 2022-09-05 NOTE — CARE UPDATE
Alternating between bipap and comfort flow. Radiation oncology consulted due to mass impinging on airway on CT chest. Awaiting pathology report. CTH ordered for anisocoria and recent hx of SDH. 1x lasix dose given for pleural effusions seen on CT. Palliative care consulted for ongoing goals of care.      RUY Bryant  Critical Care Medicine   09/05/2022  4:29 PM

## 2022-09-05 NOTE — SUBJECTIVE & OBJECTIVE
Review of Systems   Cardiovascular:  Positive for dyspnea on exertion. Negative for chest pain, irregular heartbeat and palpitations.   Respiratory:  Positive for shortness of breath.    Neurological:  Negative for light-headedness.   Objective:     Vital Signs (Most Recent):  Temp: 98.3 °F (36.8 °C) (09/04/22 1933)  Pulse: 95 (09/04/22 2216)  Resp: (!) 23 (09/04/22 2145)  BP: (!) 184/105 (09/04/22 2216)  SpO2: (!) 94 % (09/04/22 2216)   Vital Signs (24h Range):  Temp:  [97.6 °F (36.4 °C)-98.3 °F (36.8 °C)] 98.3 °F (36.8 °C)  Pulse:  [] 95  Resp:  [18-27] 23  SpO2:  [85 %-100 %] 94 %  BP: ()/() 184/105     Weight: 54 kg (119 lb)  Body mass index is 16.14 kg/m².     SpO2: (!) 94 %  O2 Device (Oxygen Therapy): BiPAP      Intake/Output Summary (Last 24 hours) at 9/4/2022 2237  Last data filed at 9/4/2022 1832  Gross per 24 hour   Intake 46.51 ml   Output 450 ml   Net -403.49 ml       Lines/Drains/Airways       Peripheral Intravenous Line  Duration                  Peripheral IV - Single Lumen 09/04/22 0707 18 G Left Upper Arm <1 day         Peripheral IV - Single Lumen 09/04/22 20 G Anterior;Right Upper Arm <1 day                    Physical Exam  Constitutional:       Appearance: Normal appearance. He is ill-appearing.   HENT:      Head: Normocephalic.      Mouth/Throat:      Mouth: Mucous membranes are moist.   Eyes:      Extraocular Movements: Extraocular movements intact.   Cardiovascular:      Rate and Rhythm: Normal rate and regular rhythm.      Pulses: Normal pulses.      Heart sounds: Normal heart sounds. No murmur heard.  Pulmonary:      Effort: Pulmonary effort is normal.      Breath sounds: Normal breath sounds.      Comments: Coarse breath sounds  Abdominal:      General: Abdomen is flat.   Musculoskeletal:         General: No swelling or tenderness. Normal range of motion.      Cervical back: Normal range of motion.   Skin:     General: Skin is warm.   Neurological:      Mental  Status: He is alert and oriented to person, place, and time. Mental status is at baseline.   Psychiatric:         Mood and Affect: Mood normal.         Behavior: Behavior normal.         Thought Content: Thought content normal.       Significant Labs: Blood Culture:   Recent Labs   Lab 09/04/22  0709 09/04/22  1136   LABBLOO No Growth to date  No Growth to date No Growth to date  No Growth to date   , BMP:   Recent Labs   Lab 09/04/22  0708 09/05/22  0341    104   * 134*   K 4.7 4.2    104   CO2 18* 18*   BUN 35* 29*   CREATININE 1.3 1.1   CALCIUM 9.1 8.4*   MG  --  2.1   , CMP   Recent Labs   Lab 09/04/22  0708 09/05/22  0341   * 134*   K 4.7 4.2    104   CO2 18* 18*    104   BUN 35* 29*   CREATININE 1.3 1.1   CALCIUM 9.1 8.4*   PROT 6.3 5.8*   ALBUMIN 2.7* 2.5*   BILITOT 0.9 0.8   ALKPHOS 229* 200*   AST 60* 36   ALT 49* 38   ANIONGAP 11 12   , and CBC   Recent Labs   Lab 09/04/22  0708 09/04/22  2145 09/05/22  0341   WBC 15.02*  --  13.89*   HGB 11.3*  --  10.3*   HCT 35.6*   < > 31.8*   *  --  417    < > = values in this interval not displayed.       Significant Imaging:   Imaging Results              US Upper Extremity Arteries Left (Final result)  Result time 09/05/22 03:41:48      Final result by Godfrey Worrell MD (09/05/22 03:41:48)                   Impression:      No hemodynamically significant stenosis demonstrated in the bilateral upper extremity arterial system.    Electronically signed by resident: Jean Dsouza  Date:    09/05/2022  Time:    03:25    Electronically signed by: Godfrey Worrell  Date:    09/05/2022  Time:    03:41               Narrative:    EXAMINATION:  US UPPER EXTREMITY ARTERIES RIGHT; US UPPER EXTREMITY ARTERIES LEFT    CLINICAL HISTORY:  r/o clot;    TECHNIQUE:  Bilateral upper extremity arterial duplex ultrasound examination performed. Multiple gray scale and color doppler images were obtained in addition to waveform  analysis.    COMPARISON:  Ultrasound upper extremity veins performed same day    FINDINGS:  RIGHT UPPER EXTREMITY:    Normal arterial waveforms are demonstrated.  No significant atherosclerotic plaque.    The peak systolic velocities on the right are as follows, in centimeters/second:    Subclavian artery: 62.7    Axillary artery: 58.4    Brachial artery, proximal: 50.3    Brachial artery, mid: 75.8    Brachial artery, distal: 66.5    Radial artery, proximal: 55.3    Radial artery, distal: 55.9    Ulnar artery, proximal: 73.9    Ulnar artery, distal: 41.2    LEFT UPPER EXTREMITY:    Normal arterial waveforms are demonstrated. No significant atherosclerotic plaque.    The peak systolic velocities on the left are as follows, in centimeters/second:    Subclavian artery: 52.2    Axillary artery: 50.3    Brachial artery, proximal: 67.7    Brachial artery, mid: 70.8    Brachial artery, distal: 59.6    Radial artery, proximal: 48.5    Radial artery, distal: 48.5    Ulnar artery, proximal: 48.5    Ulnar artery, distal: 60.9      Miscellaneous: N/A                                       US Upper Extremity Arteries Right (Final result)  Result time 09/05/22 03:41:48   Procedure changed from US Upper Extremity Arteries Bilateral     Final result by Godfrey Worrell MD (09/05/22 03:41:48)                   Impression:      No hemodynamically significant stenosis demonstrated in the bilateral upper extremity arterial system.    Electronically signed by resident: Jean Dsouza  Date:    09/05/2022  Time:    03:25    Electronically signed by: Godfrey Worrell  Date:    09/05/2022  Time:    03:41               Narrative:    EXAMINATION:  US UPPER EXTREMITY ARTERIES RIGHT; US UPPER EXTREMITY ARTERIES LEFT    CLINICAL HISTORY:  r/o clot;    TECHNIQUE:  Bilateral upper extremity arterial duplex ultrasound examination performed. Multiple gray scale and color doppler images were obtained in addition to waveform  analysis.    COMPARISON:  Ultrasound upper extremity veins performed same day    FINDINGS:  RIGHT UPPER EXTREMITY:    Normal arterial waveforms are demonstrated.  No significant atherosclerotic plaque.    The peak systolic velocities on the right are as follows, in centimeters/second:    Subclavian artery: 62.7    Axillary artery: 58.4    Brachial artery, proximal: 50.3    Brachial artery, mid: 75.8    Brachial artery, distal: 66.5    Radial artery, proximal: 55.3    Radial artery, distal: 55.9    Ulnar artery, proximal: 73.9    Ulnar artery, distal: 41.2    LEFT UPPER EXTREMITY:    Normal arterial waveforms are demonstrated. No significant atherosclerotic plaque.    The peak systolic velocities on the left are as follows, in centimeters/second:    Subclavian artery: 52.2    Axillary artery: 50.3    Brachial artery, proximal: 67.7    Brachial artery, mid: 70.8    Brachial artery, distal: 59.6    Radial artery, proximal: 48.5    Radial artery, distal: 48.5    Ulnar artery, proximal: 48.5    Ulnar artery, distal: 60.9      Miscellaneous: N/A                                       US Upper Extremity Veins Left (Final result)  Result time 09/05/22 03:36:30      Final result by Godfrey Worrell MD (09/05/22 03:36:30)                   Impression:      1. No thrombus in central veins of the right or left upper extremity.  2. Nonocclusive thrombus in the superficial left cephalic vein.  3. Heterogeneous, lobular mass lesion in the left neck, as above.    Electronically signed by resident: Jean Dsouza  Date:    09/05/2022  Time:    03:21    Electronically signed by: Godfrey Worrell  Date:    09/05/2022  Time:    03:36               Narrative:    EXAMINATION:  US UPPER EXTREMITY VEINS RIGHT; US UPPER EXTREMITY VEINS LEFT    CLINICAL HISTORY:  r/o clot;    TECHNIQUE:  Duplex and color flow Doppler evaluation and dynamic compression was performed of the right and left upper extremity  veins.    COMPARISON:  None    FINDINGS:  Right central veins: The internal jugular, subclavian, and axillary veins are patent and free of thrombus.    Right arm veins: The brachial, basilic, and cephalic veins are patent and compressible.    Left central veins: The internal jugular, subclavian, and axillary veins are patent and free of thrombus.    Left arm veins: There is a nonocclusive thrombosis in the left cephalic vein.  The brachial, and basilic veins are patent and compressible.    Miscellaneous: There is a heterogeneous, non vascularized lobular lesion measuring 4.2 x 5.1 x 4.3 cm in the left neck.  This correlates with the abnormality noted on the CT examination of the chest September 4, 2022                                       US Upper Extremity Veins Right (Final result)  Result time 09/05/22 03:36:30   Procedure changed from US Upper Extremity Veins Bilateral     Final result by Godfrey Worrell MD (09/05/22 03:36:30)                   Impression:      1. No thrombus in central veins of the right or left upper extremity.  2. Nonocclusive thrombus in the superficial left cephalic vein.  3. Heterogeneous, lobular mass lesion in the left neck, as above.    Electronically signed by resident: Jean Dsouza  Date:    09/05/2022  Time:    03:21    Electronically signed by: Godfrey Worrell  Date:    09/05/2022  Time:    03:36               Narrative:    EXAMINATION:  US UPPER EXTREMITY VEINS RIGHT; US UPPER EXTREMITY VEINS LEFT    CLINICAL HISTORY:  r/o clot;    TECHNIQUE:  Duplex and color flow Doppler evaluation and dynamic compression was performed of the right and left upper extremity veins.    COMPARISON:  None    FINDINGS:  Right central veins: The internal jugular, subclavian, and axillary veins are patent and free of thrombus.    Right arm veins: The brachial, basilic, and cephalic veins are patent and compressible.    Left central veins: The internal jugular, subclavian, and axillary veins are  patent and free of thrombus.    Left arm veins: There is a nonocclusive thrombosis in the left cephalic vein.  The brachial, and basilic veins are patent and compressible.    Miscellaneous: There is a heterogeneous, non vascularized lobular lesion measuring 4.2 x 5.1 x 4.3 cm in the left neck.  This correlates with the abnormality noted on the CT examination of the chest September 4, 2022                                        CTA Chest Non Coronary (Final result)  Result time 09/04/22 16:54:09      Final result by Emil Echevarria MD (09/04/22 16:54:09)                   Impression:      1. Diffusely infiltrative mediastinal mass with encasement of mediastinal vascular structures and airways, detailed above.  There is asymmetric left hilar and suprahilar extension with large left upper lobe mass.  2. Compared to recent above CT, enlarging moderate volume left-sided pleural effusion and new moderate volume right-sided pleural effusion.  3. Extensive panlobular emphysematous change with progressive increased attenuation throughout both lungs, and new right upper and middle lobe ground-glass opacities.  4. Bilateral suprarenal masses, fully characterized on recent prior CT.  5. Pathologic fracture of the L1 vertebral body.  6. Additional findings above.  This report was flagged in Epic as abnormal.      Electronically signed by: Emil Echevarria MD  Date:    09/04/2022  Time:    16:54               Narrative:    EXAMINATION:  CTA CHEST NON CORONARY    CLINICAL HISTORY:  Lung/mediastinal abscess;    TECHNIQUE:  Low dose axial images, sagittal and coronal reformations were obtained from the thoracic inlet to the lung bases following the IV administration of 100 mL of Omnipaque 350.  Contrast timing was optimized to evaluate the pulmonary arteries.  MIP images were performed.    COMPARISON:  Chest radiograph 09/04/2022, CT chest abdomen pelvis 08/18/2022    FINDINGS:  Right-sided pacer device with transvenous leads  extend to the heart.  Approximately 4.8 cm left lower cervical soft tissue mass, partially obscured by beam hardening artifact from dense contrast bolus in the left subclavian vein and right chest wall pacer device.  There is encasement and narrowing of the left subclavian artery.    Left-sided nonaneurysmal aortic arch with mild calcific atherosclerosis.  Operative change of aortic and mitral valve repair.  Heart is normal size.  No definite pulmonary arterial filling defect to the level of the segmental arteries. Narrowing of the left upper lobe pulmonary arteries.    There is a large heterogeneous mediastinal mass with extensive mediastinal invasion measuring on the order of 11.2 x 9.8 cm which circumferentially encases the aortic arch as well as the origins of the brachiocephalic, left carotid artery, and proximal right pulmonary artery.  Encasement with severe narrowing of the left superior pulmonary vein.  Encasement with associated narrowing of the trachea.  Lesion involves the bilateral domonique, more so on the left with encasement of the left hilar bronchovascular structures and suprahilar extension to the left lung apex.    Compared to CT 08/18/2022, there is enlarging now moderate volume left-sided pleural effusion, and new moderate volume right-sided pleural effusion with associated compressive atelectasis of the adjacent lung parenchyma.  Extensive bilateral panlobular emphysematous change with progressive increased attenuation throughout both lungs and superimposed right upper and middle lobe ground-glass opacities.  Bilateral tubular bronchiectasis and peribronchial cuffing.  No pneumothorax.    Limited evaluation of the abdomen reveals bilateral suprarenal masses measuring up to 2.4 cm on the right and 2.3 cm on the left, fully characterized on prior above comparison CT.    Operative change of median sternotomy.  Stable L1 compression fracture.  Lytic destructive lesion of the left lateral 6th rib.       "                                 X-Ray Chest AP Portable (Final result)  Result time 09/04/22 07:54:43      Final result by Karson Rios MD (09/04/22 07:54:43)                   Impression:      In this patient with known lung and mediastinal mass, there are worsening bilateral interstitial opacities and worsening bilateral pleural effusions (left side greater than right) when compared with 08/18/2022.      Electronically signed by: Karson Rios MD  Date:    09/04/2022  Time:    07:54               Narrative:    EXAMINATION:  XR CHEST AP PORTABLE    CLINICAL HISTORY:  Provided history is "Sepsis;  ".    TECHNIQUE:  One view of the chest.    COMPARISON:  CT chest, 08/18/2022.  Chest radiograph, 08/18/2022.    FINDINGS:  Cardiomediastinal silhouette is stable, with widening of the superior mediastinum consistent with known mediastinal mass as seen on prior CT.  Persistent opacification in the left upper lung likely related to mass or consolidation as seen previously.  Moderate-sized left pleural effusion and small right pleural effusion.  Patchy interstitial opacities throughout both lungs.  No distinct pneumothorax.  Pulmonary emphysema is suspected.  Postoperative changes of prior median sternotomy and valve replacement.  Right chest wall cardiac pacing device is present.  Left 6th rib fracture is noted, though better evaluated on prior CT.                                      "

## 2022-09-05 NOTE — HOSPITAL COURSE
Initially admitted to [facility] 9/4 for worsening hypoxemic respiratory failure 2/2 PNA in setting of lung cancer with extensive metastatic disease. [facility] discussed with Pt and  who agree that they would not want extreme measures such as CPR and mechanical ventilation given overall poor prognosis. Oxygen requirements improved and Pt stepped down to oncology. Received inpatient chemotherapy. Had BL pleural effusions; chest tube placed and later removed on 9/14. Stepped back up to ICU on 9/16 for increasing oxygen requirements - went from NC to non-breather. CTA chest negative for PE, showed ongoing RLL consolidation, and BL pleural effusions which are stable. Bedside US BL did not show significant pleural effusion that can be tapped. Pt was initially desatting when taken off of bipap but was successfully weaned to HFNC saturating 88-92%. Started on broad spectrum abx and steroids for PNA. INR therapeutic on 9/17 so further anticoagulation held. Supratherapeutic from 9/18 without obvious source of bleed so will continue to hold AC. Held amiodarone as Pt has been in NSR and amiodarone combined with abx likely contributing to subtherapeutic INR. Had issues weaning from bipap on morning of 9/19; changed to IV solumedrol and Q4h duonebs. Repeat CXR without significant change. Respiratory status declined on 9/21 - saturating well on HFNC but had more agonal breathing concerning for acidosis. Switched to bipap and given a dose of 40mg IV lasix however respiratory status did not improve. His mentation is also worsening. Spoke with  at bedside at advised him that this deterioration will likely lead to him passing. Pt passed at 18:26 on 9/21/22.

## 2022-09-05 NOTE — CARE UPDATE
RAPID RESPONSE NURSE PROACTIVE ROUNDING NOTE       Time of Visit:     Admit Date: 2022  LOS: 1  Code Status: DNR   Date of Visit: 2022  : 1960  Age: 61 y.o.  Sex: male  Race: White  Bed: 6974/6092 A:   MRN: [id number]  Was the patient discharged from an ICU this admission? No   Was the patient discharged from a PACU within last 24 hours? No   Did the patient receive conscious sedation/general anesthesia in last 24 hours? No  Was the patient in the ED within the past 24 hours? Yes  Was the patient on NIPPV within the past 24 hours? Yes   Attending Physician: Brannon Gonsalves MD  Primary Service: [facility] MED D   Time spent at the bedside: 45 - 60 min    SITUATION    Notified by [facility] patient alert.  Reason for alert: low SPO2  Called to evaluate the patient for Respiratory    BACKGROUND     Why is the patient in the hospital?: SOB (shortness of breath)    Patient has a past medical history of Endocarditis, Pacemaker, and Pneumonia.    Last Vitals:  Temp: 97 °F (36.1 °C) (2347)  Pulse: 85 (2347)  Resp: 22 (2347)  BP: 158/94 (2347)  SpO2: 99 % (2347)    24 Hours Vitals Range:  Temp:  [97 °F (36.1 °C)-98.3 °F (36.8 °C)]   Pulse:  []   Resp:  [18-27]   BP: ()/()   SpO2:  [85 %-100 %]     Labs:  Recent Labs     22  0708 22   WBC 15.02*  --    HGB 11.3*  --    HCT 35.6* 33*   *  --        Recent Labs     22  0708   *   K 4.7      CO2 18*   CREATININE 1.3           Recent Labs     22  1141 22   PH 7.280* 7.316*   PCO2 46.4* 37.6   PO2 22* 62*   HCO3 21.8* 19.2*   POCSATURATED 30* 89*   BE -5 -7        ASSESSMENT    Physical Exam  Constitutional:       General: He is awake.      Appearance: He is underweight. He is ill-appearing.   HENT:      Head: Normocephalic.   Cardiovascular:      Rate and Rhythm: Normal rate.      Pulses: Normal pulses.           Radial pulses are 2+ on the right  side and 2+ on the left side.        Dorsalis pedis pulses are 2+ on the right side and 2+ on the left side.      Heart sounds: Normal heart sounds, S1 normal and S2 normal.   Pulmonary:      Effort: Tachypnea, accessory muscle usage and respiratory distress present.      Comments: Course Breath sounds    Abdominal:      General: Abdomen is flat. Bowel sounds are normal.      Palpations: Abdomen is soft.      Tenderness: There is no abdominal tenderness.   Musculoskeletal:      Cervical back: Normal range of motion.      Right lower leg: No edema.      Left lower leg: No edema.   Neurological:      Mental Status: He is alert and oriented to person, place, and time.      GCS: GCS eye subscore is 4. GCS verbal subscore is 5. GCS motor subscore is 6.      Sensory: Sensation is intact.      Motor: Motor function is intact.   Psychiatric:         Attention and Perception: Attention normal.         Mood and Affect: Mood normal.         Speech: Speech normal.         Behavior: Behavior normal. Behavior is cooperative.       INTERVENTIONS    The patient was seen for Respiratory problem. Staff concerns included oxygen saturation < 90% despite supplemental oxygen and increased WOB. The following interventions were performed: supplemental oxygen, POCT arterial blood gas , portable chest x-ray, and consult critical care.    RECOMMENDATIONS    Pt found sitting upright on stretcher s/o at bedside, RR 25-30 with deep labored breathing SPO2 80% on comfort flow 30Liters at 45%.  Titrated up unitl reached 45L 100% with SPO2 90-92%, ABG obtained, critical care consulted.  Recommend comfort flow/BiPAP to maintain SPO2 and Tx to MICU.       PROVIDER ESCALATION    Yes/No  yes    Orders received and case discussed with  Cherry Vasquez NP.    Disposition: Tx in ICU bed 6092.    FOLLOW-UP    Charge Angelita OREILLY  updated on plan of care. Instructed to call the Rapid Response Nurse, Aston Lewis RN at [phone] for additional questions or  concerns.

## 2022-09-05 NOTE — ASSESSMENT & PLAN NOTE
This patient does have evidence of infective focus  My overall impression is sepsis. Vital signs were reviewed and noted in progress note.  Antibiotics given-   Antibiotics (From admission, onward)    Start     Stop Route Frequency Ordered    09/04/22 2315  azithromycin 500 mg in dextrose 5 % 250 mL IVPB (ready to mix system)         -- IV Every 24 hours (non-standard times) 09/04/22 2236    09/04/22 2300  metroNIDAZOLE tablet 500 mg         -- Oral Every 8 hours 09/04/22 2255    09/04/22 2100  vancomycin 750 mg in dextrose 5 % 250 mL IVPB (ready to mix system)         -- IV Every 24 hours (non-standard times) 09/04/22 1537    09/04/22 1600  cefepime in dextrose 5 % IVPB 2 g         -- IV Every 24 hours (non-standard times) 09/04/22 1432        Cultures were taken-   Microbiology Results (last 7 days)     Procedure Component Value Units Date/Time    Blood culture x two cultures. Draw prior to antibiotics. [[id number]] Collected: 09/04/22 1136    Order Status: Completed Specimen: Blood from Peripheral, Upper Arm, Right Updated: 09/04/22 1915     Blood Culture, Routine No Growth to date    Narrative:      Aerobic and anaerobic    Culture, Respiratory with Gram Stain [[id number]]     Order Status: No result Specimen: Respiratory     Blood culture x two cultures. Draw prior to antibiotics. [[id number]] Collected: 09/04/22 0709    Order Status: Completed Specimen: Blood from Peripheral, Upper Arm, Left Updated: 09/04/22 1515     Blood Culture, Routine No Growth to date    Narrative:      Aerobic and anaerobic    Influenza A & B by Molecular [[id number]] Collected: 09/04/22 0657    Order Status: Completed Specimen: Nasopharyngeal Swab Updated: 09/04/22 0732     Influenza A, Molecular Negative     Influenza B, Molecular Negative     Flu A & B Source NP        Latest lactate reviewed, they are-  Recent Labs   Lab 09/04/22  0708 09/04/22  1136 09/05/22  0341   LACTATE 2.3* 2.4* 1.7       Organ dysfunction indicated by Acute  kidney injury and Acute respiratory failure  Source- PNA    Source control Achieved by- broad spectrum abx  - imaging with diffusely infiltrative mediastinal mass with encasement of mediastinal vasculature and airways, L hilar/suprahilar extension with MARTHA mass, bilateral pleural effusion and diffuse panlobular emphysematous change  - contine vanc/cefepime/azithro/flagyl  - follow up sputum, blood cultures  - NIV, wean as tolerated

## 2022-09-05 NOTE — ASSESSMENT & PLAN NOTE
- noted on previous hospitalization, possibly 2/2 to vascular compression  - non-occlusive thrombus in L cephalic vein

## 2022-09-05 NOTE — ASSESSMENT & PLAN NOTE
- noted on previous hospitalization, possibly 2/2 to vascular compression  - bilateral upper extremity u/s pending

## 2022-09-05 NOTE — HOSPITAL COURSE
HPI: Donis Jimenez is a 61-year-old matta with PMH significant for bacterial endocarditis, s/p AV and MV mechanical valve replacement (on warfarin), CKD, tobacco abuse with recent complicated hospital course who presents to the emergency department with shortness of breath    Pt had AV and MV replacement, pacemaker and hip surgery 1 week ago. ***

## 2022-09-05 NOTE — NURSING
Pt cardiac monitor showed 13-beat run of v-tach. Team notified. Labs collected and sent and EKG sent to [facility]. Pt vitals currently stable and pt awake and alert.

## 2022-09-05 NOTE — ASSESSMENT & PLAN NOTE
Previously admitted last month for R hip pain s/p fall, imaging showing extensive metastatic disease involving soft tissue above and below diaphragm particularly notable for extensive mediastinal involvement with encasement of bronchovascular structures as well as osseous disease.  Unclear etiology of primary malignancy, however MARTHA mass and L renal mass possible primaries.    Pathologic fracture of the right femoral head/neck, L1 vertebral body, and left 6th rib. Small to moderate pericardial effusion, likely malignant. Small left pleural effusion.     Heme/Onc consulted on admit and recommended MRI of brain to look for brain mets and none noted to brain itself but concerning osseous mets to skull itself. Bone scan done showed numerous tracer avid foci throughout the axial and appendicular skeleton consistent with metastatic disease. These lesions correspond to mixed lytic and sclerotic lesions on same day CT chest abdomen pelvis. Heme/Onc recommended bone biopsy and done by Orthopedics on 8/23 for diagnosis as primary tumor unknown and pathology pending on discharge.    - palliative care consulted  - DNR status as per ACP conversations on admit

## 2022-09-06 PROBLEM — Z71.89 ADVANCE CARE PLANNING: Status: ACTIVE | Noted: 2022-01-01

## 2022-09-06 PROBLEM — R53.81 DEBILITY: Status: ACTIVE | Noted: 2022-01-01

## 2022-09-06 PROBLEM — L30.8 DERMATITIS ASSOCIATED WITH MOISTURE: Status: ACTIVE | Noted: 2022-01-01

## 2022-09-06 PROBLEM — Z51.5 PALLIATIVE CARE ENCOUNTER: Status: ACTIVE | Noted: 2022-01-01

## 2022-09-06 PROBLEM — J90 EXUDATIVE PLEURAL EFFUSION: Status: ACTIVE | Noted: 2022-01-01

## 2022-09-06 PROBLEM — Z71.89 GOALS OF CARE, COUNSELING/DISCUSSION: Status: ACTIVE | Noted: 2022-01-01

## 2022-09-06 NOTE — CONSULTS
[facility] - Cardiac Medical ICU  Skin Integrity YASEMIN  Consult Note    Patient Name: Donis Jimenez  MRN: [id number]  Admission Date: 9/4/2022  Hospital Length of Stay: 2 days  Attending Physician: Brannon Gonsalves MD  Primary Care Provider: Elio Farias MD     Consults  Subjective:     History of Present Illness:  Donis Jimenez is a 61 year old mael with PMH significant for bacterial endocarditis, s/p AV and MV mechanical valve replacement (on warfarin), CKD, tobacco abuse with recent complicated hospital course who presents to the emergency department with shortness of breath.  Difficult for patient to provide history due to tachypnea; spouse at bedside assisting with history.     Patient with a flutter in emergency department.  He was cardioverted with return to normal sinus rhythm and improvement of shortness of breath.  While he is still tachypneic following restoration of normal sinus rhythm, per spouse, this is essentially his recent baseline. Skin integrity YASEMIN consulted for evaluation skin injuries to sacrum.      Scheduled Meds:   albuterol-ipratropium  3 mL Nebulization Q6H    azithromycin  500 mg Intravenous Q24H    cefTRIAXone (ROCEPHIN) IVPB  2 g Intravenous Q24H    EScitalopram oxalate  20 mg Oral QHS    ferrous gluconate  324 mg Oral Daily    LIDOcaine HCL 10 mg/ml (1%)        methylPREDNISolone sodium succinate injection  60 mg Intravenous Q12H    pantoprazole  40 mg Oral Daily    pravastatin  40 mg Oral QHS    pregabalin  75 mg Oral BID    sodium chloride 3%  4 mL Nebulization Q4H WAKE    vitamin D  1,000 Units Oral Daily     Continuous Infusions:   heparin (porcine) in D5W       PRN Meds:sodium chloride, acetaminophen, labetaloL, LORazepam, melatonin, methocarbamoL, midazolam, morphine, ondansetron, oxyCODONE, sodium chloride 0.9%, sumatriptan    Review of patient's allergies indicates:  No Known Allergies     Past Medical History:   Diagnosis Date    Endocarditis     Pacemaker      Pneumonia      Past Surgical History:   Procedure Laterality Date    ANGIOGRAPHY OF LOWER EXTREMITY Right 8/19/2022    Procedure: ANGIOGRAM, LOWER EXTREMITY;  Surgeon: Thomas Nicolas MD;  Location: [facility] OR [facility] FLR;  Service: Peripheral Vascular;  Laterality: Right;  30.0 min  315.10 mGy  26.1755 Gycm2  84 ml dye    HIP RESURFACING Right 8/23/2022    Procedure: cemontoplasty;  Surgeon: Yung Flores MD;  Location: [facility] OR 2ND FLR;  Service: Orthopedics;  Laterality: Right;    RADIOFREQUENCY ABLATION, BONE, PERCUTANEOUS Right 8/23/2022    Procedure: RADIOFREQUENCY ABLATION,BONE;  Surgeon: Yung Flores MD;  Location: [facility] OR 2ND FLR;  Service: Orthopedics;  Laterality: Right;    REPAIR, PSEUDOANEURYSM, ARTERY, FEMORAL Right 8/19/2022    Procedure: REPAIR, PSEUDOANEURYSM, ARTERY, FEMORAL;  Surgeon: Thomas Nicolas MD;  Location: [facility] OR [facility] FLR;  Service: Peripheral Vascular;  Laterality: Right;  R PFA (3rd branch) pseudoaneurysm        Family History    None       Tobacco Use    Smoking status: Every Day     Packs/day: 1.00     Types: Cigarettes    Smokeless tobacco: Never   Substance and Sexual Activity    Alcohol use: Not on file    Drug use: Not on file    Sexual activity: Not on file     Review of Systems   Skin:  Positive for wound.     Objective:     Vital Signs (Most Recent):  Temp: 97.7 °F (36.5 °C) (09/06/22 1157)  Pulse: 90 (09/06/22 1404)  Resp: 20 (Simultaneous filing. User may not have seen previous data.) (09/06/22 1404)  BP: 130/76 (09/06/22 1300)  SpO2: 96 % (09/06/22 1404) Vital Signs (24h Range):  Temp:  [94.8 °F (34.9 °C)-97.7 °F (36.5 °C)] 97.7 °F (36.5 °C)  Pulse:  [77-96] 90  Resp:  [15-41] 20  SpO2:  [92 %-98 %] 96 %  BP: ()/(58-86) 130/76     Weight: 57.2 kg (126 lb 1.7 oz)  Body mass index is 18.62 kg/m².  Physical Exam  Constitutional:       Appearance: Normal appearance.   Skin:     General: Skin is warm and dry.      Findings: Lesion present.   Neurological:       Mental Status: He is alert.       Laboratory:  All pertinent labs reviewed within the last 24 hours.    Diagnostic Results:  None        Assessment/Plan:          YASEMIN Skin Integrity Evaluation    Skin Integrity YASEMIN evaluation of patient as part of the comprehensive skin care team.   He has been admitted for 2 days. Skin injury was noted on 8/24/22. POA yes.    Sacrum            Dermatitis associated with moisture  - consult received for evaluation of skin breakdown.  - pt admitted with sob.  - previous injury to sacral/buttocks area.  - lesions with pink wound base likely related to moisture dermatitis.  - continue Triad bid/prn soiling.  - waffle overlay ordered for alec surface.  - bilateral heels red but blanchable. D/w primary RN at bedside. Heel foams recommended.  - pt encouraged to turn q2h.  - nursing to maintain pressure injury prevention measures and continue wound care per orders.        Thank you for your consult. I will follow-up with patient. Please contact us if you have any additional questions.    Radha Caba NP  Skin Integrity YASEMIN  [facility] - Cardiac Medical ICU

## 2022-09-06 NOTE — ASSESSMENT & PLAN NOTE
This patient does have evidence of infective focus  My overall impression is sepsis. Vital signs were reviewed and noted in progress note.  Antibiotics given-   Antibiotics (From admission, onward)    Start     Stop Route Frequency Ordered    09/05/22 1500  cefTRIAXone (ROCEPHIN) 2 g/50 mL D5W IVPB         -- IV Every 24 hours (non-standard times) 09/05/22 1358    09/04/22 2315  azithromycin 500 mg in dextrose 5 % 250 mL IVPB (ready to mix system)         09/07 2314 IV Every 24 hours (non-standard times) 09/04/22 2236        Cultures were taken-   Microbiology Results (last 7 days)     Procedure Component Value Units Date/Time    Blood culture x two cultures. Draw prior to antibiotics. [[id number]] Collected: 09/04/22 1136    Order Status: Completed Specimen: Blood from Peripheral, Upper Arm, Right Updated: 09/05/22 1212     Blood Culture, Routine No Growth to date      No Growth to date    Narrative:      Aerobic and anaerobic    Blood culture x two cultures. Draw prior to antibiotics. [[id number]] Collected: 09/04/22 0709    Order Status: Completed Specimen: Blood from Peripheral, Upper Arm, Left Updated: 09/05/22 1012     Blood Culture, Routine No Growth to date      No Growth to date    Narrative:      Aerobic and anaerobic    Culture, Respiratory with Gram Stain [[id number]]     Order Status: No result Specimen: Respiratory     Influenza A & B by Molecular [[id number]] Collected: 09/04/22 0657    Order Status: Completed Specimen: Nasopharyngeal Swab Updated: 09/04/22 0732     Influenza A, Molecular Negative     Influenza B, Molecular Negative     Flu A & B Source NP        Latest lactate reviewed, they are-  Recent Labs   Lab 09/04/22  0708 09/04/22  1136 09/05/22  0341   LACTATE 2.3* 2.4* 1.7       Organ dysfunction indicated by Acute kidney injury and Acute respiratory failure  Source- PNA    Source control Achieved by- broad spectrum abx  - imaging with diffusely infiltrative mediastinal mass with encasement of  mediastinal vasculature and airways, L hilar/suprahilar extension with MARTHA mass, bilateral pleural effusion and diffuse panlobular emphysematous change  - contine ceftriaxone/azithromycin and prednisone for CAP   - follow up sputum, blood cultures  - alternating bipap and comfort flow

## 2022-09-06 NOTE — TELEPHONE ENCOUNTER
WE SPOKE  Aston called to state that Donis is in ICU & he was told he may die   so sorry to hear that   I will  inform Dr Flores

## 2022-09-06 NOTE — ASSESSMENT & PLAN NOTE
Patient with Hypoxic Respiratory failure which is Acute.  he is not on home oxygen. Supplemental oxygen was provided and noted- Oxygen Concentration (%):  [50-90] 60.   Signs/symptoms of respiratory failure include- tachypnea, increased work of breathing and respiratory distress. Contributing diagnoses includes - CHF, Pleural effusion and Pneumonia Labs and images were reviewed. Patient Has recent ABG, which has been reviewed. Will treat underlying causes and adjust management of respiratory failure as follows-     CT showed mediastinal mass with encasement of mediastinal structures, moderate pleural effusions, emphysematous changes and ground glass opacities.     -continue ceftraixone/azithromycin for CAP  -med onc and rad onc following   -malignancy likely contributing to dyspnea and hypoxia -- alternating bipap and comfort flow, morphine 2mg ordered for dsypnea   -plan for bedside chest tube today for moderate pleural effusions seen on CT. FFP given for elevated INR.

## 2022-09-06 NOTE — ASSESSMENT & PLAN NOTE
S/p R hip hemiarthroplasty with ablation, cementoplasty, and percutaneous fixation of pelvis 8/23.    - PT/OT consulted  - prn pain control

## 2022-09-06 NOTE — PLAN OF CARE
Recommendations     Liberalize diet to Regular. Continue to encourage PO intake.  RD to monitor & follow-up.     Goals: Meet % EEN, EPN by RD f/u date  Nutrition Goal Status: new  Communication of RD Recs: reviewed with RN

## 2022-09-06 NOTE — ASSESSMENT & PLAN NOTE
"Impression:   Pt is a 62 y/o male with PMH significant for pacemaker, at least 40 years of tobacco abuse and worsening rip hip pain found to have right femoral neck fracture, a large mediastinal mass with encasement of bronchovascular structures, pleural effusions and diffuse bone lesions on NM bone scan. Pt has metastatic lung cancer. Pt is alert, oriented to person, place, and situation. Pt is a DNR. Pt is on high flow O2 @ 40 L 60%.     Reason for consult: GOC/ACP. Communicated with MAJOR Camp fellow with [facility].     Goals of care/ACP:   Introduced role of Palliative care to pt.   Met with pt who is aware of his medical issues. Per pt he is aware he has metastatic cancer. Pt is aware that any therapies/procedures offered to him is palliative and will not cure cancer. Per pt, he is still trying to come to  with his dx. He learned about his issues in August. Pt reports goal at time is to see if anything can be done to help with symptoms and "give him some more time." Spoke to pt about amount of O2 he is on at this time. He verbalized understanding.  Per pt he has spoken to Oncology and XRT MDs.   CTS has been consulted concerning tracheal stenting.     Pt open to continued visits with [facility] care for care planning and symptom management needs.  Support given pt.     Pt reports he is legally  to Aston Collins and he would want him to be hi medical decision-maker if he cannot make his own medical decisions. MPOA paperwork left with pt. Education provided. Per Pt, Aston works during day but can be reached by phone.     Code status: DNR.     Symptom management:     Dyspnea r/t to mediastinal mass, pleural effusion.   Pt is on high flow O@ per NC 40 L @ 60%.  Pt is on Morphine 2 mg IVP q 2 hrs prn.     Pt reports Morphine "has really helps with my SOB."  Pt reports Morphine lasts around 3- 4 hours.     Continue Morphine 2 mg IVP q 2 hours prn.   Will continue to assess.     Debility r/t to pathological femoral neck " fracture s/p ight hip hemiarthroplasty with ablation, cementoplasty  Pt was using walker prior to admit.   Would resume PT/OT when pt stable to participate.     Anxiety r/t to new dx and dyspnea.   Pt has Ativan 1 mg tid prn.   Continue.     Plan:   Will continue to meet with pt to reinforce realistic expectations/assit with GOC.   Will follow.

## 2022-09-06 NOTE — ASSESSMENT & PLAN NOTE
- consult received for evaluation of skin breakdown.  - pt admitted with sob.  - previous injury to sacral/buttocks area.  - lesions with pink wound base likely related to moisture dermatitis.  - continue Triad bid/prn soiling.  - waffle overlay ordered for alec surface.  - bilateral heels red but blanchable. D/w primary RN at bedside. Heel foams recommended.  - pt encouraged to turn q2h.  - nursing to maintain pressure injury prevention measures and continue wound care per orders.

## 2022-09-06 NOTE — PT/OT/SLP PROGRESS
Physical Therapy      Patient Name:  Donis Jimenez   MRN:  [id number]    Patient not seen today secondary to patient unavailable on 4 attempts this date. On first attempt, patient finishing breakfast and requested PT to return later. On later attempts, echo at bedside, or patient receiving nursing care. Indicated in AM attempt he would like to brush his teeth. PT orders received and acknowledged. Will follow-up as appropriate.

## 2022-09-06 NOTE — EICU
Rounding (Video Assessment):  Yes    Intervention Initiated From:  Bedside    Shaheed Communicated with Bedside Nurse regarding:  Time-Out    Nurse Notified: Royce Murray NP  Yes    Doctor Notified: Privileges verified on Dr. RONALD Gonsalves  Yes    Comments: Time out completed with OSCAR Murray NP  1246 ARea marked lateral side  1248 Area cleansed with chlora prep  1250 Sterile drape placed over area  1253 Versed 2 mg IVP per RN  1254 Local 1% Lidocaine given in marked area per Dr. Gonsalves  1256 Needle inserted with negative pressure on syringe, removed then injected 1% Lidocaine  1257 Needle inserted. Fluid in syringe, syringe removed. Guide wire advanced through needle then needle removed, Holding wire secure, incision made at entry site. Dilator inserted holding guide wire secure  1300 CT inserted 14 Pakistani/15 cm. Obtained 2 containers of pleural effusion.   1302 CT sutured down. Patient tolerated well.   1304 Specimen obtained, serous drainage noted.   1306 Connected to 20 cm suction.   1307 Gauze dressing applied to site, then covered with large tega derm. Patient tolerated well. Portable CXR ordered.

## 2022-09-06 NOTE — HPI
Pt is a 61-year-old male with PMH significant for bacterial endocarditis, s/p AV and MV mechanical valve replacement (on warfarin), CKD, tobacco abuse with recent complicated hospital course who presented to the emergency department with shortness of breath.  Difficult for patient to provide history due to tachypnea; spouse at bedside assisting with history.      Per chart review, patient was admitted 8/18-8/27/22 after sustaining a pathologic right femoral neck fracture and right femoral artery pseudoaneurysm from a fall at home. Patient taken to OR on 8/19 by Vascular Surgery and had embolization of 3rd order right profunda femoral artery pseudoaneurysm with N-BCA glue and 5mm coil aneurysm repair. Pt underwent right hip hemiarthroplasty with ablation, cementoplasty, and percutaneous fixation of pelvis for pathologic fracture and metastatic disease with Ortho on 8/23. 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 Metastatic work-up done  with CT scan of chest/abdomen and pelvis showing extensive disease including mediastinal mass with encasement of mediastinal vascular structures and airways, MARTHA pulmonary mass, bilateral suprarenal and left renal masses, L1 pathologic fx and rib & skull metastatic disease.      Per chart review, pt's spouse stated that pt had been ambulatory with a walker post-discharge, without c/o SOB, lightheadedness or dizziness. SOB started ~ 9/2 with productive cough. He denies fever/chills, chest pain, abd pain, N/V/D.      In the ED patient was in a flutter and was cardioverted with some improvement in symptoms. He was found to have right sided pneumonia and was started on vanc and cefepime and admitted to Hospital Medicine for further management.      During his time  in the ED, the patient had escalating FiO2 requirements, now on 45L 100% comfort flow.      Critical Care Medicine was consulted for worsening hypoxemic respiratory failure    Pt is DNR. Pt on 40 L  @ 60 %

## 2022-09-06 NOTE — SUBJECTIVE & OBJECTIVE
Scheduled Meds:   albuterol-ipratropium  3 mL Nebulization Q6H    azithromycin  500 mg Intravenous Q24H    cefTRIAXone (ROCEPHIN) IVPB  2 g Intravenous Q24H    EScitalopram oxalate  20 mg Oral QHS    ferrous gluconate  324 mg Oral Daily    LIDOcaine HCL 10 mg/ml (1%)        methylPREDNISolone sodium succinate injection  60 mg Intravenous Q12H    pantoprazole  40 mg Oral Daily    pravastatin  40 mg Oral QHS    pregabalin  75 mg Oral BID    sodium chloride 3%  4 mL Nebulization Q4H WAKE    vitamin D  1,000 Units Oral Daily     Continuous Infusions:   heparin (porcine) in D5W       PRN Meds:sodium chloride, acetaminophen, labetaloL, LORazepam, melatonin, methocarbamoL, midazolam, morphine, ondansetron, oxyCODONE, sodium chloride 0.9%, sumatriptan    Review of patient's allergies indicates:  No Known Allergies     Past Medical History:   Diagnosis Date    Endocarditis     Pacemaker     Pneumonia      Past Surgical History:   Procedure Laterality Date    ANGIOGRAPHY OF LOWER EXTREMITY Right 8/19/2022    Procedure: ANGIOGRAM, LOWER EXTREMITY;  Surgeon: Thomas Nicoals MD;  Location: [facility] OR [address] [place] [address];  Service: Peripheral Vascular;  Laterality: Right;  30.0 min  315.10 mGy  26.1755 Gycm2  84 ml dye    HIP RESURFACING Right 8/23/2022    Procedure: cemontoplasty;  Surgeon: Yung Flores MD;  [facility];  Service: Orthopedics;  Laterality: Right;    RADIOFREQUENCY ABLATION, BONE, PERCUTANEOUS Right 8/23/2022    Procedure: RADIOFREQUENCY ABLATION,BONE;  Surgeon: Yung Flores MD;  [facility];  Service: Orthopedics;  Laterality: Right;    REPAIR, PSEUDOANEURYSM, ARTERY, FEMORAL Right 8/19/2022    Procedure: REPAIR, PSEUDOANEURYSM, ARTERY, FEMORAL;  Surgeon: Thomas Nicolas MD;  [facility];  Service: Peripheral Vascular;  Laterality: Right;  R PFA (3rd branch) pseudoaneurysm        Family History    None       Tobacco Use    Smoking status: Every Day     Packs/day: 1.00      Types: Cigarettes    Smokeless tobacco: Never   Substance and Sexual Activity    Alcohol use: Not on file    Drug use: Not on file    Sexual activity: Not on file     Review of Systems   Skin:  Positive for wound.     Objective:     Vital Signs (Most Recent):  Temp: 97.7 °F (36.5 °C) (09/06/22 1157)  Pulse: 90 (09/06/22 1404)  Resp: 20 (Simultaneous filing. User may not have seen previous data.) (09/06/22 1404)  BP: 130/76 (09/06/22 1300)  SpO2: 96 % (09/06/22 1404) Vital Signs (24h Range):  Temp:  [94.8 °F (34.9 °C)-97.7 °F (36.5 °C)] 97.7 °F (36.5 °C)  Pulse:  [77-96] 90  Resp:  [15-41] 20  SpO2:  [92 %-98 %] 96 %  BP: ()/(58-86) 130/76     Weight: 57.2 kg (126 lb 1.7 oz)  Body mass index is 18.62 kg/m².  Physical Exam  Constitutional:       Appearance: Normal appearance.   Skin:     General: Skin is warm and dry.      Findings: Lesion present.   Neurological:      Mental Status: He is alert.       Laboratory:  All pertinent labs reviewed within the last 24 hours.    Diagnostic Results:  None

## 2022-09-06 NOTE — ASSESSMENT & PLAN NOTE
Previously admitted last month for R hip pain s/p fall, imaging showing extensive metastatic disease involving soft tissue above and below diaphragm particularly notable for extensive mediastinal involvement with encasement of bronchovascular structures as well as osseous disease.  Unclear etiology of primary malignancy, however MARTHA mass and L renal mass possible primaries.    Pathologic fracture of the right femoral head/neck, L1 vertebral body, and left 6th rib. Small to moderate pericardial effusion, likely malignant. Small left pleural effusion.     Heme/Onc consulted on admit and recommended MRI of brain to look for brain mets and none noted to brain itself but concerning osseous mets to skull itself. Bone scan done showed numerous tracer avid foci throughout the axial and appendicular skeleton consistent with metastatic disease. These lesions correspond to mixed lytic and sclerotic lesions on same day CT chest abdomen pelvis. Heme/Onc recommended bone biopsy and done by Orthopedics on 8/23 for diagnosis as primary tumor unknown and pathology pending on discharge.    -med onc consulted, rad onc consulted -- pending pathology   - palliative care consulted  - DNR/DNI status as per ACP conversations on admit

## 2022-09-06 NOTE — PROVIDER TRANSFER
ICU Transfer of Care Note  Critical Care Medicine    Admit Date: 9/4/2022  LOS: 2    CC: Sepsis with acute hypoxic respiratory failure    Code Status: DNR         HPI and Hospital Course:     HPI:  Donis Jimenez is a 61-year-old matta with PMH significant for bacterial endocarditis, s/p AV and MV mechanical valve replacement (on warfarin), CKD, tobacco abuse with recent complicated hospital course who presents to the emergency department with shortness of breath.  Difficult for patient to provide history due to tachypnea; spouse at bedside assisting with history.     Patient was admitted 8/18-8/27/22 after sustaining a pathologic right femoral neck fracture and right femoral artery pseudoaneurysm from a fall at home. Patient taken to OR on 8/19 by Vascular Surgery and had embolization of 3rd order right profunda femoral artery pseudoaneurysm with N-BCA glue and 5mm coil aneurysm repair. Pt underwent ight hip hemiarthroplasty with ablation, cementoplasty, and percutaneous fixation of pelvis for pathologic fracture and metastatic disease with Ortho on 8/23. 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 Metastatic work-up done  with CT scan of chest/abdomen and pelvis showing extensive disease including mediastinal mass with encasement of mediastinal vascular structures and airways, MARTHA pulmonary mass, bilateral suprarenal and left renal masses, L1 pathologic fx and rib & skull metastatic disease.     Pt's spouse states that pt had been ambulatory with a walker post-discharge, without c/o SOB, lightheadedness or dizziness. SOB started ~ 9/2 with productive cough. He denies fever/chills, chest pain, abd pain, N/V/D.     In the ED patient was in a flutter and was cardioverted with some improvement in  symptoms. He was found to have right sided pneumonia and was started on vanc and cefepime and admitted to Hospital Medicine for further management.     During his time in the ED, the patient had escalating FiO2 requirements, now on 45L 100% comfort flow.     Critical Care Medicine was consulted for worsening hypoxemic respiratory failure.        Hospital/ICU Course:  Patient admitted to [facility] evening of 9/4 for worsening hypoxemic respiratory failure secondary to pneumonia in setting of lung cancer with extensive metastatic disease. GOC discussed with pt and  who agree that they would not want extreme measures such as CPR and mechanical ventilation given overall poor prognosis. Pt started on vanc, cefepime, azithro and flagyl for pna/post-obstructive pna; alternating between bipap and comfort flow. Bedside chest tube placed on L side and thoracentesis on R for moderate pleural effusions seen on CT. Stable to stepdown.       To Follow Up:     -- follow up pathology from lytic bone lesion and cytology from pleural fluid   -- palliative care following   -- CTS following for tentative plan for tracheal stent being placed 9/8. Will need to be NPO  -- 7 day empiric course abx CAP       Discharge Plan:     Per primary    Call with questions.

## 2022-09-06 NOTE — PLAN OF CARE
CMICU DAILY GOALS       A: Awake    RASS: Goal - RASS Goal: 0-->alert and calm  Actual - RASS (Garcia Agitation-Sedation Scale): 0-->alert and calm   Restraint necessity:    B: Breathe   SBT: Not intubated   C: Coordinate A & B, analgesics/sedatives   Pain: managed    SAT: Not intubated  D: Delirium   CAM-ICU: Overall CAM-ICU: Negative  E: Early(intubated/ Progressive (non-intubated) Mobility   MOVE Screen: Fail   Activity: Activity Management: Rolling - L1  FAS: Feeding/Nutrition   Diet order: Diet/Nutrition Received: low saturated fat/low cholesterol, no added salt,    T: Thrombus   DVT prophylaxis: VTE Required Core Measure: Pharmacological prophylaxis initiated/maintained  H: HOB Elevation   Head of Bed (HOB) Positioning: HOB at 30-45 degrees  U: Ulcer Prophylaxis   GI: yes  G: Glucose control   managed    S: Skin   Bathing/Skin Care: bath, complete, incontinence care  Device Skin Pressure Protection: absorbent pad utilized/changed, adhesive use limited  Pressure Reduction Devices: specialty bed utilized  Pressure Reduction Techniques: frequent weight shift encouraged  Skin Protection: adhesive use limited, incontinence pads utilized, tubing/devices free from skin contact, transparent dressing maintained  B: Bowel Function   no issues   I: Indwelling Catheters   Rodgers necessity:     CVC necessity: No  D: De-escalation Antibiotics   Yes    Family/Goals of care/Code Status   Code Status: DNR    24H Vital Sign Range  Temp:  [94.8 °F (34.9 °C)-97.7 °F (36.5 °C)]   Pulse:  [77-96]   Resp:  [15-42]   BP: ()/(58-86)   SpO2:  [92 %-98 %]      Shift Events   Pt resting overnight and awakenings minimized. On bipap overnight with no issues. Pt given vitamin K IVBP overnight. Difficult to obtain axillary temperature on patient; barehugger applied to pt overnight.      SULMA ECHEVARRIA

## 2022-09-06 NOTE — CONSULTS
"[facility] - Cardiac Medical ICU  Palliative Medicine  Consult Note    Patient Name: Donis Jimenez  MRN: [id number]  Admission Date: 9/4/2022  Hospital Length of Stay: 2 days  Code Status: DNR   Attending Provider: Brannon Gonsalves MD  Consulting Provider: LOREE Dean  Primary Care Physician: Elio Farias MD  Principal Problem:Sepsis with acute hypoxic respiratory failure    Patient information was obtained from primary team.      Inpatient consult to Palliative Care  Consult performed by: LOREE Gil  Consult ordered by: Royce Nelson NP      Assessment/Plan:     Palliative care encounter  Impression:   Pt is a 62 y/o male with PMH significant for pacemaker, at least 40 years of tobacco abuse and worsening rip hip pain found to have right femoral neck fracture, a large mediastinal mass with encasement of bronchovascular structures, pleural effusions and diffuse bone lesions on NM bone scan. Pt has metastatic lung cancer. Pt is alert, oriented to person, place, and situation. Pt is a DNR. Pt is on high flow O2 @ 40 L 60%.     Reason for consult: GOC/ACP. Communicated with MAJOR Camp fellow with [facility].     Goals of care/ACP:   Introduced role of Palliative care to pt.   Met with pt who is aware of his medical issues. Per pt he is aware he has metastatic cancer. Pt is aware that any therapies/procedures offered to him is palliative and will not cure cancer. Per pt, he is still trying to come to  with his dx. He learned about his issues in August. Pt reports goal at time is to see if anything can be done to help with symptoms and "give him some more time." Spoke to pt about amount of O2 he is on at this time. He verbalized understanding.  Per pt he has spoken to Oncology and XRT MDs.   CTS has been consulted concerning tracheal stenting.     Pt open to continued visits with [facility] care for care planning and symptom management needs.  Support given pt.     Pt reports he is legally " " to Aston Collins and he would want him to be hi medical decision-maker if he cannot make his own medical decisions. MPOA paperwork left with pt. Education provided. Per Pt, Aston works during day but can be reached by phone.     Code status: DNR.     Symptom management:     Dyspnea r/t to mediastinal mass, pleural effusion.   Pt is on high flow O@ per NC 40 L @ 60%.  Pt is on Morphine 2 mg IVP q 2 hrs prn.     Pt reports Morphine "has really helps with my SOB."  Pt reports Morphine lasts around 3- 4 hours.     Continue Morphine 2 mg IVP q 2 hours prn.   Will continue to assess.     Debility r/t to pathological femoral neck fracture s/p ight hip hemiarthroplasty with ablation, cementoplasty  Pt was using walker prior to admit.   Would resume PT/OT when pt stable to participate.     Anxiety r/t to new dx and dyspnea.   Pt has Ativan 1 mg tid prn.   Continue.     Plan:   Will continue to meet with pt to reinforce realistic expectations/assit with GOC.   Will follow.               Thank you for your consult. I will follow-up with patient. Please contact us if you have any additional questions.    Subjective:     HPI:   Pt is a 61-year-old male with PMH significant for bacterial endocarditis, s/p AV and MV mechanical valve replacement (on warfarin), CKD, tobacco abuse with recent complicated hospital course who presented to the emergency department with shortness of breath.  Difficult for patient to provide history due to tachypnea; spouse at bedside assisting with history.      Per chart review, patient was admitted 8/18-8/27/22 after sustaining a pathologic right femoral neck fracture and right femoral artery pseudoaneurysm from a fall at home. Patient taken to OR on 8/19 by Vascular Surgery and had embolization of 3rd order right profunda femoral artery pseudoaneurysm with N-BCA glue and 5mm coil aneurysm repair. Pt underwent right hip hemiarthroplasty with ablation, cementoplasty, and percutaneous fixation of " pelvis for pathologic fracture and metastatic disease with Ortho on 8/23. 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 Metastatic work-up done  with CT scan of chest/abdomen and pelvis showing extensive disease including mediastinal mass with encasement of mediastinal vascular structures and airways, MARTHA pulmonary mass, bilateral suprarenal and left renal masses, L1 pathologic fx and rib & skull metastatic disease.      Per chart review, pt's spouse stated that pt had been ambulatory with a walker post-discharge, without c/o SOB, lightheadedness or dizziness. SOB started ~ 9/2 with productive cough. He denies fever/chills, chest pain, abd pain, N/V/D.      In the ED patient was in a flutter and was cardioverted with some improvement in symptoms. He was found to have right sided pneumonia and was started on vanc and cefepime and admitted to Hospital Medicine for further management.      During his time in the ED, the patient had escalating FiO2 requirements, now on 45L 100% comfort flow.      Critical Care Medicine was consulted for worsening hypoxemic respiratory failure    Pt is DNR. Pt on 40 L  @ 60 %      Hospital Course:  No notes on file    Interval History: Pt DNR.  CTS consulted for possible tracheal stenting.     Past Medical History:   Diagnosis Date    Endocarditis     Pacemaker     Pneumonia        Past Surgical History:   Procedure Laterality Date    ANGIOGRAPHY OF LOWER EXTREMITY Right 8/19/2022    Procedure: ANGIOGRAM, LOWER EXTREMITY;  Surgeon: Thomas Nicolas MD;  Location: [facility] OR [address] [place] [address];  Service: Peripheral Vascular;  Laterality: Right;  30.0 min  315.10 mGy  26.1755 Gycm2  84 ml dye    HIP RESURFACING Right 8/23/2022    Procedure: cemontoplasty;  Surgeon: Yung Flores,  MD;  [facility];  Service: Orthopedics;  Laterality: Right;    RADIOFREQUENCY ABLATION, BONE, PERCUTANEOUS Right 8/23/2022    Procedure: RADIOFREQUENCY ABLATION,BONE;  Surgeon: Yung Flores MD;  [facility];  Service: Orthopedics;  Laterality: Right;    REPAIR, PSEUDOANEURYSM, ARTERY, FEMORAL Right 8/19/2022    Procedure: REPAIR, PSEUDOANEURYSM, ARTERY, FEMORAL;  Surgeon: Thomas Nicolas MD;  [facility];  Service: Peripheral Vascular;  Laterality: Right;  R PFA (3rd branch) pseudoaneurysm        Review of patient's allergies indicates:  No Known Allergies    Medications:  Continuous Infusions:  Scheduled Meds:   albuterol-ipratropium  3 mL Nebulization Q6H    azithromycin  500 mg Intravenous Q24H    cefTRIAXone (ROCEPHIN) IVPB  2 g Intravenous Q24H    EScitalopram oxalate  20 mg Oral QHS    ferrous gluconate  324 mg Oral Daily    methylPREDNISolone sodium succinate injection  60 mg Intravenous Q12H    pantoprazole  40 mg Oral Daily    pravastatin  40 mg Oral QHS    pregabalin  75 mg Oral BID    sodium chloride 3%  4 mL Nebulization Q4H WAKE    vitamin D  1,000 Units Oral Daily     PRN Meds:sodium chloride, acetaminophen, labetaloL, LORazepam, melatonin, methocarbamoL, midazolam, morphine, ondansetron, oxyCODONE, sodium chloride 0.9%, sumatriptan    Family History    None       Tobacco Use    Smoking status: Every Day     Packs/day: 1.00     Types: Cigarettes    Smokeless tobacco: Never   Substance and Sexual Activity    Alcohol use: Not on file    Drug use: Not on file    Sexual activity: Not on file       Review of Systems   Constitutional:  Positive for activity change, fatigue and unexpected weight change.   Respiratory:  Positive for shortness of breath.    Cardiovascular:  Negative for leg swelling.   Gastrointestinal:  Negative for nausea and vomiting.   Musculoskeletal:  Positive for gait problem.   Skin: Negative.    Neurological:  Positive for weakness.    Psychiatric/Behavioral: Negative.     Objective:     Vital Signs (Most Recent):  Temp: 97 °F (36.1 °C) (09/06/22 0701)  Pulse: 93 (09/06/22 1000)  Resp: (!) 24 (09/06/22 1000)  BP: 110/65 (09/06/22 1000)  SpO2: 95 % (09/06/22 1000)   Vital Signs (24h Range):  Temp:  [94.8 °F (34.9 °C)-97.7 °F (36.5 °C)] 97 °F (36.1 °C)  Pulse:  [77-95] 93  Resp:  [15-41] 24  SpO2:  [92 %-98 %] 95 %  BP: ()/(58-86) 110/65     Weight: 57.5 kg (126 lb 12.2 oz)  Body mass index is 18.72 kg/m².    Physical Exam  Constitutional:       Comments: Pt on high flow O2 per NC.    HENT:      Head: Normocephalic and atraumatic.   Eyes:      General: Lids are normal.      Conjunctiva/sclera: Conjunctivae normal.   Cardiovascular:      Rate and Rhythm: Tachycardia present.   Pulmonary:      Effort: Tachypnea present. No accessory muscle usage or respiratory distress.   Abdominal:      General: Abdomen is flat.   Musculoskeletal:      Cervical back: Full passive range of motion without pain and normal range of motion.      Comments: Edema to left arm.    Skin:     General: Skin is warm and dry.   Neurological:      Mental Status: He is alert and oriented to person, place, and time.   Psychiatric:         Attention and Perception: Attention normal.         Speech: Speech normal.         Behavior: Behavior is cooperative.       Review of Symptoms      Symptom Assessment (ESAS 0-10 Scale)  Pain:  0  Dyspnea:  4  Anxiety:  2  Nausea:  0  Depression:  0  Anorexia:  0  Fatigue:  0  Insomnia:  0  Restlessness:  0  Agitation:  0         ECOG Performance Status ndGndrndanddndend:nd nd2nd Living Arrangements:  Lives with family    Psychosocial/Cultural: Pt legally  to Aston Collins. Per pt Aston works during the day. Per pt, Aston is his care-giver.       Advance Care Planning   Advance Directives:   Living Will: No    Do Not Resuscitate Status: Yes    Medical Power of : No    Agent's Name:  Aston Collins    Decision Making:  Patient answered questions        Significant Labs: All pertinent labs within the past 24 hours have been reviewed.  CBC:   Recent Labs   Lab 09/06/22 0329   WBC 9.50   HGB 10.2*   HCT 32.1*   MCV 92        BMP:  Recent Labs   Lab 09/06/22 0329   *   *   K 4.1      CO2 24   BUN 25*   CREATININE 0.9   CALCIUM 8.2*   MG 2.0     LFT:  Lab Results   Component Value Date    AST 26 09/06/2022    ALKPHOS 180 (H) 09/06/2022    BILITOT 0.4 09/06/2022     Albumin:   Albumin   Date Value Ref Range Status   09/06/2022 2.4 (L) 3.5 - 5.2 g/dL Final     Protein:   Total Protein   Date Value Ref Range Status   09/06/2022 5.8 (L) 6.0 - 8.4 g/dL Final     Lactic acid:   Lab Results   Component Value Date    LACTATE 1.7 09/05/2022    LACTATE 2.4 (H) 09/04/2022       Significant Imaging: I have reviewed all pertinent imaging results/findings within the past 24 hours.        25 minutes of advance care planning completed.       Zoe Potter, CNS  Palliative Medicine  [facility] - Cardiac Medical ICU

## 2022-09-06 NOTE — SUBJECTIVE & OBJECTIVE
Interval History/Significant Events: alternating between bipap and comfort flow. Utilized morphine overnight for anxiety/dyspnea. Reports feeling well this AM, eating breakfast.     Review of Systems   Constitutional:  Positive for appetite change, fatigue and unexpected weight change.   Respiratory:  Positive for cough and shortness of breath. Negative for choking.    Cardiovascular:  Negative for chest pain and palpitations.   Gastrointestinal:  Negative for abdominal pain, constipation, diarrhea and nausea.   Neurological:  Negative for numbness and headaches.   Psychiatric/Behavioral:  Negative for agitation and confusion.    Objective:     Vital Signs (Most Recent):  Temp: 97 °F (36.1 °C) (09/06/22 0701)  Pulse: 87 (09/06/22 0805)  Resp: 16 (09/06/22 0805)  BP: 117/80 (09/06/22 0800)  SpO2: 97 % (09/06/22 0805) Vital Signs (24h Range):  Temp:  [94.8 °F (34.9 °C)-97.7 °F (36.5 °C)] 97 °F (36.1 °C)  Pulse:  [77-96] 87  Resp:  [15-42] 16  SpO2:  [92 %-98 %] 97 %  BP: ()/(58-86) 117/80   Weight: 57.5 kg (126 lb 12.2 oz)  Body mass index is 18.72 kg/m².      Intake/Output Summary (Last 24 hours) at 9/6/2022 0838  Last data filed at 9/6/2022 0800  Gross per 24 hour   Intake 2256.73 ml   Output 3890 ml   Net -1633.27 ml       Physical Exam  Constitutional:       General: He is not in acute distress.     Appearance: He is ill-appearing.   Eyes:      General: No scleral icterus.     Extraocular Movements: Extraocular movements intact.      Pupils: Pupils are equal, round, and reactive to light.   Cardiovascular:      Rate and Rhythm: Normal rate and regular rhythm.      Pulses: Normal pulses.      Heart sounds: Normal heart sounds.   Pulmonary:      Breath sounds: Rhonchi present.      Comments: Comfort flow 40L 60%  Abdominal:      General: Abdomen is flat. Bowel sounds are normal. There is no distension.      Palpations: Abdomen is soft.      Tenderness: There is no abdominal tenderness.   Musculoskeletal:       Right forearm: Edema present.      Left forearm: Edema present.      Comments: Distension of neck veins    Skin:     Capillary Refill: Capillary refill takes less than 2 seconds.      Findings: No bruising or lesion.   Neurological:      General: No focal deficit present.      Mental Status: He is alert.   Psychiatric:         Mood and Affect: Mood normal.         Behavior: Behavior normal.       Vents:  Oxygen Concentration (%): 60 (09/06/22 0805)  Lines/Drains/Airways       Peripheral Intravenous Line  Duration                  Peripheral IV - Single Lumen 09/04/22 0707 18 G Left Upper Arm 2 days         Peripheral IV - Single Lumen 09/05/22 1840 20 G Posterior;Right Hand <1 day                  Significant Labs:    CBC/Anemia Profile:  Recent Labs   Lab 09/04/22  2145 09/05/22 0341 09/06/22  0329   WBC  --  13.89* 9.50   HGB  --  10.3* 10.2*   HCT 33* 31.8* 32.1*   PLT  --  417 400   MCV  --  91 92   RDW  --  20.8* 20.4*        Chemistries:  Recent Labs   Lab 09/05/22  0341 09/06/22  0329   * 135*   K 4.2 4.1    104   CO2 18* 24   BUN 29* 25*   CREATININE 1.1 0.9   CALCIUM 8.4* 8.2*   ALBUMIN 2.5* 2.4*   PROT 5.8* 5.8*   BILITOT 0.8 0.4   ALKPHOS 200* 180*   ALT 38 32   AST 36 26   MG 2.1 2.0   PHOS 3.6 3.3       All pertinent labs within the past 24 hours have been reviewed.    Significant Imaging:  I have reviewed all pertinent imaging results/findings within the past 24 hours.

## 2022-09-06 NOTE — HPI
Donis Jimenez is a 61 year old mael with PMH significant for bacterial endocarditis, s/p AV and MV mechanical valve replacement (on warfarin), CKD, tobacco abuse with recent complicated hospital course who presents to the emergency department with shortness of breath.  Difficult for patient to provide history due to tachypnea; spouse at bedside assisting with history.     Patient with a flutter in emergency department.  He was cardioverted with return to normal sinus rhythm and improvement of shortness of breath.  While he is still tachypneic following restoration of normal sinus rhythm, per spouse, this is essentially his recent baseline. Skin integrity YASEMIN consulted for evaluation skin injuries to sacrum.

## 2022-09-06 NOTE — PT/OT/SLP PROGRESS
Occupational Therapy      Patient Name:  Donis Jimenez   MRN:  [id number]    Patient not seen today secondary to x4 attempts made this date. 8/56, pt eating breakfast, requested therapist return. 1006 RN care. 1045 echo at bedside. 1320 RN and echo placing line.   OT unable to make 5th attempt.  Remains an eval when medically stable.     9/6/2022

## 2022-09-06 NOTE — PLAN OF CARE
CMICU DAILY GOALS       A: Awake    RASS: Goal - RASS Goal: 0-->alert and calm  Actual - RASS (Garcia Agitation-Sedation Scale): 0-->alert and calm   Restraint necessity:    B: Breathe   SBT: Not intubated   C: Coordinate A & B, analgesics/sedatives   Pain: managed    SAT: Not intubated  D: Delirium   CAM-ICU: Overall CAM-ICU: Negative  E: Early(intubated/ Progressive (non-intubated) Mobility   MOVE Screen: Pass   Activity: Activity Management: Arm raise - L1  FAS: Feeding/Nutrition   Diet order: Diet/Nutrition Received: other (see comments) (Cardiac diet),    T: Thrombus   DVT prophylaxis: VTE Required Core Measure: Pharmacological prophylaxis initiated/maintained  H: HOB Elevation   Head of Bed (HOB) Positioning: HOB elevated  U: Ulcer Prophylaxis   GI: no  G: Glucose control   managed    S: Skin   Bathing/Skin Care: bath, complete, incontinence care  Device Skin Pressure Protection: absorbent pad utilized/changed  Pressure Reduction Devices: specialty bed utilized  Pressure Reduction Techniques: weight shift assistance provided, frequent weight shift encouraged, heels elevated off bed, positioned off wounds, pressure points protected  Skin Protection: adhesive use limited  B: Bowel Function   no issues   I: Indwelling Catheters   Rodgers necessity:     CVC necessity: No  D: De-escalation Antibiotics   Yes    Family/Goals of care/Code Status   Code Status: DNR    24H Vital Sign Range  Temp:  [94.8 °F (34.9 °C)-97.7 °F (36.5 °C)]   Pulse:  [77-96]   Resp:  [14-36]   BP: ()/(58-80)   SpO2:  [93 %-98 %]      Shift Events   Left chest tube inserted to drain pleural effusion on left side, pleural effusion on right side drained via thoracentesis. Pt c/o anxiety before the procedure, administered PRN meds per the MAR, and pt tolerated procedure well, stated that he felt better afterward. Heparin gtt started, running at 12 units/kg/hr. Palliative care consulted and visited the pt at the bedside. Pt's  has  been at the bedside throughout the day.    VS and assessment per flow sheet, patient progressing towards goals as tolerated, plan of care reviewed with  pt Giuliano Jimenez and  Aston , all concerns addressed, will continue to monitor.    Dean Darby

## 2022-09-06 NOTE — PLAN OF CARE
Brief Cardiology Progress Note:    Recommendations:   Atrial Flutter   - Current INR 2.0. recommend resuming anticoagulation when safe and if consistent with GOC, especially given recent cardioversion.   - Recommend addition of metoprolol tartrate 25mg BID   - Patient remains in NSR. Patient not a candidate for ablation given frail respiratory status        Sruthi Wren MD  Cardiology Consults  [facility] - Cardiac Medical ICU

## 2022-09-06 NOTE — ASSESSMENT & PLAN NOTE
Hx aflutter,  previously on amio  - presented in aflutter with RVR, now s/p DCCV in ED with return to NSR  - PPM in place  - cards consulted, do not recommend ablation due to frailty

## 2022-09-06 NOTE — CONSULTS
Brief Radiation Oncology Note      Donis Jimenez is a 61 y.o. man with PMH significant for pacemaker, at least 40 years of tobacco abuse and worsening rip hip pain found to have right femoral neck fracture, a large mediastinal mass with encasement of bronchovascular structures, pleural effusions and diffuse bone lesions on NM bone scan.  He is s/p Right hemiarthroplasty, Right pelvis lesion RFA on 8/23/2022, pathology pending and then was discharged home.  Since 9/4/2022 he has been admitted for SOB and is s/p synchronized cardioversion for a flutter, followed by cardiology  Seen by [facility] 9/5/2022, pending path and plan for initiation of inpatient chemo if cancer diagnosis is made.    Brain MRI showed multiple punctate acute embolic type infarcts, small acute subdural hemorrhage, 2 lesions in left occipital and right parietal calvarium        Recommendation    Since patient is treatment naive, would recommend induction chemo in case of lung cancer diagnosis.  Radiation can be use if no other therapy possible successful (IR stenting, chemo, etc...)    Please notify radiation oncology extension [phone] when cancer diagnosis is established       Thank you for the opportunity to care for this patient. Please do not hesitate to contact me with any questions.    Deniz Thomas MD/PhD  Radiation Oncology

## 2022-09-06 NOTE — CONSULTS
"  [facility] - Cardiac Medical ICU  Adult Nutrition  Consult Note    SUMMARY     Recommendations    Liberalize diet to Regular. Continue to encourage PO intake.  RD to monitor & follow-up.    Goals: Meet % EEN, EPN by RD f/u date  Nutrition Goal Status: new  Communication of RD Recs: reviewed with RN    Assessment and Plan    Nutrition Problem:  Increased nutrient needs    Related to (etiology):   Physiological causes     Signs and Symptoms (as evidenced by):   Stage 4 lung ca    Interventions(treatment strategy):  Collaboration of nutrition care w/ other providers    Nutrition Diagnosis Status:   New    Reason for Assessment    Reason For Assessment: consult  Diagnosis: other (see comments) (Sepsis, Lung ca)  Relevant Medical History: CKD, Tobacco abuse  Interdisciplinary Rounds: attended    General Information Comments: Pt tolerating diet, consuming ~ 50% of meals - doesnt want ONS. PTA; pt reports fair appetite & UBW of 125#. Pt appears to have severe fat & muscle wasting, however not enough information to diagnosis malnutrition. Will monitor. Noted palliative care is following.  Nutrition Discharge Planning: Adequate PO intake    Nutrition/Diet History    Factors Affecting Nutritional Intake: decreased appetite    Anthropometrics    Temp: 97.7 °F (36.5 °C)  Height: 5' 9" (175.3 cm)  Height (inches): 69 in  Weight Method: Bed Scale  Weight: 57.2 kg (126 lb 1.7 oz)  Weight (lb): 126.1 lb  Ideal Body Weight (IBW), Male: 160 lb  % Ideal Body Weight, Male (lb): 78.81 %  BMI (Calculated): 18.6  BMI Grade: 18.5-24.9 - normal    Lab/Procedures/Meds    Pertinent Labs Reviewed: reviewed  Pertinent Labs Comments: Na 135, BUN 25  Pertinent Medications Reviewed: reviewed  Pertinent Medications Comments: Statin    Estimated/Assessed Needs    Weight Used For Calorie Calculations: 57.2 kg (126 lb 1.7 oz)    Energy Calorie Requirements (kcal): 6846-3539 kcal/d  Energy Need Method: Kcal/kg (30-35 kcal/kg)    Protein " Requirements: 75-86 g/d (1.3-1.5 g/kg)  Weight Used For Protein Calculations: 57.2 kg (126 lb 1.7 oz)    Estimated Fluid Requirement Method: other (see comments) (Per MD or 1 mL/kcal)  RDA Method (mL): 1716    Nutrition Prescription Ordered    Current Diet Order: Cardiac    Evaluation of Received Nutrient/Fluid Intake    I/O: -2.3L since admit    Comments: LBM: 9/5    Tolerance: tolerating    Nutrition Risk    Level of Risk/Frequency of Follow-up:  (1x/week)     Monitor and Evaluation    Food and Nutrient Intake: energy intake, food and beverage intake  Food and Nutrient Adminstration: diet order  Physical Activity and Function: nutrition-related ADLs and IADLs  Anthropometric Measurements: weight, weight change  Biochemical Data, Medical Tests and Procedures: inflammatory profile, lipid profile, glucose/endocrine profile, gastrointestinal profile, electrolyte and renal panel  Nutrition-Focused Physical Findings: overall appearance     Nutrition Follow-Up    RD Follow-up?: Yes

## 2022-09-06 NOTE — SUBJECTIVE & OBJECTIVE
Interval History: Pt DNR.  CTS consulted for possible tracheal stenting.     Past Medical History:   Diagnosis Date    Endocarditis     Pacemaker     Pneumonia        Past Surgical History:   Procedure Laterality Date    ANGIOGRAPHY OF LOWER EXTREMITY Right 8/19/2022    Procedure: ANGIOGRAM, LOWER EXTREMITY;  Surgeon: Thomas Nicolas MD;  Location: [facility] OR [address] [place] [address];  Service: Peripheral Vascular;  Laterality: Right;  30.0 min  315.10 mGy  26.1755 Gycm2  84 ml dye    HIP RESURFACING Right 8/23/2022    Procedure: cemontoplasty;  Surgeon: Yung Flores MD;  [facility];  Service: Orthopedics;  Laterality: Right;    RADIOFREQUENCY ABLATION, BONE, PERCUTANEOUS Right 8/23/2022    Procedure: RADIOFREQUENCY ABLATION,BONE;  Surgeon: Yung Flores MD;  [facility];  Service: Orthopedics;  Laterality: Right;    REPAIR, PSEUDOANEURYSM, ARTERY, FEMORAL Right 8/19/2022    Procedure: REPAIR, PSEUDOANEURYSM, ARTERY, FEMORAL;  Surgeon: Thomas Nicolas MD;  [facility];  Service: Peripheral Vascular;  Laterality: Right;  R PFA (3rd branch) pseudoaneurysm        Review of patient's allergies indicates:  No Known Allergies    Medications:  Continuous Infusions:  Scheduled Meds:   albuterol-ipratropium  3 mL Nebulization Q6H    azithromycin  500 mg Intravenous Q24H    cefTRIAXone (ROCEPHIN) IVPB  2 g Intravenous Q24H    EScitalopram oxalate  20 mg Oral QHS    ferrous gluconate  324 mg Oral Daily    methylPREDNISolone sodium succinate injection  60 mg Intravenous Q12H    pantoprazole  40 mg Oral Daily    pravastatin  40 mg Oral QHS    pregabalin  75 mg Oral BID    sodium chloride 3%  4 mL Nebulization Q4H WAKE    vitamin D  1,000 Units Oral Daily     PRN Meds:sodium chloride, acetaminophen, labetaloL, LORazepam, melatonin, methocarbamoL, midazolam, morphine, ondansetron, oxyCODONE, sodium chloride 0.9%, sumatriptan    Family History    None       Tobacco Use    Smoking status: Every Day      Packs/day: 1.00     Types: Cigarettes    Smokeless tobacco: Never   Substance and Sexual Activity    Alcohol use: Not on file    Drug use: Not on file    Sexual activity: Not on file       Review of Systems   Constitutional:  Positive for activity change, fatigue and unexpected weight change.   Respiratory:  Positive for shortness of breath.    Cardiovascular:  Negative for leg swelling.   Gastrointestinal:  Negative for nausea and vomiting.   Musculoskeletal:  Positive for gait problem.   Skin: Negative.    Neurological:  Positive for weakness.   Psychiatric/Behavioral: Negative.     Objective:     Vital Signs (Most Recent):  Temp: 97 °F (36.1 °C) (09/06/22 0701)  Pulse: 93 (09/06/22 1000)  Resp: (!) 24 (09/06/22 1000)  BP: 110/65 (09/06/22 1000)  SpO2: 95 % (09/06/22 1000)   Vital Signs (24h Range):  Temp:  [94.8 °F (34.9 °C)-97.7 °F (36.5 °C)] 97 °F (36.1 °C)  Pulse:  [77-95] 93  Resp:  [15-41] 24  SpO2:  [92 %-98 %] 95 %  BP: ()/(58-86) 110/65     Weight: 57.5 kg (126 lb 12.2 oz)  Body mass index is 18.72 kg/m².    Physical Exam  Constitutional:       Comments: Pt on high flow O2 per NC.    HENT:      Head: Normocephalic and atraumatic.   Eyes:      General: Lids are normal.      Conjunctiva/sclera: Conjunctivae normal.   Cardiovascular:      Rate and Rhythm: Tachycardia present.   Pulmonary:      Effort: Tachypnea present. No accessory muscle usage or respiratory distress.   Abdominal:      General: Abdomen is flat.   Musculoskeletal:      Cervical back: Full passive range of motion without pain and normal range of motion.      Comments: Edema to left arm.    Skin:     General: Skin is warm and dry.   Neurological:      Mental Status: He is alert and oriented to person, place, and time.   Psychiatric:         Attention and Perception: Attention normal.         Speech: Speech normal.         Behavior: Behavior is cooperative.       Review of Symptoms      Symptom Assessment (ESAS 0-10 Scale)  Pain:   0  Dyspnea:  4  Anxiety:  2  Nausea:  0  Depression:  0  Anorexia:  0  Fatigue:  0  Insomnia:  0  Restlessness:  0  Agitation:  0         ECOG Performance Status thGthrthathdtheth:th th4th Living Arrangements:  Lives with family    Psychosocial/Cultural: Pt legally  to Aston Collins. Per pt Aston works during the day. Per pt, Aston is his care-giver.       Advance Care Planning   Advance Directives:   Living Will: No    Do Not Resuscitate Status: Yes    Medical Power of : No    Agent's Name:  Aston Collins    Decision Making:  Patient answered questions       Significant Labs: All pertinent labs within the past 24 hours have been reviewed.  CBC:   Recent Labs   Lab 09/06/22 0329   WBC 9.50   HGB 10.2*   HCT 32.1*   MCV 92        BMP:  Recent Labs   Lab 09/06/22 0329   *   *   K 4.1      CO2 24   BUN 25*   CREATININE 0.9   CALCIUM 8.2*   MG 2.0     LFT:  Lab Results   Component Value Date    AST 26 09/06/2022    ALKPHOS 180 (H) 09/06/2022    BILITOT 0.4 09/06/2022     Albumin:   Albumin   Date Value Ref Range Status   09/06/2022 2.4 (L) 3.5 - 5.2 g/dL Final     Protein:   Total Protein   Date Value Ref Range Status   09/06/2022 5.8 (L) 6.0 - 8.4 g/dL Final     Lactic acid:   Lab Results   Component Value Date    LACTATE 1.7 09/05/2022    LACTATE 2.4 (H) 09/04/2022       Significant Imaging: I have reviewed all pertinent imaging results/findings within the past 24 hours.

## 2022-09-06 NOTE — CONSULTS
THORACIC SURGERY  Consultation      REASON FOR CONSULT:  Evaluation for tracheal stent     SUBJECTIVE:     HISTORY OF PRESENT ILLNESS:  Donis Jimenez is a 61 YOM bacterial endocarditis (s/p AVR, MVR on warfarin), AV block with pacemaker, CKD, hx of smoking for 40+ years and hx of worsening right hip pain for the past few weeks. He was admitted 8/18-8/27/22 after sustaining a pathologic right femoral neck fracture and right femoral artery pseudoaneurysm from a fall at home. S/p embolization of 3rd order right profunda femoral artery pseudoaneurysm with N-BCA glue and 5mm coil aneurysm repair on 8/19/23 by Vascular Surgery. Pt underwent ight hip hemiarthroplasty with ablation, cementoplasty, and percutaneous fixation of pelvis for pathologic fracture and metastatic disease with Ortho on 8/23/22. Metastatic work-up done  with CT scan of chest/abdomen and pelvis showing extensive disease including mediastinal mass with encasement of mediastinal vascular structures and airways, MARTHA pulmonary mass, bilateral suprarenal and left renal masses, L1 pathologic fx and rib & skull metastatic disease.     He was transferred to the medical oncology service for evaluation of inpatient chemotherapy. Final path pending, suspicious for small cell lung caner. Rad Onc consulted. Patient has required intermittent BiPAP, now on comfort flow 40 L. No signs of acute respiratory distress, breathing comforatably on exam.       MEDICATIONS:  Home Medications:  No current facility-administered medications on file prior to encounter.     Current Outpatient Medications on File Prior to Encounter   Medication Sig Dispense Refill    acetaminophen (TYLENOL) 500 MG tablet Take 2 tablets (1,000 mg total) by mouth every 8 (eight) hours as needed (Mild to moderate pain).  0    EScitalopram oxalate (LEXAPRO) 20 MG tablet Take 20 mg by mouth every evening.      esomeprazole (NEXIUM) 20 MG capsule Take 20 mg by mouth once daily.      ferrous gluconate  (FERGON) 324 MG tablet Take 1 tablet by mouth once daily.      LORazepam (ATIVAN) 1 MG tablet Take 1 mg by mouth 3 (three) times daily as needed for Anxiety.      methocarbamoL (ROBAXIN) 500 MG Tab Take 2 tablets (1,000 mg total) by mouth every 6 (six) hours. for 10 days 80 tablet 0    oxyCODONE (ROXICODONE) 5 MG immediate release tablet Take 1 tablet (5 mg total) by mouth every 4 (four) hours as needed for Pain. 30 tablet 0    pravastatin (PRAVACHOL) 40 MG tablet Take 40 mg by mouth every evening.      pregabalin (LYRICA) 75 MG capsule Take 1 capsule (75 mg total) by mouth 2 (two) times daily. 60 capsule 0    sumatriptan (IMITREX) 25 MG Tab Take 25 mg by mouth as needed for Migraine.      vitamin D (VITAMIN D3) 1000 units Tab Take 1 tablet (1,000 Units total) by mouth once daily. 30 tablet 11    warfarin (COUMADIN) 3 MG tablet Take 1.5 tablets (4.5 mg total) by mouth Daily.       Inpatient Medications:   albuterol-ipratropium  3 mL Nebulization Q6H    azithromycin  500 mg Intravenous Q24H    cefTRIAXone (ROCEPHIN) IVPB  2 g Intravenous Q24H    EScitalopram oxalate  20 mg Oral QHS    ferrous gluconate  324 mg Oral Daily    methylPREDNISolone sodium succinate injection  60 mg Intravenous Q12H    pantoprazole  40 mg Oral Daily    pravastatin  40 mg Oral QHS    pregabalin  75 mg Oral BID    sodium chloride 3%  4 mL Nebulization Q4H WAKE    vitamin D  1,000 Units Oral Daily     Infusions:  PRN Medications:sodium chloride, acetaminophen, labetaloL, LORazepam, melatonin, methocarbamoL, midazolam, morphine, ondansetron, oxyCODONE, sodium chloride 0.9%, sumatriptan    ALLERGIES:    Review of patient's allergies indicates:  No Known Allergies    PAST MEDICAL HISTORY:    Past Medical History:   Diagnosis Date    Endocarditis     Pacemaker     Pneumonia        SURGICAL HISTORY:  Past Surgical History:   Procedure Laterality Date    ANGIOGRAPHY OF LOWER EXTREMITY Right 8/19/2022    Procedure: ANGIOGRAM, LOWER EXTREMITY;   Surgeon: Thomas Nicolas MD;  Location: [facility] OR 2ND FLR;  Service: Peripheral Vascular;  Laterality: Right;  30.0 min  315.10 mGy  26.1755 Gycm2  84 ml dye    HIP RESURFACING Right 8/23/2022    Procedure: cemontoplasty;  Surgeon: Yung Flores MD;  Location: [facility] OR 2ND FLR;  Service: Orthopedics;  Laterality: Right;    RADIOFREQUENCY ABLATION, BONE, PERCUTANEOUS Right 8/23/2022    Procedure: RADIOFREQUENCY ABLATION,BONE;  Surgeon: Yung Flores MD;  Location: [facility] OR [facility] FLR;  Service: Orthopedics;  Laterality: Right;    REPAIR, PSEUDOANEURYSM, ARTERY, FEMORAL Right 8/19/2022    Procedure: REPAIR, PSEUDOANEURYSM, ARTERY, FEMORAL;  Surgeon: Thomas Nicolas MD;  Location: [facility] OR [facility] FLR;  Service: Peripheral Vascular;  Laterality: Right;  R PFA (3rd branch) pseudoaneurysm        SOCIAL HISTORY:  Social History     Tobacco Use    Smoking status: Every Day     Packs/day: 1.00     Types: Cigarettes    Smokeless tobacco: Never        REVIEW OF SYSTEMS:  A 10-point review of systems is negative except for the above mentioned in the HPI.    OBJECTIVE:     Most Recent Vitals:  Temp: 97 °F (36.1 °C) (09/06/22 0701)  Pulse: 95 (09/06/22 0900)  Resp: (!) 21 (09/06/22 0900)  BP: 105/66 (09/06/22 0900)  SpO2: (!) 94 % (09/06/22 0900)    24-Hour Vitals:  Temp:  [94.8 °F (34.9 °C)-97.7 °F (36.5 °C)]   Pulse:  [77-95]   Resp:  [15-41]   BP: ()/(58-86)   SpO2:  [92 %-98 %]     24-Hour I&O:  Intake/Output Summary (Last 24 hours) at 9/6/2022 1009  Last data filed at 9/6/2022 0900  Gross per 24 hour   Intake 2021.73 ml   Output 3890 ml   Net -1868.27 ml       PHYSICAL EXAM:  AAO, NAD, thin male, hoarse voice  Head normocephalic, atraumatic.  Trachea midline, neck supple.  Respirations unlabored with moderate respiratory effort      Comfort flow at 40 L   Heart regular rate and rhythm.    Pacemaker in place on right chest     Prominent superficial chest collaterals visible on exam  Abdomen soft, nondistended, nontender  to palpation.      LABORATORY VALUES:  Recent Labs   Lab 09/04/22  0708 09/04/22  2145 09/05/22  0341 09/06/22  0329   WBC 15.02*  --  13.89* 9.50   HGB 11.3*  --  10.3* 10.2*   HCT 35.6* 33* 31.8* 32.1*   *  --  417 400     Recent Labs   Lab 09/04/22  0708 09/05/22  0341 09/06/22  0329   * 134* 135*   K 4.7 4.2 4.1    104 104   CO2 18* 18* 24   BUN 35* 29* 25*   CREATININE 1.3 1.1 0.9    104 124*   CALCIUM 9.1 8.4* 8.2*   MG  --  2.1 2.0   PHOS  --  3.6 3.3   AST 60* 36 26   ALT 49* 38 32   ALKPHOS 229* 200* 180*   BILITOT 0.9 0.8 0.4   PROT 6.3 5.8* 5.8*   ALBUMIN 2.7* 2.5* 2.4*     Recent Labs   Lab 09/04/22  0708 09/04/22  1136 09/05/22  0341 09/06/22  0329   INR 5.5*  --  3.2* 2.0*   LACTATE 2.3* 2.4* 1.7  --    TROPONINI 0.033*  --  0.032*  --    *  --   --   --      Recent Labs   Lab 09/04/22  1141 09/04/22  2145   PH 7.280* 7.316*   PCO2 46.4* 37.6   PO2 22* 62*   HCO3 21.8* 19.2*       DIAGNOSTIC STUDIES:  CT: Reviewed    Oncological workup:     NM bone scan 8/22:  Numerous tracer avid foci throughout the axial and appendicular skeleton consistent with metastatic disease.  These lesions correspond to mixed lytic and sclerotic lesions on same day CT chest abdomen pelvis.     CT CAP 8/22:  1. Findings of extensive metastatic disease involving soft tissues above and below the diaphragm and osseous structures.  Extensive involvement of the mediastinum with encasement of bronchovascular structures as detailed above.  Suggested potential primary lung and renal with left upper lobe and left renal masses.  2. Pathologic fracture of the right femoral head/neck, L1 vertebral body, and left 6th rib.  3. Small to moderate pericardial effusion, likely malignant.  4. Small left pleural effusion.     MRI brain 8/22:  Multiple punctate scattered foci of diffusion restriction within the right cerebellar hemisphere, left erin and left corona radiata potentially representing acute embolic type  infarcts.  FLAIR hyperintense, T1 isointense fluid overlying the left cerebral convexity and posterior to the right occipital lobe raising concern for small acute subdural hemorrhage.  Heterogeneous areas of calvarial marrow with at least two focal lesions concerning for metastatic disease involving the left occipital and right parietal calvarium.  Partially visualized C3 metastatic disease.    ASSESSMENT:     Donis Jimenez is a 61 y.o. male admitted to [facility] on 9/4/2022 with metastatic lesions and a MARTHA mass concerning for lung cancer. Final path pending. On oncology service for evaluation of inpatient chemotherapy. Thoracic surgery consulted for possible tracheal stent. Patient fluctuated between BiPaP and Comfort flow 40 L. Discussed with patient after reviewing chest CT, right/left main bronchus largely patent, only a short segment with distal tracheal narrowing. Tracheal stent placement could provide little to moderate improvement in respiratory status.       PLAN:  Possible bronchoscopy, short-segment distal tracheal stent on 9/8. Discussed with patient low likelihood for significant improvement in current respiratory stratus, favor prompt chemoradiation   NPO at midnight for tentative procedure on 9/8  INR 2.0, on home warfarin  Agree with rest of care. Discussed with Dr. Gonsalves at bedside  Will follow-up final path        Nancy Stoll MD  Thoracic Surgery

## 2022-09-06 NOTE — PROGRESS NOTES
[facility]  Critical Care Medicine  Progress Note    Patient Name: Donis Jimenez  MRN: [id number]  Admission Date: 9/4/2022  Hospital Length of Stay: 2 days  Code Status: DNR  Attending Provider: Brannon Gonsalves MD  Primary Care Provider: Elio Farias MD   Principal Problem: Sepsis with acute hypoxic respiratory failure    Subjective:     HPI:  Donis Jimenez is a 61-year-old matta with PMH significant for bacterial endocarditis, s/p AV and MV mechanical valve replacement (on warfarin), CKD, tobacco abuse with recent complicated hospital course who presents to the emergency department with shortness of breath.  Difficult for patient to provide history due to tachypnea; spouse at bedside assisting with history.     Patient was admitted 8/18-8/27/22 after sustaining a pathologic right femoral neck fracture and right femoral artery pseudoaneurysm from a fall at home. Patient taken to OR on 8/19 by Vascular Surgery and had embolization of 3rd order right profunda femoral artery pseudoaneurysm with N-BCA glue and 5mm coil aneurysm repair. Pt underwent ight hip hemiarthroplasty with ablation, cementoplasty, and percutaneous fixation of pelvis for pathologic fracture and metastatic disease with Ortho on 8/23. 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 Metastatic work-up done  with CT scan of chest/abdomen and pelvis showing extensive disease including mediastinal mass with encasement of mediastinal vascular structures and airways, MARTHA pulmonary mass, bilateral suprarenal and left renal masses, L1 pathologic fx and rib & skull metastatic disease.     Pt's spouse states that pt had been ambulatory with a walker post-discharge, without c/o SOB, lightheadedness or dizziness. SOB  started ~ 9/2 with productive cough. He denies fever/chills, chest pain, abd pain, N/V/D.     In the ED patient was in a flutter and was cardioverted with some improvement in symptoms. He was found to have right sided pneumonia and was started on vanc and cefepime and admitted to Hospital Medicine for further management.     During his time in the ED, the patient had escalating FiO2 requirements, now on 45L 100% comfort flow.     Critical Care Medicine was consulted for worsening hypoxemic respiratory failure.          Hospital/ICU Course:  Patient admitted to [facility] evening of 9/4 for worsening hypoxemic respiratory failure secondary to pneumonia in setting of lung cancer with extensive metastatic disease. GOC discussed with pt and  who agree that they would not want extreme measures such as CPR and mechanical ventilation given overall poor prognosis. Pt started on vanc, cefepime, azithro and flagyl for pna/post-obstructive pna; alternating between bipap and comfort flow.       Interval History/Significant Events: alternating between bipap and comfort flow. Utilized morphine overnight for anxiety/dyspnea. Reports feeling well this AM, eating breakfast.     Review of Systems   Constitutional:  Positive for appetite change, fatigue and unexpected weight change.   Respiratory:  Positive for cough and shortness of breath. Negative for choking.    Cardiovascular:  Negative for chest pain and palpitations.   Gastrointestinal:  Negative for abdominal pain, constipation, diarrhea and nausea.   Neurological:  Negative for numbness and headaches.   Psychiatric/Behavioral:  Negative for agitation and confusion.    Objective:     Vital Signs (Most Recent):  Temp: 97 °F (36.1 °C) (09/06/22 0701)  Pulse: 87 (09/06/22 0805)  Resp: 16 (09/06/22 0805)  BP: 117/80 (09/06/22 0800)  SpO2: 97 % (09/06/22 0805) Vital Signs (24h Range):  Temp:  [94.8 °F (34.9 °C)-97.7 °F (36.5 °C)] 97 °F (36.1 °C)  Pulse:  [77-96] 87  Resp:  [15-42]  16  SpO2:  [92 %-98 %] 97 %  BP: ()/(58-86) 117/80   Weight: 57.5 kg (126 lb 12.2 oz)  Body mass index is 18.72 kg/m².      Intake/Output Summary (Last 24 hours) at 9/6/2022 0838  Last data filed at 9/6/2022 0800  Gross per 24 hour   Intake 2256.73 ml   Output 3890 ml   Net -1633.27 ml       Physical Exam  Constitutional:       General: He is not in acute distress.     Appearance: He is ill-appearing.   Eyes:      General: No scleral icterus.     Extraocular Movements: Extraocular movements intact.      Pupils: Pupils are equal, round, and reactive to light.   Cardiovascular:      Rate and Rhythm: Normal rate and regular rhythm.      Pulses: Normal pulses.      Heart sounds: Normal heart sounds.   Pulmonary:      Breath sounds: Rhonchi present.      Comments: Comfort flow 40L 60%  Abdominal:      General: Abdomen is flat. Bowel sounds are normal. There is no distension.      Palpations: Abdomen is soft.      Tenderness: There is no abdominal tenderness.   Musculoskeletal:      Right forearm: Edema present.      Left forearm: Edema present.      Comments: Distension of neck veins    Skin:     Capillary Refill: Capillary refill takes less than 2 seconds.      Findings: No bruising or lesion.   Neurological:      General: No focal deficit present.      Mental Status: He is alert.   Psychiatric:         Mood and Affect: Mood normal.         Behavior: Behavior normal.       Vents:  Oxygen Concentration (%): 60 (09/06/22 0805)  Lines/Drains/Airways       Peripheral Intravenous Line  Duration                  Peripheral IV - Single Lumen 09/04/22 0707 18 G Left Upper Arm 2 days         Peripheral IV - Single Lumen 09/05/22 1840 20 G Posterior;Right Hand <1 day                  Significant Labs:    CBC/Anemia Profile:  Recent Labs   Lab 09/04/22  2145 09/05/22  0341 09/06/22  0329   WBC  --  13.89* 9.50   HGB  --  10.3* 10.2*   HCT 33* 31.8* 32.1*   PLT  --  417 400   MCV  --  91 92   RDW  --  20.8* 20.4*         Chemistries:  Recent Labs   Lab 09/05/22  0341 09/06/22  0329   * 135*   K 4.2 4.1    104   CO2 18* 24   BUN 29* 25*   CREATININE 1.1 0.9   CALCIUM 8.4* 8.2*   ALBUMIN 2.5* 2.4*   PROT 5.8* 5.8*   BILITOT 0.8 0.4   ALKPHOS 200* 180*   ALT 38 32   AST 36 26   MG 2.1 2.0   PHOS 3.6 3.3       All pertinent labs within the past 24 hours have been reviewed.    Significant Imaging:  I have reviewed all pertinent imaging results/findings within the past 24 hours.      ABG  Recent Labs   Lab 09/04/22  2145   PH 7.316*   PO2 62*   PCO2 37.6   HCO3 19.2*   BE -7     Assessment/Plan:     Cardiac/Vascular  Atrial flutter  Hx aflutter,  previously on amio  - presented in aflutter with RVR, now s/p DCCV in ED with return to NSR  - PPM in place  - cards consulted, do not recommend ablation due to frailty       H/O mitral valve replacement with mechanical valve  Hx endocarditis s/p MVR and AVR. INR 3.2 on admission     - daily INR  - when INR drops below 2, will initiate heparin gtt to accommodate possible invasive procedures       Pseudoaneurysm of right femoral artery  - S/p embolization of right profunda femoral artery pseudoaneurysm with 5 mm coil 8/20    ID  * Acute hypoxic respiratory failure  Patient with Hypoxic Respiratory failure which is Acute.  he is not on home oxygen. Supplemental oxygen was provided and noted- Oxygen Concentration (%):  [50-90] 60.   Signs/symptoms of respiratory failure include- tachypnea, increased work of breathing and respiratory distress. Contributing diagnoses includes - CHF, Pleural effusion and Pneumonia Labs and images were reviewed. Patient Has recent ABG, which has been reviewed. Will treat underlying causes and adjust management of respiratory failure as follows-     CT showed mediastinal mass with encasement of mediastinal structures, moderate pleural effusions, emphysematous changes and ground glass opacities.     -continue ceftraixone/azithromycin for possible CAP  -med  onc and rad onc following   -malignancy likely contributing to dyspnea and hypoxia -- alternating bipap and comfort flow, morphine 2mg ordered for dsypnea   -plan for bedside chest tube today for moderate pleural effusions seen on CT. FFP given for elevated INR.     Oncology  Malignant neoplasm metastatic to bone  Previously admitted last month for R hip pain s/p fall, imaging showing extensive metastatic disease involving soft tissue above and below diaphragm particularly notable for extensive mediastinal involvement with encasement of bronchovascular structures as well as osseous disease.  Unclear etiology of primary malignancy, however MARTHA mass and L renal mass possible primaries.    Pathologic fracture of the right femoral head/neck, L1 vertebral body, and left 6th rib. Small to moderate pericardial effusion, likely malignant. Small left pleural effusion.     Heme/Onc consulted on admit and recommended MRI of brain to look for brain mets and none noted to brain itself but concerning osseous mets to skull itself. Bone scan done showed numerous tracer avid foci throughout the axial and appendicular skeleton consistent with metastatic disease. These lesions correspond to mixed lytic and sclerotic lesions on same day CT chest abdomen pelvis. Heme/Onc recommended bone biopsy and done by Orthopedics on 8/23 for diagnosis as primary tumor unknown and pathology pending on discharge.    -med onc consulted, rad onc consulted -- pending pathology   -CTS consulted for evaluation for possible tracheal stenting due to mediastinal mass impinging on trachea   - palliative care consulted  - DNR/DNI status as per ACP conversations on admit       Endocrine  Body mass index (BMI) less than 19  in setting of malignancy and decreased appetite.    - nutrition consulted    Orthopedic  Swelling of upper extremity  Noted on previous hospitalization, possibly 2/2 to vascular compression and mass encasing mediastinal structures noted on  CT  - non-occlusive thrombus in L cephalic vein    Pathologic fracture of neck of right femur s/p hemiarthroplasty on 8/23/2022  S/p R hip hemiarthroplasty with ablation, cementoplasty, and percutaneous fixation of pelvis 8/23.    - PT/OT consulted  - prn pain control    Palliative Care  ACP (advance care planning)  Advance Care Planning     Date: 09/04/2022    Code Status  In light of the patients advanced and life limiting illness, I engaged the the patient in a conversation about the patient's preferences for care  at the very end of life. The patient wishes to have a natural, peaceful death.  Along those lines, the patient does not wish to have CPR or other invasive treatments performed when his heart and/or breathing stops. I communicated to the patient that a DNR order would be placed in his medical record to reflect this preference.  I spent a total of 10 minutes engaging the patient in this advance care planning discussion.              Critical Care Daily Checklist:    A: Awake: RASS Goal/Actual Goal: RASS Goal: 0-->alert and calm  Actual: Garcia Agitation Sedation Scale (RASS): Alert and calm   B: Spontaneous Breathing Trial Performed?     C: SAT & SBT Coordinated?  na     D: Delirium: CAM-ICU Overall CAM-ICU: Negative   E: Early Mobility Performed? Yes   F: Feeding Goal:    Status:     Current Diet Order   Procedures    Diet Cardiac      AS: Analgesia/Sedation PRN morphine, PRN versed    T: Thromboembolic Prophylaxis Heparin gtt   H: HOB > 300 Yes   U: Stress Ulcer Prophylaxis (if needed) protonix   G: Glucose Control none   B: Bowel Function Stool Occurrence: 1   I: Indwelling Catheter (Lines & Rodgers) Necessity none   D: De-escalation of Antimicrobials/Pharmacotherapies Ceftriaxone and azithromycin for CAP     Plan for the day/ETD CT placement for pleural effusion     Code Status:  Family/Goals of Care: DNR  Family at bedside and palliative care involved.      Discussed with Dr. Gonsalves .     Dior SARAVIA  MAJOR Ruano  Critical Care Medicine  [facility]    I saw & examined the patient. I personally reviewed all pertinent data in Epic. I discussed the patient and management with the YASEMIN. I reviewed the YASEMIN's note and agree with the documented findings and plan of care with the following additions/modifications:     1) Acute hypoxemic respiratory failure.   - Respiratory status appears improved today with oxygenation at goal on 50% FiO2.  - Intrathoracic tracheal stenosis due to the mediastinal mass in addition to significant burden of cancer within the lung parenchyma. Consulted radiation oncology & medical oncology due to urgent need for treatment. Radiation oncology did not offer treatment at this time. Medical oncology not starting treatment until pathology report is finalized (preliminary report was small cell lung cancer). Appreciate consultation from thoracic surgery; possible bronchoscopy on 9/8.   - Bilateral pleural effusion, L>R. Exudative by Light's criteria with lymphocytes predominant on diff; this is most suggestive of a malignant etiology. Chest tube placed for drainage while awaiting   - COPD/emphysema. Administer duonebs, abx, & solumedrol.  2) Venous outlet obstruction. The brachiocephalic vein appears severely narrowed. This likely accounts for the left upper extremity swelling & engorged collateral veins.  3) Valvular afib (mechanical aortic & mitral valve). Cardioverted in ED. Administering heparin infusion now.  4) Metastatic lung cancer. Preliminary path report was small cell. See discussion under problem #1.   5) Pathologic hip fracture. S/p right hip hemiarthroplasty with ablation, cementoplasty, and percutaneous fixation of pelvis 8/23. Patient is weight bearing as tolerated with posterior hip precautions to right lower extremity as per orthopedics.  6) Goals of care counseling. Reviewed goals of care with pt & . Code status is DNR/DNI (though pt would likely be  willing to put this order on hold for the purpose of invasive procedures). Consult palliative care to continue to discuss. Pt has presented late in the stage of his cancer, & the time window may be insufficient to initiate effective treatments.    Please take sepsis off the problem list.    Brannon Gonsalves MD

## 2022-09-06 NOTE — ASSESSMENT & PLAN NOTE
Noted on previous hospitalization, possibly 2/2 to vascular compression and mass encasing mediastinal structures noted on CT  - non-occlusive thrombus in L cephalic vein

## 2022-09-07 PROBLEM — A41.9 SEPSIS: Status: RESOLVED | Noted: 2022-01-01 | Resolved: 2022-01-01

## 2022-09-07 PROBLEM — C34.90 SMALL CELL CARCINOMA OF LUNG: Status: ACTIVE | Noted: 2022-01-01

## 2022-09-07 NOTE — PROGRESS NOTES
[facility]  Hematology/Oncology  Progress Note    Patient Name: Donis Jimenez  Admission Date: 9/4/2022  Hospital Length of Stay: 3 days  Code Status: DNR     Subjective:     HPI:  61 year old man with newly diagnosed metastatic small cell lung cancer complicated by acute hypoxic respiratory failure.  Was previously treated for pathologic right femoral neck fracture.  Pathology finalized as small cell carcinoma.      Interval History:     Oncology Treatment Plan:   OP SCLC CARBOPLATIN (AUC) ETOPOSIDE DURVALUMAB Q3W FOLLOWED BY MAINTENANCE DURVALUMAB 1500 MG Q4W    Medications:  Continuous Infusions:   amiodarone in dextrose 5% 0.5 mg/min (09/07/22 1426)    heparin (porcine) in D5W 12 Units/kg/hr (09/07/22 1400)     Scheduled Meds:   [START ON 9/8/2022] albuterol-ipratropium  3 mL Nebulization Q8H    aprepitant  130 mg Intravenous 1 time in Clinic/HOD    CARBOplatin (PARAPLATIN) chemo infusion (by AUC)  415 mg Intravenous 1 time in Clinic/HOD    cefTRIAXone (ROCEPHIN) IVPB  2 g Intravenous Q24H    dexamethasone (DECADRON) IVPB  12 mg Intravenous 1 time in Clinic/HOD    EScitalopram oxalate  20 mg Oral QHS    etoposide (VEPESID) chemo infusion  100 mg/m2 (Treatment Plan Recorded) Intravenous Q24H    ferrous gluconate  324 mg Oral Daily    [START ON 9/8/2022] methylPREDNISolone sodium succinate injection  60 mg Intravenous Daily    ondansetron  8 mg Intravenous 1 time in Clinic/HOD    pantoprazole  40 mg Oral Daily    polyethylene glycol  17 g Oral Daily    pravastatin  40 mg Oral QHS    pregabalin  75 mg Oral BID    sodium chloride 0.9% flush bag IVPB   Intravenous 1 time in Clinic/HOD    sodium chloride 0.9%  10 mL Intravenous Q6H    [START ON 9/8/2022] sodium chloride 3%  4 mL Nebulization Q8H    vitamin D  1,000 Units Oral Daily     PRN Meds:sodium chloride, acetaminophen, alteplase, diphenhydrAMINE, EPINEPHrine, heparin, porcine (PF), hydrocortisone sodium succinate,  HYDROmorphone, LORazepam, melatonin, methocarbamoL, ondansetron, sodium chloride 0.9%, sodium chloride 0.9%, sodium chloride 0.9%, sodium chloride 0.9%, Flushing PICC Protocol **AND** sodium chloride 0.9% **AND** sodium chloride 0.9%, sumatriptan     Review of Systems   Constitutional:  Positive for activity change, appetite change and unexpected weight change.   Respiratory:  Positive for shortness of breath.    Neurological:  Positive for weakness.   Psychiatric/Behavioral:  Positive for dysphoric mood. The patient is nervous/anxious.    Objective:     Vital Signs (Most Recent):  Temp: 97.2 °F (36.2 °C) (09/07/22 1100)  Pulse: (!) 127 (09/07/22 1700)  Resp: 17 (09/07/22 1751)  BP: 99/66 (09/07/22 1700)  SpO2: 99 % (09/07/22 1700)   Vital Signs (24h Range):  Temp:  [97.1 °F (36.2 °C)-97.6 °F (36.4 °C)] 97.2 °F (36.2 °C)  Pulse:  [] 127  Resp:  [10-33] 17  SpO2:  [94 %-100 %] 99 %  BP: ()/(60-73) 99/66     Weight: 57.2 kg (126 lb 1.7 oz)  Body mass index is 18.62 kg/m².  Body surface area is 1.67 meters squared.      Intake/Output Summary (Last 24 hours) at 9/7/2022 1831  Last data filed at 9/7/2022 1515  Gross per 24 hour   Intake 913.87 ml   Output 1210 ml   Net -296.13 ml       Physical Exam  Constitutional:       Comments: Pt on high flow O2 per NC. 35 L 30 %   HENT:      Head: Normocephalic and atraumatic.   Eyes:      General: Lids are normal.      Conjunctiva/sclera: Conjunctivae normal.   Cardiovascular:      Rate and Rhythm: Tachycardia present.   Pulmonary:      Effort: Tachypnea present. No accessory muscle usage or respiratory distress.   Abdominal:      General: Abdomen is flat.   Musculoskeletal:      Cervical back: Full passive range of motion without pain and normal range of motion.      Comments: Edema to left arm.    Skin:     General: Skin is warm and dry.   Neurological:      Mental Status: He is alert and oriented to person, place, and time.   Psychiatric:         Attention and  Perception: Attention normal.         Speech: Speech normal.         Behavior: Behavior is cooperative.       Significant Labs:   CBC:   Recent Labs   Lab 09/06/22  0329 09/07/22  0521   WBC 9.50 11.08   HGB 10.2* 9.7*   HCT 32.1* 30.0*    347    and CMP:   Recent Labs   Lab 09/06/22 0329 09/06/22  2130 09/07/22  0521   * 138 137   K 4.1 4.2 4.1    101 103   CO2 24 22* 25   * 146* 133*   BUN 25* 36* 34*   CREATININE 0.9 1.2 1.1   CALCIUM 8.2* 8.3* 8.0*   PROT 5.8*  --  5.5*   ALBUMIN 2.4*  --  2.4*   BILITOT 0.4  --  0.4   ALKPHOS 180*  --  163*   AST 26  --  21   ALT 32  --  24   ANIONGAP 7* 15 9       Diagnostic Results:  [facility] bone pathology results confirm small cell lung cancer.    I have reviewed all pertinent imaging results/findings within the past 24 hours.  X-Ray Pelvis Complete min 3 views    Result Date: 8/23/2022  EXAMINATION: XR HIP WITH PELVIS WHEN PERFORMED, 2 OR 3  VIEWS RIGHT; XR PELVIS JUDET VIEWS; XR PELVIS COMPLETE MIN 3 VIEWS CLINICAL HISTORY: post-op; TECHNIQUE: AP pelvis and cross-table lateral view of the right hip were performed.  Additional inlet and outlet and bilateral Judet views of the pelvis were performed. COMPARISON: Pelvis 08/18/2022. FINDINGS: Postop changes consistent with recent right hip hemiarthroplasty.  Appliance appears intact and in good position.  Air in the soft tissues consistent with recent postop change. Postop change consistent with recent right acetabular ablation procedure with placement of orthopedic screw and cement material which appear similar to intraoperative fluoroscopic images obtained 08/23/2022 earlier same day. Recent postop changes in the right thigh with embolization coils and radiopaque material present for reported pseudoaneurysm.  Suggest correlation with vascular surgical operative report 08/19/2022.     As above. Electronically signed by: Brenton Haq MD Date:    08/23/2022 Time:    23:56    X-Ray Hip 2 or 3  views Right (with Pelvis when performed)    Result Date: 8/23/2022  EXAMINATION: XR HIP WITH PELVIS WHEN PERFORMED, 2 OR 3  VIEWS RIGHT; XR PELVIS JUDET VIEWS; XR PELVIS COMPLETE MIN 3 VIEWS CLINICAL HISTORY: post-op; TECHNIQUE: AP pelvis and cross-table lateral view of the right hip were performed.  Additional inlet and outlet and bilateral Judet views of the pelvis were performed. COMPARISON: Pelvis 08/18/2022. FINDINGS: Postop changes consistent with recent right hip hemiarthroplasty.  Appliance appears intact and in good position.  Air in the soft tissues consistent with recent postop change. Postop change consistent with recent right acetabular ablation procedure with placement of orthopedic screw and cement material which appear similar to intraoperative fluoroscopic images obtained 08/23/2022 earlier same day. Recent postop changes in the right thigh with embolization coils and radiopaque material present for reported pseudoaneurysm.  Suggest correlation with vascular surgical operative report 08/19/2022.     As above. Electronically signed by: Brenton Haq MD Date:    08/23/2022 Time:    23:56    X-Ray Femur 2 View Right    Result Date: 8/18/2022  EXAMINATION: XR FEMUR 2 VIEW RIGHT CLINICAL HISTORY: fx; TECHNIQUE: AP and lateral views of the right femur were performed. COMPARISON: None FINDINGS: Generalized osteopenia.  There is deformity of the proximal right femur compatible with an acute fracture in the subcapital region.  No other fracture or dislocation is seen.  Opaque material in the area of the rectum and right lower quadrant which could represent retained contrast material or possibly ingested medication.     Acute, subcapital fracture of the right femoral neck with some angulation at the fracture site. This report was flagged in Epic as abnormal. Electronically signed by: Ravin Zheng MD Date:    08/18/2022 Time:    08:45    CT Head Without Contrast    Result Date: 9/5/2022  EXAMINATION: CT HEAD  WITHOUT CONTRAST CLINICAL HISTORY: Subdural hemorrhage; TECHNIQUE: Low dose axial CT images obtained throughout the head without intravenous contrast. Sagittal and coronal reconstructions were performed. COMPARISON: CT head 08/24/2022, 08/23/2022; MRI brain 08/22/2022 FINDINGS: Ventricles are stable in size and configuration without acute hydrocephalus. Extra-axial left parasagittal frontoparietal hyperdense thickening along the dura and interhemispheric falx (02:29).  Findings are similar to prior CT examination on 08/24/2022 and better evaluated on prior MR examination. Unchanged punctate hyperdensity along the left parietal subcortical white matter (02:20).  Previously noted left ventral erin subtle hyperdensity is not well visualized.  No new acute intracranial hemorrhage or recent major vascular territory infarct.  No midline shift. No depressed calvarial fracture.  Similar parieto-occipital expansile calvarial lucencies with associated soft tissue extension underlying the inner table concerning for extraosseous extension of metastatic lesions as previously noted. Visualized mastoid air cells and paranasal sinuses are essentially clear.     No evidence of acute intracranial hemorrhage or recent major vascular territory infarct. Redemonstration of calvarial lesions with extraosseous intracranial extension concerning for neoplastic process or metastases. Extra-axial hyperdense thickening along the left parasagittal frontoparietal dura and interhemispheric falx which may represent meningioma or metastatic lesion similar to prior CT examination. Electronically signed by resident: Jose Luis Anderson Date:    09/05/2022 Time:    15:56 Electronically signed by: Francisco Murdock MD Date:    09/05/2022 Time:    18:10    CT Head Without Contrast    Result Date: 8/24/2022  EXAMINATION: CT HEAD WITHOUT CONTRAST CLINICAL HISTORY: Follow up intracranial hemorrhage; TECHNIQUE: Multiple sequential 5 mm axial images of the head without  contrast.  Coronal and sagittal reformatted imaging from the axial acquisition. COMPARISON: 08/23/2022 and MRI 08/22/2022 FINDINGS: Punctate hyperdensity along the left parietal subcortical white matter similar to slightly less apparent from prior which likely corresponds to region of recent infarction seen on MRI concerning for petechial hemorrhage.  Separately there is a stable punctate hyperdensity along the left frontal cortex or possibly in the adjacent sulcus which is unchanged..  There is subtle hyperdensity within the left ventral erin in region of diffusion abnormality seen on MRI which may be area of hemorrhagic infarction with hemorrhagic metastases felt less likely lack of enhancement on MRI. Ventricles stable without hydrocephalus.  Clinical correlation and continued follow-up advised.  There is hypoattenuation and slight volume loss within areas of prior infarction in the cerebellar hemispheres. Heterogeneous erosive expansile calvarial lesions involving the right parietal and left occipital calvarium again seen concerning for osseous metastases. Thin subdural collection seen on MRI below CT resolution.     Continued punctate hyperdensity in the left parietal subcortical white matter which likely correlates with area of question recent infarction seen on MRI concerning for petechial hemorrhage. There is a punctate area of additional hyperdensity in the left frontal sulcus or possibly the cortex which is also unchanged. There is ill-defined hyperdensity in the left dorsal erin in region of additional diffusion signal abnormality seen on MRI which may represent small area of hemorrhage within an infarct however hemorrhagic metastases cannot be excluded overall confounded by lack of enhancement on MRI.  Clinical correlation and follow-up MRI recommended. Stable heterogeneous expansile lesions within the right posterior parietal and left occipital calvarium concerning for osseous metastases. This report  was flagged in Epic as abnormal. Electronically signed by: Pan Baum DO Date:    08/24/2022 Time:    16:14    CT Head Without Contrast    Addendum Date: 8/24/2022    COMMUNICATION This critical result was discovered/received at 08/23/2022 at 23:32.  The critical information above was relayed directly by me by secure chat to KEVEN Allred on 08/24/2022 at 01:55. Electronically signed by resident: Kimberly Burrows Date:    08/24/2022 Time:    01:58 Electronically signed by: Brenton Haq MD Date:    08/24/2022 Time:    02:09    Result Date: 8/24/2022  EXAMINATION: CT HEAD WITHOUT CONTRAST CLINICAL HISTORY: Head trauma, intracranial venous injury suspected; TECHNIQUE: Low dose axial CT images obtained throughout the head without the use of intravenous contrast.  Axial, sagittal, and coronal reconstructions were performed. COMPARISON: MRI brain 08/22/2022. FINDINGS: Intracranial compartment: Mild diffuse cerebral volume loss with compensatory sulcal and ventricular enlargement without evidence of hydrocephalus. Tiny hyperdensity in the left parietal lobe (axial series 2, image 21).  No correlating finding on recent MRI brain.  No parenchymal mass, edema, or major vascular distribution infarct. 3.2 x 1.7 cm isodense extra-axial mass overlying the left cerebral convexity with associated erosion into the overlying calvarium.  No associated mass effect on the underlying brain parenchyma.  1.9 x 1.0 cm isodense extra-axial mass invading into the right parietal calvarium without mass effect on the underlying parenchyma.  No extra-axial blood or fluid collections. Skull/extracranial contents (limited evaluation): No fracture.  Multiple areas of erosion associated with soft tissue mass. Mucous retention cyst in the right maxillary sinus, anterior ethmoid air cells, and left frontal sinus.     Tiny hyperdensity in the left parietal lobe.  Could represent microhemorrhage versus calcification.  Recommend repeat head CT in  8 hours to evaluate for stability.  No corresponding finding identified on prior day's MRI. Two extra-axial soft tissue masses invading into the overlying calvarium concerning for metastatic disease, corresponding to lesions identified on prior day's MRI. This report was flagged in Epic as abnormal. Electronically signed by resident: Kimberly Burrows Date:    08/23/2022 Time:    23:40 Electronically signed by: Brenton Haq MD Date:    08/24/2022 Time:    00:43    CTA Chest Non Coronary    Result Date: 9/4/2022  EXAMINATION: CTA CHEST NON CORONARY CLINICAL HISTORY: Lung/mediastinal abscess; TECHNIQUE: Low dose axial images, sagittal and coronal reformations were obtained from the thoracic inlet to the lung bases following the IV administration of 100 mL of Omnipaque 350.  Contrast timing was optimized to evaluate the pulmonary arteries.  MIP images were performed. COMPARISON: Chest radiograph 09/04/2022, CT chest abdomen pelvis 08/18/2022 FINDINGS: Right-sided pacer device with transvenous leads extend to the heart.  Approximately 4.8 cm left lower cervical soft tissue mass, partially obscured by beam hardening artifact from dense contrast bolus in the left subclavian vein and right chest wall pacer device.  There is encasement and narrowing of the left subclavian artery. Left-sided nonaneurysmal aortic arch with mild calcific atherosclerosis.  Operative change of aortic and mitral valve repair.  Heart is normal size.  No definite pulmonary arterial filling defect to the level of the segmental arteries. Narrowing of the left upper lobe pulmonary arteries. There is a large heterogeneous mediastinal mass with extensive mediastinal invasion measuring on the order of 11.2 x 9.8 cm which circumferentially encases the aortic arch as well as the origins of the brachiocephalic, left carotid artery, and proximal right pulmonary artery.  Encasement with severe narrowing of the left superior pulmonary vein.  Encasement with  associated narrowing of the trachea.  Lesion involves the bilateral domonique, more so on the left with encasement of the left hilar bronchovascular structures and suprahilar extension to the left lung apex. Compared to CT 08/18/2022, there is enlarging now moderate volume left-sided pleural effusion, and new moderate volume right-sided pleural effusion with associated compressive atelectasis of the adjacent lung parenchyma.  Extensive bilateral panlobular emphysematous change with progressive increased attenuation throughout both lungs and superimposed right upper and middle lobe ground-glass opacities.  Bilateral tubular bronchiectasis and peribronchial cuffing.  No pneumothorax. Limited evaluation of the abdomen reveals bilateral suprarenal masses measuring up to 2.4 cm on the right and 2.3 cm on the left, fully characterized on prior above comparison CT. Operative change of median sternotomy.  Stable L1 compression fracture.  Lytic destructive lesion of the left lateral 6th rib.     1. Diffusely infiltrative mediastinal mass with encasement of mediastinal vascular structures and airways, detailed above.  There is asymmetric left hilar and suprahilar extension with large left upper lobe mass. 2. Compared to recent above CT, enlarging moderate volume left-sided pleural effusion and new moderate volume right-sided pleural effusion. 3. Extensive panlobular emphysematous change with progressive increased attenuation throughout both lungs, and new right upper and middle lobe ground-glass opacities. 4. Bilateral suprarenal masses, fully characterized on recent prior CT. 5. Pathologic fracture of the L1 vertebral body. 6. Additional findings above. This report was flagged in Epic as abnormal. Electronically signed by: Emil Echevarria MD Date:    09/04/2022 Time:    16:54    MRI Brain W WO Contrast    Addendum Date: 8/24/2022    Upon further review at 16:17 on 08/24/2022, there is a 6 mm enhancing lesion in the right  cerebellum.  This is concerning for metastatic disease. Case discussed with Dr. Jackson on 08/24/2022 at 16:23. Electronically signed by: Francisco Murdock MD Date:    08/24/2022 Time:    16:27    Result Date: 8/24/2022  EXAMINATION: MRI BRAIN W WO CONTRAST CLINICAL HISTORY: Concern for metastatic disease, unknown primary; TECHNIQUE: Multiplanar multisequence MR imaging of the brain was performed before and after the administration of 6 cc Gadavist intravenous contrast. COMPARISON: None. FINDINGS: Scattered foci of diffusion restriction within the right cerebellar hemisphere, left erin, and left corona radiata potentially representing embolic type infarcts.  No evidence of associated enhancing focus or edema to suggest metastatic disease within these areas of diffusion restriction. FLAIR hyperintense T1 isointense subdural fluid overlying the left near the vertex as well as along the anterior cranial fossa and posterior cranial fossa, as well as a small amount posterior to the right occipital lobe raising concern for acute subdural hemorrhage. Prominence of the ventricles and sulci compatible with mild generalized cerebral volume loss.  No hydrocephalus. Periventricular T2 FLAIR signal hyperintensities within the supratentorial white matter suggestive of chronic microvascular ischemic disease.  No evidence of abnormal parenchymal enhancement.  Small cystic foci within the bilateral cerebellar hemispheres suggestive of encephalomalacia likely remote lacunar infarcts.  Scattered foci of susceptibility signal within the supratentorial and infratentorial parenchyma likely representing remote microhemorrhages. Loss of the flow void within the left transverse sinus, sigmoid sinus, and left internal jugular however contrast images demonstrate patency and likely represent slow flow. There is an enhancing lesion within the occipital calvarium measuring 1.7 x 0.8 cm potentially representing metastatic lesion.  Along the bilateral  parietal calvarium, there is heterogeneous attenuation of the marrow.  Likely additional metastatic lesion is noted along the right parietal calvarium measuring 2.0 x 0.7 cm (series 3, image 140) with a component of extraosseous extension abutting the adjacent dura. There is also abnormal T1 hypointense signal in the C3 vertebral body.     Multiple punctate scattered foci of diffusion restriction within the right cerebellar hemisphere, left erin and left corona radiata potentially representing acute embolic type infarcts. FLAIR hyperintense, T1 isointense fluid overlying the left cerebral convexity and posterior to the right occipital lobe raising concern for small acute subdural hemorrhage. Heterogeneous areas of calvarial marrow with at least two focal lesions concerning for metastatic disease involving the left occipital and right parietal calvarium. Partially visualized C3 metastatic disease.  Dedicated contrast enhanced MRI of the cervical spine may be performed for further evaluation. This report was flagged in Epic as abnormal. Findings discussed with Dr. Guerra by Dr. Donald at 17:50 on 08/22/2022. Electronically signed by resident: Turner Donald Date:    08/22/2022 Time:    17:06 Electronically signed by: Francisco Murdock MD Date:    08/22/2022 Time:    21:16    NM Bone Scan Whole Body    Result Date: 8/18/2022  EXAMINATION: NM BONE SCAN WHOLE BODY CLINICAL HISTORY: Metastatic disease evaluation; TECHNIQUE: Approximately 2-3 hours following the IV administration of 20.4 mCi of [id number], anterior and posterior delayed whole body images were acquired.  Lateral spot images of the skull were also acquired. COMPARISON: CT chest abdomen pelvis 08/18/2022.  CT right thigh 08/18/2022. FINDINGS: Abnormal tracer avid foci in the posterosuperior skull, midcervical spine left 9th rib, right 10th rib, L1, L4, and L5 vertebral bodies, right iliac bone, and right femoral head and neck.  Prominent uptake at L1 is  compatible with compression fracture as observed on CT. There is normal uptake in the genitourinary system and soft tissues.     Numerous tracer avid foci throughout the axial and appendicular skeleton consistent with metastatic disease.  These lesions correspond to mixed lytic and sclerotic lesions on same day CT chest abdomen pelvis. I, Bernard Rice MD, attest that I reviewed and interpreted the images. Electronically signed by resident: Kimberly Burrows Date:    08/18/2022 Time:    15:45 Electronically signed by: Bernard Rice Date:    08/18/2022 Time:    16:23    US Upper Extremity Arteries Left    Result Date: 9/5/2022  EXAMINATION: US UPPER EXTREMITY ARTERIES RIGHT; US UPPER EXTREMITY ARTERIES LEFT CLINICAL HISTORY: r/o clot; TECHNIQUE: Bilateral upper extremity arterial duplex ultrasound examination performed. Multiple gray scale and color doppler images were obtained in addition to waveform analysis. COMPARISON: Ultrasound upper extremity veins performed same day FINDINGS: RIGHT UPPER EXTREMITY: Normal arterial waveforms are demonstrated.  No significant atherosclerotic plaque. The peak systolic velocities on the right are as follows, in centimeters/second: Subclavian artery: 62.7 Axillary artery: 58.4 Brachial artery, proximal: 50.3 Brachial artery, mid: 75.8 Brachial artery, distal: 66.5 Radial artery, proximal: 55.3 Radial artery, distal: 55.9 Ulnar artery, proximal: 73.9 Ulnar artery, distal: 41.2 LEFT UPPER EXTREMITY: Normal arterial waveforms are demonstrated. No significant atherosclerotic plaque. The peak systolic velocities on the left are as follows, in centimeters/second: Subclavian artery: 52.2 Axillary artery: 50.3 Brachial artery, proximal: 67.7 Brachial artery, mid: 70.8 Brachial artery, distal: 59.6 Radial artery, proximal: 48.5 Radial artery, distal: 48.5 Ulnar artery, proximal: 48.5 Ulnar artery, distal: 60.9 Miscellaneous: N/A     No hemodynamically significant stenosis demonstrated in the  bilateral upper extremity arterial system. Electronically signed by resident: Jean Dsouza Date:    09/05/2022 Time:    03:25 Electronically signed by: Godfrey Worrell Date:    09/05/2022 Time:    03:41    CTA Runoff ABD PEL Bilat Lower Ext    Result Date: 8/19/2022  EXAMINATION: CTA RUNOFF ABD PEL BILAT LOWER EXT CLINICAL HISTORY: Aneurysm, pelvis or lower extremity; TECHNIQUE: CTA of abdomen, pelvis, and bilateral lower extremities performed with 125 mL Omnipaque 350 intravenous contrast. COMPARISON: CT thigh and chest abdomen pelvis 08/18/2022 FINDINGS: Aorta: Moderate atherosclerosis of the abdominal aorta and branch vessels.  Celiac, SMA, bilateral renal arteries, and KIRILL are patent. Right: Common iliac artery: Atherosclerosis without significant stenosis. External iliac artery: Atherosclerosis without significant stenosis. Common femoral artery: Atherosclerosis without significant stenosis. Deep femoral artery: Patent. 3.2 x 2.6 cm enhancing structure arising from a branch of the deep femoral artery (series 3, image 334, 347) concerning for pseudoaneurysm.  Branch vessel appears patent just distal to this structure (series 3, image 343). Femoral artery: Focus of prominent plaque with short segment near occlusion (series 3, image 41) and distal reconstitution. Popliteal artery: Atherosclerosis without significant stenosis. Anterior tibial artery: Patent. Posterior tibial artery: Patent. Peroneal artery: Patent. Left: Common iliac artery: Atherosclerosis without significant stenosis. External iliac artery: Atherosclerosis without significant stenosis. Common femoral artery: Atherosclerosis without significant stenosis. Femoral artery: Atherosclerosis without significant stenosis. Popliteal artery: Atherosclerosis without significant stenosis. Anterior tibial artery: Multifocal short segment occlusions. Posterior tibial artery: Patent. Peroneal artery: Patent. Partially visualized pacemaker wires.  Inferior  heart is stable in size.  Stable small pericardial effusion. Stable small left pleural effusion with left lower lobe compressive atelectasis. No focal hepatic lesion.  Vicarious excretion of contrast within the gallbladder.  No biliary ductal dilatation. Spleen, left adrenal gland, and pancreas are unremarkable.  Stable heterogeneous right adrenal mass. Redemonstration of left renal and multiple bilateral pararenal soft tissue masses, similar to prior exam.  No hydronephrosis or ureteral dilatation.  No focal bladder wall thickening. Small and large bowel are normal caliber.  No evidence of bowel obstruction or inflammation.  Large volume stool burden throughout the colon. Small volume abdominopelvic free fluid.  No free intraperitoneal air. Mild generalized body wall edema. Stable appearance of right femoral head neck fracture with associated heterogeneity suspicious for underlying lesion.  Stable appearance of destructive lytic lesions involving the right ilium and L1 vertebral bodies/posterior elements.  Stable height loss of the L1 vertebral body.     1. 3.2 cm enhancing structure arising from a branch of the right deep femoral artery concerning for pseudoaneurysm. 2. Atherosclerosis of the bilateral lower extremity arterial system.  Prominent plaque within the distal right femoral artery with short segment near occlusion and distal reconstitution.  Multifocal short segment occlusions of the left anterior tibial artery. 3. Right femoral head and neck fracture with heterogeneity suspicious for underlying neoplasm. 4. Destructive lytic lesions of the right ilium and L1 vertebral bodies/posterior elements concerning for metastasis. 5. Left renal, multiple bilateral pararenal, and right adrenal lesions concerning for metastasis. 6. Small pericardial effusion and small left pleural effusion. 7. Please see CT chest abdomen pelvis report 08/18/2022 for more complete assessment. This report was flagged in Epic as  "abnormal. Electronically signed by: George Molina Date:    08/19/2022 Time:    13:14    X-Ray Chest AP Portable    Result Date: 9/6/2022  EXAMINATION: XR CHEST AP PORTABLE CLINICAL HISTORY: Verify placement of left midaxillary 14 Tamazight/ 15 cm CT; TECHNIQUE: Single frontal view of the chest was performed. COMPARISON: September 4, 2022 FINDINGS: Left-sided pleural drain, sternal wires, valve replacement, cardiac pacemaker and multiple monitoring leads all noted.  Heart size is similar.  Increased soft tissue right paratracheal and left hilar region concerning for mass lesions.  Increase in the left perihilar soft tissue markings slightly more confluent compared to prior.  No significant pneumothorax.  Decreased volume of left pleural fluid.  Apical pleural thickening on the left persist.  No significant pneumothorax.     Interval placement of left pleural drain with decrease in the volume of left pleural fluid. Probable soft tissue masses right paratracheal and left hilar regions.  Patient has had a recent CT chest. Electronically signed by: Bernard Gandara MD Date:    09/06/2022 Time:    14:50    X-Ray Chest AP Portable    Result Date: 9/4/2022  EXAMINATION: XR CHEST AP PORTABLE CLINICAL HISTORY: Provided history is "Sepsis;  ". TECHNIQUE: One view of the chest. COMPARISON: CT chest, 08/18/2022.  Chest radiograph, 08/18/2022. FINDINGS: Cardiomediastinal silhouette is stable, with widening of the superior mediastinum consistent with known mediastinal mass as seen on prior CT.  Persistent opacification in the left upper lung likely related to mass or consolidation as seen previously.  Moderate-sized left pleural effusion and small right pleural effusion.  Patchy interstitial opacities throughout both lungs.  No distinct pneumothorax.  Pulmonary emphysema is suspected.  Postoperative changes of prior median sternotomy and valve replacement.  Right chest wall cardiac pacing device is present.  Left 6th rib fracture is " noted, though better evaluated on prior CT.     In this patient with known lung and mediastinal mass, there are worsening bilateral interstitial opacities and worsening bilateral pleural effusions (left side greater than right) when compared with 08/18/2022. Electronically signed by: Karson Rios MD Date:    09/04/2022 Time:    07:54    X-Ray Pelvis Routine AP    Result Date: 8/18/2022  EXAMINATION: XR PELVIS ROUTINE AP CLINICAL HISTORY: fx; TECHNIQUE: AP view of the pelvis was performed. COMPARISON: None. FINDINGS: Bones appear somewhat demineralized.  Mild joint space narrowing and hypertrophic spurring about both hips.  Deformity at the proximal right femur suggests an acute fracture at the level of the femoral neck.  No other fracture or dislocation is seen.  There appears to be some opaque material in the area of the rectum and right lower quadrant.  This could represent residual contrast material or ingested medication.     Apparent fracture of the right femoral neck This report was flagged in Epic as abnormal. Electronically signed by: Ravin Zheng MD Date:    08/18/2022 Time:    08:42    US Upper Extremity Arteries Right    Result Date: 9/5/2022  EXAMINATION: US UPPER EXTREMITY ARTERIES RIGHT; US UPPER EXTREMITY ARTERIES LEFT CLINICAL HISTORY: r/o clot; TECHNIQUE: Bilateral upper extremity arterial duplex ultrasound examination performed. Multiple gray scale and color doppler images were obtained in addition to waveform analysis. COMPARISON: Ultrasound upper extremity veins performed same day FINDINGS: RIGHT UPPER EXTREMITY: Normal arterial waveforms are demonstrated.  No significant atherosclerotic plaque. The peak systolic velocities on the right are as follows, in centimeters/second: Subclavian artery: 62.7 Axillary artery: 58.4 Brachial artery, proximal: 50.3 Brachial artery, mid: 75.8 Brachial artery, distal: 66.5 Radial artery, proximal: 55.3 Radial artery, distal: 55.9 Ulnar artery, proximal: 73.9  Ulnar artery, distal: 41.2 LEFT UPPER EXTREMITY: Normal arterial waveforms are demonstrated. No significant atherosclerotic plaque. The peak systolic velocities on the left are as follows, in centimeters/second: Subclavian artery: 52.2 Axillary artery: 50.3 Brachial artery, proximal: 67.7 Brachial artery, mid: 70.8 Brachial artery, distal: 59.6 Radial artery, proximal: 48.5 Radial artery, distal: 48.5 Ulnar artery, proximal: 48.5 Ulnar artery, distal: 60.9 Miscellaneous: N/A     No hemodynamically significant stenosis demonstrated in the bilateral upper extremity arterial system. Electronically signed by resident: Jean Dsouza Date:    09/05/2022 Time:    03:25 Electronically signed by: Godfrey Worrell Date:    09/05/2022 Time:    03:41    VAS US Arterial Leg Right    Result Date: 8/19/2022  Indication ======== Pseudoaneurysm Lower Extremity Arterial Imaging ======================== Right Lower Extremity Rt mid CFA  cm/s Rt prox DFA PSV    89 cm/s Rt prox SFA PSV    365 cm/s Impression ========= Duplex imaging of the right groin reveals a structure with mixed echoes that measures 2.4 cm x 2.9 cm and blood flow adjacent to the DFA. A neck is also visualized in the sagittal plane and measures 5.6 mm in width. These findings are consistent with the ultrasonic appearance of a pseudoaneurysm. DATE OF SERVICE: 08/19/2022                                                   Sonographer: VINCE LOZANO Electronically Signed by: Thomas Nicolas M.D. at 08/19/2022-09:58    SURG FL Surgery Fluoro Usage    Result Date: 8/23/2022  See OP Notes for results. IMPRESSION: See OP Notes for results. This procedure was auto-finalized by: [facility]    Echo    Result Date: 9/6/2022  · The left ventricle is normal in size with concentric remodeling and mildly decreased systolic function. The estimated ejection fraction is 40%. · Normal right ventricular size with normal right ventricular systolic function. · Indeterminate  left ventricular diastolic function. · There is a mechanical mitral valve prosthesis. MV peak velocity < 1.9 m/s, MG 4 mmHg at 89 bpm, PHT < 130, MV VTI/LVOT VTI 1.9. Overall, findings consistent with normal prosthetic valve hemodynamics. · There is a mechanical aortic valve present. Prosthetic aortic valve is normal. · The aortic valve mean gradient is 12 mmHg with a dimensionless index of 0.37. · Normal central venous pressure (3 mmHg). · Small circumferential pericardial effusion. · There is a left pleural effusion.      CT Thigh W W/O Contrast Right    Result Date: 8/18/2022  EXAMINATION: CT THIGH W W/O CONTRAST RIGHT CLINICAL HISTORY: Fracture, femur; TECHNIQUE: 1.25 mm thin cut axial acquisitions of the right hip proximal femur were obtained before and after administration of 50 cc Omnipaque 350 intravenous contrast.  Coronal and sagittal reformats were provided in bone and soft tissue windows administration of intravenous contrast. COMPARISON: CT chest abdomen pelvis 08/18/2022, femur radiograph 08/18/2022, pelvis radiograph 08/18/2022 FINDINGS: Bone/joint: Heterogeneous sclerosis with infiltrative lytic appearance of the right femoral head and neck with displaced subcapital fracture.  Femoral head remains relatively well seated within the acetabulum with 2 cm proximal migration of the femur.  Acetabular cortical margin is intact without additional fracture.  3.0 x 1.8 cm soft tissue lesion along the lateral cortical margin of the right anterior ilium (series 2, image 24) with underlying cortical destruction and likely extension to the anterior aspect of the right iliacus muscle.  Additional sites of lucency with cortical disruption identified in the right hemipelvis.  The knee joint is well aligned without advanced degenerative change. Soft tissues: Cachectic body habitus with atrophy of the right thigh musculature.  Residual intravascular enhancement and urinary bladder contrast likely related to CT earlier  today at 14:25.  There is a 3.2 x 2.8 cm high density structure along the anteromedial margin of the proximal femoral diaphysis, in close proximity to the femoral vessels which enhances on postcontrast imaging concerning for pseudoaneurysm.  Please refer to concurrent CT chest abdomen pelvis for detailed evaluation of intrapelvic structures. Miscellaneous: Mild calcific atherosclerosis.  Fat containing right femoral hernia.     1. Heterogeneous appearance of the right femoral head and neck with displaced likely pathologic subcapital fracture of the femoral head. 2. 3.0 cm soft tissue lesion with underlying cortical destruction of the anterior ilium concerning for additional metastases. 3. Enhancing 3.2 cm high density structure in close proximity to the femoral vessels concerning for pseudoaneurysm.  Correlation advised. 4. Additional findings detailed above. This report was flagged in Epic as abnormal. Emil Echevarria MD discussed findings with Cortez Hagan MD via [employer] secure chat on 08/18/2022 at 16:47. Electronically signed by resident: Emil Echevarria MD Date:    08/18/2022 Time:    15:10 Electronically signed by: Ole Hoff MD Date:    08/18/2022 Time:    17:04    CT Chest Abdomen Pelvis With Contrast (xpd)    Result Date: 8/18/2022  EXAMINATION: CT CHEST ABDOMEN PELVIS WITH CONTRAST (XPD) CLINICAL HISTORY: Metastatic disease evaluation; TECHNIQUE: Low dose axial images were obtained from the thoracic inlet to the pubic symphysis following the intravenous administration of 75cc of Omnipaque 350.  Sagittal and coronal reformats were provided. COMPARISON: Chest radiograph 08/18/2022, pelvic radiograph 08/18/2022, right femur radiograph 08/18/2022 FINDINGS: Prominent left supraclavicular lymphadenopathy.  Dominant left supraclavicular mass lesion measures 5.3 x 4.0 cm (series 2, image 15).  Associated encasement and narrowing of the left subclavian artery. Heterogeneity of the right internal jugular vein  which could represent contrast mixing however cannot exclude thrombus. Generalized subcutaneous edema of the thoracic wall.  Right chest wall cardiac device with transvenous leads noted. Thoracic aorta is normal caliber and contains moderate calcific atherosclerosis.  Heart is normal size.  Small to moderate pericardial effusion.  Postoperative changes of aortic and mitral valves. Extensive invasive soft tissue throughout the mediastinum encasing multiple structures concerning for malignancy.  Encasement of the aortic arch and origins of the brachiocephalic and left carotid arteries.  Encasement of the left pulmonary artery and branches.  Encasement of the proximal right pulmonary artery.  Complete encasement of the left hilum and bronchovascular structures.  Near complete occlusion of the left superior pulmonary vein.  Lesion extends inferiorly along the left anterior mediastinal margin and heart border. Left upper lobe mass measuring 4.5 x 4.3 x 6 cm (series 6, image 141).  Lesion extends inferior to involve the left hilum. Small to moderate left pleural effusion and compressive atelectasis of the left lower lobe. Extensive bilateral centrilobular and panlobular emphysematous changes. No focal hepatic lesion.  Portal veins are patent.  Gallbladder bile ducts unremarkable. Spleen is normal size. Left adrenal gland and pancreas are unremarkable.  Heterogeneous enhancing right adrenal mass measuring 3.7 cm. Heterogeneous enhancing left renal mass measuring 4.4 x 3.8 cm.  Multiple additional enhancing soft tissue lesions throughout the bilateral pararenal abutting the fascial margin.  No hydronephrosis or ureteral dilatation.  Urinary bladder is unremarkable. Small and large bowel are normal caliber.  No evidence of bowel obstruction or inflammation. Small volume abdominopelvic free fluid.  No free intraperitoneal air. Nonspecific hyperattenuating lesion within the right anterior thigh soft tissues measuring 3.1 x 2.7  cm. Right femoral head/neck pathologic fracture associated lytic destructive soft tissue lesion.  Additional lytic lesions of right hemipelvis involving the acetabulum and iliac wing.  Lytic destructive lesion L1 with associated pathologic fracture.  Lytic destructive lesion of the left 6th rib.     1. Findings of extensive metastatic disease involving soft tissues above and below the diaphragm and osseous structures.  Extensive involvement of the mediastinum with encasement of bronchovascular structures as detailed above.  Suggested potential primary lung and renal with left upper lobe and left renal masses. 2. Pathologic fracture of the right femoral head/neck, L1 vertebral body, and left 6th rib. 3. Small to moderate pericardial effusion, likely malignant. 4. Small left pleural effusion. 5. Heterogeneous opacification of the right jugular vein which could represent contrast mixing however cannot exclude thrombus.  Recommend correlation with upper extremity venous ultrasound. 6. Please see above for additional details. This report was flagged in Epic as abnormal. Electronically signed by resident: Jaclyn Ware Date:    08/18/2022 Time:    15:12 Electronically signed by: George Molina Date:    08/18/2022 Time:    17:29    US Upper Extremity Veins Left    Result Date: 9/5/2022  EXAMINATION: US UPPER EXTREMITY VEINS RIGHT; US UPPER EXTREMITY VEINS LEFT CLINICAL HISTORY: r/o clot; TECHNIQUE: Duplex and color flow Doppler evaluation and dynamic compression was performed of the right and left upper extremity veins. COMPARISON: None FINDINGS: Right central veins: The internal jugular, subclavian, and axillary veins are patent and free of thrombus. Right arm veins: The brachial, basilic, and cephalic veins are patent and compressible. Left central veins: The internal jugular, subclavian, and axillary veins are patent and free of thrombus. Left arm veins: There is a nonocclusive thrombosis in the left cephalic vein.  The  brachial, and basilic veins are patent and compressible. Miscellaneous: There is a heterogeneous, non vascularized lobular lesion measuring 4.2 x 5.1 x 4.3 cm in the left neck.  This correlates with the abnormality noted on the CT examination of the chest September 4, 2022     1. No thrombus in central veins of the right or left upper extremity. 2. Nonocclusive thrombus in the superficial left cephalic vein. 3. Heterogeneous, lobular mass lesion in the left neck, as above. Electronically signed by resident: Jean Dsouza Date:    09/05/2022 Time:    03:21 Electronically signed by: Godfrey Worrell Date:    09/05/2022 Time:    03:36    US Upper Extremity Veins Right    Result Date: 9/5/2022  EXAMINATION: US UPPER EXTREMITY VEINS RIGHT; US UPPER EXTREMITY VEINS LEFT CLINICAL HISTORY: r/o clot; TECHNIQUE: Duplex and color flow Doppler evaluation and dynamic compression was performed of the right and left upper extremity veins. COMPARISON: None FINDINGS: Right central veins: The internal jugular, subclavian, and axillary veins are patent and free of thrombus. Right arm veins: The brachial, basilic, and cephalic veins are patent and compressible. Left central veins: The internal jugular, subclavian, and axillary veins are patent and free of thrombus. Left arm veins: There is a nonocclusive thrombosis in the left cephalic vein.  The brachial, and basilic veins are patent and compressible. Miscellaneous: There is a heterogeneous, non vascularized lobular lesion measuring 4.2 x 5.1 x 4.3 cm in the left neck.  This correlates with the abnormality noted on the CT examination of the chest September 4, 2022     1. No thrombus in central veins of the right or left upper extremity. 2. Nonocclusive thrombus in the superficial left cephalic vein. 3. Heterogeneous, lobular mass lesion in the left neck, as above. Electronically signed by resident: Jean Dsouza Date:    09/05/2022 Time:    03:21 Electronically signed  by: Godfrey Worrell Date:    09/05/2022 Time:    03:36    ED US Echo    Result Date: 9/5/2022  Exam : Tereso Quezada POCUS_Cardiac: Exam Information: Exam category:  Diagnostic Indication(s) for Exam: Initial exam, Dyspnea Views Obtained: Parasternal long-axis, Parasternal short-axis, Subxiphoid Findings: Pericardial Effusion:  Present Left Ventricle Ejection Fraction:  Normal (> 55% EF) Gross [place] (RV:LV):  Normal IVC collapsibility:  Plethoric Other findings / comments:  R pleural effusion Interpretation: Pericardial effusion Other:  plethoric IVC, enlarged RA Electronically signed by Zahira Verde on Monday, September 5, 2022 at 11:15 AM    X-Ray Chest 1 View Pre-OP    Result Date: 8/18/2022  EXAMINATION: XR CHEST 1 VIEW PRE-OP CLINICAL HISTORY: preop; TECHNIQUE: Single frontal view of the chest was performed. FINDINGS: There is a moderate amount of left suprahilar and left upper lung zone opacity cannot exclude underlying mass.  Recommend nonemergent follow-up chest CT.  There is a mild amount of left basilar opacity overlying a mild left-sided pleural fluid collection.  The cardiac silhouette is enlarged.  There is a right-sided cardiac defibrillator and patient is status post sternotomy.  There is an artificial cardiac valve.  There is no pneumothorax.  The osseous structures demonstrate degenerative change.     As above. Electronically signed by: Lobo Newell MD Date:    08/18/2022 Time:    07:44    X-Ray Pelvis Judet Views    Result Date: 8/23/2022  EXAMINATION: XR HIP WITH PELVIS WHEN PERFORMED, 2 OR 3  VIEWS RIGHT; XR PELVIS JUDET VIEWS; XR PELVIS COMPLETE MIN 3 VIEWS CLINICAL HISTORY: post-op; TECHNIQUE: AP pelvis and cross-table lateral view of the right hip were performed.  Additional inlet and outlet and bilateral Judet views of the pelvis were performed. COMPARISON: Pelvis 08/18/2022. FINDINGS: Postop changes consistent with recent right hip hemiarthroplasty.  Appliance appears intact and  in good position.  Air in the soft tissues consistent with recent postop change. Postop change consistent with recent right acetabular ablation procedure with placement of orthopedic screw and cement material which appear similar to intraoperative fluoroscopic images obtained 08/23/2022 earlier same day. Recent postop changes in the right thigh with embolization coils and radiopaque material present for reported pseudoaneurysm.  Suggest correlation with vascular surgical operative report 08/19/2022.     As above. Electronically signed by: Brenton Haq MD Date:    08/23/2022 Time:    23:56       Assessment/Plan:     Small cell carcinoma of lung  [facility] pathology report confirms small cell lung cancer from bone specimen.  Explained to him and his  that he is in an unfortunate situation.  He will most certainly die of this cancer rather quickly if not given chemotherapy.  While our intent is to improve his tumor burden causing his hypoxic respiratory failure with chemotherapy, it is also possible that we may potentially hasten his death unintentionally due to chemo toxicity. He and his  understand the risks.  -he is consented for chemo  -labs reviewed; carboplatin renally dosed and will proceed with carboplatin and etoposide.  Etoposide will also be given day 2 and day 3.  We will plan to provide GCSF starting day 4 and will do so for at least 7 doses.  -will follow up in the morning      Malignant neoplasm metastatic to bone  See small cell lung cancer    Pathologic fracture of neck of right femur s/p hemiarthroplasty on 8/23/2022  Management per primary and ortho  When more stable consider postop radiation             Melecio Domingo MD  Hematology/Oncology  [facility]

## 2022-09-07 NOTE — PROCEDURES
"Donis Jimenez is a 61 y.o. male patient.    Temp: 97.2 °F (36.2 °C) (09/07/22 1100)  Pulse: (!) 128 (09/07/22 1600)  Resp: 10 (09/07/22 1600)  BP: 102/70 (09/07/22 1600)  SpO2: 98 % (09/07/22 1600)  Weight: 57.2 kg (126 lb 1.7 oz) (09/06/22 1200)  Height: 5' 9" (175.3 cm) (09/06/22 1200)    PICC  Date/Time: 9/7/2022 5:31 PM  Location procedure was performed: [facility] PICC LINE PLACEMENT  Performed by: Ankush Jacobs RN  Assisting provider: Charissa Chi LPN  Consent Done: Yes  Time out: Immediately prior to procedure a time out was called to verify the correct patient, procedure, equipment, support staff and site/side marked as required  Indications: med administration and vascular access  Anesthesia: local infiltration  Local anesthetic: lidocaine 1% without epinephrine  Anesthetic Total (mL): 2  Preparation: skin prepped with ChloraPrep  Skin prep agent dried: skin prep agent completely dried prior to procedure  Sterile barriers: all five maximum sterile barriers used - cap, mask, sterile gown, sterile gloves, and large sterile sheet  Hand hygiene: hand hygiene performed prior to central venous catheter insertion  Location details: right basilic  Catheter type: double lumen  Catheter size: 5 Fr  Catheter Length: 38cm    Ultrasound guidance: yes  Vessel Caliber: medium and patent, compressibility normal  Vascular Doppler: not done  Needle advanced into vessel with real time Ultrasound guidance.  Guidewire confirmed in vessel.  Image recorded and saved.  Sterile sheath used.  no esophageal manometryNumber of attempts: 1  Post-procedure: blood return through all ports, chlorhexidine patch and sterile dressing applied  Technical procedures used: 3CG  Specimens: No  Implants: No  Assessment: placement verified by x-ray  Complications: none        Name CHARISSA CHI LPN  9/7/2022    "

## 2022-09-07 NOTE — SUBJECTIVE & OBJECTIVE
Interval History/Significant Events: underwent bedside thoracentesis and CT placement for pleural effusions, patient reports improvement in breathing. went into afib overnight rate in the 130s, amio gtt initiated and patient converted to ST in 120s.     Review of Systems   Constitutional:  Positive for appetite change, fatigue and unexpected weight change.   Respiratory:  Positive for cough and shortness of breath. Negative for choking.    Cardiovascular:  Negative for chest pain and palpitations.   Gastrointestinal:  Negative for abdominal pain, constipation, diarrhea and nausea.   Neurological:  Negative for numbness and headaches.   Psychiatric/Behavioral:  Negative for agitation and confusion.    Objective:     Vital Signs (Most Recent):  Temp: 97.6 °F (36.4 °C) (09/07/22 0700)  Pulse: (!) 126 (09/07/22 0758)  Resp: 13 (09/07/22 0758)  BP: 91/62 (09/07/22 0700)  SpO2: 97 % (09/07/22 0758) Vital Signs (24h Range):  Temp:  [96.5 °F (35.8 °C)-97.7 °F (36.5 °C)] 97.6 °F (36.4 °C)  Pulse:  [] 126  Resp:  [10-36] 13  SpO2:  [94 %-99 %] 97 %  BP: ()/(58-80) 91/62   Weight: 57.2 kg (126 lb 1.7 oz)  Body mass index is 18.62 kg/m².      Intake/Output Summary (Last 24 hours) at 9/7/2022 0759  Last data filed at 9/7/2022 0701  Gross per 24 hour   Intake 2351.14 ml   Output 2385 ml   Net -33.86 ml         Physical Exam  Constitutional:       General: He is not in acute distress.     Appearance: He is ill-appearing.   Eyes:      General: No scleral icterus.     Extraocular Movements: Extraocular movements intact.      Pupils: Pupils are equal, round, and reactive to light.   Cardiovascular:      Rate and Rhythm: Normal rate and regular rhythm.      Pulses: Normal pulses.      Heart sounds: Normal heart sounds.   Pulmonary:      Breath sounds: Rhonchi present.      Comments: Comfort flow 40L 60%  Abdominal:      General: Abdomen is flat. Bowel sounds are normal. There is no distension.      Palpations: Abdomen is  soft.      Tenderness: There is no abdominal tenderness.   Musculoskeletal:      Right forearm: Edema present.      Left forearm: Edema present.      Comments: Distension of neck veins    Skin:     Capillary Refill: Capillary refill takes less than 2 seconds.      Findings: No bruising or lesion.   Neurological:      General: No focal deficit present.      Mental Status: He is alert.   Psychiatric:         Mood and Affect: Mood normal.         Behavior: Behavior normal.       Vents:  Oxygen Concentration (%): 35 (09/07/22 0700)  Lines/Drains/Airways       Drain  Duration                  Chest Tube 09/06/22 1300 1 Left Midaxillary 14 Fr. <1 day              Peripheral Intravenous Line  Duration                  Peripheral IV - Single Lumen 09/04/22 0707 18 G Left Upper Arm 3 days         Peripheral IV - Single Lumen 09/06/22 2100 20 G Posterior;Right Forearm <1 day                  Significant Labs:    CBC/Anemia Profile:  Recent Labs   Lab 09/06/22  0329 09/07/22  0521   WBC 9.50 11.08   HGB 10.2* 9.7*   HCT 32.1* 30.0*    347   MCV 92 91   RDW 20.4* 20.4*          Chemistries:  Recent Labs   Lab 09/06/22  0329 09/06/22  2130 09/07/22  0521   * 138 137   K 4.1 4.2 4.1    101 103   CO2 24 22* 25   BUN 25* 36* 34*   CREATININE 0.9 1.2 1.1   CALCIUM 8.2* 8.3* 8.0*   ALBUMIN 2.4*  --  2.4*   PROT 5.8*  --  5.5*   BILITOT 0.4  --  0.4   ALKPHOS 180*  --  163*   ALT 32  --  24   AST 26  --  21   MG 2.0 2.1 2.0   PHOS 3.3 2.1* 2.1*         All pertinent labs within the past 24 hours have been reviewed.    Significant Imaging:  I have reviewed all pertinent imaging results/findings within the past 24 hours.

## 2022-09-07 NOTE — PLAN OF CARE
CMICU DAILY GOALS   Pt on 4L HFNC tolerating well. Chest tube to water seal and put out 395 mL. PICC was inserted for chemotherapy just waiting on x-ray to verify and then chemo will be started.   Pt to be NPO at midnight for cardioversion tomorrow.   Consents for chemo and cardioversion are in the chart.     A: Awake    RASS: Goal - RASS Goal: 0-->alert and calm  Actual - RASS (Garcia Agitation-Sedation Scale): 0-->alert and calm   Restraint necessity:    B: Breathe   SBT: Not intubated   C: Coordinate A & B, analgesics/sedatives   Pain: managed    SAT: Not intubated  D: Delirium   CAM-ICU: Overall CAM-ICU: Negative  E: Early(intubated/ Progressive (non-intubated) Mobility   MOVE Screen: Pass   Activity: Activity Management: Rolling - L1  FAS: Feeding/Nutrition   Diet order: Diet/Nutrition Received: 2 gram sodium, consistent carb/diabetic diet,    T: Thrombus   DVT prophylaxis: VTE Required Core Measure: Pharmacological prophylaxis initiated/maintained  H: HOB Elevation   Head of Bed (HOB) Positioning: HOB at 30 degrees  U: Ulcer Prophylaxis   GI: yes  G: Glucose control   managed    S: Skin   Bathing/Skin Care: linen changed  Device Skin Pressure Protection: skin-to-skin areas padded, skin-to-device areas padded, adhesive use limited  Pressure Reduction Devices: foam padding utilized, positioning supports utilized, pressure-redistributing mattress utilized  Pressure Reduction Techniques: pressure points protected, weight shift assistance provided  Skin Protection: adhesive use limited, incontinence pads utilized, skin-to-device areas padded, transparent dressing maintained, tubing/devices free from skin contact  B: Bowel Function   no issues   I: Indwelling Catheters   Rodgers necessity:     CVC necessity: Yes  D: De-escalation Antibiotics   No    Family/Goals of care/Code Status   Code Status: DNR    24H Vital Sign Range  Temp:  [97.1 °F (36.2 °C)-97.6 °F (36.4 °C)]   Pulse:  []   Resp:  [10-33]   BP:  ()/(60-73)   SpO2:  [94 %-100 %]      Shift Events   See above note    VS and assessment per flow sheet, patient progressing towards goals as tolerated, plan of care reviewed with family, all concerns addressed, will continue to monitor.    Darci Valdez

## 2022-09-07 NOTE — ASSESSMENT & PLAN NOTE
Hx aflutter,  previously on amio.    - presented in aflutter with RVR, now s/p DCCV in ED with return to NSR. 9/7 patient went back into atrial fib and was placed on amio gtt. Converted to ST in 120s.   - PPM in place  - cards consulted, do not recommend ablation due to frailty

## 2022-09-07 NOTE — PROCEDURES
"Donis Jimenez is a 61 y.o. male patient.    Temp: 97.5 °F (36.4 °C) (22)  Pulse: (!) 135 (22)  Resp: 19 (22)  BP: 100/63 (22)  SpO2: 97 % (22)  Weight: 57.2 kg (126 lb 1.7 oz) (22)  Height: 5' 9" (175.3 cm) (22)       Chest Tube Insertion    Date/Time: 2022 10:00 AM  Location procedure was performed: [facility] PULMONARY MEDICINE  Performed by: Brannon Gonsalves MD  Authorized by: Brannon Gonsalves MD   Post-operative diagnosis: pleural effusion  Pre-operative diagnosis: pleural effusion  Consent Done: Yes  Consent: Written consent obtained.  Risks and benefits: risks, benefits and alternatives were discussed  Consent given by: patient  Patient understanding: patient states understanding of the procedure being performed  Patient consent: the patient's understanding of the procedure matches consent given  Procedure consent: procedure consent matches procedure scheduled  Relevant documents: relevant documents present and verified  Test results: test results available and properly labeled  Site marked: the operative site was marked  Imaging studies: imaging studies available  Required items: required blood products, implants, devices, and special equipment available  Patient identity confirmed: , name and MRN  Time out: Immediately prior to procedure a "time out" was called to verify the correct patient, procedure, equipment, support staff and site/side marked as required.  Indications: pleural effusion    Anesthesia:  Local Anesthetic: lidocaine 1% without epinephrine  Anesthetic total: 5 mL  Preparation: skin prepped with ChloraPrep  Placement location: left lateral  Scalpel size: 11  Ultrasound guidance: yes  Tube connected to: water seal  Drainage characteristics: yellow  Drainage amount: 1500 ml  Suture material: 2-0 silk  Dressing: 4x4 sterile gauze  Post-insertion x-ray findings: tube in good position  Patient tolerance: " Patient tolerated the procedure well with no immediate complications  Complications: No  Estimated blood loss (mL): 2  Specimens: Yes  Implants: No    14 German tube  Plastic dilator used for dissection    9/6/2022

## 2022-09-07 NOTE — ASSESSMENT & PLAN NOTE
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 Pt has metastatic lung cancer. Pt is alert, oriented to person, place, and situation. Pt is a DNR. Pt is on high flow O2 @ 35 L 30%.  CHest      Reason for consult: GOC/ACP. Communicated with MAJOR Camp fellow with [facility].      Goals of care/ACP: Pt to have tracheal stenting tomorrow. Pt reports he is in agreement to procedure and would like CC team to speak to Aston about procedure when he visits today. Per pt, he relays information to Aston but he feels like he forgets some of information to tell him. Pt reports his goal is medical management at this time to see if he can improve enough to get back home. Pt reports his goal is to be at home with his four dogs and , Aston. Pt is aware of his severity of his illness.     Today: Met with pt along with Tracie Kidd LCSW.     9/6/22  Introduced role of Palliative care to pt.   Met with pt who is aware of his medical issues. Per pt he is aware he has metastatic cancer. Pt is aware that any therapies/procedures offered to him is palliative and will not cure cancer. Per pt, he is still trying to come to  with his dx. He learned about his issues in August. Pt reports goal at time is to see if anything can be done to help with symptoms and "give him some more time." Spoke to pt about amount of O2 he is on at this time. He verbalized understanding.  Per pt he has spoken to Oncology and XRT MDs.   CTS has been consulted concerning tracheal stenting.      Pt open to continued visits with [facility] care for care planning and symptom management needs.  Support given pt.      Pt reports he is legally  to Aston Collins and he would want him to be hi medical " "decision-maker if he cannot make his own medical decisions. MPOA paperwork left with pt. Education provided. Per Pt, Aston works during day but can be reached by phone.      Code status: DNR.      Symptom management:      Dyspnea r/t to mediastinal mass, pleural effusion.   Pt is on high flow O@ per NC 35 L  @ 30%.  Pt is on Morphine 2 mg IVP q 2 hrs prn.   Pt has chest tube in place and having tracheal stents placed.      Pt reports Morphine "has really helps with my SOB."  Pt reports Morphine lasts around 1-2 hours.      Continue Morphine 2 mg IVP q 2 hours prn.   Will continue to assess.      Debility r/t to pathological femoral neck fracture s/p ight hip hemiarthroplasty with ablation, cementoplasty  Pt was using walker prior to admit.   Would resume PT/OT when pt stable to participate.      Anxiety r/t to new dx and dyspnea.   Pt has Ativan 1 mg tid prn.   Continue.      Plan:   Will continue to meet with pt to reinforce realistic expectations/assit with GOC.   Will follow.         "

## 2022-09-07 NOTE — PLAN OF CARE
START ON PATHWAY REGIMEN - Small Cell Lung    [id number]        Durvalumab (Imfinzi)       Carboplatin       Etoposide (Toposar)       Durvalumab (Imfinzi)           Additional Orders: For patients weighing less than 30 kg, only   weight-based dosing is recommended for durvalumab. For patients weighing greater   than or equal to 30 kg, weight-based or flat dosing can be used based on   indication. See PI for details. Serious immune-mediated adverse events can occur   with durvalumab. Please monitor your patient and refer to the linked   immune-mediated adverse reaction management materials for more information.    **Always confirm dose/schedule in your pharmacy ordering system**    Patient Characteristics:  Newly Diagnosed, Preoperative or Nonsurgical Candidate (Clinical Staging), First   Line, Extensive Stage  Therapeutic Status: Newly Diagnosed, Preoperative or Nonsurgical Candidate   (Clinical Staging)  AJCC T Category: cT4  AJCC N Category: cN2  AJCC M Category: cM1c  AJCC 8 Stage Grouping: IVB  Stage Classification: Extensive    Intent of Therapy:  Non-Curative / Palliative Intent, Discussed with Patient

## 2022-09-07 NOTE — ASSESSMENT & PLAN NOTE
Patient with Hypoxic Respiratory failure which is Acute.  he is not on home oxygen. Supplemental oxygen was provided and noted- Oxygen Concentration (%):  [35-60] 35.   Signs/symptoms of respiratory failure include- tachypnea, increased work of breathing and respiratory distress. Contributing diagnoses includes - CHF, Pleural effusion and Pneumonia Labs and images were reviewed. Patient Has recent ABG, which has been reviewed. Will treat underlying causes and adjust management of respiratory failure as follows-     CT showed mediastinal mass with encasement of mediastinal structures, moderate pleural effusions, emphysematous changes and ground glass opacities.     -continue ceftraixone/azithromycin for CAP  -med onc and rad onc following   -malignancy likely contributing to dyspnea and hypoxia -- alternating bipap and comfort flow, morphine 2mg ordered for dsypnea   -9/7 underwent thoracentesis and CT placement with Dr. Gonsalves.

## 2022-09-07 NOTE — SUBJECTIVE & OBJECTIVE
Interval History: Pt DNR.  Pt to have tracheal stent placed.    Past Medical History:   Diagnosis Date    Endocarditis     Pacemaker     Pneumonia        Past Surgical History:   Procedure Laterality Date    ANGIOGRAPHY OF LOWER EXTREMITY Right 8/19/2022    Procedure: ANGIOGRAM, LOWER EXTREMITY;  Surgeon: Thomas Nicolas MD;  [facility];  Service: Peripheral Vascular;  Laterality: Right;  30.0 min  315.10 mGy  26.1755 Gycm2  84 ml dye    HIP RESURFACING Right 8/23/2022    Procedure: cemontoplasty;  Surgeon: Yung Flores MD;  [facility];  Service: Orthopedics;  Laterality: Right;    RADIOFREQUENCY ABLATION, BONE, PERCUTANEOUS Right 8/23/2022    Procedure: RADIOFREQUENCY ABLATION,BONE;  Surgeon: Yung Flores MD;  [facility];  Service: Orthopedics;  Laterality: Right;    REPAIR, PSEUDOANEURYSM, ARTERY, FEMORAL Right 8/19/2022    Procedure: REPAIR, PSEUDOANEURYSM, ARTERY, FEMORAL;  Surgeon: Thomas Nicolas MD;  [facility];  Service: Peripheral Vascular;  Laterality: Right;  R PFA (3rd branch) pseudoaneurysm        Review of patient's allergies indicates:  No Known Allergies    Medications:  Continuous Infusions:   amiodarone in dextrose 5% 0.5 mg/min (09/07/22 1000)    heparin (porcine) in D5W Stopped (09/07/22 0943)     Scheduled Meds:   albuterol-ipratropium  3 mL Nebulization Q6H    cefTRIAXone (ROCEPHIN) IVPB  2 g Intravenous Q24H    EScitalopram oxalate  20 mg Oral QHS    ferrous gluconate  324 mg Oral Daily    methylPREDNISolone sodium succinate injection  60 mg Intravenous Q12H    pantoprazole  40 mg Oral Daily    pravastatin  40 mg Oral QHS    pregabalin  75 mg Oral BID    sodium chloride 3%  4 mL Nebulization Q4H WAKE    vitamin D  1,000 Units Oral Daily     PRN Meds:sodium chloride, acetaminophen, labetaloL, LORazepam, melatonin, methocarbamoL, midazolam, morphine, ondansetron, oxyCODONE, sodium chloride 0.9%, sumatriptan    Family History     None       Tobacco Use    Smoking status: Every Day     Packs/day: 1.00     Types: Cigarettes    Smokeless tobacco: Never   Substance and Sexual Activity    Alcohol use: Not on file    Drug use: Not on file    Sexual activity: Not on file       Review of Systems   Constitutional:  Positive for activity change, fatigue and unexpected weight change.   Respiratory:  Positive for shortness of breath.    Cardiovascular:  Negative for leg swelling.   Gastrointestinal:  Negative for nausea and vomiting.   Musculoskeletal:  Positive for gait problem.   Skin: Negative.    Neurological:  Positive for weakness.   Psychiatric/Behavioral: Negative.     Objective:     Vital Signs (Most Recent):  Temp: 97.6 °F (36.4 °C) (09/07/22 0700)  Pulse: (!) 127 (09/07/22 1000)  Resp: (!) 26 (09/07/22 1013)  BP: 98/68 (09/07/22 1000)  SpO2: 99 % (09/07/22 1000)   Vital Signs (24h Range):  Temp:  [96.5 °F (35.8 °C)-97.7 °F (36.5 °C)] 97.6 °F (36.4 °C)  Pulse:  [] 127  Resp:  [10-36] 26  SpO2:  [94 %-99 %] 99 %  BP: ()/(58-76) 98/68     Weight: 57.2 kg (126 lb 1.7 oz)  Body mass index is 18.62 kg/m².    Physical Exam  Constitutional:       Comments: Pt on high flow O2 per NC. 35 L 30 %   HENT:      Head: Normocephalic and atraumatic.   Eyes:      General: Lids are normal.      Conjunctiva/sclera: Conjunctivae normal.   Cardiovascular:      Rate and Rhythm: Tachycardia present.   Pulmonary:      Effort: Tachypnea present. No accessory muscle usage or respiratory distress.   Abdominal:      General: Abdomen is flat.   Musculoskeletal:      Cervical back: Full passive range of motion without pain and normal range of motion.      Comments: Edema to left arm.    Skin:     General: Skin is warm and dry.   Neurological:      Mental Status: He is alert and oriented to person, place, and time.   Psychiatric:         Attention and Perception: Attention normal.         Speech: Speech normal.         Behavior: Behavior is cooperative.        Review of Symptoms      Symptom Assessment (ESAS 0-10 Scale)  Pain:  0  Dyspnea:  0  Anxiety:  0  Nausea:  0  Depression:  0  Anorexia:  0  Fatigue:  0  Insomnia:  0  Restlessness:  0  Agitation:  0         ECOG Performance Status thGthrthathdtheth:th th4th Living Arrangements:  Lives with family    Psychosocial/Cultural: Pt legally  to Aston Collins. Per pt Aston works during the day. Per pt, Aston is his care-giver.       Advance Care Planning   Advance Directives:   Living Will: No    Do Not Resuscitate Status: Yes    Medical Power of : No    Agent's Name:  Aston Collins    Decision Making:  Patient answered questions       Significant Labs: All pertinent labs within the past 24 hours have been reviewed.  CBC:   Recent Labs   Lab 09/07/22 0521   WBC 11.08   HGB 9.7*   HCT 30.0*   MCV 91          BMP:  Recent Labs   Lab 09/07/22 0521   *      K 4.1      CO2 25   BUN 34*   CREATININE 1.1   CALCIUM 8.0*   MG 2.0       LFT:  Lab Results   Component Value Date    AST 21 09/07/2022    ALKPHOS 163 (H) 09/07/2022    BILITOT 0.4 09/07/2022     Albumin:   Albumin   Date Value Ref Range Status   09/07/2022 2.4 (L) 3.5 - 5.2 g/dL Final     Protein:   Total Protein   Date Value Ref Range Status   09/07/2022 5.5 (L) 6.0 - 8.4 g/dL Final     Lactic acid:   Lab Results   Component Value Date    LACTATE 1.7 09/05/2022    LACTATE 2.4 (H) 09/04/2022       Significant Imaging: I have reviewed all pertinent imaging results/findings within the past 24 hours.

## 2022-09-07 NOTE — CHAPLAIN
The chaplains explored the pt.'s major concerns and the pt. Narratives were based on his concerns for his partner, chemotherapy, he is terrified, scared, guilty, and financially handicapped.   The two chaplains offered some advocacy help during the visit,  they listened emphatically,  prayed, and offered to follow up with visits. We encouraged the pt. to connect with his nurse since he emphasized that he had no voice. He expressed his confused state of mind. He seems not to be sure about his medical care and worried about what is happening to him.

## 2022-09-07 NOTE — PLAN OF CARE
CMICU DAILY GOALS       A: Awake    RASS: Goal - RASS Goal: 0-->alert and calm  Actual - RASS (Garcia Agitation-Sedation Scale): 0-->alert and calm   Restraint necessity:    B: Breathe   SBT: Not intubated   C: Coordinate A & B, analgesics/sedatives   Pain: managed    SAT: Not intubated  D: Delirium   CAM-ICU: Overall CAM-ICU: Negative  E: Early(intubated/ Progressive (non-intubated) Mobility   MOVE Screen: Fail   Activity: Activity Management: Rolling - L1  FAS: Feeding/Nutrition   Diet order: Diet/Nutrition Received: low saturated fat/low cholesterol, no added salt,    T: Thrombus   DVT prophylaxis: VTE Required Core Measure: Pharmacological prophylaxis initiated/maintained  H: HOB Elevation   Head of Bed (HOB) Positioning: HOB at 30-45 degrees  U: Ulcer Prophylaxis   GI: yes  G: Glucose control   managed    S: Skin   Bathing/Skin Care: bath, partial, dressed/undressed, electrode patches/site rotation, foot care  Device Skin Pressure Protection: absorbent pad utilized/changed, adhesive use limited  Pressure Reduction Devices: specialty bed utilized, foam padding utilized  Pressure Reduction Techniques: weight shift assistance provided  Skin Protection: adhesive use limited, incontinence pads utilized, tubing/devices free from skin contact, transparent dressing maintained  B: Bowel Function   no issues   I: Indwelling Catheters   Rodgers necessity:     CVC necessity: No  D: De-escalation Antibiotics   Yes    Family/Goals of care/Code Status   Code Status: DNR    24H Vital Sign Range  Temp:  [96.5 °F (35.8 °C)-97.7 °F (36.5 °C)]   Pulse:  []   Resp:  [14-36]   BP: ()/(58-80)   SpO2:  [94 %-99 %]      Shift Events   Pt's heart rhythm atrial flutter with heart rate in 140's around 2100. Pt was assessed and BP stable. NP notified of pt's status, labs collected, and EKG sent to [place]. Pt given loading dose amiodarone and started on amiodarone drip. Pt's BP stable overnight and heart rate now 124-126. Pt on  comfort flow overnight; took off bipap due to discomfort.       SULMA ECHEVARRIA

## 2022-09-07 NOTE — SUBJECTIVE & OBJECTIVE
Interval History:     Oncology Treatment Plan:   OP SCLC CARBOPLATIN (AUC) ETOPOSIDE DURVALUMAB Q3W FOLLOWED BY MAINTENANCE DURVALUMAB 1500 MG Q4W    Medications:  Continuous Infusions:   amiodarone in dextrose 5% 0.5 mg/min (09/07/22 1426)    heparin (porcine) in D5W 12 Units/kg/hr (09/07/22 1400)     Scheduled Meds:   [START ON 9/8/2022] albuterol-ipratropium  3 mL Nebulization Q8H    aprepitant  130 mg Intravenous 1 time in Clinic/HOD    CARBOplatin (PARAPLATIN) chemo infusion (by AUC)  415 mg Intravenous 1 time in Clinic/HOD    cefTRIAXone (ROCEPHIN) IVPB  2 g Intravenous Q24H    dexamethasone (DECADRON) IVPB  12 mg Intravenous 1 time in Clinic/HOD    EScitalopram oxalate  20 mg Oral QHS    etoposide (VEPESID) chemo infusion  100 mg/m2 (Treatment Plan Recorded) Intravenous Q24H    ferrous gluconate  324 mg Oral Daily    [START ON 9/8/2022] methylPREDNISolone sodium succinate injection  60 mg Intravenous Daily    ondansetron  8 mg Intravenous 1 time in Clinic/HOD    pantoprazole  40 mg Oral Daily    polyethylene glycol  17 g Oral Daily    pravastatin  40 mg Oral QHS    pregabalin  75 mg Oral BID    sodium chloride 0.9% flush bag IVPB   Intravenous 1 time in Clinic/HOD    sodium chloride 0.9%  10 mL Intravenous Q6H    [START ON 9/8/2022] sodium chloride 3%  4 mL Nebulization Q8H    vitamin D  1,000 Units Oral Daily     PRN Meds:sodium chloride, acetaminophen, alteplase, diphenhydrAMINE, EPINEPHrine, heparin, porcine (PF), hydrocortisone sodium succinate, HYDROmorphone, LORazepam, melatonin, methocarbamoL, ondansetron, sodium chloride 0.9%, sodium chloride 0.9%, sodium chloride 0.9%, sodium chloride 0.9%, Flushing PICC Protocol **AND** sodium chloride 0.9% **AND** sodium chloride 0.9%, sumatriptan     Review of Systems   Constitutional:  Positive for activity change, appetite change and unexpected weight change.   Respiratory:  Positive for shortness of breath.    Neurological:  Positive for weakness.    Psychiatric/Behavioral:  Positive for dysphoric mood. The patient is nervous/anxious.    Objective:     Vital Signs (Most Recent):  Temp: 97.2 °F (36.2 °C) (09/07/22 1100)  Pulse: (!) 127 (09/07/22 1700)  Resp: 17 (09/07/22 1751)  BP: 99/66 (09/07/22 1700)  SpO2: 99 % (09/07/22 1700)   Vital Signs (24h Range):  Temp:  [97.1 °F (36.2 °C)-97.6 °F (36.4 °C)] 97.2 °F (36.2 °C)  Pulse:  [] 127  Resp:  [10-33] 17  SpO2:  [94 %-100 %] 99 %  BP: ()/(60-73) 99/66     Weight: 57.2 kg (126 lb 1.7 oz)  Body mass index is 18.62 kg/m².  Body surface area is 1.67 meters squared.      Intake/Output Summary (Last 24 hours) at 9/7/2022 1831  Last data filed at 9/7/2022 1515  Gross per 24 hour   Intake 913.87 ml   Output 1210 ml   Net -296.13 ml       Physical Exam  Constitutional:       Comments: Pt on high flow O2 per NC. 35 L 30 %   HENT:      Head: Normocephalic and atraumatic.   Eyes:      General: Lids are normal.      Conjunctiva/sclera: Conjunctivae normal.   Cardiovascular:      Rate and Rhythm: Tachycardia present.   Pulmonary:      Effort: Tachypnea present. No accessory muscle usage or respiratory distress.   Abdominal:      General: Abdomen is flat.   Musculoskeletal:      Cervical back: Full passive range of motion without pain and normal range of motion.      Comments: Edema to left arm.    Skin:     General: Skin is warm and dry.   Neurological:      Mental Status: He is alert and oriented to person, place, and time.   Psychiatric:         Attention and Perception: Attention normal.         Speech: Speech normal.         Behavior: Behavior is cooperative.       Significant Labs:   CBC:   Recent Labs   Lab 09/06/22  0329 09/07/22  0521   WBC 9.50 11.08   HGB 10.2* 9.7*   HCT 32.1* 30.0*    347    and CMP:   Recent Labs   Lab 09/06/22  0329 09/06/22 2130 09/07/22  0521   * 138 137   K 4.1 4.2 4.1    101 103   CO2 24 22* 25   * 146* 133*   BUN 25* 36* 34*   CREATININE 0.9 1.2 1.1    CALCIUM 8.2* 8.3* 8.0*   PROT 5.8*  --  5.5*   ALBUMIN 2.4*  --  2.4*   BILITOT 0.4  --  0.4   ALKPHOS 180*  --  163*   AST 26  --  21   ALT 32  --  24   ANIONGAP 7* 15 9       Diagnostic Results:  [facility] bone pathology results confirm small cell lung cancer.    I have reviewed all pertinent imaging results/findings within the past 24 hours.  X-Ray Pelvis Complete min 3 views    Result Date: 8/23/2022  EXAMINATION: XR HIP WITH PELVIS WHEN PERFORMED, 2 OR 3  VIEWS RIGHT; XR PELVIS JUDET VIEWS; XR PELVIS COMPLETE MIN 3 VIEWS CLINICAL HISTORY: post-op; TECHNIQUE: AP pelvis and cross-table lateral view of the right hip were performed.  Additional inlet and outlet and bilateral Judet views of the pelvis were performed. COMPARISON: Pelvis 08/18/2022. FINDINGS: Postop changes consistent with recent right hip hemiarthroplasty.  Appliance appears intact and in good position.  Air in the soft tissues consistent with recent postop change. Postop change consistent with recent right acetabular ablation procedure with placement of orthopedic screw and cement material which appear similar to intraoperative fluoroscopic images obtained 08/23/2022 earlier same day. Recent postop changes in the right thigh with embolization coils and radiopaque material present for reported pseudoaneurysm.  Suggest correlation with vascular surgical operative report 08/19/2022.     As above. Electronically signed by: Brenton Haq MD Date:    08/23/2022 Time:    23:56    X-Ray Hip 2 or 3 views Right (with Pelvis when performed)    Result Date: 8/23/2022  EXAMINATION: XR HIP WITH PELVIS WHEN PERFORMED, 2 OR 3  VIEWS RIGHT; XR PELVIS JUDET VIEWS; XR PELVIS COMPLETE MIN 3 VIEWS CLINICAL HISTORY: post-op; TECHNIQUE: AP pelvis and cross-table lateral view of the right hip were performed.  Additional inlet and outlet and bilateral Judet views of the pelvis were performed. COMPARISON: Pelvis 08/18/2022. FINDINGS: Postop changes consistent with  recent right hip hemiarthroplasty.  Appliance appears intact and in good position.  Air in the soft tissues consistent with recent postop change. Postop change consistent with recent right acetabular ablation procedure with placement of orthopedic screw and cement material which appear similar to intraoperative fluoroscopic images obtained 08/23/2022 earlier same day. Recent postop changes in the right thigh with embolization coils and radiopaque material present for reported pseudoaneurysm.  Suggest correlation with vascular surgical operative report 08/19/2022.     As above. Electronically signed by: Brenton Haq MD Date:    08/23/2022 Time:    23:56    X-Ray Femur 2 View Right    Result Date: 8/18/2022  EXAMINATION: XR FEMUR 2 VIEW RIGHT CLINICAL HISTORY: fx; TECHNIQUE: AP and lateral views of the right femur were performed. COMPARISON: None FINDINGS: Generalized osteopenia.  There is deformity of the proximal right femur compatible with an acute fracture in the subcapital region.  No other fracture or dislocation is seen.  Opaque material in the area of the rectum and right lower quadrant which could represent retained contrast material or possibly ingested medication.     Acute, subcapital fracture of the right femoral neck with some angulation at the fracture site. This report was flagged in Epic as abnormal. Electronically signed by: Ravin Zheng MD Date:    08/18/2022 Time:    08:45    CT Head Without Contrast    Result Date: 9/5/2022  EXAMINATION: CT HEAD WITHOUT CONTRAST CLINICAL HISTORY: Subdural hemorrhage; TECHNIQUE: Low dose axial CT images obtained throughout the head without intravenous contrast. Sagittal and coronal reconstructions were performed. COMPARISON: CT head 08/24/2022, 08/23/2022; MRI brain 08/22/2022 FINDINGS: Ventricles are stable in size and configuration without acute hydrocephalus. Extra-axial left parasagittal frontoparietal hyperdense thickening along the dura and interhemispheric  falx (02:29).  Findings are similar to prior CT examination on 08/24/2022 and better evaluated on prior MR examination. Unchanged punctate hyperdensity along the left parietal subcortical white matter (02:20).  Previously noted left ventral erin subtle hyperdensity is not well visualized.  No new acute intracranial hemorrhage or recent major vascular territory infarct.  No midline shift. No depressed calvarial fracture.  Similar parieto-occipital expansile calvarial lucencies with associated soft tissue extension underlying the inner table concerning for extraosseous extension of metastatic lesions as previously noted. Visualized mastoid air cells and paranasal sinuses are essentially clear.     No evidence of acute intracranial hemorrhage or recent major vascular territory infarct. Redemonstration of calvarial lesions with extraosseous intracranial extension concerning for neoplastic process or metastases. Extra-axial hyperdense thickening along the left parasagittal frontoparietal dura and interhemispheric falx which may represent meningioma or metastatic lesion similar to prior CT examination. Electronically signed by resident: Jose Luis Anderson Date:    09/05/2022 Time:    15:56 Electronically signed by: Francisco Murdock MD Date:    09/05/2022 Time:    18:10    CT Head Without Contrast    Result Date: 8/24/2022  EXAMINATION: CT HEAD WITHOUT CONTRAST CLINICAL HISTORY: Follow up intracranial hemorrhage; TECHNIQUE: Multiple sequential 5 mm axial images of the head without contrast.  Coronal and sagittal reformatted imaging from the axial acquisition. COMPARISON: 08/23/2022 and MRI 08/22/2022 FINDINGS: Punctate hyperdensity along the left parietal subcortical white matter similar to slightly less apparent from prior which likely corresponds to region of recent infarction seen on MRI concerning for petechial hemorrhage.  Separately there is a stable punctate hyperdensity along the left frontal cortex or possibly in the  adjacent sulcus which is unchanged..  There is subtle hyperdensity within the left ventral erin in region of diffusion abnormality seen on MRI which may be area of hemorrhagic infarction with hemorrhagic metastases felt less likely lack of enhancement on MRI. Ventricles stable without hydrocephalus.  Clinical correlation and continued follow-up advised.  There is hypoattenuation and slight volume loss within areas of prior infarction in the cerebellar hemispheres. Heterogeneous erosive expansile calvarial lesions involving the right parietal and left occipital calvarium again seen concerning for osseous metastases. Thin subdural collection seen on MRI below CT resolution.     Continued punctate hyperdensity in the left parietal subcortical white matter which likely correlates with area of question recent infarction seen on MRI concerning for petechial hemorrhage. There is a punctate area of additional hyperdensity in the left frontal sulcus or possibly the cortex which is also unchanged. There is ill-defined hyperdensity in the left dorsal erin in region of additional diffusion signal abnormality seen on MRI which may represent small area of hemorrhage within an infarct however hemorrhagic metastases cannot be excluded overall confounded by lack of enhancement on MRI.  Clinical correlation and follow-up MRI recommended. Stable heterogeneous expansile lesions within the right posterior parietal and left occipital calvarium concerning for osseous metastases. This report was flagged in Epic as abnormal. Electronically signed by: Pan Baum DO Date:    08/24/2022 Time:    16:14    CT Head Without Contrast    Addendum Date: 8/24/2022    COMMUNICATION This critical result was discovered/received at 08/23/2022 at 23:32.  The critical information above was relayed directly by me by secure chat to KEVEN Allred on 08/24/2022 at 01:55. Electronically signed by resident: Kimberly Burrows Date:    08/24/2022 Time:    01:58  Electronically signed by: Brenton Haq MD Date:    08/24/2022 Time:    02:09    Result Date: 8/24/2022  EXAMINATION: CT HEAD WITHOUT CONTRAST CLINICAL HISTORY: Head trauma, intracranial venous injury suspected; TECHNIQUE: Low dose axial CT images obtained throughout the head without the use of intravenous contrast.  Axial, sagittal, and coronal reconstructions were performed. COMPARISON: MRI brain 08/22/2022. FINDINGS: Intracranial compartment: Mild diffuse cerebral volume loss with compensatory sulcal and ventricular enlargement without evidence of hydrocephalus. Tiny hyperdensity in the left parietal lobe (axial series 2, image 21).  No correlating finding on recent MRI brain.  No parenchymal mass, edema, or major vascular distribution infarct. 3.2 x 1.7 cm isodense extra-axial mass overlying the left cerebral convexity with associated erosion into the overlying calvarium.  No associated mass effect on the underlying brain parenchyma.  1.9 x 1.0 cm isodense extra-axial mass invading into the right parietal calvarium without mass effect on the underlying parenchyma.  No extra-axial blood or fluid collections. Skull/extracranial contents (limited evaluation): No fracture.  Multiple areas of erosion associated with soft tissue mass. Mucous retention cyst in the right maxillary sinus, anterior ethmoid air cells, and left frontal sinus.     Tiny hyperdensity in the left parietal lobe.  Could represent microhemorrhage versus calcification.  Recommend repeat head CT in 8 hours to evaluate for stability.  No corresponding finding identified on prior day's MRI. Two extra-axial soft tissue masses invading into the overlying calvarium concerning for metastatic disease, corresponding to lesions identified on prior day's MRI. This report was flagged in Epic as abnormal. Electronically signed by resident: Kimberly Burrows Date:    08/23/2022 Time:    23:40 Electronically signed by: Brenton Haq MD Date:    08/24/2022  Time:    00:43    CTA Chest Non Coronary    Result Date: 9/4/2022  EXAMINATION: CTA CHEST NON CORONARY CLINICAL HISTORY: Lung/mediastinal abscess; TECHNIQUE: Low dose axial images, sagittal and coronal reformations were obtained from the thoracic inlet to the lung bases following the IV administration of 100 mL of Omnipaque 350.  Contrast timing was optimized to evaluate the pulmonary arteries.  MIP images were performed. COMPARISON: Chest radiograph 09/04/2022, CT chest abdomen pelvis 08/18/2022 FINDINGS: Right-sided pacer device with transvenous leads extend to the heart.  Approximately 4.8 cm left lower cervical soft tissue mass, partially obscured by beam hardening artifact from dense contrast bolus in the left subclavian vein and right chest wall pacer device.  There is encasement and narrowing of the left subclavian artery. Left-sided nonaneurysmal aortic arch with mild calcific atherosclerosis.  Operative change of aortic and mitral valve repair.  Heart is normal size.  No definite pulmonary arterial filling defect to the level of the segmental arteries. Narrowing of the left upper lobe pulmonary arteries. There is a large heterogeneous mediastinal mass with extensive mediastinal invasion measuring on the order of 11.2 x 9.8 cm which circumferentially encases the aortic arch as well as the origins of the brachiocephalic, left carotid artery, and proximal right pulmonary artery.  Encasement with severe narrowing of the left superior pulmonary vein.  Encasement with associated narrowing of the trachea.  Lesion involves the bilateral domonique, more so on the left with encasement of the left hilar bronchovascular structures and suprahilar extension to the left lung apex. Compared to CT 08/18/2022, there is enlarging now moderate volume left-sided pleural effusion, and new moderate volume right-sided pleural effusion with associated compressive atelectasis of the adjacent lung parenchyma.  Extensive bilateral  panlobular emphysematous change with progressive increased attenuation throughout both lungs and superimposed right upper and middle lobe ground-glass opacities.  Bilateral tubular bronchiectasis and peribronchial cuffing.  No pneumothorax. Limited evaluation of the abdomen reveals bilateral suprarenal masses measuring up to 2.4 cm on the right and 2.3 cm on the left, fully characterized on prior above comparison CT. Operative change of median sternotomy.  Stable L1 compression fracture.  Lytic destructive lesion of the left lateral 6th rib.     1. Diffusely infiltrative mediastinal mass with encasement of mediastinal vascular structures and airways, detailed above.  There is asymmetric left hilar and suprahilar extension with large left upper lobe mass. 2. Compared to recent above CT, enlarging moderate volume left-sided pleural effusion and new moderate volume right-sided pleural effusion. 3. Extensive panlobular emphysematous change with progressive increased attenuation throughout both lungs, and new right upper and middle lobe ground-glass opacities. 4. Bilateral suprarenal masses, fully characterized on recent prior CT. 5. Pathologic fracture of the L1 vertebral body. 6. Additional findings above. This report was flagged in Epic as abnormal. Electronically signed by: Emil Echevarria MD Date:    09/04/2022 Time:    16:54    MRI Brain W WO Contrast    Addendum Date: 8/24/2022    Upon further review at 16:17 on 08/24/2022, there is a 6 mm enhancing lesion in the right cerebellum.  This is concerning for metastatic disease. Case discussed with Dr. Jackson on 08/24/2022 at 16:23. Electronically signed by: Francisco Murdock MD Date:    08/24/2022 Time:    16:27    Result Date: 8/24/2022  EXAMINATION: MRI BRAIN W WO CONTRAST CLINICAL HISTORY: Concern for metastatic disease, unknown primary; TECHNIQUE: Multiplanar multisequence MR imaging of the brain was performed before and after the administration of 6 cc Gadavist  intravenous contrast. COMPARISON: None. FINDINGS: Scattered foci of diffusion restriction within the right cerebellar hemisphere, left erin, and left corona radiata potentially representing embolic type infarcts.  No evidence of associated enhancing focus or edema to suggest metastatic disease within these areas of diffusion restriction. FLAIR hyperintense T1 isointense subdural fluid overlying the left near the vertex as well as along the anterior cranial fossa and posterior cranial fossa, as well as a small amount posterior to the right occipital lobe raising concern for acute subdural hemorrhage. Prominence of the ventricles and sulci compatible with mild generalized cerebral volume loss.  No hydrocephalus. Periventricular T2 FLAIR signal hyperintensities within the supratentorial white matter suggestive of chronic microvascular ischemic disease.  No evidence of abnormal parenchymal enhancement.  Small cystic foci within the bilateral cerebellar hemispheres suggestive of encephalomalacia likely remote lacunar infarcts.  Scattered foci of susceptibility signal within the supratentorial and infratentorial parenchyma likely representing remote microhemorrhages. Loss of the flow void within the left transverse sinus, sigmoid sinus, and left internal jugular however contrast images demonstrate patency and likely represent slow flow. There is an enhancing lesion within the occipital calvarium measuring 1.7 x 0.8 cm potentially representing metastatic lesion.  Along the bilateral parietal calvarium, there is heterogeneous attenuation of the marrow.  Likely additional metastatic lesion is noted along the right parietal calvarium measuring 2.0 x 0.7 cm (series 3, image 140) with a component of extraosseous extension abutting the adjacent dura. There is also abnormal T1 hypointense signal in the C3 vertebral body.     Multiple punctate scattered foci of diffusion restriction within the right cerebellar hemisphere, left  erin and left corona radiata potentially representing acute embolic type infarcts. FLAIR hyperintense, T1 isointense fluid overlying the left cerebral convexity and posterior to the right occipital lobe raising concern for small acute subdural hemorrhage. Heterogeneous areas of calvarial marrow with at least two focal lesions concerning for metastatic disease involving the left occipital and right parietal calvarium. Partially visualized C3 metastatic disease.  Dedicated contrast enhanced MRI of the cervical spine may be performed for further evaluation. This report was flagged in Epic as abnormal. Findings discussed with Dr. Guerra by Dr. Donald at 17:50 on 08/22/2022. Electronically signed by resident: Turner Donald Date:    08/22/2022 Time:    17:06 Electronically signed by: Francisco Murdock MD Date:    08/22/2022 Time:    21:16    NM Bone Scan Whole Body    Result Date: 8/18/2022  EXAMINATION: NM BONE SCAN WHOLE BODY CLINICAL HISTORY: Metastatic disease evaluation; TECHNIQUE: Approximately 2-3 hours following the IV administration of 20.4 mCi of [id number]-MDP, anterior and posterior delayed whole body images were acquired.  Lateral spot images of the skull were also acquired. COMPARISON: CT chest abdomen pelvis 08/18/2022.  CT right thigh 08/18/2022. FINDINGS: Abnormal tracer avid foci in the posterosuperior skull, midcervical spine left 9th rib, right 10th rib, L1, L4, and L5 vertebral bodies, right iliac bone, and right femoral head and neck.  Prominent uptake at L1 is compatible with compression fracture as observed on CT. There is normal uptake in the genitourinary system and soft tissues.     Numerous tracer avid foci throughout the axial and appendicular skeleton consistent with metastatic disease.  These lesions correspond to mixed lytic and sclerotic lesions on same day CT chest abdomen pelvis. I, Bernard Rice MD, attest that I reviewed and interpreted the images. Electronically signed by resident:  Kimberlyvinod Burrows Date:    08/18/2022 Time:    15:45 Electronically signed by: Bernard Rice Date:    08/18/2022 Time:    16:23    US Upper Extremity Arteries Left    Result Date: 9/5/2022  EXAMINATION: US UPPER EXTREMITY ARTERIES RIGHT; US UPPER EXTREMITY ARTERIES LEFT CLINICAL HISTORY: r/o clot; TECHNIQUE: Bilateral upper extremity arterial duplex ultrasound examination performed. Multiple gray scale and color doppler images were obtained in addition to waveform analysis. COMPARISON: Ultrasound upper extremity veins performed same day FINDINGS: RIGHT UPPER EXTREMITY: Normal arterial waveforms are demonstrated.  No significant atherosclerotic plaque. The peak systolic velocities on the right are as follows, in centimeters/second: Subclavian artery: 62.7 Axillary artery: 58.4 Brachial artery, proximal: 50.3 Brachial artery, mid: 75.8 Brachial artery, distal: 66.5 Radial artery, proximal: 55.3 Radial artery, distal: 55.9 Ulnar artery, proximal: 73.9 Ulnar artery, distal: 41.2 LEFT UPPER EXTREMITY: Normal arterial waveforms are demonstrated. No significant atherosclerotic plaque. The peak systolic velocities on the left are as follows, in centimeters/second: Subclavian artery: 52.2 Axillary artery: 50.3 Brachial artery, proximal: 67.7 Brachial artery, mid: 70.8 Brachial artery, distal: 59.6 Radial artery, proximal: 48.5 Radial artery, distal: 48.5 Ulnar artery, proximal: 48.5 Ulnar artery, distal: 60.9 Miscellaneous: N/A     No hemodynamically significant stenosis demonstrated in the bilateral upper extremity arterial system. Electronically signed by resident: Jean Dsouza Date:    09/05/2022 Time:    03:25 Electronically signed by: Godfrey Worrell Date:    09/05/2022 Time:    03:41    CTA Runoff ABD PEL Bilat Lower Ext    Result Date: 8/19/2022  EXAMINATION: CTA RUNOFF ABD PEL BILAT LOWER EXT CLINICAL HISTORY: Aneurysm, pelvis or lower extremity; TECHNIQUE: CTA of abdomen, pelvis, and bilateral lower extremities  performed with 125 mL Omnipaque 350 intravenous contrast. COMPARISON: CT thigh and chest abdomen pelvis 08/18/2022 FINDINGS: Aorta: Moderate atherosclerosis of the abdominal aorta and branch vessels.  Celiac, SMA, bilateral renal arteries, and KIRILL are patent. Right: Common iliac artery: Atherosclerosis without significant stenosis. External iliac artery: Atherosclerosis without significant stenosis. Common femoral artery: Atherosclerosis without significant stenosis. Deep femoral artery: Patent. 3.2 x 2.6 cm enhancing structure arising from a branch of the deep femoral artery (series 3, image 334, 347) concerning for pseudoaneurysm.  Branch vessel appears patent just distal to this structure (series 3, image 343). Femoral artery: Focus of prominent plaque with short segment near occlusion (series 3, image 41) and distal reconstitution. Popliteal artery: Atherosclerosis without significant stenosis. Anterior tibial artery: Patent. Posterior tibial artery: Patent. Peroneal artery: Patent. Left: Common iliac artery: Atherosclerosis without significant stenosis. External iliac artery: Atherosclerosis without significant stenosis. Common femoral artery: Atherosclerosis without significant stenosis. Femoral artery: Atherosclerosis without significant stenosis. Popliteal artery: Atherosclerosis without significant stenosis. Anterior tibial artery: Multifocal short segment occlusions. Posterior tibial artery: Patent. Peroneal artery: Patent. Partially visualized pacemaker wires.  Inferior heart is stable in size.  Stable small pericardial effusion. Stable small left pleural effusion with left lower lobe compressive atelectasis. No focal hepatic lesion.  Vicarious excretion of contrast within the gallbladder.  No biliary ductal dilatation. Spleen, left adrenal gland, and pancreas are unremarkable.  Stable heterogeneous right adrenal mass. Redemonstration of left renal and multiple bilateral pararenal soft tissue masses,  similar to prior exam.  No hydronephrosis or ureteral dilatation.  No focal bladder wall thickening. Small and large bowel are normal caliber.  No evidence of bowel obstruction or inflammation.  Large volume stool burden throughout the colon. Small volume abdominopelvic free fluid.  No free intraperitoneal air. Mild generalized body wall edema. Stable appearance of right femoral head neck fracture with associated heterogeneity suspicious for underlying lesion.  Stable appearance of destructive lytic lesions involving the right ilium and L1 vertebral bodies/posterior elements.  Stable height loss of the L1 vertebral body.     1. 3.2 cm enhancing structure arising from a branch of the right deep femoral artery concerning for pseudoaneurysm. 2. Atherosclerosis of the bilateral lower extremity arterial system.  Prominent plaque within the distal right femoral artery with short segment near occlusion and distal reconstitution.  Multifocal short segment occlusions of the left anterior tibial artery. 3. Right femoral head and neck fracture with heterogeneity suspicious for underlying neoplasm. 4. Destructive lytic lesions of the right ilium and L1 vertebral bodies/posterior elements concerning for metastasis. 5. Left renal, multiple bilateral pararenal, and right adrenal lesions concerning for metastasis. 6. Small pericardial effusion and small left pleural effusion. 7. Please see CT chest abdomen pelvis report 08/18/2022 for more complete assessment. This report was flagged in Epic as abnormal. Electronically signed by: George Molina Date:    08/19/2022 Time:    13:14    X-Ray Chest AP Portable    Result Date: 9/6/2022  EXAMINATION: XR CHEST AP PORTABLE CLINICAL HISTORY: Verify placement of left midaxillary 14 [place]/ 15 cm CT; TECHNIQUE: Single frontal view of the chest was performed. COMPARISON: September 4, 2022 FINDINGS: Left-sided pleural drain, sternal wires, valve replacement, cardiac pacemaker and multiple  "monitoring leads all noted.  Heart size is similar.  Increased soft tissue right paratracheal and left hilar region concerning for mass lesions.  Increase in the left perihilar soft tissue markings slightly more confluent compared to prior.  No significant pneumothorax.  Decreased volume of left pleural fluid.  Apical pleural thickening on the left persist.  No significant pneumothorax.     Interval placement of left pleural drain with decrease in the volume of left pleural fluid. Probable soft tissue masses right paratracheal and left hilar regions.  Patient has had a recent CT chest. Electronically signed by: Bernard Gandara MD Date:    09/06/2022 Time:    14:50    X-Ray Chest AP Portable    Result Date: 9/4/2022  EXAMINATION: XR CHEST AP PORTABLE CLINICAL HISTORY: Provided history is "Sepsis;  ". TECHNIQUE: One view of the chest. COMPARISON: CT chest, 08/18/2022.  Chest radiograph, 08/18/2022. FINDINGS: Cardiomediastinal silhouette is stable, with widening of the superior mediastinum consistent with known mediastinal mass as seen on prior CT.  Persistent opacification in the left upper lung likely related to mass or consolidation as seen previously.  Moderate-sized left pleural effusion and small right pleural effusion.  Patchy interstitial opacities throughout both lungs.  No distinct pneumothorax.  Pulmonary emphysema is suspected.  Postoperative changes of prior median sternotomy and valve replacement.  Right chest wall cardiac pacing device is present.  Left 6th rib fracture is noted, though better evaluated on prior CT.     In this patient with known lung and mediastinal mass, there are worsening bilateral interstitial opacities and worsening bilateral pleural effusions (left side greater than right) when compared with 08/18/2022. Electronically signed by: Karson Rios MD Date:    09/04/2022 Time:    07:54    X-Ray Pelvis Routine AP    Result Date: 8/18/2022  EXAMINATION: XR PELVIS ROUTINE AP CLINICAL " HISTORY: fx; TECHNIQUE: AP view of the pelvis was performed. COMPARISON: None. FINDINGS: Bones appear somewhat demineralized.  Mild joint space narrowing and hypertrophic spurring about both hips.  Deformity at the proximal right femur suggests an acute fracture at the level of the femoral neck.  No other fracture or dislocation is seen.  There appears to be some opaque material in the area of the rectum and right lower quadrant.  This could represent residual contrast material or ingested medication.     Apparent fracture of the right femoral neck This report was flagged in Epic as abnormal. Electronically signed by: Ravni Zheng MD Date:    08/18/2022 Time:    08:42    US Upper Extremity Arteries Right    Result Date: 9/5/2022  EXAMINATION: US UPPER EXTREMITY ARTERIES RIGHT; US UPPER EXTREMITY ARTERIES LEFT CLINICAL HISTORY: r/o clot; TECHNIQUE: Bilateral upper extremity arterial duplex ultrasound examination performed. Multiple gray scale and color doppler images were obtained in addition to waveform analysis. COMPARISON: Ultrasound upper extremity veins performed same day FINDINGS: RIGHT UPPER EXTREMITY: Normal arterial waveforms are demonstrated.  No significant atherosclerotic plaque. The peak systolic velocities on the right are as follows, in centimeters/second: Subclavian artery: 62.7 Axillary artery: 58.4 Brachial artery, proximal: 50.3 Brachial artery, mid: 75.8 Brachial artery, distal: 66.5 Radial artery, proximal: 55.3 Radial artery, distal: 55.9 Ulnar artery, proximal: 73.9 Ulnar artery, distal: 41.2 LEFT UPPER EXTREMITY: Normal arterial waveforms are demonstrated. No significant atherosclerotic plaque. The peak systolic velocities on the left are as follows, in centimeters/second: Subclavian artery: 52.2 Axillary artery: 50.3 Brachial artery, proximal: 67.7 Brachial artery, mid: 70.8 Brachial artery, distal: 59.6 Radial artery, proximal: 48.5 Radial artery, distal: 48.5 Ulnar artery, proximal: 48.5  Ulnar artery, distal: 60.9 Miscellaneous: N/A     No hemodynamically significant stenosis demonstrated in the bilateral upper extremity arterial system. Electronically signed by resident: Jean Dsouza Date:    09/05/2022 Time:    03:25 Electronically signed by: Godfrey Worrell Date:    09/05/2022 Time:    03:41    VAS US Arterial Leg Right    Result Date: 8/19/2022  Indication ======== Pseudoaneurysm Lower Extremity Arterial Imaging ======================== Right Lower Extremity Rt mid CFA  cm/s Rt prox DFA PSV    89 cm/s Rt prox SFA PSV    365 cm/s Impression ========= Duplex imaging of the right groin reveals a structure with mixed echoes that measures 2.4 cm x 2.9 cm and blood flow adjacent to the DFA. A neck is also visualized in the sagittal plane and measures 5.6 mm in width. These findings are consistent with the ultrasonic appearance of a pseudoaneurysm. DATE OF SERVICE: 08/19/2022                                                   Sonographer: VINCE GASTON RVS Electronically Signed by: Thomas Nicolas M.D. at 08/19/2022-09:58    SURG FL Surgery Fluoro Usage    Result Date: 8/23/2022  See OP Notes for results. IMPRESSION: See OP Notes for results. This procedure was auto-finalized by: [facility]    Echo    Result Date: 9/6/2022  · The left ventricle is normal in size with concentric remodeling and mildly decreased systolic function. The estimated ejection fraction is 40%. · Normal right ventricular size with normal right ventricular systolic function. · Indeterminate left ventricular diastolic function. · There is a mechanical mitral valve prosthesis. MV peak velocity < 1.9 m/s, MG 4 mmHg at 89 bpm, PHT < 130, MV VTI/LVOT VTI 1.9. Overall, findings consistent with normal prosthetic valve hemodynamics. · There is a mechanical aortic valve present. Prosthetic aortic valve is normal. · The aortic valve mean gradient is 12 mmHg with a dimensionless index of 0.37. · Normal central venous pressure (3  mmHg). · Small circumferential pericardial effusion. · There is a left pleural effusion.      CT Thigh W W/O Contrast Right    Result Date: 8/18/2022  EXAMINATION: CT THIGH W W/O CONTRAST RIGHT CLINICAL HISTORY: Fracture, femur; TECHNIQUE: 1.25 mm thin cut axial acquisitions of the right hip proximal femur were obtained before and after administration of 50 cc Omnipaque 350 intravenous contrast.  Coronal and sagittal reformats were provided in bone and soft tissue windows administration of intravenous contrast. COMPARISON: CT chest abdomen pelvis 08/18/2022, femur radiograph 08/18/2022, pelvis radiograph 08/18/2022 FINDINGS: Bone/joint: Heterogeneous sclerosis with infiltrative lytic appearance of the right femoral head and neck with displaced subcapital fracture.  Femoral head remains relatively well seated within the acetabulum with 2 cm proximal migration of the femur.  Acetabular cortical margin is intact without additional fracture.  3.0 x 1.8 cm soft tissue lesion along the lateral cortical margin of the right anterior ilium (series 2, image 24) with underlying cortical destruction and likely extension to the anterior aspect of the right iliacus muscle.  Additional sites of lucency with cortical disruption identified in the right hemipelvis.  The knee joint is well aligned without advanced degenerative change. Soft tissues: Cachectic body habitus with atrophy of the right thigh musculature.  Residual intravascular enhancement and urinary bladder contrast likely related to CT earlier today at 14:25.  There is a 3.2 x 2.8 cm high density structure along the anteromedial margin of the proximal femoral diaphysis, in close proximity to the femoral vessels which enhances on postcontrast imaging concerning for pseudoaneurysm.  Please refer to concurrent CT chest abdomen pelvis for detailed evaluation of intrapelvic structures. Miscellaneous: Mild calcific atherosclerosis.  Fat containing right femoral hernia.     1.  Heterogeneous appearance of the right femoral head and neck with displaced likely pathologic subcapital fracture of the femoral head. 2. 3.0 cm soft tissue lesion with underlying cortical destruction of the anterior ilium concerning for additional metastases. 3. Enhancing 3.2 cm high density structure in close proximity to the femoral vessels concerning for pseudoaneurysm.  Correlation advised. 4. Additional findings detailed above. This report was flagged in Epic as abnormal. Emil Echevarria MD discussed findings with Cortez Hagan MD via [facility] secure chat on 08/18/2022 at 16:47. Electronically signed by resident: Emil Echevarria MD Date:    08/18/2022 Time:    15:10 Electronically signed by: Ole Hoff MD Date:    08/18/2022 Time:    17:04    CT Chest Abdomen Pelvis With Contrast (xpd)    Result Date: 8/18/2022  EXAMINATION: CT CHEST ABDOMEN PELVIS WITH CONTRAST (XPD) CLINICAL HISTORY: Metastatic disease evaluation; TECHNIQUE: Low dose axial images were obtained from the thoracic inlet to the pubic symphysis following the intravenous administration of 75cc of Omnipaque 350.  Sagittal and coronal reformats were provided. COMPARISON: Chest radiograph 08/18/2022, pelvic radiograph 08/18/2022, right femur radiograph 08/18/2022 FINDINGS: Prominent left supraclavicular lymphadenopathy.  Dominant left supraclavicular mass lesion measures 5.3 x 4.0 cm (series 2, image 15).  Associated encasement and narrowing of the left subclavian artery. Heterogeneity of the right internal jugular vein which could represent contrast mixing however cannot exclude thrombus. Generalized subcutaneous edema of the thoracic wall.  Right chest wall cardiac device with transvenous leads noted. Thoracic aorta is normal caliber and contains moderate calcific atherosclerosis.  Heart is normal size.  Small to moderate pericardial effusion.  Postoperative changes of aortic and mitral valves. Extensive invasive soft tissue throughout the  mediastinum encasing multiple structures concerning for malignancy.  Encasement of the aortic arch and origins of the brachiocephalic and left carotid arteries.  Encasement of the left pulmonary artery and branches.  Encasement of the proximal right pulmonary artery.  Complete encasement of the left hilum and bronchovascular structures.  Near complete occlusion of the left superior pulmonary vein.  Lesion extends inferiorly along the left anterior mediastinal margin and heart border. Left upper lobe mass measuring 4.5 x 4.3 x 6 cm (series 6, image 141).  Lesion extends inferior to involve the left hilum. Small to moderate left pleural effusion and compressive atelectasis of the left lower lobe. Extensive bilateral centrilobular and panlobular emphysematous changes. No focal hepatic lesion.  Portal veins are patent.  Gallbladder bile ducts unremarkable. Spleen is normal size. Left adrenal gland and pancreas are unremarkable.  Heterogeneous enhancing right adrenal mass measuring 3.7 cm. Heterogeneous enhancing left renal mass measuring 4.4 x 3.8 cm.  Multiple additional enhancing soft tissue lesions throughout the bilateral pararenal abutting the fascial margin.  No hydronephrosis or ureteral dilatation.  Urinary bladder is unremarkable. Small and large bowel are normal caliber.  No evidence of bowel obstruction or inflammation. Small volume abdominopelvic free fluid.  No free intraperitoneal air. Nonspecific hyperattenuating lesion within the right anterior thigh soft tissues measuring 3.1 x 2.7 cm. Right femoral head/neck pathologic fracture associated lytic destructive soft tissue lesion.  Additional lytic lesions of right hemipelvis involving the acetabulum and iliac wing.  Lytic destructive lesion L1 with associated pathologic fracture.  Lytic destructive lesion of the left 6th rib.     1. Findings of extensive metastatic disease involving soft tissues above and below the diaphragm and osseous structures.   Extensive involvement of the mediastinum with encasement of bronchovascular structures as detailed above.  Suggested potential primary lung and renal with left upper lobe and left renal masses. 2. Pathologic fracture of the right femoral head/neck, L1 vertebral body, and left 6th rib. 3. Small to moderate pericardial effusion, likely malignant. 4. Small left pleural effusion. 5. Heterogeneous opacification of the right jugular vein which could represent contrast mixing however cannot exclude thrombus.  Recommend correlation with upper extremity venous ultrasound. 6. Please see above for additional details. This report was flagged in Epic as abnormal. Electronically signed by resident: Jaclyn Ware Date:    08/18/2022 Time:    15:12 Electronically signed by: George Molina Date:    08/18/2022 Time:    17:29    US Upper Extremity Veins Left    Result Date: 9/5/2022  EXAMINATION: US UPPER EXTREMITY VEINS RIGHT; US UPPER EXTREMITY VEINS LEFT CLINICAL HISTORY: r/o clot; TECHNIQUE: Duplex and color flow Doppler evaluation and dynamic compression was performed of the right and left upper extremity veins. COMPARISON: None FINDINGS: Right central veins: The internal jugular, subclavian, and axillary veins are patent and free of thrombus. Right arm veins: The brachial, basilic, and cephalic veins are patent and compressible. Left central veins: The internal jugular, subclavian, and axillary veins are patent and free of thrombus. Left arm veins: There is a nonocclusive thrombosis in the left cephalic vein.  The brachial, and basilic veins are patent and compressible. Miscellaneous: There is a heterogeneous, non vascularized lobular lesion measuring 4.2 x 5.1 x 4.3 cm in the left neck.  This correlates with the abnormality noted on the CT examination of the chest September 4, 2022     1. No thrombus in central veins of the right or left upper extremity. 2. Nonocclusive thrombus in the superficial left cephalic vein. 3.  Heterogeneous, lobular mass lesion in the left neck, as above. Electronically signed by resident: Jean Dsouza Date:    09/05/2022 Time:    03:21 Electronically signed by: Godfrey Worrell Date:    09/05/2022 Time:    03:36    US Upper Extremity Veins Right    Result Date: 9/5/2022  EXAMINATION: US UPPER EXTREMITY VEINS RIGHT; US UPPER EXTREMITY VEINS LEFT CLINICAL HISTORY: r/o clot; TECHNIQUE: Duplex and color flow Doppler evaluation and dynamic compression was performed of the right and left upper extremity veins. COMPARISON: None FINDINGS: Right central veins: The internal jugular, subclavian, and axillary veins are patent and free of thrombus. Right arm veins: The brachial, basilic, and cephalic veins are patent and compressible. Left central veins: The internal jugular, subclavian, and axillary veins are patent and free of thrombus. Left arm veins: There is a nonocclusive thrombosis in the left cephalic vein.  The brachial, and basilic veins are patent and compressible. Miscellaneous: There is a heterogeneous, non vascularized lobular lesion measuring 4.2 x 5.1 x 4.3 cm in the left neck.  This correlates with the abnormality noted on the CT examination of the chest September 4, 2022     1. No thrombus in central veins of the right or left upper extremity. 2. Nonocclusive thrombus in the superficial left cephalic vein. 3. Heterogeneous, lobular mass lesion in the left neck, as above. Electronically signed by resident: Jean Dsouza Date:    09/05/2022 Time:    03:21 Electronically signed by: Godfrey Worrell Date:    09/05/2022 Time:    03:36    ED US Echo    Result Date: 9/5/2022  Exam : Tereso Quezada POCUS_Cardiac: Exam Information: Exam category:  Diagnostic Indication(s) for Exam: Initial exam, Dyspnea Views Obtained: Parasternal long-axis, Parasternal short-axis, Subxiphoid Findings: Pericardial Effusion:  Present Left Ventricle Ejection Fraction:  Normal (> 55% EF) [place] (RV:LV):   Normal IVC collapsibility:  Plethoric Other findings / comments:  R pleural effusion Interpretation: Pericardial effusion Other:  plethoric IVC, enlarged RA Electronically signed by Zahira Verde on Monday, September 5, 2022 at 11:15 AM    X-Ray Chest 1 View Pre-OP    Result Date: 8/18/2022  EXAMINATION: XR CHEST 1 VIEW PRE-OP CLINICAL HISTORY: preop; TECHNIQUE: Single frontal view of the chest was performed. FINDINGS: There is a moderate amount of left suprahilar and left upper lung zone opacity cannot exclude underlying mass.  Recommend nonemergent follow-up chest CT.  There is a mild amount of left basilar opacity overlying a mild left-sided pleural fluid collection.  The cardiac silhouette is enlarged.  There is a right-sided cardiac defibrillator and patient is status post sternotomy.  There is an artificial cardiac valve.  There is no pneumothorax.  The osseous structures demonstrate degenerative change.     As above. Electronically signed by: Lobo Newell MD Date:    08/18/2022 Time:    07:44    X-Ray Pelvis Judet Views    Result Date: 8/23/2022  EXAMINATION: XR HIP WITH PELVIS WHEN PERFORMED, 2 OR 3  VIEWS RIGHT; XR PELVIS JUDET VIEWS; XR PELVIS COMPLETE MIN 3 VIEWS CLINICAL HISTORY: post-op; TECHNIQUE: AP pelvis and cross-table lateral view of the right hip were performed.  Additional inlet and outlet and bilateral Jud[place] views of the pelvis were performed. COMPARISON: Pelvis 08/18/2022. FINDINGS: Postop changes consistent with recent right hip hemiarthroplasty.  Appliance appears intact and in good position.  Air in the soft tissues consistent with recent postop change. Postop change consistent with recent right acetabular ablation procedure with placement of orthopedic screw and cement material which appear similar to intraoperative fluoroscopic images obtained 08/23/2022 earlier same day. Recent postop changes in the right thigh with embolization coils and radiopaque material present for reported  pseudoaneurysm.  Suggest correlation with vascular surgical operative report 08/19/2022.     As above. Electronically signed by: Brenton Haq MD Date:    08/23/2022 Time:    23:56

## 2022-09-07 NOTE — PLAN OF CARE
Problem: Occupational Therapy  Goal: Occupational Therapy Goal  Description: Goals set on 9/7 with expiration date 9/21:  Patient will increase functional independence with ADLs by performing:    Supine <> Sit with Stand-by Assistance.  Feeding with set up  Grooming while seated at sink with Stand-by Assistance.  UB Dressing with Stand-by Assistanceset up at bed level..  LB Dressing with Stand-by Assistance set up at bed level.  Stand pivot transfer with Min Assistance with DME as needed.  Pt will demonstrate understanding of education provided regarding energy conservation and task modification through teach-back method.       Outcome: Ongoing, Progressing       Once patient is medically stable, patient is safe to discharge home with continued OT services via [facility] and 24/7 assist from family/friends

## 2022-09-07 NOTE — PT/OT/SLP EVAL
"Occupational Therapy   Co-Evaluation and Treatment with PT  Co-treat performed due to acuity and complexity of pt's medical status with 2 skilled disciplines needed to optimize pts functional performance in ICU setting.    Name: Donis Jimenez  MRN: [id number]  Admitting Diagnosis:  Sepsis with acute hypoxic respiratory failure    Length of Stay: 3 days    Recommendations:     Discharge Recommendations: home health OT (24/7 assist)  Discharge Equipment Recommendations:  other (see comments) (TBD pendig progress)  Barriers to discharge:  Inaccessible home environment, Decreased caregiver support    Plan:     Patient to be seen 3 x/week to address the above listed problems via self-care/home management, therapeutic activities, therapeutic exercises  Plan of Care Expires: 10/07/22  Plan of Care Reviewed with: patient    Assessment:     Donis Jimenez is a 61 y.o. male with a medical diagnosis of Sepsis with acute hypoxic respiratory failure.  He presents with the following performance deficits affecting function: weakness, impaired endurance, impaired self care skills, impaired functional mobility, gait instability, impaired balance, decreased lower extremity function, pain, impaired cardiopulmonary response to activity.      Rehab Prognosis: Fair; patient would benefit from acute skilled OT services to address these deficits and reach maximum level of function.       Subjective   Communicated with: RN prior to session.  Patient found HOB elevated with bed alarm, blood pressure cuff, central line, chest tube, oxygen, peripheral IV, pulse ox (continuous), telemetry upon OT entry to room.    Chief Complaint: Shortness of Breath (Pt brought to ED from home via [facility]. Per family pt SOB and productive cough. Pt had double valve replacement, pacemaker and hip surgery 1 week ago. )    Patient/Family Comments/goals: "I'm going to have friends or my  with me all the time"     Pain/Comfort:  Pain Rating 1: " "7/10  Location - Side 1: Left  Location - Orientation 1: generalized  Location 1: chest (chest tube site)  Pain Addressed 1: Distraction, Pre-medicate for activity, Reposition  Pain Rating Post-Intervention 1: 7/10  Pain Rating 2: 8/10  Location - Side 2: Right  Location - Orientation 2: generalized  Location 2: hip  Pain Addressed 2: Reposition, Distraction  Pain Rating Post-Intervention 2: 8/10    Patients cultural, spiritual, Alevism conflicts given the current situation: no    Occupational Profile:  Living Environment: pt lives with spouse in [facility], ramp entrance with R hand rail, tubshower   Prior Level of Function: Patient reports being Mod I with RW with mobility & with ADLs.   Patient uses DME as follows: walker, rolling, wheelchair, bedside commode.   DME owned (not currently used): none.  Roles/Repsonsibilities:   Hand Dominance: right   Work: retired hairdresser.   Drive: no.   Managing Medicines/Managing Home: yes.   Hobbies: enjoys being independent.  Equipment Used at Home:  walker, rolling, wheelchair, bedside commode    Patient reports they will have assistance from family/friends upon discharge.      Objective:     Patient found with: bed alarm, blood pressure cuff, central line, chest tube, oxygen, peripheral IV, pulse ox (continuous), telemetry   General Precautions: Standard, Cardiac aspiration, fall   Orthopedic Precautions:N/A   Braces: N/A   Respiratory Status:   Comfort flow, flow 30 L/min, concentration 34%  Vitals: BP 98/68 (BP Location: Right arm, Patient Position: Lying)   Pulse (!) 126   Temp 97.2 °F (36.2 °C) (Axillary)   Resp 14   Ht 5' 9" (1.753 m)   Wt 57.2 kg (126 lb 1.7 oz)   SpO2 100%   BMI 18.62 kg/m²     Cognitive and Psychosocial Function:   AxOx4 --    Follows Commands/attention:follows two-step commands  Communication:  clear/fluent  Memory: No Deficits noted  Safety awareness/insight to disability: intact   Mood/Affect/Coping skills/emotional control: Appropriate to " situation    Hearing: Intact    Vision:  Intact visual fields    Physical Exam:  Postural examination/scapula alignment:    -       Rounded shoulders  -       Forward head  Skin integrity: Visible skin intact and Thin, potential wound to spine noted by PT, RN applied dressing     BUE WFL for strength, sensation, GM/FM for use during functional tasks.    Occupational Performance:  Bed Mobility:    Patient completed Rolling/Turning to Left with  stand by assistance  Patient completed Rolling/Turning to Right with stand by assistance  Scooting to HOB in supine: stand by assistance  Patient completed Supine to Sit with stand by assistance on L side of bed  Scooting anteriorly to EOB to have both feet planted on floor: stand by assistance  Patient completed Sit to Supine with minimum assistance and with leg lift on L side of bed    Functional Mobility/Transfers:  Static Sitting EOB: SBA  Aprx 10 min for seated ADLs  X1 LOB to L, pt able to self-correct  Dynamic Sitting EOB: CGA  Patient completed Sit <> Stand Transfer with moderate assistance  with  hand-held assist   Static Standing Balance: Mod A w HH assist  X1 side step to HOB      Activities of Daily Living:  Feeding:  modified independence set up at bed level, BUE WFL  Grooming: supervision seated EOB, BUE WFL  Lower Body Dressing: maximal assistance 2* R hip pain, at bed level donning socks      AMPAC 6 Click ADL:  AMPAC Total Score: 20    Treatment & Education:  -OT POC, safety during ADLs and mobility   -Education on energy conservation and task modification to maximize safety and independence  -Questions answered within OT scope of practice.      Patient left HOB elevated with all lines intact, call button in reach, and RN notified    GOALS:   Multidisciplinary Problems       Occupational Therapy Goals          Problem: Occupational Therapy    Goal Priority Disciplines Outcome Interventions   Occupational Therapy Goal     OT, PT/OT Ongoing, Progressing     Description: Goals set on 9/7 with expiration date 9/21:  Patient will increase functional independence with ADLs by performing:    Supine <> Sit with Stand-by Assistance.  Feeding with set up  Grooming while seated at sink with Stand-by Assistance.  UB Dressing with Stand-by Assistanceset up at bed level..  LB Dressing with Stand-by Assistance set up at bed level.  Stand pivot transfer with Min Assistance with DME as needed.  Pt will demonstrate understanding of education provided regarding energy conservation and task modification through teach-back method.                            History:     Past Medical History:   Diagnosis Date    Endocarditis     Pacemaker     Pneumonia          Past Surgical History:   Procedure Laterality Date    ANGIOGRAPHY OF LOWER EXTREMITY Right 8/19/2022    Procedure: ANGIOGRAM, LOWER EXTREMITY;  Surgeon: Thomas iNcolas MD;  Location: [facility] OR [address] [place] [address];  Service: Peripheral Vascular;  Laterality: Right;  30.0 min  315.10 mGy  26.1755 Gycm2  84 ml dye    HIP RESURFACING Right 8/23/2022    Procedure: cemontoplasty;  Surgeon: Yung Flores MD;  [facility];  Service: Orthopedics;  Laterality: Right;    RADIOFREQUENCY ABLATION, BONE, PERCUTANEOUS Right 8/23/2022    Procedure: RADIOFREQUENCY ABLATION,BONE;  Surgeon: Yung Flores MD;  [facility];  Service: Orthopedics;  Laterality: Right;    REPAIR, PSEUDOANEURYSM, ARTERY, FEMORAL Right 8/19/2022    Procedure: REPAIR, PSEUDOANEURYSM, ARTERY, FEMORAL;  Surgeon: Thomas Nicolas MD;  [facility];  Service: Peripheral Vascular;  Laterality: Right;  R PFA (3rd branch) pseudoaneurysm        Time Tracking:       OT Date of Treatment: 09/07/22  OT Start Time: 0927  OT Stop Time: 0958  OT Total Time (min): 31 min  Additional staff present: PT      Billable Minutes:Evaluation 7  Self Care/Home Management 23 9/7/2022

## 2022-09-07 NOTE — PLAN OF CARE
Problem: Physical Therapy  Goal: Physical Therapy Goal  Description: Goals to be met by: 10/3/22     Patient will increase functional independence with mobility by performin. Supine to sit with Modified [place]  2. Sit to stand transfer with Contact Guard Assistance with RW   3. Gait  x 50 feet with Contact Guard Assistance using Rolling Walker.   4 15. Lower extremity exercise program x 15 reps per  with assistance as needed    Outcome: Ongoing, Progressing   Evaluation completed and goals appropriate. 2022

## 2022-09-07 NOTE — HPI
61 year old man with newly diagnosed metastatic small cell lung cancer complicated by acute hypoxic respiratory failure.  Was previously treated for pathologic right femoral neck fracture.  Pathology finalized as small cell carcinoma. Over his stay in ICU, his O2 requirments were steadily reduced, he is now on nasal cannula. Patient went into afib with RVR and needed cardioversion. He is currently undergoing inpatient chemotherapy with carboplatin (day 1) and etoposide (day 1,2,3). Will need GCSF on day 4.     Patient now feeling anxious about new diagnosis. Psych onc saw patient, will consider consulting psych for new onset anxiety and panic attacks if patient does not improve.

## 2022-09-07 NOTE — PLAN OF CARE
"Advance Care Planning   [facility] - Cardiac Medical ICU  Palliative Care   Psychosocial Assessment    Patient Name: Donis Jimenez  MRN: [id number]  Admission Date: 9/4/2022  Hospital Length of Stay: 3 days  Code Status: DNR   Attending Provider: Brannon Gonsalves MD  Palliative Care Provider: SALBADOR Spencer, APRN, AGCNS  Primary Care Physician: Elio Farias MD  Principal Problem:Sepsis with acute hypoxic respiratory failure    Reason for Referral: assistance with clarification of goals of care  Consult Order Date: 9/5/22   Primary CM/SW: Daphney Pryor LMSW    Present during Interview: patient.      Primary Language:English   Needed: no      Past Medical Situation:   PMH:   Past Medical History:   Diagnosis Date    Endocarditis     Pacemaker     Pneumonia      Mental Health/Substance Use History: n/a  Risk of Abuse, neglect or exploitation: none noted  Current or Previous Trauma and/or evidence of PTSD: none noted  Non-traditional Health practices:     Understanding of diagnosis and prognosis: Pt appears to understand his limited prognosis. Will benefit from continued follow up.   Experience/Comfort level with health care system:    Patients Mental Status: Alert and oriented.    Socio-Economic Factors/Resources:  Address: [address] [place] [address]  Phone Number: [phone] (home)     Marital Status:  x 31 years. Per pt, Legally .  Household composition: lives with  Aston  Children: none. 4 dogs    Patient/Family perceptions about Caregiving Needs; availability and capacity: return home with     Family Structure, Dynamics/Relationships: Pt has been "legally " for 31 years. Main support system in [place]. Has few friends that are supportive but no other extended family    Patterns/Styles of Communication and Decision-making in the Family:  Supportive family    Patient/Family Strengths/Resilience: faithful. family  Patient/Family Coping " Style: adjusting expectations, seeking support    Activities of Daily Living: assistance with adls  Support Systems-Family & Community (Home Health, HME etc): n/a    Transportation:  yes    Work/Education History: Pt is disabled. He worked as a  until   Self-Care Activities/Hobbies: enjoys walking on the beach, shopping. Enjoys 70s music     History: no    Financial Resources: [employer] Medicaid      Advanced Care Planning & Legal Concerns:   Advanced Directives/Living Will: no  LaPOST/POLST: no   Planning:  no    Medical Power of : no     Oral/Written Declaration: no  Witnesses:   Surrogate Decision Maker: Miki Colon-     Emergency Contacts:  : Aston Colon: [phone]    Spirituality, Culture & Coping Mechanisms:  F- Geno and Belief: Episcopalian     I - Importance: strong geno    C - Community/Culture Values:     A - Address in Care: Open to spiritual care.       Goals/Hopes/Expectations: return home. Continue treatment.   Fears/Anxiety/Concerns: concerned about symptoms        Preferences about EOL Environment: (own bed, family nearby, pets, music, etc)  Home with family.     Complicated Bereavement Risk Assessment Tool (CBRAT)  Reference:  [facility] Clinical Practice Group (May 2016). Bereavement Risk Screening and Management Guidelines.  Retrieved from: http://www.grpcc.com.au/wp-content/uploads//SGCFF-Ebhbczxiggz-Drrgszhvj-and-Management-Guideline-2016.pdf      Bereaved Client Characteristics   Under 18      no  Was a Twin   no  Young Spouse   no  Elderly Spouse    no  Isolated    no  Lacks Meaningful Social Support   yes  Dissatisfied with help available during illness   no  New to [employer] no  New to Decision-Making   no    Illness  Inherited Disorder   no  Stigmatized Disease in the family/community   no  Lengthy/Burdensome   no     Bereaved Client's History of Loss   Cumulative  Multiple Losses   no  Previous Mental Health Illnesses   no  Current Mental Health Illness   no  Other Significant Health Issues   no   Migrant/Refugee   no Will the Death be:  Sudden or Unexpected   yes  Traumatic Circumstances Associated with Death   no  Significant Cultural/Social Burdens as a result of Death   no   Relationship with   Profound Lifelong Partner   yes  Highly Dependent    no  Antagonistic   no  Ambivalent   no  Deeply Connected   yes  Culturally Defined   no   Risk Factors Scores  0-2  Low  3-5  Moderate  5+  High  All persons scoring moderate to high presume to be at risk**    (** It is acknowledged that protective factors and resilience may outweigh apparent risk factors.      Total Risk Factors Score:   Mild to Moderate    Will benefit from close follow up and bereavement support.       Discharge Planning Needs/Plan of Care:     Visit to bedside with [facility] APRN. Introduced role of  as part of Palliative Medicine team. Pt stated that his goal is to be able to go home with Narendra and his 4 dogs. Pt stated that the plan is to have tracheal stenting tomorrow in hopes that this will improve his symptoms.     Support provided to pt. Pt open to  visits.      Tracie Kidd, JASPREETW, ACHP-SW

## 2022-09-07 NOTE — PT/OT/SLP EVAL
Physical Therapy  Co-Evaluation and co-treatment with OT    Patient Name:  Donis Jimenez   MRN:  [id number]    Recommendations:     Discharge Recommendations:  home health PT   Discharge Equipment Recommendations:  (will determine DME needs closer to discharge)   Barriers to discharge: None    Assessment:     Donis Jimenez is a 61 y.o. male admitted with a medical diagnosis of Sepsis with acute hypoxic respiratory failure.  He presents with the following impairments/functional limitations:  impaired endurance, weakness, impaired functional mobility, gait instability, impaired balance, decreased safety awareness, pain, decreased lower extremity function pt tolerated treatment well. At current functional level, pt is increased burden of care and increased risk of falls. Pt will benefit from cont skilled PT 3x/wk to progress physically. Pt should be able to discharge home with HHPT when medically stable. Pt presented to ED with SOB. Pt was recently in [facility] s/p R Hip hemiarthroplasty 8/23 with posterior precautions and WBAT.     Rehab Prognosis: Good; patient would benefit from acute skilled PT services to address these deficits and reach maximum level of function.    Recent Surgery: Procedure(s) (LRB):  Bronchoscopy (N/A)  INSERTION, STENT, BRONCHUS (N/A)      Plan:     During this hospitalization, patient to be seen 3 x/week to address the identified rehab impairments via gait training, therapeutic activities, therapeutic exercises and progress toward the following goals:    Plan of Care Expires:  10/03/22    Subjective     Chief Complaint: pt c/o pain during treatment.   Patient/Family Comments/goals: to be able to do more for myself and go home.   Pain/Comfort:  Pain Rating Post-Intervention 1:  (8 R hip, 7 L flank)  Pain Addressed 2: Reposition, Distraction  Pain Rating Post-Intervention 2:  (see above)    Patients cultural, spiritual, Sabianist conflicts given the current situation: no    Living Environment:  Pt is  medically disabled and lives with his  ( works full-time). They live in 1 story with ramp entrance.   Prior to admission, patients level of function was modified Independent using RW since hip sx.   Equipment used at home: walker, rolling, bedside commode, wheelchair.  DME owned (not currently used): none.  Upon discharge, patient will have assistance from  and friends.    Objective:     Communicated with nurse prior to session.  Pt had increased HR prior to and during treatment. RN approved treatment.  Patient found supine with telemetry, pulse ox (continuous), blood pressure cuff, oxygen, peripheral IV , pacemaker upon PT entry to room.    General Precautions: Standard, fall  WBAT RLE with posterior precautions per notes from previous admit.   Orthopedic Precautions:    Braces:    Respiratory Status: Comfort flow, flow 30 L/min, concentration 34%    Exams:  Cognitive Exam:  Patient is oriented to Person, Place, Time, and Situation  RLE ROM: WFL  RLE Strength: WFL  LLE ROM: WFL  LLE Strength: WFL    Functional Mobility:  Bed Mobility:  pt needed verbal cues for hand placement and sequencing for functional mobility.    Rolling Left:  stand by assistance  Supine to Sit: stand by assistance  Sit to Supine: minimum assistance    Transfers:     Sit to Stand:  moderate assistance with hand-held assist    Gait: pt received gait training ~ 1 side step to L with mod assist.     Balance: pt sat on EOB with SBA but had 1 episode of decreased balance but was able to self-correct.     Due to pt complex medical condition, the skill of 2 licensed therapists is needed to maximize treatment session and progression towards goals      Therapeutic Activities and Exercises:   Pt received verbal instructions in role of PT and POC. Pt verbally expressed understanding of such.     AM-PAC 6 CLICK MOBILITY  Total Score:11     Patient left supine with all lines intact, call button in reach, and RN present.    GOALS:    Multidisciplinary Problems       Physical Therapy Goals          Problem: Physical Therapy    Goal Priority Disciplines Outcome Goal Variances Interventions   Physical Therapy Goal     PT, PT/OT Ongoing, Progressing     Description: Goals to be met by: 10/3/22     Patient will increase functional independence with mobility by performin. Supine to sit with Modified [place]  2. Sit to stand transfer with Contact Guard Assistance with RW   3. Gait  x 50 feet with Contact Guard Assistance using Rolling Walker.   4 15. Lower extremity exercise program x 15 reps per  with assistance as needed                         History:     Past Medical History:   Diagnosis Date    Endocarditis     Pacemaker     Pneumonia        Past Surgical History:   Procedure Laterality Date    ANGIOGRAPHY OF LOWER EXTREMITY Right 2022    Procedure: ANGIOGRAM, LOWER EXTREMITY;  Surgeon: Thomas Nicolas MD;  Location: [facility] OR [address] [place] [address];  Service: Peripheral Vascular;  Laterality: Right;  30.0 min  315.10 mGy  26.1755 Gycm2  84 ml dye    HIP RESURFACING Right 2022    Procedure: cemontoplasty;  Surgeon: Yung Flores MD;  [facility];  Service: Orthopedics;  Laterality: Right;    RADIOFREQUENCY ABLATION, BONE, PERCUTANEOUS Right 2022    Procedure: RADIOFREQUENCY ABLATION,BONE;  Surgeon: Yung Flores MD;  [facility];  Service: Orthopedics;  Laterality: Right;    REPAIR, PSEUDOANEURYSM, ARTERY, FEMORAL Right 2022    Procedure: REPAIR, PSEUDOANEURYSM, ARTERY, FEMORAL;  Surgeon: Thomas Nicolas MD;  Location: [facility] OR [address] [place] [address];  Service: Peripheral Vascular;  Laterality: Right;  R PFA (3rd branch) pseudoaneurysm        Time Tracking:     PT Received On: 22  PT Start Time: 942     PT Stop Time: 958  PT Total Time (min): 16 min     Billable Minutes: Evaluation 8 min and Therapeutic Activity 8 min      2022

## 2022-09-07 NOTE — PLAN OF CARE
Brief Cardiology Progress Note:    Donis Jimenez is a 61-year-old male with PMH significant for bacterial endocarditis, s/p AV and MV mechanical valve replacement (previously on warfarin), dual chamber PPM placed during perioperative MV/AV replacements per family, CKD, tobacco abuse, newly diagnosed metastatic small cell lung ca (final path pending) with recent complicated hospital course who presented to the emergency department with shortness of breath. Patient was cardioverted in the ED 9/5 for atrial flutter, in the absence of cardiology and subsequently remained in NSR. He was upgraded to the MICU for acute respiratory failure, requiring high amounts of oxygen, previously requiring BiPAP and now on HFNC. Cardiology was consulted for persistent atrial flutter. Patient is DNR.      Recommendations  Atrial Flutter  -Patient with atrial flutter with hypotension and  tachycardia, otherwise asymptomatic and without chest pain  -MICU started patient on amiodarone gtt, therapeutic AC  -OK to repeat DCCV per MICU  -Discussed patient with cardiology fellow, Dr. Clemons and MICU

## 2022-09-07 NOTE — ASSESSMENT & PLAN NOTE
[facility] pathology report confirms small cell lung cancer from bone specimen.  Explained to him and his  that he is in an unfortunate situation.  He will most certainly die of this cancer rather quickly if not given chemotherapy.  While our intent is to improve his tumor burden causing his hypoxic respiratory failure with chemotherapy, it is also possible that we may potentially hasten his death unintentionally due to chemo toxicity. He and his  understand the risks.  -he is consented for chemo  -labs reviewed; carboplatin renally dosed and will proceed with carboplatin and etoposide.  Etoposide will also be given day 2 and day 3.  We will plan to provide GCSF starting day 4 and will do so for at least 7 doses.  -will follow up in the morning

## 2022-09-07 NOTE — CONSULTS
[facility] - Cardiac Medical [facility]  Palliative Medicine  Consult Note    Patient Name: Donis Jimenez  MRN: [id number]  Admission Date: 9/4/2022  Hospital Length of Stay: 3 days  Code Status: DNR   Attending Provider: Brannon Gonsalves MD  Consulting Provider: LOREE Dean  Primary Care Physician: Elio Farias MD  Principal Problem:Sepsis with acute hypoxic respiratory failure    Patient information was obtained from patient and primary team.      Consults  Assessment/Plan:     Palliative care encounter  Impression:   Pt is a 62 y/o male with PMH significant for pacemaker, at least 40 years of tobacco abuse and worsening rip hip pain found to have right femoral neck fracture, a large mediastinal mass with encasement of bronchovascular structures, pleural effusions and diffuse bone lesions on NM bone scan. Pt has metastatic lung cancer. Pt is alert, oriented to person, place, and situation. Pt is a DNR. Pt is on high flow O2 @ 35 L 30%.  CHest      Reason for consult: GOC/ACP. Communicated with MAJOR Camp fellow with [facility].      Goals of care/ACP: Pt to have tracheal stenting tomorrow. Pt reports he is in agreement to procedure and would like CC team to speak to Aston about procedure when he visits today. Per pt, he relays information to Aston but he feels like he forgets some of information to tell him. Pt reports his goal is medical management at this time to see if he can improve enough to get back home. Pt reports his goal is to be at home with his four dogs and , Aston. Pt is aware of his severity of his illness.     Today: Met with pt along with Tracie Kidd LCSW.     9/6/22  Introduced role of Palliative care to pt.   Met with pt who is aware of his medical issues. Per pt he is aware he has metastatic cancer. Pt is aware that any therapies/procedures offered to him is palliative and will not cure cancer. Per pt, he is still trying to come to  with his dx. He learned about his issues  "in August. Pt reports goal at time is to see if anything can be done to help with symptoms and "give him some more time." Spoke to pt about amount of O2 he is on at this time. He verbalized understanding.  Per pt he has spoken to Oncology and XRT MDs.   CTS has been consulted concerning tracheal stenting.      Pt open to continued visits with pal care for care planning and symptom management needs.  Support given pt.      Pt reports he is legally  to Aston Collins and he would want him to be hi medical decision-maker if he cannot make his own medical decisions. MPOA paperwork left with pt. Education provided. Per Pt, Aston works during day but can be reached by phone.      Code status: DNR.      Symptom management:      Dyspnea r/t to mediastinal mass, pleural effusion.   Pt is on high flow O@ per NC 35 L  @ 30%.  Pt is on Morphine 2 mg IVP q 2 hrs prn.   Pt has chest tube in place and having tracheal stents placed.      Pt reports Morphine "has really helps with my SOB."  Pt reports Morphine lasts around 1-2 hours.      Continue Morphine 2 mg IVP q 2 hours prn.   Will continue to assess.      Debility r/t to pathological femoral neck fracture s/p ight hip hemiarthroplasty with ablation, cementoplasty  Pt was using walker prior to admit.   Would resume PT/OT when pt stable to participate.      Anxiety r/t to new dx and dyspnea.   Pt has Ativan 1 mg tid prn.   Continue.      Plan:   Will continue to meet with pt to reinforce realistic expectations/assit with GOC.   Will follow.               Thank you for your consult. I will follow-up with patient. Please contact us if you have any additional questions.    Subjective:     HPI:   Pt is a 61-year-old male with PMH significant for bacterial endocarditis, s/p AV and MV mechanical valve replacement (on warfarin), CKD, tobacco abuse with recent complicated hospital course who presented to the emergency department with shortness of breath.  Difficult for patient to " provide history due to tachypnea; spouse at bedside assisting with history.      Per chart review, patient was admitted 8/18-8/27/22 after sustaining a pathologic right femoral neck fracture and right femoral artery pseudoaneurysm from a fall at home. Patient taken to OR on 8/19 by Vascular Surgery and had embolization of 3rd order right profunda femoral artery pseudoaneurysm with N-BCA glue and 5mm coil aneurysm repair. Pt underwent right hip hemiarthroplasty with ablation, cementoplasty, and percutaneous fixation of pelvis for pathologic fracture and metastatic disease with Ortho on 8/23. 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 Metastatic work-up done  with CT scan of chest/abdomen and pelvis showing extensive disease including mediastinal mass with encasement of mediastinal vascular structures and airways, MARTHA pulmonary mass, bilateral suprarenal and left renal masses, L1 pathologic fx and rib & skull metastatic disease.      Per chart review, pt's spouse stated that pt had been ambulatory with a walker post-discharge, without c/o SOB, lightheadedness or dizziness. SOB started ~ 9/2 with productive cough. He denies fever/chills, chest pain, abd pain, N/V/D.      In the ED patient was in a flutter and was cardioverted with some improvement in symptoms. He was found to have right sided pneumonia and was started on vanc and cefepime and admitted to Hospital Medicine for further management.      During his time in the ED, the patient had escalating FiO2 requirements, now on 45L 100% comfort flow.      Critical Care Medicine was consulted for worsening hypoxemic respiratory failure    Pt is DNR. Pt on 40 L  @ 60 %      Hospital Course:  No notes on file    Interval History: Pt DNR.  Pt to have  tracheal stent placed.    Past Medical History:   Diagnosis Date    Endocarditis     Pacemaker     Pneumonia        Past Surgical History:   Procedure Laterality Date    ANGIOGRAPHY OF LOWER EXTREMITY Right 8/19/2022    Procedure: ANGIOGRAM, LOWER EXTREMITY;  Surgeon: Thomas Nicolas MD;  Location: [facility] OR [address] [place] [address];  Service: Peripheral Vascular;  Laterality: Right;  30.0 min  315.10 mGy  26.1755 Gycm2  84 ml dye    HIP RESURFACING Right 8/23/2022    Procedure: cemontoplasty;  Surgeon: Yung Flores MD;  [facility];  Service: Orthopedics;  Laterality: Right;    RADIOFREQUENCY ABLATION, BONE, PERCUTANEOUS Right 8/23/2022    Procedure: RADIOFREQUENCY ABLATION,BONE;  Surgeon: Yung Flores MD;  [facility];  Service: Orthopedics;  Laterality: Right;    REPAIR, PSEUDOANEURYSM, ARTERY, FEMORAL Right 8/19/2022    Procedure: REPAIR, PSEUDOANEURYSM, ARTERY, FEMORAL;  Surgeon: Thomas Nicolas MD;  [facility];  Service: Peripheral Vascular;  Laterality: Right;  R PFA (3rd branch) pseudoaneurysm        Review of patient's allergies indicates:  No Known Allergies    Medications:  Continuous Infusions:   amiodarone in dextrose 5% 0.5 mg/min (09/07/22 1000)    heparin (porcine) in D5W Stopped (09/07/22 0943)     Scheduled Meds:   albuterol-ipratropium  3 mL Nebulization Q6H    cefTRIAXone (ROCEPHIN) IVPB  2 g Intravenous Q24H    EScitalopram oxalate  20 mg Oral QHS    ferrous gluconate  324 mg Oral Daily    methylPREDNISolone sodium succinate injection  60 mg Intravenous Q12H    pantoprazole  40 mg Oral Daily    pravastatin  40 mg Oral QHS    pregabalin  75 mg Oral BID    sodium chloride 3%  4 mL Nebulization Q4H WAKE    vitamin D  1,000 Units Oral Daily     PRN Meds:sodium chloride, acetaminophen, labetaloL, LORazepam, melatonin, methocarbamoL, midazolam, morphine, ondansetron, oxyCODONE, sodium chloride 0.9%, sumatriptan    Family History    None        Tobacco Use    Smoking status: Every Day     Packs/day: 1.00     Types: Cigarettes    Smokeless tobacco: Never   Substance and Sexual Activity    Alcohol use: Not on file    Drug use: Not on file    Sexual activity: Not on file       Review of Systems   Constitutional:  Positive for activity change, fatigue and unexpected weight change.   Respiratory:  Positive for shortness of breath.    Cardiovascular:  Negative for leg swelling.   Gastrointestinal:  Negative for nausea and vomiting.   Musculoskeletal:  Positive for gait problem.   Skin: Negative.    Neurological:  Positive for weakness.   Psychiatric/Behavioral: Negative.     Objective:     Vital Signs (Most Recent):  Temp: 97.6 °F (36.4 °C) (09/07/22 0700)  Pulse: (!) 127 (09/07/22 1000)  Resp: (!) 26 (09/07/22 1013)  BP: 98/68 (09/07/22 1000)  SpO2: 99 % (09/07/22 1000)   Vital Signs (24h Range):  Temp:  [96.5 °F (35.8 °C)-97.7 °F (36.5 °C)] 97.6 °F (36.4 °C)  Pulse:  [] 127  Resp:  [10-36] 26  SpO2:  [94 %-99 %] 99 %  BP: ()/(58-76) 98/68     Weight: 57.2 kg (126 lb 1.7 oz)  Body mass index is 18.62 kg/m².    Physical Exam  Constitutional:       Comments: Pt on high flow O2 per NC. 35 L 30 %   HENT:      Head: Normocephalic and atraumatic.   Eyes:      General: Lids are normal.      Conjunctiva/sclera: Conjunctivae normal.   Cardiovascular:      Rate and Rhythm: Tachycardia present.   Pulmonary:      Effort: Tachypnea present. No accessory muscle usage or respiratory distress.   Abdominal:      General: Abdomen is flat.   Musculoskeletal:      Cervical back: Full passive range of motion without pain and normal range of motion.      Comments: Edema to left arm.    Skin:     General: Skin is warm and dry.   Neurological:      Mental Status: He is alert and oriented to person, place, and time.   Psychiatric:         Attention and Perception: Attention normal.         Speech: Speech normal.         Behavior: Behavior is cooperative.        Review of Symptoms      Symptom Assessment (ESAS 0-10 Scale)  Pain:  0  Dyspnea:  0  Anxiety:  0  Nausea:  0  Depression:  0  Anorexia:  0  Fatigue:  0  Insomnia:  0  Restlessness:  0  Agitation:  0         ECOG Performance Status thGthrthathdtheth:th th4th Living Arrangements:  Lives with family    Psychosocial/Cultural: Pt legally  to Aston Collins. Per pt Aston works during the day. Per pt, Aston is his care-giver.       Advance Care Planning   Advance Directives:   Living Will: No    Do Not Resuscitate Status: Yes    Medical Power of : No    Agent's Name:  Aston Collins    Decision Making:  Patient answered questions       Significant Labs: All pertinent labs within the past 24 hours have been reviewed.  CBC:   Recent Labs   Lab 09/07/22 0521   WBC 11.08   HGB 9.7*   HCT 30.0*   MCV 91          BMP:  Recent Labs   Lab 09/07/22 0521   *      K 4.1      CO2 25   BUN 34*   CREATININE 1.1   CALCIUM 8.0*   MG 2.0       LFT:  Lab Results   Component Value Date    AST 21 09/07/2022    ALKPHOS 163 (H) 09/07/2022    BILITOT 0.4 09/07/2022     Albumin:   Albumin   Date Value Ref Range Status   09/07/2022 2.4 (L) 3.5 - 5.2 g/dL Final     Protein:   Total Protein   Date Value Ref Range Status   09/07/2022 5.5 (L) 6.0 - 8.4 g/dL Final     Lactic acid:   Lab Results   Component Value Date    LACTATE 1.7 09/05/2022    LACTATE 2.4 (H) 09/04/2022       Significant Imaging: I have reviewed all pertinent imaging results/findings within the past 24 hours.      20 minutes of advance care planning completed.       Zoe Potter, CNS  Palliative Medicine  [facility] - Cardiac Medical ICU

## 2022-09-07 NOTE — CARE UPDATE
Was notified by primary that pt went back into flutter with RVR.  HD stable at this time.  Has been on coumadin for years and s/p DCCV a few days ago in the ED    Discussed with Dr. Mccollum from EP.  Make NPO at midnight for DCCV tomorrow    Luis Miguel Clemons, PGY5  Cardiovascular Disease  [facility]

## 2022-09-07 NOTE — CONSULTS
The patient has many concern about his , he is confused about the treatment care plan, he anxious, scared, terrified, feels guilty and said he has no voice and could not speak audibly for conversation.  We offered some prayer, patient advocacy, empowered and encouraged him to express his concerns to his nurse and the medical team. The pt would benefit from more chaplains visit.

## 2022-09-07 NOTE — PLAN OF CARE
[facility] - Cardiac Medical [facility]  Initial Discharge Assessment       Primary Care Provider: Elio Farias MD    Admission Diagnosis: Atrial flutter [I48.92]  ARDS (adult respiratory distress syndrome) [J80]  SOB (shortness of breath) [R06.02]  Anticoagulated on warfarin [Z79.01]  H/O mitral valve replacement with mechanical valve [Z95.2]    Admission Date: 9/4/2022  Expected Discharge Date: 9/12/2022    Discharge Barriers Identified: Underinsured    Payor: MEDICAID / Plan: HE[place]Y BLUE [place] / Product Type: Managed Medicaid /     Extended Emergency Contact Information  Primary Emergency Contact: Aston Collins  Mobile Phone: [phone]  Relation: Spouse  Preferred language: English   needed? No    Discharge Plan A: Home with family  Discharge Plan B: Home      [facility] - [place] - [address]  [address] [facility] [address]  Phone: [phone] Fax: [phone]      Transferred from:     Past Medical History:   Diagnosis Date    Endocarditis     Pacemaker     Pneumonia          CM met with patient in room for Discharge Planning Assessment.  Patient was able to answer questions.  Per patient, he lives with Aston Collins (spouse) [phone] in a 1-story home with ramp to enter.  Per patient, he was independent with ADLS and used rolling walker for ambulation.  Per patient, he is not on dialysis and does take Coumadin, that is monitored by Dr. Dean Ferreira at [facility]. Patient will have help from Aston Collins (spouse) [phone] upon discharge.   Discharge Planning Booklet given to patient/family and discussed.  All questions addressed.  CM will follow for needs.      Initial Assessment (most recent)       Adult Discharge Assessment - 09/07/22 1702          Discharge Assessment    Assessment Type Discharge Planning Assessment     Confirmed/corrected address, phone number and insurance Yes     Confirmed Demographics Correct on Facesheet      Source of Information patient     When was your last doctors appointment? 06/02/22     Communicated VIKI with patient/caregiver Date not available/Unable to determine     Reason For Admission Sepsis with acute hypoxic respiratory failure     Lives With spouse     Facility Arrived From: Home     Do you expect to return to your current living situation? Yes     Do you have help at home or someone to help you manage your care at home? Yes     Who are your caregiver(s) and their phone number(s)? Aston Collins (spouse) [phone]     Prior to hospitilization cognitive status: Alert/Oriented     Current cognitive status: Alert/Oriented     Walking or Climbing Stairs Difficulty ambulation difficulty, requires equipment     Mobility Management rolling walker     Dressing/Bathing Difficulty none     Equipment Currently Used at Home bedside commode;walker, rolling;wheelchair     Readmission within 30 days? Yes     Patient currently being followed by outpatient case management? No     Do you currently have service(s) that help you manage your care at home? No     Do you take prescription medications? Yes     Do you have prescription coverage? Yes     Coverage MEDICAID - [place] [facility]     Do you have any problems affording any of your prescribed medications? No     Is the patient taking medications as prescribed? yes     Who is going to help you get home at discharge? Aston Collins (spouse) [phone]     How do you get to doctors appointments? car, drives self;family or friend will provide     Are you on dialysis? No     Do you take coumadin? Yes     Who monitors your labs? Dean Ferreira MD - [facility]     Discharge Plan A Home with family     Discharge Plan B Home     DME Needed Upon Discharge  other (see comments)   TBD    Discharge Plan discussed with: Patient     Discharge Barriers Identified Underinsured        Relationship/Environment    Name(s) of Who Lives With Patient Aston Collins (spouse)  [phone]                            PCP:  Elio Farias MD  [phone]        [facility] [place] [place] [facility] [address]  [address] [address]  Phone: [phone] Fax: [phone]        Emergency Contacts:  Extended Emergency Contact Information  Primary Emergency Contact: Aston Collins  Mobile Phone: [phone]  Relation: Spouse  Preferred language: English   needed? No      Insurance:    Payor: MEDICAID / Plan: [place] BLUE [place] / Product Type: Managed Medicaid /     Vero Villanueva RN     [phone]      09/07/2022  5:13 PM  '

## 2022-09-07 NOTE — PROGRESS NOTES
[facility] - Cardiac Medical [facility]  Critical Care Medicine  Progress Note    Patient Name: Donis Jimenez  MRN: [id number]  Admission Date: 9/4/2022  Hospital Length of Stay: 3 days  Code Status: DNR  Attending Provider: Brannon Gonsalves MD  Primary Care Provider: Elio Farias MD   Principal Problem: Sepsis with acute hypoxic respiratory failure    Subjective:     HPI:  Donis Jimenez is a 61-year-old matta with PMH significant for bacterial endocarditis, s/p AV and MV mechanical valve replacement (on warfarin), CKD, tobacco abuse with recent complicated hospital course who presents to the emergency department with shortness of breath.  Difficult for patient to provide history due to tachypnea; spouse at bedside assisting with history.     Patient was admitted 8/18-8/27/22 after sustaining a pathologic right femoral neck fracture and right femoral artery pseudoaneurysm from a fall at home. Patient taken to OR on 8/19 by Vascular Surgery and had embolization of 3rd order right profunda femoral artery pseudoaneurysm with N-BCA glue and 5mm coil aneurysm repair. Pt underwent ight hip hemiarthroplasty with ablation, cementoplasty, and percutaneous fixation of pelvis for pathologic fracture and metastatic disease with Ortho on 8/23. 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 Metastatic work-up done  with CT scan of chest/abdomen and pelvis showing extensive disease including mediastinal mass with encasement of mediastinal vascular structures and airways, MARTHA pulmonary mass, bilateral suprarenal and left renal masses, L1 pathologic fx and rib & skull metastatic disease.     Pt's spouse states that pt had been ambulatory with a walker post-discharge, without c/o SOB, lightheadedness or dizziness. SOB  started ~ 9/2 with productive cough. He denies fever/chills, chest pain, abd pain, N/V/D.     In the ED patient was in a flutter and was cardioverted with some improvement in symptoms. He was found to have right sided pneumonia and was started on vanc and cefepime and admitted to Hospital Medicine for further management.     During his time in the ED, the patient had escalating FiO2 requirements, now on 45L 100% comfort flow.     Critical Care Medicine was consulted for worsening hypoxemic respiratory failure.          Hospital/ICU Course:  Patient admitted to [facility] evening of 9/4 for worsening hypoxemic respiratory failure secondary to pneumonia in setting of lung cancer with extensive metastatic disease. GOC discussed with pt and  who agree that they would not want extreme measures such as CPR and mechanical ventilation given overall poor prognosis. Pt started on vanc, cefepime, azithro and flagyl for pna/post-obstructive pna; alternating between bipap and comfort flow.       Interval History/Significant Events: underwent bedside thoracentesis and CT placement for pleural effusions, patient reports improvement in breathing. went into afib/aflutter overnight rate in the 130s, amio gtt initiated a    Review of Systems   Constitutional:  Positive for appetite change, fatigue and unexpected weight change.   Respiratory:  Positive for cough and shortness of breath. Negative for choking.    Cardiovascular:  Negative for chest pain and palpitations.   Gastrointestinal:  Negative for abdominal pain, constipation, diarrhea and nausea.   Neurological:  Negative for numbness and headaches.   Psychiatric/Behavioral:  Negative for agitation and confusion.    Objective:     Vital Signs (Most Recent):  Temp: 97.6 °F (36.4 °C) (09/07/22 0700)  Pulse: (!) 126 (09/07/22 0758)  Resp: 13 (09/07/22 0758)  BP: 91/62 (09/07/22 0700)  SpO2: 97 % (09/07/22 0758) Vital Signs (24h Range):  Temp:  [96.5 °F (35.8 °C)-97.7 °F (36.5 °C)]  97.6 °F (36.4 °C)  Pulse:  [] 126  Resp:  [10-36] 13  SpO2:  [94 %-99 %] 97 %  BP: ()/(58-80) 91/62   Weight: 57.2 kg (126 lb 1.7 oz)  Body mass index is 18.62 kg/m².      Intake/Output Summary (Last 24 hours) at 9/7/2022 0759  Last data filed at 9/7/2022 0701  Gross per 24 hour   Intake 2351.14 ml   Output 2385 ml   Net -33.86 ml         Physical Exam  Constitutional:       General: He is not in acute distress.     Appearance: He is ill-appearing.   Eyes:      General: No scleral icterus.     Extraocular Movements: Extraocular movements intact.      Pupils: Pupils are equal, round, and reactive to light.   Cardiovascular:      Rate and Rhythm: Normal rate and regular rhythm.      Pulses: Normal pulses.      Heart sounds: Normal heart sounds.   Pulmonary:      Breath sounds: Rhonchi present.      Comments: Comfort flow 40L 60%  Abdominal:      General: Abdomen is flat. Bowel sounds are normal. There is no distension.      Palpations: Abdomen is soft.      Tenderness: There is no abdominal tenderness.   Musculoskeletal:      Right forearm: Edema present.      Left forearm: Edema present.      Comments: Distension of neck veins    Skin:     Capillary Refill: Capillary refill takes less than 2 seconds.      Findings: No bruising or lesion.   Neurological:      General: No focal deficit present.      Mental Status: He is alert.   Psychiatric:         Mood and Affect: Mood normal.         Behavior: Behavior normal.       Vents:  Oxygen Concentration (%): 35 (09/07/22 0700)  Lines/Drains/Airways       Drain  Duration                  Chest Tube 09/06/22 1300 1 Left Midaxillary 14 Fr. <1 day              Peripheral Intravenous Line  Duration                  Peripheral IV - Single Lumen 09/04/22 0707 18 G Left Upper Arm 3 days         Peripheral IV - Single Lumen 09/06/22 2100 20 G Posterior;Right Forearm <1 day                  Significant Labs:    CBC/Anemia Profile:  Recent Labs   Lab 09/06/22  1331  09/07/22  0521   WBC 9.50 11.08   HGB 10.2* 9.7*   HCT 32.1* 30.0*    347   MCV 92 91   RDW 20.4* 20.4*          Chemistries:  Recent Labs   Lab 09/06/22  0329 09/06/22  2130 09/07/22  0521   * 138 137   K 4.1 4.2 4.1    101 103   CO2 24 22* 25   BUN 25* 36* 34*   CREATININE 0.9 1.2 1.1   CALCIUM 8.2* 8.3* 8.0*   ALBUMIN 2.4*  --  2.4*   PROT 5.8*  --  5.5*   BILITOT 0.4  --  0.4   ALKPHOS 180*  --  163*   ALT 32  --  24   AST 26  --  21   MG 2.0 2.1 2.0   PHOS 3.3 2.1* 2.1*         All pertinent labs within the past 24 hours have been reviewed.    Significant Imaging:  I have reviewed all pertinent imaging results/findings within the past 24 hours.      ABG  Recent Labs   Lab 09/04/22  2145   PH 7.316*   PO2 62*   PCO2 37.6   HCO3 19.2*   BE -7     Assessment/Plan:     Cardiac/Vascular  Atrial flutter  Hx aflutter,  previously on amio.    - presented in aflutter with RVR, now s/p DCCV in ED with return to NSR. 9/7 patient went back into atrial fib and was placed on amio gtt. Cards re-consulted  - PPM in place        H/O mitral valve replacement with mechanical valve  Hx endocarditis s/p MVR and AVR. INR 3.2 on admission     - daily INR  - when INR drops below 2, will initiate heparin gtt to accommodate possible invasive procedures       Pseudoaneurysm of right femoral artery  - S/p embolization of right profunda femoral artery pseudoaneurysm with 5 mm coil 8/20    ID  * acute hypoxic respiratory failure  Patient with Hypoxic Respiratory failure which is Acute.  he is not on home oxygen. Supplemental oxygen was provided and noted- Oxygen Concentration (%):  [35-60] 35.   Signs/symptoms of respiratory failure include- tachypnea, increased work of breathing and respiratory distress. Contributing diagnoses includes - CHF, Pleural effusion and Pneumonia Labs and images were reviewed. Patient Has recent ABG, which has been reviewed. Will treat underlying causes and adjust management of respiratory  failure as follows-     CT showed mediastinal mass with encasement of mediastinal structures, moderate pleural effusions, emphysematous changes and ground glass opacities.     -continue ceftraixone/azithromycin for CAP  -med onc and rad onc following   -malignancy likely contributing to dyspnea and hypoxia -- alternating bipap and comfort flow, morphine 2mg ordered for dsypnea   -9/7 underwent thoracentesis and CT placement with Dr. Gonsalves.    -CTS consulted for tracheal stent placement 9/8, NPO at [facility]          Oncology  Malignant neoplasm metastatic to bone  Previously admitted last month for R hip pain s/p fall, imaging showing extensive metastatic disease involving soft tissue above and below diaphragm particularly notable for extensive mediastinal involvement with encasement of bronchovascular structures as well as osseous disease.  Unclear etiology of primary malignancy, however MARTHA mass and L renal mass possible primaries.    Pathologic fracture of the right femoral head/neck, L1 vertebral body, and left 6th rib. Small to moderate pericardial effusion, likely malignant. Small left pleural effusion.     Heme/Onc consulted on admit and recommended MRI of brain to look for brain mets and none noted to brain itself but concerning osseous mets to skull itself. Bone scan done showed numerous tracer avid foci throughout the axial and appendicular skeleton consistent with metastatic disease. These lesions correspond to mixed lytic and sclerotic lesions on same day CT chest abdomen pelvis. Heme/Onc recommended bone biopsy and done by Orthopedics on 8/23 for diagnosis as primary tumor unknown and pathology pending on discharge.    -med onc consulted, rad onc consulted -- pending pathology   - palliative care consulted  - DNR/DNI status as per ACP conversations on admit       Endocrine  Body mass index (BMI) less than 19  in setting of malignancy and decreased appetite.    - nutrition consulted    Orthopedic  Swelling of  upper extremity  Noted on previous hospitalization, possibly 2/2 to vascular compression and mass encasing mediastinal structures noted on CT  - non-occlusive thrombus in L cephalic vein    Pathologic fracture of neck of right femur s/p hemiarthroplasty on 8/23/2022  S/p R hip hemiarthroplasty with ablation, cementoplasty, and percutaneous fixation of pelvis 8/23.    - PT/OT consulted  - prn pain control    Palliative Care  ACP (advance care planning)  Advance Care Planning     Date: 09/04/2022    Code Status  In light of the patients advanced and life limiting illness, I engaged the the patient in a conversation about the patient's preferences for care  at the very end of life. The patient wishes to have a natural, peaceful death.  Along those lines, the patient does not wish to have CPR or other invasive treatments performed when his heart and/or breathing stops. I communicated to the patient that a DNR order would be placed in his medical record to reflect this preference.  I spent a total of 10 minutes engaging the patient in this advance care planning discussion.    -palliative care following for [facility]               Critical Care Daily Checklist:    A: Awake: RASS Goal/Actual Goal: RASS Goal: 0-->alert and calm  Actual: Garcia Agitation Sedation Scale (RASS): Drowsy   B: Spontaneous Breathing Trial Performed?     C: SAT & SBT Coordinated?  na                      D: Delirium: CAM-ICU Overall CAM-ICU: Negative   E: Early Mobility Performed? Yes   F: Feeding Goal: Goals: Meet % EEN, EPN by RD f/u date  Status: Nutrition Goal Status: new   Current Diet Order   Procedures    Diet Cardiac    Diet NPO      AS: Analgesia/Sedation Prn morphine, versed   T: Thromboembolic Prophylaxis Heparin gtt   H: HOB > 300 Yes   U: Stress Ulcer Prophylaxis (if needed) none   G: Glucose Control Not diabetic    B: Bowel Function Stool Occurrence: 1   I: Indwelling Catheter (Lines & Rodgers) Necessity CT placed yesterday, PIVs    D: De-escalation of Antimicrobials/Pharmacotherapies Ceftriaxone, azithromycin CAP     Plan for the day/ETD amio gtt for atrial fib.           Discuss with Dr. Gonsalves.     Dior Ruano NP  Critical Care Medicine  [facility] - Cardiac Medical ICU    I saw & examined the patient. I personally reviewed all pertinent data in Epic. I discussed the patient and management with the YASEMIN. I reviewed the YASEMIN's note and agree with the documented findings and plan of care with the following additions/modifications:     1) Acute hypoxemic respiratory failure.   - Respiratory status appears much improved today with oxygenation at goal on 3L NC.  - Intrathoracic tracheal stenosis. Respiratory status is improved such that this seems less likely to be a major driving factor of pt's respiratory failure. Discussed this with thoracic surgery & patient. Cancel bronchoscopy for tomorrow. Proceed with chemotherapy.   - Bilateral pleural effusion, L>R. Exudative by Light's criteria with lymphocytes predominant on diff; this is most suggestive of a malignant etiology. Chest tube placed for drainage while awaiting treatment to take effect. If effusion persists despite treatment, can consider pleurx.  - COPD/emphysema. Administer duonebs, abx, & solumedrol.  2) Venous outlet obstruction. The brachiocephalic vein appears severely narrowed. This likely accounts for the left upper extremity swelling & engorged collateral veins.  3) Valvular disease with atrial flutter (mechanical aortic & mitral valve). Cardioverted in ED. Now back in atrial flutter. Persisting despite amiodarone. Administering heparin infusion now. Consulted cardiology for assistance with management.   4) Metastatic lung cancer. Path report received today: small cell lung cancer. Proceed with palliative chemotherapy.  5) Pathologic hip fracture. S/p right hip hemiarthroplasty with ablation, cementoplasty, and percutaneous fixation of pelvis 8/23. Patient is weight bearing as  tolerated with posterior hip precautions to right lower extremity as per orthopedics.  6) Goals of care counseling. Reviewed goals of care with pt & . Code status is DNR/DNI (though pt would likely be willing to put this order on hold for the purpose of essential invasive procedures).      Please take sepsis off the problem list.     Brannon Gonsalves MD

## 2022-09-07 NOTE — CONSULTS
D/L PICC placed in R BASILIC vein, 38cm in length with 0cm exposed. Arm circumference 26cm. Lot#[id number]

## 2022-09-07 NOTE — PROCEDURES
"Donis Jimenez is a 61 y.o. male patient.    Temp: 97.5 °F (36.4 °C) (22)  Pulse: (!) 135 (22)  Resp: 19 (22)  BP: 100/63 (22)  SpO2: 97 % (22)  Weight: 57.2 kg (126 lb 1.7 oz) (22)  Height: 5' 9" (175.3 cm) (22 1200)       Thoracentesis    Date/Time: 2022 10:45 AM  Location procedure was performed: [facility] PULMONARY MEDICINE  Performed by: Brannon Gonsalves MD  Authorized by: Brannon Gonsalves MD   Pre-operative diagnosis: pleural effusion  Post-operative diagnosis: pleural effusion  Consent Done: Yes  Consent: Written consent obtained.  Consent given by: patient  Patient understanding: patient states understanding of the procedure being performed  Patient consent: the patient's understanding of the procedure matches consent given  Procedure consent: procedure consent matches procedure scheduled  Relevant documents: relevant documents present and verified  Test results: test results available and properly labeled  Site marked: the operative site was marked  Imaging studies: imaging studies available  Required items: required blood products, implants, devices, and special equipment available  Patient identity confirmed: , name and MRN  Time out: Immediately prior to procedure a "time out" was called to verify the correct patient, procedure, equipment, support staff and site/side marked as required.  Procedure purpose: therapeutic  Indications: pleural effusion  Preparation: Patient was prepped and draped in the usual sterile fashion.  Local anesthesia used: yes    Anesthesia:  Local anesthesia used: yes  Local Anesthetic: lidocaine 1% without epinephrine  Anesthetic total: 5 mL  Description of findings: yellow fluid   Preparation: skin prepped with ChloraPrep  Patient position: sitting  Ultrasound guidance: yes  Location: right posterior  Puncture method: over-the-needle catheter  Needle size: 18  Catheter size: 18 gauge  Number of " attempts: 1  Drainage amount: 1000 ml  Drainage characteristics: clear  Patient tolerance: Patient tolerated the procedure well with no immediate complications  Chest x-ray performed: yes  Complications: No  Estimated blood loss (mL): 1  Specimens: No  Drainage Tube: removed      9/6/2022

## 2022-09-08 NOTE — NURSING
1215 - CARBOplatin (PARAPLATIN) 415 mg in sodium chloride 0.9% 250 mL chemo infusion started through right arm PICC noted for having positive blood return over 30 minutes. Consents verified in chart, patient education completed, and pre-meds given per treatment-plan prior. Chemotherapy dosage and BSA checked by two chemotherapy certified nurses (Anika Christianson, RN and Emil Major, AFIA) prior to administration. Chemotherapeutic precautions in place throughout therapy. Tolerated well and without issues throughout infusion. Additional education provided as necessary. Will continue to monitor closely.     1258 - etoposide (VEPESID) 100 mg/m2 = 168 mg in sodium chloride 0.9% 500 mL chemo infusion started through right arm PICC noted for having positive blood return over 1 hour. Consents verified in chart, patient education completed, and pre-meds given per treatment-plan prior. Chemotherapy dosage and BSA checked by two chemotherapy certified nurses (Anika Christianson, RN and Dorothy Lester, RN) prior to administration. Chemotherapeutic precautions in place throughout therapy. Tolerated well and without issues throughout infusion. Additional education provided as necessary. Will continue to monitor closely.

## 2022-09-08 NOTE — ASSESSMENT & PLAN NOTE
[facility] pathology report confirms small cell lung cancer from bone specimen.  Explained to him and his  that he is in an unfortunate situation.  He will most certainly die of this cancer rather quickly if not given chemotherapy.  While our intent is to improve his tumor burden causing his hypoxic respiratory failure with chemotherapy, it is also possible that we may potentially hasten his death unintentionally due to chemo toxicity. He and his  understand the risks.    -Heme/Onc following  -he is consented for chemo  -labs reviewed; carboplatin renally dosed and will proceed with carboplatin and etoposide.  Etoposide will also be given day 2 and day 3.  We will plan to provide GCSF starting day 4 and will do so for at least 7 doses.

## 2022-09-08 NOTE — ANESTHESIA PREPROCEDURE EVALUATION
[facility]  Anesthesia Pre-Operative Evaluation         Patient Name: Donis Jimenez  YOB: 1960  MRN: [id number]    SUBJECTIVE:     Pre-operative evaluation for Procedure(s) (LRB):  Cardioversion or Defibrillation (N/A)     09/07/2022    Donis Jimenez is a 61 y.o. male w/ a significant PMHx of bacterial endocarditis, s/p AV and MV mechanical valve replacement (on warfarin at home currently on heparin), HFrEF, CKD, tobacco abuse admitted for hypoxemic respiratory failure 2/2 pneumonia in setting of lung cancer with metastatic disease. She is on IV antibiotics. On 9/5 she required cardioversion for atrial flutter, however course has been complicated by persistent atria flutter so Cardiology consulted for evaluation.    Patient now presents for the above procedure(s).      LDA:   PICC Double Lumen 09/07/22 1734 right basilic (Active)   Verification by X-ray Yes 09/07/22 1732   Site Assessment No drainage;No redness;No swelling;No warmth 09/07/22 1732   Extremity Assessment Distal to IV No redness;No swelling;No warmth;No abnormal discoloration 09/07/22 1732   Line Securement Device Secured with sutureless device 09/07/22 1732   Dressing Type Biopatch in place 09/07/22 1732   Dressing Status Clean;Dry;Intact 09/07/22 1732   Dressing Intervention First dressing 09/07/22 1732   Date on Dressing 09/07/22 09/07/22 1732   Dressing Due to be Changed 09/14/22 09/07/22 1732   Left Lumen Patency/Care Blood return present;Flushed w/o difficulty;Normal saline locked 09/07/22 1732   Right Lumen Patency/Care Blood return present;Flushed w/o difficulty;Normal saline locked 09/07/22 1732   Current Insertion Depth (cm) 38 cm 09/07/22 1732   Current Exposed Catheter (cm) 0 cm 09/07/22 1732   Extremity Circumference (cm) 26 cm 09/07/22 1732   Waveform Normal 09/07/22 1732   Line Necessity Review Medication caustic to vasculature 09/07/22 1732   Number of days: 0            Peripheral IV - Single Lumen  09/04/22 0707 18 G Left Upper Arm (Active)   Site Assessment Clean;Dry;Intact;No swelling 09/07/22 1515   Extremity Assessment Distal to IV No warmth;No swelling;No redness;No abnormal discoloration 09/07/22 1515   Line Status Infusing 09/07/22 1515   Dressing Status Clean;Intact 09/07/22 1515   Dressing Intervention Integrity maintained 09/07/22 1515   Dressing Change Due 09/08/22 09/07/22 1515   Site Change Due 09/08/22 09/07/22 1515   Reason Not Rotated Not due 09/07/22 1515   Number of days: 3            Peripheral IV - Single Lumen 09/07/22 1000 20 G Left Forearm (Active)   Site Assessment Dry;Clean;Intact;No swelling;No redness 09/07/22 1515   Extremity Assessment Distal to IV No warmth;No redness;No abnormal discoloration;No swelling 09/07/22 1515   Line Status Infusing 09/07/22 1515   Dressing Status Clean;Intact;Dry 09/07/22 1515   Dressing Intervention Integrity maintained 09/07/22 1515   Dressing Change Due 09/11/22 09/07/22 1515   Site Change Due 09/11/22 09/07/22 1515   Reason Not Rotated Not due 09/07/22 1515   Number of days: 0            Peripheral IV - Single Lumen 09/07/22 1100 20 G Right Forearm (Active)   Site Assessment Clean;Dry;Intact;No redness;No swelling 09/07/22 1515   Extremity Assessment Distal to IV No warmth;No swelling;No redness 09/07/22 1515   Line Status Saline locked 09/07/22 1515   Dressing Status Clean;Dry;Intact 09/07/22 1515   Dressing Intervention Integrity maintained 09/07/22 1515   Dressing Change Due 09/11/22 09/07/22 1515   Site Change Due 09/11/22 09/07/22 1515   Reason Not Rotated Not due 09/07/22 1515   Number of days: 0            Chest Tube 09/06/22 1300 1 Left Midaxillary 14 Fr. (Active)   $ Chest Tube Charges (Upon insertion) Bedside Insertion Performed;Catheter - Paracentesis/Thoracentesis (Supply) 09/06/22 1500   Chest Tube WDL WDL 09/07/22 1515   Function -20 cm H2O 09/07/22 1515   Air Leak/Fluctuation air leak not present 09/07/22 1515   Safety all tubing  connections taped;all connections secured 09/07/22 1515   Securement tubing secured to body distal to insertion site w/ tape 09/07/22 1515   Drainage Description Serous 09/07/22 1100   Dressing Appearance occlusive gauze dressing intact 09/07/22 1100   Left Subcutaneous Emphysema none present 09/07/22 1515   Right Subcutaneous Emphysema none present 09/07/22 1515   Site Assessment Intact;Dry;Clean 09/07/22 1515   Surrounding Skin Dry;Intact 09/07/22 1515   Output (mL) 395 mL 09/07/22 1800   Number of days: 1       Prev airway: 08/23/22; Placement Time: 1744 (created via procedure documentation); Mask Ventilation: Easy; Intubated: Postinduction; Blade: Crum #2; Airway Device Size: 7.0; Placement Verified By: Capnometry; Complicating Factors: None    Drips:    amiodarone in dextrose 5% 0.5 mg/min (09/07/22 1426)    heparin (porcine) in D5W 12 Units/kg/hr (09/07/22 1400)       Patient Active Problem List   Diagnosis    Pathologic fracture of neck of right femur s/p hemiarthroplasty on 8/23/2022    Pseudoaneurysm of right femoral artery    Acute arterial ischemic stroke, multifocal, multiple vascular territories    Subdural hematoma    Body mass index (BMI) less than 19    H/O mitral valve replacement with mechanical valve    Lytic bone lesion of hip    Malignant neoplasm metastatic to bone    Anticoagulated on warfarin    Atrial flutter    SOB (shortness of breath)    Swelling of upper extremity    Sepsis    Sepsis with acute hypoxic respiratory failure    ACP (advance care planning)    Goals of care, counseling/discussion    Debility    Advance care planning    Palliative care encounter    Dermatitis associated with moisture    Exudative pleural effusion    Small cell carcinoma of lung       Review of patient's allergies indicates:  No Known Allergies    Current Inpatient Medications:   [START ON 9/8/2022] albuterol-ipratropium  3 mL Nebulization Q8H    aprepitant  130 mg Intravenous 1 time  in Clinic/HOD    CARBOplatin (PARAPLATIN) chemo infusion (by AUC)  415 mg Intravenous 1 time in Clinic/HOD    cefTRIAXone (ROCEPHIN) IVPB  2 g Intravenous Q24H    dexamethasone (DECADRON) IVPB  12 mg Intravenous 1 time in Clinic/HOD    EScitalopram oxalate  20 mg Oral QHS    etoposide (VEPESID) chemo infusion  100 mg/m2 (Treatment Plan Recorded) Intravenous Q24H    ferrous gluconate  324 mg Oral Daily    [START ON 9/8/2022] methylPREDNISolone sodium succinate injection  60 mg Intravenous Daily    ondansetron  8 mg Intravenous 1 time in Clinic/HOD    pantoprazole  40 mg Oral Daily    polyethylene glycol  17 g Oral Daily    pravastatin  40 mg Oral QHS    pregabalin  75 mg Oral BID    sodium chloride 0.9% flush bag IVPB   Intravenous 1 time in Clinic/HOD    sodium chloride 0.9%  10 mL Intravenous Q6H    [START ON 9/8/2022] sodium chloride 3%  4 mL Nebulization Q8H    vitamin D  1,000 Units Oral Daily       No current facility-administered medications on file prior to encounter.     Current Outpatient Medications on File Prior to Encounter   Medication Sig Dispense Refill    acetaminophen (TYLENOL) 500 MG tablet Take 2 tablets (1,000 mg total) by mouth every 8 (eight) hours as needed (Mild to moderate pain).  0    EScitalopram oxalate (LEXAPRO) 20 MG tablet Take 20 mg by mouth every evening.      esomeprazole (NEXIUM) 20 MG capsule Take 20 mg by mouth once daily.      ferrous gluconate (FERGON) 324 MG tablet Take 1 tablet by mouth once daily.      LORazepam (ATIVAN) 1 MG tablet Take 1 mg by mouth 3 (three) times daily as needed for Anxiety.      oxyCODONE (ROXICODONE) 5 MG immediate release tablet Take 1 tablet (5 mg total) by mouth every 4 (four) hours as needed for Pain. 30 tablet 0    pravastatin (PRAVACHOL) 40 MG tablet Take 40 mg by mouth every evening.      pregabalin (LYRICA) 75 MG capsule Take 1 capsule (75 mg total) by mouth 2 (two) times daily. 60 capsule 0    sumatriptan (IMITREX) 25  MG Tab Take 25 mg by mouth as needed for Migraine.      vitamin D (VITAMIN D3) 1000 units Tab Take 1 tablet (1,000 Units total) by mouth once daily. 30 tablet 11    warfarin (COUMADIN) 3 MG tablet Take 1.5 tablets (4.5 mg total) by mouth Daily.         Past Surgical History:   Procedure Laterality Date    ANGIOGRAPHY OF LOWER EXTREMITY Right 8/19/2022    Procedure: ANGIOGRAM, LOWER EXTREMITY;  Surgeon: Thomas Nicolas MD;  Location: [facility] OR [facility] FLR;  Service: Peripheral Vascular;  Laterality: Right;  30.0 min  315.10 mGy  26.1755 Gycm2  84 ml dye    HIP RESURFACING Right 8/23/2022    Procedure: cemontoplasty;  Surgeon: Yung Flores MD;  Location: [facility] OR [facility] FLR;  Service: Orthopedics;  Laterality: Right;    RADIOFREQUENCY ABLATION, BONE, PERCUTANEOUS Right 8/23/2022    Procedure: RADIOFREQUENCY ABLATION,BONE;  Surgeon: Yung Flores MD;  Location: [facility] OR [facility] FLR;  Service: Orthopedics;  Laterality: Right;    REPAIR, PSEUDOANEURYSM, ARTERY, FEMORAL Right 8/19/2022    Procedure: REPAIR, PSEUDOANEURYSM, ARTERY, FEMORAL;  Surgeon: Thomas Nicolas MD;  Location: [facility] OR [facility] FLR;  Service: Peripheral Vascular;  Laterality: Right;  R PFA (3rd branch) pseudoaneurysm        Social History:  Tobacco Use: High Risk    Smoking Tobacco Use: Every Day    Smokeless Tobacco Use: Never      Alcohol Use: Not on file        OBJECTIVE:     Vital Signs Range (Last 24H):  Temp:  [36.1 °C (97 °F)-36.7 °C (98 °F)]   Pulse:  []   Resp:  [10-33]   BP: ()/(60-73)   SpO2:  [94 %-100 %]       Significant Labs:  Lab Results   Component Value Date    WBC 11.08 09/07/2022    HGB 9.7 (L) 09/07/2022    HCT 30.0 (L) 09/07/2022     09/07/2022    ALT 24 09/07/2022    AST 21 09/07/2022     09/07/2022    K 4.1 09/07/2022     09/07/2022    CREATININE 1.1 09/07/2022    BUN 34 (H) 09/07/2022    CO2 25 09/07/2022    TSH 1.538 08/18/2022    INR 1.2 09/07/2022       Diagnostic Studies: No relevant  studies.    EKG:   Results for orders placed or performed during the hospital encounter of 09/04/22   EKG 12-lead    Collection Time: 09/07/22  1:47 PM    Narrative    Test Reason :     Vent. Rate : 127 BPM     Atrial Rate : 254 BPM     P-R Int : 000 ms          QRS Dur : 090 ms      QT Int : 322 ms       P-R-T Axes : 261 043 098 degrees     QTc Int : 467 ms    Atrial flutter with 2:1 A-V conduction  Inferior infarct (cited on or before 05-SEP-2022)  Abnormal ECG  When compared with ECG of 06-SEP-2022 21:12,  No significant change was found  Confirmed by Ana Avila MD ([id number]) on 9/7/2022 3:33:03 PM    Referred By: [email or web address]   SELF           Confirmed By:Ana Avial MD       2D ECHO:  TTE:  Results for orders placed or performed during the hospital encounter of 09/04/22   Echo   Result Value Ref Range    BSA 1.67 m2    TDI SEPTAL 0.05 m/s    LV LATERAL E/E' RATIO 21.33 m/s    LV SEPTAL E/E' RATIO 25.60 m/s    LA WIDTH 4.86 cm    TDI LATERAL 0.06 m/s    LVIDd 3.31 (A) 3.5 - 6.0 cm    IVS 1.28 (A) 0.6 - 1.1 cm    Posterior Wall 1.09 0.6 - 1.1 cm    LVIDs 3.20 2.1 - 4.0 cm    FS 3 28 - 44 %    LV mass 122.89 g    LA size 3.60 cm    RVDD 2.85 cm    TAPSE 1.69 cm    RV S' 9.36 cm/s    Left Ventricle Relative Wall Thickness 0.66 cm    AV mean gradient 12 mmHg    AV valve area 1.31 cm2    AV Velocity Ratio 0.38     AV index (prosthetic) 0.37     MV mean gradient 1 mmHg    MV valve area p 1/2 method 3.27 cm2    MV valve area by continuity eq 1.53 cm2    E/A ratio 1.94     Mean e' 0.06 m/s    E wave deceleration time 231.98 msec    LVOT diameter 2.12 cm    LVOT area 3.5 cm2    LVOT peak cachorro 0.88 m/s    LVOT peak VTI 13.93 cm    Ao peak cachorro 2.33 m/s    Ao VTI 37.48 cm    LVOT stroke volume 49.15 cm3    AV peak gradient 22 mmHg    MV peak gradient 8 mmHg    E/E' ratio 23.27 m/s    MV Peak E Cachorro 1.28 m/s    MV VTI 32.15 cm    MV stenosis pressure 1/2 time 67.27 ms    MV Peak A Cachorro 0.66 m/s    LV Systolic Volume 40.92 mL     LV Systolic Volume Index 24.1 mL/m2    LV Diastolic Volume 44.51 mL    LV Diastolic Volume Index 26.18 mL/m2    LV Mass Index 72 g/m2    RA Major Axis 4.95 cm    Right Atrial Pressure (from IVC) 3 mmHg    EF 40 %    Narrative    · The left ventricle is normal in size with concentric remodeling and   mildly decreased systolic function. The estimated ejection fraction is   40%.  · Normal right ventricular size with normal right ventricular systolic   function.  · Indeterminate left ventricular diastolic function.  · There is a mechanical mitral valve prosthesis. MV peak velocity < 1.9   m/s, MG 4 mmHg at 89 bpm, PHT < 130, MV VTI/LVOT VTI 1.9. Overall,   findings consistent with normal prosthetic valve hemodynamics.  · There is a mechanical aortic valve present. Prosthetic aortic valve is   normal.  · The aortic valve mean gradient is 12 mmHg with a dimensionless index of   0.37.  · Normal central venous pressure (3 mmHg).  · Small circumferential pericardial effusion.  · There is a left pleural effusion.          MARIE:  No results found for this or any previous visit.    ASSESSMENT/PLAN:         Pre-op Assessment    I have reviewed the Patient Summary Reports.     I have reviewed the Nursing Notes. I have reviewed the NPO Status.   I have reviewed the Medications.     Review of Systems  Anesthesia Hx:  No problems with previous Anesthesia Denies Hx of Anesthetic complications  History of prior surgery of interest to airway management or planning: Denies Family Hx of Anesthesia complications.   Denies Personal Hx of Anesthesia complications.   Social:  Smoker    Hematology/Oncology:        Current/Recent Cancer.   Cardiovascular:   Pacemaker Valvular problems/Murmurs Dysrhythmias Right femoral artery psuedoaneurysm (s/p repair)    Endocarditis   Pulmonary:   Pneumonia Shortness of breath    Renal/:   Chronic Renal Disease, CRI    Hepatic/GI:  Hepatic/GI Normal  Denies GERD.    Musculoskeletal:   Right femur  fracture   Neurological:   CVA Denies Headaches. Denies Seizures.    Endocrine:  Endocrine Normal Denies Diabetes.  Denies Obesity / BMI > 30  Psych:   Denies Psychiatric History.          Physical Exam  General: Well nourished, Alert and Oriented    Airway:  Mallampati: III / II  Mouth Opening: Normal  TM Distance: Normal  Tongue: Normal  Neck ROM: Normal ROM    Dental:  Intact        Anesthesia Plan  Type of Anesthesia, risks & benefits discussed:    Anesthesia Type: Gen Natural Airway, MAC  Intra-op Monitoring Plan: Standard ASA Monitors  Post Op Pain Control Plan: multimodal analgesia and IV/PO Opioids PRN  Induction:  IV  Informed Consent: Informed consent signed with the Patient and all parties understand the risks and agree with anesthesia plan.  All questions answered.   ASA Score: 3  Day of Surgery Review of History & Physical: H&P Update referred to the surgeon/provider.    Ready For Surgery From Anesthesia Perspective.     .

## 2022-09-08 NOTE — SUBJECTIVE & OBJECTIVE
Interval History: No acute events overnight. Inpatient chemotherapy started this morning. Patient tolerated well with minimal side effects so far. Only complaints of mild chest pain after cardioversion.     Oncology Treatment Plan:   OP SCLC CARBOPLATIN (AUC) ETOPOSIDE DURVALUMAB Q3W FOLLOWED BY MAINTENANCE DURVALUMAB 1500 MG Q4W    Medications:  Continuous Infusions:   amiodarone in dextrose 5% 0.5 mg/min (09/08/22 0600)    heparin (porcine) in D5W 13.986 Units/kg/hr (09/08/22 0600)     Scheduled Meds:   albuterol-ipratropium  3 mL Nebulization Q8H    aprepitant  130 mg Intravenous 1 time in Clinic/HOD    CARBOplatin (PARAPLATIN) chemo infusion (by AUC)  415 mg Intravenous 1 time in Clinic/HOD    cefTRIAXone (ROCEPHIN) IVPB  2 g Intravenous Q24H    dexamethasone  12 mg Intravenous Q24H    EScitalopram oxalate  20 mg Oral QHS    etoposide (VEPESID) chemo infusion  100 mg/m2 (Treatment Plan Recorded) Intravenous Q24H    [START ON 9/9/2022] methylPREDNISolone sodium succinate injection  60 mg Intravenous Daily    ondansetron  8 mg Intravenous Once    pantoprazole  40 mg Oral Daily    polyethylene glycol  17 g Oral Daily    pravastatin  40 mg Oral QHS    pregabalin  75 mg Oral BID    senna-docusate 8.6-50 mg  1 tablet Oral BID    sodium chloride 0.9% flush bag IVPB   Intravenous 1 time in Clinic/HOD    sodium chloride 0.9%  10 mL Intravenous Q6H    sodium chloride 3%  4 mL Nebulization Q8H    vitamin D  1,000 Units Oral Daily     PRN Meds:sodium chloride, acetaminophen, alteplase, diphenhydrAMINE, EPINEPHrine, heparin, porcine (PF), hydrocortisone sodium succinate, HYDROmorphone, LORazepam, melatonin, methocarbamoL, ondansetron, sodium chloride 0.9%, sodium chloride 0.9%, sodium chloride 0.9%, sodium chloride 0.9%, Flushing PICC Protocol **AND** sodium chloride 0.9% **AND** sodium chloride 0.9%, sodium chloride 0.9%, sumatriptan     Review of Systems   Constitutional:  Positive for appetite change and fatigue. Negative  for activity change.   HENT:  Negative for congestion and sore throat.    Respiratory:  Positive for shortness of breath. Negative for cough, chest tightness and wheezing.    Cardiovascular:  Negative for chest pain, palpitations and leg swelling.   Gastrointestinal:  Negative for abdominal pain, constipation, diarrhea, nausea and vomiting.   Genitourinary:  Negative for dysuria and flank pain.   Musculoskeletal:  Negative for arthralgias, back pain and myalgias.   Skin:  Negative for color change and pallor.   Neurological:  Positive for weakness. Negative for dizziness and headaches.   Objective:     Vital Signs (Most Recent):  Temp: 97.7 °F (36.5 °C) (09/08/22 0705)  Pulse: (!) 128 (09/08/22 0905)  Resp: 15 (09/08/22 0905)  BP: 109/65 (09/08/22 0924)  SpO2: 99 % (09/08/22 0905)   Vital Signs (24h Range):  Temp:  [97 °F (36.1 °C)-98 °F (36.7 °C)] 97.7 °F (36.5 °C)  Pulse:  [111-128] 128  Resp:  [10-33] 15  SpO2:  [97 %-100 %] 99 %  BP: ()/(56-73) 109/65     Weight: 57.2 kg (126 lb)  Body mass index is 18.61 kg/m².  Body surface area is 1.67 meters squared.      Intake/Output Summary (Last 24 hours) at 9/8/2022 0941  Last data filed at 9/8/2022 0600  Gross per 24 hour   Intake 573.76 ml   Output 1615 ml   Net -1041.24 ml       Physical Exam  Constitutional:       General: He is not in acute distress.     Appearance: He is well-developed. He is ill-appearing.   HENT:      Head: Normocephalic and atraumatic.      Right Ear: External ear normal.      Left Ear: External ear normal.      Nose: Nose normal.      Mouth/Throat:      Mouth: Mucous membranes are moist.      Pharynx: Oropharynx is clear. No oropharyngeal exudate.   Eyes:      General: No scleral icterus.     Extraocular Movements: Extraocular movements intact.      Conjunctiva/sclera: Conjunctivae normal.   Cardiovascular:      Rate and Rhythm: Normal rate and regular rhythm.      Heart sounds: Normal heart sounds. No murmur heard.  Pulmonary:       Effort: Pulmonary effort is normal.      Breath sounds: Rhonchi present.      Comments: On 2L NC  Abdominal:      General: Bowel sounds are normal.      Palpations: Abdomen is soft.      Tenderness: There is no abdominal tenderness.   Musculoskeletal:         General: Normal range of motion.      Cervical back: Normal range of motion and neck supple.      Right lower leg: No edema.      Left lower leg: No edema.   Skin:     General: Skin is warm and dry.   Neurological:      General: No focal deficit present.      Mental Status: He is alert and oriented to person, place, and time.   Psychiatric:         Behavior: Behavior normal.       Significant Labs:   All pertinent labs from the last 24 hours have been reviewed.    Diagnostic Results:  I have reviewed all pertinent imaging results/findings within the past 24 hours.

## 2022-09-08 NOTE — SUBJECTIVE & OBJECTIVE
Interval History/Significant Events: Pt with afib w/RVR overnight. S/p MARIE w/DCCV. Chemotherapy started. Will monitor in ICU for now. May be ready for stepdown to hospital medicine this afternoon.      Review of Systems   Constitutional:  Positive for appetite change, fatigue and unexpected weight change.   Respiratory:  Positive for cough and shortness of breath. Negative for choking.    Cardiovascular:  Negative for chest pain and palpitations.   Gastrointestinal:  Negative for abdominal pain, constipation, diarrhea and nausea.   Neurological:  Negative for numbness and headaches.   Psychiatric/Behavioral:  Negative for agitation and confusion.    Objective:     Vital Signs (Most Recent):  Temp: 98.1 °F (36.7 °C) (09/08/22 1130)  Pulse: 61 (09/08/22 1305)  Resp: 12 (09/08/22 1305)  BP: 106/69 (09/08/22 1300)  SpO2: 95 % (09/08/22 1305) Vital Signs (24h Range):  Temp:  [97 °F (36.1 °C)-98.1 °F (36.7 °C)] 98.1 °F (36.7 °C)  Pulse:  [] 61  Resp:  [10-31] 12  SpO2:  [95 %-100 %] 95 %  BP: ()/(54-73) 106/69   Weight: 57.2 kg (126 lb)  Body mass index is 18.61 kg/m².      Intake/Output Summary (Last 24 hours) at 9/8/2022 1339  Last data filed at 9/8/2022 1200  Gross per 24 hour   Intake 679.32 ml   Output 2035 ml   Net -1355.68 ml       Physical Exam  Constitutional:       General: He is not in acute distress.     Appearance: He is ill-appearing.   Eyes:      General: No scleral icterus.     Extraocular Movements: Extraocular movements intact.      Pupils: Pupils are equal, round, and reactive to light.   Cardiovascular:      Rate and Rhythm: Normal rate and regular rhythm.      Pulses: Normal pulses.      Heart sounds: Normal heart sounds.   Pulmonary:      Breath sounds: Rhonchi present.      Comments: 2LNC  Abdominal:      General: Abdomen is flat. Bowel sounds are normal. There is no distension.      Palpations: Abdomen is soft.      Tenderness: There is no abdominal tenderness.   Musculoskeletal:       Right forearm: Edema present.      Left forearm: Edema present.      Comments: Distension of neck veins    Skin:     Capillary Refill: Capillary refill takes less than 2 seconds.      Findings: No bruising or lesion.   Neurological:      General: No focal deficit present.      Mental Status: He is alert.   Psychiatric:         Mood and Affect: Mood normal.         Behavior: Behavior normal.       Vents:  Oxygen Concentration (%): 30 (09/08/22 0340)  Lines/Drains/Airways       Peripherally Inserted Central Catheter Line  Duration             PICC Double Lumen 09/07/22 1734 right basilic <1 day              Drain  Duration                  Chest Tube 09/06/22 1300 1 Left Midaxillary 14 Fr. 2 days              Peripheral Intravenous Line  Duration                  Peripheral IV - Single Lumen 09/04/22 0707 18 G Left Upper Arm 4 days         Peripheral IV - Single Lumen 09/07/22 1000 20 G Left Forearm 1 day         Peripheral IV - Single Lumen 09/07/22 1100 20 G Right Forearm 1 day                  Significant Labs:    CBC/Anemia Profile:  Recent Labs   Lab 09/07/22  0521 09/08/22  0408   WBC 11.08 11.52   HGB 9.7* 10.3*   HCT 30.0* 32.4*    285   MCV 91 90   RDW 20.4* 20.6*        Chemistries:  Recent Labs   Lab 09/06/22  2130 09/07/22  0521 09/08/22  0408    137 134*   K 4.2 4.1 4.4    103 99   CO2 22* 25 28   BUN 36* 34* 37*   CREATININE 1.2 1.1 1.1   CALCIUM 8.3* 8.0* 8.4*   ALBUMIN  --  2.4* 2.8*   PROT  --  5.5* 5.9*   BILITOT  --  0.4 0.3   ALKPHOS  --  163* 172*   ALT  --  24 25   AST  --  21 41*   MG 2.1 2.0 2.2   PHOS 2.1* 2.1* 4.4       All pertinent labs within the past 24 hours have been reviewed.    Significant Imaging:  I have reviewed all pertinent imaging results/findings within the past 24 hours.

## 2022-09-08 NOTE — ASSESSMENT & PLAN NOTE
Previously admitted last month for R hip pain s/p fall, imaging showing extensive metastatic disease involving soft tissue above and below diaphragm particularly notable for extensive mediastinal involvement with encasement of bronchovascular structures as well as osseous disease.  Unclear etiology of primary malignancy, however MARTHA mass and L renal mass possible primaries.    Pathologic fracture of the right femoral head/neck, L1 vertebral body, and left 6th rib. Small to moderate pericardial effusion, likely malignant. Small left pleural effusion.     Heme/Onc consulted on admit and recommended MRI of brain to look for brain mets and none noted to brain itself but concerning osseous mets to skull itself. Bone scan done showed numerous tracer avid foci throughout the axial and appendicular skeleton consistent with metastatic disease. These lesions correspond to mixed lytic and sclerotic lesions on same day CT chest abdomen pelvis. Heme/Onc recommended bone biopsy and done by Orthopedics on 8/23 for diagnosis as primary tumor unknown and pathology pending on discharge.    - med onc consulted, rad onc consulted    - palliative care consulted  - DNR/DNI status as per ACP conversations on admit

## 2022-09-08 NOTE — PROGRESS NOTES
[facility] - Cardiac Medical ICU  Critical Care Medicine  Progress Note    Patient Name: Donis Jimenez  MRN: [id number]  Admission Date: 9/4/2022  Hospital Length of Stay: 4 days  Code Status: DNR  Attending Provider: Brannon Gonsalves MD  Primary Care Provider: Elio Farias MD   Principal Problem: Acute hypoxemic respiratory failure    Subjective:     HPI:  Donis Jimenez is a 61-year-old matta with PMH significant for bacterial endocarditis, s/p AV and MV mechanical valve replacement (on warfarin), CKD, tobacco abuse with recent complicated hospital course who presents to the emergency department with shortness of breath.  Difficult for patient to provide history due to tachypnea; spouse at bedside assisting with history.     Patient was admitted 8/18-8/27/22 after sustaining a pathologic right femoral neck fracture and right femoral artery pseudoaneurysm from a fall at home. Patient taken to OR on 8/19 by Vascular Surgery and had embolization of 3rd order right profunda femoral artery pseudoaneurysm with N-BCA glue and 5mm coil aneurysm repair. Pt underwent ight hip hemiarthroplasty with ablation, cementoplasty, and percutaneous fixation of pelvis for pathologic fracture and metastatic disease with Ortho on 8/23. 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 Metastatic work-up done  with CT scan of chest/abdomen and pelvis showing extensive disease including mediastinal mass with encasement of mediastinal vascular structures and airways, MARTHA pulmonary mass, bilateral suprarenal and left renal masses, L1 pathologic fx and rib & skull metastatic disease.     Pt's spouse states that pt had been ambulatory with a walker post-discharge, without c/o SOB, lightheadedness or dizziness. SOB started ~  9/2 with productive cough. He denies fever/chills, chest pain, abd pain, N/V/D.     In the ED patient was in a flutter and was cardioverted with some improvement in symptoms. He was found to have right sided pneumonia and was started on vanc and cefepime and admitted to Hospital Medicine for further management.     During his time in the ED, the patient had escalating FiO2 requirements, now on 45L 100% comfort flow.     Critical Care Medicine was consulted for worsening hypoxemic respiratory failure.          Hospital/ICU Course:  Patient admitted to [facility] evening of 9/4 for worsening hypoxemic respiratory failure secondary to pneumonia in setting of lung cancer with extensive metastatic disease. GOC discussed with pt and  who agree that they would not want extreme measures such as CPR and mechanical ventilation given overall poor prognosis. Pt started on vanc, cefepime, azithro and flagyl for pna/post-obstructive pna; alternating between bipap and 2LNC. S/p MARIE and DCCV 09/08. Chemotherapy started 09/08      Interval History/Significant Events: Pt with afib w/RVR overnight. S/p MARIE w/DCCV. Chemotherapy started. Will monitor in ICU for now. May be ready for stepdown to hospital medicine this afternoon.      Review of Systems   Constitutional:  Positive for appetite change, fatigue and unexpected weight change.   Respiratory:  Positive for cough and shortness of breath. Negative for choking.    Cardiovascular:  Negative for chest pain and palpitations.   Gastrointestinal:  Negative for abdominal pain, constipation, diarrhea and nausea.   Neurological:  Negative for numbness and headaches.   Psychiatric/Behavioral:  Negative for agitation and confusion.    Objective:     Vital Signs (Most Recent):  Temp: 98.1 °F (36.7 °C) (09/08/22 1130)  Pulse: 61 (09/08/22 1305)  Resp: 12 (09/08/22 1305)  BP: 106/69 (09/08/22 1300)  SpO2: 95 % (09/08/22 1305) Vital Signs (24h Range):  Temp:  [97 °F (36.1 °C)-98.1 °F (36.7 °C)]  98.1 °F (36.7 °C)  Pulse:  [] 61  Resp:  [10-31] 12  SpO2:  [95 %-100 %] 95 %  BP: ()/(54-73) 106/69   Weight: 57.2 kg (126 lb)  Body mass index is 18.61 kg/m².      Intake/Output Summary (Last 24 hours) at 9/8/2022 1339  Last data filed at 9/8/2022 1200  Gross per 24 hour   Intake 679.32 ml   Output 2035 ml   Net -1355.68 ml       Physical Exam  Constitutional:       General: He is not in acute distress.     Appearance: He is ill-appearing.   Eyes:      General: No scleral icterus.     Extraocular Movements: Extraocular movements intact.      Pupils: Pupils are equal, round, and reactive to light.   Cardiovascular:      Rate and Rhythm: Normal rate and regular rhythm.      Pulses: Normal pulses.      Heart sounds: Normal heart sounds.   Pulmonary:      Breath sounds: Rhonchi present.      Comments: 2LNC  Abdominal:      General: Abdomen is flat. Bowel sounds are normal. There is no distension.      Palpations: Abdomen is soft.      Tenderness: There is no abdominal tenderness.   Musculoskeletal:      Right forearm: Edema present.      Left forearm: Edema present.      Comments: Distension of neck veins    Skin:     Capillary Refill: Capillary refill takes less than 2 seconds.      Findings: No bruising or lesion.   Neurological:      General: No focal deficit present.      Mental Status: He is alert.   Psychiatric:         Mood and Affect: Mood normal.         Behavior: Behavior normal.       Vents:  Oxygen Concentration (%): 30 (09/08/22 0340)  Lines/Drains/Airways       Peripherally Inserted Central Catheter Line  Duration             PICC Double Lumen 09/07/22 1734 right basilic <1 day              Drain  Duration                  Chest Tube 09/06/22 1300 1 Left Midaxillary 14 Fr. 2 days              Peripheral Intravenous Line  Duration                  Peripheral IV - Single Lumen 09/04/22 0707 18 G Left Upper Arm 4 days         Peripheral IV - Single Lumen 09/07/22 1000 20 G Left Forearm 1 day          Peripheral IV - Single Lumen 09/07/22 1100 20 G Right Forearm 1 day                  Significant Labs:    CBC/Anemia Profile:  Recent Labs   Lab 09/07/22  0521 09/08/22  0408   WBC 11.08 11.52   HGB 9.7* 10.3*   HCT 30.0* 32.4*    285   MCV 91 90   RDW 20.4* 20.6*        Chemistries:  Recent Labs   Lab 09/06/22  2130 09/07/22  0521 09/08/22  0408    137 134*   K 4.2 4.1 4.4    103 99   CO2 22* 25 28   BUN 36* 34* 37*   CREATININE 1.2 1.1 1.1   CALCIUM 8.3* 8.0* 8.4*   ALBUMIN  --  2.4* 2.8*   PROT  --  5.5* 5.9*   BILITOT  --  0.4 0.3   ALKPHOS  --  163* 172*   ALT  --  24 25   AST  --  21 41*   MG 2.1 2.0 2.2   PHOS 2.1* 2.1* 4.4       All pertinent labs within the past 24 hours have been reviewed.    Significant Imaging:  I have reviewed all pertinent imaging results/findings within the past 24 hours.      ABG  Recent Labs   Lab 09/04/22  2145   PH 7.316*   PO2 62*   PCO2 37.6   HCO3 19.2*   BE -7     Assessment/Plan:     Pulmonary  * Acute hypoxemic respiratory failure  Patient with Hypoxic Respiratory failure which is Acute.  he is not on home oxygen. Supplemental oxygen was provided and noted- Oxygen Concentration (%):  [35-60] 35.   Signs/symptoms of respiratory failure include- tachypnea, increased work of breathing and respiratory distress. Contributing diagnoses includes - CHF, Pleural effusion and Pneumonia Labs and images were reviewed. Patient Has recent ABG, which has been reviewed. Will treat underlying causes and adjust management of respiratory failure as follows-     CT showed mediastinal mass with encasement of mediastinal structures, moderate pleural effusions, emphysematous changes and ground glass opacities.     -continue ceftraixone/azithromycin for CAP  -med onc and rad onc following   -malignancy likely contributing to dyspnea and hypoxia -- alternating bipap and comfort flow, morphine 2mg ordered for dsypnea   -9/7 underwent thoracentesis and CT placement with   [place].      Cardiac/Vascular  Atrial flutter  Hx aflutter,  previously on amio.    - presented in aflutter with RVR, now s/p DCCV in ED with return to NSR. 9/7 patient went back into atrial fib and was placed on amio gtt. Converted to ST in 120s.   - PPM in place  - cards consulted, do not recommend ablation due to frailty       H/O mitral valve replacement with mechanical valve  Hx endocarditis s/p MVR and AVR. INR 3.2 on admission     - daily INR  - on heparin gtt      Pseudoaneurysm of right femoral artery  - S/p embolization of right profunda femoral artery pseudoaneurysm with 5 mm coil 8/20    Oncology  Small cell carcinoma of lung  [facility] pathology report confirms small cell lung cancer from bone specimen.  Explained to him and his  that he is in an unfortunate situation.  He will most certainly die of this cancer rather quickly if not given chemotherapy.  While our intent is to improve his tumor burden causing his hypoxic respiratory failure with chemotherapy, it is also possible that we may potentially hasten his death unintentionally due to chemo toxicity. He and his  understand the risks.    -Heme/Onc following  -he is consented for chemo  -labs reviewed; carboplatin renally dosed and will proceed with carboplatin and etoposide.  Etoposide will also be given day 2 and day 3.  We will plan to provide GCSF starting day 4 and will do so for at least 7 doses.    Malignant neoplasm metastatic to bone  Previously admitted last month for R hip pain s/p fall, imaging showing extensive metastatic disease involving soft tissue above and below diaphragm particularly notable for extensive mediastinal involvement with encasement of bronchovascular structures as well as osseous disease.  Unclear etiology of primary malignancy, however MARTHA mass and L renal mass possible primaries.    Pathologic fracture of the right femoral head/neck, L1 vertebral body, and left 6th rib. Small to moderate pericardial effusion,  likely malignant. Small left pleural effusion.     Heme/Onc consulted on admit and recommended MRI of brain to look for brain mets and none noted to brain itself but concerning osseous mets to skull itself. Bone scan done showed numerous tracer avid foci throughout the axial and appendicular skeleton consistent with metastatic disease. These lesions correspond to mixed lytic and sclerotic lesions on same day CT chest abdomen pelvis. Heme/Onc recommended bone biopsy and done by Orthopedics on 8/23 for diagnosis as primary tumor unknown and pathology pending on discharge.    - med onc consulted, rad onc consulted    - palliative care consulted  - DNR/DNI status as per ACP conversations on admit       Endocrine  Body mass index (BMI) less than 19  in setting of malignancy and decreased appetite.    - nutrition consulted    Orthopedic  Swelling of upper extremity  Noted on previous hospitalization, possibly 2/2 to vascular compression and mass encasing mediastinal structures noted on CT  - non-occlusive thrombus in L cephalic vein    Pathologic fracture of neck of right femur s/p hemiarthroplasty on 8/23/2022  S/p R hip hemiarthroplasty with ablation, cementoplasty, and percutaneous fixation of pelvis 8/23.    - PT/OT consulted  - prn pain control    Palliative Care  ACP (advance care planning)  Advance Care Planning     Date: 09/04/2022    Code Status  In light of the patients advanced and life limiting illness, I engaged the the patient in a conversation about the patient's preferences for care  at the very end of life. The patient wishes to have a natural, peaceful death.  Along those lines, the patient does not wish to have CPR or other invasive treatments performed when his heart and/or breathing stops. I communicated to the patient that a DNR order would be placed in his medical record to reflect this preference.  I spent a total of 10 minutes engaging the patient in this advance care planning discussion.               Critical Care Daily Checklist:    A: Awake: RASS Goal/Actual Goal: RASS Goal: 0-->alert and calm  Actual: Garcia Agitation Sedation Scale (RASS): Alert and calm   B: Spontaneous Breathing Trial Performed?     C: SAT & SBT Coordinated?  N/A                      D: Delirium: CAM-ICU Overall CAM-ICU: Negative   E: Early Mobility Performed? No   F: Feeding Goal: Goals: Meet % EEN, EPN by RD f/u date  Status: Nutrition Goal Status: new   Current Diet Order   Procedures    Diet NPO Except for: Medication     Order Specific Question:   Except for     Answer:   Medication      AS: Analgesia/Sedation None   T: Thromboembolic Prophylaxis Heparin gtt   H: HOB > 300 Yes   U: Stress Ulcer Prophylaxis (if needed) Famotidine   G: Glucose Control 140-180   B: Bowel Function Stool Occurrence: 1   I: Indwelling Catheter (Lines & Rodgers) Necessity PIV   D: De-escalation of Antimicrobials/Pharmacotherapies Continue Ceftriaxone    Plan for the day/ETD Ongoing    Code Status:  Family/Goals of Care: DNR  Ongoing     Critical Care Time: 60 minutes  Critical secondary to Patient has a condition that poses threat to life and bodily function: Afib w/RVR      Critical care was time spent personally by me on the following activities: development of treatment plan with patient or surrogate and bedside caregivers, discussions with consultants, evaluation of patient's response to treatment, examination of patient, ordering and performing treatments and interventions, ordering and review of laboratory studies, ordering and review of radiographic studies, pulse oximetry, re-evaluation of patient's condition. This critical care time did not overlap with that of any other provider or involve time for any procedures.     Marilee Dacosta, MELLO  Critical Care Medicine  [facility] Cardiac Medical ICU

## 2022-09-08 NOTE — TRANSFER OF CARE
"Anesthesia Transfer of Care Note    Patient: Donis Jimenez    Procedure(s) Performed: Procedure(s) (LRB):  Cardioversion or Defibrillation (N/A)    Patient location: ICU    Anesthesia Type: general    Transport from OR: Transported from OR on 2-3 L/min O2 by NC with adequate spontaneous ventilation. Continuous ECG monitoring in transport. Continuous SpO2 monitoring in transport    Post pain: adequate analgesia    Post assessment: no apparent anesthetic complications    Post vital signs: stable    Level of consciousness: awake    Nausea/Vomiting: no nausea/vomiting    Complications: none    Transfer of care protocol was followedComments: Case was a bedside procedure. Patient remained in room during case. Report provided to ICU RN following procedure. Questions asked and answered. EICU was present during bedside case as well.      Last vitals:   Visit Vitals  /65   Pulse (!) 128   Temp 36.5 °C (97.7 °F) (Oral)   Resp 15   Ht 5' 9" (1.753 m)   Wt 57.2 kg (126 lb)   SpO2 99%   BMI 18.61 kg/m²     "

## 2022-09-08 NOTE — CONSULTS
[facility]  MARIE Consult       Donis Jimenez  YOB: 1960  Medical Record Number:  [id number]  Attending Physician:  Brannon Gonsalves MD   Date of Admission: 9/4/2022       Hospital Day:  4  Current Principal Problem:  Acute hypoxemic respiratory failure      History     Cc: atrial flutter     HPI  Donis Jimenez is a 61-year-old matta with PMH significant for bacterial endocarditis, s/p AV and MV mechanical valve replacement (on warfarin), dual chamber PPM placed during perioperative MV/AV replacements, CKD, tobacco abuse with recent complicated hospital course who presented to the emergency department with shortness of breath.       Patient was admitted 8/18-8/27/22 after sustaining a pathologic right femoral neck fracture and right femoral artery pseudoaneurysm from a fall at home. Patient taken to OR on 8/19 by Vascular Surgery and had vascular repair. 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 Additionally, CT scan of chest/abdomen and pelvis showing extensive disease including mediastinal mass with encasement of mediastinal vascular structures and airways, MARTHA pulmonary mass, bilateral suprarenal and left renal masses, L1 pathologic fx and rib & skull metastatic disease. Ultimately diagnosed with extensive small cell lung cancer.      In the ED patient was in atrial flutter and was cardioverted with some improvement in symptoms. He was found to have right sided pneumonia and was started on vanc and cefepime and was initially admitted to Hospital Medicine for further management, but was upgraded to MICU for worsening respiratory distress. Patient now DNR. On high amounts of O2 via HFNC. On evening of 9/6 patient went back into atrial flutter and was  scheduled for repeat cardioversion this morning. He was transitioned from warfarin to heparin drip given interaction with chemotherapy. This morning has subtherapeutic INR and PTT therefore MARIE requested with cardioversion.       Medications - Outpatient  Prior to Admission medications    Medication Sig Start Date End Date Taking? Authorizing Provider   acetaminophen (TYLENOL) 500 MG tablet Take 2 tablets (1,000 mg total) by mouth every 8 (eight) hours as needed (Mild to moderate pain). 8/27/22   Katina Khoury MD   EScitalopram oxalate (LEXAPRO) 20 MG tablet Take 20 mg by mouth every evening. 8/18/22   Historical Provider   esomeprazole (NEXIUM) 20 MG capsule Take 20 mg by mouth once daily.    Historical Provider   ferrous gluconate (FERGON) 324 MG tablet Take 1 tablet by mouth once daily. 8/18/22   Historical Provider   LORazepam (ATIVAN) 1 MG tablet Take 1 mg by mouth 3 (three) times daily as needed for Anxiety. 8/11/22   Historical Provider   oxyCODONE (ROXICODONE) 5 MG immediate release tablet Take 1 tablet (5 mg total) by mouth every 4 (four) hours as needed for Pain. 8/27/22   Katina Khoury MD   pravastatin (PRAVACHOL) 40 MG tablet Take 40 mg by mouth every evening. 8/18/22   Historical Provider   pregabalin (LYRICA) 75 MG capsule Take 1 capsule (75 mg total) by mouth 2 (two) times daily. 8/27/22 9/26/22  Katina Khoury MD   sumatriptan (IMITREX) 25 MG Tab Take 25 mg by mouth as needed for Migraine. 7/21/22   Historical Provider   vitamin D (VITAMIN D3) 1000 units Tab Take 1 tablet (1,000 Units total) by mouth once daily. 8/27/22 8/27/23  Katina Khoury MD   warfarin (COUMADIN) 3 MG tablet Take 1.5 tablets (4.5 mg total) by mouth Daily. 8/27/22   Katina Khoury MD         Medications - Current  Scheduled Meds:   albuterol-ipratropium  3 mL Nebulization Q8H    aprepitant  130 mg Intravenous 1 time in Clinic/HOD    CARBOplatin (PARAPLATIN) chemo infusion (by AUC)  415 mg Intravenous 1  time in Clinic/HOD    cefTRIAXone (ROCEPHIN) IVPB  2 g Intravenous Q24H    dexamethasone  12 mg Intravenous Once    EScitalopram oxalate  20 mg Oral QHS    etoposide (VEPESID) chemo infusion  100 mg/m2 (Treatment Plan Recorded) Intravenous Q24H    [START ON 9/9/2022] methylPREDNISolone sodium succinate injection  60 mg Intravenous Daily    ondansetron  8 mg Intravenous Once    pantoprazole  40 mg Oral Daily    polyethylene glycol  17 g Oral Daily    pravastatin  40 mg Oral QHS    pregabalin  75 mg Oral BID    senna-docusate 8.6-50 mg  1 tablet Oral BID    sodium chloride 0.9% flush bag IVPB   Intravenous 1 time in Clinic/HOD    sodium chloride 0.9%  10 mL Intravenous Q6H    sodium chloride 3%  4 mL Nebulization Q8H    vitamin D  1,000 Units Oral Daily     Continuous Infusions:   amiodarone in dextrose 5% 0.5 mg/min (09/08/22 0600)    heparin (porcine) in D5W 13.986 Units/kg/hr (09/08/22 0600)     PRN Meds:.sodium chloride, acetaminophen, alteplase, diphenhydrAMINE, EPINEPHrine, heparin, porcine (PF), hydrocortisone sodium succinate, HYDROmorphone, LORazepam, melatonin, methocarbamoL, ondansetron, sodium chloride 0.9%, sodium chloride 0.9%, sodium chloride 0.9%, sodium chloride 0.9%, Flushing PICC Protocol **AND** sodium chloride 0.9% **AND** sodium chloride 0.9%, sumatriptan      Allergies  Review of patient's allergies indicates:  No Known Allergies      Past Medical History  Past Medical History:   Diagnosis Date    Endocarditis     Pacemaker     Pneumonia          Past Surgical History  Past Surgical History:   Procedure Laterality Date    ANGIOGRAPHY OF LOWER EXTREMITY Right 8/19/2022    Procedure: ANGIOGRAM, LOWER EXTREMITY;  Surgeon: Thomas Nicolas MD;  Location: [facility] OR [address] [place] [address];  Service: Peripheral Vascular;  Laterality: Right;  30.0 min  315.10 mGy  26.1755 Gycm2  84 ml dye    HIP RESURFACING Right 8/23/2022    Procedure: cemontoplasty;  Surgeon: Yung Flores MD;  Location: [facility] OR [address] [place] [address];  Service:  Orthopedics;  Laterality: Right;    RADIOFREQUENCY ABLATION, BONE, PERCUTANEOUS Right 8/23/2022    Procedure: RADIOFREQUENCY ABLATION,BONE;  Surgeon: Yung Flores MD;  Location: [facility] OR 2ND FLR;  Service: Orthopedics;  Laterality: Right;    REPAIR, PSEUDOANEURYSM, ARTERY, FEMORAL Right 8/19/2022    Procedure: REPAIR, PSEUDOANEURYSM, ARTERY, FEMORAL;  Surgeon: Thomas Nicolas MD;  Location: [facility] OR 2ND FLR;  Service: Peripheral Vascular;  Laterality: Right;  R PFA (3rd branch) pseudoaneurysm          Social History  Social History     Socioeconomic History    Marital status:    Tobacco Use    Smoking status: Every Day     Packs/day: 1.00     Types: Cigarettes    Smokeless tobacco: Never         ROS  10 point ROS performed and negative except as stated in HPI       Physical Examination         Vital Signs  Vitals  Temp: 97.4 °F (36.3 °C)  Temp src: Axillary  Pulse: (!) 123  Heart Rate Source: Monitor  Resp: 13  SpO2: 100 %  Pulse Oximetry Type: Continuous  Flow (L/min): 4  Oxygen Concentration (%): 30  O2 Device (Oxygen Therapy): nasal cannula w/ humidification  BP: (!) 113/56  MAP (mmHg): 80  BP Location: Right leg  BP Method: Automatic  Patient Position: Lying            24 Hour VS Range    Temp:  [97 °F (36.1 °C)-98 °F (36.7 °C)]   Pulse:  [111-128]   Resp:  [10-33]   BP: ()/(56-73)   SpO2:  [97 %-100 %]     Intake/Output Summary (Last 24 hours) at 9/8/2022 0847  Last data filed at 9/8/2022 0600  Gross per 24 hour   Intake 597.31 ml   Output 1890 ml   Net -1292.69 ml         Physical Exam:   Constitutional: no acute distress  HEENT: NCAT, EOMI, no scleral icterus  Cardiovascular: Irregularly irregular rhythm. 2+ carotid, radial, DP pulses bilaterally    Pulmonary:   Abdomen: nontender, non-distended   Neuro: alert and oriented, no focal deficits  Extremities: warm, no edema   MSK: no deformities  Integument: intact, no rashes         Data       Recent Labs   Lab 09/04/22  0708 09/04/22  0098  09/05/22 0341 09/06/22 0329 09/07/22 0521 09/08/22  0408   WBC 15.02*  --  13.89* 9.50 11.08 11.52   HGB 11.3*  --  10.3* 10.2* 9.7* 10.3*   HCT 35.6* 33* 31.8* 32.1* 30.0* 32.4*   *  --  417 400 347 285        Recent Labs   Lab 09/04/22 0708 09/05/22 0341 09/06/22 0329 09/07/22 0521 09/08/22  0408   INR 5.5* 3.2* 2.0* 1.2 1.2        Recent Labs   Lab 09/05/22 0341 09/06/22 0329 09/06/22 2130 09/07/22 0521 09/08/22  0408   * 135* 138 137 134*   K 4.2 4.1 4.2 4.1 4.4    104 101 103 99   CO2 18* 24 22* 25 28   BUN 29* 25* 36* 34* 37*   CREATININE 1.1 0.9 1.2 1.1 1.1   ANIONGAP 12 7* 15 9 7*   CALCIUM 8.4* 8.2* 8.3* 8.0* 8.4*        Recent Labs   Lab 09/04/22 0708 09/05/22 0341 09/06/22 0329 09/07/22 0521 09/08/22  0408   PROT 6.3 5.8* 5.8* 5.5* 5.9*   ALBUMIN 2.7* 2.5* 2.4* 2.4* 2.8*   BILITOT 0.9 0.8 0.4 0.4 0.3   ALKPHOS 229* 200* 180* 163* 172*   AST 60* 36 26 21 41*   ALT 49* 38 32 24 25        Recent Labs   Lab 09/04/22 0708 09/05/22  0341   TROPONINI 0.033* 0.032*        BNP (pg/mL)   Date Value   09/07/2022 336 (H)   09/04/2022 350 (H)       Recent Labs   Lab 09/04/22  0709 09/04/22  1136   LABBLOO No Growth to date  No Growth to date  No Growth to date  No Growth to date No Growth to date  No Growth to date  No Growth to date  No Growth to date           Assessment & Plan   61-year-old matta with PMH significant for bacterial endocarditis, s/p AV and MV mechanical valve replacement (on warfarin), dual chamber PPM placed during perioperative MV/AV replacements, CKD, small cell lung cancer and atrial flutter.     Atrial flutter:   -No absolute contraindications of esophageal stricture, tumor, perforation, laceration,or diverticulum and/or active GI bleed  -The risks, benefits & alternatives of the procedure were explained to the patient.   -The risks of transesophageal echo include but are not limited to:  Dental trauma, esophageal trauma/perforation, bleeding,  laryngospasm/brochospasm, aspiration, sore throat/hoarseness, & dislodgement of the endotracheal tube/nasogastric tube (where applicable).    -The risks of moderate sedation include hypotension, respiratory depression, arrhythmias, bronchospasm, & death.    -Informed consent was obtained. The patient is agreeable to proceed with the procedure and all questions and concerns addressed      Juventino Garcia MD  [facility]   Cardiovascular Disease PGY-V

## 2022-09-08 NOTE — ASSESSMENT & PLAN NOTE
[facility] pathology report confirms small cell lung cancer from bone specimen.  Explained to him and his  that he is in an unfortunate situation.  He will most certainly die of this cancer rather quickly if not given chemotherapy.  While our intent is to improve his tumor burden causing his hypoxic respiratory failure with chemotherapy, it is also possible that we may potentially hasten his death unintentionally due to chemo toxicity. He and his  understand the risks.  - Patient started chemotherapy this morning, tolerated well.   -labs reviewed; carboplatin renally dosed and will proceed with carboplatin and etoposide.  Etoposide will also be given day 2 and day 3.  We will plan to provide GCSF starting day 4 and will do so for at least 7 doses.

## 2022-09-08 NOTE — PROGRESS NOTES
[facility] - Cardiac Medical ICU  Hematology/Oncology  Progress Note    Patient Name: Donis Jimenez  Admission Date: 9/4/2022  Hospital Length of Stay: 4 days  Code Status: DNR     Subjective:     HPI:  61 year old man with newly diagnosed metastatic small cell lung cancer complicated by acute hypoxic respiratory failure.  Was previously treated for pathologic right femoral neck fracture.  Pathology finalized as small cell carcinoma.      Interval History: No acute events overnight. Inpatient chemotherapy started this morning. Patient tolerated well with minimal side effects so far. Only complaints of mild chest pain after cardioversion.     Oncology Treatment Plan:   OP SCLC CARBOPLATIN (AUC) ETOPOSIDE DURVALUMAB Q3W FOLLOWED BY MAINTENANCE DURVALUMAB 1500 MG Q4W    Medications:  Continuous Infusions:   amiodarone in dextrose 5% 0.5 mg/min (09/08/22 0600)    heparin (porcine) in D5W 13.986 Units/kg/hr (09/08/22 0600)     Scheduled Meds:   albuterol-ipratropium  3 mL Nebulization Q8H    aprepitant  130 mg Intravenous 1 time in Clinic/HOD    CARBOplatin (PARAPLATIN) chemo infusion (by AUC)  415 mg Intravenous 1 time in Clinic/HOD    cefTRIAXone (ROCEPHIN) IVPB  2 g Intravenous Q24H    dexamethasone  12 mg Intravenous Q24H    EScitalopram oxalate  20 mg Oral QHS    etoposide (VEPESID) chemo infusion  100 mg/m2 (Treatment Plan Recorded) Intravenous Q24H    [START ON 9/9/2022] methylPREDNISolone sodium succinate injection  60 mg Intravenous Daily    ondansetron  8 mg Intravenous Once    pantoprazole  40 mg Oral Daily    polyethylene glycol  17 g Oral Daily    pravastatin  40 mg Oral QHS    pregabalin  75 mg Oral BID    senna-docusate 8.6-50 mg  1 tablet Oral BID    sodium chloride 0.9% flush bag IVPB   Intravenous 1 time in Clinic/HOD    sodium chloride 0.9%  10 mL Intravenous Q6H    sodium chloride 3%  4 mL Nebulization Q8H    vitamin D  1,000 Units Oral Daily     PRN Meds:sodium chloride,  acetaminophen, alteplase, diphenhydrAMINE, EPINEPHrine, heparin, porcine (PF), hydrocortisone sodium succinate, HYDROmorphone, LORazepam, melatonin, methocarbamoL, ondansetron, sodium chloride 0.9%, sodium chloride 0.9%, sodium chloride 0.9%, sodium chloride 0.9%, Flushing PICC Protocol **AND** sodium chloride 0.9% **AND** sodium chloride 0.9%, sodium chloride 0.9%, sumatriptan     Review of Systems   Constitutional:  Positive for appetite change and fatigue. Negative for activity change.   HENT:  Negative for congestion and sore throat.    Respiratory:  Positive for shortness of breath. Negative for cough, chest tightness and wheezing.    Cardiovascular:  Negative for chest pain, palpitations and leg swelling.   Gastrointestinal:  Negative for abdominal pain, constipation, diarrhea, nausea and vomiting.   Genitourinary:  Negative for dysuria and flank pain.   Musculoskeletal:  Negative for arthralgias, back pain and myalgias.   Skin:  Negative for color change and pallor.   Neurological:  Positive for weakness. Negative for dizziness and headaches.   Objective:     Vital Signs (Most Recent):  Temp: 97.7 °F (36.5 °C) (09/08/22 0705)  Pulse: (!) 128 (09/08/22 0905)  Resp: 15 (09/08/22 0905)  BP: 109/65 (09/08/22 0924)  SpO2: 99 % (09/08/22 0905)   Vital Signs (24h Range):  Temp:  [97 °F (36.1 °C)-98 °F (36.7 °C)] 97.7 °F (36.5 °C)  Pulse:  [111-128] 128  Resp:  [10-33] 15  SpO2:  [97 %-100 %] 99 %  BP: ()/(56-73) 109/65     Weight: 57.2 kg (126 lb)  Body mass index is 18.61 kg/m².  Body surface area is 1.67 meters squared.      Intake/Output Summary (Last 24 hours) at 9/8/2022 0941  Last data filed at 9/8/2022 0600  Gross per 24 hour   Intake 573.76 ml   Output 1615 ml   Net -1041.24 ml       Physical Exam  Constitutional:       General: He is not in acute distress.     Appearance: He is well-developed. He is ill-appearing.   HENT:      Head: Normocephalic and atraumatic.      Right Ear: External ear normal.       Left Ear: External ear normal.      Nose: Nose normal.      Mouth/Throat:      Mouth: Mucous membranes are moist.      Pharynx: Oropharynx is clear. No oropharyngeal exudate.   Eyes:      General: No scleral icterus.     Extraocular Movements: Extraocular movements intact.      Conjunctiva/sclera: Conjunctivae normal.   Cardiovascular:      Rate and Rhythm: Normal rate and regular rhythm.      Heart sounds: Normal heart sounds. No murmur heard.  Pulmonary:      Effort: Pulmonary effort is normal.      Breath sounds: Rhonchi present.      Comments: On 2L NC  Abdominal:      General: Bowel sounds are normal.      Palpations: Abdomen is soft.      Tenderness: There is no abdominal tenderness.   Musculoskeletal:         General: Normal range of motion.      Cervical back: Normal range of motion and neck supple.      Right lower leg: No edema.      Left lower leg: No edema.   Skin:     General: Skin is warm and dry.   Neurological:      General: No focal deficit present.      Mental Status: He is alert and oriented to person, place, and time.   Psychiatric:         Behavior: Behavior normal.       Significant Labs:   All pertinent labs from the last 24 hours have been reviewed.    Diagnostic Results:  I have reviewed all pertinent imaging results/findings within the past 24 hours.    Assessment/Plan:     Small cell carcinoma of lung  [facility] pathology report confirms small cell lung cancer from bone specimen.  Explained to him and his  that he is in an unfortunate situation.  He will most certainly die of this cancer rather quickly if not given chemotherapy.  While our intent is to improve his tumor burden causing his hypoxic respiratory failure with chemotherapy, it is also possible that we may potentially hasten his death unintentionally due to chemo toxicity. He and his  understand the risks.  - Patient started chemotherapy this morning, tolerated well.   -labs reviewed; carboplatin renally dosed and will  proceed with carboplatin and etoposide.  Etoposide will also be given day 2 and day 3.  We will plan to provide GCSF starting day 4 and will do so for at least 7 doses.        Malignant neoplasm metastatic to bone  See small cell lung cancer    Pathologic fracture of neck of right femur s/p hemiarthroplasty on 8/23/2022  Management per primary and ortho  When more stable consider postop radiation    * Patient to step down to oncology service pending bed availability         Frandy Ward MD  Hematology/Oncology  [facility] - Cardiac Medical ICU

## 2022-09-09 PROBLEM — F43.23 ADJUSTMENT DISORDER WITH MIXED ANXIETY AND DEPRESSED MOOD: Status: ACTIVE | Noted: 2022-01-01

## 2022-09-09 NOTE — ASSESSMENT & PLAN NOTE
Patient with Hypoxic Respiratory failure which is Acute.  he is not on home oxygen. Supplemental oxygen was provided and noted- Oxygen Concentration (%):  [30] 30.   Signs/symptoms of respiratory failure include- tachypnea, increased work of breathing and respiratory distress. Contributing diagnoses includes - CHF, Pleural effusion and Pneumonia Labs and images were reviewed. Patient Has recent ABG, which has been reviewed. Will treat underlying causes and adjust management of respiratory failure as follows-     CT showed mediastinal mass with encasement of mediastinal structures, moderate pleural effusions, emphysematous changes and ground glass opacities.     -continue ceftraixone/azithromycin for CAP  -med onc and rad onc following   -malignancy likely contributing to dyspnea and hypoxia -- alternating bipap and comfort flow, morphine 2mg ordered for dsypnea   -9/7 underwent thoracentesis and CT placement with Dr. Gonsalves.

## 2022-09-09 NOTE — NURSING
Etoposide started through [facility] PICC noted for having positive blood return over 1 hour. Chemotherapy dosage and BSA checked by two chemotherapy certified nurses prior to administration. Chemotherapy education done prior to hanging of chemotherapy. Chemotherapeutic precautions in place throughout therapy.  Will continue to monitor.

## 2022-09-09 NOTE — ASSESSMENT & PLAN NOTE
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 Pt has metastatic lung cancer. Pt is alert, oriented to person, place, and situation. Pt is a DNR. Pt is on 3 L O2. Pt tearful today.      Reason for consult: GOC/ACP. Communicated with MAJOR Camp fellow with [facility].     Today: Pt very tearful today. Pt says "I know I am dying. " Pt kept apologizing.  Explained to pt there is not a need to apoligize and that that I am glad he felt comfortable to express his feelings. Pt repots he is having a bad day and everything starting to sink in about his health. Support given.  seeing. Will consult Oncology/Psycology for pt. Pt to step down to Hem/onc as soon as bed available.     9/6/22  Goals of care/ACP: Pt to have tracheal stenting tomorrow. Pt reports he is in agreement to procedure and would like CC team to speak to Aston about procedure when he visits today. Per pt, he relays information to Aston but he feels like he forgets some of information to tell him. Pt reports his goal is medical management at this time to see if he can improve enough to get back home. Pt reports his goal is to be at home with his four dogs and , Aston. Pt is aware of his severity of his illness.     Today: Met with pt along with Tracie Kidd LCSW.     9/6/22  Introduced role of Palliative care to pt.   Met with pt who is aware of his medical issues. Per pt he is aware he has metastatic cancer. Pt is aware that any therapies/procedures offered to him is palliative and will not cure cancer. Per pt, he is still trying to come to  with his dx. He learned about his issues in August. Pt reports goal at time is to see if anything can be done to help with symptoms and "give him some " "more time." Spoke to pt about amount of O2 he is on at this time. He verbalized understanding.  Per pt he has spoken to Oncology and XRT MDs.   CTS has been consulted concerning tracheal stenting.      Pt open to continued visits with pal care for care planning and symptom management needs.  Support given pt.      Pt reports he is legally  to Aston Collins and he would want him to be hi medical decision-maker if he cannot make his own medical decisions. MPOA paperwork left with pt. Education provided. Per Pt, Aston works during day but can be reached by phone.      Code status: DNR.      Symptom management:      Dyspnea r/t to mediastinal mass, pleural effusion.   Pt is on 3 L O2 per NC.   Pt is on Morphine 2 mg IVP q 2 hrs prn.   Pt has chest tube in place and having tracheal stents placed.      Pt reports Morphine "has really helps with my SOB."  Pt reports Morphine lasts around 1-2 hours.      Continue Morphine 2 mg IVP q 2 hours prn.   Will continue to assess.      Debility r/t to pathological femoral neck fracture s/p ight hip hemiarthroplasty with ablation, cementoplasty  Pt was using walker prior to admit.   Would resume PT/OT when pt stable to participate.      Anxiety r/t to new dx and dyspnea.   Pt has Ativan 1 mg tid prn.   Continue.      Plan:   Will continue to meet with pt to reinforce realistic expectations/assit with GOC.   Support given.   Will consult Oncology/ Pyschology.     Will follow.         "

## 2022-09-09 NOTE — PLAN OF CARE
Pt stepped down from ICU this shift. Amio gtt and heparin gtt infusing. Next aPTT due with morning labs. Etoposide given and Pt tolerated without issue. O2 sats maintained on 3L NC. Chest tube to water seal with 236 ml output. Pt with c/o chest pain s/p cardioversion, roboxin and dilaudid given prn with moderate relief obtained. Ativan given for anxiety. NSR on telemetry. Pt remains afebrile and VSS. WCTM.

## 2022-09-09 NOTE — PROGRESS NOTES
[facility] - Oncology ([facility])  Hematology/Oncology  Progress Note    Patient Name: Donis Jimenez  Admission Date: 9/4/2022  Hospital Length of Stay: 5 days  Code Status: DNR     Subjective:     HPI:  61 year old man with newly diagnosed metastatic small cell lung cancer complicated by acute hypoxic respiratory failure.  Was previously treated for pathologic right femoral neck fracture.  Pathology finalized as small cell carcinoma. Over his stay in ICU, his O2 requirments were steadily reduced, he is now on nasal cannula. He is currently undergoing inpatient chemotherapy with carboplatin and etoposide.       Interval History: Patient tolerated day one of inpatient chemotherapy and tolerated it well. Stepped down to 8th floor with plans to change to medical oncology team on 9/10. Patient expresses guilt about his current condition, feeling he is responsible for his illness and that he is a burden to others.     Oncology Treatment Plan:   OP SCLC CARBOPLATIN (AUC) ETOPOSIDE DURVALUMAB Q3W FOLLOWED BY MAINTENANCE DURVALUMAB 1500 MG Q4W    Medications:  Continuous Infusions:   amiodarone in dextrose 5% 0.5 mg/min (09/09/22 0600)    heparin (porcine) in D5W 16 Units/kg/hr (09/09/22 0834)     Scheduled Meds:   albuterol-ipratropium  3 mL Nebulization Q8H    aprepitant  130 mg Intravenous 1 time in Clinic/HOD    dexamethasone  12 mg Intravenous Q24H    EScitalopram oxalate  20 mg Oral QHS    etoposide (VEPESID) chemo infusion  100 mg/m2 (Treatment Plan Recorded) Intravenous Q24H    pantoprazole  40 mg Oral Daily    polyethylene glycol  17 g Oral Daily    pravastatin  40 mg Oral QHS    pregabalin  75 mg Oral BID    senna-docusate 8.6-50 mg  1 tablet Oral BID    sodium chloride 0.9% flush bag IVPB   Intravenous 1 time in Clinic/HOD    sodium chloride 0.9%  10 mL Intravenous Q6H    sodium chloride 3%  4 mL Nebulization Q8H    vitamin D  1,000 Units Oral Daily     PRN Meds:sodium chloride, acetaminophen, alteplase,  diphenhydrAMINE, EPINEPHrine, heparin, porcine (PF), hydrocortisone sodium succinate, HYDROmorphone, LORazepam, melatonin, methocarbamoL, ondansetron, sodium chloride 0.9%, sodium chloride 0.9%, sodium chloride 0.9%, sodium chloride 0.9%, Flushing PICC Protocol **AND** sodium chloride 0.9% **AND** sodium chloride 0.9%, sodium chloride 0.9%, sumatriptan     Review of Systems   Constitutional:  Positive for activity change, fatigue and unexpected weight change.   Respiratory:  Positive for shortness of breath.    Cardiovascular:  Negative for leg swelling.   Gastrointestinal:  Negative for nausea and vomiting.   Musculoskeletal:  Positive for gait problem.   Skin: Negative.    Neurological:  Positive for weakness.   Psychiatric/Behavioral: Negative.     Objective:     Vital Signs (Most Recent):  Temp: 97.5 °F (36.4 °C) (09/09/22 1516)  Pulse: 61 (09/09/22 1516)  Resp: 19 (09/09/22 1516)  BP: 101/64 (09/09/22 1516)  SpO2: 97 % (09/09/22 1516) Vital Signs (24h Range):  Temp:  [97.4 °F (36.3 °C)-97.5 °F (36.4 °C)] 97.5 °F (36.4 °C)  Pulse:  [60-70] 61  Resp:  [10-29] 19  SpO2:  [92 %-99 %] 97 %  BP: (100-109)/(58-73) 101/64     Weight: 57.2 kg (126 lb)  Body mass index is 18.61 kg/m².  Body surface area is 1.67 meters squared.      Intake/Output Summary (Last 24 hours) at 9/9/2022 1706  Last data filed at 9/9/2022 1400  Gross per 24 hour   Intake 753.71 ml   Output 1616 ml   Net -862.29 ml       Physical Exam  Constitutional:       General: He is not in acute distress.     Appearance: He is well-developed. He is ill-appearing.   HENT:      Head: Normocephalic and atraumatic.      Right Ear: External ear normal.      Left Ear: External ear normal.      Nose: Nose normal.      Mouth/Throat:      Mouth: Mucous membranes are moist.      Pharynx: Oropharynx is clear. No oropharyngeal exudate.   Eyes:      General: No scleral icterus.     Extraocular Movements: Extraocular movements intact.      Conjunctiva/sclera:  Conjunctivae normal.   Cardiovascular:      Rate and Rhythm: Normal rate and regular rhythm.      Heart sounds: Normal heart sounds. No murmur heard.  Pulmonary:      Effort: Pulmonary effort is normal.      Breath sounds: Rhonchi present.      Comments: On 5L NC  Abdominal:      General: Bowel sounds are normal.      Palpations: Abdomen is soft.      Tenderness: There is no abdominal tenderness.   Musculoskeletal:         General: Normal range of motion.      Cervical back: Normal range of motion and neck supple.      Right lower leg: No edema.      Left lower leg: No edema.   Skin:     General: Skin is warm and dry.   Neurological:      General: No focal deficit present.      Mental Status: He is alert and oriented to person, place, and time.   Psychiatric:      Comments: Depressed mood, feelings of guilt       Significant Labs:   All pertinent labs from the last 24 hours have been reviewed.    Diagnostic Results:  I have reviewed all pertinent imaging results/findings within the past 24 hours.    Assessment/Plan:     Small cell carcinoma of lung  [facility] pathology report confirms small cell lung cancer from bone specimen.  Explained to him and his  that he is in an unfortunate situation.  He will most certainly die of this cancer rather quickly if not given chemotherapy.  While our intent is to improve his tumor burden causing his hypoxic respiratory failure with chemotherapy, it is also possible that we may potentially hasten his death unintentionally due to chemo toxicity. He and his  understand the risks.  - Patient currently on day 2 of inpatient chemotherapy, so far tolerating well  -labs reviewed; carboplatin renally dosed and will proceed with carboplatin and etoposide.  Etoposide will also be given day 2 and day 3.  We will plan to provide GCSF starting day 4 and will do so for at least 7 doses.  - Will get tumor lysis labs daily while inpatient  - agree with oncology psychology consult  - Will  transfer to medical oncology on 9/10        Malignant neoplasm metastatic to bone  See small cell lung cancer    Pathologic fracture of neck of right femur s/p hemiarthroplasty on 8/23/2022  Management per primary and ortho  When more stable consider postop radiation             Frandy Ward MD  Hematology/Oncology  [facility] - Oncology (Hospital)          ATTENDING NOTE, ONCOLOGY INPATIENT TEAM    As above; events of last 24 hours noted.  Patient seen and examined.  The plan is for him to complete 3 days of carboplatin etoposide as an inpatient.  We will continue to check daily tumor lysis labs for now.  We will assume his care at 7 a.m. tomorrow.    Kash Boo MD

## 2022-09-09 NOTE — ANESTHESIA POSTPROCEDURE EVALUATION
Anesthesia Post Evaluation    Patient: Donis Jimenez    Procedure(s) Performed: Procedure(s) (LRB):  Cardioversion or Defibrillation (N/A)  Transesophageal echo (MAIRE) intra-procedure log documentation    Final Anesthesia Type: general      Patient location during evaluation: ICU  Patient participation: Yes- Able to Participate  Level of consciousness: awake and alert  Post-procedure vital signs: reviewed and stable  Pain management: adequate  Airway patency: patent    PONV status at discharge: No PONV  Anesthetic complications: no      Cardiovascular status: blood pressure returned to baseline  Respiratory status: unassisted, spontaneous ventilation and nasal cannula  Hydration status: euvolemic        Vitals Value   /59   Temp 36.4 °C (97.5 °F)   Pulse 60   Resp 11   SpO2 95 %   Vitals shown include unvalidated device data.      No case tracking events are documented in the log.      Pain/Adebayo Score: Pain Rating Prior to Med Admin: 5   Pain Rating Post Med Admin: 0

## 2022-09-09 NOTE — SUBJECTIVE & OBJECTIVE
Interval History: Pt DNR.  Pt to have tracheal stent placed.    Past Medical History:   Diagnosis Date    Endocarditis     Pacemaker     Pneumonia        Past Surgical History:   Procedure Laterality Date    ANGIOGRAPHY OF LOWER EXTREMITY Right 8/19/2022    Procedure: ANGIOGRAM, LOWER EXTREMITY;  Surgeon: Thomas Nicolas MD;  [facility];  Service: Peripheral Vascular;  Laterality: Right;  30.0 min  315.10 mGy  26.1755 Gycm2  84 ml dye    HIP RESURFACING Right 8/23/2022    Procedure: cemontoplasty;  Surgeon: Yung Flores MD;  [facility];  Service: Orthopedics;  Laterality: Right;    RADIOFREQUENCY ABLATION, BONE, PERCUTANEOUS Right 8/23/2022    Procedure: RADIOFREQUENCY ABLATION,BONE;  Surgeon: Yung Flores MD;  [facility];  Service: Orthopedics;  Laterality: Right;    REPAIR, PSEUDOANEURYSM, ARTERY, FEMORAL Right 8/19/2022    Procedure: REPAIR, PSEUDOANEURYSM, ARTERY, FEMORAL;  Surgeon: Thomas Nicolas MD;  [facility];  Service: Peripheral Vascular;  Laterality: Right;  R PFA (3rd branch) pseudoaneurysm        Review of patient's allergies indicates:  No Known Allergies    Medications:  Continuous Infusions:   amiodarone in dextrose 5% 0.5 mg/min (09/09/22 0600)    heparin (porcine) in D5W 16 Units/kg/hr (09/09/22 0834)     Scheduled Meds:   albuterol-ipratropium  3 mL Nebulization Q8H    aprepitant  130 mg Intravenous 1 time in Clinic/HOD    dexamethasone  12 mg Intravenous Q24H    EScitalopram oxalate  20 mg Oral QHS    etoposide (VEPESID) chemo infusion  100 mg/m2 (Treatment Plan Recorded) Intravenous Q24H    pantoprazole  40 mg Oral Daily    polyethylene glycol  17 g Oral Daily    pravastatin  40 mg Oral QHS    pregabalin  75 mg Oral BID    senna-docusate 8.6-50 mg  1 tablet Oral BID    sodium chloride 0.9% flush bag IVPB   Intravenous 1 time in Clinic/HOD    sodium chloride 0.9%  10 mL Intravenous Q6H    sodium chloride 3%  4 mL  Nebulization Q8H    vitamin D  1,000 Units Oral Daily     PRN Meds:sodium chloride, acetaminophen, alteplase, diphenhydrAMINE, EPINEPHrine, heparin, porcine (PF), hydrocortisone sodium succinate, HYDROmorphone, LORazepam, melatonin, methocarbamoL, ondansetron, sodium chloride 0.9%, sodium chloride 0.9%, sodium chloride 0.9%, sodium chloride 0.9%, Flushing PICC Protocol **AND** sodium chloride 0.9% **AND** sodium chloride 0.9%, sodium chloride 0.9%, sumatriptan    Family History    None       Tobacco Use    Smoking status: Every Day     Packs/day: 1.00     Types: Cigarettes    Smokeless tobacco: Never   Substance and Sexual Activity    Alcohol use: Not on file    Drug use: Not on file    Sexual activity: Not on file       Review of Systems   Constitutional:  Positive for activity change, fatigue and unexpected weight change.   Respiratory:  Positive for shortness of breath.    Cardiovascular:  Negative for leg swelling.   Gastrointestinal:  Negative for nausea and vomiting.   Musculoskeletal:  Positive for gait problem.   Skin: Negative.    Neurological:  Positive for weakness.   Psychiatric/Behavioral: Negative.     Objective:     Vital Signs (Most Recent):  Temp: 97.5 °F (36.4 °C) (09/09/22 0705)  Pulse: 60 (09/09/22 1000)  Resp: 11 (09/09/22 1000)  BP: 100/62 (09/09/22 1000)  SpO2: 95 % (09/09/22 1000)   Vital Signs (24h Range):  Temp:  [97.4 °F (36.3 °C)-98.2 °F (36.8 °C)] 97.5 °F (36.4 °C)  Pulse:  [60-62] 60  Resp:  [10-25] 11  SpO2:  [92 %-100 %] 95 %  BP: ()/(58-73) 100/62     Weight: 57.2 kg (126 lb)  Body mass index is 18.61 kg/m².    Physical Exam  Constitutional:       Comments: Pt on 3L O2   HENT:      Head: Normocephalic and atraumatic.   Eyes:      General: Lids are normal.      Conjunctiva/sclera: Conjunctivae normal.   Cardiovascular:      Rate and Rhythm: Tachycardia present.   Pulmonary:      Effort: Tachypnea present. No accessory muscle usage or respiratory distress.      Comments: Pt on 3L  O2.  Abdominal:      General: Abdomen is flat.   Musculoskeletal:      Cervical back: Full passive range of motion without pain and normal range of motion.      Comments: Edema to left arm.    Skin:     General: Skin is warm and dry.   Neurological:      Mental Status: He is alert and oriented to person, place, and time.   Psychiatric:         Attention and Perception: Attention normal.         Speech: Speech normal.      Comments: Crying       Review of Symptoms      Symptom Assessment (ESAS 0-10 Scale)  Pain:  0  Dyspnea:  0  Anxiety:  0  Nausea:  0  Depression:  0  Anorexia:  0  Fatigue:  0  Insomnia:  0  Restlessness:  0  Agitation:  0         ECOG Performance Status ndGndrndanddndend:nd nd2nd Living Arrangements:  Lives with family    Psychosocial/Cultural: Pt legally  to Aston Collins. Per pt Aston works during the day. Per pt, Aston is his care-giver.       Advance Care Planning   Advance Directives:   Living Will: No    Do Not Resuscitate Status: Yes    Medical Power of : No    Agent's Name:  Aston Collins    Decision Making:  Patient answered questions       Significant Labs: All pertinent labs within the past 24 hours have been reviewed.  CBC:   Recent Labs   Lab 09/09/22  0542   WBC 8.81   HGB 9.8*   HCT 30.9*   MCV 93          BMP:  Recent Labs   Lab 09/09/22  0542   *   *   K 4.8      CO2 23   BUN 43*   CREATININE 1.1   CALCIUM 8.3*   MG 2.1       LFT:  Lab Results   Component Value Date    AST 27 09/09/2022    ALKPHOS 172 (H) 09/09/2022    BILITOT 0.3 09/09/2022     Albumin:   Albumin   Date Value Ref Range Status   09/09/2022 2.5 (L) 3.5 - 5.2 g/dL Final     Protein:   Total Protein   Date Value Ref Range Status   09/09/2022 5.3 (L) 6.0 - 8.4 g/dL Final     Lactic acid:   Lab Results   Component Value Date    LACTATE 1.7 09/05/2022    LACTATE 2.4 (H) 09/04/2022       Significant Imaging: I have reviewed all pertinent imaging results/findings within the past 24 hours.

## 2022-09-09 NOTE — PROGRESS NOTES
"[facility]  Palliative Medicine  Progress Note    Patient Name: Donis Jimenez  MRN: [id number]  Admission Date: 9/4/2022  Hospital Length of Stay: 5 days  Code Status: DNR   Attending Provider: Brannon Gonsalves MD  Consulting Provider: LOREE Dean  Primary Care Physician: Elio Farias MD  Principal Problem:Acute hypoxemic respiratory failure    Patient information was obtained from patient and primary team.      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 Pt has metastatic lung cancer. Pt is alert, oriented to person, place, and situation. Pt is a DNR. Pt is on 3 L O2. Pt tearful today.      Reason for consult: GOC/ACP. Communicated with MAJOR Camp fellow with [facility].     Today: Pt very tearful today. Pt says "I know I am dying. " Pt kept apologizing.  Explained to pt there is not a need to apoligize and that that I am glad he felt comfortable to express his feelings. Pt repots he is having a bad day and everything starting to sink in about his health. Support given.  seeing. Will consult Oncology/Psycology for pt. Pt to step down to Hem/onc as soon as bed available.     9/6/22  Goals of care/ACP: Pt to have tracheal stenting tomorrow. Pt reports he is in agreement to procedure and would like CC team to speak to Aston about procedure when he visits today. Per pt, he relays information to Aston but he feels like he forgets some of information to tell him. Pt reports his goal is medical management at this time to see if he can improve enough to get back home. Pt reports his goal is to be at home with his four dogs and , Aston. Pt is aware of his severity of his " "illness.     Today: Met with pt along with Tracie Kidd LCSW.     9/6/22  Introduced role of Palliative care to pt.   Met with pt who is aware of his medical issues. Per pt he is aware he has metastatic cancer. Pt is aware that any therapies/procedures offered to him is palliative and will not cure cancer. Per pt, he is still trying to come to  with his dx. He learned about his issues in August. Pt reports goal at time is to see if anything can be done to help with symptoms and "give him some more time." Spoke to pt about amount of O2 he is on at this time. He verbalized understanding.  Per pt he has spoken to Oncology and XRT MDs.   CTS has been consulted concerning tracheal stenting.      Pt open to continued visits with pal care for care planning and symptom management needs.  Support given pt.      Pt reports he is legally  to Aston Collins and he would want him to be hi medical decision-maker if he cannot make his own medical decisions. MPOA paperwork left with pt. Education provided. Per Pt, Aston works during day but can be reached by phone.      Code status: DNR.      Symptom management:      Dyspnea r/t to mediastinal mass, pleural effusion.   Pt is on 3 L O2 per NC.   Pt is on Morphine 2 mg IVP q 2 hrs prn.   Pt has chest tube in place and having tracheal stents placed.      Pt reports Morphine "has really helps with my SOB."  Pt reports Morphine lasts around 1-2 hours.      Continue Morphine 2 mg IVP q 2 hours prn.   Will continue to assess.      Debility r/t to pathological femoral neck fracture s/p ight hip hemiarthroplasty with ablation, cementoplasty  Pt was using walker prior to admit.   Would resume PT/OT when pt stable to participate.      Anxiety r/t to new dx and dyspnea.   Pt has Ativan 1 mg tid prn.   Continue.      Plan:   Will continue to meet with pt to reinforce realistic expectations/assit with GOC.   Support given.   Will consult Oncology/ Pyschology.     Will follow. "               I will follow-up with patient. Please contact us if you have any additional questions.    Subjective:     Chief Complaint:   Chief Complaint   Patient presents with    Shortness of Breath     Pt brought to ED from home via [facility]. Per family pt SOB and productive cough. Pt had double valve replacement, pacemaker and hip surgery 1 week ago.        HPI:   Pt is a 61-year-old male with PMH significant for bacterial endocarditis, s/p AV and MV mechanical valve replacement (on warfarin), CKD, tobacco abuse with recent complicated hospital course who presented to the emergency department with shortness of breath.  Difficult for patient to provide history due to tachypnea; spouse at bedside assisting with history.      Per chart review, patient was admitted 8/18-8/27/22 after sustaining a pathologic right femoral neck fracture and right femoral artery pseudoaneurysm from a fall at home. Patient taken to OR on 8/19 by Vascular Surgery and had embolization of 3rd order right profunda femoral artery pseudoaneurysm with N-BCA glue and 5mm coil aneurysm repair. Pt underwent right hip hemiarthroplasty with ablation, cementoplasty, and percutaneous fixation of pelvis for pathologic fracture and metastatic disease with Ortho on 8/23. 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 Metastatic work-up done  with CT scan of chest/abdomen and pelvis showing extensive disease including mediastinal mass with encasement of mediastinal vascular structures and airways, MARTHA pulmonary mass, bilateral suprarenal and left renal masses, L1 pathologic fx and rib & skull metastatic disease.      Per chart review, pt's spouse stated that pt had been ambulatory with a walker post-discharge, without c/o  SOB, lightheadedness or dizziness. SOB started ~ 9/2 with productive cough. He denies fever/chills, chest pain, abd pain, N/V/D.      In the ED patient was in a flutter and was cardioverted with some improvement in symptoms. He was found to have right sided pneumonia and was started on vanc and cefepime and admitted to Hospital Medicine for further management.      During his time in the ED, the patient had escalating FiO2 requirements, now on 45L 100% comfort flow.      Critical Care Medicine was consulted for worsening hypoxemic respiratory failure    Pt is DNR. Pt on 40 L  @ 60 %      Hospital Course:  No notes on file    Interval History: Pt DNR.  Pt to have tracheal stent placed.    Past Medical History:   Diagnosis Date    Endocarditis     Pacemaker     Pneumonia        Past Surgical History:   Procedure Laterality Date    ANGIOGRAPHY OF LOWER EXTREMITY Right 8/19/2022    Procedure: ANGIOGRAM, LOWER EXTREMITY;  Surgeon: Thomas Nicolas MD;  Location: [facility] OR [address] [place] [address];  Service: Peripheral Vascular;  Laterality: Right;  30.0 min  315.10 mGy  26.1755 Gycm2  84 ml dye    HIP RESURFACING Right 8/23/2022    Procedure: cemontoplasty;  Surgeon: Yung Flores MD;  [facility];  Service: Orthopedics;  Laterality: Right;    RADIOFREQUENCY ABLATION, BONE, PERCUTANEOUS Right 8/23/2022    Procedure: RADIOFREQUENCY ABLATION,BONE;  Surgeon: Yung Flores MD;  [facility];  Service: Orthopedics;  Laterality: Right;    REPAIR, PSEUDOANEURYSM, ARTERY, FEMORAL Right 8/19/2022    Procedure: REPAIR, PSEUDOANEURYSM, ARTERY, FEMORAL;  Surgeon: Thomas Nicolas MD;  Location: [facility] OR [address] [place] [address];  Service: Peripheral Vascular;  Laterality: Right;  R PFA (3rd branch) pseudoaneurysm        Review of patient's allergies indicates:  No Known Allergies    Medications:  Continuous Infusions:   amiodarone in dextrose 5% 0.5 mg/min (09/09/22 0600)    heparin (porcine) in D5W 16 Units/kg/hr (09/09/22  0834)     Scheduled Meds:   albuterol-ipratropium  3 mL Nebulization Q8H    aprepitant  130 mg Intravenous 1 time in Clinic/HOD    dexamethasone  12 mg Intravenous Q24H    EScitalopram oxalate  20 mg Oral QHS    etoposide (VEPESID) chemo infusion  100 mg/m2 (Treatment Plan Recorded) Intravenous Q24H    pantoprazole  40 mg Oral Daily    polyethylene glycol  17 g Oral Daily    pravastatin  40 mg Oral QHS    pregabalin  75 mg Oral BID    senna-docusate 8.6-50 mg  1 tablet Oral BID    sodium chloride 0.9% flush bag IVPB   Intravenous 1 time in Clinic/HOD    sodium chloride 0.9%  10 mL Intravenous Q6H    sodium chloride 3%  4 mL Nebulization Q8H    vitamin D  1,000 Units Oral Daily     PRN Meds:sodium chloride, acetaminophen, alteplase, diphenhydrAMINE, EPINEPHrine, heparin, porcine (PF), hydrocortisone sodium succinate, HYDROmorphone, LORazepam, melatonin, methocarbamoL, ondansetron, sodium chloride 0.9%, sodium chloride 0.9%, sodium chloride 0.9%, sodium chloride 0.9%, Flushing PICC Protocol **AND** sodium chloride 0.9% **AND** sodium chloride 0.9%, sodium chloride 0.9%, sumatriptan    Family History    None       Tobacco Use    Smoking status: Every Day     Packs/day: 1.00     Types: Cigarettes    Smokeless tobacco: Never   Substance and Sexual Activity    Alcohol use: Not on file    Drug use: Not on file    Sexual activity: Not on file       Review of Systems   Constitutional:  Positive for activity change, fatigue and unexpected weight change.   Respiratory:  Positive for shortness of breath.    Cardiovascular:  Negative for leg swelling.   Gastrointestinal:  Negative for nausea and vomiting.   Musculoskeletal:  Positive for gait problem.   Skin: Negative.    Neurological:  Positive for weakness.   Psychiatric/Behavioral: Negative.     Objective:     Vital Signs (Most Recent):  Temp: 97.5 °F (36.4 °C) (09/09/22 0705)  Pulse: 60 (09/09/22 1000)  Resp: 11 (09/09/22 1000)  BP: 100/62 (09/09/22  1000)  SpO2: 95 % (09/09/22 1000)   Vital Signs (24h Range):  Temp:  [97.4 °F (36.3 °C)-98.2 °F (36.8 °C)] 97.5 °F (36.4 °C)  Pulse:  [60-62] 60  Resp:  [10-25] 11  SpO2:  [92 %-100 %] 95 %  BP: ()/(58-73) 100/62     Weight: 57.2 kg (126 lb)  Body mass index is 18.61 kg/m².    Physical Exam  Constitutional:       Comments: Pt on 3L O2   HENT:      Head: Normocephalic and atraumatic.   Eyes:      General: Lids are normal.      Conjunctiva/sclera: Conjunctivae normal.   Cardiovascular:      Rate and Rhythm: Tachycardia present.   Pulmonary:      Effort: Tachypnea present. No accessory muscle usage or respiratory distress.      Comments: Pt on 3L O2.  Abdominal:      General: Abdomen is flat.   Musculoskeletal:      Cervical back: Full passive range of motion without pain and normal range of motion.      Comments: Edema to left arm.    Skin:     General: Skin is warm and dry.   Neurological:      Mental Status: He is alert and oriented to person, place, and time.   Psychiatric:         Attention and Perception: Attention normal.         Speech: Speech normal.      Comments: Crying       Review of Symptoms      Symptom Assessment (ESAS 0-10 Scale)  Pain:  0  Dyspnea:  0  Anxiety:  0  Nausea:  0  Depression:  0  Anorexia:  0  Fatigue:  0  Insomnia:  0  Restlessness:  0  Agitation:  0         ECOG Performance Status thGthrthathdtheth:th th4th Living Arrangements:  Lives with family    Psychosocial/Cultural: Pt legally  to Aston Collins. Per pt Aston works during the day. Per pt, Aston is his care-giver.       Advance Care Planning   Advance Directives:   Living Will: No    Do Not Resuscitate Status: Yes    Medical Power of : No    Agent's Name:  Aston Collins    Decision Making:  Patient answered questions       Significant Labs: All pertinent labs within the past 24 hours have been reviewed.  CBC:   Recent Labs   Lab 09/09/22  0542   WBC 8.81   HGB 9.8*   HCT 30.9*   MCV 93          BMP:  Recent Labs   Lab  09/09/22  0542   *   *   K 4.8      CO2 23   BUN 43*   CREATININE 1.1   CALCIUM 8.3*   MG 2.1       LFT:  Lab Results   Component Value Date    AST 27 09/09/2022    ALKPHOS 172 (H) 09/09/2022    BILITOT 0.3 09/09/2022     Albumin:   Albumin   Date Value Ref Range Status   09/09/2022 2.5 (L) 3.5 - 5.2 g/dL Final     Protein:   Total Protein   Date Value Ref Range Status   09/09/2022 5.3 (L) 6.0 - 8.4 g/dL Final     Lactic acid:   Lab Results   Component Value Date    LACTATE 1.7 09/05/2022    LACTATE 2.4 (H) 09/04/2022       Significant Imaging: I have reviewed all pertinent imaging results/findings within the past 24 hours.      > 50% of 35  min visit spent in chart review, face to face discussion of goals of care,  symptom assessment, coordination of care and emotional support    Zoe Potter, CNS  Palliative Medicine  [facility] - Cardiac Medical ICU

## 2022-09-09 NOTE — ASSESSMENT & PLAN NOTE
Hx aflutter.    -cards following  -s/p MARIE/ DCCV in ED with return to [facility] 09/08   -PPM in place  -Continue amio gtt  -Continue heparin gtt

## 2022-09-09 NOTE — PROGRESS NOTES
[facility]  Critical Care Medicine  Progress Note    Patient Name: Donis Jimenez  MRN: [id number]  Admission Date: 9/4/2022  Hospital Length of Stay: 5 days  Code Status: DNR  Attending Provider: Brannon Gonsalves MD  Primary Care Provider: Elio Farias MD   Principal Problem: Acute hypoxemic respiratory failure    Subjective:     HPI:  Donis Jimenez is a 61-year-old matta with PMH significant for bacterial endocarditis, s/p AV and MV mechanical valve replacement (on warfarin), CKD, tobacco abuse with recent complicated hospital course who presents to the emergency department with shortness of breath.  Difficult for patient to provide history due to tachypnea; spouse at bedside assisting with history.     Patient was admitted 8/18-8/27/22 after sustaining a pathologic right femoral neck fracture and right femoral artery pseudoaneurysm from a fall at home. Patient taken to OR on 8/19 by Vascular Surgery and had embolization of 3rd order right profunda femoral artery pseudoaneurysm with N-BCA glue and 5mm coil aneurysm repair. Pt underwent ight hip hemiarthroplasty with ablation, cementoplasty, and percutaneous fixation of pelvis for pathologic fracture and metastatic disease with Ortho on 8/23. 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 Metastatic work-up done  with CT scan of chest/abdomen and pelvis showing extensive disease including mediastinal mass with encasement of mediastinal vascular structures and airways, MARTHA pulmonary mass, bilateral suprarenal and left renal masses, L1 pathologic fx and rib & skull metastatic disease.     Pt's spouse states that pt had been ambulatory with a walker post-discharge, without c/o SOB, lightheadedness or dizziness. SOB started ~  9/2 with productive cough. He denies fever/chills, chest pain, abd pain, N/V/D.     In the ED patient was in a flutter and was cardioverted with some improvement in symptoms. He was found to have right sided pneumonia and was started on vanc and cefepime and admitted to Hospital Medicine for further management.     During his time in the ED, the patient had escalating FiO2 requirements, now on 45L 100% comfort flow.     Critical Care Medicine was consulted for worsening hypoxemic respiratory failure.          Hospital/ICU Course:  Patient admitted to [facility] evening of 9/4 for worsening hypoxemic respiratory failure secondary to pneumonia in setting of lung cancer with extensive metastatic disease. GOC discussed with pt and  who agree that they would not want extreme measures such as CPR and mechanical ventilation given overall poor prognosis. Pt started on vanc, cefepime, azithro and flagyl for pna/post-obstructive pna; alternating between bipap and 2LNC. S/p MARIE and DCCV 09/08. Chemotherapy started 09/08. Stable for stepdown to Heme/Onc.       Interval History/Significant Events: No acute events overnight. Tolerated chemo. Remains paced, HR 60.     Review of Systems   Constitutional:  Positive for appetite change, fatigue and unexpected weight change.   Respiratory:  Positive for cough and shortness of breath. Negative for choking.    Cardiovascular:  Negative for chest pain and palpitations.   Gastrointestinal:  Negative for abdominal pain, constipation, diarrhea and nausea.   Neurological:  Negative for numbness and headaches.   Psychiatric/Behavioral:  Negative for agitation and confusion.    Objective:     Vital Signs (Most Recent):  Temp: 97.5 °F (36.4 °C) (09/09/22 0705)  Pulse: 61 (09/09/22 0759)  Resp: 20 (09/09/22 0848)  BP: 104/64 (09/09/22 0705)  SpO2: 97 % (09/09/22 0759) Vital Signs (24h Range):  Temp:  [97.4 °F (36.3 °C)-98.2 °F (36.8 °C)] 97.5 °F (36.4 °C)  Pulse:  [] 61  Resp:  [10-26]  20  SpO2:  [94 %-100 %] 97 %  BP: ()/(54-73) 104/64   Weight: 57.2 kg (126 lb)  Body mass index is 18.61 kg/m².      Intake/Output Summary (Last 24 hours) at 9/9/2022 0853  Last data filed at 9/9/2022 0600  Gross per 24 hour   Intake 1934.47 ml   Output 1300 ml   Net 634.47 ml       Physical Exam  Constitutional:       General: He is not in acute distress.     Appearance: He is ill-appearing.   Eyes:      General: No scleral icterus.     Extraocular Movements: Extraocular movements intact.      Pupils: Pupils are equal, round, and reactive to light.   Cardiovascular:      Rate and Rhythm: Normal rate and regular rhythm.      Pulses: Normal pulses.      Heart sounds: Normal heart sounds.   Pulmonary:      Breath sounds: Rhonchi present.      Comments: 2LNC  Abdominal:      General: Abdomen is flat. Bowel sounds are normal. There is no distension.      Palpations: Abdomen is soft.      Tenderness: There is no abdominal tenderness.   Musculoskeletal:      Right forearm: Edema present.      Left forearm: Edema present.      Comments: Distension of neck veins    Skin:     Capillary Refill: Capillary refill takes less than 2 seconds.      Findings: No bruising or lesion.   Neurological:      General: No focal deficit present.      Mental Status: He is alert.   Psychiatric:         Mood and Affect: Mood normal.         Behavior: Behavior normal.       Vents:  Oxygen Concentration (%): 30 (09/09/22 0021)  Lines/Drains/Airways       Peripherally Inserted Central Catheter Line  Duration             PICC Double Lumen 09/07/22 1734 right basilic 1 day              Drain  Duration                  Chest Tube 09/06/22 1300 1 Left Midaxillary 14 Fr. 2 days              Peripheral Intravenous Line  Duration                  Peripheral IV - Single Lumen 09/04/22 0707 18 G Left Upper Arm 5 days         Peripheral IV - Single Lumen 09/07/22 1000 20 G Left Forearm 1 day         Peripheral IV - Single Lumen 09/07/22 1100 20 G  Right Forearm 1 day                  Significant Labs:    CBC/Anemia Profile:  Recent Labs   Lab 09/08/22  0408 09/09/22  0542   WBC 11.52 8.81   HGB 10.3* 9.8*   HCT 32.4* 30.9*    205   MCV 90 93   RDW 20.6* 20.3*   IRON  --  159   FERRITIN  --  1,900*        Chemistries:  Recent Labs   Lab 09/08/22  0408 09/09/22  0542   * 135*   K 4.4 4.8   CL 99 101   CO2 28 23   BUN 37* 43*   CREATININE 1.1 1.1   CALCIUM 8.4* 8.3*   ALBUMIN 2.8* 2.5*   PROT 5.9* 5.3*   BILITOT 0.3 0.3   ALKPHOS 172* 172*   ALT 25 23   AST 41* 27   MG 2.2 2.1   PHOS 4.4 4.3       All pertinent labs within the past 24 hours have been reviewed.    Significant Imaging:  I have reviewed all pertinent imaging results/findings within the past 24 hours.      ABG  Recent Labs   Lab 09/04/22  2145   PH 7.316*   PO2 62*   PCO2 37.6   HCO3 19.2*   BE -7     Assessment/Plan:     Pulmonary  * Acute hypoxemic respiratory failure  Patient with Hypoxic Respiratory failure which is Acute.  he is not on home oxygen. Supplemental oxygen was provided and noted- Oxygen Concentration (%):  [30] 30.   Signs/symptoms of respiratory failure include- tachypnea, increased work of breathing and respiratory distress. Contributing diagnoses includes - CHF, Pleural effusion and Pneumonia Labs and images were reviewed. Patient Has recent ABG, which has been reviewed. Will treat underlying causes and adjust management of respiratory failure as follows-     CT showed mediastinal mass with encasement of mediastinal structures, moderate pleural effusions, emphysematous changes and ground glass opacities.     -continue ceftraixone/azithromycin for CAP  -med onc and rad onc following   -malignancy likely contributing to dyspnea and hypoxia -- alternating bipap and comfort flow, morphine 2mg ordered for dsypnea   -9/7 underwent thoracentesis and CT placement with Dr. Gonsalves.      Cardiac/Vascular  Atrial flutter  Hx aflutter.    -cards following  -s/p MARIE/ DCCV in ED  with return to [facility] 09/08   -PPM in place  -Continue amio gtt  -Continue heparin gtt        H/O mitral valve replacement with mechanical valve  Hx endocarditis s/p MVR and AVR. INR 3.2 on admission     - daily INR  - on heparin gtt      Pseudoaneurysm of right femoral artery  - S/p embolization of right profunda femoral artery pseudoaneurysm with 5 mm coil 8/20    Oncology  Small cell carcinoma of lung  OS pathology report confirms small cell lung cancer from bone specimen.  Explained to him and his  that he is in an unfortunate situation.  He will most certainly die of this cancer rather quickly if not given chemotherapy.  While our intent is to improve his tumor burden causing his hypoxic respiratory failure with chemotherapy, it is also possible that we may potentially hasten his death unintentionally due to chemo toxicity. He and his  understand the risks.    -Heme/Onc following  -he is consented for chemo  -labs reviewed; carboplatin renally dosed and will proceed with carboplatin and etoposide.  Etoposide will also be given day 2 and day 3.  We will plan to provide GCSF starting day 4 and will do so for at least 7 doses.    Malignant neoplasm metastatic to bone  Previously admitted last month for R hip pain s/p fall, imaging showing extensive metastatic disease involving soft tissue above and below diaphragm particularly notable for extensive mediastinal involvement with encasement of bronchovascular structures as well as osseous disease.  Unclear etiology of primary malignancy, however MARTHA mass and L renal mass possible primaries.    Pathologic fracture of the right femoral head/neck, L1 vertebral body, and left 6th rib. Small to moderate pericardial effusion, likely malignant. Small left pleural effusion.     Heme/Onc consulted on admit and recommended MRI of brain to look for brain mets and none noted to brain itself but concerning osseous mets to skull itself. Bone scan done showed numerous  tracer avid foci throughout the axial and appendicular skeleton consistent with metastatic disease. These lesions correspond to mixed lytic and sclerotic lesions on same day CT chest abdomen pelvis. Heme/Onc recommended bone biopsy and done by Orthopedics on 8/23 for diagnosis as primary tumor unknown and pathology pending on discharge.    - med onc consulted, rad onc consulted    - palliative care consulted  - DNR/DNI status as per ACP conversations on admit       Endocrine  Body mass index (BMI) less than 19  in setting of malignancy and decreased appetite.    - nutrition consulted    Orthopedic  Swelling of upper extremity  Noted on previous hospitalization, possibly 2/2 to vascular compression and mass encasing mediastinal structures noted on CT    - non-occlusive thrombus in L cephalic vein    Pathologic fracture of neck of right femur s/p hemiarthroplasty on 8/23/2022  S/p R hip hemiarthroplasty with ablation, cementoplasty, and percutaneous fixation of pelvis 8/23.    - PT/OT consulted  - prn pain control    Palliative Care  ACP (advance care planning)  Advance Care Planning     Date: 09/04/2022    Code Status  In light of the patients advanced and life limiting illness, I engaged the the patient in a conversation about the patient's preferences for care  at the very end of life. The patient wishes to have a natural, peaceful death.  Along those lines, the patient does not wish to have CPR or other invasive treatments performed when his heart and/or breathing stops. I communicated to the patient that a DNR order would be placed in his medical record to reflect this preference.  I spent a total of 10 minutes engaging the patient in this advance care planning discussion.                Marilee Dacosta, MELLO  Critical Care Medicine  [facility]

## 2022-09-09 NOTE — SUBJECTIVE & OBJECTIVE
Interval History: Patient tolerated day one of inpatient chemotherapy and tolerated it well. Stepped down to 8th floor with plans to change to medical oncology team on 9/10. Patient expresses guilt about his current condition, feeling he is responsible for his illness and that he is a burden to others.     Oncology Treatment Plan:   OP SCLC CARBOPLATIN (AUC) ETOPOSIDE DURVALUMAB Q3W FOLLOWED BY MAINTENANCE DURVALUMAB 1500 MG Q4W    Medications:  Continuous Infusions:   amiodarone in dextrose 5% 0.5 mg/min (09/09/22 0600)    heparin (porcine) in D5W 16 Units/kg/hr (09/09/22 0834)     Scheduled Meds:   albuterol-ipratropium  3 mL Nebulization Q8H    aprepitant  130 mg Intravenous 1 time in Clinic/HOD    dexamethasone  12 mg Intravenous Q24H    EScitalopram oxalate  20 mg Oral QHS    etoposide (VEPESID) chemo infusion  100 mg/m2 (Treatment Plan Recorded) Intravenous Q24H    pantoprazole  40 mg Oral Daily    polyethylene glycol  17 g Oral Daily    pravastatin  40 mg Oral QHS    pregabalin  75 mg Oral BID    senna-docusate 8.6-50 mg  1 tablet Oral BID    sodium chloride 0.9% flush bag IVPB   Intravenous 1 time in Clinic/HOD    sodium chloride 0.9%  10 mL Intravenous Q6H    sodium chloride 3%  4 mL Nebulization Q8H    vitamin D  1,000 Units Oral Daily     PRN Meds:sodium chloride, acetaminophen, alteplase, diphenhydrAMINE, EPINEPHrine, heparin, porcine (PF), hydrocortisone sodium succinate, HYDROmorphone, LORazepam, melatonin, methocarbamoL, ondansetron, sodium chloride 0.9%, sodium chloride 0.9%, sodium chloride 0.9%, sodium chloride 0.9%, Flushing PICC Protocol **AND** sodium chloride 0.9% **AND** sodium chloride 0.9%, sodium chloride 0.9%, sumatriptan     Review of Systems   Constitutional:  Positive for activity change, fatigue and unexpected weight change.   Respiratory:  Positive for shortness of breath.    Cardiovascular:  Negative for leg swelling.   Gastrointestinal:  Negative for nausea and vomiting.    Musculoskeletal:  Positive for gait problem.   Skin: Negative.    Neurological:  Positive for weakness.   Psychiatric/Behavioral: Negative.     Objective:     Vital Signs (Most Recent):  Temp: 97.5 °F (36.4 °C) (09/09/22 1516)  Pulse: 61 (09/09/22 1516)  Resp: 19 (09/09/22 1516)  BP: 101/64 (09/09/22 1516)  SpO2: 97 % (09/09/22 1516) Vital Signs (24h Range):  Temp:  [97.4 °F (36.3 °C)-97.5 °F (36.4 °C)] 97.5 °F (36.4 °C)  Pulse:  [60-70] 61  Resp:  [10-29] 19  SpO2:  [92 %-99 %] 97 %  BP: (100-109)/(58-73) 101/64     Weight: 57.2 kg (126 lb)  Body mass index is 18.61 kg/m².  Body surface area is 1.67 meters squared.      Intake/Output Summary (Last 24 hours) at 9/9/2022 1706  Last data filed at 9/9/2022 1400  Gross per 24 hour   Intake 753.71 ml   Output 1616 ml   Net -862.29 ml       Physical Exam  Constitutional:       General: He is not in acute distress.     Appearance: He is well-developed. He is ill-appearing.   HENT:      Head: Normocephalic and atraumatic.      Right Ear: External ear normal.      Left Ear: External ear normal.      Nose: Nose normal.      Mouth/Throat:      Mouth: Mucous membranes are moist.      Pharynx: Oropharynx is clear. No oropharyngeal exudate.   Eyes:      General: No scleral icterus.     Extraocular Movements: Extraocular movements intact.      Conjunctiva/sclera: Conjunctivae normal.   Cardiovascular:      Rate and Rhythm: Normal rate and regular rhythm.      Heart sounds: Normal heart sounds. No murmur heard.  Pulmonary:      Effort: Pulmonary effort is normal.      Breath sounds: Rhonchi present.      Comments: On 5L NC  Abdominal:      General: Bowel sounds are normal.      Palpations: Abdomen is soft.      Tenderness: There is no abdominal tenderness.   Musculoskeletal:         General: Normal range of motion.      Cervical back: Normal range of motion and neck supple.      Right lower leg: No edema.      Left lower leg: No edema.   Skin:     General: Skin is warm and dry.    Neurological:      General: No focal deficit present.      Mental Status: He is alert and oriented to person, place, and time.   Psychiatric:      Comments: Depressed mood, feelings of guilt       Significant Labs:   All pertinent labs from the last 24 hours have been reviewed.    Diagnostic Results:  I have reviewed all pertinent imaging results/findings within the past 24 hours.

## 2022-09-09 NOTE — SUBJECTIVE & OBJECTIVE
Interval History/Significant Events: No acute events overnight. Tolerated chemo. Remains paced, HR 60.     Review of Systems   Constitutional:  Positive for appetite change, fatigue and unexpected weight change.   Respiratory:  Positive for cough and shortness of breath. Negative for choking.    Cardiovascular:  Negative for chest pain and palpitations.   Gastrointestinal:  Negative for abdominal pain, constipation, diarrhea and nausea.   Neurological:  Negative for numbness and headaches.   Psychiatric/Behavioral:  Negative for agitation and confusion.    Objective:     Vital Signs (Most Recent):  Temp: 97.5 °F (36.4 °C) (09/09/22 0705)  Pulse: 61 (09/09/22 0759)  Resp: 20 (09/09/22 0848)  BP: 104/64 (09/09/22 0705)  SpO2: 97 % (09/09/22 0759) Vital Signs (24h Range):  Temp:  [97.4 °F (36.3 °C)-98.2 °F (36.8 °C)] 97.5 °F (36.4 °C)  Pulse:  [] 61  Resp:  [10-26] 20  SpO2:  [94 %-100 %] 97 %  BP: ()/(54-73) 104/64   Weight: 57.2 kg (126 lb)  Body mass index is 18.61 kg/m².      Intake/Output Summary (Last 24 hours) at 9/9/2022 0853  Last data filed at 9/9/2022 0600  Gross per 24 hour   Intake 1934.47 ml   Output 1300 ml   Net 634.47 ml       Physical Exam  Constitutional:       General: He is not in acute distress.     Appearance: He is ill-appearing.   Eyes:      General: No scleral icterus.     Extraocular Movements: Extraocular movements intact.      Pupils: Pupils are equal, round, and reactive to light.   Cardiovascular:      Rate and Rhythm: Normal rate and regular rhythm.      Pulses: Normal pulses.      Heart sounds: Normal heart sounds.   Pulmonary:      Breath sounds: Rhonchi present.      Comments: 2LNC  Abdominal:      General: Abdomen is flat. Bowel sounds are normal. There is no distension.      Palpations: Abdomen is soft.      Tenderness: There is no abdominal tenderness.   Musculoskeletal:      Right forearm: Edema present.      Left forearm: Edema present.      Comments: Distension of  neck veins    Skin:     Capillary Refill: Capillary refill takes less than 2 seconds.      Findings: No bruising or lesion.   Neurological:      General: No focal deficit present.      Mental Status: He is alert.   Psychiatric:         Mood and Affect: Mood normal.         Behavior: Behavior normal.       Vents:  Oxygen Concentration (%): 30 (09/09/22 0021)  Lines/Drains/Airways       Peripherally Inserted Central Catheter Line  Duration             PICC Double Lumen 09/07/22 1734 right basilic 1 day              Drain  Duration                  Chest Tube 09/06/22 1300 1 Left Midaxillary 14 Fr. 2 days              Peripheral Intravenous Line  Duration                  Peripheral IV - Single Lumen 09/04/22 0707 18 G Left Upper Arm 5 days         Peripheral IV - Single Lumen 09/07/22 1000 20 G Left Forearm 1 day         Peripheral IV - Single Lumen 09/07/22 1100 20 G Right Forearm 1 day                  Significant Labs:    CBC/Anemia Profile:  Recent Labs   Lab 09/08/22  0408 09/09/22  0542   WBC 11.52 8.81   HGB 10.3* 9.8*   HCT 32.4* 30.9*    205   MCV 90 93   RDW 20.6* 20.3*   IRON  --  159   FERRITIN  --  1,900*        Chemistries:  Recent Labs   Lab 09/08/22  0408 09/09/22  0542   * 135*   K 4.4 4.8   CL 99 101   CO2 28 23   BUN 37* 43*   CREATININE 1.1 1.1   CALCIUM 8.4* 8.3*   ALBUMIN 2.8* 2.5*   PROT 5.9* 5.3*   BILITOT 0.3 0.3   ALKPHOS 172* 172*   ALT 25 23   AST 41* 27   MG 2.2 2.1   PHOS 4.4 4.3       All pertinent labs within the past 24 hours have been reviewed.    Significant Imaging:  I have reviewed all pertinent imaging results/findings within the past 24 hours.

## 2022-09-10 PROBLEM — E88.3 TUMOR LYSIS SYNDROME: Status: ACTIVE | Noted: 2022-01-01

## 2022-09-10 NOTE — ASSESSMENT & PLAN NOTE
Patient with increased anxiety, depression and grief related to diagnosis/prognosis.  Partner perceives minimal benefit from Lexapro   Partner requests improved control of nighttime anxiety/night terrors-1-3 AM nightly (? Delirium)  Psychiatry consultation may be warranted.    Provided cognitive behavioral therapy to address negative cognitions. Discussed ways to connect with partner and have pleasant time together despite debility.    I will follow up with patient 9/12/22. Partner provided with my contact information in case of additional psychosocial needs.

## 2022-09-10 NOTE — HPI
Donis Jimenez, a 61 y.o. male, for therapy visit.  Met with patient and spouse ([place]).    Chief Complaint/Reason for Encounter: adaptation to disease and treatment, depression, anxiety, sleep

## 2022-09-10 NOTE — CONSULTS
Issac Moore - Oncology ([facility])  Psychology  Progress Note  Psycho-Oncology Intake (PhD)    Patient Name: Donis Jimenez  MRN: [id number]    Patient Class: IP- Inpatient  Admission Date: 9/4/2022  Hospital Length of Stay: 5 days  Attending Physician: Brannon Gonsalves MD  Primary Care Provider: Elio Farias MD  Length of Service (minutes): 60    Donis Jimenez, a 61 y.o. male, seen for initial evaluation. Met with patient and spouse.    Chief Complaint   Patient presents with    Shortness of Breath     Pt brought to ED from home via [facility]. Per family pt SOB and productive cough. Pt had double valve replacement, pacemaker and hip surgery 1 week ago.        Patient Active Problem List   Diagnosis    Pathologic fracture of neck of right femur s/p hemiarthroplasty on 8/23/2022    Pseudoaneurysm of right femoral artery    Acute arterial ischemic stroke, multifocal, multiple vascular territories    Subdural hematoma    Body mass index (BMI) less than 19    H/O mitral valve replacement with mechanical valve    Lytic bone lesion of hip    Malignant neoplasm metastatic to bone    Anticoagulated on warfarin    Atrial flutter    SOB (shortness of breath)    Swelling of upper extremity    Acute hypoxemic respiratory failure    ACP (advance care planning)    Goals of care, counseling/discussion    Debility    Advance care planning    Palliative care encounter    Dermatitis associated with moisture    Exudative pleural effusion    Small cell carcinoma of lung       Health Behaviors:      ETOH Use: No      Tobacco Use: Yes  Illicit Drug Use: No    Prescription Misuse: No  Advanced directives: No documents, currently DNR        Past Psychiatric History:     Inpatient treatment: No    Outpatient treatment: No    Prior substance abuse treatment: No    Suicide Attempts: No    Psychotropic Medications:   · Current: Ativan and Lexapro (Since 2009)  · Past: Ambien (caused disturbing dreams), another  sleep medication caused severe headaches       Social Situation/Stressors:     Donis Jimenez lives with his  (Aston Collins) in [place], [place].  He is a full-time  (stopped working in 2022 due to illness).  Donis Jimenez has been /partnered for 31 years. He has no children. His spouse is also a hairdresser.  His parents are . He has a number of close friends (particularly Kirsten). The patient reports excellent social support. Donis Jimenez reports his shawna is important to him.  Donis Jimenez's hobbies include visiting the beach and restaurants with his , watching TV, and playing with his dogs.    Strengths and liabilities: Strength: Patient is expressive/articulate., Strength: Patient is intelligent., Strength: Patient has positive support network., Strength: Patient is stable., Liability: Patient has poor health.    Current Evaluation:     Mental Status Exam: Donis Jimenez was seen along with his  (at his request).  Mr. Jimenez was in bed throughout the session. The patient was fully cooperative throughout the interview and was an adequate historian.    Appearance: age appropriate, ill-appearing  Behavior/Cooperation: friendly and cooperative  Speech: Not pressured, clearly audible, no slurring  Mood: anxious and depressed  Affect: profusely tearful, distressed  Thought Process: goal-directed, logical  Thought Content: normal, no suicidality, no homicidality, delusions, or paranoia; did not appear to be responding to internal stimuli during the interview.   Orientation: grossly intact  Memory: Grossly intact  Attention Span/Concentration: Attends to interview without distraction; reports no difficulty  Fund of Knowledge: average  Estimate of Intelligence: average from verbal skills and history  Cognition: grossly intact  Insight: patient has awareness of illness; good insight into own behavior and behavior of others  Judgment: the patient's behavior is adequate to  "circumstances        History of Present Illness:     Donis Jimenez, a 61 y.o. male, for initial evaluation visit.  Met with patient and spouse (Aston Dennis).    Chief Complaint/Reason for Encounter: adaptation to disease and treatment, depression, anxiety, sleep    Donis Jimenez has adjusted to illness with difficulty.  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 He is profoundly distressed about his diagnosis and feels guilty for causing it due to "selfishness" by smoking. He feels "so terrible" for the hurt his illness/death will cause his loved ones. He repeatedly states, "no one should waste time on me" because "I don't deserve it." He states his rapid death would allow his loved ones to "move on and forget me." He also describes discomfort allowing others to serve him and states that he does not deserve their love or care due to his poor decision-making to have started/continued smoking. The patient has excellent family/friend support.  His support system is coping very well with the diagnosis/treatment/prognosis. Illness-related psychosocial stressors include financial strain, absence from work, changes in ability to engage in leisure activities, and absence from home.  The patient has a good partnership with his [facility] oncology treatment team.       Symptoms:   · Mood: depressed mood, weight loss, insomnia, psychomotor agitation, fatigue, worthlessness/guilt, thoughts of death, and tearfulness;  no prior and No SI/HI  · Anxiety: excessive anxiety/worry and restlessness/keyed up; lifelong anxiety, on Lexapro since 2009 due to anxiety- patient and spouse describe minimal perceived benefit  · Substance abuse: denied  · Cognitive functioning: Spouse describes delirium in ICU after cardiac surgery in 2020 and, again, during this hospitalization  · Sleep:  restless sleep -spouse states better with Ativan, but TID dosing caused hypersomnolence, spouse " describes increased anxiety in the evening and frequent night terrors since 2020 hospitalization; previous trial of Ambien with disturbing dreams, anther sleep medication caused headaches        Assessment - Diagnosis - Goals:       ICD-10-CM ICD-9-CM   1. ARDS (adult respiratory distress syndrome)  J80 518.52   2. SOB (shortness of breath)  R06.02 786.05   3. H/O mitral valve replacement with mechanical valve  Z95.2 V43.3   4. Anticoagulated on warfarin  Z79.01 V58.61   5. Atrial flutter  I48.92 427.32   6. Palliative care encounter  Z51.5 V66.7   7. Debility  R53.81 799.3   8. Goals of care, counseling/discussion  Z71.89 V65.49   9. Advance care planning  Z71.89 V65.49   10. Mass of left lung  R91.8 786.6   11. Dermatitis associated with moisture  L30.8 692.89   12. Atrial fibrillation with RVR  I48.91 427.31   13. Exudative pleural effusion  J90 511.9   14. Small cell carcinoma of lung  C34.90 162.9   15. Sepsis with acute hypoxic respiratory failure  A41.9 038.9    R65.20 995.91    J96.01 518.81   16. Acute hypoxemic respiratory failure  J96.01 518.81     Adjustment disorder with mixed anxiety and depressed mood  Patient with increased anxiety, depression and grief related to diagnosis/prognosis.  Partner perceives minimal benefit from Lexapro   Partner requests improved control of nighttime anxiety/night terrors-1-3 AM nightly (? Delirium)  Psychiatry consultation may be warranted.    Provided cognitive behavioral therapy to address negative cognitions. Discussed ways to connect with partner and have pleasant time together despite debility.    I will follow up with patient 9/12/22. Partner provided with my contact information in case of additional psychosocial needs.        Plan: individual psychotherapy, consult psychiatrist for medication evaluation and medication management by physician          Boubacar Hatfield, PhD  Clinical Psychologist

## 2022-09-10 NOTE — ASSESSMENT & PLAN NOTE
[facility] pathology report confirms small cell lung cancer from bone specimen.  Explained to him and his  that he is in an unfortunate situation.  He will most certainly die of this cancer rather quickly if not given chemotherapy.  While our intent is to improve his tumor burden causing his hypoxic respiratory failure with chemotherapy, it is also possible that we may potentially hasten his death unintentionally due to chemo toxicity. He and his  understand the risks.  - Patient currently on day 2 of inpatient chemotherapy, so far tolerating well  -labs reviewed; carboplatin renally dosed and will proceed with carboplatin and etoposide.  Etoposide will also be given day 2 and day 3.  We will plan to provide GCSF starting day 4 and will do so for at least 7 doses.      - agree with oncology psychology consult  - Will transfer to medical oncology on 9/10

## 2022-09-10 NOTE — PROGRESS NOTES
"Issac Moore - Oncology ([facility])  Palliative Medicine  Progress Note    Patient Name: Donis Jimenez  MRN: [id number]  Admission Date: 9/4/2022  Hospital Length of Stay: 6 days  Code Status: DNR   Attending Provider: Brannon Gonsalves MD  Consulting Provider: Sigrid Vences MD  Primary Care Physician: Elio Farias MD  Principal Problem:Acute hypoxemic respiratory failure    Patient information was obtained from patient, past medical records and primary team.      Assessment/Plan:     Palliative care encounter  [id number] Discussed with patient. He has lots of physical and emotional pain. Adjusted pain meds with primary team during the day. He is tearful and full of guilt. Agree with Hydromorphone adjustment of dose and psychiatry consult. Total time 40 minutes majority of it spent in conversation and assessment of symptoms.  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 Pt has metastatic lung cancer. Pt is alert, oriented to person, place, and situation. Pt is a DNR. Pt is on 3 L O2. Pt tearful today.      Reason for consult: GOC/ACP. Communicated with MAJOR Camp fellow with [facility].     Today: Pt very tearful today. Pt says "I know I am dying. " Pt kept apologizing.  Explained to pt there is not a need to apoligize and that that I am glad he felt comfortable to express his feelings. Pt repots he is having a bad day and everything starting to sink in about his health. Support given.  seeing. Will consult Oncology/Psycology for pt. Pt to step down to Hem/onc as soon as bed available.     9/6/22  Goals of care/ACP: Pt to have tracheal stenting tomorrow. Pt reports he is in agreement to procedure and would like CC team to speak to Aston " "about procedure when he visits today. Per pt, he relays information to Aston but he feels like he forgets some of information to tell him. Pt reports his goal is medical management at this time to see if he can improve enough to get back home. Pt reports his goal is to be at home with his four dogs and , Aston. Pt is aware of his severity of his illness.     Today: Met with pt along with Tracie Kidd LCSW.     9/6/22  Introduced role of Palliative care to pt.   Met with pt who is aware of his medical issues. Per pt he is aware he has metastatic cancer. Pt is aware that any therapies/procedures offered to him is palliative and will not cure cancer. Per pt, he is still trying to come to  with his dx. He learned about his issues in August. Pt reports goal at time is to see if anything can be done to help with symptoms and "give him some more time." Spoke to pt about amount of O2 he is on at this time. He verbalized understanding.  Per pt he has spoken to Oncology and XRT MDs.   CTS has been consulted concerning tracheal stenting.      Pt open to continued visits with [facility] care for care planning and symptom management needs.  Support given pt.      Pt reports he is legally  to Aston Collins and he would want him to be hi medical decision-maker if he cannot make his own medical decisions. MPOA paperwork left with pt. Education provided. Per Pt, Aston works during day but can be reached by phone.      Code status: DNR.      Symptom management:      Dyspnea r/t to mediastinal mass, pleural effusion.   Pt is on 3 L O2 per NC.   Pt is on Morphine 2 mg IVP q 2 hrs prn.   Pt has chest tube in place and having tracheal stents placed.      Pt reports Morphine "has really helps with my SOB."  Pt reports Morphine lasts around 1-2 hours.      Continue Morphine 2 mg IVP q 2 hours prn.   Will continue to assess.      Debility r/t to pathological femoral neck fracture s/p ight hip hemiarthroplasty with ablation, " cementoplasty  Pt was using walker prior to admit.   Would resume PT/OT when pt stable to participate.      Anxiety r/t to new dx and dyspnea.   Pt has Ativan 1 mg tid prn.   Continue.      Plan:   Will continue to meet with pt to reinforce realistic expectations/assit with GOC.   Support given.   Will consult Oncology/ Pyschology.     Will follow.               I will follow-up with patient. Please contact us if you have any additional questions.    Subjective:     Chief Complaint:   Chief Complaint   Patient presents with    Shortness of Breath     Pt brought to ED from home via [facility]. Per family pt SOB and productive cough. Pt had double valve replacement, pacemaker and hip surgery 1 week ago.        HPI:   Pt is a 61-year-old male with PMH significant for bacterial endocarditis, s/p AV and MV mechanical valve replacement (on warfarin), CKD, tobacco abuse with recent complicated hospital course who presented to the emergency department with shortness of breath.  Difficult for patient to provide history due to tachypnea; spouse at bedside assisting with history.      Per chart review, patient was admitted 8/18-8/27/22 after sustaining a pathologic right femoral neck fracture and right femoral artery pseudoaneurysm from a fall at home. Patient taken to OR on 8/19 by Vascular Surgery and had embolization of 3rd order right profunda femoral artery pseudoaneurysm with N-BCA glue and 5mm coil aneurysm repair. Pt underwent right hip hemiarthroplasty with ablation, cementoplasty, and percutaneous fixation of pelvis for pathologic fracture and metastatic disease with Ortho on 8/23. 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 Metastatic work-up done  with CT scan of chest/abdomen and  pelvis showing extensive disease including mediastinal mass with encasement of mediastinal vascular structures and airways, MARTHA pulmonary mass, bilateral suprarenal and left renal masses, L1 pathologic fx and rib & skull metastatic disease.      Per chart review, pt's spouse stated that pt had been ambulatory with a walker post-discharge, without c/o SOB, lightheadedness or dizziness. SOB started ~ 9/2 with productive cough. He denies fever/chills, chest pain, abd pain, N/V/D.      In the ED patient was in a flutter and was cardioverted with some improvement in symptoms. He was found to have right sided pneumonia and was started on vanc and cefepime and admitted to Hospital Medicine for further management.      During his time in the ED, the patient had escalating FiO2 requirements, now on 45L 100% comfort flow.      Critical Care Medicine was consulted for worsening hypoxemic respiratory failure    Pt is DNR. Pt on 40 L  @ 60 %      Hospital Course:  No notes on file    Interval History: Very tearful. Feels guilty that he should have stopped smoking years ago. Feels guilty about leaving Aston and friends behind.     Medications:  Continuous Infusions:   amiodarone in dextrose 5% 0.5 mg/min (09/10/22 1525)    heparin (porcine) in D5W 16.084 Units/kg/hr (09/10/22 1202)     Scheduled Meds:   albuterol-ipratropium  3 mL Nebulization Q8H    aprepitant  130 mg Intravenous 1 time in Clinic/HOD    EScitalopram oxalate  20 mg Oral QHS    pantoprazole  40 mg Oral Daily    polyethylene glycol  17 g Oral Daily    pravastatin  40 mg Oral QHS    pregabalin  75 mg Oral BID    senna-docusate 8.6-50 mg  1 tablet Oral BID    sodium chloride 0.9% flush bag IVPB   Intravenous 1 time in Clinic/HOD    sodium chloride 0.9%  10 mL Intravenous Q6H    sodium chloride 3%  4 mL Nebulization Q8H    vitamin D  1,000 Units Oral Daily     PRN Meds:sodium chloride, acetaminophen, alteplase, diphenhydrAMINE, EPINEPHrine, heparin,  porcine (PF), hydrocortisone sodium succinate, HYDROmorphone, LORazepam, melatonin, methocarbamoL, ondansetron, sodium chloride 0.9%, sodium chloride 0.9%, sodium chloride 0.9%, sodium chloride 0.9%, Flushing PICC Protocol **AND** sodium chloride 0.9% **AND** sodium chloride 0.9%, sodium chloride 0.9%, sumatriptan    Objective:     Vital Signs (Most Recent):  Temp: 97.6 °F (36.4 °C) (09/10/22 1204)  Pulse: 60 (09/10/22 1500)  Resp: 19 (09/10/22 1500)  BP: 97/66 (09/10/22 1204)  SpO2: 95 % (09/10/22 1500) Vital Signs (24h Range):  Temp:  [97.6 °F (36.4 °C)-99.4 °F (37.4 °C)] 97.6 °F (36.4 °C)  Pulse:  [59-68] 60  Resp:  [16-22] 19  SpO2:  [95 %-100 %] 95 %  BP: ()/(66-73) 97/66     Weight: 57.2 kg (126 lb)  Body mass index is 18.61 kg/m².    Physical Exam  Vitals and nursing note reviewed.   Constitutional:       Appearance: He is ill-appearing.   Cardiovascular:      Rate and Rhythm: Tachycardia present.   Pulmonary:      Effort: Pulmonary effort is normal.      Comments: Some labored breathing and sobs. Able to use yankauer suction.  Neurological:      Mental Status: He is oriented to person, place, and time.   Psychiatric:      Comments: He is sad and sobbing.        Review of Symptoms      Symptom Assessment (ESAS 0-10 Scale)  Pain:  2  Dyspnea:  2  Anxiety:  3  Nausea:  0  Depression:  0  Anorexia:  6  Fatigue:  2  Insomnia:  0  Restlessness:  2  Agitation:  0     CAM / Delirium:  Negative  Constipation:  Negative  Diarrhea:  Negative      Bowel Management Plan (BMP):  Yes      Pain Assessment:  OME in 24 hours:  3.5 mg iv dilaudid  Location(s): chest    Chest       Location: generalized and anterior        Quality: Pressure-like        Quantity: 2/10 in intensity        Chronicity: Onset 2 week(s) ago, gradually worsening since pain is better controlled.  Has a lot of emotional pain as well but tumor pain is significant.        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 He is frightened for what will happen to Aston his  after he dies. He is burdened with guilt.    Spiritual:  F - Geno and Belief:  Voodoo  I - Importance:  Yes  C - Community:  Yes  A - Address in Care:  Yes- he does not want to see a  as he fears they will be beckoning him to go ahead and go. Worries about images of the "grim reaper".      Advance Care Planning   Advance Directives:   Living Will: No    LaPOST: No    Do Not Resuscitate Status: Yes    Agent's Name:  Aston Collins   Agent's Contact Number:  [phone]    Decision Making:  Patient answered questions       Significant Labs: All pertinent labs within the past 24 hours have been reviewed.  CBC:   Recent Labs   Lab 09/10/22  0310   WBC 8.64   HGB 9.4*   HCT 29.6*   MCV 91        BMP:  Recent Labs   Lab 09/10/22  0310   *   *   K 5.3*      CO2 27   BUN 42*   CREATININE 0.9   CALCIUM 8.2*   MG 2.1     LFT:  Lab Results   Component Value Date    AST 25 09/10/2022    ALKPHOS 148 (H) 09/10/2022    BILITOT 0.3 09/10/2022     Albumin:   Albumin   Date Value Ref Range Status   09/10/2022 2.4 (L) 3.5 - 5.2 g/dL Final     Protein:   Total Protein   Date Value Ref Range Status   09/10/2022 4.9 (L) 6.0 - 8.4 g/dL Final     Lactic acid:   Lab Results   Component Value Date    LACTATE 1.7 09/05/2022    LACTATE 2.4 (H) 09/04/2022       Significant Imaging: I have reviewed all pertinent imaging results/findings within the past 24 hours.      Sigrid Vences MD  Palliative Medicine  [facility] - Oncology ([facility])              "

## 2022-09-10 NOTE — SUBJECTIVE & OBJECTIVE
Interval History: Patient now feeling anxious about new diagnosis. Psych onc saw patient, will consider consulting psych for new onset anxiety and panic attacks if patient does not improve. Patient completed day 3 of chemo, will need GCSF on day 4. New concerns for TLS, started on fluids, allopurinol, and q6h labs.     Oncology Treatment Plan:   OP SCLC CARBOPLATIN (AUC) ETOPOSIDE DURVALUMAB Q3W FOLLOWED BY MAINTENANCE DURVALUMAB 1500 MG Q4W    Medications:  Continuous Infusions:   amiodarone in dextrose 5% 0.5 mg/min (09/10/22 1525)    heparin (porcine) in D5W 16.084 Units/kg/hr (09/10/22 1202)     Scheduled Meds:   albuterol-ipratropium  3 mL Nebulization Q8H    aprepitant  130 mg Intravenous 1 time in Clinic/HOD    EScitalopram oxalate  20 mg Oral QHS    pantoprazole  40 mg Oral Daily    polyethylene glycol  17 g Oral Daily    pravastatin  40 mg Oral QHS    pregabalin  75 mg Oral BID    senna-docusate 8.6-50 mg  1 tablet Oral BID    sodium chloride 0.9% flush bag IVPB   Intravenous 1 time in Clinic/HOD    sodium chloride 0.9%  10 mL Intravenous Q6H    sodium chloride 3%  4 mL Nebulization Q8H    vitamin D  1,000 Units Oral Daily     PRN Meds:sodium chloride, acetaminophen, alteplase, diphenhydrAMINE, EPINEPHrine, heparin, porcine (PF), hydrocortisone sodium succinate, HYDROmorphone, LORazepam, melatonin, methocarbamoL, ondansetron, sodium chloride 0.9%, sodium chloride 0.9%, sodium chloride 0.9%, sodium chloride 0.9%, Flushing PICC Protocol **AND** sodium chloride 0.9% **AND** sodium chloride 0.9%, sodium chloride 0.9%, sumatriptan     Review of Systems   Constitutional:  Positive for activity change, fatigue and unexpected weight change.   Respiratory:  Positive for shortness of breath.    Cardiovascular:  Negative for leg swelling.   Gastrointestinal:  Negative for nausea and vomiting.   Musculoskeletal:  Positive for gait problem.   Skin: Negative.    Neurological:  Positive for weakness.    Psychiatric/Behavioral:  The patient is nervous/anxious.    Objective:     Vital Signs (Most Recent):  Temp: 97.6 °F (36.4 °C) (09/10/22 1204)  Pulse: 60 (09/10/22 1500)  Resp: 19 (09/10/22 1500)  BP: 97/66 (09/10/22 1204)  SpO2: 95 % (09/10/22 1500) Vital Signs (24h Range):  Temp:  [97.6 °F (36.4 °C)-99.4 °F (37.4 °C)] 97.6 °F (36.4 °C)  Pulse:  [59-68] 60  Resp:  [16-22] 19  SpO2:  [95 %-100 %] 95 %  BP: ()/(66-73) 97/66     Weight: 57.2 kg (126 lb)  Body mass index is 18.61 kg/m².  Body surface area is 1.67 meters squared.      Intake/Output Summary (Last 24 hours) at 9/10/2022 1539  Last data filed at 9/10/2022 1325  Gross per 24 hour   Intake 1286.79 ml   Output 1590 ml   Net -303.21 ml       Physical Exam  Constitutional:       General: He is not in acute distress.     Appearance: He is well-developed. He is ill-appearing.   HENT:      Head: Normocephalic and atraumatic.      Right Ear: External ear normal.      Left Ear: External ear normal.      Nose: Nose normal.      Mouth/Throat:      Mouth: Mucous membranes are moist.      Pharynx: Oropharynx is clear. No oropharyngeal exudate.   Eyes:      General: No scleral icterus.     Extraocular Movements: Extraocular movements intact.      Conjunctiva/sclera: Conjunctivae normal.   Cardiovascular:      Rate and Rhythm: Normal rate and regular rhythm.      Heart sounds: Normal heart sounds. No murmur heard.  Pulmonary:      Effort: Pulmonary effort is normal.      Breath sounds: Rhonchi present.      Comments: On 5L NC  Abdominal:      General: Bowel sounds are normal.      Palpations: Abdomen is soft.      Tenderness: There is no abdominal tenderness.   Musculoskeletal:         General: Normal range of motion.      Cervical back: Normal range of motion and neck supple.      Right lower leg: No edema.      Left lower leg: No edema.   Skin:     General: Skin is warm and dry.   Neurological:      General: No focal deficit present.      Mental Status: He is  alert and oriented to person, place, and time.   Psychiatric:      Comments: Depressed mood, feelings of guilt. Anxious          Significant Labs:   BMP:   Recent Labs   Lab 09/09/22  0542 09/10/22  0310   * 115*   * 134*   K 4.8 5.3*    102   CO2 23 27   BUN 43* 42*   CREATININE 1.1 0.9   CALCIUM 8.3* 8.2*   MG 2.1 2.1    and CBC:   Recent Labs   Lab 09/09/22  0542 09/10/22  0310   WBC 8.81 8.64   HGB 9.8* 9.4*   HCT 30.9* 29.6*    189       Diagnostic Results:  I have reviewed and interpreted all pertinent imaging results/findings within the past 24 hours.

## 2022-09-10 NOTE — SUBJECTIVE & OBJECTIVE
Interval History: Very tearful. Feels guilty that he should have stopped smoking years ago. Feels guilty about leaving Aston and friends behind.     Medications:  Continuous Infusions:   amiodarone in dextrose 5% 0.5 mg/min (09/10/22 1525)    heparin (porcine) in D5W 16.084 Units/kg/hr (09/10/22 1202)     Scheduled Meds:   albuterol-ipratropium  3 mL Nebulization Q8H    aprepitant  130 mg Intravenous 1 time in Clinic/HOD    EScitalopram oxalate  20 mg Oral QHS    pantoprazole  40 mg Oral Daily    polyethylene glycol  17 g Oral Daily    pravastatin  40 mg Oral QHS    pregabalin  75 mg Oral BID    senna-docusate 8.6-50 mg  1 tablet Oral BID    sodium chloride 0.9% flush bag IVPB   Intravenous 1 time in Clinic/HOD    sodium chloride 0.9%  10 mL Intravenous Q6H    sodium chloride 3%  4 mL Nebulization Q8H    vitamin D  1,000 Units Oral Daily     PRN Meds:sodium chloride, acetaminophen, alteplase, diphenhydrAMINE, EPINEPHrine, heparin, porcine (PF), hydrocortisone sodium succinate, HYDROmorphone, LORazepam, melatonin, methocarbamoL, ondansetron, sodium chloride 0.9%, sodium chloride 0.9%, sodium chloride 0.9%, sodium chloride 0.9%, Flushing PICC Protocol **AND** sodium chloride 0.9% **AND** sodium chloride 0.9%, sodium chloride 0.9%, sumatriptan    Objective:     Vital Signs (Most Recent):  Temp: 97.6 °F (36.4 °C) (09/10/22 1204)  Pulse: 60 (09/10/22 1500)  Resp: 19 (09/10/22 1500)  BP: 97/66 (09/10/22 1204)  SpO2: 95 % (09/10/22 1500) Vital Signs (24h Range):  Temp:  [97.6 °F (36.4 °C)-99.4 °F (37.4 °C)] 97.6 °F (36.4 °C)  Pulse:  [59-68] 60  Resp:  [16-22] 19  SpO2:  [95 %-100 %] 95 %  BP: ()/(66-73) 97/66     Weight: 57.2 kg (126 lb)  Body mass index is 18.61 kg/m².    Physical Exam  Vitals and nursing note reviewed.   Constitutional:       Appearance: He is ill-appearing.   Cardiovascular:      Rate and Rhythm: Tachycardia present.   Pulmonary:      Effort: Pulmonary effort is normal.      Comments: Some  "labored breathing and sobs. Able to use yankauer suction.  Neurological:      Mental Status: He is oriented to person, place, and time.   Psychiatric:      Comments: He is sad and sobbing.        Review of Symptoms      Symptom Assessment (ESAS 0-10 Scale)  Pain:  2  Dyspnea:  2  Anxiety:  3  Nausea:  0  Depression:  0  Anorexia:  6  Fatigue:  2  Insomnia:  0  Restlessness:  2  Agitation:  0     CAM / Delirium:  Negative  Constipation:  Negative  Diarrhea:  Negative      Bowel Management Plan (BMP):  Yes      Pain Assessment:  OME in 24 hours:  3.5 mg iv dilaudid  Location(s): chest    Chest       Location: generalized and anterior        Quality: Pressure-like        Quantity: 2/10 in intensity        Chronicity: Onset 2 week(s) ago, gradually worsening since pain is better controlled.  Has a lot of emotional pain as well but tumor pain is significant.        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 He is frightened for what will happen to Aston his  after he dies. He is burdened with guilt.    Spiritual:  F - Geno and Belief:  Bahai  I - Importance:  Yes  C - Community:  Yes  A - Address in Care:  Yes- he does not want to see a  as he fears they will be beckoning him to go ahead and go. Worries about images of the "grim reaper".      Advance Care Planning   Advance Directives:   Living Will: No    LaPOST: No    Do Not Resuscitate Status: Yes    Agent's Name:  Aston Collins   Agent's Contact Number:  [phone]    Decision Making:  Patient answered questions       Significant Labs: All pertinent labs within the past 24 hours have been reviewed.  CBC:   Recent Labs   Lab 09/10/22  0310   WBC 8.64   HGB 9.4* "   HCT 29.6*   MCV 91        BMP:  Recent Labs   Lab 09/10/22  0310   *   *   K 5.3*      CO2 27   BUN 42*   CREATININE 0.9   CALCIUM 8.2*   MG 2.1     LFT:  Lab Results   Component Value Date    AST 25 09/10/2022    ALKPHOS 148 (H) 09/10/2022    BILITOT 0.3 09/10/2022     Albumin:   Albumin   Date Value Ref Range Status   09/10/2022 2.4 (L) 3.5 - 5.2 g/dL Final     Protein:   Total Protein   Date Value Ref Range Status   09/10/2022 4.9 (L) 6.0 - 8.4 g/dL Final     Lactic acid:   Lab Results   Component Value Date    LACTATE 1.7 09/05/2022    LACTATE 2.4 (H) 09/04/2022       Significant Imaging: I have reviewed all pertinent imaging results/findings within the past 24 hours.

## 2022-09-10 NOTE — PLAN OF CARE
Plan of care reviewed with the pt at the beginning of the shift. Pt has remained free from injury and falls. Fall precautions maintained. Bed locked in lowest position, side rails up x2, non skid footwear on, personal belongings and call light within reach. Instructed pt to call for assistance as needed. Pt has remained afebrile at this time.  Pt refuses to be turned overnight. Assisted with weight shifting. Heparin and amniodorone infusing with out complications. Telemetry monitored.

## 2022-09-10 NOTE — ASSESSMENT & PLAN NOTE
S/p right hip hemiarthroplasty with ablation, cementoplasty, and percutaneous fixation of pelvis 8/23. Patient is weight bearing as tolerated with posterior hip precautions to right lower extremity as per orthopedics    - Management per primary and ortho  - When more stable consider postop radiation

## 2022-09-10 NOTE — ASSESSMENT & PLAN NOTE
Patient had afib with RVR this admission was successfully cardioverted. IV amio load x72 hours then transition to PO.    - amnio gtt

## 2022-09-10 NOTE — ASSESSMENT & PLAN NOTE
"[id number] Discussed with patient. He has lots of physical and emotional pain. Adjusted pain meds with primary team during the day. He is tearful and full of guilt. Agree with Hydromorphone adjustment of dose and psychiatry consult. Total time 40 minutes majority of it spent in conversation and assessment of symptoms.  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 Pt has metastatic lung cancer. Pt is alert, oriented to person, place, and situation. Pt is a DNR. Pt is on 3 L O2. Pt tearful today.      Reason for consult: GOC/ACP. Communicated with MAJOR Camp fellow with [facility].     Today: Pt very tearful today. Pt says "I know I am dying. " Pt kept apologizing.  Explained to pt there is not a need to apoligize and that that I am glad he felt comfortable to express his feelings. Pt repots he is having a bad day and everything starting to sink in about his health. Support given.  seeing. Will consult Oncology/Psycology for pt. Pt to step down to Hem/onc as soon as bed available.     9/6/22  Goals of care/ACP: Pt to have tracheal stenting tomorrow. Pt reports he is in agreement to procedure and would like CC team to speak to Aston about procedure when he visits today. Per pt, he relays information to Aston but he feels like he forgets some of information to tell him. Pt reports his goal is medical management at this time to see if he can improve enough to get back home. Pt reports his goal is to be at home with his four dogs and , Aston. Pt is aware of his severity of his illness.     Today: Met with pt along with Tracie Kidd LCSW.     9/6/22  Introduced role of Palliative care to pt.   Met with pt who is aware of his medical " "issues. Per pt he is aware he has metastatic cancer. Pt is aware that any therapies/procedures offered to him is palliative and will not cure cancer. Per pt, he is still trying to come to  with his dx. He learned about his issues in August. Pt reports goal at time is to see if anything can be done to help with symptoms and "give him some more time." Spoke to pt about amount of O2 he is on at this time. He verbalized understanding.  Per pt he has spoken to Oncology and XRT MDs.   CTS has been consulted concerning tracheal stenting.      Pt open to continued visits with [facility] care for care planning and symptom management needs.  Support given pt.      Pt reports he is legally  to Aston Collins and he would want him to be hi medical decision-maker if he cannot make his own medical decisions. MPOA paperwork left with pt. Education provided. Per Pt, Aston works during day but can be reached by phone.      Code status: DNR.      Symptom management:      Dyspnea r/t to mediastinal mass, pleural effusion.   Pt is on 3 L O2 per NC.   Pt is on Morphine 2 mg IVP q 2 hrs prn.   Pt has chest tube in place and having tracheal stents placed.      Pt reports Morphine "has really helps with my SOB."  Pt reports Morphine lasts around 1-2 hours.      Continue Morphine 2 mg IVP q 2 hours prn.   Will continue to assess.      Debility r/t to pathological femoral neck fracture s/p ight hip hemiarthroplasty with ablation, cementoplasty  Pt was using walker prior to admit.   Would resume PT/OT when pt stable to participate.      Anxiety r/t to new dx and dyspnea.   Pt has Ativan 1 mg tid prn.   Continue.      Plan:   Will continue to meet with pt to reinforce realistic expectations/assit with GOC.   Support given.   Will consult Oncology/ Pyschology.     Will follow.         "

## 2022-09-10 NOTE — HOSPITAL COURSE
Patient now feeling anxious about new diagnosis. Psych onc saw patient, will consider consulting psych for new onset anxiety and panic attacks if patient does not improve. Patient completed day 3 of chemo, will need GCSF on day 4. New concerns for TLS, started on fluids, allopurinol, and q6h labs. Based of recs from palliative and psych onc, consulted psychiatry for patient's newfound guilt over cancer diagnosis. Patient was also on amnio for >72 hours and per critical care note amio was transitioned to PO. Post allopurinol and IVF patient's TLS labs recovering. Continue to monitor. Chemo rounds completed and patient started on GCSF. Has been stable w/ labs not indicating TLS. Chest tube removed 9/13, sutured. Daily CXR for reassessment. Otherwise pain control as tolerated, PT/OT recommended home health. Began dessatting  to 70s, low 80s on 9/16, chest-xray showing similar to previous. HFNC ordered. Stepped up to ICU on 9/16.

## 2022-09-10 NOTE — PROGRESS NOTES
"Pt very anxious and tearful throughout shift and "unable to catch my breath"; unrelieved with PO anxiety meds. 1 time dose of IV diazepam given with relief. Anxiety also relieved by 's arrival to bedside.  Pain medication increased due to complaints of pain unrelieved by previous regiment. VSS on 2L NC. Telemetry monitoring continued with HR in the 60s. All questions and concerns addressed.         CHEMO NOTE     Verified chemo consent signed and in chart. Chemo dosage and and rate checked by 2 chemo certified nurses. Patient education offered and stated understanding. Denies questions at this time.      Blood return positive via: R PICC line.      1252 etoposide (VEPESID) 100 mg/m2 = 168 mg in sodium chloride 0.9% 500 mL chemo infusion started through R PICC line.   "

## 2022-09-10 NOTE — NURSING
Pulmonary critical care paged to notify of potassium of 5.7. Awaiting call back. BMT night resident also notified.

## 2022-09-10 NOTE — ASSESSMENT & PLAN NOTE
Will consult psychiatry for patient tomorrow. Talked to palliative and they stated that patient would not benefit from more pain medications given current emotional state    - will consult psych tomorrow

## 2022-09-10 NOTE — SUBJECTIVE & OBJECTIVE
Donis Jimenez, a 61 y.o. male, seen for initial evaluation. Met with patient and spouse.    Chief Complaint   Patient presents with    Shortness of Breath     Pt brought to ED from home via [facility]. Per family pt SOB and productive cough. Pt had double valve replacement, pacemaker and hip surgery 1 week ago.        Patient Active Problem List   Diagnosis    Pathologic fracture of neck of right femur s/p hemiarthroplasty on 2022    Pseudoaneurysm of right femoral artery    Acute arterial ischemic stroke, multifocal, multiple vascular territories    Subdural hematoma    Body mass index (BMI) less than 19    H/O mitral valve replacement with mechanical valve    Lytic bone lesion of hip    Malignant neoplasm metastatic to bone    Anticoagulated on warfarin    Atrial flutter    SOB (shortness of breath)    Swelling of upper extremity    Acute hypoxemic respiratory failure    ACP (advance care planning)    Goals of care, counseling/discussion    Debility    Advance care planning    Palliative care encounter    Dermatitis associated with moisture    Exudative pleural effusion    Small cell carcinoma of lung       Health Behaviors:      ETOH Use: No      Tobacco Use: Yes  Illicit Drug Use: No    Prescription Misuse: No  Advanced directives: No documents, currently DNR        Past Psychiatric History:     Inpatient treatment: No    Outpatient treatment: No    Prior substance abuse treatment: No    Suicide Attempts: No    Psychotropic Medications:   Current: Ativan and Lexapro (Since )  Past: Ambien (caused disturbing dreams), another sleep medication caused severe headaches       Social Situation/Stressors:     Donis Jimenez lives with his  (Aston Collins) in [place], [place].  He is a full-time  (stopped working in 2022 due to illness).  Donis Jimenez has been /partnered for 31 years. He has no children. His spouse is also a hairdresser.  His parents are . He has a number  "of close friends (particularly Kirsten). The patient reports excellent social support. Donis Jimenez reports his shawna is important to him.  Donis Jimenez's hobbies include visiting the beach and restaurants with his , watching TV, and playing with his dogs.    Strengths and liabilities: Strength: Patient is expressive/articulate., Strength: Patient is intelligent., Strength: Patient has positive support network., Strength: Patient is stable., Liability: Patient has poor health.    Current Evaluation:     Mental Status Exam: Donis Jimenez was seen along with his  (at his request).  Mr. Jimenez was in bed throughout the session. The patient was fully cooperative throughout the interview and was an adequate historian.    Appearance: age appropriate, ill-appearing  Behavior/Cooperation: friendly and cooperative  Speech: Not pressured, clearly audible, no slurring  Mood: anxious and depressed  Affect: profusely tearful, distressed  Thought Process: goal-directed, logical  Thought Content: normal, no suicidality, no homicidality, delusions, or paranoia; did not appear to be responding to internal stimuli during the interview.   Orientation: grossly intact  Memory: Grossly intact  Attention Span/Concentration: Attends to interview without distraction; reports no difficulty  Fund of Knowledge: average  Estimate of Intelligence: average from verbal skills and history  Cognition: grossly intact  Insight: patient has awareness of illness; good insight into own behavior and behavior of others  Judgment: the patient's behavior is adequate to circumstances        History of Present Illness:     Donis Jimenez, a 61 y.o. male, for initial evaluation visit.  Met with patient and spouse (Aston Collins).    Chief Complaint/Reason for Encounter: adaptation to disease and treatment, depression, anxiety, sleep    Donis Jimenez has adjusted to illness with difficulty.  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 He is profoundly distressed about his diagnosis and feels guilty for causing it due to "selfishness" by smoking. He feels "so terrible" for the hurt his illness/death will cause his loved ones. He repeatedly states, "no one should waste time on me" because "I don't deserve it." He states his rapid death would allow his loved ones to "move on and forget me." He also describes discomfort allowing others to serve him and states that he does not deserve their love or care due to his poor decision-making to have started/continued smoking. The patient has excellent family/friend support.  His support system is coping very well with the diagnosis/treatment/prognosis. Illness-related psychosocial stressors include financial strain, absence from work, changes in ability to engage in leisure activities, and absence from home.  The patient has a good partnership with his [facility] oncology treatment team.       Symptoms:   Mood: depressed mood, weight loss, insomnia, psychomotor agitation, fatigue, worthlessness/guilt, thoughts of death, and tearfulness;  no prior and No SI/HI  Anxiety: excessive anxiety/worry and restlessness/keyed up; lifelong anxiety, on Lexapro since 2009 due to anxiety- patient and spouse describe minimal perceived benefit  Substance abuse: denied  Cognitive functioning: Spouse describes delirium in ICU after cardiac surgery in 2020 and, again, during this hospitalization  Sleep:  restless sleep -spouse states better with Ativan, but TID dosing caused hypersomnolence, spouse describes increased anxiety in the evening and frequent night terrors since 2020 hospitalization; previous trial of Ambien with disturbing dreams, anther sleep medication caused headaches      "

## 2022-09-10 NOTE — PROGRESS NOTES
[facility] - Oncology ([facility])  Hematology/Oncology  Progress Note    Patient Name: Donis Jimenez  Admission Date: 9/4/2022  Hospital Length of Stay: 6 days  Code Status: DNR     Subjective:     HPI:  61 year old man with newly diagnosed metastatic small cell lung cancer complicated by acute hypoxic respiratory failure.  Was previously treated for pathologic right femoral neck fracture.  Pathology finalized as small cell carcinoma. Over his stay in ICU, his O2 requirments were steadily reduced, he is now on nasal cannula. Patient went into afib with RVR and needed cardioversion. He is currently undergoing inpatient chemotherapy with carboplatin (day 1) and etoposide (day 1,2,3). Will need GCSF on day 4.     Patient now feeling anxious about new diagnosis. Psych onc saw patient, will consider consulting psych for new onset anxiety and panic attacks if patient does not improve.       Interval History: Patient now feeling anxious about new diagnosis. Psych onc saw patient, will consider consulting psych for new onset anxiety and panic attacks if patient does not improve. Patient completed day 3 of chemo, will need GCSF on day 4. New concerns for TLS, started on fluids, allopurinol, and q6h labs.     Oncology Treatment Plan:   OP SCLC CARBOPLATIN (AUC) ETOPOSIDE DURVALUMAB Q3W FOLLOWED BY MAINTENANCE DURVALUMAB 1500 MG Q4W    Medications:  Continuous Infusions:   amiodarone in dextrose 5% 0.5 mg/min (09/10/22 1525)    heparin (porcine) in D5W 16.084 Units/kg/hr (09/10/22 1202)     Scheduled Meds:   albuterol-ipratropium  3 mL Nebulization Q8H    aprepitant  130 mg Intravenous 1 time in Clinic/HOD    EScitalopram oxalate  20 mg Oral QHS    pantoprazole  40 mg Oral Daily    polyethylene glycol  17 g Oral Daily    pravastatin  40 mg Oral QHS    pregabalin  75 mg Oral BID    senna-docusate 8.6-50 mg  1 tablet Oral BID    sodium chloride 0.9% flush bag IVPB   Intravenous 1 time in Clinic/HOD    sodium chloride  0.9%  10 mL Intravenous Q6H    sodium chloride 3%  4 mL Nebulization Q8H    vitamin D  1,000 Units Oral Daily     PRN Meds:sodium chloride, acetaminophen, alteplase, diphenhydrAMINE, EPINEPHrine, heparin, porcine (PF), hydrocortisone sodium succinate, HYDROmorphone, LORazepam, melatonin, methocarbamoL, ondansetron, sodium chloride 0.9%, sodium chloride 0.9%, sodium chloride 0.9%, sodium chloride 0.9%, Flushing PICC Protocol **AND** sodium chloride 0.9% **AND** sodium chloride 0.9%, sodium chloride 0.9%, sumatriptan     Review of Systems   Constitutional:  Positive for activity change, fatigue and unexpected weight change.   Respiratory:  Positive for shortness of breath.    Cardiovascular:  Negative for leg swelling.   Gastrointestinal:  Negative for nausea and vomiting.   Musculoskeletal:  Positive for gait problem.   Skin: Negative.    Neurological:  Positive for weakness.   Psychiatric/Behavioral:  The patient is nervous/anxious.    Objective:     Vital Signs (Most Recent):  Temp: 97.6 °F (36.4 °C) (09/10/22 1204)  Pulse: 60 (09/10/22 1500)  Resp: 19 (09/10/22 1500)  BP: 97/66 (09/10/22 1204)  SpO2: 95 % (09/10/22 1500) Vital Signs (24h Range):  Temp:  [97.6 °F (36.4 °C)-99.4 °F (37.4 °C)] 97.6 °F (36.4 °C)  Pulse:  [59-68] 60  Resp:  [16-22] 19  SpO2:  [95 %-100 %] 95 %  BP: ()/(66-73) 97/66     Weight: 57.2 kg (126 lb)  Body mass index is 18.61 kg/m².  Body surface area is 1.67 meters squared.      Intake/Output Summary (Last 24 hours) at 9/10/2022 1539  Last data filed at 9/10/2022 1325  Gross per 24 hour   Intake 1286.79 ml   Output 1590 ml   Net -303.21 ml       Physical Exam  Constitutional:       General: He is not in acute distress.     Appearance: He is well-developed. He is ill-appearing.   HENT:      Head: Normocephalic and atraumatic.      Right Ear: External ear normal.      Left Ear: External ear normal.      Nose: Nose normal.      Mouth/Throat:      Mouth: Mucous membranes are moist.       Pharynx: Oropharynx is clear. No oropharyngeal exudate.   Eyes:      General: No scleral icterus.     Extraocular Movements: Extraocular movements intact.      Conjunctiva/sclera: Conjunctivae normal.   Cardiovascular:      Rate and Rhythm: Normal rate and regular rhythm.      Heart sounds: Normal heart sounds. No murmur heard.  Pulmonary:      Effort: Pulmonary effort is normal.      Breath sounds: Rhonchi present.      Comments: On 5L NC  Abdominal:      General: Bowel sounds are normal.      Palpations: Abdomen is soft.      Tenderness: There is no abdominal tenderness.   Musculoskeletal:         General: Normal range of motion.      Cervical back: Normal range of motion and neck supple.      Right lower leg: No edema.      Left lower leg: No edema.   Skin:     General: Skin is warm and dry.   Neurological:      General: No focal deficit present.      Mental Status: He is alert and oriented to person, place, and time.   Psychiatric:      Comments: Depressed mood, feelings of guilt. Anxious          Significant Labs:   BMP:   Recent Labs   Lab 09/09/22  0542 09/10/22  0310   * 115*   * 134*   K 4.8 5.3*    102   CO2 23 27   BUN 43* 42*   CREATININE 1.1 0.9   CALCIUM 8.3* 8.2*   MG 2.1 2.1    and CBC:   Recent Labs   Lab 09/09/22  0542 09/10/22  0310   WBC 8.81 8.64   HGB 9.8* 9.4*   HCT 30.9* 29.6*    189       Diagnostic Results:  I have reviewed and interpreted all pertinent imaging results/findings within the past 24 hours.    Assessment/Plan:     * Small cell carcinoma of lung  [facility] pathology report confirms small cell lung cancer from bone specimen.  Explained to him and his  that he is in an unfortunate situation.  He will most certainly die of this cancer rather quickly if not given chemotherapy.  While our intent is to improve his tumor burden causing his hypoxic respiratory failure with chemotherapy, it is also possible that we may potentially hasten his death  unintentionally due to chemo toxicity. He and his  understand the risks.  - Patient currently on day 2 of inpatient chemotherapy, so far tolerating well  -labs reviewed; carboplatin renally dosed and will proceed with carboplatin and etoposide.  Etoposide will also be given day 2 and day 3.  We will plan to provide GCSF starting day 4 and will do so for at least 7 doses.      - agree with oncology psychology consult  - Will transfer to medical oncology on 9/10    Adjustment disorder with mixed anxiety and depressed mood  Will consult psychiatry for patient tomorrow. Talked to palliative and they stated that patient would not benefit from more pain medications given current emotional state    - will consult psych tomorrow     Atrial flutter  Patient had afib with RVR this admission was successfully cardioverted. IV amio load x72 hours then transition to PO.    - amnio gtt    Malignant neoplasm metastatic to bone  See small cell lung cancer    Pathologic fracture of neck of right femur s/p hemiarthroplasty on 8/23/2022  S/p right hip hemiarthroplasty with ablation, cementoplasty, and percutaneous fixation of pelvis 8/23. Patient is weight bearing as tolerated with posterior hip precautions to right lower extremity as per orthopedics    - Management per primary and ortho  - When more stable consider postop radiation             Susan Gregory,   Hematology/Oncology  [facility] - Oncology ([facility])

## 2022-09-11 NOTE — ASSESSMENT & PLAN NOTE
Patient had afib with RVR this admission was successfully cardioverted. IV amio load x72 hours completed and transitioned to PO.    - amio  BID for 2 weeks followed by:    - amio 400 daily for 1 week    - amio 200 daily lifetime

## 2022-09-11 NOTE — ASSESSMENT & PLAN NOTE
[facility] pathology report confirms small cell lung cancer from bone specimen.  Explained to him and his  that he is in an unfortunate situation.  He will most certainly die of this cancer rather quickly if not given chemotherapy.  While our intent is to improve his tumor burden causing his hypoxic respiratory failure with chemotherapy, it is also possible that we may potentially hasten his death unintentionally due to chemo toxicity. He and his  understand the risks.  - Patient currently on day 2 of inpatient chemotherapy, so far tolerating well  -labs reviewed; carboplatin renally dosed and will proceed with carboplatin and etoposide.  Etoposide given day 2 and day 3.  We will plan to provide GCSF starting day 4 and will do so for at least 7 doses.      - GCSF starting day 4 (9/11) and will do so for at least 7 doses.  - agree with oncology psychology consult  - Will transfer to medical oncology on 9/10

## 2022-09-11 NOTE — PROGRESS NOTES
"[facility]ac Moore - Oncology ([facility])  Palliative Medicine  Progress Note    Patient Name: Donis Jimenez  MRN: [id number]  Admission Date: 9/4/2022  Hospital Length of Stay: 7 days  Code Status: DNR   Attending Provider: Brannon Gonsalves MD  Consulting Provider: Sigrid Vences MD  Primary Care Physician: Elio Farias MD  Principal Problem:Small cell carcinoma of lung    Patient information was obtained from patient and primary team.      Assessment/Plan:     Palliative care encounter  [id number] Patient looks brighter today and symptoms are managed.   Hopeful that this chemotherapy will allow some more time for him. Needs psychiatric and social work assistance (thanks)  Alvaroid is treating his pain at this time!    [id number] Discussed with patient. He has lots of physical and emotional pain. Adjusted pain meds with primary team during the day. He is tearful and full of guilt. Agree with Hydromorphone adjustment of dose and psychiatry consult. Total time 40 minutes majority of it spent in conversation and assessment of symptoms.  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 Pt has metastatic lung cancer. Pt is alert, oriented to person, place, and situation. Pt is a DNR. Pt is on 3 L O2. Pt tearful today.      Reason for consult: GOC/ACP. Communicated with MAJOR Camp fellow with [facility].     Today: Pt very tearful today. Pt says "I know I am dying. " Pt kept apologizing.  Explained to pt there is not a need to apoligize and that that I am glad he felt comfortable to express his feelings. Pt repots he is having a bad day and everything starting to sink in about his health. Support given.  seeing. Will consult Oncology/Psycology for pt. " "Pt to step down to Hem/onc as soon as bed available.     9/6/22  Goals of care/ACP: Pt to have tracheal stenting tomorrow. Pt reports he is in agreement to procedure and would like CC team to speak to Aston about procedure when he visits today. Per pt, he relays information to Aston but he feels like he forgets some of information to tell him. Pt reports his goal is medical management at this time to see if he can improve enough to get back home. Pt reports his goal is to be at home with his four dogs and , Aston. Pt is aware of his severity of his illness.     Today: Met with pt along with Tracie Kidd LCSW.     9/6/22  Introduced role of Palliative care to pt.   Met with pt who is aware of his medical issues. Per pt he is aware he has metastatic cancer. Pt is aware that any therapies/procedures offered to him is palliative and will not cure cancer. Per pt, he is still trying to come to  with his dx. He learned about his issues in August. Pt reports goal at time is to see if anything can be done to help with symptoms and "give him some more time." Spoke to pt about amount of O2 he is on at this time. He verbalized understanding.  Per pt he has spoken to Oncology and XRT MDs.   CTS has been consulted concerning tracheal stenting.      Pt open to continued visits with [facility] care for care planning and symptom management needs.  Support given pt.      Pt reports he is legally  to Aston Collins and he would want him to be hi medical decision-maker if he cannot make his own medical decisions. MPOA paperwork left with pt. Education provided. Per Pt, Aston works during day but can be reached by phone.      Code status: DNR.      Symptom management:      Dyspnea r/t to mediastinal mass, pleural effusion.   Pt is on 3 L O2 per NC.   Pt is on Morphine 2 mg IVP q 2 hrs prn.   Pt has chest tube in place and having tracheal stents placed.      Pt reports Morphine "has really helps with my SOB."  Pt reports " Morphine lasts around 1-2 hours.      Continue Morphine 2 mg IVP q 2 hours prn.   Will continue to assess.      Debility r/t to pathological femoral neck fracture s/p ight hip hemiarthroplasty with ablation, cementoplasty  Pt was using walker prior to admit.   Would resume PT/OT when pt stable to participate.      Anxiety r/t to new dx and dyspnea.   Pt has Ativan 1 mg tid prn.   Continue.      Plan:   Will continue to meet with pt to reinforce realistic expectations/assit with GOC.   Support given.   Will consult Oncology/ Pyschology.     Will follow.               I will follow-up with patient. Please contact us if you have any additional questions.    Subjective:     Chief Complaint:   Chief Complaint   Patient presents with    Shortness of Breath     Pt brought to ED from home via [facility]. Per family pt SOB and productive cough. Pt had double valve replacement, pacemaker and hip surgery 1 week ago.        HPI:   Pt is a 61-year-old male with PMH significant for bacterial endocarditis, s/p AV and MV mechanical valve replacement (on warfarin), CKD, tobacco abuse with recent complicated hospital course who presented to the emergency department with shortness of breath.  Difficult for patient to provide history due to tachypnea; spouse at bedside assisting with history.      Per chart review, patient was admitted 8/18-8/27/22 after sustaining a pathologic right femoral neck fracture and right femoral artery pseudoaneurysm from a fall at home. Patient taken to OR on 8/19 by Vascular Surgery and had embolization of 3rd order right profunda femoral artery pseudoaneurysm with N-BCA glue and 5mm coil aneurysm repair. Pt underwent right hip hemiarthroplasty with ablation, cementoplasty, and percutaneous fixation of pelvis for pathologic fracture and metastatic disease with Ortho on 8/23. 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 Metastatic work-up done  with CT scan of chest/abdomen and pelvis showing extensive disease including mediastinal mass with encasement of mediastinal vascular structures and airways, MARTHA pulmonary mass, bilateral suprarenal and left renal masses, L1 pathologic fx and rib & skull metastatic disease.      Per chart review, pt's spouse stated that pt had been ambulatory with a walker post-discharge, without c/o SOB, lightheadedness or dizziness. SOB started ~ 9/2 with productive cough. He denies fever/chills, chest pain, abd pain, N/V/D.      In the ED patient was in a flutter and was cardioverted with some improvement in symptoms. He was found to have right sided pneumonia and was started on vanc and cefepime and admitted to Hospital Medicine for further management.      During his time in the ED, the patient had escalating FiO2 requirements, now on 45L 100% comfort flow.      Critical Care Medicine was consulted for worsening hypoxemic respiratory failure    Pt is DNR. Pt on 40 L  @ 60 %      Hospital Course:  No notes on file    Interval History: Looks much better today. Still with thick cough/secretions. Window open, flowers in room. Brighter affect. Says guilt is still swirling but not right in front of his face!    Medications:  Continuous Infusions:   sodium chloride 0.9% 125 mL/hr at 09/11/22 1417    heparin (porcine) in D5W 16.084 Units/kg/hr (09/11/22 1352)     Scheduled Meds:   albuterol-ipratropium  3 mL Nebulization Q8H    allopurinoL  300 mg Oral Daily    amiodarone  400 mg Oral BID    Followed by    [START ON 9/26/2022] amiodarone  400 mg Oral Daily    Followed by    [START ON 10/3/2022] amiodarone  400 mg Oral Daily    aprepitant  130 mg Intravenous 1 time in Clinic/HOD    EScitalopram oxalate  20 mg Oral QHS    filgrastim-sndz  300 mcg Subcutaneous  Daily    pantoprazole  40 mg Oral Daily    polyethylene glycol  17 g Oral Daily    pravastatin  40 mg Oral QHS    pregabalin  75 mg Oral BID    senna-docusate 8.6-50 mg  1 tablet Oral BID    sodium chloride 0.9% flush bag IVPB   Intravenous 1 time in Clinic/HOD    sodium chloride 0.9%  10 mL Intravenous Q6H    sodium chloride 3%  4 mL Nebulization Q8H    vitamin D  1,000 Units Oral Daily     PRN Meds:sodium chloride, acetaminophen, alteplase, diphenhydrAMINE, EPINEPHrine, heparin, porcine (PF), hydrocortisone sodium succinate, HYDROmorphone, LORazepam, melatonin, methocarbamoL, ondansetron, sodium chloride 0.9%, sodium chloride 0.9%, sodium chloride 0.9%, sodium chloride 0.9%, Flushing PICC Protocol **AND** sodium chloride 0.9% **AND** sodium chloride 0.9%, sodium chloride 0.9%, sumatriptan    Objective:     Vital Signs (Most Recent):  Temp: 97.7 °F (36.5 °C) (09/11/22 1544)  Pulse: 60 (09/11/22 1544)  Resp: 18 (09/11/22 1544)  BP: 103/65 (09/11/22 1544)  SpO2: (!) 93 % (09/11/22 1544)   Vital Signs (24h Range):  Temp:  [97.2 °F (36.2 °C)-98 °F (36.7 °C)] 97.7 °F (36.5 °C)  Pulse:  [58-61] 60  Resp:  [15-20] 18  SpO2:  [92 %-97 %] 93 %  BP: ()/(59-67) 103/65     Weight: 57.2 kg (126 lb)  Body mass index is 18.61 kg/m².    Physical Exam  Vitals and nursing note reviewed.   Constitutional:       Appearance: He is well-developed. He is ill-appearing.   HENT:      Head: Normocephalic and atraumatic.      Right Ear: External ear normal.      Left Ear: External ear normal.      Nose: Nose normal.      Mouth/Throat:      Mouth: Mucous membranes are moist.   Eyes:      Conjunctiva/sclera: Conjunctivae normal.      Pupils: Pupils are equal, round, and reactive to light.   Cardiovascular:      Rate and Rhythm: Normal rate and regular rhythm.   Pulmonary:      Effort: Pulmonary effort is normal.      Breath sounds: Rhonchi present.   Abdominal:      Palpations: Abdomen is soft.   Musculoskeletal:          General: Normal range of motion.      Cervical back: Normal range of motion and neck supple.   Skin:     General: Skin is warm and dry.   Neurological:      Mental Status: He is alert and oriented to person, place, and time.   Psychiatric:         Mood and Affect: Mood normal.         Speech: Speech normal.         Behavior: Behavior normal.         Thought Content: Thought content normal.         Judgment: Judgment normal.       Review of Symptoms      Symptom Assessment (ESAS 0-10 Scale)  Pain:  2  Dyspnea:  2  Anxiety:  1  Nausea:  0  Depression:  1  Anorexia:  0  Fatigue:  0  Insomnia:  0  Restlessness:  0  Agitation:  0     CAM / Delirium:  Negative  Constipation:  Negative  Diarrhea:  Negative      Bowel Management Plan (BMP):  Yes      Pain Assessment:  OME in 24 hours:  2 mg iv dilaudid  Location(s): chest    Chest       Location: anterior and generalized        Quality: Pressure-like        Quantity: 2/10 in intensity        Chronicity: Onset 2 week(s) ago, gradually improving since see previous notes. Better         Aggravating Factors: Movement        Alleviating Factors: Opiates        Associated Symptoms: Anorexia    Modified Floresita Scale:  2    Living Arrangements:  Lives in home and Lives with spouse    Psychosocial/Cultural: ,  to his partner of greater than 30 years.  No children no siblings.  Hair has always been important to him    Spiritual:  F - Geno and Belief:  Scientologist  I - Importance:  Unknown  C - Community:  Unknown  A - Address in Care:  Unknown      Advance Care Planning   Advance Directives:   Living Will: No    LaPOST: No    Do Not Resuscitate Status: Yes    Medical Power of : No    Agent's Name:  Aston Collins   Agent's Contact Number:  [phone]    Decision Making:  Patient answered questions       Significant Labs: All pertinent labs within the past 24 hours have been reviewed.  CBC:   Recent Labs   Lab 09/11/22  0344   WBC 9.16   HGB 9.7*   HCT 30.4*    MCV 93        BMP:  Recent Labs   Lab 09/11/22  0344 09/11/22  1114   GLU 98  98 151*   *  131* 135*   K 5.4*  5.4* 5.2*     100 102   CO2 24  24 24   BUN 37*  37* 35*   CREATININE 0.9  0.9 0.9   CALCIUM 8.0*  8.0* 8.1*   MG 1.9  --      LFT:  Lab Results   Component Value Date    AST 31 09/11/2022    ALKPHOS 145 (H) 09/11/2022    BILITOT 0.5 09/11/2022     Albumin:   Albumin   Date Value Ref Range Status   09/11/2022 2.4 (L) 3.5 - 5.2 g/dL Final     Protein:   Total Protein   Date Value Ref Range Status   09/11/2022 5.0 (L) 6.0 - 8.4 g/dL Final     Lactic acid:   Lab Results   Component Value Date    LACTATE 1.7 09/05/2022    LACTATE 2.4 (H) 09/04/2022       Significant Imaging: I have reviewed all pertinent imaging results/findings within the past 24 hours.      Sigrid Vences MD  Palliative Medicine  [facility] - Oncology ([facility])

## 2022-09-11 NOTE — PROGRESS NOTES
[facility] - Oncology ([facility])  Hematology/Oncology  Progress Note    Patient Name: Donis Jimenez  Admission Date: 9/4/2022  Hospital Length of Stay: 7 days  Code Status: DNR     Subjective:     HPI:  61 year old man with newly diagnosed metastatic small cell lung cancer complicated by acute hypoxic respiratory failure.  Was previously treated for pathologic right femoral neck fracture.  Pathology finalized as small cell carcinoma. Over his stay in ICU, his O2 requirments were steadily reduced, he is now on nasal cannula. Patient went into afib with RVR and needed cardioversion. He is currently undergoing inpatient chemotherapy with carboplatin (day 1) and etoposide (day 1,2,3). Will need GCSF on day 4.     Patient now feeling anxious about new diagnosis. Psych onc saw patient, will consider consulting psych for new onset anxiety and panic attacks if patient does not improve.       Interval History: Based of recs from palliative and psych onc, consulted psychiatry for patient's newfound guilt over cancer diagnosis. Patient was also on amnio for >72 hours and per critical care note amio was transitioned to PO. Post allopurinol and IVF patient's TLS labs recovering. Continue to monitor. Chemo rounds completed and patient started on GCSF. Patient having SOB and CP most likely due to panic attack like symptoms given patient is HDS.     Oncology Treatment Plan:   OP SCLC CARBOPLATIN (AUC) ETOPOSIDE DURVALUMAB Q3W FOLLOWED BY MAINTENANCE DURVALUMAB 1500 MG Q4W    Medications:  Continuous Infusions:   sodium chloride 0.9% 125 mL/hr at 09/11/22 1417    heparin (porcine) in D5W 16.084 Units/kg/hr (09/11/22 1352)     Scheduled Meds:   albuterol-ipratropium  3 mL Nebulization Q8H    allopurinoL  300 mg Oral Daily    amiodarone  400 mg Oral BID    Followed by    [START ON 9/26/2022] amiodarone  400 mg Oral Daily    Followed by    [START ON 10/3/2022] amiodarone  400 mg Oral Daily    aprepitant  130 mg Intravenous 1 time  in Clinic/HOD    EScitalopram oxalate  20 mg Oral QHS    filgrastim-sndz  300 mcg Subcutaneous Daily    pantoprazole  40 mg Oral Daily    polyethylene glycol  17 g Oral Daily    pravastatin  40 mg Oral QHS    pregabalin  75 mg Oral BID    senna-docusate 8.6-50 mg  1 tablet Oral BID    sodium chloride 0.9% flush bag IVPB   Intravenous 1 time in Clinic/HOD    sodium chloride 0.9%  10 mL Intravenous Q6H    sodium chloride 3%  4 mL Nebulization Q8H    vitamin D  1,000 Units Oral Daily     PRN Meds:sodium chloride, acetaminophen, alteplase, diphenhydrAMINE, EPINEPHrine, heparin, porcine (PF), hydrocortisone sodium succinate, HYDROmorphone, LORazepam, melatonin, methocarbamoL, ondansetron, sodium chloride 0.9%, sodium chloride 0.9%, sodium chloride 0.9%, sodium chloride 0.9%, Flushing PICC Protocol **AND** sodium chloride 0.9% **AND** sodium chloride 0.9%, sodium chloride 0.9%, sumatriptan     Review of Systems   Constitutional:  Positive for activity change, fatigue and unexpected weight change.   Respiratory:  Positive for shortness of breath.    Cardiovascular:  Positive for chest pain. Negative for leg swelling.   Gastrointestinal:  Negative for nausea and vomiting.   Musculoskeletal:  Positive for gait problem.   Skin: Negative.    Neurological:  Positive for weakness.   Psychiatric/Behavioral:  The patient is nervous/anxious.    Objective:     Vital Signs (Most Recent):  Temp: 97.7 °F (36.5 °C) (09/11/22 1544)  Pulse: 60 (09/11/22 1544)  Resp: 18 (09/11/22 1544)  BP: 103/65 (09/11/22 1544)  SpO2: (!) 93 % (09/11/22 1544)   Vital Signs (24h Range):  Temp:  [97 °F (36.1 °C)-98 °F (36.7 °C)] 97.7 °F (36.5 °C)  Pulse:  [58-65] 60  Resp:  [15-20] 18  SpO2:  [85 %-97 %] 93 %  BP: ()/(58-67) 103/65     Weight: 57.2 kg (126 lb)  Body mass index is 18.61 kg/m².  Body surface area is 1.67 meters squared.      Intake/Output Summary (Last 24 hours) at 9/11/2022 1548  Last data filed at 9/11/2022 0616  Gross per  24 hour   Intake 2643.54 ml   Output 1320 ml   Net 1323.54 ml       Physical Exam  Constitutional:       General: He is not in acute distress.     Appearance: He is well-developed. He is ill-appearing.   HENT:      Head: Normocephalic and atraumatic.      Right Ear: External ear normal.      Left Ear: External ear normal.      Nose: Nose normal.      Mouth/Throat:      Mouth: Mucous membranes are moist.      Pharynx: Oropharynx is clear. No oropharyngeal exudate.   Eyes:      General: No scleral icterus.     Extraocular Movements: Extraocular movements intact.      Conjunctiva/sclera: Conjunctivae normal.   Cardiovascular:      Rate and Rhythm: Normal rate and regular rhythm.      Heart sounds: Normal heart sounds. No murmur heard.  Pulmonary:      Effort: Pulmonary effort is normal.      Breath sounds: Rhonchi present.      Comments: On 5L NC  Abdominal:      General: Bowel sounds are normal.      Palpations: Abdomen is soft.      Tenderness: There is no abdominal tenderness.   Musculoskeletal:         General: Normal range of motion.      Cervical back: Normal range of motion and neck supple.      Right lower leg: No edema.      Left lower leg: No edema.   Skin:     General: Skin is warm and dry.   Neurological:      General: No focal deficit present.      Mental Status: He is alert and oriented to person, place, and time.   Psychiatric:      Comments: Depressed mood, feelings of guilt. Anxious          Significant Labs:   CMP:   Recent Labs   Lab 09/10/22  2139 09/11/22  0344 09/11/22  1114   * 131*  131* 135*   K 5.4* 5.4*  5.4* 5.2*    100  100 102   CO2 23 24  24 24   * 98  98 151*   BUN 42* 37*  37* 35*   CREATININE 0.9 0.9  0.9 0.9   CALCIUM 8.2* 8.0*  8.0* 8.1*   PROT 4.9* 4.7*  4.7* 5.0*   ALBUMIN 2.3* 2.2*  2.2* 2.4*   BILITOT 0.4 0.4  0.4 0.5   ALKPHOS 151* 142*  142* 145*   AST 27 29  29 31   ALT 20 19  19 19   ANIONGAP 9 7*  7* 9       Diagnostic Results:  I have  reviewed and interpreted all pertinent imaging results/findings within the past 24 hours.    Assessment/Plan:     * Small cell carcinoma of lung  [facility] pathology report confirms small cell lung cancer from bone specimen.  Explained to him and his  that he is in an unfortunate situation.  He will most certainly die of this cancer rather quickly if not given chemotherapy.  While our intent is to improve his tumor burden causing his hypoxic respiratory failure with chemotherapy, it is also possible that we may potentially hasten his death unintentionally due to chemo toxicity. He and his  understand the risks.  - Patient currently on day 2 of inpatient chemotherapy, so far tolerating well  -labs reviewed; carboplatin renally dosed and will proceed with carboplatin and etoposide.  Etoposide given day 2 and day 3.  We will plan to provide GCSF starting day 4 and will do so for at least 7 doses.      - GCSF starting day 4 (9/11) and will do so for at least 7 doses.  - agree with oncology psychology consult  - Will transfer to medical oncology on 9/10    Tumor lysis syndrome  Patient now with concerns for tumor lysis syndrome given active chemo with SCLC, will treat prophylactically    - allopurinol 300 mg daily   - IVF   - Q6 CMP, phos, uric acid       Adjustment disorder with mixed anxiety and depressed mood  Will consult psychiatry for patient tomorrow. Talked to palliative and they stated that patient would not benefit from more pain medications given current emotional state    - Psych consulted, appreciate recs     Atrial flutter  Patient had afib with RVR this admission was successfully cardioverted. IV amio load x72 hours completed and transitioned to PO.    - amio  BID for 2 weeks followed by:    - amio 400 daily for 1 week    - amio 200 daily lifetime    Malignant neoplasm metastatic to bone  See small cell lung cancer    Pathologic fracture of neck of right femur s/p hemiarthroplasty on  8/23/2022  S/p right hip hemiarthroplasty with ablation, cementoplasty, and percutaneous fixation of pelvis 8/23. Patient is weight bearing as tolerated with posterior hip precautions to right lower extremity as per orthopedics    - Management per primary and ortho  - When more stable consider postop radiation             Susan Gregory DO  Hematology/Oncology  [facility] - Oncology ([facility])

## 2022-09-11 NOTE — ASSESSMENT & PLAN NOTE
"[id number] Patient looks brighter today and symptoms are managed.   Hopeful that this chemotherapy will allow some more time for him. Needs psychiatric and social work assistance (thanks)  Emmy is treating his pain at this time!    [id number] Discussed with patient. He has lots of physical and emotional pain. Adjusted pain meds with primary team during the day. He is tearful and full of guilt. Agree with Hydromorphone adjustment of dose and psychiatry consult. Total time 40 minutes majority of it spent in conversation and assessment of symptoms.  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 Pt has metastatic lung cancer. Pt is alert, oriented to person, place, and situation. Pt is a DNR. Pt is on 3 L O2. Pt tearful today.      Reason for consult: GOC/ACP. Communicated with MAJOR Camp fellow with [facility].     Today: Pt very tearful today. Pt says "I know I am dying. " Pt kept apologizing.  Explained to pt there is not a need to apoligize and that that I am glad he felt comfortable to express his feelings. Pt repots he is having a bad day and everything starting to sink in about his health. Support given.  seeing. Will consult Oncology/Psycology for pt. Pt to step down to Hem/onc as soon as bed available.     9/6/22  Goals of care/ACP: Pt to have tracheal stenting tomorrow. Pt reports he is in agreement to procedure and would like CC team to speak to Aston about procedure when he visits today. Per pt, he relays information to Aston but he feels like he forgets some of information to tell him. Pt reports his goal is medical management at this time to see if he can improve enough to get back home. Pt reports his goal is to be at home with " "his four dogs and , Aston. Pt is aware of his severity of his illness.     Today: Met with pt along with Tracie Kidd LCSW.     9/6/22  Introduced role of Palliative care to pt.   Met with pt who is aware of his medical issues. Per pt he is aware he has metastatic cancer. Pt is aware that any therapies/procedures offered to him is palliative and will not cure cancer. Per pt, he is still trying to come to  with his dx. He learned about his issues in August. Pt reports goal at time is to see if anything can be done to help with symptoms and "give him some more time." Spoke to pt about amount of O2 he is on at this time. He verbalized understanding.  Per pt he has spoken to Oncology and XRT MDs.   CTS has been consulted concerning tracheal stenting.      Pt open to continued visits with pal care for care planning and symptom management needs.  Support given pt.      Pt reports he is legally  to Aston Collins and he would want him to be hi medical decision-maker if he cannot make his own medical decisions. MPOA paperwork left with pt. Education provided. Per Pt, Aston works during day but can be reached by phone.      Code status: DNR.      Symptom management:      Dyspnea r/t to mediastinal mass, pleural effusion.   Pt is on 3 L O2 per NC.   Pt is on Morphine 2 mg IVP q 2 hrs prn.   Pt has chest tube in place and having tracheal stents placed.      Pt reports Morphine "has really helps with my SOB."  Pt reports Morphine lasts around 1-2 hours.      Continue Morphine 2 mg IVP q 2 hours prn.   Will continue to assess.      Debility r/t to pathological femoral neck fracture s/p ight hip hemiarthroplasty with ablation, cementoplasty  Pt was using walker prior to admit.   Would resume PT/OT when pt stable to participate.      Anxiety r/t to new dx and dyspnea.   Pt has Ativan 1 mg tid prn.   Continue.      Plan:   Will continue to meet with pt to reinforce realistic expectations/assit with GOC.   Support " given.   Will consult Oncology/ Pyschology.     Will follow.

## 2022-09-11 NOTE — CONSULTS
"Consultation-Liaison Psychiatry Consult Note    9/11/2022 4:52 PM  Donis Jimenez  MRN: [id number]    Chief Complaint / Reason for Consult: depression and anxiety in context of new metastatic small cell lung cancer diagnosis     SUBJECTIVE     History of Present Illness:   Donis Jimenez is a 61 y.o. male with no reported past psychiatric history, currently presenting with Small cell carcinoma of lung.  Psychiatry was originally consulted to address the patient's symptoms of depression and anxiety in context of new metastatic small cell lung cancer diagnosis .    Per Primary MD:  61 year old man with newly diagnosed metastatic small cell lung cancer complicated by acute hypoxic respiratory failure.  Was previously treated for pathologic right femoral neck fracture.  Pathology finalized as small cell carcinoma. Over his stay in ICU, his O2 requirments were steadily reduced, he is now on nasal cannula. Patient went into afib with RVR and needed cardioversion. He is currently undergoing inpatient chemotherapy with carboplatin (day 1) and etoposide (day 1,2,3). Will need GCSF on day 4.      Patient now feeling anxious about new diagnosis. Psych onc saw patient, will consider consulting psych for new onset anxiety and panic attacks if patient does not improve.     Per C-L Psych MD:  Pt was greeted at bedside. He said he's never talked to a stranger before and said he has trouble expressing himself. He said that he did this to himself, referring to his cancer diagnosis and smoking 1PPD since 16 until recently, and said that he accepts this as his "punishment", but feels guilty that he is leaving all his loved ones behind, especially his . He said they don't have any family and once he passes, his  will be all alone. He said he feels "selfish", "scared" and feels that this is a bad TV show that he wishes he can just change the channel, but he knows that he can't. He denied any previous health concerns or " "psychiatric history, said he lived a happy life, had a happy childhood and met his  in his 20s. He says he's not "smart" and doesn't have a lot of education, but he's lived a good life and his only vice is smoking. He 's had some depression and anxiety ever since his diagnosis, and feels scared that every time someone leaves his room it'd be the last time he'd see them again. Denied SI/HI/AVH or substance use. Throughout interview, pt was noted to have difficulty speaking, often pausing to catch his breath or to suction secretions. He was intermittently tearful. Discussed starting another medication for mood/sleep during rounds but patient said he does not want to add more medications at this time.    Psychiatric Review Of Systems - Is patient experiencing or having changes in:  sleep: yes  appetite: yes because food is bad  weight: not asked  energy/anergy: yes  interest/pleasure/anhedonia: no  somatic symptoms: no  guilty/hopelessness: yes  concentration: yes  S.I.B.s/risky behavior: not asked  SI/SA:  no    anxiety/panic: yes  Agoraphobia:  no  Social phobia:  no  Recurrent nightmares:  not asked  hyper startle response:  not asked  Avoidance: not asked  Recurrent thoughts:  yes  Recurrent behaviors:  not asked    Irritability: no  Racing thoughts: yes  Impulsive behaviors: no  Pressured speech:  no    Paranoia:yes, that "cancer" is going to come around the corner  Delusions: no  AVH:no    Medical Review of Symptoms:  History obtained from the patient VS unobtainable from patient due to mental status / lack of cooperation  General : NO chills or fever  Eyes: NO  visual changes  ENT: increased secretion in mouth/throat  Respiratory: + cough, shortness of breath  Cardiovascular: +racing heart with anxiety  Neurological: + confusion and slower thinking  Psychiatric: please see HPI    Psychiatric History:  Diagnose(s): No  Previous Medication Trials: Yes - lexapro 20 per chart but pt does not " remember  Previous Psychiatric Hospitalizations: No  Previous Suicide Attempts: No  History of Violence: No  Outpatient Psychiatrist: No    Social History:  Marital Status:   Children: 0   Employment Status: currently employed hairdresser  Education: high school diploma/GED + beauty school  Special Ed: no   History: unknown  Housing Status: Yes - with   Developmental History: No  History of Abuse: No  Access to Gun: No    Substance Abuse History:  Recreational Drugs:  denied  Use of Alcohol: denied  Rehab History: No  Tobacco Use: Yes - 1PPD 15yo until recent dx  Use of Caffeine: not asked  Use of OTC: Not asked  Legal consequences of chemical use: No  Is the patient aware of the biomedical complications associated with substance abuse and mental illness? Yes - cancer    Legal History:  Past Charges/Incarcerations: No  Pending Charges: No    Family Psychiatric History:   Not asked    Psychosocial Factors:  Psychosocial Stressors: health.   Functioning Relationships: good relationship with spouse or significant other  Maladaptive or problem behaviors: No  Peer group, social, ethic, cultural, emotional, and health factors: No  Living situation, family constellation, family circumstances/home: No  Recovery environment: No  Community resources used by patient: No  Treatment acceptance/motivation for change: Yes     Collateral:   No    Scheduled Meds:   albuterol-ipratropium  3 mL Nebulization Q8H    allopurinoL  300 mg Oral Daily    amiodarone  400 mg Oral BID    Followed by    [START ON 9/26/2022] amiodarone  400 mg Oral Daily    Followed by    [START ON 10/3/2022] amiodarone  400 mg Oral Daily    aprepitant  130 mg Intravenous 1 time in Clinic/HOD    EScitalopram oxalate  20 mg Oral QHS    filgrastim-sndz  300 mcg Subcutaneous Daily    pantoprazole  40 mg Oral Daily    polyethylene glycol  17 g Oral Daily    pravastatin  40 mg Oral QHS    pregabalin  75 mg Oral BID    senna-docusate 8.6-50 mg  1  tablet Oral BID    sodium chloride 0.9% flush bag IVPB   Intravenous 1 time in Clinic/HOD    sodium chloride 0.9%  10 mL Intravenous Q6H    sodium chloride 3%  4 mL Nebulization Q8H    vitamin D  1,000 Units Oral Daily     sodium chloride, acetaminophen, alteplase, diphenhydrAMINE, EPINEPHrine, heparin, porcine (PF), hydrocortisone sodium succinate, HYDROmorphone, LORazepam, melatonin, methocarbamoL, ondansetron, sodium chloride 0.9%, sodium chloride 0.9%, sodium chloride 0.9%, sodium chloride 0.9%, Flushing PICC Protocol **AND** sodium chloride 0.9% **AND** sodium chloride 0.9%, sodium chloride 0.9%, sumatriptan  Psychotherapeutics (From admission, onward)      Start     Stop Route Frequency Ordered    09/04/22 2100  EScitalopram oxalate tablet 20 mg         -- Oral Nightly 09/04/22 1441    09/04/22 1539  LORazepam tablet 1 mg         -- Oral 3 times daily PRN 09/04/22 1441          PRN Meds:  sodium chloride, acetaminophen, alteplase, diphenhydrAMINE, EPINEPHrine, heparin, porcine (PF), hydrocortisone sodium succinate, HYDROmorphone, LORazepam, melatonin, methocarbamoL, ondansetron, sodium chloride 0.9%, sodium chloride 0.9%, sodium chloride 0.9%, sodium chloride 0.9%, Flushing PICC Protocol **AND** sodium chloride 0.9% **AND** sodium chloride 0.9%, sodium chloride 0.9%, sumatriptan  Home Meds:  Prior to Admission medications    Medication Sig Start Date End Date Taking? Authorizing Provider   acetaminophen (TYLENOL) 500 MG tablet Take 2 tablets (1,000 mg total) by mouth every 8 (eight) hours as needed (Mild to moderate pain). 8/27/22   Katina Khoury MD   EScitalopram oxalate (LEXAPRO) 20 MG tablet Take 20 mg by mouth every evening. 8/18/22   Historical Provider   esomeprazole (NEXIUM) 20 MG capsule Take 20 mg by mouth once daily.    Historical Provider   ferrous gluconate (FERGON) 324 MG tablet Take 1 tablet by mouth once daily. 8/18/22   Historical Provider   LORazepam (ATIVAN) 1 MG tablet Take 1 mg by  mouth 3 (three) times daily as needed for Anxiety. 8/11/22   Historical Provider   oxyCODONE (ROXICODONE) 5 MG immediate release tablet Take 1 tablet (5 mg total) by mouth every 4 (four) hours as needed for Pain. 8/27/22   Katina Khoury MD   pravastatin (PRAVACHOL) 40 MG tablet Take 40 mg by mouth every evening. 8/18/22   Historical Provider   pregabalin (LYRICA) 75 MG capsule Take 1 capsule (75 mg total) by mouth 2 (two) times daily. 8/27/22 9/26/22  Katina Khoury MD   sumatriptan (IMITREX) 25 MG Tab Take 25 mg by mouth as needed for Migraine. 7/21/22   Historical Provider   vitamin D (VITAMIN D3) 1000 units Tab Take 1 tablet (1,000 Units total) by mouth once daily. 8/27/22 8/27/23  Katina Khoury MD   warfarin (COUMADIN) 3 MG tablet Take 1.5 tablets (4.5 mg total) by mouth Daily. 8/27/22   Katina Khoury MD     Allergies:  Ambien [zolpidem]  Past Medical/Surgical History:  Past Medical History:   Diagnosis Date    Endocarditis     Pacemaker     Pneumonia      Past Surgical History:   Procedure Laterality Date    ANGIOGRAPHY OF LOWER EXTREMITY Right 8/19/2022    Procedure: ANGIOGRAM, LOWER EXTREMITY;  Surgeon: Thomas Nicolas MD;  Location: [facility] OR [address] [place] [address];  Service: Peripheral Vascular;  Laterality: Right;  30.0 min  315.10 mGy  26.1755 Gycm2  84 ml dye    HIP RESURFACING Right 8/23/2022    Procedure: cemontoplasty;  Surgeon: Yung Flores MD;  Location: [facility] OR [address] [place] [address];  Service: Orthopedics;  Laterality: Right;    RADIOFREQUENCY ABLATION, BONE, PERCUTANEOUS Right 8/23/2022    Procedure: RADIOFREQUENCY ABLATION,BONE;  Surgeon: Yung Flores MD;  [facility];  Service: Orthopedics;  Laterality: Right;    REPAIR, PSEUDOANEURYSM, ARTERY, FEMORAL Right 8/19/2022    Procedure: REPAIR, PSEUDOANEURYSM, ARTERY, FEMORAL;  Surgeon: Thomas Nicolas MD;  Location: [facility] OR [address] [place] [address];  Service: Peripheral Vascular;  Laterality: Right;  R PFA (3rd branch) pseudoaneurysm      OBJECTIVE  "    Vital Signs:  Temp:  [97.2 °F (36.2 °C)-98 °F (36.7 °C)]   Pulse:  [58-61]   Resp:  [15-20]   BP: ()/(59-67)   SpO2:  [92 %-97 %]       Mental Status Exam:  Appearance: disheveled, thin & gaunt looking, on O2, visibly short of breath, constantly coughing/suctioning secretions  Level of Consciousness: alert, awake  Behavior/Cooperation: normal, cooperative  Psychomotor: unremarkable   Speech: normal tone, normal pitch, slowed, soft  Language: english, fluid  Orientation: grossly intact, person, place, situation  Attention Span/Concentration: intact  Memory: Intact  Mood: "anxious"  Affect: anxious, and dysphoric  Thought Process: linear, normal and logical  Associations: normal and logical  Thought Content: normal, no suicidality, no homicidality, delusions, or paranoia  Fund of Knowledge: Intact  Abstraction: intact to conversation  Insight: fair as pt is aware of his depression/anxiety, however excessively blaming self for illness  Judgment: good as pt seeks help and is willing to accept help    Laboratory Data:  Recent Results (from the past 48 hour(s))   Comprehensive Metabolic Panel    Collection Time: 09/10/22  3:10 AM   Result Value Ref Range    Sodium 134 (L) 136 - 145 mmol/L    Potassium 5.3 (H) 3.5 - 5.1 mmol/L    Chloride 102 95 - 110 mmol/L    CO2 27 23 - 29 mmol/L    Glucose 115 (H) 70 - 110 mg/dL    BUN 42 (H) 6 - 20 mg/dL    Creatinine 0.9 0.5 - 1.4 mg/dL    Calcium 8.2 (L) 8.7 - 10.5 mg/dL    Total Protein 4.9 (L) 6.0 - 8.4 g/dL    Albumin 2.4 (L) 3.5 - 5.2 g/dL    Total Bilirubin 0.3 0.1 - 1.0 mg/dL    Alkaline Phosphatase 148 (H) 55 - 135 U/L    AST 25 10 - 40 U/L    ALT 20 10 - 44 U/L    Anion Gap 5 (L) 8 - 16 mmol/L    eGFR >60.0 >60 mL/min/1.73 m^2   Magnesium    Collection Time: 09/10/22  3:10 AM   Result Value Ref Range    Magnesium 2.1 1.6 - 2.6 mg/dL   Phosphorus    Collection Time: 09/10/22  3:10 AM   Result Value Ref Range    Phosphorus 4.2 2.7 - 4.5 mg/dL   Protime-INR    " Collection Time: 09/10/22  3:10 AM   Result Value Ref Range    Prothrombin Time 13.9 (H) 9.0 - 12.5 sec    INR 1.4 (H) 0.8 - 1.2   CBC auto differential    Collection Time: 09/10/22  3:10 AM   Result Value Ref Range    WBC 8.64 3.90 - 12.70 K/uL    RBC 3.25 (L) 4.60 - 6.20 M/uL    Hemoglobin 9.4 (L) 14.0 - 18.0 g/dL    Hematocrit 29.6 (L) 40.0 - 54.0 %    MCV 91 82 - 98 fL    MCH 28.9 27.0 - 31.0 pg    MCHC 31.8 (L) 32.0 - 36.0 g/dL    RDW 20.1 (H) 11.5 - 14.5 %    Platelets 189 150 - 450 K/uL    MPV 10.1 9.2 - 12.9 fL    Immature Granulocytes 0.8 (H) 0.0 - 0.5 %    Gran # (ANC) 8.1 (H) 1.8 - 7.7 K/uL    Immature Grans (Abs) 0.07 (H) 0.00 - 0.04 K/uL    Lymph # 0.2 (L) 1.0 - 4.8 K/uL    Mono # 0.2 (L) 0.3 - 1.0 K/uL    Eos # 0.0 0.0 - 0.5 K/uL    Baso # 0.01 0.00 - 0.20 K/uL    nRBC 0 0 /100 WBC    Gran % 93.6 (H) 38.0 - 73.0 %    Lymph % 2.7 (L) 18.0 - 48.0 %    Mono % 2.8 (L) 4.0 - 15.0 %    Eosinophil % 0.0 0.0 - 8.0 %    Basophil % 0.1 0.0 - 1.9 %    Differential Method Automated    APTT    Collection Time: 09/10/22  3:10 AM   Result Value Ref Range    aPTT 49.3 (H) 21.0 - 32.0 sec   Lactate dehydrogenase    Collection Time: 09/10/22  3:10 AM   Result Value Ref Range     (H) 110 - 260 U/L   Uric acid    Collection Time: 09/10/22  3:10 AM   Result Value Ref Range    Uric Acid 10.2 (H) 3.4 - 7.0 mg/dL   Comprehensive metabolic panel    Collection Time: 09/10/22  5:25 PM   Result Value Ref Range    Sodium 134 (L) 136 - 145 mmol/L    Potassium 5.3 (H) 3.5 - 5.1 mmol/L    Chloride 101 95 - 110 mmol/L    CO2 24 23 - 29 mmol/L    Glucose 114 (H) 70 - 110 mg/dL    BUN 38 (H) 6 - 20 mg/dL    Creatinine 0.9 0.5 - 1.4 mg/dL    Calcium 8.3 (L) 8.7 - 10.5 mg/dL    Total Protein 5.0 (L) 6.0 - 8.4 g/dL    Albumin 2.4 (L) 3.5 - 5.2 g/dL    Total Bilirubin 0.4 0.1 - 1.0 mg/dL    Alkaline Phosphatase 153 (H) 55 - 135 U/L    AST 28 10 - 40 U/L    ALT 20 10 - 44 U/L    Anion Gap 9 8 - 16 mmol/L    eGFR >60.0 >60 mL/min/1.73  m^2   Phosphorus    Collection Time: 09/10/22  5:25 PM   Result Value Ref Range    Phosphorus 3.4 2.7 - 4.5 mg/dL   Uric acid    Collection Time: 09/10/22  5:25 PM   Result Value Ref Range    Uric Acid 9.4 (H) 3.4 - 7.0 mg/dL   Comprehensive metabolic panel    Collection Time: 09/10/22  9:39 PM   Result Value Ref Range    Sodium 134 (L) 136 - 145 mmol/L    Potassium 5.4 (H) 3.5 - 5.1 mmol/L    Chloride 102 95 - 110 mmol/L    CO2 23 23 - 29 mmol/L    Glucose 129 (H) 70 - 110 mg/dL    BUN 42 (H) 6 - 20 mg/dL    Creatinine 0.9 0.5 - 1.4 mg/dL    Calcium 8.2 (L) 8.7 - 10.5 mg/dL    Total Protein 4.9 (L) 6.0 - 8.4 g/dL    Albumin 2.3 (L) 3.5 - 5.2 g/dL    Total Bilirubin 0.4 0.1 - 1.0 mg/dL    Alkaline Phosphatase 151 (H) 55 - 135 U/L    AST 27 10 - 40 U/L    ALT 20 10 - 44 U/L    Anion Gap 9 8 - 16 mmol/L    eGFR >60.0 >60 mL/min/1.73 m^2   Phosphorus    Collection Time: 09/10/22  9:39 PM   Result Value Ref Range    Phosphorus 3.7 2.7 - 4.5 mg/dL   Uric acid    Collection Time: 09/10/22  9:39 PM   Result Value Ref Range    Uric Acid 8.9 (H) 3.4 - 7.0 mg/dL   Comprehensive Metabolic Panel    Collection Time: 09/11/22  3:44 AM   Result Value Ref Range    Sodium 131 (L) 136 - 145 mmol/L    Potassium 5.4 (H) 3.5 - 5.1 mmol/L    Chloride 100 95 - 110 mmol/L    CO2 24 23 - 29 mmol/L    Glucose 98 70 - 110 mg/dL    BUN 37 (H) 6 - 20 mg/dL    Creatinine 0.9 0.5 - 1.4 mg/dL    Calcium 8.0 (L) 8.7 - 10.5 mg/dL    Total Protein 4.7 (L) 6.0 - 8.4 g/dL    Albumin 2.2 (L) 3.5 - 5.2 g/dL    Total Bilirubin 0.4 0.1 - 1.0 mg/dL    Alkaline Phosphatase 142 (H) 55 - 135 U/L    AST 29 10 - 40 U/L    ALT 19 10 - 44 U/L    Anion Gap 7 (L) 8 - 16 mmol/L    eGFR >60.0 >60 mL/min/1.73 m^2   Magnesium    Collection Time: 09/11/22  3:44 AM   Result Value Ref Range    Magnesium 1.9 1.6 - 2.6 mg/dL   Phosphorus    Collection Time: 09/11/22  3:44 AM   Result Value Ref Range    Phosphorus 3.6 2.7 - 4.5 mg/dL   Protime-INR    Collection Time: 09/11/22   3:44 AM   Result Value Ref Range    Prothrombin Time 13.8 (H) 9.0 - 12.5 sec    INR 1.3 (H) 0.8 - 1.2   CBC auto differential    Collection Time: 09/11/22  3:44 AM   Result Value Ref Range    WBC 9.16 3.90 - 12.70 K/uL    RBC 3.28 (L) 4.60 - 6.20 M/uL    Hemoglobin 9.7 (L) 14.0 - 18.0 g/dL    Hematocrit 30.4 (L) 40.0 - 54.0 %    MCV 93 82 - 98 fL    MCH 29.6 27.0 - 31.0 pg    MCHC 31.9 (L) 32.0 - 36.0 g/dL    RDW 20.3 (H) 11.5 - 14.5 %    Platelets 186 150 - 450 K/uL    MPV 10.5 9.2 - 12.9 fL    Immature Granulocytes 0.4 0.0 - 0.5 %    Gran # (ANC) 8.8 (H) 1.8 - 7.7 K/uL    Immature Grans (Abs) 0.04 0.00 - 0.04 K/uL    Lymph # 0.2 (L) 1.0 - 4.8 K/uL    Mono # 0.1 (L) 0.3 - 1.0 K/uL    Eos # 0.0 0.0 - 0.5 K/uL    Baso # 0.00 0.00 - 0.20 K/uL    nRBC 0 0 /100 WBC    Gran % 96.0 (H) 38.0 - 73.0 %    Lymph % 2.4 (L) 18.0 - 48.0 %    Mono % 1.2 (L) 4.0 - 15.0 %    Eosinophil % 0.0 0.0 - 8.0 %    Basophil % 0.0 0.0 - 1.9 %    Differential Method Automated    APTT    Collection Time: 09/11/22  3:44 AM   Result Value Ref Range    aPTT 49.4 (H) 21.0 - 32.0 sec   Lactate dehydrogenase    Collection Time: 09/11/22  3:44 AM   Result Value Ref Range     (H) 110 - 260 U/L   Uric acid    Collection Time: 09/11/22  3:44 AM   Result Value Ref Range    Uric Acid 7.7 (H) 3.4 - 7.0 mg/dL   Comprehensive metabolic panel    Collection Time: 09/11/22  3:44 AM   Result Value Ref Range    Sodium 131 (L) 136 - 145 mmol/L    Potassium 5.4 (H) 3.5 - 5.1 mmol/L    Chloride 100 95 - 110 mmol/L    CO2 24 23 - 29 mmol/L    Glucose 98 70 - 110 mg/dL    BUN 37 (H) 6 - 20 mg/dL    Creatinine 0.9 0.5 - 1.4 mg/dL    Calcium 8.0 (L) 8.7 - 10.5 mg/dL    Total Protein 4.7 (L) 6.0 - 8.4 g/dL    Albumin 2.2 (L) 3.5 - 5.2 g/dL    Total Bilirubin 0.4 0.1 - 1.0 mg/dL    Alkaline Phosphatase 142 (H) 55 - 135 U/L    AST 29 10 - 40 U/L    ALT 19 10 - 44 U/L    Anion Gap 7 (L) 8 - 16 mmol/L    eGFR >60.0 >60 mL/min/1.73 m^2   Phosphorus    Collection Time:  09/11/22  3:44 AM   Result Value Ref Range    Phosphorus 3.6 2.7 - 4.5 mg/dL   Uric acid    Collection Time: 09/11/22  3:44 AM   Result Value Ref Range    Uric Acid 7.7 (H) 3.4 - 7.0 mg/dL   Comprehensive metabolic panel    Collection Time: 09/11/22 11:14 AM   Result Value Ref Range    Sodium 135 (L) 136 - 145 mmol/L    Potassium 5.2 (H) 3.5 - 5.1 mmol/L    Chloride 102 95 - 110 mmol/L    CO2 24 23 - 29 mmol/L    Glucose 151 (H) 70 - 110 mg/dL    BUN 35 (H) 6 - 20 mg/dL    Creatinine 0.9 0.5 - 1.4 mg/dL    Calcium 8.1 (L) 8.7 - 10.5 mg/dL    Total Protein 5.0 (L) 6.0 - 8.4 g/dL    Albumin 2.4 (L) 3.5 - 5.2 g/dL    Total Bilirubin 0.5 0.1 - 1.0 mg/dL    Alkaline Phosphatase 145 (H) 55 - 135 U/L    AST 31 10 - 40 U/L    ALT 19 10 - 44 U/L    Anion Gap 9 8 - 16 mmol/L    eGFR >60.0 >60 mL/min/1.73 m^2   Phosphorus    Collection Time: 09/11/22 11:14 AM   Result Value Ref Range    Phosphorus 3.3 2.7 - 4.5 mg/dL   Uric acid    Collection Time: 09/11/22 11:14 AM   Result Value Ref Range    Uric Acid 6.5 3.4 - 7.0 mg/dL      No results found for: PHENYTOIN, PHENOBARB, VALPROATE, CBMZ  Imaging:  Imaging Results              US Upper Extremity Arteries Left (Final result)  Result time 09/05/22 03:41:48      Final result by Godfrey Worrell MD (09/05/22 03:41:48)                   Impression:      No hemodynamically significant stenosis demonstrated in the bilateral upper extremity arterial system.    Electronically signed by resident: Jean Dsouza  Date:    09/05/2022  Time:    03:25    Electronically signed by: Godfrey Worrell  Date:    09/05/2022  Time:    03:41               Narrative:    EXAMINATION:  US UPPER EXTREMITY ARTERIES RIGHT; US UPPER EXTREMITY ARTERIES LEFT    CLINICAL HISTORY:  r/o clot;    TECHNIQUE:  Bilateral upper extremity arterial duplex ultrasound examination performed. Multiple gray scale and color doppler images were obtained in addition to waveform analysis.    COMPARISON:  Ultrasound upper  extremity veins performed same day    FINDINGS:  RIGHT UPPER EXTREMITY:    Normal arterial waveforms are demonstrated.  No significant atherosclerotic plaque.    The peak systolic velocities on the right are as follows, in centimeters/second:    Subclavian artery: 62.7    Axillary artery: 58.4    Brachial artery, proximal: 50.3    Brachial artery, mid: 75.8    Brachial artery, distal: 66.5    Radial artery, proximal: 55.3    Radial artery, distal: 55.9    Ulnar artery, proximal: 73.9    Ulnar artery, distal: 41.2    LEFT UPPER EXTREMITY:    Normal arterial waveforms are demonstrated. No significant atherosclerotic plaque.    The peak systolic velocities on the left are as follows, in centimeters/second:    Subclavian artery: 52.2    Axillary artery: 50.3    Brachial artery, proximal: 67.7    Brachial artery, mid: 70.8    Brachial artery, distal: 59.6    Radial artery, proximal: 48.5    Radial artery, distal: 48.5    Ulnar artery, proximal: 48.5    Ulnar artery, distal: 60.9      Miscellaneous: N/A                                       US Upper Extremity Arteries Right (Final result)  Result time 09/05/22 03:41:48   Procedure changed from US Upper Extremity Arteries Bilateral     Final result by Godfrey Worrell MD (09/05/22 03:41:48)                   Impression:      No hemodynamically significant stenosis demonstrated in the bilateral upper extremity arterial system.    Electronically signed by resident: Jean Dsouza  Date:    09/05/2022  Time:    03:25    Electronically signed by: Godfrey Worrell  Date:    09/05/2022  Time:    03:41               Narrative:    EXAMINATION:  US UPPER EXTREMITY ARTERIES RIGHT; US UPPER EXTREMITY ARTERIES LEFT    CLINICAL HISTORY:  r/o clot;    TECHNIQUE:  Bilateral upper extremity arterial duplex ultrasound examination performed. Multiple gray scale and color doppler images were obtained in addition to waveform analysis.    COMPARISON:  Ultrasound upper extremity veins  performed same day    FINDINGS:  RIGHT UPPER EXTREMITY:    Normal arterial waveforms are demonstrated.  No significant atherosclerotic plaque.    The peak systolic velocities on the right are as follows, in centimeters/second:    Subclavian artery: 62.7    Axillary artery: 58.4    Brachial artery, proximal: 50.3    Brachial artery, mid: 75.8    Brachial artery, distal: 66.5    Radial artery, proximal: 55.3    Radial artery, distal: 55.9    Ulnar artery, proximal: 73.9    Ulnar artery, distal: 41.2    LEFT UPPER EXTREMITY:    Normal arterial waveforms are demonstrated. No significant atherosclerotic plaque.    The peak systolic velocities on the left are as follows, in centimeters/second:    Subclavian artery: 52.2    Axillary artery: 50.3    Brachial artery, proximal: 67.7    Brachial artery, mid: 70.8    Brachial artery, distal: 59.6    Radial artery, proximal: 48.5    Radial artery, distal: 48.5    Ulnar artery, proximal: 48.5    Ulnar artery, distal: 60.9      Miscellaneous: N/A                                       US Upper Extremity Veins Left (Final result)  Result time 09/05/22 03:36:30      Final result by Godfrey Worrell MD (09/05/22 03:36:30)                   Impression:      1. No thrombus in central veins of the right or left upper extremity.  2. Nonocclusive thrombus in the superficial left cephalic vein.  3. Heterogeneous, lobular mass lesion in the left neck, as above.    Electronically signed by resident: Jean Dsouza  Date:    09/05/2022  Time:    03:21    Electronically signed by: Godfrey Worrell  Date:    09/05/2022  Time:    03:36               Narrative:    EXAMINATION:  US UPPER EXTREMITY VEINS RIGHT; US UPPER EXTREMITY VEINS LEFT    CLINICAL HISTORY:  r/o clot;    TECHNIQUE:  Duplex and color flow Doppler evaluation and dynamic compression was performed of the right and left upper extremity veins.    COMPARISON:  None    FINDINGS:  Right central veins: The internal jugular, subclavian,  and axillary veins are patent and free of thrombus.    Right arm veins: The brachial, basilic, and cephalic veins are patent and compressible.    Left central veins: The internal jugular, subclavian, and axillary veins are patent and free of thrombus.    Left arm veins: There is a nonocclusive thrombosis in the left cephalic vein.  The brachial, and basilic veins are patent and compressible.    Miscellaneous: There is a heterogeneous, non vascularized lobular lesion measuring 4.2 x 5.1 x 4.3 cm in the left neck.  This correlates with the abnormality noted on the CT examination of the chest September 4, 2022                                       US Upper Extremity Veins Right (Final result)  Result time 09/05/22 03:36:30   Procedure changed from US Upper Extremity Veins Bilateral     Final result by Godfrey Worrell MD (09/05/22 03:36:30)                   Impression:      1. No thrombus in central veins of the right or left upper extremity.  2. Nonocclusive thrombus in the superficial left cephalic vein.  3. Heterogeneous, lobular mass lesion in the left neck, as above.    Electronically signed by resident: Jean Dsouza  Date:    09/05/2022  Time:    03:21    Electronically signed by: Godfrey Worrell  Date:    09/05/2022  Time:    03:36               Narrative:    EXAMINATION:  US UPPER EXTREMITY VEINS RIGHT; US UPPER EXTREMITY VEINS LEFT    CLINICAL HISTORY:  r/o clot;    TECHNIQUE:  Duplex and color flow Doppler evaluation and dynamic compression was performed of the right and left upper extremity veins.    COMPARISON:  None    FINDINGS:  Right central veins: The internal jugular, subclavian, and axillary veins are patent and free of thrombus.    Right arm veins: The brachial, basilic, and cephalic veins are patent and compressible.    Left central veins: The internal jugular, subclavian, and axillary veins are patent and free of thrombus.    Left arm veins: There is a nonocclusive thrombosis in the left  cephalic vein.  The brachial, and basilic veins are patent and compressible.    Miscellaneous: There is a heterogeneous, non vascularized lobular lesion measuring 4.2 x 5.1 x 4.3 cm in the left neck.  This correlates with the abnormality noted on the CT examination of the chest September 4, 2022                                        CTA Chest Non Coronary (Final result)  Result time 09/04/22 16:54:09      Final result by Emil Echevarria MD (09/04/22 16:54:09)                   Impression:      1. Diffusely infiltrative mediastinal mass with encasement of mediastinal vascular structures and airways, detailed above.  There is asymmetric left hilar and suprahilar extension with large left upper lobe mass.  2. Compared to recent above CT, enlarging moderate volume left-sided pleural effusion and new moderate volume right-sided pleural effusion.  3. Extensive panlobular emphysematous change with progressive increased attenuation throughout both lungs, and new right upper and middle lobe ground-glass opacities.  4. Bilateral suprarenal masses, fully characterized on recent prior CT.  5. Pathologic fracture of the L1 vertebral body.  6. Additional findings above.  This report was flagged in Epic as abnormal.      Electronically signed by: Emil Echevarria MD  Date:    09/04/2022  Time:    16:54               Narrative:    EXAMINATION:  CTA CHEST NON CORONARY    CLINICAL HISTORY:  Lung/mediastinal abscess;    TECHNIQUE:  Low dose axial images, sagittal and coronal reformations were obtained from the thoracic inlet to the lung bases following the IV administration of 100 mL of Omnipaque 350.  Contrast timing was optimized to evaluate the pulmonary arteries.  MIP images were performed.    COMPARISON:  Chest radiograph 09/04/2022, CT chest abdomen pelvis 08/18/2022    FINDINGS:  Right-sided pacer device with transvenous leads extend to the heart.  Approximately 4.8 cm left lower cervical soft tissue mass, partially obscured  by beam hardening artifact from dense contrast bolus in the left subclavian vein and right chest wall pacer device.  There is encasement and narrowing of the left subclavian artery.    Left-sided nonaneurysmal aortic arch with mild calcific atherosclerosis.  Operative change of aortic and mitral valve repair.  Heart is normal size.  No definite pulmonary arterial filling defect to the level of the segmental arteries. Narrowing of the left upper lobe pulmonary arteries.    There is a large heterogeneous mediastinal mass with extensive mediastinal invasion measuring on the order of 11.2 x 9.8 cm which circumferentially encases the aortic arch as well as the origins of the brachiocephalic, left carotid artery, and proximal right pulmonary artery.  Encasement with severe narrowing of the left superior pulmonary vein.  Encasement with associated narrowing of the trachea.  Lesion involves the bilateral domonique, more so on the left with encasement of the left hilar bronchovascular structures and suprahilar extension to the left lung apex.    Compared to CT 08/18/2022, there is enlarging now moderate volume left-sided pleural effusion, and new moderate volume right-sided pleural effusion with associated compressive atelectasis of the adjacent lung parenchyma.  Extensive bilateral panlobular emphysematous change with progressive increased attenuation throughout both lungs and superimposed right upper and middle lobe ground-glass opacities.  Bilateral tubular bronchiectasis and peribronchial cuffing.  No pneumothorax.    Limited evaluation of the abdomen reveals bilateral suprarenal masses measuring up to 2.4 cm on the right and 2.3 cm on the left, fully characterized on prior above comparison CT.    Operative change of median sternotomy.  Stable L1 compression fracture.  Lytic destructive lesion of the left lateral 6th rib.                                       X-Ray Chest AP Portable (Final result)  Result time 09/04/22  "07:54:43      Final result by Karson Rios MD (09/04/22 07:54:43)                   Impression:      In this patient with known lung and mediastinal mass, there are worsening bilateral interstitial opacities and worsening bilateral pleural effusions (left side greater than right) when compared with 08/18/2022.      Electronically signed by: Karson Rios MD  Date:    09/04/2022  Time:    07:54               Narrative:    EXAMINATION:  XR CHEST AP PORTABLE    CLINICAL HISTORY:  Provided history is "Sepsis;  ".    TECHNIQUE:  One view of the chest.    COMPARISON:  CT chest, 08/18/2022.  Chest radiograph, 08/18/2022.    FINDINGS:  Cardiomediastinal silhouette is stable, with widening of the superior mediastinum consistent with known mediastinal mass as seen on prior CT.  Persistent opacification in the left upper lung likely related to mass or consolidation as seen previously.  Moderate-sized left pleural effusion and small right pleural effusion.  Patchy interstitial opacities throughout both lungs.  No distinct pneumothorax.  Pulmonary emphysema is suspected.  Postoperative changes of prior median sternotomy and valve replacement.  Right chest wall cardiac pacing device is present.  Left 6th rib fracture is noted, though better evaluated on prior CT.                                       ASSESSMENT     Donis Jimenez is a 61 y.o. male with no reported past psychiatric history, currently presenting with Small cell carcinoma of lung.  Psychiatry was originally consulted to address the patient's symptoms of depression and anxiety in context of new metastatic small cell lung cancer diagnosis. On interview, pt appeared anxious and sad, frequently in tears and in respiratory distress as evidence by suctioning/coughing. He reports being anxious and sad since cancer diagnosis and has guilt that he did this to himself by smoking and now will hurt his loved ones when he passes. Discussed that this is an appropriate " reaction to his illness, and discussed medication options. However, pt does not want medications at this time. Agree with consulting palliative care for continued discussion of goals of care.    IMPRESSION  Acute stress disorder 2/2 to medical diagnosis    RECOMMENDATION(S)      1. Scheduled Medication(s):  Continue home lexapro 20    2. PRN Medication(s):  None at this time    3.  Monitor:  Please obtain daily EKG to monitor QTc    4. Legal Status/Precaution(s):  None     Please call us back if/when patient is interested in starting psych medication.     Kenton Arnold MD  [facility] PGY2

## 2022-09-11 NOTE — ASSESSMENT & PLAN NOTE
Patient now with concerns for tumor lysis syndrome given active chemo with SCLC, will treat prophylactically    - allopurinol 300 mg daily   - IVF   - Q6 CMP, phos, uric acid

## 2022-09-11 NOTE — PLAN OF CARE
Plan of care reviewed with the pt at the beginning of the shift. Pt has remained free from injury and falls. Fall precautions maintained. Bed locked in lowest position, side rails up x2, non skid footwear on, personal belongings and call light within reach. Instructed pt to call for assistance as needed. Pt has remained afebrile at this time.  Pt refuses to be turned overnight. Assisted with weight shifting. Heparin and amniodorone infusing with out complications. Telemetry monitored. Potassium 5.4 and Z crystals given. Pt given dilaudid, and Robaxin overnight for pain.

## 2022-09-11 NOTE — SUBJECTIVE & OBJECTIVE
Interval History: Looks much better today. Still with thick cough/secretions. Window open, flowers in room. Brighter affect. Says guilt is still swirling but not right in front of his face!    Medications:  Continuous Infusions:   sodium chloride 0.9% 125 mL/hr at 09/11/22 1417    heparin (porcine) in D5W 16.084 Units/kg/hr (09/11/22 1352)     Scheduled Meds:   albuterol-ipratropium  3 mL Nebulization Q8H    allopurinoL  300 mg Oral Daily    amiodarone  400 mg Oral BID    Followed by    [START ON 9/26/2022] amiodarone  400 mg Oral Daily    Followed by    [START ON 10/3/2022] amiodarone  400 mg Oral Daily    aprepitant  130 mg Intravenous 1 time in Clinic/HOD    EScitalopram oxalate  20 mg Oral QHS    filgrastim-sndz  300 mcg Subcutaneous Daily    pantoprazole  40 mg Oral Daily    polyethylene glycol  17 g Oral Daily    pravastatin  40 mg Oral QHS    pregabalin  75 mg Oral BID    senna-docusate 8.6-50 mg  1 tablet Oral BID    sodium chloride 0.9% flush bag IVPB   Intravenous 1 time in Clinic/HOD    sodium chloride 0.9%  10 mL Intravenous Q6H    sodium chloride 3%  4 mL Nebulization Q8H    vitamin D  1,000 Units Oral Daily     PRN Meds:sodium chloride, acetaminophen, alteplase, diphenhydrAMINE, EPINEPHrine, heparin, porcine (PF), hydrocortisone sodium succinate, HYDROmorphone, LORazepam, melatonin, methocarbamoL, ondansetron, sodium chloride 0.9%, sodium chloride 0.9%, sodium chloride 0.9%, sodium chloride 0.9%, Flushing PICC Protocol **AND** sodium chloride 0.9% **AND** sodium chloride 0.9%, sodium chloride 0.9%, sumatriptan    Objective:     Vital Signs (Most Recent):  Temp: 97.7 °F (36.5 °C) (09/11/22 1544)  Pulse: 60 (09/11/22 1544)  Resp: 18 (09/11/22 1544)  BP: 103/65 (09/11/22 1544)  SpO2: (!) 93 % (09/11/22 1544)   Vital Signs (24h Range):  Temp:  [97.2 °F (36.2 °C)-98 °F (36.7 °C)] 97.7 °F (36.5 °C)  Pulse:  [58-61] 60  Resp:  [15-20] 18  SpO2:  [92 %-97 %] 93 %  BP: ()/(59-67) 103/65     Weight: 57.2  kg (126 lb)  Body mass index is 18.61 kg/m².    Physical Exam  Vitals and nursing note reviewed.   Constitutional:       Appearance: He is well-developed. He is ill-appearing.   HENT:      Head: Normocephalic and atraumatic.      Right Ear: External ear normal.      Left Ear: External ear normal.      Nose: Nose normal.      Mouth/Throat:      Mouth: Mucous membranes are moist.   Eyes:      Conjunctiva/sclera: Conjunctivae normal.      Pupils: Pupils are equal, round, and reactive to light.   Cardiovascular:      Rate and Rhythm: Normal rate and regular rhythm.   Pulmonary:      Effort: Pulmonary effort is normal.      Breath sounds: Rhonchi present.   Abdominal:      Palpations: Abdomen is soft.   Musculoskeletal:         General: Normal range of motion.      Cervical back: Normal range of motion and neck supple.   Skin:     General: Skin is warm and dry.   Neurological:      Mental Status: He is alert and oriented to person, place, and time.   Psychiatric:         Mood and Affect: Mood normal.         Speech: Speech normal.         Behavior: Behavior normal.         Thought Content: Thought content normal.         Judgment: Judgment normal.       Review of Symptoms      Symptom Assessment (ESAS 0-10 Scale)  Pain:  2  Dyspnea:  2  Anxiety:  1  Nausea:  0  Depression:  1  Anorexia:  0  Fatigue:  0  Insomnia:  0  Restlessness:  0  Agitation:  0     CAM / Delirium:  Negative  Constipation:  Negative  Diarrhea:  Negative      Bowel Management Plan (BMP):  Yes      Pain Assessment:  OME in 24 hours:  2 mg iv dilaudid  Location(s): chest    Chest       Location: anterior and generalized        Quality: Pressure-like        Quantity: 2/10 in intensity        Chronicity: Onset 2 week(s) ago, gradually improving since see previous notes. Better         Aggravating Factors: Movement        Alleviating Factors: Opiates        Associated Symptoms: Anorexia    Modified Floresita Scale:  2    Living Arrangements:  Lives in home and  Lives with spouse    Psychosocial/Cultural: ,  to his partner of greater than 30 years.  No children no siblings.  Hair has always been important to him    Spiritual:  F - Geno and Belief:  Jainism  I - Importance:  Unknown  C - Community:  Unknown  A - Address in Care:  Unknown      Advance Care Planning   Advance Directives:   Living Will: No    LaPOST: No    Do Not Resuscitate Status: Yes    Medical Power of : No    Agent's Name:  Aston Collins   Agent's Contact Number:  [phone]    Decision Making:  Patient answered questions       Significant Labs: All pertinent labs within the past 24 hours have been reviewed.  CBC:   Recent Labs   Lab 09/11/22 0344   WBC 9.16   HGB 9.7*   HCT 30.4*   MCV 93        BMP:  Recent Labs   Lab 09/11/22  0344 09/11/22  1114   GLU 98  98 151*   *  131* 135*   K 5.4*  5.4* 5.2*     100 102   CO2 24  24 24   BUN 37*  37* 35*   CREATININE 0.9  0.9 0.9   CALCIUM 8.0*  8.0* 8.1*   MG 1.9  --      LFT:  Lab Results   Component Value Date    AST 31 09/11/2022    ALKPHOS 145 (H) 09/11/2022    BILITOT 0.5 09/11/2022     Albumin:   Albumin   Date Value Ref Range Status   09/11/2022 2.4 (L) 3.5 - 5.2 g/dL Final     Protein:   Total Protein   Date Value Ref Range Status   09/11/2022 5.0 (L) 6.0 - 8.4 g/dL Final     Lactic acid:   Lab Results   Component Value Date    LACTATE 1.7 09/05/2022    LACTATE 2.4 (H) 09/04/2022       Significant Imaging: I have reviewed all pertinent imaging results/findings within the past 24 hours.

## 2022-09-11 NOTE — SUBJECTIVE & OBJECTIVE
Interval History: Based of recs from palliative and psych onc, consulted psychiatry for patient's newfound guilt over cancer diagnosis. Patient was also on amnio for >72 hours and per critical care note amio was transitioned to PO. Post allopurinol and IVF patient's TLS labs recovering. Continue to monitor. Chemo rounds completed and patient started on GCSF. Patient having SOB and CP most likely due to panic attack like symptoms given patient is HDS.     Oncology Treatment Plan:   OP SCLC CARBOPLATIN (AUC) ETOPOSIDE DURVALUMAB Q3W FOLLOWED BY MAINTENANCE DURVALUMAB 1500 MG Q4W    Medications:  Continuous Infusions:   sodium chloride 0.9% 125 mL/hr at 09/11/22 1417    heparin (porcine) in D5W 16.084 Units/kg/hr (09/11/22 1352)     Scheduled Meds:   albuterol-ipratropium  3 mL Nebulization Q8H    allopurinoL  300 mg Oral Daily    amiodarone  400 mg Oral BID    Followed by    [START ON 9/26/2022] amiodarone  400 mg Oral Daily    Followed by    [START ON 10/3/2022] amiodarone  400 mg Oral Daily    aprepitant  130 mg Intravenous 1 time in Clinic/HOD    EScitalopram oxalate  20 mg Oral QHS    filgrastim-sndz  300 mcg Subcutaneous Daily    pantoprazole  40 mg Oral Daily    polyethylene glycol  17 g Oral Daily    pravastatin  40 mg Oral QHS    pregabalin  75 mg Oral BID    senna-docusate 8.6-50 mg  1 tablet Oral BID    sodium chloride 0.9% flush bag IVPB   Intravenous 1 time in Clinic/HOD    sodium chloride 0.9%  10 mL Intravenous Q6H    sodium chloride 3%  4 mL Nebulization Q8H    vitamin D  1,000 Units Oral Daily     PRN Meds:sodium chloride, acetaminophen, alteplase, diphenhydrAMINE, EPINEPHrine, heparin, porcine (PF), hydrocortisone sodium succinate, HYDROmorphone, LORazepam, melatonin, methocarbamoL, ondansetron, sodium chloride 0.9%, sodium chloride 0.9%, sodium chloride 0.9%, sodium chloride 0.9%, Flushing PICC Protocol **AND** sodium chloride 0.9% **AND** sodium chloride 0.9%, sodium chloride 0.9%, sumatriptan      Review of Systems   Constitutional:  Positive for activity change, fatigue and unexpected weight change.   Respiratory:  Positive for shortness of breath.    Cardiovascular:  Positive for chest pain. Negative for leg swelling.   Gastrointestinal:  Negative for nausea and vomiting.   Musculoskeletal:  Positive for gait problem.   Skin: Negative.    Neurological:  Positive for weakness.   Psychiatric/Behavioral:  The patient is nervous/anxious.    Objective:     Vital Signs (Most Recent):  Temp: 97.7 °F (36.5 °C) (09/11/22 1544)  Pulse: 60 (09/11/22 1544)  Resp: 18 (09/11/22 1544)  BP: 103/65 (09/11/22 1544)  SpO2: (!) 93 % (09/11/22 1544)   Vital Signs (24h Range):  Temp:  [97 °F (36.1 °C)-98 °F (36.7 °C)] 97.7 °F (36.5 °C)  Pulse:  [58-65] 60  Resp:  [15-20] 18  SpO2:  [85 %-97 %] 93 %  BP: ()/(58-67) 103/65     Weight: 57.2 kg (126 lb)  Body mass index is 18.61 kg/m².  Body surface area is 1.67 meters squared.      Intake/Output Summary (Last 24 hours) at 9/11/2022 1548  Last data filed at 9/11/2022 0616  Gross per 24 hour   Intake 2643.54 ml   Output 1320 ml   Net 1323.54 ml       Physical Exam  Constitutional:       General: He is not in acute distress.     Appearance: He is well-developed. He is ill-appearing.   HENT:      Head: Normocephalic and atraumatic.      Right Ear: External ear normal.      Left Ear: External ear normal.      Nose: Nose normal.      Mouth/Throat:      Mouth: Mucous membranes are moist.      Pharynx: Oropharynx is clear. No oropharyngeal exudate.   Eyes:      General: No scleral icterus.     Extraocular Movements: Extraocular movements intact.      Conjunctiva/sclera: Conjunctivae normal.   Cardiovascular:      Rate and Rhythm: Normal rate and regular rhythm.      Heart sounds: Normal heart sounds. No murmur heard.  Pulmonary:      Effort: Pulmonary effort is normal.      Breath sounds: Rhonchi present.      Comments: On 5L NC  Abdominal:      General: Bowel sounds are normal.       Palpations: Abdomen is soft.      Tenderness: There is no abdominal tenderness.   Musculoskeletal:         General: Normal range of motion.      Cervical back: Normal range of motion and neck supple.      Right lower leg: No edema.      Left lower leg: No edema.   Skin:     General: Skin is warm and dry.   Neurological:      General: No focal deficit present.      Mental Status: He is alert and oriented to person, place, and time.   Psychiatric:      Comments: Depressed mood, feelings of guilt. Anxious          Significant Labs:   CMP:   Recent Labs   Lab 09/10/22  2139 09/11/22  0344 09/11/22  1114   * 131*  131* 135*   K 5.4* 5.4*  5.4* 5.2*    100  100 102   CO2 23 24  24 24   * 98  98 151*   BUN 42* 37*  37* 35*   CREATININE 0.9 0.9  0.9 0.9   CALCIUM 8.2* 8.0*  8.0* 8.1*   PROT 4.9* 4.7*  4.7* 5.0*   ALBUMIN 2.3* 2.2*  2.2* 2.4*   BILITOT 0.4 0.4  0.4 0.5   ALKPHOS 151* 142*  142* 145*   AST 27 29  29 31   ALT 20 19  19 19   ANIONGAP 9 7*  7* 9       Diagnostic Results:  I have reviewed and interpreted all pertinent imaging results/findings within the past 24 hours.

## 2022-09-11 NOTE — ASSESSMENT & PLAN NOTE
Will consult psychiatry for patient tomorrow. Talked to palliative and they stated that patient would not benefit from more pain medications given current emotional state    - Psych consulted, appreciate recs

## 2022-09-12 PROBLEM — R52 PAIN: Status: ACTIVE | Noted: 2022-01-01

## 2022-09-12 NOTE — ASSESSMENT & PLAN NOTE
Patient with increased anxiety, depression and grief related to diagnosis/prognosis.  Partner perceives minimal benefit from Lexapro   Partner requests improved control of nighttime anxiety/night terrors-1-3 AM nightly (? Delirium)  Psychiatry consultation may be warranted.    Provided cognitive behavioral therapy to address negative cognitions. Discussed breathing exercises to decrease panic.    Partner provided with my contact information in case of additional psychosocial needs.

## 2022-09-12 NOTE — ASSESSMENT & PLAN NOTE
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 Pt has metastatic lung cancer. Pt is alert, oriented to person, place, and situation. Pt is a DNR. Pt is on 3 L O2.     Reason for consult: GOC/ACP. Communicated with MAJOR Camp fellow with [facility].     Today: Met with pt who stated his goal at this time is to be comfortable and receive treatment. Goal is to be back home if possible and have more quality and quantity of life.     9/9/22: Pt very tearful today. Pt says "I know I am dying. " Pt kept apologizing.  Explained to pt there is not a need to apoligize and that that I am glad he felt comfortable to express his feelings. Pt repots he is having a bad day and everything starting to sink in about his health. Support given.  seeing. Will consult Oncology/Psycology for pt. Pt to step down to Hem/onc as soon as bed available.     9/6/22  Goals of care/ACP: Pt to have tracheal stenting tomorrow. Pt reports he is in agreement to procedure and would like CC team to speak to Aston about procedure when he visits today. Per pt, he relays information to Aston but he feels like he forgets some of information to tell him. Pt reports his goal is medical management at this time to see if he can improve enough to get back home. Pt reports his goal is to be at home with his four dogs and , Aston. Pt is aware of his severity of his illness.     Today: Met with pt along with Tracie Kidd LCSW.     9/6/22  Introduced role of Palliative care to pt.   Met with pt who is aware of his medical issues. Per pt he is aware he has metastatic cancer. Pt is aware that any therapies/procedures offered to him is palliative and will not cure cancer. Per pt, he is still trying to " "come to  with his dx. He learned about his issues in August. Pt reports goal at time is to see if anything can be done to help with symptoms and "give him some more time." Spoke to pt about amount of O2 he is on at this time. He verbalized understanding.  Per pt he has spoken to Oncology and XRT MDs.   CTS has been consulted concerning tracheal stenting.      Pt open to continued visits with [facility] care for care planning and symptom management needs.  Support given pt.      Pt reports he is legally  to Aston Collins and he would want him to be hi medical decision-maker if he cannot make his own medical decisions. MPOA paperwork left with pt. Education provided. Per Pt, Aston works during day but can be reached by phone.      Code status: DNR.      Symptom management:     Pain: Pt reports he has pain to back chest area that can get up to 9/10. Pt reports throbbing/shooting pain.     Pt is currently on Dilaudid 1 mg IVP q 4 hrs prn pain.     Recs:   Would start pt on OxyContin 10 mg bid.   Can increase if needed. Will assess.   Continue with Dilaudid IV.      Dyspnea r/t to mediastinal mass, pleural effusion.   Pt is on 3 L O2 per NC.   Pt is on Dilaudid 1 mg IVP q 4 hrs prn.  Pt has chest tube in place and having tracheal stents placed.      Debility r/t to pathological femoral neck fracture s/p ight hip hemiarthroplasty with ablation, cementoplasty  Pt was using walker prior to admit.   Would resume PT/OT when pt stable to participate.      Anxiety r/t to new dx and dyspnea.   Pt has Ativan 1 mg tid prn.   Continue.      Plan:   Will continue to meet with pt to reinforce realistic expectations/assit with GOC.   See above symptom recs.   Support given.   Will consult Oncology/ Pyschology.     Will follow.         "

## 2022-09-12 NOTE — ASSESSMENT & PLAN NOTE
Will consult psychiatry for patient tomorrow. Talked to palliative and they stated that patient would not benefit from more pain medications given current emotional state    - Psych consulted, appreciate recs  - Does not want additional medications at this time for anxiety, wants to continue 20mg lexapro

## 2022-09-12 NOTE — HPI
Donis Jimenez is a 61 y.o. M with pmhx of AV&MV mechanical valve replacement, CKD, and smoking, who presented with SOB. History is notable for recent admit 8/18-8/27 for pathologic femoral neck fracture. He was admitted 9/4 for acute respiratory failure with concern for sepsis. CT chest at that time showed L upper lobe mass with pericardial & L sided pleural effusions. He was admitted to medicine initially but was later stepped up to MICU. He underwent thoracentesis & L sided chest tube placement 9/6. Pleural fluid analysis revealed exudative fluid diagnostic for small cell lung cancer. Tube output declined 9/6 through 9/9 but increased since then. Output is serous yellow fluid. He was seen by heme/onc and has completed one cycle of carboplatin etoposide chemotherapy. His major barrier to discharge at this time is his chest tube. CXR 9/12 demonstrated a small L apical pneumothorax as well as a R sided opacity. Pulmonology consulted for chest tube evaluation & removal.    Mr. Jimenez reports feeling much better since the tube was placed and states his SOB is much less although he still requires O2 and is coughing up mucus. He reports having some substernal chest pressure/pain worse with anxiety. This pain radiates to his back. He reports feeling anxious with his bipap nightly. His voice is very quiet and hoarse which he attributes to airway compression 2/2 cancer. He denies other symptoms. On exam there is a L sided chest tube in place set to water seal with no air leak. The tube site is nontender, leaking fluid, and with dressing in place. On auscultation his R lung is clear and his L lower field has crackles. Heart sounds are WNL. Abdomen nontender. Pt is with  at bedside.

## 2022-09-12 NOTE — PROGRESS NOTES
ATTENDING NOTE, ONCOLOGY INPATIENT TEAM    Events of last 24 hours noted.  Patient seen and examined, chart reviewed.  Full note to followed by house staff  Appears comfortable, in NAD.  He still has a chest tube in place  Lungs with few scattered rhonchi.  Abdomen is soft, nontender.  Labs reviewed.  There is no evidence of tumor lysis    PLAN  Repeat chest x-ray today  Continue daily CBC and CMP  Will discuss with thoracic surgery service regarding removal of his chest tube  Continue heparin drip for now.  We will switch to oral anticoagulation once his chest tube has been removed.  PT OT to assess  We will discharge home once his chest tube has been removed  His multiple questions were answered to his satisfaction.        Kash Boo MD

## 2022-09-12 NOTE — ASSESSMENT & PLAN NOTE
Chest tube placed 9/6, currently still draining. Hopefully will improve w/ drainage and chemotherapy   - Will get pulmonology input on removal

## 2022-09-12 NOTE — PT/OT/SLP PROGRESS
Physical Therapy Co-Treatment  Co-treatment completed due to patient's complexity and benefit of two skilled therapists to facilitate functional mobility while maintaining patient and staff safety.  Patient Name:  Donis Jimenez   MRN:  [id number]    Recommendations:     Discharge Recommendations:  home health PT / OT  Discharge Equipment Recommendations:  (TBD pending pt prgoress)   Barriers to discharge:  increased level of assistance    Assessment:     Donis Jimenez is a 61 y.o. male admitted with a medical diagnosis of Small cell carcinoma of lung.  He presents with the following impairments/functional limitations:  weakness, impaired endurance, impaired self care skills, impaired functional mobility, gait instability, impaired balance, decreased lower extremity function, decreased safety awareness, orthopedic precautions, decreased ROM, impaired cardiopulmonary response to activity. Pt tolerated session well. Pt with reporting dizziness with changes in position. Pt's dizziness resolved with APs and increased time. Pt's respiration rate increased after transfer to bedside chair. Pt educated on pursed lip breathing and respiration rate improved while sitting up in chair with feet elevated. Pt would benefit from HHPT/OT for: Dynamic/static standing/sitting balance through skilled balance training, strengthening with the use of skilled therapeutic exercises interventions, and mobility through adaptive equipment training. Pt continues to benefit from a collaborative PT/OT program to improve quality of life and focus on recovery of impairments.    Rehab Prognosis: Good; patient would benefit from acute skilled PT services to address these deficits and reach maximum level of function.    Recent Surgery: Procedure(s) (LRB):  Cardioversion or Defibrillation (N/A)  Transesophageal echo (MARIE) intra-procedure log documentation 4 Days Post-Op    Plan:     During this hospitalization, patient to be seen 3 x/week to address the  identified rehab impairments via gait training, therapeutic activities, therapeutic exercises, neuromuscular re-education and progress toward the following goals:    Plan of Care Expires:  10/03/22    Subjective     Chief Complaint: dizziness with changes in position   Patient/Family Comments/goals: improve functional mobility  Pain/Comfort:  Pain Rating 1: 0/10  Pain Rating Post-Intervention 1: 0/10      Objective:     Communicated with RN prior to session.  Patient found HOB elevated with chest tube, oxygen, peripheral IV, PICC line upon PT entry to room.     General Precautions: Standard, fall, aspiration   Orthopedic Precautions:RLE weight bearing as tolerated, RLE posterior precautions   Braces: N/A  Respiratory Status: Nasal cannula     Functional Mobility:  Bed Mobility:     Scooting: minimum assistance  Supine to Sit: minimum assistance  Transfers:     Sit to Stand:  minimum assistance with rolling walker  Gait: 4-5 steps L to bed side chair, Chanel, RW; decreased gait speed, decreased step length, decreased clearance  Balance:      Static Sitting: SBA  Dynamic Sitting: SBA  Static Standing: Chanel with RW  Dynamic Standing: Chanel with RW      AM-PAC 6 CLICK MOBILITY  Turning over in bed (including adjusting bedclothes, sheets and blankets)?: 3  Sitting down on and standing up from a chair with arms (e.g., wheelchair, bedside commode, etc.): 3  Moving from lying on back to sitting on the side of the bed?: 3  Moving to and from a bed to a chair (including a wheelchair)?: 3  Need to walk in hospital room?: 3  Climbing 3-5 steps with a railing?: 1  Basic Mobility Total Score: 16       Therapeutic Activities and Exercises:  Reviewed posterior hip precautions and WBing status.  Pt educated on pursed lip breathing.  Pt educated on BLE LAQs, L hip ABD, and B APs.  Patient educated on role of therapy, goals of session, and benefits of mobilizing.   Discussed PT plan of care during hospitalization.   Patient educated on  calling for assistance.   Patient educated on how their diagnosis impacts their mobility within PT scope of practice.   Communication board up to date.  All questions answered within PT scope of practice.    Patient left up in chair with all lines intact and call button in reach..    GOALS:   Multidisciplinary Problems       Physical Therapy Goals          Problem: Physical Therapy    Goal Priority Disciplines Outcome Goal Variances Interventions   Physical Therapy Goal     PT, PT/OT Ongoing, Progressing     Description: Goals to be met by: 10/3/22     Patient will increase functional independence with mobility by performin. Supine to sit with Modified Mayes  2. Sit to stand transfer with Contact Guard Assistance with RW   3. Gait  x 50 feet with Contact Guard Assistance using Rolling Walker.   4 15. Lower extremity exercise program x 15 reps per  with assistance as needed                         Time Tracking:     PT Received On: 22  PT Start Time: 1057     PT Stop Time: 1125  PT Total Time (min): 28 min     Billable Minutes: Gait Training 13 and Therapeutic Activity 10    Treatment Type: Treatment  PT/PTA: PT     PTA Visit Number: 0     2022

## 2022-09-12 NOTE — PLAN OF CARE
Plan of care reviewed with patient and spouse at beginning of shift and PRN. Pain meds provided RTC pt requested to be awakened for pain meds. Anxiety med given @ HS. VSS on 2L NC. LCT in place w  460 cc serous drainage to collect container. IVF continued.  Pt w heparin drip in therapeutic range. No BM overnight ; to begin bowel regimen today.  Will continue to monitor.

## 2022-09-12 NOTE — SUBJECTIVE & OBJECTIVE
Interval History: Pt DNR.    Past Medical History:   Diagnosis Date    Endocarditis     Pacemaker     Pneumonia        Past Surgical History:   Procedure Laterality Date    ANGIOGRAPHY OF LOWER EXTREMITY Right 8/19/2022    Procedure: ANGIOGRAM, LOWER EXTREMITY;  Surgeon: Thomas Nicolas MD;  Location: [facility] OR [address] [place] [address];  Service: Peripheral Vascular;  Laterality: Right;  30.0 min  315.10 mGy  26.1755 Gycm2  84 ml dye    HIP RESURFACING Right 8/23/2022    Procedure: cemontoplasty;  Surgeon: Yung Flores MD;  [address];  Service: Orthopedics;  Laterality: Right;    RADIOFREQUENCY ABLATION, BONE, PERCUTANEOUS Right 8/23/2022    Procedure: RADIOFREQUENCY ABLATION,BONE;  Surgeon: Yung Flores MD;  [address];  Service: Orthopedics;  Laterality: Right;    REPAIR, PSEUDOANEURYSM, ARTERY, FEMORAL Right 8/19/2022    Procedure: REPAIR, PSEUDOANEURYSM, ARTERY, FEMORAL;  Surgeon: Thomas Nicolas MD;  [address];  Service: Peripheral Vascular;  Laterality: Right;  R PFA (3rd branch) pseudoaneurysm     TREATMENT OF CARDIAC ARRHYTHMIA N/A 9/8/2022    Procedure: Cardioversion or Defibrillation;  Surgeon: Lan Mccollum MD;  Location: [facility] EP LAB;  Service: Cardiology;  Laterality: N/A;  AFL, DCCV, ANES, SK, RM [id number]       Review of patient's allergies indicates:   Allergen Reactions    Ambien [zolpidem] Other (See Comments)     Disturbing dreams       Medications:  Continuous Infusions:   sodium chloride 0.9% 125 mL/hr at 09/12/22 0700    heparin (porcine) in D5W 16.084 Units/kg/hr (09/12/22 0700)     Scheduled Meds:   albuterol-ipratropium  3 mL Nebulization Q8H    allopurinoL  300 mg Oral Daily    amiodarone  400 mg Oral BID    Followed by    [START ON 9/26/2022] amiodarone  400 mg Oral Daily    Followed by    [START ON 10/3/2022] amiodarone  400 mg Oral Daily    EScitalopram oxalate  20 mg Oral QHS    filgrastim-sndz  300 mcg Subcutaneous Daily    pantoprazole  40 mg Oral  Daily    polyethylene glycol  17 g Oral Daily    pregabalin  75 mg Oral BID    senna-docusate 8.6-50 mg  1 tablet Oral BID    sodium chloride 0.9% flush bag IVPB   Intravenous 1 time in Clinic/HOD    sodium chloride 0.9%  10 mL Intravenous Q6H    sodium chloride 3%  4 mL Nebulization Q8H     PRN Meds:sodium chloride, acetaminophen, alteplase, diphenhydrAMINE, EPINEPHrine, heparin, porcine (PF), HYDROmorphone, LORazepam, melatonin, methocarbamoL, ondansetron, Flushing PICC Protocol **AND** sodium chloride 0.9% **AND** sodium chloride 0.9%, sumatriptan    Family History    None       Tobacco Use    Smoking status: Every Day     Packs/day: 1.00     Types: Cigarettes    Smokeless tobacco: Never   Substance and Sexual Activity    Alcohol use: Not on file    Drug use: Not on file    Sexual activity: Not on file       Review of Systems   Constitutional:  Positive for activity change, fatigue and unexpected weight change.   Respiratory:  Positive for shortness of breath.    Cardiovascular:  Negative for leg swelling.   Gastrointestinal:  Negative for nausea and vomiting.   Musculoskeletal:  Positive for gait problem.   Skin: Negative.    Neurological:  Positive for weakness.   Psychiatric/Behavioral: Negative.     Objective:     Vital Signs (Most Recent):  Temp: 97.8 °F (36.6 °C) (09/12/22 0723)  Pulse: 60 (09/12/22 0724)  Resp: 17 (09/12/22 0956)  BP: 113/67 (09/12/22 0723)  SpO2: (!) 92 % (09/12/22 0724)   Vital Signs (24h Range):  Temp:  [97.5 °F (36.4 °C)-97.8 °F (36.6 °C)] 97.8 °F (36.6 °C)  Pulse:  [59-62] 60  Resp:  [14-20] 17  SpO2:  [92 %-98 %] 92 %  BP: ()/(57-73) 113/67     Weight: 57.2 kg (126 lb)  Body mass index is 18.61 kg/m².    Physical Exam  Constitutional:       Comments: Pt on 3L O2   HENT:      Head: Normocephalic and atraumatic.   Eyes:      General: Lids are normal.      Conjunctiva/sclera: Conjunctivae normal.   Cardiovascular:      Rate and Rhythm: Tachycardia present.   Pulmonary:       Effort: Tachypnea present. No accessory muscle usage or respiratory distress.      Comments: Pt on 3L O2.  Abdominal:      General: Abdomen is flat.   Musculoskeletal:      Cervical back: Full passive range of motion without pain and normal range of motion.      Comments: Edema to left arm.    Skin:     General: Skin is warm and dry.   Neurological:      Mental Status: He is alert and oriented to person, place, and time.   Psychiatric:         Attention and Perception: Attention normal.         Speech: Speech normal.      Comments: Crying       Review of Symptoms      Symptom Assessment (ESAS 0-10 Scale)  Pain:  0  Dyspnea:  0  Anxiety:  0  Nausea:  0  Depression:  0  Anorexia:  0  Fatigue:  0  Insomnia:  0  Restlessness:  0  Agitation:  0         ECOG Performance Status ndGndrndanddndend:nd nd2nd Living Arrangements:  Lives with family    Psychosocial/Cultural: Pt legally  to Aston Collins. Per pt Aston works during the day. Per pt, Aston is his care-giver.       Advance Care Planning   Advance Directives:   Living Will: No    Do Not Resuscitate Status: Yes    Medical Power of : No    Agent's Name:  Aston Collins    Decision Making:  Patient answered questions       Significant Labs: All pertinent labs within the past 24 hours have been reviewed.  CBC:   Recent Labs   Lab 09/12/22 0442   WBC 20.24*   HGB 9.0*   HCT 28.5*   MCV 95   *       BMP:  Recent Labs   Lab 09/12/22 0442   GLU 74   *   K 3.9   *   CO2 19*   BUN 27*   CREATININE 0.6   CALCIUM 6.7*       LFT:  Lab Results   Component Value Date    AST 24 09/12/2022    ALKPHOS 109 09/12/2022    BILITOT 0.4 09/12/2022     Albumin:   Albumin   Date Value Ref Range Status   09/12/2022 1.7 (L) 3.5 - 5.2 g/dL Final     Protein:   Total Protein   Date Value Ref Range Status   09/12/2022 3.7 (L) 6.0 - 8.4 g/dL Final     Lactic acid:   Lab Results   Component Value Date    LACTATE 1.7 09/05/2022    LACTATE 2.4 (H) 09/04/2022       Significant Imaging:  I have reviewed all pertinent imaging results/findings within the past 24 hours.

## 2022-09-12 NOTE — ASSESSMENT & PLAN NOTE
Patient had afib with RVR this admission was successfully cardioverted. IV amio load x72 hours completed and transitioned to PO.    - amio  BID for 2 weeks followed by:    - amio 400 daily for 1 week    - amio 200 daily lifetime    - He is on low intensity heparin drip currently for the atrial flutter, will switch to oral once chest tube is out

## 2022-09-12 NOTE — PROGRESS NOTES
[facility] ([facility])  Psychology  Progress Note  Individual Psychotherapy (PhD/LCSW)    Patient Name: Donis Jimenez  MRN: [id number]    Patient Class: IP- Inpatient  Admission Date: 9/4/2022  Hospital Length of Stay: 8 days  Attending Physician: Kash Boo MD  Primary Care Provider: Elio Farias MD    Therapeutic Intervention: Met with patient and spouse.  Inpatient/Partial Hospital - Behavior modifying psychotherapy 30 min - CPT code 91579    Chief Complaint/Reason for Encounter: adaptation to disease and treatment; anxiety     Interval History and Content of Current Session: Discussed patient's coping and anxiety management techniques (breathing retraining, PMR).    Risk Parameters:  Patient reports no suicidal ideation  Patient reports no homicidal ideation  Patient reports no self-injurious behavior  Patient reports no violent behavior    Verbal Deficits: None    Patient's response to intervention:  The patient's response to intervention is accepting.    Progress toward goals and other mental status changes:  The patient's progress toward goals is good.    Diagnostic Impression - Plan:     Adjustment disorder with mixed anxiety and depressed mood  Patient with increased anxiety, depression and grief related to diagnosis/prognosis.  Partner perceives minimal benefit from Lexapro   Partner requests improved control of nighttime anxiety/night terrors-1-3 AM nightly (? Delirium)  Psychiatry consultation may be warranted.    Provided cognitive behavioral therapy to address negative cognitions. Discussed breathing exercises to decrease panic.    Partner provided with my contact information in case of additional psychosocial needs.        Treatment Plan:  · Target symptoms: depression and anxiety  · Why chosen therapy is appropriate versus another modality: relevant to diagnosis, patient responds to this modality, evidence based practice  · Outcome monitoring methods: self-report, feedback from  family  · Therapeutic intervention type: behavior modifying psychotherapy, supportive psychotherapy    Plan:  individual psychotherapy and medication management by physician  Next anticipated contact: <1 week    Length of Service (minutes): 30    Boubacar Hatfield, PhD  Psychology  [facility] On[facility] ([facility])

## 2022-09-12 NOTE — PROGRESS NOTES
Plan of care reviewed with patient and family this shift. Complaints of pain and anxiety managed well with current regiment. VSS on 2L. Pt up in chair with PT/OT. Pulm consulted for chest tube management; output of 570ml this shift. IVF continued. Heparin transfusing at 16U/kg/hr; aPTT remains therapeutic. 1 BM today. All questions and concerns addressed. Will continue to monitor.

## 2022-09-12 NOTE — PT/OT/SLP PROGRESS
Occupational Therapy   Co-Treatment    Name: Donis Jimenez  MRN: [id number]  Admitting Diagnosis:  Small cell carcinoma of lung  4 Days Post-Op    Recommendations:     Discharge Recommendations: home health OT  Discharge Equipment Recommendations:   (TBD)  Barriers to discharge:  Inaccessible home environment, Decreased caregiver support    Assessment:     Donis Jimenez is a 61 y.o. male with a medical diagnosis of Small cell carcinoma of lung. Pt remains limited in self-care, functional mobility, and ADLs and is currently not performing tasks at [facility]. Performance deficits affecting function are weakness, impaired endurance, impaired self care skills, impaired functional mobility, gait instability, impaired balance, decreased coordination, decreased lower extremity function, decreased safety awareness, pain, decreased ROM, orthopedic precautions, impaired cardiopulmonary response to activity.     Rehab Prognosis:  Good; patient would benefit from acute skilled OT services to address these deficits and reach maximum level of function.       Plan:     Patient to be seen 3 x/week to address the above listed problems via self-care/home management, therapeutic activities, therapeutic exercises  Plan of Care Expires: 10/07/22  Plan of Care Reviewed with: patient    Subjective     Pain/Comfort:  Pain Rating 1: 0/10  Pain Rating Post-Intervention 1: 0/10    Objective:   Co-treatment performed due to patient's multiple deficits requiring two skilled therapists to appropriately and safely assess patient's strength and endurance while facilitating functional tasks in addition to accommodating for patient's activity tolerance.   Communicated with: RN prior to session.  Patient found HOB elevated with chest tube, oxygen, peripheral IV, PICC line upon OT entry to room.    Additional staff present:  BALAJI Parisi    General Precautions: Standard, fall, aspiration   Orthopedic Precautions:RLE weight bearing as tolerated, RLE posterior  precautions   Braces: N/A  Respiratory Status: Nasal cannula, flow 2 L/min     Occupational Performance:     Bed Mobility:    Patient completed Scooting/Bridging with minimum assistance  Patient completed Supine to Sit with minimum assistance     Functional Mobility/Transfers:  Patient completed Sit <> Stand Transfer with minimum assistance  with  rolling walker   Patient completed Bed > Bedside Chair Transfer performing ~4-5 steps to the left with minimum assistance (2nd person- CGA for safety) with rolling walker    Activities of Daily Living:  Grooming: set up (A) to perform oral hygiene while seated on bedside chair  Lower Body Dressing: total assistance required to don B socks due to hip precautions      [facility] 6 Click ADL: 16    Treatment & Education:  -Education on Wb'ng and sx precautions with 4/4 verbalized correctly   -Provided education regarding role of OT, POC, & discharge recommendations with pt verbalizing understanding.  Pt had no further questions & when asked whether there were any concerns pt reported none.     Patient left up in chair with all lines intact, call button in reach, and RN notified    GOALS:   Multidisciplinary Problems       Occupational Therapy Goals          Problem: Occupational Therapy    Goal Priority Disciplines Outcome Interventions   Occupational Therapy Goal     OT, PT/OT Ongoing, Progressing    Description: Goals set on 9/7 with expiration date 9/21:  Patient will increase functional independence with ADLs by performing:    Supine <> Sit with Stand-by Assistance.  Feeding with set up  Grooming while seated at sink with Stand-by Assistance.  UB Dressing with Stand-by Assistanceset up at bed level..  LB Dressing with Stand-by Assistance set up at bed level.  Stand pivot transfer with Min Assistance with DME as needed.  Pt will demonstrate understanding of education provided regarding energy conservation and task modification through teach-back method.                             Time Tracking:     OT Date of Treatment: 09/12/22  OT Start Time: 1057  OT Stop Time: 1126  OT Total Time (min): 29 min    Billable Minutes:Self Care/Home Management 16  Therapeutic Activity 13    OT/AP: OT          9/12/2022

## 2022-09-12 NOTE — ASSESSMENT & PLAN NOTE
Secondary to extensive small cell lung cancer. Currently on 2L O2, satting 92%  - Will need 6MWT prior to discharge

## 2022-09-12 NOTE — PROGRESS NOTES
[facility] - Oncology ([facility])  Hematology/Oncology  Progress Note    Patient Name: Donis Jimenez  Admission Date: 9/4/2022  Hospital Length of Stay: 8 days  Code Status: DNR     Subjective:     HPI:  61 year old man with newly diagnosed metastatic small cell lung cancer complicated by acute hypoxic respiratory failure.  Was previously treated for pathologic right femoral neck fracture.  Pathology finalized as small cell carcinoma. Over his stay in ICU, his O2 requirments were steadily reduced, he is now on nasal cannula. Patient went into afib with RVR and needed cardioversion. He is currently undergoing inpatient chemotherapy with carboplatin (day 1) and etoposide (day 1,2,3). Will need GCSF on day 4.     Patient now feeling anxious about new diagnosis. Psych onc saw patient, will consider consulting psych for new onset anxiety and panic attacks if patient does not improve.       Interval History: Resting comfortably in bed, dyspneic when speaking in long sentences, currently on 2L O2. Patient states he is regretful about smoking since teenager as he is the only support system left for his . Denies any symptoms besides pain around his chest wall.     Oncology Treatment Plan:   OP SCLC CARBOPLATIN (AUC) ETOPOSIDE DURVALUMAB Q3W FOLLOWED BY MAINTENANCE DURVALUMAB 1500 MG Q4W    Medications:  Continuous Infusions:   sodium chloride 0.9% 125 mL/hr at 09/12/22 0700    heparin (porcine) in D5W 16.084 Units/kg/hr (09/12/22 0700)     Scheduled Meds:   albuterol-ipratropium  3 mL Nebulization Q8H    allopurinoL  300 mg Oral Daily    amiodarone  400 mg Oral BID    Followed by    [START ON 9/26/2022] amiodarone  400 mg Oral Daily    Followed by    [START ON 10/3/2022] amiodarone  200 mg Oral Daily    EScitalopram oxalate  20 mg Oral QHS    filgrastim-sndz  300 mcg Subcutaneous Daily    pantoprazole  40 mg Oral Daily    polyethylene glycol  17 g Oral Daily    pregabalin  75 mg Oral BID    senna-docusate 8.6-50 mg  1  tablet Oral BID    sodium chloride 0.9% flush bag IVPB   Intravenous 1 time in Clinic/HOD    sodium chloride 0.9%  10 mL Intravenous Q6H    sodium chloride 3%  4 mL Nebulization Q8H     PRN Meds:sodium chloride, acetaminophen, alteplase, diphenhydrAMINE, EPINEPHrine, heparin, porcine (PF), HYDROmorphone, LORazepam, melatonin, methocarbamoL, ondansetron, Flushing PICC Protocol **AND** sodium chloride 0.9% **AND** sodium chloride 0.9%, sumatriptan       Objective:     Vital Signs (Most Recent):  Temp: 97.4 °F (36.3 °C) (09/12/22 1155)  Pulse: 60 (09/12/22 1155)  Resp: 18 (09/12/22 1155)  BP: 120/71 (09/12/22 1155)  SpO2: (!) 94 % (09/12/22 1155)   Vital Signs (24h Range):  Temp:  [97.4 °F (36.3 °C)-97.8 °F (36.6 °C)] 97.4 °F (36.3 °C)  Pulse:  [59-62] 60  Resp:  [14-20] 18  SpO2:  [92 %-98 %] 94 %  BP: ()/(57-73) 120/71     Weight: 57.2 kg (126 lb)  Body mass index is 18.61 kg/m².  Body surface area is 1.67 meters squared.      Intake/Output Summary (Last 24 hours) at 9/12/2022 1244  Last data filed at 9/12/2022 1000  Gross per 24 hour   Intake 3441.15 ml   Output 3045 ml   Net 396.15 ml       Physical Exam  Constitutional:       Comments: Frail, cachectic    HENT:      Head: Normocephalic.      Nose: Nose normal.      Mouth/Throat:      Mouth: Mucous membranes are moist.   Eyes:      Pupils: Pupils are equal, round, and reactive to light.   Cardiovascular:      Rate and Rhythm: Normal rate.   Pulmonary:      Comments: Rhonchi present anteriorly   Abdominal:      General: Abdomen is flat.   Musculoskeletal:         General: Normal range of motion.      Cervical back: Normal range of motion.   Skin:     General: Skin is warm.   Neurological:      General: No focal deficit present.      Mental Status: He is alert and oriented to person, place, and time.   Psychiatric:      Comments: Tearful on exam       Significant Labs:   All pertinent labs from the last 24 hours have been reviewed.    Diagnostic Results:  I  have reviewed all pertinent imaging results/findings within the past 24 hours.    Assessment/Plan:     * Small cell carcinoma of lung  [facility] pathology report confirms small cell lung cancer from bone specimen.  Explained to him and his  that he is in an unfortunate situation.  He will most certainly die of this cancer rather quickly if not given chemotherapy.  While our intent is to improve his tumor burden causing his hypoxic respiratory failure with chemotherapy, it is also possible that we may potentially hasten his death unintentionally due to chemo toxicity. He and his  understand the risks.  - Patient currently on day 2 of inpatient chemotherapy, so far tolerating well  -labs reviewed; carboplatin renally dosed and will proceed with carboplatin and etoposide.  Etoposide given day 2 and day 3.  We will plan to provide GCSF starting day 4 and will do so for at least 7 doses.      - GCSF starting day 4 (9/11) and will do so for at least 7 doses.  - agree with oncology psychology consult  - Will transfer to medical oncology on 9/10    Pain  MMT  - Dilaudid 1mg q4 prn  - Robaxin 500 q6h prn  - Ativan 1mg tid prn    Tumor lysis syndrome  Resolved  Patient now with concerns for tumor lysis syndrome given active chemo with SCLC, will treat prophylactically    - allopurinol 300 mg daily   - Continue daily cbc/cmp  - Uric acid/LDH downtrending     Adjustment disorder with mixed anxiety and depressed mood  Will consult psychiatry for patient tomorrow. Talked to palliative and they stated that patient would not benefit from more pain medications given current emotional state    - Psych consulted, appreciate recs  - Does not want additional medications at this time for anxiety, wants to continue 20mg lexapro    Exudative pleural effusion  Chest tube placed 9/6, currently still draining. Hopefully will improve w/ drainage and chemotherapy   - Will get pulmonology input on removal     Debility  PT/OT  Eval    Acute hypoxemic respiratory failure  Secondary to extensive small cell lung cancer. Currently on 2L O2, satting 92%  - Will need 6MWT prior to discharge      Atrial flutter  Patient had afib with RVR this admission was successfully cardioverted. IV amio load x72 hours completed and transitioned to PO.    - amio  BID for 2 weeks followed by:    - amio 400 daily for 1 week    - amio 200 daily lifetime    - He is on low intensity heparin drip currently for the atrial flutter, will switch to oral once chest tube is out    Malignant neoplasm metastatic to bone  See small cell lung cancer    Pathologic fracture of neck of right femur s/p hemiarthroplasty on 8/23/2022  S/p right hip hemiarthroplasty with ablation, cementoplasty, and percutaneous fixation of pelvis 8/23. Patient is weight bearing as tolerated with posterior hip precautions to right lower extremity as per orthopedics    - Management per primary and ortho  - When more stable consider postop radiation             Steven Silva, DO  Hematology/Oncology  [facility] - Oncology ([facility])      ATTENDING NOTE, ONCOLOGY INPATIENT TEAM    Patient seen and examined, chart reviewed.  Please refer to my note of same day for our assessment and plan.      Kash Boo MD

## 2022-09-12 NOTE — ASSESSMENT & PLAN NOTE
Resolved  Patient now with concerns for tumor lysis syndrome given active chemo with SCLC, will treat prophylactically    - allopurinol 300 mg daily   - Continue daily cbc/cmp  - Uric acid/LDH downtrending

## 2022-09-12 NOTE — PROGRESS NOTES
Plan of care reviewed with patient and family this shift. Pain and anxiety better controlled today.  Spouse at bedside and continues to provide excellent emotional support for this pt. Up in chair for 30 min today. VSS on 2L NC.  IVF started. Transitioned to PO amiodarone.  Pt bathed and linens changed; waffle mattress applied; triad cream to sacral wound and new aquacel applied to sacrum and heels. Dressing changed to chest tube.  Pt agrees that he will need to have a BM soon, but appetite has been poor and pt feels embarrassed in hospital setting; reassurance provided and agreeable to start bowel regiment tomorrow AM. All questions and concerns addressed. Will continue to monitor.

## 2022-09-12 NOTE — SUBJECTIVE & OBJECTIVE
Interval History: Resting comfortably in bed, dyspneic when speaking in long sentences, currently on 2L O2. Patient states he is regretful about smoking since teenager as he is the only support system left for his . Denies any symptoms besides pain around his chest wall.     Oncology Treatment Plan:   OP SCLC CARBOPLATIN (AUC) ETOPOSIDE DURVALUMAB Q3W FOLLOWED BY MAINTENANCE DURVALUMAB 1500 MG Q4W    Medications:  Continuous Infusions:   sodium chloride 0.9% 125 mL/hr at 09/12/22 0700    heparin (porcine) in D5W 16.084 Units/kg/hr (09/12/22 0700)     Scheduled Meds:   albuterol-ipratropium  3 mL Nebulization Q8H    allopurinoL  300 mg Oral Daily    amiodarone  400 mg Oral BID    Followed by    [START ON 9/26/2022] amiodarone  400 mg Oral Daily    Followed by    [START ON 10/3/2022] amiodarone  200 mg Oral Daily    EScitalopram oxalate  20 mg Oral QHS    filgrastim-sndz  300 mcg Subcutaneous Daily    pantoprazole  40 mg Oral Daily    polyethylene glycol  17 g Oral Daily    pregabalin  75 mg Oral BID    senna-docusate 8.6-50 mg  1 tablet Oral BID    sodium chloride 0.9% flush bag IVPB   Intravenous 1 time in Clinic/HOD    sodium chloride 0.9%  10 mL Intravenous Q6H    sodium chloride 3%  4 mL Nebulization Q8H     PRN Meds:sodium chloride, acetaminophen, alteplase, diphenhydrAMINE, EPINEPHrine, heparin, porcine (PF), HYDROmorphone, LORazepam, melatonin, methocarbamoL, ondansetron, Flushing PICC Protocol **AND** sodium chloride 0.9% **AND** sodium chloride 0.9%, sumatriptan       Objective:     Vital Signs (Most Recent):  Temp: 97.4 °F (36.3 °C) (09/12/22 1155)  Pulse: 60 (09/12/22 1155)  Resp: 18 (09/12/22 1155)  BP: 120/71 (09/12/22 1155)  SpO2: (!) 94 % (09/12/22 1155)   Vital Signs (24h Range):  Temp:  [97.4 °F (36.3 °C)-97.8 °F (36.6 °C)] 97.4 °F (36.3 °C)  Pulse:  [59-62] 60  Resp:  [14-20] 18  SpO2:  [92 %-98 %] 94 %  BP: ()/(57-73) 120/71     Weight: 57.2 kg (126 lb)  Body mass index is 18.61  kg/m².  Body surface area is 1.67 meters squared.      Intake/Output Summary (Last 24 hours) at 9/12/2022 1244  Last data filed at 9/12/2022 1000  Gross per 24 hour   Intake 3441.15 ml   Output 3045 ml   Net 396.15 ml       Physical Exam  Constitutional:       Comments: Frail, cachectic    HENT:      Head: Normocephalic.      Nose: Nose normal.      Mouth/Throat:      Mouth: Mucous membranes are moist.   Eyes:      Pupils: Pupils are equal, round, and reactive to light.   Cardiovascular:      Rate and Rhythm: Normal rate.   Pulmonary:      Comments: Rhonchi present anteriorly   Abdominal:      General: Abdomen is flat.   Musculoskeletal:         General: Normal range of motion.      Cervical back: Normal range of motion.   Skin:     General: Skin is warm.   Neurological:      General: No focal deficit present.      Mental Status: He is alert and oriented to person, place, and time.   Psychiatric:      Comments: Tearful on exam       Significant Labs:   All pertinent labs from the last 24 hours have been reviewed.    Diagnostic Results:  I have reviewed all pertinent imaging results/findings within the past 24 hours.

## 2022-09-12 NOTE — PROGRESS NOTES
"[facility] Oncology ([facility])  Palliative Medicine  Progress Note    Patient Name: Donis Jimenez  MRN: [id number]  Admission Date: 9/4/2022  Hospital Length of Stay: 8 days  Code Status: DNR   Attending Provider: Kash Boo MD  Consulting Provider: LOREE Dean  Primary Care Physician: Elio Farias MD  Principal Problem:Small cell carcinoma of lung    Patient information was obtained from patient and primary team.      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 Pt has metastatic lung cancer. Pt is alert, oriented to person, place, and situation. Pt is a DNR. Pt is on 3 L O2.     Reason for consult: GOC/ACP. Communicated with MAJRO Camp fellow with [facility].     Today: Met with pt who stated his goal at this time is to be comfortable and receive treatment. Goal is to be back home if possible and have more quality and quantity of life.     9/9/22: Pt very tearful today. Pt says "I know I am dying. " Pt kept apologizing.  Explained to pt there is not a need to apoligize and that that I am glad he felt comfortable to express his feelings. Pt repots he is having a bad day and everything starting to sink in about his health. Support given.  seeing. Will consult Oncology/Psycology for pt. Pt to step down to Hem/onc as soon as bed available.     9/6/22  Goals of care/ACP: Pt to have tracheal stenting tomorrow. Pt reports he is in agreement to procedure and would like CC team to speak to Aston about procedure when he visits today. Per pt, he relays information to Aston but he feels like he forgets some of information to tell him. Pt reports his goal is medical management at this time to " "see if he can improve enough to get back home. Pt reports his goal is to be at home with his four dogs and , Aston. Pt is aware of his severity of his illness.     Today: Met with pt along with Tracie Kidd LCSW.     9/6/22  Introduced role of Palliative care to pt.   Met with pt who is aware of his medical issues. Per pt he is aware he has metastatic cancer. Pt is aware that any therapies/procedures offered to him is palliative and will not cure cancer. Per pt, he is still trying to come to  with his dx. He learned about his issues in August. Pt reports goal at time is to see if anything can be done to help with symptoms and "give him some more time." Spoke to pt about amount of O2 he is on at this time. He verbalized understanding.  Per pt he has spoken to Oncology and XRT MDs.   CTS has been consulted concerning tracheal stenting.      Pt open to continued visits with [facility] care for care planning and symptom management needs.  Support given pt.      Pt reports he is legally  to Aston Collins and he would want him to be hi medical decision-maker if he cannot make his own medical decisions. MPOA paperwork left with pt. Education provided. Per Pt, Aston works during day but can be reached by phone.      Code status: DNR.      Symptom management:     Pain: Pt reports he has pain to back chest area that can get up to 9/10. Pt reports throbbing/shooting pain.     Pt is currently on Dilaudid 1 mg IVP q 4 hrs prn pain.     Recs:   Would start pt on OxyContin 10 mg bid.   Can increase if needed. Will assess.   Continue with Dilaudid IV.      Dyspnea r/t to mediastinal mass, pleural effusion.   Pt is on 3 L O2 per NC.   Pt is on Dilaudid 1 mg IVP q 4 hrs prn.  Pt has chest tube in place and having tracheal stents placed.      Debility r/t to pathological femoral neck fracture s/p ight hip hemiarthroplasty with ablation, cementoplasty  Pt was using walker prior to admit.   Would resume PT/OT when pt stable " to participate.      Anxiety r/t to new dx and dyspnea.   Pt has Ativan 1 mg tid prn.   Continue.      Plan:   Will continue to meet with pt to reinforce realistic expectations/assit with GOC.   See above symptom recs.   Support given.   Will consult Oncology/ Pyschology.     Will follow.               I will follow-up with patient. Please contact us if you have any additional questions.    Subjective:     Chief Complaint:   Chief Complaint   Patient presents with    Shortness of Breath     Pt brought to ED from home via [facility]. Per family pt SOB and productive cough. Pt had double valve replacement, pacemaker and hip surgery 1 week ago.        HPI:   Pt is a 61-year-old male with PMH significant for bacterial endocarditis, s/p AV and MV mechanical valve replacement (on warfarin), CKD, tobacco abuse with recent complicated hospital course who presented to the emergency department with shortness of breath.  Difficult for patient to provide history due to tachypnea; spouse at bedside assisting with history.      Per chart review, patient was admitted 8/18-8/27/22 after sustaining a pathologic right femoral neck fracture and right femoral artery pseudoaneurysm from a fall at home. Patient taken to OR on 8/19 by Vascular Surgery and had embolization of 3rd order right profunda femoral artery pseudoaneurysm with N-BCA glue and 5mm coil aneurysm repair. Pt underwent right hip hemiarthroplasty with ablation, cementoplasty, and percutaneous fixation of pelvis for pathologic fracture and metastatic disease with Ortho on 8/23. 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 Metastatic work-up done  with CT scan of chest/abdomen and pelvis showing extensive disease including mediastinal mass with  encasement of mediastinal vascular structures and airways, MARTHA pulmonary mass, bilateral suprarenal and left renal masses, L1 pathologic fx and rib & skull metastatic disease.      Per chart review, pt's spouse stated that pt had been ambulatory with a walker post-discharge, without c/o SOB, lightheadedness or dizziness. SOB started ~ 9/2 with productive cough. He denies fever/chills, chest pain, abd pain, N/V/D.      In the ED patient was in a flutter and was cardioverted with some improvement in symptoms. He was found to have right sided pneumonia and was started on vanc and cefepime and admitted to Hospital Medicine for further management.      During his time in the ED, the patient had escalating FiO2 requirements, now on 45L 100% comfort flow.      Critical Care Medicine was consulted for worsening hypoxemic respiratory failure    Pt is DNR. Pt on 40 L  @ 60 %      Hospital Course:  No notes on file    Interval History: Pt DNR.    Past Medical History:   Diagnosis Date    Endocarditis     Pacemaker     Pneumonia        Past Surgical History:   Procedure Laterality Date    ANGIOGRAPHY OF LOWER EXTREMITY Right 8/19/2022    Procedure: ANGIOGRAM, LOWER EXTREMITY;  Surgeon: Thomas Nicolas MD;  Location: [address];  Service: Peripheral Vascular;  Laterality: Right;  30.0 min  315.10 mGy  26.1755 Gycm2  84 ml dye    HIP RESURFACING Right 8/23/2022    Procedure: cemontoplasty;  Surgeon: Yung Flores MD;  Location: [address];  Service: Orthopedics;  Laterality: Right;    RADIOFREQUENCY ABLATION, BONE, PERCUTANEOUS Right 8/23/2022    Procedure: RADIOFREQUENCY ABLATION,BONE;  Surgeon: Yung Flores MD;  Location: [address];  Service: Orthopedics;  Laterality: Right;    REPAIR, PSEUDOANEURYSM, ARTERY, FEMORAL Right 8/19/2022    Procedure: REPAIR, PSEUDOANEURYSM, ARTERY, FEMORAL;  Surgeon: Thomas Nicolas MD;  Location: [address];  Service: Peripheral Vascular;  Laterality: Right;   R PFA (3rd branch) pseudoaneurysm     TREATMENT OF CARDIAC ARRHYTHMIA N/A 9/8/2022    Procedure: Cardioversion or Defibrillation;  Surgeon: Lan Mccollum MD;  Location: [facility];  Service: Cardiology;  Laterality: N/A;  AFL, DCCV, ANES, SK, RM [id number]       Review of patient's allergies indicates:   Allergen Reactions    Ambien [zolpidem] Other (See Comments)     Disturbing dreams       Medications:  Continuous Infusions:   sodium chloride 0.9% 125 mL/hr at 09/12/22 0700    heparin (porcine) in D5W 16.084 Units/kg/hr (09/12/22 0700)     Scheduled Meds:   albuterol-ipratropium  3 mL Nebulization Q8H    allopurinoL  300 mg Oral Daily    amiodarone  400 mg Oral BID    Followed by    [START ON 9/26/2022] amiodarone  400 mg Oral Daily    Followed by    [START ON 10/3/2022] amiodarone  400 mg Oral Daily    EScitalopram oxalate  20 mg Oral QHS    filgrastim-sndz  300 mcg Subcutaneous Daily    pantoprazole  40 mg Oral Daily    polyethylene glycol  17 g Oral Daily    pregabalin  75 mg Oral BID    senna-docusate 8.6-50 mg  1 tablet Oral BID    sodium chloride 0.9% flush bag IVPB   Intravenous 1 time in Clinic/HOD    sodium chloride 0.9%  10 mL Intravenous Q6H    sodium chloride 3%  4 mL Nebulization Q8H     PRN Meds:sodium chloride, acetaminophen, alteplase, diphenhydrAMINE, EPINEPHrine, heparin, porcine (PF), HYDROmorphone, LORazepam, melatonin, methocarbamoL, ondansetron, Flushing PICC Protocol **AND** sodium chloride 0.9% **AND** sodium chloride 0.9%, sumatriptan    Family History    None       Tobacco Use    Smoking status: Every Day     Packs/day: 1.00     Types: Cigarettes    Smokeless tobacco: Never   Substance and Sexual Activity    Alcohol use: Not on file    Drug use: Not on file    Sexual activity: Not on file       Review of Systems   Constitutional:  Positive for activity change, fatigue and unexpected weight change.   Respiratory:  Positive for shortness of breath.    Cardiovascular:   Negative for leg swelling.   Gastrointestinal:  Negative for nausea and vomiting.   Musculoskeletal:  Positive for gait problem.   Skin: Negative.    Neurological:  Positive for weakness.   Psychiatric/Behavioral: Negative.     Objective:     Vital Signs (Most Recent):  Temp: 97.8 °F (36.6 °C) (09/12/22 0723)  Pulse: 60 (09/12/22 0724)  Resp: 17 (09/12/22 0956)  BP: 113/67 (09/12/22 0723)  SpO2: (!) 92 % (09/12/22 0724)   Vital Signs (24h Range):  Temp:  [97.5 °F (36.4 °C)-97.8 °F (36.6 °C)] 97.8 °F (36.6 °C)  Pulse:  [59-62] 60  Resp:  [14-20] 17  SpO2:  [92 %-98 %] 92 %  BP: ()/(57-73) 113/67     Weight: 57.2 kg (126 lb)  Body mass index is 18.61 kg/m².    Physical Exam  Constitutional:       Comments: Pt on 3L O2   HENT:      Head: Normocephalic and atraumatic.   Eyes:      General: Lids are normal.      Conjunctiva/sclera: Conjunctivae normal.   Cardiovascular:      Rate and Rhythm: Tachycardia present.   Pulmonary:      Effort: Tachypnea present. No accessory muscle usage or respiratory distress.      Comments: Pt on 3L O2.  Abdominal:      General: Abdomen is flat.   Musculoskeletal:      Cervical back: Full passive range of motion without pain and normal range of motion.      Comments: Edema to left arm.    Skin:     General: Skin is warm and dry.   Neurological:      Mental Status: He is alert and oriented to person, place, and time.   Psychiatric:         Attention and Perception: Attention normal.         Speech: Speech normal.      Comments: Crying       Review of Symptoms      Symptom Assessment (ESAS 0-10 Scale)  Pain:  0  Dyspnea:  0  Anxiety:  0  Nausea:  0  Depression:  0  Anorexia:  0  Fatigue:  0  Insomnia:  0  Restlessness:  0  Agitation:  0         ECOG Performance Status ndGndrndanddndend:nd nd2nd Living Arrangements:  Lives with family    Psychosocial/Cultural: Pt legally  to Aston Collins. Per pt Aston works during the day. Per pt, Aston is his care-giver.       Advance Care Planning   Advance  Directives:   Living Will: No    Do Not Resuscitate Status: Yes    Medical Power of : No    Agent's Name:  Aston Collins    Decision Making:  Patient answered questions       Significant Labs: All pertinent labs within the past 24 hours have been reviewed.  CBC:   Recent Labs   Lab 09/12/22 0442   WBC 20.24*   HGB 9.0*   HCT 28.5*   MCV 95   *       BMP:  Recent Labs   Lab 09/12/22 0442   GLU 74   *   K 3.9   *   CO2 19*   BUN 27*   CREATININE 0.6   CALCIUM 6.7*       LFT:  Lab Results   Component Value Date    AST 24 09/12/2022    ALKPHOS 109 09/12/2022    BILITOT 0.4 09/12/2022     Albumin:   Albumin   Date Value Ref Range Status   09/12/2022 1.7 (L) 3.5 - 5.2 g/dL Final     Protein:   Total Protein   Date Value Ref Range Status   09/12/2022 3.7 (L) 6.0 - 8.4 g/dL Final     Lactic acid:   Lab Results   Component Value Date    LACTATE 1.7 09/05/2022    LACTATE 2.4 (H) 09/04/2022       Significant Imaging: I have reviewed all pertinent imaging results/findings within the past 24 hours.      18 minutes of ACP completed with pt       Zoe Potter, CNS  Palliative Medicine  [facility] [address] - Oncology ([facility])

## 2022-09-12 NOTE — SUBJECTIVE & OBJECTIVE
Therapeutic Intervention: Met with patient and spouse.  Inpatient/Partial Hospital - Behavior modifying psychotherapy 30 min - CPT code 80460    Chief Complaint/Reason for Encounter: adaptation to disease and treatment; anxiety     Interval History and Content of Current Session: Discussed patient's coping and anxiety management techniques (breathing retraining, PMR).    Risk Parameters:  Patient reports no suicidal ideation  Patient reports no homicidal ideation  Patient reports no self-injurious behavior  Patient reports no violent behavior    Verbal Deficits: None    Patient's response to intervention:  The patient's response to intervention is accepting.    Progress toward goals and other mental status changes:  The patient's progress toward goals is good.

## 2022-09-13 PROBLEM — F41.9 ANXIETY: Status: ACTIVE | Noted: 2022-01-01

## 2022-09-13 NOTE — SUBJECTIVE & OBJECTIVE
Interval History: Pt DNR.    Past Medical History:   Diagnosis Date    Endocarditis     Pacemaker     Pneumonia        Past Surgical History:   Procedure Laterality Date    ANGIOGRAPHY OF LOWER EXTREMITY Right 8/19/2022    Procedure: ANGIOGRAM, LOWER EXTREMITY;  Surgeon: Thomas Nicolas MD;  Location: [facility] OR [address] [place] [address];  Service: Peripheral Vascular;  Laterality: Right;  30.0 min  315.10 mGy  26.1755 Gycm2  84 ml dye    HIP RESURFACING Right 8/23/2022    Procedure: cemontoplasty;  Surgeon: Yung Flores MD;  [address];  Service: Orthopedics;  Laterality: Right;    RADIOFREQUENCY ABLATION, BONE, PERCUTANEOUS Right 8/23/2022    Procedure: RADIOFREQUENCY ABLATION,BONE;  Surgeon: Yung Flores MD;  [address];  Service: Orthopedics;  Laterality: Right;    REPAIR, PSEUDOANEURYSM, ARTERY, FEMORAL Right 8/19/2022    Procedure: REPAIR, PSEUDOANEURYSM, ARTERY, FEMORAL;  Surgeon: Thomas Nicolas MD;  [address];  Service: Peripheral Vascular;  Laterality: Right;  R PFA (3rd branch) pseudoaneurysm     TREATMENT OF CARDIAC ARRHYTHMIA N/A 9/8/2022    Procedure: Cardioversion or Defibrillation;  Surgeon: Lan Mccollum MD;  Location: [facility] EP LAB;  Service: Cardiology;  Laterality: N/A;  AFL, DCCV, ANES, SK, RM 6076       Review of patient's allergies indicates:   Allergen Reactions    Ambien [zolpidem] Other (See Comments)     Disturbing dreams       Medications:  Continuous Infusions:   sodium chloride 0.9% 125 mL/hr at 09/13/22 0431    heparin (porcine) in D5W 16.084 Units/kg/hr (09/12/22 1718)     Scheduled Meds:   albuterol-ipratropium  3 mL Nebulization Q8H    allopurinoL  300 mg Oral Daily    amiodarone  400 mg Oral BID    Followed by    [START ON 9/26/2022] amiodarone  400 mg Oral Daily    Followed by    [START ON 10/3/2022] amiodarone  200 mg Oral Daily    EScitalopram oxalate  20 mg Oral QHS    filgrastim-sndz  300 mcg Subcutaneous Daily    morphine  15 mg Oral Q12H     pantoprazole  40 mg Oral Daily    polyethylene glycol  17 g Oral Daily    pregabalin  75 mg Oral BID    senna-docusate 8.6-50 mg  1 tablet Oral BID    sodium chloride 0.9% flush bag IVPB   Intravenous 1 time in Clinic/HOD    sodium chloride 0.9%  10 mL Intravenous Q6H    sodium chloride 3%  4 mL Nebulization Q8H     PRN Meds:sodium chloride, acetaminophen, alteplase, diphenhydrAMINE, EPINEPHrine, heparin, porcine (PF), HYDROmorphone, LORazepam, melatonin, methocarbamoL, ondansetron, Flushing PICC Protocol **AND** sodium chloride 0.9% **AND** sodium chloride 0.9%, sumatriptan    Family History    None       Tobacco Use    Smoking status: Every Day     Packs/day: 1.00     Types: Cigarettes    Smokeless tobacco: Never   Substance and Sexual Activity    Alcohol use: Not on file    Drug use: Not on file    Sexual activity: Not on file       Review of Systems   Constitutional:  Positive for activity change, fatigue and unexpected weight change.   Respiratory:  Positive for shortness of breath.    Cardiovascular:  Negative for leg swelling.   Gastrointestinal:  Negative for nausea and vomiting.   Musculoskeletal:  Positive for gait problem.   Skin: Negative.    Neurological:  Positive for weakness.   Psychiatric/Behavioral: Negative.     Objective:     Vital Signs (Most Recent):  Temp: 98.3 °F (36.8 °C) (09/13/22 0712)  Pulse: 68 (09/13/22 0731)  Resp: 18 (09/13/22 0731)  BP: (!) 178/91 (09/13/22 0712)  SpO2: (!) 92 % (09/13/22 0731)   Vital Signs (24h Range):  Temp:  [97.4 °F (36.3 °C)-98.3 °F (36.8 °C)] 98.3 °F (36.8 °C)  Pulse:  [60-87] 68  Resp:  [17-26] 18  SpO2:  [83 %-100 %] 92 %  BP: (120-178)/(65-91) 178/91     Weight: 57.2 kg (126 lb)  Body mass index is 18.61 kg/m².    Physical Exam  Constitutional:       Comments: Pt on 3L O2   HENT:      Head: Normocephalic and atraumatic.   Eyes:      General: Lids are normal.      Conjunctiva/sclera: Conjunctivae normal.   Cardiovascular:      Rate and Rhythm: Tachycardia  present.   Pulmonary:      Effort: Tachypnea present. No accessory muscle usage or respiratory distress.      Comments: Pt on 3L O2.  Abdominal:      General: Abdomen is flat.   Musculoskeletal:      Cervical back: Full passive range of motion without pain and normal range of motion.      Comments: Edema to left arm.    Skin:     General: Skin is warm and dry.   Neurological:      Mental Status: He is alert and oriented to person, place, and time.   Psychiatric:         Attention and Perception: Attention normal.         Speech: Speech normal.      Comments: Crying       Review of Symptoms      Symptom Assessment (ESAS 0-10 Scale)  Pain:  0  Dyspnea:  0  Anxiety:  0  Nausea:  0  Depression:  0  Anorexia:  0  Fatigue:  0  Insomnia:  0  Restlessness:  0  Agitation:  0         ECOG Performance Status thGthrthathdtheth:th th4th Living Arrangements:  Lives with family    Psychosocial/Cultural: Pt legally  to Aston Collins. Per pt Aston works during the day. Per pt, Aston is his care-giver.       Advance Care Planning   Advance Directives:   Living Will: No    Do Not Resuscitate Status: Yes    Medical Power of : No    Agent's Name:  Aston Collins    Decision Making:  Patient answered questions       Significant Labs: All pertinent labs within the past 24 hours have been reviewed.  CBC:   Recent Labs   Lab 09/13/22 0424   WBC 17.06*   HGB 10.3*   HCT 31.9*   MCV 89   *       BMP:  Recent Labs   Lab 09/13/22 0424   GLU 86   *   K 4.6      CO2 23   BUN 25*   CREATININE 0.8   CALCIUM 8.1*       LFT:  Lab Results   Component Value Date    AST 28 09/13/2022    ALKPHOS 129 09/13/2022    BILITOT 0.6 09/13/2022     Albumin:   Albumin   Date Value Ref Range Status   09/13/2022 2.0 (L) 3.5 - 5.2 g/dL Final     Protein:   Total Protein   Date Value Ref Range Status   09/13/2022 4.3 (L) 6.0 - 8.4 g/dL Final     Lactic acid:   Lab Results   Component Value Date    LACTATE 1.7 09/05/2022    LACTATE 2.4 (H) 09/04/2022        Significant Imaging: I have reviewed all pertinent imaging results/findings within the past 24 hours.

## 2022-09-13 NOTE — PROGRESS NOTES
[facility] - Oncology ([facility])  Hematology/Oncology  Progress Note    Patient Name: Donis Jimenez  Admission Date: 9/4/2022  Hospital Length of Stay: 9 days  Code Status: DNR     Subjective:     HPI:  61 year old man with newly diagnosed metastatic small cell lung cancer complicated by acute hypoxic respiratory failure.  Was previously treated for pathologic right femoral neck fracture.  Pathology finalized as small cell carcinoma. Over his stay in ICU, his O2 requirments were steadily reduced, he is now on nasal cannula. Patient went into afib with RVR and needed cardioversion. He is currently undergoing inpatient chemotherapy with carboplatin (day 1) and etoposide (day 1,2,3). Will need GCSF on day 4.     Patient now feeling anxious about new diagnosis. Psych onc saw patient, will consider consulting psych for new onset anxiety and panic attacks if patient does not improve.       Interval History: No acute events overnight. Will plan to remove chest tube and monitor for fluid accumulation. Patient will need to start warfarin prior to stopping heparin products    Oncology Treatment Plan:   OP SCLC CARBOPLATIN (AUC) ETOPOSIDE DURVALUMAB Q3W FOLLOWED BY MAINTENANCE DURVALUMAB 1500 MG Q4W    Medications:  Continuous Infusions:   sodium chloride 0.9% 125 mL/hr at 09/12/22 1711    heparin (porcine) in D5W 16.084 Units/kg/hr (09/12/22 1718)     Scheduled Meds:   albuterol-ipratropium  3 mL Nebulization Q8H    allopurinoL  300 mg Oral Daily    amiodarone  400 mg Oral BID    Followed by    [START ON 9/26/2022] amiodarone  400 mg Oral Daily    Followed by    [START ON 10/3/2022] amiodarone  200 mg Oral Daily    EScitalopram oxalate  20 mg Oral QHS    filgrastim-sndz  300 mcg Subcutaneous Daily    pantoprazole  40 mg Oral Daily    polyethylene glycol  17 g Oral Daily    pregabalin  75 mg Oral BID    senna-docusate 8.6-50 mg  1 tablet Oral BID    sodium chloride 0.9% flush bag IVPB   Intravenous 1 time in Clinic/HOD    sodium  chloride 0.9%  10 mL Intravenous Q6H    sodium chloride 3%  4 mL Nebulization Q8H     PRN Meds:sodium chloride, acetaminophen, alteplase, diphenhydrAMINE, EPINEPHrine, heparin, porcine (PF), HYDROmorphone, LORazepam, melatonin, methocarbamoL, ondansetron, Flushing PICC Protocol **AND** sodium chloride 0.9% **AND** sodium chloride 0.9%, sumatriptan     Review of Systems   Constitutional:  Positive for activity change, fatigue and unexpected weight change.   Respiratory:  Positive for shortness of breath.    Cardiovascular:  Negative for leg swelling.   Gastrointestinal:  Negative for nausea and vomiting.   Musculoskeletal:  Positive for gait problem.   Skin: Negative.    Neurological:  Positive for weakness.   Psychiatric/Behavioral: Negative.     Objective:     Vital Signs (Most Recent):  Temp: 97.5 °F (36.4 °C) (09/12/22 1949)  Pulse: 60 (09/12/22 2110)  Resp: 20 (09/12/22 1949)  BP: 137/65 (09/12/22 1949)  SpO2: (!) 92 % (09/12/22 2116)   Vital Signs (24h Range):  Temp:  [97.4 °F (36.3 °C)-98.3 °F (36.8 °C)] 97.5 °F (36.4 °C)  Pulse:  [59-64] 60  Resp:  [14-20] 20  SpO2:  [90 %-98 %] 92 %  BP: ()/(57-71) 137/65     Weight: 57.2 kg (126 lb)  Body mass index is 18.61 kg/m².  Body surface area is 1.67 meters squared.      Intake/Output Summary (Last 24 hours) at 9/12/2022 2133  Last data filed at 9/12/2022 1711  Gross per 24 hour   Intake 3710.77 ml   Output 2860 ml   Net 850.77 ml       Physical Exam  Constitutional:       Comments: Frail, cachectic    HENT:      Head: Normocephalic.      Nose: Nose normal.      Mouth/Throat:      Mouth: Mucous membranes are moist.   Eyes:      Pupils: Pupils are equal, round, and reactive to light.   Cardiovascular:      Rate and Rhythm: Normal rate.   Pulmonary:      Comments: Rhonchi present anteriorly   Abdominal:      General: Abdomen is flat.   Musculoskeletal:         General: Normal range of motion.      Cervical back: Normal range of motion.   Skin:     General: Skin  is warm.   Neurological:      General: No focal deficit present.      Mental Status: He is alert and oriented to person, place, and time.       Significant Labs:   All pertinent labs from the last 24 hours have been reviewed.    Diagnostic Results:  I have reviewed all pertinent imaging results/findings within the past 24 hours.    Assessment/Plan:     * Small cell carcinoma of lung  [facility] pathology report confirms small cell lung cancer from bone specimen.  Explained to him and his  that he is in an unfortunate situation.  He will most certainly die of this cancer rather quickly if not given chemotherapy.  While our intent is to improve his tumor burden causing his hypoxic respiratory failure with chemotherapy, it is also possible that we may potentially hasten his death unintentionally due to chemo toxicity. He and his  understand the risks.  - Patient currently on day 2 of inpatient chemotherapy, so far tolerating well  -labs reviewed; carboplatin renally dosed and will proceed with carboplatin and etoposide.  Etoposide given day 2 and day 3.  We will plan to provide GCSF starting day 4 and will do so for at least 7 doses.      - GCSF starting day 4 (9/11) and will do so for at least 7 doses.  - agree with oncology psychology consult  - Will transfer to medical oncology on 9/10    Pain  MMT  - Dilaudid 1mg q4 prn  - Robaxin 500 q6h prn  - Ativan 1mg tid prn    Tumor lysis syndrome  Resolved  Patient now with concerns for tumor lysis syndrome given active chemo with SCLC, will treat prophylactically    - allopurinol 300 mg daily   - Continue daily cbc/cmp  - Uric acid/LDH downtrending     Adjustment disorder with mixed anxiety and depressed mood  Will consult psychiatry for patient tomorrow. Talked to palliative and they stated that patient would not benefit from more pain medications given current emotional state    - Psych consulted, appreciate recs  - Does not want additional medications at this  time for anxiety, wants to continue 20mg lexapro    Exudative pleural effusion  Chest tube placed 9/6, currently still draining. Hopefully will improve w/ drainage and chemotherapy   - pulmonology recommending removal of chest tube, monitor respiratory status    Debility  PT/OT Eval    Acute hypoxemic respiratory failure  Secondary to extensive small cell lung cancer. Currently on 2L O2, satting 92%  - Will need 6MWT prior to discharge      Atrial flutter  Patient had afib with RVR this admission was successfully cardioverted. IV amio load x72 hours completed and transitioned to PO.    - amio  BID for 2 weeks followed by:    - amio 400 daily for 1 week    - amio 200 daily lifetime    - He is on low intensity heparin drip currently for the atrial flutter, will switch to warfarin once chest tube is out (mechanical heart valve)    Malignant neoplasm metastatic to bone  See small cell lung cancer    Pathologic fracture of neck of right femur s/p hemiarthroplasty on 8/23/2022  S/p right hip hemiarthroplasty with ablation, cementoplasty, and percutaneous fixation of pelvis 8/23. Patient is weight bearing as tolerated with posterior hip precautions to right lower extremity as per orthopedics    - Management per primary and ortho  - When more stable consider postop radiation             Frandy Ward MD  Hematology/Oncology  [facility] - Oncology ([facility])      ATTENDING NOTE, ONCOLOGY INPATIENT TEAM    As above; events of last 24 hours noted.  Patient seen and examined, chart reviewed.  Appears comfortable, in NAD, he complains of profound fatigue  Lungs are clear to auscultation.  Abdomen is soft, nontender.  Labs reviewed.  Creatinine is 0.8 mg/dL  Chest tube was removed earlier    PLAN  CBC, CMP and INR daily  Continue heparin drip and start Coumadin daily  Repeat chest x-ray in 2 days  PT/OT to make recommendations regarding his possible discharge  DC allopurinol  We will hold G-CSF for now, and follow his CBC  daily      Kash Boo MD

## 2022-09-13 NOTE — ASSESSMENT & PLAN NOTE
Patient had afib with RVR this admission was successfully cardioverted. IV amio load x72 hours completed and transitioned to PO.    - amio  BID for 2 weeks followed by:    - amio 400 daily for 1 week    - amio 200 daily lifetime    - He is on low intensity heparin drip currently for the atrial flutter, will switch to warfarin once chest tube is out (mechanical heart valve)

## 2022-09-13 NOTE — ASSESSMENT & PLAN NOTE
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 Pt has metastatic lung cancer. Pt is alert, oriented to person, place, and situation. Pt is a DNR. Pt is on 3 L O2.     Reason for consult: GOC/ACP. Communicated with MAJOR Camp fellow with [facility].     Today: Met with pt who stated his goal at this time is to be comfortable and receive treatment. Goal is to be back home if possible and have more quality and quantity of life.     9/9/22: Pt very tearful today. Pt says "I know I am dying. " Pt kept apologizing.  Explained to pt there is not a need to apoligize and that that I am glad he felt comfortable to express his feelings. Pt repots he is having a bad day and everything starting to sink in about his health. Support given.  seeing. Will consult Oncology/Psycology for pt. Pt to step down to Hem/onc as soon as bed available.     9/6/22  Goals of care/ACP: Pt to have tracheal stenting tomorrow. Pt reports he is in agreement to procedure and would like CC team to speak to Aston about procedure when he visits today. Per pt, he relays information to Aston but he feels like he forgets some of information to tell him. Pt reports his goal is medical management at this time to see if he can improve enough to get back home. Pt reports his goal is to be at home with his four dogs and , Aston. Pt is aware of his severity of his illness.     Today: Met with pt along with Tracie Kidd LCSW.     9/6/22  Introduced role of Palliative care to pt.   Met with pt who is aware of his medical issues. Per pt he is aware he has metastatic cancer. Pt is aware that any therapies/procedures offered to him is palliative and will not cure cancer. Per pt, he is still trying to " "come to  with his dx. He learned about his issues in August. Pt reports goal at time is to see if anything can be done to help with symptoms and "give him some more time." Spoke to pt about amount of O2 he is on at this time. He verbalized understanding.  Per pt he has spoken to Oncology and XRT MDs.   CTS has been consulted concerning tracheal stenting.      Pt open to continued visits with [facility] care for care planning and symptom management needs.  Support given pt.      Pt reports he is legally  to Aston Collins and he would want him to be hi medical decision-maker if he cannot make his own medical decisions. MPOA paperwork left with pt. Education provided. Per Pt, Aston works during day but can be reached by phone.      Code status: DNR.      Symptom management:     Pain: Pt reports he has pain to back chest area that can get up to 9/10. Pt reports throbbing/shooting pain.     Pt is currently on Dilaudid 1 mg IVP q 4 hrs prn pain.     Recs:   Would start pt on MSContin 15 mg bid. Can increase if needed.   Continue with Dilaudid IV.   Will need to convert to PO pain meds when pt gets closer to going home.      Dyspnea r/t to mediastinal mass, pleural effusion.   Pt is on 3 L O2 per NC.   Pt is on Dilaudid 1 mg IVP q 4 hrs prn.  Pt has chest tube in place and having tracheal stents placed.      Debility r/t to pathological femoral neck fracture s/p ight hip hemiarthroplasty with ablation, cementoplasty  Pt was using walker prior to admit.   Would resume PT/OT when pt stable to participate.      Anxiety r/t to new dx and dyspnea.   Pt has Ativan 1 mg tid prn.         Plan:   Will continue to meet with pt to reinforce realistic expectations/assit with GOC.   See above symptom recs.   Communicated with Hem/ONC team  Support given.   Will consult Oncology/ Pyschology.     Will follow.         "

## 2022-09-13 NOTE — ASSESSMENT & PLAN NOTE
[facility] pathology report confirms small cell lung cancer from bone specimen.  Explained to him and his  that he is in an unfortunate situation.  He will most certainly die of this cancer rather quickly if not given chemotherapy.  While our intent is to improve his tumor burden causing his hypoxic respiratory failure with chemotherapy, it is also possible that we may potentially hasten his death unintentionally due to chemo toxicity. He and his  understand the risks.  - Patient currently on day 2 of inpatient chemotherapy, so far tolerating well  -labs reviewed; carboplatin renally dosed and will proceed with carboplatin and etoposide.  Etoposide given day 2 and day 3.  We will plan to provide GCSF starting day 4 and will do so for at least 7 doses.      - GCSF starting day 4 (9/11) and will do so for at least 7 doses.  - agree with oncology psychology consult  - Will transfer to medical oncology on 9/10

## 2022-09-13 NOTE — ASSESSMENT & PLAN NOTE
Chest tube placed 9/6, currently still draining. Hopefully will improve w/ drainage and chemotherapy   - pulmonology recommending removal of chest tube, monitor respiratory status

## 2022-09-13 NOTE — PT/OT/SLP PROGRESS
Physical Therapy Treatment    Patient Name:  Donis Jimenez   MRN:  [id number]    Recommendations:     Discharge Recommendations:  home health PT   Discharge Equipment Recommendations:  (TBD pending pt prgoress)   Barriers to discharge:  Increased level of assistance     Assessment:     Donis Jimenez is a 61 y.o. male admitted with a medical diagnosis of Small cell carcinoma of lung.  He presents with the following impairments/functional limitations:  weakness, impaired endurance, impaired functional mobility, impaired self care skills, gait instability, impaired balance, decreased lower extremity function, decreased safety awareness, orthopedic precautions, decreased ROM, impaired cardiopulmonary response to activity. Pt fair to tx session, and will continue to benefit from skilled PT services to improve all deficits noted above. Resume PT POC as indicated.     Rehab Prognosis: Good; patient would benefit from acute skilled PT services to address these deficits and reach maximum level of function.    Recent Surgery: Procedure(s) (LRB):  Cardioversion or Defibrillation (N/A)  Transesophageal echo (MARIE) intra-procedure log documentation 5 Days Post-Op    Plan:     During this hospitalization, patient to be seen 3 x/week to address the identified rehab impairments via gait training, therapeutic activities, therapeutic exercises, neuromuscular re-education and progress toward the following goals:    Plan of Care Expires:  10/03/22    Subjective     Chief Complaint: (R) hip/leg pain  Patient/Family Comments/goals: none stated  Pain/Comfort:  Pain Rating 1:  (not rated)  Location - Side 1: Right  Location 1: hip  Pain Addressed 1: Reposition, Distraction  Pain Rating Post-Intervention 1:  (Not rated)      Objective:     Communicated with nursing prior to session.  Patient found HOB elevated with  (all lines intact and spouse present) upon PT entry to room.     General Precautions: Standard, fall, aspiration   Orthopedic  Precautions:RLE posterior precautions, RLE weight bearing as tolerated   Braces: N/A  Respiratory Status: Nasal cannula     Functional Mobility:  Bed Mobility:  Scooting: minimum assistance  Supine to Sit: minimum assistance  Sit to Supine: minimum assistance  Transfers:  Sit to Stand: to/from EOB x2 trials  minimum assistance with hand-held assist and rolling walker  Gait: Pt took ~4 small shuffled steps to HOB with HHA and Min A. Activity limited 2/2 (R) hip pain.   Balance: Pt sat EOB w/ SBA; Pt stood Min A using RW      AM-PAC 6 CLICK MOBILITY  Turning over in bed (including adjusting bedclothes, sheets and blankets)?: 3  Sitting down on and standing up from a chair with arms (e.g., wheelchair, bedside commode, etc.): 3  Moving from lying on back to sitting on the side of the bed?: 3  Moving to and from a bed to a chair (including a wheelchair)?: 3  Need to walk in hospital room?: 3  Climbing 3-5 steps with a railing?: 1  Basic Mobility Total Score: 16       Therapeutic Activities and Exercises:   -Answered all questions/concerns within PTA scope of practice.   -Pt educated on pursed lip breathing     Patient left HOB elevated with all lines intact, call button in reach, nursing  notified, and spouse present..    GOALS:   Multidisciplinary Problems       Physical Therapy Goals          Problem: Physical Therapy    Goal Priority Disciplines Outcome Goal Variances Interventions   Physical Therapy Goal     PT, PT/OT Ongoing, Progressing     Description: Goals to be met by: 10/3/22     Patient will increase functional independence with mobility by performin. Supine to sit with Modified [place]  2. Sit to stand transfer with Contact Guard Assistance with RW   3. Gait  x 50 feet with Contact Guard Assistance using Rolling Walker.   4 15. Lower extremity exercise program x 15 reps per  with assistance as needed                         Time Tracking:     PT Received On: 22  PT Start Time: 1325     PT  Stop Time: 1350  PT Total Time (min): 25 min     Billable Minutes: Therapeutic Activity 25    Treatment Type: Treatment  PT/PTA: PTA     PTA Visit Number: 1     09/13/2022

## 2022-09-13 NOTE — PT/OT/SLP PROGRESS
"Occupational Therapy   CoTreatment  CoTx performed to optimize pt participation and assessment of full functional capacity.     Name: Donis Jimenez  MRN: [id number]  Admitting Diagnosis:  Small cell carcinoma of lung  5 Days Post-Op    Recommendations:     Discharge Recommendations: home health OT  Discharge Equipment Recommendations:   (TBD)  Barriers to discharge:  Inaccessible home environment, Decreased caregiver support    Assessment:     Donis Jimenez is a 61 y.o. male with a medical diagnosis of Small cell carcinoma of lung.  He presents with fair tolerance to session completing bed mob and func mob with c/o pain 2/2 recent removal of chest tube on this date and pain in R hip from jamia of R hip fracture.  Pt w SOB post func mob requiring increased vcs for breathing management. Performance deficits affecting function are weakness, impaired endurance, impaired functional mobility, gait instability, impaired balance, decreased lower extremity function, decreased safety awareness, pain, impaired cardiopulmonary response to activity.   Pt motivated to return home however not at baseline for ADL and functional mobility performance in addition to concerns of fall risk and would benefit from continued OT services at this time.    Rehab Prognosis:  Fair; patient would benefit from acute skilled OT services to address these deficits and reach maximum level of function.       Plan:     Patient to be seen 3 x/week to address the above listed problems via self-care/home management, therapeutic activities, therapeutic exercises  Plan of Care Expires: 10/07/22  Plan of Care Reviewed with: patient    Subjective   "I need air"  Pain/Comfort:  Pain Rating 1: 8/10  Location - Side 1: Left  Location - Orientation 1: generalized  Location 1: chest (at chest tube removal site)  Pain Addressed 1: Reposition, Distraction, Cessation of Activity  Pain Rating Post-Intervention 1:  (no rating given)    Objective:     Communicated with: RN " prior to session.  Patient found supine with oxygen, peripheral IV upon OT entry to room.    General Precautions: Standard, fall, aspiration   Orthopedic Precautions:RLE weight bearing as tolerated, RLE posterior precautions   Braces: N/A  Respiratory Status: Nasal cannula, flow 3 L/min     Occupational Performance:     Bed Mobility:    Patient completed Rolling/Turning to Right with minimum assistance  Patient completed Scooting/Bridging with minimum assistance  Patient completed Supine to Sit with minimum assistance  Patient completed Sit to Supine with minimum assistance   Good EOB sitting balance   Decreased posture    Functional Mobility/Transfers:  Patient completed Sit <> Stand Transfer with minimum assistance  with  rolling walker on 1st trial  Patient completed Sit <> Stand Transfer with minimum assistance  with  B HHA on 2nd trial  Functional Mobility: pt completed functional ambulation ~4 sidesteps to simulate household mobility requiring B HHA and min A    Activities of Daily Living:  Upper Body Dressing: moderate assistance don gown while supine in bed        [facility] 6 Click ADL: 16    Treatment & Education:  Pt educated on scope of practice and importance of daily functional mobility.   Pt educated on safety precautions during transfers  Pt updated on POC and discharge recc  White board updated to reflect pt status and visual reminder included on board instructing her to call for nurse as needed.      Patient left supine with all lines intact, call button in reach, and spouse present    GOALS:   Multidisciplinary Problems       Occupational Therapy Goals          Problem: Occupational Therapy    Goal Priority Disciplines Outcome Interventions   Occupational Therapy Goal     OT, PT/OT Ongoing, Progressing    Description: Goals set on 9/7 with expiration date 9/21:  Patient will increase functional independence with ADLs by performing:    Supine <> Sit with Stand-by Assistance.  Feeding with set  up  Grooming while seated at sink with Stand-by Assistance.  UB Dressing with Stand-by Assistanceset up at bed level..  LB Dressing with Stand-by Assistance set up at bed level.  Stand pivot transfer with Min Assistance with DME as needed.  Pt will demonstrate understanding of education provided regarding energy conservation and task modification through teach-back method.                            Time Tracking:     OT Date of Treatment: 09/13/22  OT Start Time: 1325  OT Stop Time: 1350  OT Total Time (min): 25 min    Billable Minutes:Self Care/Home Management 25    OT/AP: OT          9/13/2022

## 2022-09-13 NOTE — CONSULTS
Issac Moore - Oncology ([facility])  Pulmonology  Consult Note    Patient Name: Donis Jimenez  MRN: [id number]  Admission Date: 9/4/2022  Hospital Length of Stay: 9 days  Code Status: DNR  Attending Physician: Kash Boo MD  Primary Care Provider: Elio Farias MD   Principal Problem: Small cell carcinoma of lung    Inpatient consult to Pulmonology  Consult performed by: Nathan Parker MD  Consult ordered by: Steven Silva DO  Reason for consult: chest tube  Assessment/Recommendations: - recommend removing chest tube today if patient amenable to plan     - If fluid builds up significantly after removal will seek placement of long term PleurX drain  - recommend mucinex for phlegm.           Subjective:     HPI:  Donis Jimenez is a 61 y.o. M with pmhx of AV&MV mechanical valve replacement, CKD, and smoking, who presented with SOB. History is notable for recent admit 8/18-8/27 for pathologic femoral neck fracture. He was admitted 9/4 for acute respiratory failure with concern for sepsis. CT chest at that time showed L upper lobe mass with pericardial & L sided pleural effusions. He was admitted to medicine initially but was later stepped up to MICU. He underwent thoracentesis & L sided chest tube placement 9/6. Pleural fluid analysis revealed exudative fluid diagnostic for small cell lung cancer. Tube output declined 9/6 through 9/9 but increased since then. Output is serous yellow fluid. He was seen by heme/onc and has completed one cycle of carboplatin etoposide chemotherapy. His major barrier to discharge at this time is his chest tube. CXR 9/12 demonstrated a small L apical pneumothorax as well as a R sided opacity. Pulmonology consulted for chest tube evaluation & removal.    Mr. Jimenez reports feeling much better since the tube was placed and states his SOB is much less although he still requires O2 and is coughing up mucus. He reports having some substernal chest pressure/pain worse with anxiety.  This pain radiates to his back. He reports feeling anxious with his bipap nightly. His voice is very quiet and hoarse which he attributes to airway compression 2/2 cancer. He denies other symptoms. On exam there is a L sided chest tube in place set to water seal with no air leak. The tube site is nontender, leaking fluid, and with dressing in place. On auscultation his R lung is clear and his L lower field has crackles. Heart sounds are WNL. Abdomen nontender. Pt is with  at bedside.           Past Medical History:   Diagnosis Date    Endocarditis     Pacemaker     Pneumonia        Past Surgical History:   Procedure Laterality Date    ANGIOGRAPHY OF LOWER EXTREMITY Right 8/19/2022    Procedure: ANGIOGRAM, LOWER EXTREMITY;  Surgeon: Thomas Nicolas MD;  Location: [facility] OR [address] [place] [address];  Service: Peripheral Vascular;  Laterality: Right;  30.0 min  315.10 mGy  26.1755 Gycm2  84 ml dye    HIP RESURFACING Right 8/23/2022    Procedure: cemontoplasty;  Surgeon: Yung Flores MD;  [facility];  Service: Orthopedics;  Laterality: Right;    RADIOFREQUENCY ABLATION, BONE, PERCUTANEOUS Right 8/23/2022    Procedure: RADIOFREQUENCY ABLATION,BONE;  Surgeon: Yung Flores MD;  [facility];  Service: Orthopedics;  Laterality: Right;    REPAIR, PSEUDOANEURYSM, ARTERY, FEMORAL Right 8/19/2022    Procedure: REPAIR, PSEUDOANEURYSM, ARTERY, FEMORAL;  Surgeon: Thomas Nicolas MD;  [facility];  Service: Peripheral Vascular;  Laterality: Right;  R PFA (3rd branch) pseudoaneurysm     TREATMENT OF CARDIAC ARRHYTHMIA N/A 9/8/2022    Procedure: Cardioversion or Defibrillation;  Surgeon: Lan Mccollum MD;  Location: [facility] EP LAB;  Service: Cardiology;  Laterality: N/A;  AFL, DCCV, ANES, SK, RM [id number]       Review of patient's allergies indicates:   Allergen Reactions    Ambien [zolpidem] Other (See Comments)     Disturbing dreams       Family History    None       Tobacco Use    Smoking status:  Every Day     Packs/day: 1.00     Types: Cigarettes    Smokeless tobacco: Never   Substance and Sexual Activity    Alcohol use: Not on file    Drug use: Not on file    Sexual activity: Not on file         Review of Systems   Constitutional:  Negative for chills and fever.   HENT:  Negative for congestion and sore throat.    Respiratory:  Positive for cough, chest tightness and shortness of breath. Negative for wheezing.    Cardiovascular:  Positive for chest pain and palpitations. Negative for leg swelling.   Gastrointestinal:  Negative for abdominal distention, abdominal pain, blood in stool, constipation, diarrhea, nausea and vomiting.   Genitourinary:  Negative for dysuria, flank pain, frequency and hematuria.   Neurological:  Positive for weakness. Negative for dizziness, light-headedness and headaches.   Psychiatric/Behavioral:  The patient is nervous/anxious.    Objective:     Vital Signs (Most Recent):  Temp: 98.3 °F (36.8 °C) (09/13/22 0712)  Pulse: 68 (09/13/22 0731)  Resp: 20 (09/13/22 1003)  BP: (!) 178/91 (09/13/22 0712)  SpO2: (!) 92 % (09/13/22 0731)   Vital Signs (24h Range):  Temp:  [97.4 °F (36.3 °C)-98.3 °F (36.8 °C)] 98.3 °F (36.8 °C)  Pulse:  [60-87] 68  Resp:  [18-26] 20  SpO2:  [83 %-100 %] 92 %  BP: (120-178)/(65-91) 178/91     Weight: 57.2 kg (126 lb)  Body mass index is 18.61 kg/m².      Intake/Output Summary (Last 24 hours) at 9/13/2022 1106  Last data filed at 9/13/2022 0807  Gross per 24 hour   Intake 2257.2 ml   Output 3000 ml   Net -742.8 ml       Physical Exam  Vitals and nursing note reviewed.   Constitutional:       Appearance: He is ill-appearing.   HENT:      Head: Normocephalic and atraumatic.      Nose: Nose normal.      Mouth/Throat:      Mouth: Mucous membranes are moist.   Eyes:      General: No scleral icterus.     Conjunctiva/sclera: Conjunctivae normal.   Cardiovascular:      Rate and Rhythm: Normal rate and regular rhythm.      Pulses: Normal pulses.      Heart sounds:  Normal heart sounds. No murmur heard.    No gallop.   Pulmonary:      Effort: Pulmonary effort is normal.      Breath sounds: Rhonchi (L sided) present. No wheezing.   Abdominal:      General: Abdomen is flat. There is no distension.      Tenderness: There is no abdominal tenderness. There is no right CVA tenderness, left CVA tenderness or guarding.   Musculoskeletal:      Right lower leg: No edema.      Left lower leg: No edema.   Skin:     General: Skin is warm and dry.      Capillary Refill: Capillary refill takes less than 2 seconds.      Coloration: Skin is not jaundiced.   Neurological:      General: No focal deficit present.      Mental Status: He is alert and oriented to person, place, and time. Mental status is at baseline.       Vents:  Oxygen Concentration (%): 28 (09/13/22 0731)    Lines/Drains/Airways       Peripherally Inserted Central Catheter Line  Duration             PICC Double Lumen 09/07/22 1734 right basilic 5 days              Drain  Duration                  Chest Tube 09/06/22 1300 1 Left Midaxillary 14 Fr. 6 days              Peripheral Intravenous Line  Duration                  Peripheral IV - Single Lumen 09/11/22 1354 20 G Anterior;Left Upper Arm 1 day                    Significant Labs:    CBC/Anemia Profile:  Recent Labs   Lab 09/12/22  0442 09/13/22  0424   WBC 20.24* 17.06*   HGB 9.0* 10.3*   HCT 28.5* 31.9*   * 126*   MCV 95 89   RDW 20.1* 19.9*        Chemistries:  Recent Labs   Lab 09/11/22  1713 09/11/22  2257 09/12/22  0442 09/13/22  0424   * 131* 134* 133*   K 4.3 4.5 3.9 4.6    102 111* 106   CO2 19* 22* 19* 23   BUN 32* 32* 27* 25*   CREATININE 0.8 0.8 0.6 0.8   CALCIUM 7.3* 7.8* 6.7* 8.1*   ALBUMIN 2.1* 2.1* 1.7* 2.0*   PROT 4.4* 4.4* 3.7* 4.3*   BILITOT 0.4 0.5 0.4 0.6   ALKPHOS 133 136* 109 129   ALT 18 19 15 15   AST 28 30 24 28   PHOS 2.9 3.5 2.8  --        All pertinent labs within the past 24 hours have been reviewed.    Significant Imaging:   I  have reviewed all pertinent imaging results/findings within the past 24 hours.  CXR: I have reviewed all pertinent results/findings within the past 24 hours and my personal findings are:  Worsening R sided opacity    Assessment/Plan:     Exudative pleural effusion  Patient s/p chest tube insertion 9/6 for pleural effusion. Pulmonology consulted for chest tube management. Chest tube drainage initially decreasing but now increasing with over 1L drainage / day 9/11-9/13. Tube site leaking on exam 9/13. On CXR L side appears unchanged since prior. Per primary, fluid production should decrease with chemotherapy.   - recommend removing chest tube today if patient amenable to plan     - If fluid builds up significantly after removal will seek placement of long term PleurX drain  - recommend mucinex for phlegm.           Thank you for your consult. I will follow-up with patient. Please contact us if you have any additional questions.     Nathan Parker MD  Pulmonology  [facility] - Oncology ([facility])

## 2022-09-13 NOTE — PROGRESS NOTES
"Issac Moore - Oncology ([facility])  Palliative Medicine  Progress Note    Patient Name: Donis Jimenez  MRN: [id number]  Admission Date: 9/4/2022  Hospital Length of Stay: 9 days  Code Status: DNR   Attending Provider: Kash Boo MD  Consulting Provider: LOREE Dean  Primary Care Physician: Elio Farias MD  Principal Problem:Small cell carcinoma of lung    Patient information was obtained from patient and primary team.      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 Pt has metastatic lung cancer. Pt is alert, oriented to person, place, and situation. Pt is a DNR. Pt is on 3 L O2.     Reason for consult: GOC/ACP. Communicated with MAJOR Camp fellow with [facility].     Today: Met with pt who stated his goal at this time is to be comfortable and receive treatment. Goal is to be back home if possible and have more quality and quantity of life.     9/9/22: Pt very tearful today. Pt says "I know I am dying. " Pt kept apologizing.  Explained to pt there is not a need to apoligize and that that I am glad he felt comfortable to express his feelings. Pt repots he is having a bad day and everything starting to sink in about his health. Support given.  seeing. Will consult Oncology/Psycology for pt. Pt to step down to Hem/onc as soon as bed available.     9/6/22  Goals of care/ACP: Pt to have tracheal stenting tomorrow. Pt reports he is in agreement to procedure and would like CC team to speak to Aston about procedure when he visits today. Per pt, he relays information to Aston but he feels like he forgets some of information to tell him. Pt reports his goal is medical management at this time to " "see if he can improve enough to get back home. Pt reports his goal is to be at home with his four dogs and , Aston. Pt is aware of his severity of his illness.     Today: Met with pt along with Tracie Kidd LCSW.     9/6/22  Introduced role of Palliative care to pt.   Met with pt who is aware of his medical issues. Per pt he is aware he has metastatic cancer. Pt is aware that any therapies/procedures offered to him is palliative and will not cure cancer. Per pt, he is still trying to come to  with his dx. He learned about his issues in August. Pt reports goal at time is to see if anything can be done to help with symptoms and "give him some more time." Spoke to pt about amount of O2 he is on at this time. He verbalized understanding.  Per pt he has spoken to Oncology and XRT MDs.   CTS has been consulted concerning tracheal stenting.      Pt open to continued visits with [facility] care for care planning and symptom management needs.  Support given pt.      Pt reports he is legally  to Aston Collins and he would want him to be hi medical decision-maker if he cannot make his own medical decisions. MPOA paperwork left with pt. Education provided. Per Pt, Aston works during day but can be reached by phone.      Code status: DNR.      Symptom management:     Pain: Pt reports he has pain to back chest area that can get up to 9/10. Pt reports throbbing/shooting pain.     Pt is currently on Dilaudid 1 mg IVP q 4 hrs prn pain.     Recs:   Would start pt on MSContin 15 mg bid. Can increase if needed.   Continue with Dilaudid IV.   Will need to convert to PO pain meds when pt gets closer to going home.      Dyspnea r/t to mediastinal mass, pleural effusion.   Pt is on 3 L O2 per NC.   Pt is on Dilaudid 1 mg IVP q 4 hrs prn.  Pt has chest tube in place and having tracheal stents placed.      Debility r/t to pathological femoral neck fracture s/p ight hip hemiarthroplasty with ablation, cementoplasty  Pt was using " walker prior to admit.   Would resume PT/OT when pt stable to participate.      Anxiety r/t to new dx and dyspnea.   Pt has Ativan 1 mg tid prn. Pt reports does not last till next dose.     Recs:  Consider Ativan 1 mg q 6 hrs prn anxiety.         Plan:   Will continue to meet with pt to reinforce realistic expectations/assit with GOC.   See above symptom recs.   Communicated with Hem/ONC team  Support given.   Oncology/ Pyschology seeing pt.     Will follow.               I will follow-up with patient. Please contact us if you have any additional questions.    Subjective:     Chief Complaint:   Chief Complaint   Patient presents with    Shortness of Breath     Pt brought to ED from home via [facility]. Per family pt SOB and productive cough. Pt had double valve replacement, pacemaker and hip surgery 1 week ago.        HPI:   Pt is a 61-year-old male with PMH significant for bacterial endocarditis, s/p AV and MV mechanical valve replacement (on warfarin), CKD, tobacco abuse with recent complicated hospital course who presented to the emergency department with shortness of breath.  Difficult for patient to provide history due to tachypnea; spouse at bedside assisting with history.      Per chart review, patient was admitted 8/18-8/27/22 after sustaining a pathologic right femoral neck fracture and right femoral artery pseudoaneurysm from a fall at home. Patient taken to OR on 8/19 by Vascular Surgery and had embolization of 3rd order right profunda femoral artery pseudoaneurysm with N-BCA glue and 5mm coil aneurysm repair. Pt underwent right hip hemiarthroplasty with ablation, cementoplasty, and percutaneous fixation of pelvis for pathologic fracture and metastatic disease with Ortho on 8/23. 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 Metastatic work-up done  with CT scan of chest/abdomen and pelvis showing extensive disease including mediastinal mass with encasement of mediastinal vascular structures and airways, MARTHA pulmonary mass, bilateral suprarenal and left renal masses, L1 pathologic fx and rib & skull metastatic disease.      Per chart review, pt's spouse stated that pt had been ambulatory with a walker post-discharge, without c/o SOB, lightheadedness or dizziness. SOB started ~ 9/2 with productive cough. He denies fever/chills, chest pain, abd pain, N/V/D.      In the ED patient was in a flutter and was cardioverted with some improvement in symptoms. He was found to have right sided pneumonia and was started on vanc and cefepime and admitted to Hospital Medicine for further management.      During his time in the ED, the patient had escalating FiO2 requirements, now on 45L 100% comfort flow.      Critical Care Medicine was consulted for worsening hypoxemic respiratory failure    Pt is DNR. Pt on 40 L  @ 60 %      Hospital Course:  No notes on file    Interval History: Pt DNR.    Past Medical History:   Diagnosis Date    Endocarditis     Pacemaker     Pneumonia        Past Surgical History:   Procedure Laterality Date    ANGIOGRAPHY OF LOWER EXTREMITY Right 8/19/2022    Procedure: ANGIOGRAM, LOWER EXTREMITY;  Surgeon: Thomas Nicolas MD;  Location: [facility] OR [address] [place] [address];  Service: Peripheral Vascular;  Laterality: Right;  30.0 min  315.10 mGy  26.1755 Gycm2  84 ml dye    HIP RESURFACING Right 8/23/2022    Procedure: cemontoplasty;  Surgeon: Yung Flores MD;  Location: [facility] OR [address] [place] [address];  Service: Orthopedics;  Laterality: Right;    RADIOFREQUENCY ABLATION, BONE, PERCUTANEOUS Right 8/23/2022    Procedure: RADIOFREQUENCY ABLATION,BONE;  Surgeon: Yung Flores MD;  Location: [facility] OR [address] [place] [address];  Service: Orthopedics;  Laterality: Right;    REPAIR, PSEUDOANEURYSM, ARTERY, FEMORAL Right  8/19/2022    Procedure: REPAIR, PSEUDOANEURYSM, ARTERY, FEMORAL;  Surgeon: Thomas Nicolas MD;  [facility];  Service: Peripheral Vascular;  Laterality: Right;  R PFA (3rd branch) pseudoaneurysm     TREATMENT OF CARDIAC ARRHYTHMIA N/A 9/8/2022    Procedure: Cardioversion or Defibrillation;  Surgeon: Lan Mccollum MD;  Location: [facility] EP LAB;  Service: Cardiology;  Laterality: N/A;  AFL, DCCV, ANES, SK, RM 6076       Review of patient's allergies indicates:   Allergen Reactions    Ambien [zolpidem] Other (See Comments)     Disturbing dreams       Medications:  Continuous Infusions:   sodium chloride 0.9% 125 mL/hr at 09/13/22 0431    heparin (porcine) in D5W 16.084 Units/kg/hr (09/12/22 1718)     Scheduled Meds:   albuterol-ipratropium  3 mL Nebulization Q8H    allopurinoL  300 mg Oral Daily    amiodarone  400 mg Oral BID    Followed by    [START ON 9/26/2022] amiodarone  400 mg Oral Daily    Followed by    [START ON 10/3/2022] amiodarone  200 mg Oral Daily    EScitalopram oxalate  20 mg Oral QHS    filgrastim-sndz  300 mcg Subcutaneous Daily    morphine  15 mg Oral Q12H    pantoprazole  40 mg Oral Daily    polyethylene glycol  17 g Oral Daily    pregabalin  75 mg Oral BID    senna-docusate 8.6-50 mg  1 tablet Oral BID    sodium chloride 0.9% flush bag IVPB   Intravenous 1 time in Clinic/HOD    sodium chloride 0.9%  10 mL Intravenous Q6H    sodium chloride 3%  4 mL Nebulization Q8H     PRN Meds:sodium chloride, acetaminophen, alteplase, diphenhydrAMINE, EPINEPHrine, heparin, porcine (PF), HYDROmorphone, LORazepam, melatonin, methocarbamoL, ondansetron, Flushing PICC Protocol **AND** sodium chloride 0.9% **AND** sodium chloride 0.9%, sumatriptan    Family History    None       Tobacco Use    Smoking status: Every Day     Packs/day: 1.00     Types: Cigarettes    Smokeless tobacco: Never   Substance and Sexual Activity    Alcohol use: Not on file    Drug use: Not on file    Sexual  activity: Not on file       Review of Systems   Constitutional:  Positive for activity change, fatigue and unexpected weight change.   Respiratory:  Positive for shortness of breath.    Cardiovascular:  Negative for leg swelling.   Gastrointestinal:  Negative for nausea and vomiting.   Musculoskeletal:  Positive for gait problem.   Skin: Negative.    Neurological:  Positive for weakness.   Psychiatric/Behavioral: Negative.     Objective:     Vital Signs (Most Recent):  Temp: 98.3 °F (36.8 °C) (09/13/22 0712)  Pulse: 68 (09/13/22 0731)  Resp: 18 (09/13/22 0731)  BP: (!) 178/91 (09/13/22 0712)  SpO2: (!) 92 % (09/13/22 0731)   Vital Signs (24h Range):  Temp:  [97.4 °F (36.3 °C)-98.3 °F (36.8 °C)] 98.3 °F (36.8 °C)  Pulse:  [60-87] 68  Resp:  [17-26] 18  SpO2:  [83 %-100 %] 92 %  BP: (120-178)/(65-91) 178/91     Weight: 57.2 kg (126 lb)  Body mass index is 18.61 kg/m².    Physical Exam  Constitutional:       Comments: Pt on 3L O2   HENT:      Head: Normocephalic and atraumatic.   Eyes:      General: Lids are normal.      Conjunctiva/sclera: Conjunctivae normal.   Cardiovascular:      Rate and Rhythm: Tachycardia present.   Pulmonary:      Effort: Tachypnea present. No accessory muscle usage or respiratory distress.      Comments: Pt on 3L O2.  Abdominal:      General: Abdomen is flat.   Musculoskeletal:      Cervical back: Full passive range of motion without pain and normal range of motion.      Comments: Edema to left arm.    Skin:     General: Skin is warm and dry.   Neurological:      Mental Status: He is alert and oriented to person, place, and time.   Psychiatric:         Attention and Perception: Attention normal.         Speech: Speech normal.      Comments: Crying       Review of Symptoms      Symptom Assessment (ESAS 0-10 Scale)  Pain:  0  Dyspnea:  0  Anxiety:  0  Nausea:  0  Depression:  0  Anorexia:  0  Fatigue:  0  Insomnia:  0  Restlessness:  0  Agitation:  0         ECOG Performance Status Grade:   3    Living Arrangements:  Lives with family    Psychosocial/Cultural: Pt legally  to Aston Collins. Per pt Aston works during the day. Per pt, Aston is his care-giver.       Advance Care Planning   Advance Directives:   Living Will: No    Do Not Resuscitate Status: Yes    Medical Power of : No    Agent's Name:  Aston Collins    Decision Making:  Patient answered questions       Significant Labs: All pertinent labs within the past 24 hours have been reviewed.  CBC:   Recent Labs   Lab 09/13/22 0424   WBC 17.06*   HGB 10.3*   HCT 31.9*   MCV 89   *       BMP:  Recent Labs   Lab 09/13/22 0424   GLU 86   *   K 4.6      CO2 23   BUN 25*   CREATININE 0.8   CALCIUM 8.1*       LFT:  Lab Results   Component Value Date    AST 28 09/13/2022    ALKPHOS 129 09/13/2022    BILITOT 0.6 09/13/2022     Albumin:   Albumin   Date Value Ref Range Status   09/13/2022 2.0 (L) 3.5 - 5.2 g/dL Final     Protein:   Total Protein   Date Value Ref Range Status   09/13/2022 4.3 (L) 6.0 - 8.4 g/dL Final     Lactic acid:   Lab Results   Component Value Date    LACTATE 1.7 09/05/2022    LACTATE 2.4 (H) 09/04/2022       Significant Imaging: I have reviewed all pertinent imaging results/findings within the past 24 hours.        Zoe Potter, CNS  Palliative Medicine  [facility] - Oncology ([facility])

## 2022-09-13 NOTE — ASSESSMENT & PLAN NOTE
Patient s/p chest tube insertion 9/6 for pleural effusion. Pulmonology consulted for chest tube management. Chest tube drainage initially decreasing but now increasing with over 1L drainage / day 9/11-9/13. Tube site leaking on exam 9/13. On CXR L side appears unchanged since prior. Per primary, fluid production should decrease with chemotherapy.   - recommend removing chest tube today if patient amenable to plan     - If fluid builds up significantly after removal will seek placement of long term PleurX drain  - recommend mucinex for phlegm.

## 2022-09-13 NOTE — PLAN OF CARE
Patient AAOX4, repositioning with minimal assistance in bed, and fall precautions maintained. Expressing anxiety this AM and tachypnea present. PRN PO ativan administered with mild relief. PRN dilaudid continued for hip pain. Significant other at the bedside this afternoon and involved in care. Chest tube removed and dressing applied. Leaking noted following removal but has slowed down. Worked with PT. Oxygen saturation maintained greater than 90% on 3L NC. Patient to restart Coumadin today, awaiting verification. Patient stable, at this time.

## 2022-09-13 NOTE — SUBJECTIVE & OBJECTIVE
Past Medical History:   Diagnosis Date    Endocarditis     Pacemaker     Pneumonia        Past Surgical History:   Procedure Laterality Date    ANGIOGRAPHY OF LOWER EXTREMITY Right 8/19/2022    Procedure: ANGIOGRAM, LOWER EXTREMITY;  Surgeon: Thomas Nicolas MD;  Location: [address];  Service: Peripheral Vascular;  Laterality: Right;  30.0 min  315.10 mGy  26.1755 Gycm2  84 ml dye    HIP RESURFACING Right 8/23/2022    Procedure: cemontoplasty;  Surgeon: Yung Flores MD;  [address];  Service: Orthopedics;  Laterality: Right;    RADIOFREQUENCY ABLATION, BONE, PERCUTANEOUS Right 8/23/2022    Procedure: RADIOFREQUENCY ABLATION,BONE;  Surgeon: Yung Flores MD;  Location: [facility] OR [address] [place] [address];  Service: Orthopedics;  Laterality: Right;    REPAIR, PSEUDOANEURYSM, ARTERY, FEMORAL Right 8/19/2022    Procedure: REPAIR, PSEUDOANEURYSM, ARTERY, FEMORAL;  Surgeon: Thomas Nicolas MD;  Location: [address];  Service: Peripheral Vascular;  Laterality: Right;  R PFA (3rd branch) pseudoaneurysm     TREATMENT OF CARDIAC ARRHYTHMIA N/A 9/8/2022    Procedure: Cardioversion or Defibrillation;  Surgeon: Lan Mccollum MD;  Location: [facility] EP LAB;  Service: Cardiology;  Laterality: N/A;  AFL, DCCV, ANES, SK, RM [id number]       Review of patient's allergies indicates:   Allergen Reactions    Ambien [zolpidem] Other (See Comments)     Disturbing dreams       Family History    None       Tobacco Use    Smoking status: Every Day     Packs/day: 1.00     Types: Cigarettes    Smokeless tobacco: Never   Substance and Sexual Activity    Alcohol use: Not on file    Drug use: Not on file    Sexual activity: Not on file         Review of Systems   Constitutional:  Negative for chills and fever.   HENT:  Negative for congestion and sore throat.    Respiratory:  Positive for cough, chest tightness and shortness of breath. Negative for wheezing.    Cardiovascular:  Positive for chest pain and palpitations. Negative for leg  swelling.   Gastrointestinal:  Negative for abdominal distention, abdominal pain, blood in stool, constipation, diarrhea, nausea and vomiting.   Genitourinary:  Negative for dysuria, flank pain, frequency and hematuria.   Neurological:  Positive for weakness. Negative for dizziness, light-headedness and headaches.   Psychiatric/Behavioral:  The patient is nervous/anxious.    Objective:     Vital Signs (Most Recent):  Temp: 98.3 °F (36.8 °C) (09/13/22 0712)  Pulse: 68 (09/13/22 0731)  Resp: 20 (09/13/22 1003)  BP: (!) 178/91 (09/13/22 0712)  SpO2: (!) 92 % (09/13/22 0731)   Vital Signs (24h Range):  Temp:  [97.4 °F (36.3 °C)-98.3 °F (36.8 °C)] 98.3 °F (36.8 °C)  Pulse:  [60-87] 68  Resp:  [18-26] 20  SpO2:  [83 %-100 %] 92 %  BP: (120-178)/(65-91) 178/91     Weight: 57.2 kg (126 lb)  Body mass index is 18.61 kg/m².      Intake/Output Summary (Last 24 hours) at 9/13/2022 1106  Last data filed at 9/13/2022 0807  Gross per 24 hour   Intake 2257.2 ml   Output 3000 ml   Net -742.8 ml       Physical Exam  Vitals and nursing note reviewed.   Constitutional:       Appearance: He is ill-appearing.   HENT:      Head: Normocephalic and atraumatic.      Nose: Nose normal.      Mouth/Throat:      Mouth: Mucous membranes are moist.   Eyes:      General: No scleral icterus.     Conjunctiva/sclera: Conjunctivae normal.   Cardiovascular:      Rate and Rhythm: Normal rate and regular rhythm.      Pulses: Normal pulses.      Heart sounds: Normal heart sounds. No murmur heard.    No gallop.   Pulmonary:      Effort: Pulmonary effort is normal.      Breath sounds: Rhonchi (L sided) present. No wheezing.   Abdominal:      General: Abdomen is flat. There is no distension.      Tenderness: There is no abdominal tenderness. There is no right CVA tenderness, left CVA tenderness or guarding.   Musculoskeletal:      Right lower leg: No edema.      Left lower leg: No edema.   Skin:     General: Skin is warm and dry.      Capillary Refill:  Capillary refill takes less than 2 seconds.      Coloration: Skin is not jaundiced.   Neurological:      General: No focal deficit present.      Mental Status: He is alert and oriented to person, place, and time. Mental status is at baseline.       Vents:  Oxygen Concentration (%): 28 (09/13/22 0731)    Lines/Drains/Airways       Peripherally Inserted Central Catheter Line  Duration             PICC Double Lumen 09/07/22 1734 right basilic 5 days              Drain  Duration                  Chest Tube 09/06/22 1300 1 Left Midaxillary 14 Fr. 6 days              Peripheral Intravenous Line  Duration                  Peripheral IV - Single Lumen 09/11/22 1354 20 G Anterior;Left Upper Arm 1 day                    Significant Labs:    CBC/Anemia Profile:  Recent Labs   Lab 09/12/22  0442 09/13/22  0424   WBC 20.24* 17.06*   HGB 9.0* 10.3*   HCT 28.5* 31.9*   * 126*   MCV 95 89   RDW 20.1* 19.9*        Chemistries:  Recent Labs   Lab 09/11/22  1713 09/11/22  2257 09/12/22  0442 09/13/22  0424   * 131* 134* 133*   K 4.3 4.5 3.9 4.6    102 111* 106   CO2 19* 22* 19* 23   BUN 32* 32* 27* 25*   CREATININE 0.8 0.8 0.6 0.8   CALCIUM 7.3* 7.8* 6.7* 8.1*   ALBUMIN 2.1* 2.1* 1.7* 2.0*   PROT 4.4* 4.4* 3.7* 4.3*   BILITOT 0.4 0.5 0.4 0.6   ALKPHOS 133 136* 109 129   ALT 18 19 15 15   AST 28 30 24 28   PHOS 2.9 3.5 2.8  --        All pertinent labs within the past 24 hours have been reviewed.    Significant Imaging:   I have reviewed all pertinent imaging results/findings within the past 24 hours.  CXR: I have reviewed all pertinent results/findings within the past 24 hours and my personal findings are:  Worsening R sided opacity

## 2022-09-13 NOTE — PROGRESS NOTES
Admit Assessment    Patient Identification  Donis Jimenez   :  1960  Admit Date:  2022  Attending Provider:  Kash Boo MD              Referral:   Pt was admitted to  with a diagnosis of Small cell carcinoma of lung, and was admitted this hospital stay due to Atrial flutter [I48.92]  ARDS (adult respiratory distress syndrome) [J80]  SOB (shortness of breath) [R06.02]  Anticoagulated on warfarin [Z79.01]  H/O mitral valve replacement with mechanical valve [Z95.2].       is involved was referred to the Social Work Department via routine referral.  Patient presents as a 61 y.o. year old  male.    Persons interviewed: Patient and     Living Situation:      Resides at [address] [place] [address], phone: [phone] (home).  Patient resides with his .         Current or Past Agencies and Description of Services/Supplies    DME: Most items they have purchased on their own  Equipment Currently Used at Home: bedside commode, walker, rolling, wheelchair ( transport). Hip kit, and ramp with rails    Home Health: Patient not active on home health services prior to admit. He had recently been referred to outpatient physical therapy after last hospitalization and was about to start before he was admitted. Would like to be referred back upon discharge.       IV Infusion: N/A      Nutrition: Oral    Outpatient Pharmacy:     [facility] [address] [facility]  [address] [facility] [address]  Phone: [phone] Fax: [phone]      Patient Preference of agencies include : Patient does not express a preference    Patient/Caregiver informed of right to choose providers or agencies.  Patient provides permission to release any necessary information to [facility] and to Non-[facility] agencies as needed to facilitate patient care, treatment planning, and patient discharge planning.  Written and verbal resources  "provided.      Coping: Reports he is doing the best he can. He knows he cannot change the past but can only take it "one day at a time."          Adjustment to Diagnosis and Treatment: Patient still adjusting to diagnosis      Emotional/Behavioral/Cognitive Issues: Increased anxiety             History/Current Symptoms of Anxiety/Depression: Yes  History/Current Substance Use:   Social History     Tobacco Use    Smoking status: Every Day     Packs/day: 1.00     Types: Cigarettes    Smokeless tobacco: Never   Substance and Sexual Activity    Alcohol use: Not on file    Drug use: Not on file    Sexual activity: Not on file       Indications of Abuse/Neglect: No:   Abuse Screen (yes response referral indicated)  Feels Unsafe at Home or Work/School: no  Feels Threatened by Someone: no  Does Anyone Try to Keep You From Having Contact with Others or Doing Things Outside Your Home?: no  Physical Signs of Abuse Present: no    Financial:  Payer/Plan Subscr  Sex Relation Sub. Ins. ID Effective Group Num   1. MEDICAID - [place]* GIACOMO ARGUELLO 1960 Male Self [id number]                                   [address] [address]                            Other identified concerns/needs: None at this time    Plan: Return home    Interventions/Referrals: TBD will need to be tested for oxygen prior to discharge and referral to outpatient therapy  Patient/caregiver engaged in treatment planning process.     providing psychosocial and supportive counseling, resources, education, assistance and discharge planning as appropriate.  Patient/caregiver state understanding of  available resources,  following, remains available.                           "

## 2022-09-13 NOTE — SUBJECTIVE & OBJECTIVE
Interval History: No acute events overnight. Will plan to remove chest tube and monitor for fluid accumulation. Patient will need to start warfarin prior to stopping heparin products    Oncology Treatment Plan:   OP SCLC CARBOPLATIN (AUC) ETOPOSIDE DURVALUMAB Q3W FOLLOWED BY MAINTENANCE DURVALUMAB 1500 MG Q4W    Medications:  Continuous Infusions:   sodium chloride 0.9% 125 mL/hr at 09/12/22 1711    heparin (porcine) in D5W 16.084 Units/kg/hr (09/12/22 1718)     Scheduled Meds:   albuterol-ipratropium  3 mL Nebulization Q8H    allopurinoL  300 mg Oral Daily    amiodarone  400 mg Oral BID    Followed by    [START ON 9/26/2022] amiodarone  400 mg Oral Daily    Followed by    [START ON 10/3/2022] amiodarone  200 mg Oral Daily    EScitalopram oxalate  20 mg Oral QHS    filgrastim-sndz  300 mcg Subcutaneous Daily    pantoprazole  40 mg Oral Daily    polyethylene glycol  17 g Oral Daily    pregabalin  75 mg Oral BID    senna-docusate 8.6-50 mg  1 tablet Oral BID    sodium chloride 0.9% flush bag IVPB   Intravenous 1 time in Clinic/HOD    sodium chloride 0.9%  10 mL Intravenous Q6H    sodium chloride 3%  4 mL Nebulization Q8H     PRN Meds:sodium chloride, acetaminophen, alteplase, diphenhydrAMINE, EPINEPHrine, heparin, porcine (PF), HYDROmorphone, LORazepam, melatonin, methocarbamoL, ondansetron, Flushing PICC Protocol **AND** sodium chloride 0.9% **AND** sodium chloride 0.9%, sumatriptan     Review of Systems   Constitutional:  Positive for activity change, fatigue and unexpected weight change.   Respiratory:  Positive for shortness of breath.    Cardiovascular:  Negative for leg swelling.   Gastrointestinal:  Negative for nausea and vomiting.   Musculoskeletal:  Positive for gait problem.   Skin: Negative.    Neurological:  Positive for weakness.   Psychiatric/Behavioral: Negative.     Objective:     Vital Signs (Most Recent):  Temp: 97.5 °F (36.4 °C) (09/12/22 1949)  Pulse: 60 (09/12/22 2110)  Resp: 20 (09/12/22  1949)  BP: 137/65 (09/12/22 1949)  SpO2: (!) 92 % (09/12/22 2116)   Vital Signs (24h Range):  Temp:  [97.4 °F (36.3 °C)-98.3 °F (36.8 °C)] 97.5 °F (36.4 °C)  Pulse:  [59-64] 60  Resp:  [14-20] 20  SpO2:  [90 %-98 %] 92 %  BP: ()/(57-71) 137/65     Weight: 57.2 kg (126 lb)  Body mass index is 18.61 kg/m².  Body surface area is 1.67 meters squared.      Intake/Output Summary (Last 24 hours) at 9/12/2022 2133  Last data filed at 9/12/2022 1711  Gross per 24 hour   Intake 3710.77 ml   Output 2860 ml   Net 850.77 ml       Physical Exam  Constitutional:       Comments: Frail, cachectic    HENT:      Head: Normocephalic.      Nose: Nose normal.      Mouth/Throat:      Mouth: Mucous membranes are moist.   Eyes:      Pupils: Pupils are equal, round, and reactive to light.   Cardiovascular:      Rate and Rhythm: Normal rate.   Pulmonary:      Comments: Rhonchi present anteriorly   Abdominal:      General: Abdomen is flat.   Musculoskeletal:         General: Normal range of motion.      Cervical back: Normal range of motion.   Skin:     General: Skin is warm.   Neurological:      General: No focal deficit present.      Mental Status: He is alert and oriented to person, place, and time.       Significant Labs:   All pertinent labs from the last 24 hours have been reviewed.    Diagnostic Results:  I have reviewed all pertinent imaging results/findings within the past 24 hours.

## 2022-09-14 NOTE — SUBJECTIVE & OBJECTIVE
Interval History: Has chest tube removed yesterday, leaking from the site today. Complaining of increased pain around the site, breathing okay while at rest. Denies any other symptoms. Possibly need Pleurx if return of pleural effusion.     Oncology Treatment Plan:   OP SCLC CARBOPLATIN (AUC) ETOPOSIDE DURVALUMAB Q3W FOLLOWED BY MAINTENANCE DURVALUMAB 1500 MG Q4W    Medications:  Continuous Infusions:   sodium chloride 0.9% 125 mL/hr at 09/14/22 0449    heparin (porcine) in D5W 16.084 Units/kg/hr (09/13/22 1817)     Scheduled Meds:   albuterol-ipratropium  3 mL Nebulization Q8H    amiodarone  400 mg Oral BID    Followed by    [START ON 9/26/2022] amiodarone  400 mg Oral Daily    Followed by    [START ON 10/3/2022] amiodarone  200 mg Oral Daily    EScitalopram oxalate  20 mg Oral QHS    filgrastim-sndz  300 mcg Subcutaneous Daily    morphine  15 mg Oral Q12H    pantoprazole  40 mg Oral Daily    polyethylene glycol  17 g Oral Daily    pregabalin  75 mg Oral BID    senna-docusate 8.6-50 mg  1 tablet Oral BID    sodium chloride 0.9% flush bag IVPB   Intravenous 1 time in Clinic/HOD    sodium chloride 0.9%  10 mL Intravenous Q6H    sodium chloride 3%  4 mL Nebulization Q8H    warfarin  5 mg Oral Daily     PRN Meds:sodium chloride, acetaminophen, alteplase, diphenhydrAMINE, EPINEPHrine, heparin, porcine (PF), HYDROmorphone, LORazepam, melatonin, methocarbamoL, ondansetron, Flushing PICC Protocol **AND** sodium chloride 0.9% **AND** sodium chloride 0.9%, sumatriptan       Objective:     Vital Signs (Most Recent):  Temp: 97.9 °F (36.6 °C) (09/14/22 1135)  Pulse: 60 (09/14/22 1135)  Resp: 20 (09/14/22 1135)  BP: (!) 166/79 (09/14/22 1135)  SpO2: (!) 92 % (09/14/22 1403)   Vital Signs (24h Range):  Temp:  [97.6 °F (36.4 °C)-97.9 °F (36.6 °C)] 97.9 °F (36.6 °C)  Pulse:  [59-67] 60  Resp:  [14-22] 20  SpO2:  [90 %-96 %] 92 %  BP: (135-169)/(71-80) 166/79     Weight: 57.2 kg (126 lb)  Body mass index is 18.61 kg/m².  Body  surface area is 1.67 meters squared.      Intake/Output Summary (Last 24 hours) at 9/14/2022 1419  Last data filed at 9/14/2022 0500  Gross per 24 hour   Intake 180 ml   Output 500 ml   Net -320 ml       Physical Exam  HENT:      Head: Normocephalic.      Nose: Nose normal.      Mouth/Throat:      Mouth: Mucous membranes are moist.   Eyes:      Pupils: Pupils are equal, round, and reactive to light.   Cardiovascular:      Rate and Rhythm: Normal rate.   Pulmonary:      Comments: On NC for oxygen, in no respiratory distress. Decreased breath sounds on auscultation from anterior chest  Abdominal:      General: Abdomen is flat.   Musculoskeletal:         General: Normal range of motion.      Cervical back: Normal range of motion.   Skin:     General: Skin is warm.   Neurological:      General: No focal deficit present.      Mental Status: He is alert.       Significant Labs:   All pertinent labs from the last 24 hours have been reviewed.    Diagnostic Results:  I have reviewed all pertinent imaging results/findings within the past 24 hours.

## 2022-09-14 NOTE — ASSESSMENT & PLAN NOTE
Chest tube placed 9/6, removed 9/14. Hopefully will improve w/ drainage and chemotherapy   - pulmonology recommending removal of chest tube, monitor respiratory status  - Daily CXR, monitor for malignant effusion

## 2022-09-14 NOTE — ASSESSMENT & PLAN NOTE
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 Pt has metastatic lung cancer. Pt is alert, oriented to person, place, and situation. Pt is a DNR. Pt is on 3 L O2.     Reason for consult: GOC/ACP. Communicated with MAJOR Camp fellow with [facility].     Today: Met with pt who stated his goal at this time is to be comfortable and receive treatment. Goal is to be back home if possible and have more quality and quantity of life.     9/9/22: Pt very tearful today. Pt says "I know I am dying. " Pt kept apologizing.  Explained to pt there is not a need to apoligize and that that I am glad he felt comfortable to express his feelings. Pt repots he is having a bad day and everything starting to sink in about his health. Support given.  seeing. Will consult Oncology/Psycology for pt. Pt to step down to Hem/onc as soon as bed available.     9/6/22  Goals of care/ACP: Pt to have tracheal stenting tomorrow. Pt reports he is in agreement to procedure and would like CC team to speak to Aston about procedure when he visits today. Per pt, he relays information to Aston but he feels like he forgets some of information to tell him. Pt reports his goal is medical management at this time to see if he can improve enough to get back home. Pt reports his goal is to be at home with his four dogs and , Aston. Pt is aware of his severity of his illness.     Today: Met with pt along with Tracie Kidd LCSW.     9/6/22  Introduced role of Palliative care to pt.   Met with pt who is aware of his medical issues. Per pt he is aware he has metastatic cancer. Pt is aware that any therapies/procedures offered to him is palliative and will not cure cancer. Per pt, he is still trying to " "come to  with his dx. He learned about his issues in August. Pt reports goal at time is to see if anything can be done to help with symptoms and "give him some more time." Spoke to pt about amount of O2 he is on at this time. He verbalized understanding.  Per pt he has spoken to Oncology and XRT MDs.   CTS has been consulted concerning tracheal stenting.      Pt open to continued visits with [facility] care for care planning and symptom management needs.  Support given pt.      Pt reports he is legally  to Aston Collins and he would want him to be hi medical decision-maker if he cannot make his own medical decisions. MPOA paperwork left with pt. Education provided. Per Pt, Aston works during day but can be reached by phone.      Code status: DNR.      Symptom management:     [id number]    Pain: Pt reports he has pain to back chest area that can get up to 9/10. Pt reports throbbing/shooting pain. Pt reports adding long-acting has improved pain.     Pt is on MSContin 15 mg bid.   Pt is currently on Dilaudid 1 mg IVP q 4 hrs prn pain.     Recs:   Would increase MSContin to 30 mg bid. Can increase if needed.   Continue with Dilaudid IV.   Will need to convert to PO pain meds when pt gets closer to going home. (Pt is aware of this)     Dyspnea r/t to mediastinal mass, pleural effusion.   Pt is on 3 L O2 per NC.   Pt is on Dilaudid 1 mg IVP q 4 hrs prn.  Pt has chest tube in place and having tracheal stents placed.      Debility r/t to pathological femoral neck fracture s/p ight hip hemiarthroplasty with ablation, cementoplasty  Pt was using walker prior to admit.   Would resume PT/OT when pt stable to participate.      Anxiety r/t to new dx and dyspnea.   Pt has Ativan 1 mg tid prn.         Plan:   Will continue to meet with pt to reinforce realistic expectations/assit with GOC.   See above symptom recs.   Communicated with Hem/ONC team  Support given.   Oncology/ Psychology seeing     Will follow.         "

## 2022-09-14 NOTE — PLAN OF CARE
Pt involved in plan of care and communicating needs throughout shift. Pt alert and oriented x4. Pt on 4LNC with oxygen 90% or greater. Pt c/o of anxiety and pain throughout shift. PRN Ativan and Dilaudid given with moderate relief. Poor PO intake. Stitches put in chest tube incision site by MD d/t cont. drainage. All VSS; no acute events so far this shift.  Pt remaining free from falls or injury throughout shift; bed locked and in lowest position; call light within reach.  Pt instructed to call for assistance as needed.  Q1H rounding done on pt. WCTM.

## 2022-09-14 NOTE — ASSESSMENT & PLAN NOTE
Patient had afib with RVR this admission was successfully cardioverted. IV amio load x72 hours completed and transitioned to PO.    - amio  BID for 2 weeks followed by:    - amio 400 daily for 1 week    - amio 200 daily lifetime    - He is on low intensity heparin drip currently for the atrial flutter,   - Warfarin started 9/13 (mechanical heart valve)

## 2022-09-14 NOTE — ASSESSMENT & PLAN NOTE
Resolved  Patient now with concerns for tumor lysis syndrome given active chemo with SCLC, will treat prophylactically    - holding allopurinol 300 mg daily   - Continue daily cbc/cmp  - Uric acid/LDH downtrending, no need to repeat

## 2022-09-14 NOTE — SUBJECTIVE & OBJECTIVE
Interval History: Mr. Jimenez denies any acute events overnight and reports no worsening of his symptoms. He states he is breathing about the same as prior to chest tube removal. However his tube site continues to leak fluid and his bed is saturated today. Will insert stitch today. CXR read pending but largely unchanged from yesterday on my read.     Objective:     Vital Signs (Most Recent):  Temp: 97.9 °F (36.6 °C) (09/14/22 0732)  Pulse: 65 (09/14/22 0758)  Resp: 20 (09/14/22 0857)  BP: (!) 169/80 (09/14/22 0732)  SpO2: (!) 90 % (09/14/22 0758)   Vital Signs (24h Range):  Temp:  [97.6 °F (36.4 °C)-98.4 °F (36.9 °C)] 97.9 °F (36.6 °C)  Pulse:  [59-67] 65  Resp:  [14-22] 20  SpO2:  [89 %-96 %] 90 %  BP: (135-169)/(71-80) 169/80     Weight: 57.2 kg (126 lb)  Body mass index is 18.61 kg/m².      Intake/Output Summary (Last 24 hours) at 9/14/2022 1015  Last data filed at 9/14/2022 0500  Gross per 24 hour   Intake 360 ml   Output 800 ml   Net -440 ml       Physical Exam  Vitals and nursing note reviewed.   Constitutional:       Appearance: He is ill-appearing.   HENT:      Head: Normocephalic and atraumatic.      Nose: Nose normal.      Mouth/Throat:      Mouth: Mucous membranes are moist.   Eyes:      General: No scleral icterus.     Conjunctiva/sclera: Conjunctivae normal.   Cardiovascular:      Rate and Rhythm: Normal rate and regular rhythm.      Pulses: Normal pulses.      Heart sounds: Normal heart sounds. No murmur heard.    No gallop.   Pulmonary:      Effort: Pulmonary effort is normal.      Breath sounds: Rhonchi (L sided) present. No wheezing.   Abdominal:      General: Abdomen is flat. There is no distension.      Tenderness: There is no abdominal tenderness. There is no right CVA tenderness, left CVA tenderness or guarding.   Musculoskeletal:      Right lower leg: No edema.      Left lower leg: No edema.   Skin:     General: Skin is warm and dry.      Capillary Refill: Capillary refill takes less than 2  seconds.      Coloration: Skin is not jaundiced.   Neurological:      General: No focal deficit present.      Mental Status: He is alert and oriented to person, place, and time. Mental status is at baseline.       Vents:  Oxygen Concentration (%): 32 (09/13/22 1658)    Lines/Drains/Airways       Peripherally Inserted Central Catheter Line  Duration             PICC Double Lumen 09/07/22 1734 right basilic 6 days              Peripheral Intravenous Line  Duration                  Peripheral IV - Single Lumen 09/11/22 1354 20 G Anterior;Left Upper Arm 2 days                    Significant Labs:    CBC/Anemia Profile:  Recent Labs   Lab 09/13/22  0424 09/14/22  0449   WBC 17.06* 6.79   HGB 10.3* 9.6*   HCT 31.9* 29.1*   * 87*   MCV 89 89   RDW 19.9* 19.8*        Chemistries:  Recent Labs   Lab 09/13/22 0424 09/14/22  0449   * 137   K 4.6 4.3    109   CO2 23 23   BUN 25* 20   CREATININE 0.8 0.7   CALCIUM 8.1* 7.8*   ALBUMIN 2.0* 1.8*   PROT 4.3* 4.2*   BILITOT 0.6 0.6   ALKPHOS 129 114   ALT 15 12   AST 28 24       All pertinent labs within the past 24 hours have been reviewed.    Significant Imaging:  I have reviewed all pertinent imaging results/findings within the past 24 hours.

## 2022-09-14 NOTE — CONSULTS
Inpatient consult to Physical Medicine Rehab  Consult performed by: Merna Woody NP  Consult ordered by: Steven Silva DO    Consult received.  Reviewed patient history and current admission.  PM&R following. Notified by primary that consult was placed by mistake. Will sign off. Please re-consult if anything changes.    Merna Woody NP  Physical Medicine & Rehabilitation   09/14/2022

## 2022-09-14 NOTE — ASSESSMENT & PLAN NOTE
Secondary to extensive small cell lung cancer. Currently on 2L O2, satting 92%  - Will need 6MWT for discharge planning

## 2022-09-14 NOTE — PROGRESS NOTES
"[facility] - Oncology ([facility])  Palliative Medicine  Progress Note    Patient Name: Donis Jimenez  MRN: [id number]  Admission Date: 9/4/2022  Hospital Length of Stay: 10 days  Code Status: DNR   Attending Provider: Kash Boo MD  Consulting Provider: LOREE Dean  Primary Care Physician: Elio Farias MD  Principal Problem:Small cell carcinoma of lung    Patient information was obtained from patient and primary team.      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 Pt has metastatic lung cancer. Pt is alert, oriented to person, place, and situation. Pt is a DNR. Pt is on 3 L O2.     Reason for consult: GOC/ACP. Communicated with MAJOR Camp fellow with [facility].     Today: Met with pt who stated his goal at this time is to be comfortable and receive treatment. Goal is to be back home if possible and have more quality and quantity of life.     9/9/22: Pt very tearful today. Pt says "I know I am dying. " Pt kept apologizing.  Explained to pt there is not a need to apoligize and that that I am glad he felt comfortable to express his feelings. Pt repots he is having a bad day and everything starting to sink in about his health. Support given.  seeing. Will consult Oncology/Psycology for pt. Pt to step down to Hem/onc as soon as bed available.     9/6/22  Goals of care/ACP: Pt to have tracheal stenting tomorrow. Pt reports he is in agreement to procedure and would like CC team to speak to Aston about procedure when he visits today. Per pt, he relays information to Aston but he feels like he forgets some of information to tell him. Pt reports his goal is medical management at this time to " "see if he can improve enough to get back home. Pt reports his goal is to be at home with his four dogs and , Aston. Pt is aware of his severity of his illness.     Today: Met with pt along with Tracie Kidd LCSW.     9/6/22  Introduced role of Palliative care to pt.   Met with pt who is aware of his medical issues. Per pt he is aware he has metastatic cancer. Pt is aware that any therapies/procedures offered to him is palliative and will not cure cancer. Per pt, he is still trying to come to  with his dx. He learned about his issues in August. Pt reports goal at time is to see if anything can be done to help with symptoms and "give him some more time." Spoke to pt about amount of O2 he is on at this time. He verbalized understanding.  Per pt he has spoken to Oncology and XRT MDs.   CTS has been consulted concerning tracheal stenting.      Pt open to continued visits with [facility] care for care planning and symptom management needs.  Support given pt.      Pt reports he is legally  to Aston Collins and he would want him to be hi medical decision-maker if he cannot make his own medical decisions. MPOA paperwork left with pt. Education provided. Per Pt, Aston works during day but can be reached by phone.      Code status: DNR.      Symptom management:     UTP=559    Pain: Pt reports he has pain to back chest area that can get up to 9/10. Pt reports throbbing/shooting pain. Pt reports adding long-acting has improved pain.     Pt is on MSContin 15 mg bid.   Pt is currently on Dilaudid 1 mg IVP q 4 hrs prn pain.     Recs:   Would increase MSContin to 30 mg bid. Can increase if needed.   Continue with Dilaudid IV.   Will need to convert to PO pain meds when pt gets closer to going home. (Pt is aware of this)     Dyspnea r/t to mediastinal mass, pleural effusion.   Pt is on 3 L O2 per NC.   Pt is on Dilaudid 1 mg IVP q 4 hrs prn.  Pt has chest tube in place and having tracheal stents placed.      Debility r/t " to pathological femoral neck fracture s/p ight hip hemiarthroplasty with ablation, cementoplasty  Pt was using walker prior to admit.   Would resume PT/OT when pt stable to participate.      Anxiety r/t to new dx and dyspnea.   Pt has Ativan 1 mg tid prn.         Plan:   Will continue to meet with pt to reinforce realistic expectations/assit with GOC.   See above symptom recs.   Communicated with Hem/ONC team  Support given.   Oncology/ Psychology seeing     Will follow.               I will follow-up with patient. Please contact us if you have any additional questions.    Subjective:     Chief Complaint:   Chief Complaint   Patient presents with    Shortness of Breath     Pt brought to ED from home via [facility]. Per family pt SOB and productive cough. Pt had double valve replacement, pacemaker and hip surgery 1 week ago.        HPI:   Pt is a 61-year-old male with PMH significant for bacterial endocarditis, s/p AV and MV mechanical valve replacement (on warfarin), CKD, tobacco abuse with recent complicated hospital course who presented to the emergency department with shortness of breath.  Difficult for patient to provide history due to tachypnea; spouse at bedside assisting with history.      Per chart review, patient was admitted 8/18-8/27/22 after sustaining a pathologic right femoral neck fracture and right femoral artery pseudoaneurysm from a fall at home. Patient taken to OR on 8/19 by Vascular Surgery and had embolization of 3rd order right profunda femoral artery pseudoaneurysm with N-BCA glue and 5mm coil aneurysm repair. Pt underwent right hip hemiarthroplasty with ablation, cementoplasty, and percutaneous fixation of pelvis for pathologic fracture and metastatic disease with Ortho on 8/23. 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 Metastatic work-up done  with CT scan of chest/abdomen and pelvis showing extensive disease including mediastinal mass with encasement of mediastinal vascular structures and airways, MARTHA pulmonary mass, bilateral suprarenal and left renal masses, L1 pathologic fx and rib & skull metastatic disease.      Per chart review, pt's spouse stated that pt had been ambulatory with a walker post-discharge, without c/o SOB, lightheadedness or dizziness. SOB started ~ 9/2 with productive cough. He denies fever/chills, chest pain, abd pain, N/V/D.      In the ED patient was in a flutter and was cardioverted with some improvement in symptoms. He was found to have right sided pneumonia and was started on vanc and cefepime and admitted to Hospital Medicine for further management.      During his time in the ED, the patient had escalating FiO2 requirements, now on 45L 100% comfort flow.      Critical Care Medicine was consulted for worsening hypoxemic respiratory failure    Pt is DNR. Pt on 40 L  @ 60 %      Hospital Course:  No notes on file    Interval History: Pt DNR.    Past Medical History:   Diagnosis Date    Endocarditis     Pacemaker     Pneumonia        Past Surgical History:   Procedure Laterality Date    ANGIOGRAPHY OF LOWER EXTREMITY Right 8/19/2022    Procedure: ANGIOGRAM, LOWER EXTREMITY;  Surgeon: Thomas Nicolas MD;  Location: [facility] OR [address] [place] [address];  Service: Peripheral Vascular;  Laterality: Right;  30.0 min  315.10 mGy  26.1755 Gycm2  84 ml dye    HIP RESURFACING Right 8/23/2022    Procedure: cemontoplasty;  Surgeon: Yung Flores MD;  Location: [facility] OR [address] [place] [address];  Service: Orthopedics;  Laterality: Right;    RADIOFREQUENCY ABLATION, BONE, PERCUTANEOUS Right 8/23/2022    Procedure: RADIOFREQUENCY ABLATION,BONE;  Surgeon: Yung Flores MD;  Location: [facility] OR [address] [place] [address];  Service: Orthopedics;  Laterality: Right;    REPAIR, PSEUDOANEURYSM, ARTERY,  FEMORAL Right 8/19/2022    Procedure: REPAIR, PSEUDOANEURYSM, ARTERY, FEMORAL;  Surgeon: Thomas Nicolas MD;  Location: [facility] OR [facility] FLR;  Service: Peripheral Vascular;  Laterality: Right;  R PFA (3rd branch) pseudoaneurysm     TREATMENT OF CARDIAC ARRHYTHMIA N/A 9/8/2022    Procedure: Cardioversion or Defibrillation;  Surgeon: Lan Mccollum MD;  Location: [facility] EP LAB;  Service: Cardiology;  Laterality: N/A;  AFL, DCCV, ANES, SK, RM [id number]       Review of patient's allergies indicates:   Allergen Reactions    Ambien [zolpidem] Other (See Comments)     Disturbing dreams       Medications:  Continuous Infusions:   sodium chloride 0.9% 125 mL/hr at 09/14/22 0449    heparin (porcine) in D5W 16.084 Units/kg/hr (09/13/22 1817)     Scheduled Meds:   albuterol-ipratropium  3 mL Nebulization Q8H    amiodarone  400 mg Oral BID    Followed by    [START ON 9/26/2022] amiodarone  400 mg Oral Daily    Followed by    [START ON 10/3/2022] amiodarone  200 mg Oral Daily    EScitalopram oxalate  20 mg Oral QHS    LIDOcaine HCL 10 mg/ml (1%)  5 mL Intradermal Once    morphine  15 mg Oral Q12H    pantoprazole  40 mg Oral Daily    polyethylene glycol  17 g Oral Daily    pregabalin  75 mg Oral BID    senna-docusate 8.6-50 mg  1 tablet Oral BID    sodium chloride 0.9% flush bag IVPB   Intravenous 1 time in Clinic/HOD    sodium chloride 0.9%  10 mL Intravenous Q6H    sodium chloride 3%  4 mL Nebulization Q8H    warfarin  5 mg Oral Daily     PRN Meds:sodium chloride, acetaminophen, alteplase, diphenhydrAMINE, EPINEPHrine, heparin, porcine (PF), HYDROmorphone, LORazepam, melatonin, methocarbamoL, ondansetron, Flushing PICC Protocol **AND** sodium chloride 0.9% **AND** sodium chloride 0.9%, sumatriptan    Family History    None       Tobacco Use    Smoking status: Every Day     Packs/day: 1.00     Types: Cigarettes    Smokeless tobacco: Never   Substance and Sexual Activity    Alcohol use: Not on file    Drug use: Not on  file    Sexual activity: Not on file       Review of Systems   Constitutional:  Positive for activity change, fatigue and unexpected weight change.   Respiratory:  Positive for shortness of breath.    Cardiovascular:  Negative for leg swelling.   Gastrointestinal:  Negative for nausea and vomiting.   Musculoskeletal:  Positive for gait problem.   Skin: Negative.    Neurological:  Positive for weakness.   Psychiatric/Behavioral: Negative.     Objective:     Vital Signs (Most Recent):  Temp: 97.9 °F (36.6 °C) (09/14/22 0732)  Pulse: 65 (09/14/22 0758)  Resp: 20 (09/14/22 0857)  BP: (!) 169/80 (09/14/22 0732)  SpO2: (!) 90 % (09/14/22 0758)   Vital Signs (24h Range):  Temp:  [97.6 °F (36.4 °C)-98.4 °F (36.9 °C)] 97.9 °F (36.6 °C)  Pulse:  [59-67] 65  Resp:  [14-22] 20  SpO2:  [89 %-96 %] 90 %  BP: (135-169)/(71-80) 169/80     Weight: 57.2 kg (126 lb)  Body mass index is 18.61 kg/m².    Physical Exam  Constitutional:       Comments: Pt on 3L O2   HENT:      Head: Normocephalic and atraumatic.   Eyes:      General: Lids are normal.      Conjunctiva/sclera: Conjunctivae normal.   Cardiovascular:      Rate and Rhythm: Tachycardia present.   Pulmonary:      Effort: Tachypnea present. No accessory muscle usage or respiratory distress.      Comments: Pt on 3L O2.  Abdominal:      General: Abdomen is flat.   Musculoskeletal:      Cervical back: Full passive range of motion without pain and normal range of motion.      Comments: Edema to left arm.    Skin:     General: Skin is warm and dry.   Neurological:      Mental Status: He is alert and oriented to person, place, and time.   Psychiatric:         Attention and Perception: Attention normal.         Speech: Speech normal.      Comments: Crying       Review of Symptoms      Symptom Assessment (ESAS 0-10 Scale)  Pain:  0  Dyspnea:  0  Anxiety:  0  Nausea:  0  Depression:  0  Anorexia:  0  Fatigue:  0  Insomnia:  0  Restlessness:  0  Agitation:  0         ECOG Performance  Status thGthrthathdtheth:th th4th Living Arrangements:  Lives with family    Psychosocial/Cultural: Pt legally  to Aston Collins. Per pt Aston works during the day. Per pt, Aston is his care-giver.       Advance Care Planning   Advance Directives:   Living Will: No    Do Not Resuscitate Status: Yes    Medical Power of : No    Agent's Name:  Aston Collins    Decision Making:  Patient answered questions       Significant Labs: All pertinent labs within the past 24 hours have been reviewed.  CBC:   Recent Labs   Lab 09/14/22 0449   WBC 6.79   HGB 9.6*   HCT 29.1*   MCV 89   PLT 87*       BMP:  Recent Labs   Lab 09/14/22 0449   GLU 85      K 4.3      CO2 23   BUN 20   CREATININE 0.7   CALCIUM 7.8*       LFT:  Lab Results   Component Value Date    AST 24 09/14/2022    ALKPHOS 114 09/14/2022    BILITOT 0.6 09/14/2022     Albumin:   Albumin   Date Value Ref Range Status   09/14/2022 1.8 (L) 3.5 - 5.2 g/dL Final     Protein:   Total Protein   Date Value Ref Range Status   09/14/2022 4.2 (L) 6.0 - 8.4 g/dL Final     Lactic acid:   Lab Results   Component Value Date    LACTATE 1.7 09/05/2022    LACTATE 2.4 (H) 09/04/2022       Significant Imaging: I have reviewed all pertinent imaging results/findings within the past 24 hours.      > 50% of 35  min visit spent in chart review, face to face discussion of goals of care,  symptom assessment, coordination of care and emotional support      Zoe Potter, CNS  Palliative Medicine  [facility] - Oncology ([facility])

## 2022-09-14 NOTE — PROGRESS NOTES
[facility] - Oncology ([facility])  Pulmonology  Progress Note    Patient Name: Donis Jimenez  MRN: [id number]  Admission Date: 9/4/2022  Hospital Length of Stay: 10 days  Code Status: DNR  Attending Provider: Kash Boo MD  Primary Care Provider: Elio Farias MD   Principal Problem: Small cell carcinoma of lung    Subjective:     Interval History: Mr. Jimenez denies any acute events overnight and reports no worsening of his symptoms. He states he is breathing about the same as prior to chest tube removal. However his tube site continues to leak fluid and his bed is saturated today. Will insert stitch today. CXR read pending but largely unchanged from yesterday on my read.     Objective:     Vital Signs (Most Recent):  Temp: 97.9 °F (36.6 °C) (09/14/22 0732)  Pulse: 65 (09/14/22 0758)  Resp: 20 (09/14/22 0857)  BP: (!) 169/80 (09/14/22 0732)  SpO2: (!) 90 % (09/14/22 0758)   Vital Signs (24h Range):  Temp:  [97.6 °F (36.4 °C)-98.4 °F (36.9 °C)] 97.9 °F (36.6 °C)  Pulse:  [59-67] 65  Resp:  [14-22] 20  SpO2:  [89 %-96 %] 90 %  BP: (135-169)/(71-80) 169/80     Weight: 57.2 kg (126 lb)  Body mass index is 18.61 kg/m².      Intake/Output Summary (Last 24 hours) at 9/14/2022 1015  Last data filed at 9/14/2022 0500  Gross per 24 hour   Intake 360 ml   Output 800 ml   Net -440 ml       Physical Exam  Vitals and nursing note reviewed.   Constitutional:       Appearance: He is ill-appearing.   HENT:      Head: Normocephalic and atraumatic.      Nose: Nose normal.      Mouth/Throat:      Mouth: Mucous membranes are moist.   Eyes:      General: No scleral icterus.     Conjunctiva/sclera: Conjunctivae normal.   Cardiovascular:      Rate and Rhythm: Normal rate and regular rhythm.      Pulses: Normal pulses.      Heart sounds: Normal heart sounds. No murmur heard.    No gallop.   Pulmonary:      Effort: Pulmonary effort is normal.      Breath sounds: Rhonchi (L sided) present. No wheezing.   Abdominal:      General:  Abdomen is flat. There is no distension.      Tenderness: There is no abdominal tenderness. There is no right CVA tenderness, left CVA tenderness or guarding.   Musculoskeletal:      Right lower leg: No edema.      Left lower leg: No edema.   Skin:     General: Skin is warm and dry.      Capillary Refill: Capillary refill takes less than 2 seconds.      Coloration: Skin is not jaundiced.   Neurological:      General: No focal deficit present.      Mental Status: He is alert and oriented to person, place, and time. Mental status is at baseline.       Vents:  Oxygen Concentration (%): 32 (09/13/22 1658)    Lines/Drains/Airways       Peripherally Inserted Central Catheter Line  Duration             PICC Double Lumen 09/07/22 1734 right basilic 6 days              Peripheral Intravenous Line  Duration                  Peripheral IV - Single Lumen 09/11/22 1354 20 G Anterior;Left Upper Arm 2 days                    Significant Labs:    CBC/Anemia Profile:  Recent Labs   Lab 09/13/22  0424 09/14/22  0449   WBC 17.06* 6.79   HGB 10.3* 9.6*   HCT 31.9* 29.1*   * 87*   MCV 89 89   RDW 19.9* 19.8*        Chemistries:  Recent Labs   Lab 09/13/22  0424 09/14/22  0449   * 137   K 4.6 4.3    109   CO2 23 23   BUN 25* 20   CREATININE 0.8 0.7   CALCIUM 8.1* 7.8*   ALBUMIN 2.0* 1.8*   PROT 4.3* 4.2*   BILITOT 0.6 0.6   ALKPHOS 129 114   ALT 15 12   AST 28 24       All pertinent labs within the past 24 hours have been reviewed.    Significant Imaging:  I have reviewed all pertinent imaging results/findings within the past 24 hours.    Assessment/Plan:     Exudative pleural effusion  Patient s/p chest tube insertion 9/6 for pleural effusion. Pulmonology consulted for chest tube management. Chest tube drainage initially decreasing but now increasing with over 1L drainage / day 9/11-9/13. Tube site leaking on exam 9/13. On CXR L side appears unchanged since prior. Per primary, fluid production should decrease with  chemotherapy.   - Chest tube removed 9/13. Significant fluid leak after removal     - If fluid builds up significantly after removal will seek placement of long term PleurX drain  - recommend mucinex for phlegm.   - Will place suture for fluid leak today 9/14    Pulmonology will continue to follow this patient              Nathan Parker MD  Pulmonology  [facility] - Oncology ([facility])

## 2022-09-14 NOTE — SUBJECTIVE & OBJECTIVE
Interval History: Pt DNR.    Past Medical History:   Diagnosis Date    Endocarditis     Pacemaker     Pneumonia        Past Surgical History:   Procedure Laterality Date    ANGIOGRAPHY OF LOWER EXTREMITY Right 8/19/2022    Procedure: ANGIOGRAM, LOWER EXTREMITY;  Surgeon: Thomas Nicolas MD;  Location: [facility] OR [address] [place] [address];  Service: Peripheral Vascular;  Laterality: Right;  30.0 min  315.10 mGy  26.1755 Gycm2  84 ml dye    HIP RESURFACING Right 8/23/2022    Procedure: cemontoplasty;  Surgeon: Yung Flores MD;  [address];  Service: Orthopedics;  Laterality: Right;    RADIOFREQUENCY ABLATION, BONE, PERCUTANEOUS Right 8/23/2022    Procedure: RADIOFREQUENCY ABLATION,BONE;  Surgeon: Yung Flores MD;  [address];  Service: Orthopedics;  Laterality: Right;    REPAIR, PSEUDOANEURYSM, ARTERY, FEMORAL Right 8/19/2022    Procedure: REPAIR, PSEUDOANEURYSM, ARTERY, FEMORAL;  Surgeon: Thomas Nicolas MD;  [facility];  Service: Peripheral Vascular;  Laterality: Right;  R PFA (3rd branch) pseudoaneurysm     TREATMENT OF CARDIAC ARRHYTHMIA N/A 9/8/2022    Procedure: Cardioversion or Defibrillation;  Surgeon: Lan Mccollum MD;  Location: [facility];  Service: Cardiology;  Laterality: N/A;  AFL, DCCV, ANES, SK, RM [id number]       Review of patient's allergies indicates:   Allergen Reactions    Ambien [zolpidem] Other (See Comments)     Disturbing dreams       Medications:  Continuous Infusions:   sodium chloride 0.9% 125 mL/hr at 09/14/22 0449    heparin (porcine) in D5W 16.084 Units/kg/hr (09/13/22 1817)     Scheduled Meds:   albuterol-ipratropium  3 mL Nebulization Q8H    amiodarone  400 mg Oral BID    Followed by    [START ON 9/26/2022] amiodarone  400 mg Oral Daily    Followed by    [START ON 10/3/2022] amiodarone  200 mg Oral Daily    EScitalopram oxalate  20 mg Oral QHS    LIDOcaine HCL 10 mg/ml (1%)  5 mL Intradermal Once    morphine  15 mg Oral Q12H    pantoprazole  40 mg Oral  Daily    polyethylene glycol  17 g Oral Daily    pregabalin  75 mg Oral BID    senna-docusate 8.6-50 mg  1 tablet Oral BID    sodium chloride 0.9% flush bag IVPB   Intravenous 1 time in Clinic/HOD    sodium chloride 0.9%  10 mL Intravenous Q6H    sodium chloride 3%  4 mL Nebulization Q8H    warfarin  5 mg Oral Daily     PRN Meds:sodium chloride, acetaminophen, alteplase, diphenhydrAMINE, EPINEPHrine, heparin, porcine (PF), HYDROmorphone, LORazepam, melatonin, methocarbamoL, ondansetron, Flushing PICC Protocol **AND** sodium chloride 0.9% **AND** sodium chloride 0.9%, sumatriptan    Family History    None       Tobacco Use    Smoking status: Every Day     Packs/day: 1.00     Types: Cigarettes    Smokeless tobacco: Never   Substance and Sexual Activity    Alcohol use: Not on file    Drug use: Not on file    Sexual activity: Not on file       Review of Systems   Constitutional:  Positive for activity change, fatigue and unexpected weight change.   Respiratory:  Positive for shortness of breath.    Cardiovascular:  Negative for leg swelling.   Gastrointestinal:  Negative for nausea and vomiting.   Musculoskeletal:  Positive for gait problem.   Skin: Negative.    Neurological:  Positive for weakness.   Psychiatric/Behavioral: Negative.     Objective:     Vital Signs (Most Recent):  Temp: 97.9 °F (36.6 °C) (09/14/22 0732)  Pulse: 65 (09/14/22 0758)  Resp: 20 (09/14/22 0857)  BP: (!) 169/80 (09/14/22 0732)  SpO2: (!) 90 % (09/14/22 0758)   Vital Signs (24h Range):  Temp:  [97.6 °F (36.4 °C)-98.4 °F (36.9 °C)] 97.9 °F (36.6 °C)  Pulse:  [59-67] 65  Resp:  [14-22] 20  SpO2:  [89 %-96 %] 90 %  BP: (135-169)/(71-80) 169/80     Weight: 57.2 kg (126 lb)  Body mass index is 18.61 kg/m².    Physical Exam  Constitutional:       Comments: Pt on 3L O2   HENT:      Head: Normocephalic and atraumatic.   Eyes:      General: Lids are normal.      Conjunctiva/sclera: Conjunctivae normal.   Cardiovascular:      Rate and Rhythm: Tachycardia  present.   Pulmonary:      Effort: Tachypnea present. No accessory muscle usage or respiratory distress.      Comments: Pt on 3L O2.  Abdominal:      General: Abdomen is flat.   Musculoskeletal:      Cervical back: Full passive range of motion without pain and normal range of motion.      Comments: Edema to left arm.    Skin:     General: Skin is warm and dry.   Neurological:      Mental Status: He is alert and oriented to person, place, and time.   Psychiatric:         Attention and Perception: Attention normal.         Speech: Speech normal.      Comments: Crying       Review of Symptoms      Symptom Assessment (ESAS 0-10 Scale)  Pain:  0  Dyspnea:  0  Anxiety:  0  Nausea:  0  Depression:  0  Anorexia:  0  Fatigue:  0  Insomnia:  0  Restlessness:  0  Agitation:  0         ECOG Performance Status ndGndrndanddndend:nd nd2nd Living Arrangements:  Lives with family    Psychosocial/Cultural: Pt legally  to Aston Collins. Per pt Aston works during the day. Per pt, Aston is his care-giver.       Advance Care Planning   Advance Directives:   Living Will: No    Do Not Resuscitate Status: Yes    Medical Power of : No    Agent's Name:  Aston Collins    Decision Making:  Patient answered questions       Significant Labs: All pertinent labs within the past 24 hours have been reviewed.  CBC:   Recent Labs   Lab 09/14/22 0449   WBC 6.79   HGB 9.6*   HCT 29.1*   MCV 89   PLT 87*       BMP:  Recent Labs   Lab 09/14/22 0449   GLU 85      K 4.3      CO2 23   BUN 20   CREATININE 0.7   CALCIUM 7.8*       LFT:  Lab Results   Component Value Date    AST 24 09/14/2022    ALKPHOS 114 09/14/2022    BILITOT 0.6 09/14/2022     Albumin:   Albumin   Date Value Ref Range Status   09/14/2022 1.8 (L) 3.5 - 5.2 g/dL Final     Protein:   Total Protein   Date Value Ref Range Status   09/14/2022 4.2 (L) 6.0 - 8.4 g/dL Final     Lactic acid:   Lab Results   Component Value Date    LACTATE 1.7 09/05/2022    LACTATE 2.4 (H) 09/04/2022        Significant Imaging: I have reviewed all pertinent imaging results/findings within the past 24 hours.

## 2022-09-14 NOTE — PROGRESS NOTES
ATTENDING NOTE, ONCOLOGY INPATIENT TEAM    Events of last 24 hours noted.  Patient seen and examined, chart reviewed.  Appears comfortable, in NAD.  Lungs are clear to auscultation.  Abdomen is soft, nontender.  Labs reviewed.    PLAN  Follow CBC and CMP daily   Repeat INR daily  Repeat chest x-ray in a.m.  He was encouraged to continue working with PT  Continue warfarin 5 mg daily and continue unfractionated heparin drip for now  Will restart Neupogen  We will follow  Kash Boo MD

## 2022-09-14 NOTE — ASSESSMENT & PLAN NOTE
[facility] report confirms small cell lung cancer from bone specimen.  Explained to him and his  that he is in an unfortunate situation.  He will most certainly die of this cancer rather quickly if not given chemotherapy.  While our intent is to improve his tumor burden causing his hypoxic respiratory failure with chemotherapy, it is also possible that we may potentially hasten his death unintentionally due to chemo toxicity. He and his  understand the risks.  - Patient currently on day 2 of inpatient chemotherapy, so far tolerating well  -labs reviewed; carboplatin renally dosed and will proceed with carboplatin and etoposide.  Etoposide given day 2 and day 3.  We will plan to provide GCSF starting day 4 and will do so for at least 7 doses.      - GCSF starting day 4 (9/11) for WBC<10  - agree with oncology psychology consult

## 2022-09-14 NOTE — ASSESSMENT & PLAN NOTE
Patient s/p chest tube insertion 9/6 for pleural effusion. Pulmonology consulted for chest tube management. Chest tube drainage initially decreasing but now increasing with over 1L drainage / day 9/11-9/13. Tube site leaking on exam 9/13. On CXR L side appears unchanged since prior. Per primary, fluid production should decrease with chemotherapy.   - Chest tube removed 9/13. Significant fluid leak after removal     - If fluid builds up significantly after removal will seek placement of long term PleurX drain  - recommend mucinex for phlegm.   - Will place suture for fluid leak today 9/14

## 2022-09-14 NOTE — PROGRESS NOTES
[facility] ([facility])  Hematology/Oncology  Progress Note    Patient Name: Donis Jimenez  Admission Date: 9/4/2022  Hospital Length of Stay: 10 days  Code Status: DNR     Subjective:     HPI:  61 year old man with newly diagnosed metastatic small cell lung cancer complicated by acute hypoxic respiratory failure.  Was previously treated for pathologic right femoral neck fracture.  Pathology finalized as small cell carcinoma. Over his stay in ICU, his O2 requirments were steadily reduced, he is now on nasal cannula. Patient went into afib with RVR and needed cardioversion. He is currently undergoing inpatient chemotherapy with carboplatin (day 1) and etoposide (day 1,2,3). Will need GCSF on day 4.     Patient now feeling anxious about new diagnosis. Psych onc saw patient, will consider consulting psych for new onset anxiety and panic attacks if patient does not improve.       Interval History: Has chest tube removed yesterday, leaking from the site today. Complaining of increased pain around the site, breathing okay while at rest. Denies any other symptoms. Possibly need Pleurx if return of pleural effusion.     Oncology Treatment Plan:   OP SCLC CARBOPLATIN (AUC) ETOPOSIDE DURVALUMAB Q3W FOLLOWED BY MAINTENANCE DURVALUMAB 1500 MG Q4W    Medications:  Continuous Infusions:   sodium chloride 0.9% 125 mL/hr at 09/14/22 0449    heparin (porcine) in D5W 16.084 Units/kg/hr (09/13/22 1817)     Scheduled Meds:   albuterol-ipratropium  3 mL Nebulization Q8H    amiodarone  400 mg Oral BID    Followed by    [START ON 9/26/2022] amiodarone  400 mg Oral Daily    Followed by    [START ON 10/3/2022] amiodarone  200 mg Oral Daily    EScitalopram oxalate  20 mg Oral QHS    filgrastim-sndz  300 mcg Subcutaneous Daily    morphine  15 mg Oral Q12H    pantoprazole  40 mg Oral Daily    polyethylene glycol  17 g Oral Daily    pregabalin  75 mg Oral BID    senna-docusate 8.6-50 mg  1 tablet Oral BID    sodium chloride 0.9% flush  bag IVPB   Intravenous 1 time in Clinic/HOD    sodium chloride 0.9%  10 mL Intravenous Q6H    sodium chloride 3%  4 mL Nebulization Q8H    warfarin  5 mg Oral Daily     PRN Meds:sodium chloride, acetaminophen, alteplase, diphenhydrAMINE, EPINEPHrine, heparin, porcine (PF), HYDROmorphone, LORazepam, melatonin, methocarbamoL, ondansetron, Flushing PICC Protocol **AND** sodium chloride 0.9% **AND** sodium chloride 0.9%, sumatriptan       Objective:     Vital Signs (Most Recent):  Temp: 97.9 °F (36.6 °C) (09/14/22 1135)  Pulse: 60 (09/14/22 1135)  Resp: 20 (09/14/22 1135)  BP: (!) 166/79 (09/14/22 1135)  SpO2: (!) 92 % (09/14/22 1403)   Vital Signs (24h Range):  Temp:  [97.6 °F (36.4 °C)-97.9 °F (36.6 °C)] 97.9 °F (36.6 °C)  Pulse:  [59-67] 60  Resp:  [14-22] 20  SpO2:  [90 %-96 %] 92 %  BP: (135-169)/(71-80) 166/79     Weight: 57.2 kg (126 lb)  Body mass index is 18.61 kg/m².  Body surface area is 1.67 meters squared.      Intake/Output Summary (Last 24 hours) at 9/14/2022 1419  Last data filed at 9/14/2022 0500  Gross per 24 hour   Intake 180 ml   Output 500 ml   Net -320 ml       Physical Exam  HENT:      Head: Normocephalic.      Nose: Nose normal.      Mouth/Throat:      Mouth: Mucous membranes are moist.   Eyes:      Pupils: Pupils are equal, round, and reactive to light.   Cardiovascular:      Rate and Rhythm: Normal rate.   Pulmonary:      Comments: On NC for oxygen, in no respiratory distress. Decreased breath sounds on auscultation from anterior chest  Abdominal:      General: Abdomen is flat.   Musculoskeletal:         General: Normal range of motion.      Cervical back: Normal range of motion.   Skin:     General: Skin is warm.   Neurological:      General: No focal deficit present.      Mental Status: He is alert.       Significant Labs:   All pertinent labs from the last 24 hours have been reviewed.    Diagnostic Results:  I have reviewed all pertinent imaging results/findings within the past 24  hours.    Assessment/Plan:     * Small cell carcinoma of lung  [facility] pathology report confirms small cell lung cancer from bone specimen.  Explained to him and his  that he is in an unfortunate situation.  He will most certainly die of this cancer rather quickly if not given chemotherapy.  While our intent is to improve his tumor burden causing his hypoxic respiratory failure with chemotherapy, it is also possible that we may potentially hasten his death unintentionally due to chemo toxicity. He and his  understand the risks.  - Patient currently on day 2 of inpatient chemotherapy, so far tolerating well  -labs reviewed; carboplatin renally dosed and will proceed with carboplatin and etoposide.  Etoposide given day 2 and day 3.  We will plan to provide GCSF starting day 4 and will do so for at least 7 doses.      - GCSF starting day 4 (9/11) for WBC<10  - agree with oncology psychology consult    Pain  MMT  - Dilaudid 1mg q4 prn  - Robaxin 500 q6h prn  - Ativan 1mg tid prn    Tumor lysis syndrome  Resolved  Patient now with concerns for tumor lysis syndrome given active chemo with SCLC, will treat prophylactically    - holding allopurinol 300 mg daily   - Continue daily cbc/cmp  - Uric acid/LDH downtrending, no need to repeat    Adjustment disorder with mixed anxiety and depressed mood  Will consult psychiatry for patient tomorrow. Talked to palliative and they stated that patient would not benefit from more pain medications given current emotional state    - Psych consulted, appreciate recs  - Does not want additional medications at this time for anxiety, wants to continue 20mg lexapro    Exudative pleural effusion  Chest tube placed 9/6, removed 9/14. Hopefully will improve w/ drainage and chemotherapy   - pulmonology recommending removal of chest tube, monitor respiratory status  - Daily CXR, monitor for malignant effusion     Debility  PT/OT Eval    Acute hypoxemic respiratory failure  Secondary to  extensive small cell lung cancer. Currently on 2L O2, satting 92%  - Will need 6MWT for discharge planning      Atrial flutter  Patient had afib with RVR this admission was successfully cardioverted. IV amio load x72 hours completed and transitioned to PO.    - amio  BID for 2 weeks followed by:    - amio 400 daily for 1 week    - amio 200 daily lifetime    - He is on low intensity heparin drip currently for the atrial flutter,   - Warfarin started 9/13 (mechanical heart valve)    Malignant neoplasm metastatic to bone  See small cell lung cancer    Pathologic fracture of neck of right femur s/p hemiarthroplasty on 8/23/2022  S/p right hip hemiarthroplasty with ablation, cementoplasty, and percutaneous fixation of pelvis 8/23. Patient is weight bearing as tolerated with posterior hip precautions to right lower extremity as per orthopedics    - Management per primary and ortho  - When more stable consider postop radiation             Steven Silva,   Hematology/Oncology  [facility] ([facility])        ATTENDING NOTE, ONCOLOGY INPATIENT TEAM    Patient seen and examined, chart reviewed.  Please refer to my note of same day for our assessment and plan    Kash Boo MD

## 2022-09-15 NOTE — PLAN OF CARE
Plan of care reviewed. Patient is oriented X4. He is chair fast but remains in bed at night. Repositioning as needed. PETERSON PICC flushed and saline locked. PIV infusing heparin at 9.2 ml/hr. Coughing spells; Self suction as needed. Back and left chest pain; PRN IV dilaudid administered x2. Patient had a panic attack this morning. RR slightly increased; PRN ativan pill administered. Patient calm 15 minutes post ativan administration.  is concerned that time it takes for patient to return to baseline is too long and would like medical team to consider IV or liquid ativan. Maintaining oxygen status >90% on 4 liters. Remained afebrile. Patient temperature is normal but is cold natured and like to have bear hugger on. All questions and concerns addressed.  is at bedside. All safety precautions in place. Remains free of injury. Patient is stable. All needs met at this time.

## 2022-09-15 NOTE — PROGRESS NOTES
[facility] ([facility])  Pulmonology  Progress Note    Patient Name: Donis Jimenez  MRN: [id number]  Admission Date: 9/4/2022  Hospital Length of Stay: 11 days  Code Status: DNR  Attending Provider: Kash Boo MD  Primary Care Provider: Elio Farias MD   Principal Problem: Small cell carcinoma of lung    Subjective:     Interval History: Mr Jimenez is tolerating chest tube removal well. He reports no increased work of breathing or SOB since the tube was pulled. He underwent stitching of the tube site yesterday & the wound is no longer leaking pleural fluid. CXT today does not demonstrate re accumulation of pleural fluid. He continues to suffer panic attacks occasionally.     Objective:     Vital Signs (Most Recent):  Temp: 97.9 °F (36.6 °C) (09/15/22 0746)  Pulse: 61 (09/15/22 0832)  Resp: (!) 22 (09/15/22 0813)  BP: (!) 164/79 (09/15/22 0746)  SpO2: 97 % (09/15/22 0813)   Vital Signs (24h Range):  Temp:  [97.5 °F (36.4 °C)-98.3 °F (36.8 °C)] 97.9 °F (36.6 °C)  Pulse:  [59-74] 61  Resp:  [16-24] 22  SpO2:  [87 %-97 %] 97 %  BP: (132-169)/(75-81) 164/79     Weight: 57.2 kg (126 lb)  Body mass index is 18.61 kg/m².      Intake/Output Summary (Last 24 hours) at 9/15/2022 1015  Last data filed at 9/15/2022 0423  Gross per 24 hour   Intake 1077 ml   Output 2200 ml   Net -1123 ml       Physical Exam  Vitals and nursing note reviewed.   Constitutional:       Appearance: He is ill-appearing.   HENT:      Head: Normocephalic and atraumatic.      Nose: Nose normal.      Mouth/Throat:      Mouth: Mucous membranes are moist.   Eyes:      General: No scleral icterus.     Conjunctiva/sclera: Conjunctivae normal.   Cardiovascular:      Rate and Rhythm: Normal rate and regular rhythm.      Pulses: Normal pulses.      Heart sounds: Normal heart sounds. No murmur heard.    No gallop.   Pulmonary:      Effort: Pulmonary effort is normal.      Breath sounds: Rhonchi (BL bases) present. No wheezing.   Abdominal:       General: Abdomen is flat. There is no distension.      Tenderness: There is no abdominal tenderness. There is no right CVA tenderness, left CVA tenderness or guarding.   Musculoskeletal:      Right lower leg: No edema.      Left lower leg: No edema.   Skin:     General: Skin is warm and dry.      Capillary Refill: Capillary refill takes less than 2 seconds.      Coloration: Skin is not jaundiced.   Neurological:      General: No focal deficit present.      Mental Status: He is alert and oriented to person, place, and time. Mental status is at baseline.       Vents:  Oxygen Concentration (%): 32 (09/13/22 1658)    Lines/Drains/Airways       Peripherally Inserted Central Catheter Line  Duration             PICC Double Lumen 09/07/22 1734 right basilic 7 days              Peripheral Intravenous Line  Duration                  Peripheral IV - Single Lumen 09/11/22 1354 20 G Anterior;Left Upper Arm 3 days                    Significant Labs:    CBC/Anemia Profile:  Recent Labs   Lab 09/14/22  0449 09/15/22  0428   WBC 6.79 1.69*   HGB 9.6* 10.2*   HCT 29.1* 31.6*   PLT 87* 71*   MCV 89 92   RDW 19.8* 19.6*        Chemistries:  Recent Labs   Lab 09/14/22 0449 09/15/22  0428    136   K 4.3 4.2    107   CO2 23 23   BUN 20 18   CREATININE 0.7 0.7   CALCIUM 7.8* 8.3*   ALBUMIN 1.8* 1.9*   PROT 4.2* 4.5*   BILITOT 0.6 0.7   ALKPHOS 114 111   ALT 12 13   AST 24 20       All pertinent labs within the past 24 hours have been reviewed.    Significant Imaging:  I have reviewed all pertinent imaging results/findings within the past 24 hours.  CXR: I have reviewed all pertinent results/findings within the past 24 hours and my personal findings are:  No significant change from prior    Assessment/Plan:     Exudative pleural effusion  Patient s/p chest tube insertion 9/6 for pleural effusion. Pulmonology consulted for chest tube management. Chest tube drainage initially decreasing but then increased with over 1L drainage /  day 9/11-9/13. On CXR L side appears unchanged since prior. Per primary, fluid production should decrease with chemotherapy.   - Chest tube removed 9/13. Significant fluid leak after removal, now s/p suture & free of leak      - If fluid builds up significantly after removal will seek placement of long term PleurX drain  - recommend mucinex for phlegm.   - f/u CXR tomorrow 9/16                 Nathan Parker MD  Pulmonology  [facility] - Oncology ([facility])

## 2022-09-15 NOTE — PT/OT/SLP PROGRESS
Physical Therapy Treatment    Patient Name:  Donis Jimenez   MRN:  [id number]    Recommendations:     Discharge Recommendations:  home health PT   Discharge Equipment Recommendations:  (TBD)   Barriers to discharge: Increased level of assistance     Assessment:     Donis Jimenez is a 61 y.o. male admitted with a medical diagnosis of Small cell carcinoma of lung.  He presents with the following impairments/functional limitations:   (weakness; impaired endurance; impaired functional mobility; gait instability; impaired balance; decreased lower extremity function; decreased safety awareness; pain; impaired cardiopulmonary response to activity)  Pt was motivated and cooperative with treatment session. Pt Progressing with PT Intervention. Pt Progressing with improving gait distance however with SOB following gait. Pt would continue to benefit from skilled PT to address overall functional mobility, goals , and to return to functional baseline.  Goals remain appropriate.      Rehab Prognosis: Good; patient would benefit from acute skilled PT services to address these deficits and reach maximum level of function.    Recent Surgery: Procedure(s) (LRB):  Cardioversion or Defibrillation (N/A)  Transesophageal echo (MARIE) intra-procedure log documentation 7 Days Post-Op    Plan:     During this hospitalization, patient to be seen 3 x/week to address the identified rehab impairments via gait training, therapeutic activities, therapeutic exercises, neuromuscular re-education and progress toward the following goals:    Plan of Care Expires:  10/03/22    Subjective     Chief Complaint: I feel tired and winded from just walking that little bit  Pain/Comfort:  Pain Rating 1: 0/10  Pain Addressed 2: Reposition, Distraction      Objective:     Communicated with RN prior to session.  Patient found HOB elevated with  (oxygen; peripheral IV) upon PT entry to room.     General Precautions: Standard, fall, aspiration   Orthopedic Precautions:  (RLE weight bearing as tolerated; RLE posterior precautions)   Braces: N/A  Respiratory Status: Nasal cannula, flow 4 L/min     Functional Mobility:  Bed Mobility:  Scooting: minimum assistance  Supine to Sit: minimum assistance  Sit to Supine: minimum assistance  Transfers:  Sit to Stand: to/from EOB with  minimum assistance with  rolling walker  Gait: Pt amb with  RW x 25 ft and Min A with O2 In tow. Pt with SOB following gait   AM-PAC 6 CLICK MOBILITY  Turning over in bed (including adjusting bedclothes, sheets and blankets)?: 3  Sitting down on and standing up from a chair with arms (e.g., wheelchair, bedside commode, etc.): 3  Moving from lying on back to sitting on the side of the bed?: 3  Moving to and from a bed to a chair (including a wheelchair)?: 3  Need to walk in hospital room?: 3  Climbing 3-5 steps with a railing?: 1  Basic Mobility Total Score: 16       Therapeutic Activities and Exercises:   Therapist provided instruction and educated of  patient on progress, safety,d/c,PT POC,   proper body mechanics, energy conservation, and fall prevention strategies during tasks listed above, on the effects of prolonged immobility and the importance of performing OOB activity and exercises to promote healing and reduce recovery time    Updated white board with appropriate PT mobility information for medical team notification     Donned an extra gown   Call nursing/pct to transfer to chair/use bathroom. Pt stated understanding    Bedside table in front of patient and area set up for function, convenience, and safety. RN aware of patient's mobility needs and status. Questions/concerns addressed within PTA scope of practice; patient  with no further questions. Time was provided for active listening, discussion of health disposition, and discussion of safe discharge. Pt?verbalized?agreement .    Patient left HOB elevated with all lines intact, call button in reach, and nsg notified..    GOALS:   Multidisciplinary  Problems       Physical Therapy Goals          Problem: Physical Therapy    Goal Priority Disciplines Outcome Goal Variances Interventions   Physical Therapy Goal     PT, PT/OT Ongoing, Progressing     Description: Goals to be met by: 10/3/22     Patient will increase functional independence with mobility by performin. Supine to sit with Modified [place]  2. Sit to stand transfer with Contact Guard Assistance with RW   3. Gait  x 50 feet with Contact Guard Assistance using Rolling Walker.   4 15. Lower extremity exercise program x 15 reps per  with assistance as needed                         Time Tracking:     PT Received On: 09/15/22  PT Start Time: 1036     PT Stop Time: 1059  PT Total Time (min): 23 min     Billable Minutes: Gait Training 13 and Therapeutic Activity 10    Treatment Type: Treatment  PT/PTA: PTA     PTA Visit Number: 2     09/15/2022

## 2022-09-15 NOTE — PLAN OF CARE
Pt involved in plan of care and communicating needs throughout shift. Pt alert and oriented x4. Pt on 4LNC with oxygen 90% or greater. Pt c/o of anxiety and pain throughout shift. PRN Ativan and Dilaudid given with moderate relief. Pt up with PT/OT. Panic attack x 1; resolved with breathing techniques and Ativan. Pt's appetite increased today. Breathing treatments done by RT. Heparin infusing @ 9.2 ml/hr. Warfarin given as ordered. Edema to both upper extremities. All VSS; no acute events so far this shift.  Pt remaining free from falls or injury throughout shift; bed locked and in lowest position; call light within reach.  Pt instructed to call for assistance as needed.  Q1H rounding done. WCTM.

## 2022-09-15 NOTE — ASSESSMENT & PLAN NOTE
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 Pt has metastatic lung cancer. Pt is alert, oriented to person, place, and situation. Pt is a DNR. Pt is on 3 L O2.     Reason for consult: GOC/ACP. Communicated with MAJOR Camp fellow with [facility].     Today: Met with pt who stated his goal at this time is to be comfortable and receive treatment. Goal is to be back home if possible and have more quality and quantity of life.     9/9/22: Pt very tearful today. Pt says "I know I am dying. " Pt kept apologizing.  Explained to pt there is not a need to apoligize and that that I am glad he felt comfortable to express his feelings. Pt repots he is having a bad day and everything starting to sink in about his health. Support given.  seeing. Will consult Oncology/Psycology for pt. Pt to step down to Hem/onc as soon as bed available.     9/6/22  Goals of care/ACP: Pt to have tracheal stenting tomorrow. Pt reports he is in agreement to procedure and would like CC team to speak to Aston about procedure when he visits today. Per pt, he relays information to Aston but he feels like he forgets some of information to tell him. Pt reports his goal is medical management at this time to see if he can improve enough to get back home. Pt reports his goal is to be at home with his four dogs and , Aston. Pt is aware of his severity of his illness.     Today: Met with pt along with Tracie Kidd LCSW.     9/6/22  Introduced role of Palliative care to pt.   Met with pt who is aware of his medical issues. Per pt he is aware he has metastatic cancer. Pt is aware that any therapies/procedures offered to him is palliative and will not cure cancer. Per pt, he is still trying to " "come to  with his dx. He learned about his issues in August. Pt reports goal at time is to see if anything can be done to help with symptoms and "give him some more time." Spoke to pt about amount of O2 he is on at this time. He verbalized understanding.  Per pt he has spoken to Oncology and XRT MDs.   CTS has been consulted concerning tracheal stenting.      Pt open to continued visits with [facility] care for care planning and symptom management needs.  Support given pt.      Pt reports he is legally  to Aston Collins and he would want him to be hi medical decision-maker if he cannot make his own medical decisions. MPOA paperwork left with pt. Education provided. Per Pt, Aston works during day but can be reached by phone.      Code status: DNR.      Symptom management:     BTC=692    Pain: Pt reports he has pain to back chest area that can get up to 9/10. Pt reports throbbing/shooting pain. Pt reports adding long-acting has improved pain.     Pt is on MSContin 15 mg bid.   Pt is currently on Dilaudid 1 mg IVP q 4 hrs prn pain.     Recs:   Would increase MSContin to 30 mg bid. Can increase if needed. Communicated with team today.   Continue with Dilaudid IV.   Will need to convert to PO pain meds when pt gets closer to going home. (Pt is aware of this)     Dyspnea r/t to mediastinal mass, pleural effusion.   Pt is on 3 L O2 per NC.   Pt is on Dilaudid 1 mg IVP q 4 hrs prn.  Pt has chest tube in place and having tracheal stents placed.      Debility r/t to pathological femoral neck fracture s/p ight hip hemiarthroplasty with ablation, cementoplasty  Pt was using walker prior to admit.   Would resume PT/OT when pt stable to participate.      Anxiety r/t to new dx and dyspnea.   Pt has Ativan 1 mg tid prn.         Plan:   Will continue to meet with pt to reinforce realistic expectations/assit with GOC.   See above symptom recs.   Communicated with Hem/ONC team  Support given.   Oncology/ Psychology seeing  Will " follow.

## 2022-09-15 NOTE — SUBJECTIVE & OBJECTIVE
Interval History: Pt DNR.    Past Medical History:   Diagnosis Date    Endocarditis     Pacemaker     Pneumonia        Past Surgical History:   Procedure Laterality Date    ANGIOGRAPHY OF LOWER EXTREMITY Right 8/19/2022    Procedure: ANGIOGRAM, LOWER EXTREMITY;  Surgeon: Thomas Nicolas MD;  Location: [facility] OR [address] [place] [address];  Service: Peripheral Vascular;  Laterality: Right;  30.0 min  315.10 mGy  26.1755 Gycm2  84 ml dye    HIP RESURFACING Right 8/23/2022    Procedure: cemontoplasty;  Surgeon: Yung Flores MD;  [address];  Service: Orthopedics;  Laterality: Right;    RADIOFREQUENCY ABLATION, BONE, PERCUTANEOUS Right 8/23/2022    Procedure: RADIOFREQUENCY ABLATION,BONE;  Surgeon: Yung Flores MD;  [address];  Service: Orthopedics;  Laterality: Right;    REPAIR, PSEUDOANEURYSM, ARTERY, FEMORAL Right 8/19/2022    Procedure: REPAIR, PSEUDOANEURYSM, ARTERY, FEMORAL;  Surgeon: Thomas Nicolas MD;  [address];  Service: Peripheral Vascular;  Laterality: Right;  R PFA (3rd branch) pseudoaneurysm     TREATMENT OF CARDIAC ARRHYTHMIA N/A 9/8/2022    Procedure: Cardioversion or Defibrillation;  Surgeon: Lan Mccollum MD;  Location: [facility] EP LAB;  Service: Cardiology;  Laterality: N/A;  AFL, DCCV, ANES, SK, RM [id number]       Review of patient's allergies indicates:   Allergen Reactions    Ambien [zolpidem] Other (See Comments)     Disturbing dreams       Medications:  Continuous Infusions:   heparin (porcine) in D5W 16.084 Units/kg/hr (09/14/22 2150)     Scheduled Meds:   albuterol-ipratropium  3 mL Nebulization Q8H    amiodarone  400 mg Oral BID    Followed by    [START ON 9/26/2022] amiodarone  400 mg Oral Daily    Followed by    [START ON 10/3/2022] amiodarone  200 mg Oral Daily    EScitalopram oxalate  20 mg Oral QHS    filgrastim-sndz  300 mcg Subcutaneous Daily    morphine  30 mg Oral Q12H    pantoprazole  40 mg Oral Daily    polyethylene glycol  17 g Oral Daily    pregabalin   75 mg Oral BID    senna-docusate 8.6-50 mg  1 tablet Oral BID    sodium chloride 0.9% flush bag IVPB   Intravenous 1 time in Clinic/HOD    sodium chloride 0.9%  10 mL Intravenous Q6H    sodium chloride 3%  4 mL Nebulization Q8H    warfarin  5 mg Oral Daily     PRN Meds:sodium chloride, acetaminophen, alteplase, diphenhydrAMINE, EPINEPHrine, heparin, porcine (PF), HYDROmorphone, LORazepam, melatonin, methocarbamoL, ondansetron, Flushing PICC Protocol **AND** sodium chloride 0.9% **AND** sodium chloride 0.9%, sumatriptan    Family History    None       Tobacco Use    Smoking status: Every Day     Packs/day: 1.00     Types: Cigarettes    Smokeless tobacco: Never   Substance and Sexual Activity    Alcohol use: Not on file    Drug use: Not on file    Sexual activity: Not on file       Review of Systems   Constitutional:  Positive for activity change, fatigue and unexpected weight change.   Respiratory:  Positive for shortness of breath.    Cardiovascular:  Negative for leg swelling.   Gastrointestinal:  Negative for nausea and vomiting.   Musculoskeletal:  Positive for gait problem.   Skin: Negative.    Neurological:  Positive for weakness.   Psychiatric/Behavioral: Negative.     Objective:     Vital Signs (Most Recent):  Temp: 97.9 °F (36.6 °C) (09/15/22 0746)  Pulse: 61 (09/15/22 0832)  Resp: (!) 22 (09/15/22 0813)  BP: (!) 164/79 (09/15/22 0746)  SpO2: 97 % (09/15/22 0813)   Vital Signs (24h Range):  Temp:  [97.5 °F (36.4 °C)-98.3 °F (36.8 °C)] 97.9 °F (36.6 °C)  Pulse:  [59-74] 61  Resp:  [16-24] 22  SpO2:  [87 %-97 %] 97 %  BP: (132-169)/(75-81) 164/79     Weight: 57.2 kg (126 lb)  Body mass index is 18.61 kg/m².    Physical Exam  Constitutional:       Comments: Pt on 3L O2   HENT:      Head: Normocephalic and atraumatic.   Eyes:      General: Lids are normal.      Conjunctiva/sclera: Conjunctivae normal.   Cardiovascular:      Rate and Rhythm: Tachycardia present.   Pulmonary:      Effort: Tachypnea present. No  accessory muscle usage or respiratory distress.      Comments: Pt on 3L O2.  Abdominal:      General: Abdomen is flat.   Musculoskeletal:      Cervical back: Full passive range of motion without pain and normal range of motion.      Comments: Edema to left arm.    Skin:     General: Skin is warm and dry.   Neurological:      Mental Status: He is alert and oriented to person, place, and time.   Psychiatric:         Attention and Perception: Attention normal.         Speech: Speech normal.      Comments: Crying       Review of Symptoms      Symptom Assessment (ESAS 0-10 Scale)  Pain:  0  Dyspnea:  0  Anxiety:  0  Nausea:  0  Depression:  0  Anorexia:  0  Fatigue:  0  Insomnia:  0  Restlessness:  0  Agitation:  0         ECOG Performance Status thGthrthathdtheth:th th4th Living Arrangements:  Lives with family    Psychosocial/Cultural: Pt legally  to Aston Collins. Per pt Aston works during the day. Per pt, Aston is his care-giver.       Advance Care Planning   Advance Directives:   Living Will: No    Do Not Resuscitate Status: Yes    Medical Power of : No    Agent's Name:  Aston Collins    Decision Making:  Patient answered questions       Significant Labs: All pertinent labs within the past 24 hours have been reviewed.  CBC:   Recent Labs   Lab 09/15/22  0428   WBC 1.69*   HGB 10.2*   HCT 31.6*   MCV 92   PLT 71*       BMP:  Recent Labs   Lab 09/15/22  0428   GLU 85      K 4.2      CO2 23   BUN 18   CREATININE 0.7   CALCIUM 8.3*       LFT:  Lab Results   Component Value Date    AST 20 09/15/2022    ALKPHOS 111 09/15/2022    BILITOT 0.7 09/15/2022     Albumin:   Albumin   Date Value Ref Range Status   09/15/2022 1.9 (L) 3.5 - 5.2 g/dL Final     Protein:   Total Protein   Date Value Ref Range Status   09/15/2022 4.5 (L) 6.0 - 8.4 g/dL Final     Lactic acid:   Lab Results   Component Value Date    LACTATE 1.7 09/05/2022    LACTATE 2.4 (H) 09/04/2022       Significant Imaging: I have reviewed all pertinent  imaging results/findings within the past 24 hours.

## 2022-09-15 NOTE — PROGRESS NOTES
"[facility] - Oncology ([facility])  Palliative Medicine  Progress Note    Patient Name: Donis Jimenez  MRN: [id number]  Admission Date: 9/4/2022  Hospital Length of Stay: 11 days  Code Status: DNR   Attending Provider: Kash Boo MD  Consulting Provider: LOREE Dean  Primary Care Physician: Elio Farias MD  Principal Problem:Small cell carcinoma of lung    Patient information was obtained from patient and primary team.      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 Pt has metastatic lung cancer. Pt is alert, oriented to person, place, and situation. Pt is a DNR. Pt is on 3 L O2.     Reason for consult: GOC/ACP. Communicated with MAJOR Camp fellow with [facility].     Today: Met with pt who stated his goal at this time is to be comfortable and receive treatment. Goal is to be back home if possible and have more quality and quantity of life.     9/9/22: Pt very tearful today. Pt says "I know I am dying. " Pt kept apologizing.  Explained to pt there is not a need to apoligize and that that I am glad he felt comfortable to express his feelings. Pt repots he is having a bad day and everything starting to sink in about his health. Support given.  seeing. Will consult Oncology/Psycology for pt. Pt to step down to Hem/onc as soon as bed available.     9/6/22  Goals of care/ACP: Pt to have tracheal stenting tomorrow. Pt reports he is in agreement to procedure and would like CC team to speak to Aston about procedure when he visits today. Per pt, he relays information to Aston but he feels like he forgets some of information to tell him. Pt reports his goal is medical management at this time to " "see if he can improve enough to get back home. Pt reports his goal is to be at home with his four dogs and , Aston. Pt is aware of his severity of his illness.     Today: Met with pt along with Tracie Kidd LCSW.     9/6/22  Introduced role of Palliative care to pt.   Met with pt who is aware of his medical issues. Per pt he is aware he has metastatic cancer. Pt is aware that any therapies/procedures offered to him is palliative and will not cure cancer. Per pt, he is still trying to come to  with his dx. He learned about his issues in August. Pt reports goal at time is to see if anything can be done to help with symptoms and "give him some more time." Spoke to pt about amount of O2 he is on at this time. He verbalized understanding.  Per pt he has spoken to Oncology and XRT MDs.   CTS has been consulted concerning tracheal stenting.      Pt open to continued visits with [facility] care for care planning and symptom management needs.  Support given pt.      Pt reports he is legally  to Aston Collins and he would want him to be hi medical decision-maker if he cannot make his own medical decisions. MPOA paperwork left with pt. Education provided. Per Pt, Aston works during day but can be reached by phone.      Code status: DNR.      Symptom management:     FSY=[id number]    Pain: Pt reports he has pain to back chest area that can get up to 9/10. Pt reports throbbing/shooting pain. Pt reports adding long-acting has improved pain.     Pt is on MSContin 15 mg bid.   Pt is currently on Dilaudid 1 mg IVP q 4 hrs prn pain.     Recs:   Would increase MSContin to 30 mg bid. Can increase if needed. Communicated with team today.   Continue with Dilaudid IV.   Will need to convert to PO pain meds when pt gets closer to going home. (Pt is aware of this)     Dyspnea r/t to mediastinal mass, pleural effusion.   Pt is on 3 L O2 per NC.   Pt is on Dilaudid 1 mg IVP q 4 hrs prn.  Pt has chest tube in place and having tracheal " stents placed.      Debility r/t to pathological femoral neck fracture s/p ight hip hemiarthroplasty with ablation, cementoplasty  Pt was using walker prior to admit.   Would resume PT/OT when pt stable to participate.      Anxiety r/t to new dx and dyspnea.   Pt has Ativan 1 mg tid prn.         Plan:   Will continue to meet with pt to reinforce realistic expectations/assit with GOC.   See above symptom recs.   Communicated with Hem/ONC team  Support given.   Oncology/ Psychology seeing  Will follow.               I will follow-up with patient. Please contact us if you have any additional questions.    Subjective:     Chief Complaint:   Chief Complaint   Patient presents with    Shortness of Breath     Pt brought to ED from home via [facility]. Per family pt SOB and productive cough. Pt had double valve replacement, pacemaker and hip surgery 1 week ago.        HPI:   Pt is a 61-year-old male with PMH significant for bacterial endocarditis, s/p AV and MV mechanical valve replacement (on warfarin), CKD, tobacco abuse with recent complicated hospital course who presented to the emergency department with shortness of breath.  Difficult for patient to provide history due to tachypnea; spouse at bedside assisting with history.      Per chart review, patient was admitted 8/18-8/27/22 after sustaining a pathologic right femoral neck fracture and right femoral artery pseudoaneurysm from a fall at home. Patient taken to OR on 8/19 by Vascular Surgery and had embolization of 3rd order right profunda femoral artery pseudoaneurysm with N-BCA glue and 5mm coil aneurysm repair. Pt underwent right hip hemiarthroplasty with ablation, cementoplasty, and percutaneous fixation of pelvis for pathologic fracture and metastatic disease with Ortho on 8/23. 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 Metastatic work-up done  with CT scan of chest/abdomen and pelvis showing extensive disease including mediastinal mass with encasement of mediastinal vascular structures and airways, MARTHA pulmonary mass, bilateral suprarenal and left renal masses, L1 pathologic fx and rib & skull metastatic disease.      Per chart review, pt's spouse stated that pt had been ambulatory with a walker post-discharge, without c/o SOB, lightheadedness or dizziness. SOB started ~ 9/2 with productive cough. He denies fever/chills, chest pain, abd pain, N/V/D.      In the ED patient was in a flutter and was cardioverted with some improvement in symptoms. He was found to have right sided pneumonia and was started on vanc and cefepime and admitted to Hospital Medicine for further management.      During his time in the ED, the patient had escalating FiO2 requirements, now on 45L 100% comfort flow.      Critical Care Medicine was consulted for worsening hypoxemic respiratory failure    Pt is DNR. Pt on 40 L  @ 60 %      Hospital Course:  No notes on file    Interval History: Pt DNR.    Past Medical History:   Diagnosis Date    Endocarditis     Pacemaker     Pneumonia        Past Surgical History:   Procedure Laterality Date    ANGIOGRAPHY OF LOWER EXTREMITY Right 8/19/2022    Procedure: ANGIOGRAM, LOWER EXTREMITY;  Surgeon: Thomas Nicolas MD;  Location: [facility] OR [address] [place] [address];  Service: Peripheral Vascular;  Laterality: Right;  30.0 min  315.10 mGy  26.1755 Gycm2  84 ml dye    HIP RESURFACING Right 8/23/2022    Procedure: cemontoplasty;  Surgeon: Yung Flores MD;  [facility];  Service: Orthopedics;  Laterality: Right;    RADIOFREQUENCY ABLATION, BONE, PERCUTANEOUS Right 8/23/2022    Procedure: RADIOFREQUENCY ABLATION,BONE;  Surgeon: Yung Flores MD;  Location: [facility] OR [address] [place] [address];  Service: Orthopedics;  Laterality: Right;     REPAIR, PSEUDOANEURYSM, ARTERY, FEMORAL Right 8/19/2022    Procedure: REPAIR, PSEUDOANEURYSM, ARTERY, FEMORAL;  Surgeon: Thomas Nicolas MD;  [facility];  Service: Peripheral Vascular;  Laterality: Right;  R PFA (3rd branch) pseudoaneurysm     TREATMENT OF CARDIAC ARRHYTHMIA N/A 9/8/2022    Procedure: Cardioversion or Defibrillation;  Surgeon: Lan Mccollum MD;  Location: [facility] EP LAB;  Service: Cardiology;  Laterality: N/A;  AFL, DCCV, ANES, SK, RM [id number]       Review of patient's allergies indicates:   Allergen Reactions    Ambien [zolpidem] Other (See Comments)     Disturbing dreams       Medications:  Continuous Infusions:   heparin (porcine) in D5W 16.084 Units/kg/hr (09/14/22 2150)     Scheduled Meds:   albuterol-ipratropium  3 mL Nebulization Q8H    amiodarone  400 mg Oral BID    Followed by    [START ON 9/26/2022] amiodarone  400 mg Oral Daily    Followed by    [START ON 10/3/2022] amiodarone  200 mg Oral Daily    EScitalopram oxalate  20 mg Oral QHS    filgrastim-sndz  300 mcg Subcutaneous Daily    morphine  30 mg Oral Q12H    pantoprazole  40 mg Oral Daily    polyethylene glycol  17 g Oral Daily    pregabalin  75 mg Oral BID    senna-docusate 8.6-50 mg  1 tablet Oral BID    sodium chloride 0.9% flush bag IVPB   Intravenous 1 time in Clinic/HOD    sodium chloride 0.9%  10 mL Intravenous Q6H    sodium chloride 3%  4 mL Nebulization Q8H    warfarin  5 mg Oral Daily     PRN Meds:sodium chloride, acetaminophen, alteplase, diphenhydrAMINE, EPINEPHrine, heparin, porcine (PF), HYDROmorphone, LORazepam, melatonin, methocarbamoL, ondansetron, Flushing PICC Protocol **AND** sodium chloride 0.9% **AND** sodium chloride 0.9%, sumatriptan    Family History    None       Tobacco Use    Smoking status: Every Day     Packs/day: 1.00     Types: Cigarettes    Smokeless tobacco: Never   Substance and Sexual Activity    Alcohol use: Not on file    Drug use: Not on file    Sexual activity:  Not on file       Review of Systems   Constitutional:  Positive for activity change, fatigue and unexpected weight change.   Respiratory:  Positive for shortness of breath.    Cardiovascular:  Negative for leg swelling.   Gastrointestinal:  Negative for nausea and vomiting.   Musculoskeletal:  Positive for gait problem.   Skin: Negative.    Neurological:  Positive for weakness.   Psychiatric/Behavioral: Negative.     Objective:     Vital Signs (Most Recent):  Temp: 97.9 °F (36.6 °C) (09/15/22 0746)  Pulse: 61 (09/15/22 0832)  Resp: (!) 22 (09/15/22 0813)  BP: (!) 164/79 (09/15/22 0746)  SpO2: 97 % (09/15/22 0813)   Vital Signs (24h Range):  Temp:  [97.5 °F (36.4 °C)-98.3 °F (36.8 °C)] 97.9 °F (36.6 °C)  Pulse:  [59-74] 61  Resp:  [16-24] 22  SpO2:  [87 %-97 %] 97 %  BP: (132-169)/(75-81) 164/79     Weight: 57.2 kg (126 lb)  Body mass index is 18.61 kg/m².    Physical Exam  Constitutional:       Comments: Pt on 3L O2   HENT:      Head: Normocephalic and atraumatic.   Eyes:      General: Lids are normal.      Conjunctiva/sclera: Conjunctivae normal.   Cardiovascular:      Rate and Rhythm: Tachycardia present.   Pulmonary:      Effort: Tachypnea present. No accessory muscle usage or respiratory distress.      Comments: Pt on 3L O2.  Abdominal:      General: Abdomen is flat.   Musculoskeletal:      Cervical back: Full passive range of motion without pain and normal range of motion.      Comments: Edema to left arm.    Skin:     General: Skin is warm and dry.   Neurological:      Mental Status: He is alert and oriented to person, place, and time.   Psychiatric:         Attention and Perception: Attention normal.         Speech: Speech normal.      Comments: Crying       Review of Symptoms      Symptom Assessment (ESAS 0-10 Scale)  Pain:  0  Dyspnea:  0  Anxiety:  0  Nausea:  0  Depression:  0  Anorexia:  0  Fatigue:  0  Insomnia:  0  Restlessness:  0  Agitation:  0         ECOG Performance Status thGthrthathdtheth:th th4th Living  Arrangements:  Lives with family    Psychosocial/Cultural: Pt legally  to Aston Collins. Per pt Aston works during the day. Per pt, Aston is his care-giver.       Advance Care Planning   Advance Directives:   Living Will: No    Do Not Resuscitate Status: Yes    Medical Power of : No    Agent's Name:  Aston Collins    Decision Making:  Patient answered questions       Significant Labs: All pertinent labs within the past 24 hours have been reviewed.  CBC:   Recent Labs   Lab 09/15/22  0428   WBC 1.69*   HGB 10.2*   HCT 31.6*   MCV 92   PLT 71*       BMP:  Recent Labs   Lab 09/15/22  0428   GLU 85      K 4.2      CO2 23   BUN 18   CREATININE 0.7   CALCIUM 8.3*       LFT:  Lab Results   Component Value Date    AST 20 09/15/2022    ALKPHOS 111 09/15/2022    BILITOT 0.7 09/15/2022     Albumin:   Albumin   Date Value Ref Range Status   09/15/2022 1.9 (L) 3.5 - 5.2 g/dL Final     Protein:   Total Protein   Date Value Ref Range Status   09/15/2022 4.5 (L) 6.0 - 8.4 g/dL Final     Lactic acid:   Lab Results   Component Value Date    LACTATE 1.7 09/05/2022    LACTATE 2.4 (H) 09/04/2022       Significant Imaging: I have reviewed all pertinent imaging results/findings within the past 24 hours.      > 50% of  35 min visit spent in chart review, face to face discussion of goals of care,  symptom assessment, coordination of care and emotional support    Zoe Potter, CNS  Palliative Medicine  [facility] - Oncology ([facility])

## 2022-09-15 NOTE — ASSESSMENT & PLAN NOTE
Secondary to extensive small cell lung cancer. Currently on 2L O2, satting 92%  - Failed 6MWT, dessatted to 85% when O2 removed. Will need home oxygen on discharge.

## 2022-09-15 NOTE — PROGRESS NOTES
Donis Jimenez is a 61 y.o.   male patient, who presents for a 6 minute walk test ordered by Dr. Silva  .  The diagnosis is small cell carcinoma of lung .  The patient's BMI is 18.6kg/m2.    Test Results:    The test was not able to be performed  .  Prior to walking, the patient reported:  shortness of breath and anxiety .      09/14/2022     O2 Sat % Supplemental Oxygen Heart Rate Blood Pressure Floresita Scale   Pre-exercise  (Resting) 85  Room air  79          Performing nurse/tech: Geneva Coto RN         CLINICAL INTERPRETATION:  Testing was not able to be performed d/t oxygen saturation dropping below 90% after removing NC. PT oxygen 91% on 4L NC. Pt sat up on side of bed, oxygen removed and pt on room air. Once on the side of the bed pt's oxygen 85%. NC put back on pt and oxygen increased to WNL.

## 2022-09-15 NOTE — SUBJECTIVE & OBJECTIVE
Interval History: Appears better this morning. He states he feels short of breath,  is requesting IV ativan for anxiety breakthrough. Will continue to get daily CXR for assessment of malignant effusion. Increased MS Contin dosage to 30 bid as he required IV dilaudid everytime when available.     Oncology Treatment Plan:   OP SCLC CARBOPLATIN (AUC) ETOPOSIDE DURVALUMAB Q3W FOLLOWED BY MAINTENANCE DURVALUMAB 1500 MG Q4W    Medications:  Continuous Infusions:   heparin (porcine) in D5W 16.084 Units/kg/hr (09/14/22 2150)     Scheduled Meds:   albuterol-ipratropium  3 mL Nebulization Q8H    amiodarone  400 mg Oral BID    Followed by    [START ON 9/26/2022] amiodarone  400 mg Oral Daily    Followed by    [START ON 10/3/2022] amiodarone  200 mg Oral Daily    EScitalopram oxalate  20 mg Oral QHS    filgrastim-sndz  300 mcg Subcutaneous Daily    morphine  30 mg Oral Q12H    mupirocin   Nasal BID    pantoprazole  40 mg Oral Daily    polyethylene glycol  17 g Oral Daily    pregabalin  75 mg Oral BID    senna-docusate 8.6-50 mg  1 tablet Oral BID    sodium chloride 0.9% flush bag IVPB   Intravenous 1 time in Clinic/HOD    sodium chloride 0.9%  10 mL Intravenous Q6H    sodium chloride 3%  4 mL Nebulization Q8H    warfarin  7.5 mg Oral Daily     PRN Meds:sodium chloride, acetaminophen, alteplase, diphenhydrAMINE, EPINEPHrine, heparin, porcine (PF), HYDROmorphone, LORazepam, melatonin, methocarbamoL, ondansetron, Flushing PICC Protocol **AND** sodium chloride 0.9% **AND** sodium chloride 0.9%, sumatriptan       Objective:     Vital Signs (Most Recent):  Temp: 97.8 °F (36.6 °C) (09/15/22 1557)  Pulse: 60 (09/15/22 1557)  Resp: 19 (09/15/22 1636)  BP: (!) 158/74 (09/15/22 1557)  SpO2: (!) 90 % (09/15/22 1557)   Vital Signs (24h Range):  Temp:  [97.5 °F (36.4 °C)-98.3 °F (36.8 °C)] 97.8 °F (36.6 °C)  Pulse:  [59-72] 60  Resp:  [16-24] 19  SpO2:  [87 %-97 %] 90 %  BP: (132-169)/(74-81) 158/74     Weight: 57.2 kg (126 lb)  Body  mass index is 18.61 kg/m².  Body surface area is 1.67 meters squared.      Intake/Output Summary (Last 24 hours) at 9/15/2022 1656  Last data filed at 9/15/2022 1613  Gross per 24 hour   Intake 1197 ml   Output 2375 ml   Net -1178 ml       Physical Exam  Constitutional:       Comments: Frail, cachectic   HENT:      Head: Normocephalic.      Nose: Nose normal.      Mouth/Throat:      Mouth: Mucous membranes are moist.   Eyes:      Pupils: Pupils are equal, round, and reactive to light.   Cardiovascular:      Rate and Rhythm: Normal rate.   Pulmonary:      Effort: Pulmonary effort is normal.      Comments: Diffuse rhonchi, in no respiratory distress at rest  Abdominal:      General: There is no distension.      Tenderness: There is no abdominal tenderness.   Musculoskeletal:         General: Normal range of motion.   Skin:     General: Skin is warm.      Capillary Refill: Capillary refill takes less than 2 seconds.   Neurological:      General: No focal deficit present.      Mental Status: He is alert and oriented to person, place, and time.       Significant Labs:   All pertinent labs from the last 24 hours have been reviewed.    Diagnostic Results:  I have reviewed all pertinent imaging results/findings within the past 24 hours.

## 2022-09-15 NOTE — ASSESSMENT & PLAN NOTE
Patient had afib with RVR this admission was successfully cardioverted. IV amio load x72 hours completed and transitioned to PO.    - amio  BID for 2 weeks followed by:    - amio 400 daily for 1 week    - amio 200 daily lifetime    - He is on low intensity heparin drip currently for the atrial flutter,   - Warfarin started 9/13 (mechanical heart valve)  - Daily PT/INR, transition from heparin drip to warfarin once therapeutic. Increased warfarin dose to 7.5 (9/15)

## 2022-09-15 NOTE — ASSESSMENT & PLAN NOTE
Patient s/p chest tube insertion 9/6 for pleural effusion. Pulmonology consulted for chest tube management. Chest tube drainage initially decreasing but then increased with over 1L drainage / day 9/11-9/13. On CXR L side appears unchanged since prior. Per primary, fluid production should decrease with chemotherapy.   - Chest tube removed 9/13. Significant fluid leak after removal, now s/p suture & free of leak      - If fluid builds up significantly after removal will seek placement of long term PleurX drain  - recommend mucinex for phlegm.   - f/u CXR tomorrow 9/16

## 2022-09-15 NOTE — PROGRESS NOTES
Issac Moore - Oncology ([facility])  Hematology/Oncology  Progress Note    Patient Name: Donis Jimenez  Admission Date: 9/4/2022  Hospital Length of Stay: 11 days  Code Status: DNR     Subjective:     HPI:  61 year old man with newly diagnosed metastatic small cell lung cancer complicated by acute hypoxic respiratory failure.  Was previously treated for pathologic right femoral neck fracture.  Pathology finalized as small cell carcinoma. Over his stay in ICU, his O2 requirments were steadily reduced, he is now on nasal cannula. Patient went into afib with RVR and needed cardioversion. He is currently undergoing inpatient chemotherapy with carboplatin (day 1) and etoposide (day 1,2,3). Will need GCSF on day 4.     Patient now feeling anxious about new diagnosis. Psych onc saw patient, will consider consulting psych for new onset anxiety and panic attacks if patient does not improve.       Interval History: Appears better this morning. He states he feels short of breath,  is requesting IV ativan for anxiety breakthrough. Will continue to get daily CXR for assessment of malignant effusion. Increased MS Contin dosage to 30 bid as he required IV dilaudid everytime when available.     Oncology Treatment Plan:   OP SCLC CARBOPLATIN (AUC) ETOPOSIDE DURVALUMAB Q3W FOLLOWED BY MAINTENANCE DURVALUMAB 1500 MG Q4W    Medications:  Continuous Infusions:   heparin (porcine) in D5W 16.084 Units/kg/hr (09/14/22 2150)     Scheduled Meds:   albuterol-ipratropium  3 mL Nebulization Q8H    amiodarone  400 mg Oral BID    Followed by    [START ON 9/26/2022] amiodarone  400 mg Oral Daily    Followed by    [START ON 10/3/2022] amiodarone  200 mg Oral Daily    EScitalopram oxalate  20 mg Oral QHS    filgrastim-sndz  300 mcg Subcutaneous Daily    morphine  30 mg Oral Q12H    mupirocin   Nasal BID    pantoprazole  40 mg Oral Daily    polyethylene glycol  17 g Oral Daily    pregabalin  75 mg Oral BID    senna-docusate 8.6-50 mg  1 tablet Oral  BID    sodium chloride 0.9% flush bag IVPB   Intravenous 1 time in Clinic/HOD    sodium chloride 0.9%  10 mL Intravenous Q6H    sodium chloride 3%  4 mL Nebulization Q8H    warfarin  7.5 mg Oral Daily     PRN Meds:sodium chloride, acetaminophen, alteplase, diphenhydrAMINE, EPINEPHrine, heparin, porcine (PF), HYDROmorphone, LORazepam, melatonin, methocarbamoL, ondansetron, Flushing PICC Protocol **AND** sodium chloride 0.9% **AND** sodium chloride 0.9%, sumatriptan       Objective:     Vital Signs (Most Recent):  Temp: 97.8 °F (36.6 °C) (09/15/22 1557)  Pulse: 60 (09/15/22 1557)  Resp: 19 (09/15/22 1636)  BP: (!) 158/74 (09/15/22 1557)  SpO2: (!) 90 % (09/15/22 1557)   Vital Signs (24h Range):  Temp:  [97.5 °F (36.4 °C)-98.3 °F (36.8 °C)] 97.8 °F (36.6 °C)  Pulse:  [59-72] 60  Resp:  [16-24] 19  SpO2:  [87 %-97 %] 90 %  BP: (132-169)/(74-81) 158/74     Weight: 57.2 kg (126 lb)  Body mass index is 18.61 kg/m².  Body surface area is 1.67 meters squared.      Intake/Output Summary (Last 24 hours) at 9/15/2022 1656  Last data filed at 9/15/2022 1613  Gross per 24 hour   Intake 1197 ml   Output 2375 ml   Net -1178 ml       Physical Exam  Constitutional:       Comments: Frail, cachectic   HENT:      Head: Normocephalic.      Nose: Nose normal.      Mouth/Throat:      Mouth: Mucous membranes are moist.   Eyes:      Pupils: Pupils are equal, round, and reactive to light.   Cardiovascular:      Rate and Rhythm: Normal rate.   Pulmonary:      Effort: Pulmonary effort is normal.      Comments: Diffuse rhonchi, in no respiratory distress at rest  Abdominal:      General: There is no distension.      Tenderness: There is no abdominal tenderness.   Musculoskeletal:         General: Normal range of motion.   Skin:     General: Skin is warm.      Capillary Refill: Capillary refill takes less than 2 seconds.   Neurological:      General: No focal deficit present.      Mental Status: He is alert and oriented to person, place, and  time.       Significant Labs:   All pertinent labs from the last 24 hours have been reviewed.    Diagnostic Results:  I have reviewed all pertinent imaging results/findings within the past 24 hours.    Assessment/Plan:     * Small cell carcinoma of lung  [facility] pathology report confirms small cell lung cancer from bone specimen.  Explained to him and his  that he is in an unfortunate situation.  He will most certainly die of this cancer rather quickly if not given chemotherapy.  While our intent is to improve his tumor burden causing his hypoxic respiratory failure with chemotherapy, it is also possible that we may potentially hasten his death unintentionally due to chemo toxicity. He and his  understand the risks.  - Patient currently on day 2 of inpatient chemotherapy, so far tolerating well  -labs reviewed; carboplatin renally dosed and will proceed with carboplatin and etoposide.  Etoposide given day 2 and day 3.  We will plan to provide GCSF starting day 4 and will do so for at least 7 doses.      - GCSF starting day 4 (9/11) for WBC<10  - agree with oncology psychology consult    Pain  MMT  - MS Contin 30mg bid  - Dilaudid 1mg q4 prn  - Robaxin 500 q6h prn  - Ativan 1mg tid prn    Tumor lysis syndrome  Resolved  Patient now with concerns for tumor lysis syndrome given active chemo with SCLC, will treat prophylactically    - holding allopurinol 300 mg daily   - Continue daily cbc/cmp  - Uric acid/LDH downtrending, no need to repeat    Adjustment disorder with mixed anxiety and depressed mood  Will consult psychiatry for patient tomorrow. Talked to palliative and they stated that patient would not benefit from more pain medications given current emotional state    - Psych consulted, appreciate recs  - Does not want additional medications at this time for anxiety, wants to continue 20mg lexapro  - Ativan for breakthrough anxiety    Exudative pleural effusion  Chest tube placed 9/6, removed 9/14.  Hopefully will improve w/ drainage and chemotherapy   - pulmonology recommending removal of chest tube, monitor respiratory status  - Daily CXR, monitor for malignant effusion     Debility  PT/OT Eval, will need home health    Acute hypoxemic respiratory failure  Secondary to extensive small cell lung cancer. Currently on 2L O2, satting 92%  - Failed 6MWT, dessatted to 85% when O2 removed. Will need home oxygen on discharge.     Atrial flutter  Patient had afib with RVR this admission was successfully cardioverted. IV amio load x72 hours completed and transitioned to PO.    - amio  BID for 2 weeks followed by:    - amio 400 daily for 1 week    - amio 200 daily lifetime    - He is on low intensity heparin drip currently for the atrial flutter,   - Warfarin started 9/13 (mechanical heart valve)  - Daily PT/INR, transition from heparin drip to warfarin once therapeutic. Increased warfarin dose to 7.5 (9/15)    Malignant neoplasm metastatic to bone  See small cell lung cancer    Pathologic fracture of neck of right femur s/p hemiarthroplasty on 8/23/2022  S/p right hip hemiarthroplasty with ablation, cementoplasty, and percutaneous fixation of pelvis 8/23. Patient is weight bearing as tolerated with posterior hip precautions to right lower extremity as per orthopedics    - Management per primary and ortho  - When more stable consider postop radiation             Steven Silva,   Hematology/Oncology  [facility] Moore - Oncology ([facility])      ATTENDING NOTE, ONCOLOGY INPATIENT TEAM      Patient seen and examined, chart reviewed.  Please refer to my note of same day.    Kash Boo MD

## 2022-09-15 NOTE — SUBJECTIVE & OBJECTIVE
Interval History: Mr Jimenez is tolerating chest tube removal well. He reports no increased work of breathing or SOB since the tube was pulled. He underwent stitching of the tube site yesterday & the wound is no longer leaking pleural fluid. CXT today does not demonstrate re accumulation of pleural fluid. He continues to suffer panic attacks occasionally.     Objective:     Vital Signs (Most Recent):  Temp: 97.9 °F (36.6 °C) (09/15/22 0746)  Pulse: 61 (09/15/22 0832)  Resp: (!) 22 (09/15/22 0813)  BP: (!) 164/79 (09/15/22 0746)  SpO2: 97 % (09/15/22 0813)   Vital Signs (24h Range):  Temp:  [97.5 °F (36.4 °C)-98.3 °F (36.8 °C)] 97.9 °F (36.6 °C)  Pulse:  [59-74] 61  Resp:  [16-24] 22  SpO2:  [87 %-97 %] 97 %  BP: (132-169)/(75-81) 164/79     Weight: 57.2 kg (126 lb)  Body mass index is 18.61 kg/m².      Intake/Output Summary (Last 24 hours) at 9/15/2022 1015  Last data filed at 9/15/2022 0423  Gross per 24 hour   Intake 1077 ml   Output 2200 ml   Net -1123 ml       Physical Exam  Vitals and nursing note reviewed.   Constitutional:       Appearance: He is ill-appearing.   HENT:      Head: Normocephalic and atraumatic.      Nose: Nose normal.      Mouth/Throat:      Mouth: Mucous membranes are moist.   Eyes:      General: No scleral icterus.     Conjunctiva/sclera: Conjunctivae normal.   Cardiovascular:      Rate and Rhythm: Normal rate and regular rhythm.      Pulses: Normal pulses.      Heart sounds: Normal heart sounds. No murmur heard.    No gallop.   Pulmonary:      Effort: Pulmonary effort is normal.      Breath sounds: Rhonchi (BL bases) present. No wheezing.   Abdominal:      General: Abdomen is flat. There is no distension.      Tenderness: There is no abdominal tenderness. There is no right CVA tenderness, left CVA tenderness or guarding.   Musculoskeletal:      Right lower leg: No edema.      Left lower leg: No edema.   Skin:     General: Skin is warm and dry.      Capillary Refill: Capillary refill takes  less than 2 seconds.      Coloration: Skin is not jaundiced.   Neurological:      General: No focal deficit present.      Mental Status: He is alert and oriented to person, place, and time. Mental status is at baseline.       Vents:  Oxygen Concentration (%): 32 (09/13/22 1658)    Lines/Drains/Airways       Peripherally Inserted Central Catheter Line  Duration             PICC Double Lumen 09/07/22 1734 right basilic 7 days              Peripheral Intravenous Line  Duration                  Peripheral IV - Single Lumen 09/11/22 1354 20 G Anterior;Left Upper Arm 3 days                    Significant Labs:    CBC/Anemia Profile:  Recent Labs   Lab 09/14/22  0449 09/15/22  0428   WBC 6.79 1.69*   HGB 9.6* 10.2*   HCT 29.1* 31.6*   PLT 87* 71*   MCV 89 92   RDW 19.8* 19.6*        Chemistries:  Recent Labs   Lab 09/14/22 0449 09/15/22  0428    136   K 4.3 4.2    107   CO2 23 23   BUN 20 18   CREATININE 0.7 0.7   CALCIUM 7.8* 8.3*   ALBUMIN 1.8* 1.9*   PROT 4.2* 4.5*   BILITOT 0.6 0.7   ALKPHOS 114 111   ALT 12 13   AST 24 20       All pertinent labs within the past 24 hours have been reviewed.    Significant Imaging:  I have reviewed all pertinent imaging results/findings within the past 24 hours.  CXR: I have reviewed all pertinent results/findings within the past 24 hours and my personal findings are:  No significant change from prior

## 2022-09-15 NOTE — ASSESSMENT & PLAN NOTE
Will consult psychiatry for patient tomorrow. Talked to palliative and they stated that patient would not benefit from more pain medications given current emotional state    - Psych consulted, appreciate recs  - Does not want additional medications at this time for anxiety, wants to continue 20mg lexapro  - Ativan for breakthrough anxiety

## 2022-09-15 NOTE — PROGRESS NOTES
ATTENDING NOTE, ONCOLOGY INPATIENT TEAM     Events of last 24 hours noted.  Patient seen and examined, chart reviewed.  Appears relatively comfortable, in NAD at rest.  Lungs with few ronchi.  Abdomen is soft, nontender.  Labs reviewed.  WBCs are 1600 per cubic mm, hemoglobin 10.2 grams/deciliter and platelets 71,000 per cubic mm..  INR is 1.1.     PLAN  Follow CBC and CMP daily   Repeat INR daily  Continue heparin drip and increase today's dose of warfarin to 7.5 mg.  We will hold anticoagulation if his platelet count drops to below 30 K  He was encouraged to continue working with PT  Continue daily Neupogen  We will follow.      Kash Boo MD

## 2022-09-16 NOTE — ASSESSMENT & PLAN NOTE
Noted on previous hospitalization, possibly 2/2 to vascular compression and mass encasing mediastinal structures noted on CT  - non-occlusive thrombus in L cephalic vein  - also noted to have right upper extremity swelling, US doppler pending

## 2022-09-16 NOTE — ASSESSMENT & PLAN NOTE
MMT  - MS Contin 30mg bid  - Dilaudid 1mg q4 prn, will transition to po  - Robaxin 500 q6h prn  - Ativan 1mg tid prn

## 2022-09-16 NOTE — ASSESSMENT & PLAN NOTE
Hx endocarditis s/p MVR and AVR  Previously on warfarin  Was on heparin gtt bridging while INR subtherapeutic  Will hold warfarin for now but continue heparin gtt

## 2022-09-16 NOTE — CODE/ RAPID DOCUMENTATION
RAPID RESPONSE NURSE NOTE        Admit Date: 2022  LOS: 12  Code Status: DNR   Date of Consult: 2022  : 1960  Age: 61 y.o.  Weight:   Wt Readings from Last 1 Encounters:   22 57.2 kg (126 lb)     Sex: male  Race: White   Bed: 62/60 A:   MRN: [id number]  Time Rapid Response Team page Received: n/a  Time Rapid Response Team at Bedside: 1150  Time Rapid Response Team left Bedside: 1223  Was the patient discharged from an ICU this admission? Yes   Was the patient discharged from a PACU within last 24 hours? No   Did the patient receive conscious sedation/general anesthesia in last 24 hours? No  Was the patient in the ED within the past 24 hours? No  Was the patient on NIPPV within the past 24 hours? Yes   Did this progress into an ARC or CPA: no  Attending Physician: Laxmi Dee DO  [place] [facility] [place] [facility]       SITUATION    Notified by Rapid Response RT.  Reason for alert: hypoxia  Called to evaluate the patient for Respiratory    BACKGROUND     Why is the patient in the hospital?: Small cell carcinoma of lung    Patient has a past medical history of Endocarditis, Pacemaker, and Pneumonia.    Last Vitals:  Temp: 97.9 °F (36.6 °C) ( 1300)  Pulse: 60 ( 1400)  Resp: 27 ( 1400)  BP: 154/89 ( 1400)  SpO2: 96 % ( 1400)    24 Hours Vitals Range:  Temp:  [97.5 °F (36.4 °C)-98.5 °F (36.9 °C)]   Pulse:  [59-87]   Resp:  [16-32]   BP: (135-186)/(68-89)   SpO2:  [76 %-100 %]     Labs:  Recent Labs     09/14/22  0449 09/15/22  0428 22  0340   WBC 6.79 1.69* 0.23*   HGB 9.6* 10.2* 9.2*   HCT 29.1* 31.6* 28.6*   PLT 87* 71* 65*       Recent Labs     09/14/22  0449 09/15/22  0428 22  0340    136 134*   K 4.3 4.2 3.9    107 104   CO2 23 23 24   CREATININE 0.7 0.7 0.7   GLU 85 85 100        No results for input(s): PH, PCO2, PO2, HCO3, POCSATURATED, BE in the last 72 hours.     ASSESSMENT    Physical Exam  Constitutional:        General: He is in acute distress.      Appearance: He is ill-appearing.   Cardiovascular:      Rate and Rhythm: Normal rate.   Pulmonary:      Effort: Respiratory distress present.   Musculoskeletal:         General: Swelling present.   Neurological:      Mental Status: He is alert. Mental status is at baseline.     Patient sitting up in bed with non-rebreather on. Oxygen saturation is fluctuating between 87-90%. He is tachypneic, and unable to say any words due to dyspnea. Dr. Jacobs at bedside for evaluation of patient. Decision made to transfer patient to Critical Care; patient was transferred to room 6081 with RRN x2, RRT, O2 via non-rebreather, and cardiac monitoring.     INTERVENTIONS    The patient was seen for Respiratory problem. Staff concerns included oxygen saturation < 90% despite supplemental oxygen and increased WOB. The following interventions were performed: supplemental oxygen, POCT arterial blood gas , and Critical Care consult.    RECOMMENDATIONS    We recommend:     - as per critical care team    PROVIDER ESCALATION    Orders received and case discussed with Dr. Jacobs .    Primary team arrival time: not at bedside    Disposition: Tx in ICU bed 6081.    FOLLOW UP    Charge Emil OREILLY  updated on plan of care. Instructed to call the Rapid Response Nurse, Antonella Enrique RN at [phone] for additional questions or concerns.

## 2022-09-16 NOTE — ASSESSMENT & PLAN NOTE
Advance Care Planning   Previously engaged in advanced care planning during previous ICU stay  Code status has not changed and Pt is still DNR

## 2022-09-16 NOTE — CODE/ RAPID DOCUMENTATION
RAPID RESPONSE RESPIRATORY THERAPY NOTE             Code Status: DNR   : 1960  Bed: 808/808 A:   MRN: [id number]  Time Call Received from bedside RT: 1146  Time Rapid Response RT at Bedside: 1150  Time Rapid Response RT left Bedside: 1223    SITUATION    Evaluated patient for: Respiratory     BACKGROUND    Why is the patient in the hospital?: Small cell carcinoma of lung    Patient has a past medical history of Endocarditis, Pacemaker, and Pneumonia.    24 Hours Vitals Range:  Temp:  [97.5 °F (36.4 °C)-98.5 °F (36.9 °C)]   Pulse:  [59-87]   Resp:  [16-24]   BP: (135-186)/(68-84)   SpO2:  [76 %-95 %]     Labs:    Recent Labs     22  0449 09/15/22  0428 22  0340    136 134*   K 4.3 4.2 3.9    107 104   CO2 23 23 24   CREATININE 0.7 0.7 0.7   GLU 85 85 100        No results for input(s): PH, PCO2, PO2, HCO3, POCSATURATED, BE in the last 72 hours.    ASSESSMENT/INTERVENTIONS  Bedside RT contacted Rapid RT for worsening oxygen saturations. Pt originally on 6L and low 80s, placed on non re-breather by bedside RT with improvement to a sat of 88-90. Upon arrival pt visually labored, tachypneic, and short of breath, O2 on arrival was 89%, Rapid Nurses and MD arrived shortly after to help evaluate. ABG results from 940 were reviewed with Rapid RNs and MD, decision was made to move pt to MICU for higher level of care. Upon arrival to MICU pt desatted into 70s so BiPAP was initiated at 18/8 and 100% FiO2 with improvement to 100% saturations.     Last Vitals: Temp: 97.7 °F (36.5 °C) ( 114)  Pulse: 71 ( 1150)  Resp: 23 ( 115)  BP: 186/83 ( 114)  SpO2: 88 % ( 115)  Level of Consciousness: Level of Consciousness (AVPU): alert  Respiratory Effort: Respiratory Effort: Labored  Expansion/Accessory Muscle Usage: Expansion/Accessory Muscles/Retractions: accessory muscle use, retractions minimal  All Lung Field Breath Sounds: All Lung Fields Breath Sounds: Anterior:, Lateral:,  coarse, diminished  MARTHA Breath Sounds: diminished, crackles, coarse  LLL Breath Sounds: diminished, crackles, coarse  RUL Breath Sounds: diminished, crackles, coarse  RML Breath Sounds: diminished, crackles, coarse  RLL Breath Sounds: diminished, crackles, coarse  O2 Device/Concentration: NRB @ 15L  NIPPV: Yes, Type: BiPAP Settings: 18/8 & 100%  Surgical airway: No Vent: No  ETCO2 monitored:    Ambu at bedside: Ambu bag with the patient?: Yes, Adult Ambu    Active Orders   Respiratory Care    Bipap QHS     Frequency: QHS     Number of Occurrences: Until Specified     Order Questions:      Mode Bilevel      FiO2% 30      Inspiratory pressure: 12      Expiratory pressure: 8    Chest physiotherapy Q4H WAKE     Frequency: Q4H WAKE     Number of Occurrences: Until Specified     Order Questions:      Indications: COPIOUS SPUTUM PRODUCTION    Flutter Valve Treatment Q4H WAKE     Frequency: Q4H WAKE     Number of Occurrences: Until Specified    Inhalation Treatment Q8H     Frequency: Q8H     Number of Occurrences: Until Specified    Inhalation Treatment Q8H     Frequency: Q8H     Number of Occurrences: Until Specified    Oxygen Continuous     Frequency: Continuous     Number of Occurrences: Until Specified     Order Questions:      Device type: Low flow      Device: Nasal Cannula (1- 5 Liters)      LPM: 3      Titrate O2 per Oxygen Titration Protocol: Yes      To maintain SpO2 goal of: >= 90%      Notify MD of: Inability to achieve desired SpO2; Sudden change in patient status and requires 20% increase in FiO2; Patient requires >60% FiO2    Oxygen Continuous     Frequency: Continuous     Number of Occurrences: Until Specified     Order Questions:      Device type: High flow      Device: Comfort Flow      FiO2%: 60      LPM: 20      Titrate O2 per Oxygen Titration Protocol: Yes      To maintain SpO2 goal of: >= 90%      Notify MD of: Inability to achieve desired SpO2; Sudden change in patient status and requires 20% increase  in FiO2; Patient requires >60% FiO2    Oxygen PRN     Frequency: PRN     Number of Occurrences: 1 Days     Order Questions:      Device type: High flow      Device: Non-Rebreathing Mask      Titrate O2 per Oxygen Titration Protocol: Yes    POCT ARTERIAL BLOOD GAS Blood Gas     Frequency: Once     Number of Occurrences: 1 Occurrences     Order Comments: Notify Physician if: see parameters below.       Order Questions:      Component: Blood Gas    POCT ARTERIAL BLOOD GAS Blood Gas     Frequency: Once     Number of Occurrences: 1 Occurrences     Order Comments: Notify Physician if: see parameters below.       Order Questions:      Component: Blood Gas    Pulse Oximetry Continuous     Frequency: Continuous     Number of Occurrences: Until Specified       RECOMMENDATIONS  ?  We recommend: RRT Recs: Transfer to ICU for higher level of care    ESCALATION    Orders received and case discussed with Dr. Jhonny Will and POC reviewed with bedside RT, Elizabeth Sims.    FOLLOW-UP    Disposition: Tx in ICU bed 6081.    Please call back the Rapid Response RT, Armond Lezama, RRT at x [phone] for any questions or concerns.

## 2022-09-16 NOTE — NURSING
Dr Silva notified pt's sats are  87-88% on 100% NRB. MD stated he wrote for comfort flow. Dr Silva also reminded of request for respiratory frequency to be changed to q 4. RT Elizabeth notified of new orders, pt's 02 sats and need for resp treatment.

## 2022-09-16 NOTE — ASSESSMENT & PLAN NOTE
Patient had afib with RVR this admission was successfully cardioverted. IV amio load x72 hours completed and transitioned to PO.    Plan:  - amio  BID for 2 weeks followed by:           - amio 400 daily for 1 week           - amio 200 daily lifetime  - on low intensity heparin drip currently for the atrial flutter  - Warfarin started 9/13 (mechanical heart valve)  - Daily PT/INR, transition from heparin drip to warfarin once therapeutic. Increased warfarin dose to 7.5 (9/15)

## 2022-09-16 NOTE — ASSESSMENT & PLAN NOTE
Chest tube placed 9/6, removed 9/14  Had worsening hypoxia on 9/16 but US BL did not show large pleural effusion that could be drained  Will obtain CT chest to further assess

## 2022-09-16 NOTE — SUBJECTIVE & OBJECTIVE
Interval History: Pt DNR.    Past Medical History:   Diagnosis Date    Endocarditis     Pacemaker     Pneumonia        Past Surgical History:   Procedure Laterality Date    ANGIOGRAPHY OF LOWER EXTREMITY Right 8/19/2022    Procedure: ANGIOGRAM, LOWER EXTREMITY;  Surgeon: Thomas Nicolas MD;  Location: [facility] OR [address] [place] [address];  Service: Peripheral Vascular;  Laterality: Right;  30.0 min  315.10 mGy  26.1755 Gycm2  84 ml dye    HIP RESURFACING Right 8/23/2022    Procedure: cemontoplasty;  Surgeon: Yung Flores MD;  [address];  Service: Orthopedics;  Laterality: Right;    RADIOFREQUENCY ABLATION, BONE, PERCUTANEOUS Right 8/23/2022    Procedure: RADIOFREQUENCY ABLATION,BONE;  Surgeon: Yung Flores MD;  [address];  Service: Orthopedics;  Laterality: Right;    REPAIR, PSEUDOANEURYSM, ARTERY, FEMORAL Right 8/19/2022    Procedure: REPAIR, PSEUDOANEURYSM, ARTERY, FEMORAL;  Surgeon: Thomas Nicolas MD;  [address];  Service: Peripheral Vascular;  Laterality: Right;  R PFA (3rd branch) pseudoaneurysm     TREATMENT OF CARDIAC ARRHYTHMIA N/A 9/8/2022    Procedure: Cardioversion or Defibrillation;  Surgeon: Lan Mccollum MD;  Location: [facility] EP LAB;  Service: Cardiology;  Laterality: N/A;  AFL, DCCV, ANES, SK, RM [id number]       Review of patient's allergies indicates:   Allergen Reactions    Ambien [zolpidem] Other (See Comments)     Disturbing dreams       Medications:  Continuous Infusions:   heparin (porcine) in D5W 16 Units/kg/hr (09/16/22 0219)     Scheduled Meds:   albuterol-ipratropium  3 mL Nebulization Q8H    amiodarone  400 mg Oral BID    Followed by    [START ON 9/26/2022] amiodarone  400 mg Oral Daily    Followed by    [START ON 10/3/2022] amiodarone  200 mg Oral Daily    EScitalopram oxalate  20 mg Oral QHS    morphine  30 mg Oral Q12H    mupirocin   Nasal BID    pantoprazole  40 mg Oral Daily    polyethylene glycol  17 g Oral Daily    pregabalin  75 mg Oral BID     senna-docusate 8.6-50 mg  1 tablet Oral BID    sodium chloride 0.9% flush bag IVPB   Intravenous 1 time in Clinic/HOD    sodium chloride 0.9%  10 mL Intravenous Q6H    sodium chloride 3%  4 mL Nebulization Q8H    warfarin  5 mg Oral Daily     PRN Meds:sodium chloride, acetaminophen, alteplase, diphenhydrAMINE, EPINEPHrine, heparin, porcine (PF), HYDROmorphone, LORazepam, melatonin, methocarbamoL, ondansetron, Flushing PICC Protocol **AND** sodium chloride 0.9% **AND** sodium chloride 0.9%, sumatriptan    Family History    None       Tobacco Use    Smoking status: Every Day     Packs/day: 1.00     Types: Cigarettes    Smokeless tobacco: Never   Substance and Sexual Activity    Alcohol use: Not on file    Drug use: Not on file    Sexual activity: Not on file       Review of Systems   Constitutional:  Positive for activity change, fatigue and unexpected weight change.   Respiratory:  Positive for shortness of breath.    Cardiovascular:  Negative for leg swelling.   Gastrointestinal:  Negative for nausea and vomiting.   Musculoskeletal:  Positive for gait problem.   Skin: Negative.    Neurological:  Positive for weakness.   Psychiatric/Behavioral: Negative.     Objective:     Vital Signs (Most Recent):  Temp: 97.9 °F (36.6 °C) (09/16/22 0818)  Pulse: 69 (09/16/22 0839)  Resp: 20 (09/16/22 0957)  BP: (!) 176/79 (09/16/22 0839)  SpO2: (!) 93 % (09/16/22 0839)   Vital Signs (24h Range):  Temp:  [97.5 °F (36.4 °C)-98.5 °F (36.9 °C)] 97.9 °F (36.6 °C)  Pulse:  [59-87] 69  Resp:  [16-24] 20  SpO2:  [76 %-95 %] 93 %  BP: (135-184)/(68-84) 176/79     Weight: 57.2 kg (126 lb)  Body mass index is 18.61 kg/m².    Physical Exam  Constitutional:       Comments: Pt on 3L O2   HENT:      Head: Normocephalic and atraumatic.   Eyes:      General: Lids are normal.      Conjunctiva/sclera: Conjunctivae normal.   Cardiovascular:      Rate and Rhythm: Tachycardia present.   Pulmonary:      Effort: Tachypnea present. No accessory muscle  usage or respiratory distress.      Comments: Pt on 3L O2.  Abdominal:      General: Abdomen is flat.   Musculoskeletal:      Cervical back: Full passive range of motion without pain and normal range of motion.      Comments: Edema to left arm.    Skin:     General: Skin is warm and dry.   Neurological:      Mental Status: He is alert and oriented to person, place, and time.   Psychiatric:         Attention and Perception: Attention normal.         Speech: Speech normal.      Comments: Crying       Review of Symptoms      Symptom Assessment (ESAS 0-10 Scale)  Pain:  0  Dyspnea:  0  Anxiety:  0  Nausea:  0  Depression:  0  Anorexia:  0  Fatigue:  0  Insomnia:  0  Restlessness:  0  Agitation:  0         ECOG Performance Status thGthrthathdtheth:th th4th Living Arrangements:  Lives with family    Psychosocial/Cultural: Pt legally  to Aston Collins. Per pt Aston works during the day. Per pt, Aston is his care-giver.       Advance Care Planning   Advance Directives:   Living Will: No    Do Not Resuscitate Status: Yes    Medical Power of : No    Agent's Name:  Aston Collins    Decision Making:  Patient answered questions       Significant Labs: All pertinent labs within the past 24 hours have been reviewed.  CBC:   Recent Labs   Lab 09/16/22  0340   WBC 0.23*   HGB 9.2*   HCT 28.6*   MCV 91   PLT 65*       BMP:  Recent Labs   Lab 09/16/22  0340      *   K 3.9      CO2 24   BUN 18   CREATININE 0.7   CALCIUM 8.6*       LFT:  Lab Results   Component Value Date    AST 14 09/16/2022    ALKPHOS 102 09/16/2022    BILITOT 0.6 09/16/2022     Albumin:   Albumin   Date Value Ref Range Status   09/16/2022 1.9 (L) 3.5 - 5.2 g/dL Final     Protein:   Total Protein   Date Value Ref Range Status   09/16/2022 4.3 (L) 6.0 - 8.4 g/dL Final     Lactic acid:   Lab Results   Component Value Date    LACTATE 1.7 09/05/2022    LACTATE 2.4 (H) 09/04/2022       Significant Imaging: I have reviewed all pertinent imaging  results/findings within the past 24 hours.

## 2022-09-16 NOTE — PT/OT/SLP DISCHARGE
Physical Therapy Discharge Summary    Name: Donis Jimenez  MRN: [id number]   Principal Problem: Small cell carcinoma of lung     Patient Discharged from acute Physical Therapy on 2022.  Please refer to prior PT noted date on 9/15/2022 for functional status.     Assessment:     Patient transferred to lower level of care secondary to respiratory issues    Objective:     GOALS:   Multidisciplinary Problems       Physical Therapy Goals          Problem: Physical Therapy    Goal Priority Disciplines Outcome Goal Variances Interventions   Physical Therapy Goal     PT, PT/OT Ongoing, Progressing     Description: Goals to be met by: 10/3/22     Patient will increase functional independence with mobility by performin. Supine to sit with Modified [employer]  2. Sit to stand transfer with Contact Guard Assistance with RW   3. Gait  x 50 feet with Contact Guard Assistance using Rolling Walker.   4 15. Lower extremity exercise program x 15 reps per  with assistance as needed                         Reasons for Discontinuation of Therapy Services  Patient is unable to continue work toward goals because of medical or psychosocial complications.      Plan:     Patient Discharged to:  ICU .      2022

## 2022-09-16 NOTE — NURSING
O2 sats 76-84% Chg nurse Bart and RT Radha notified and in room. Breathing treatment in progress, 02 sats at 88%.

## 2022-09-16 NOTE — ASSESSMENT & PLAN NOTE
Secondary to extensive small cell lung cancer. Currently on 2L O2, satting 92%  - Failed 6MWT, dessatted to 85% when O2 removed. Will need home oxygen on discharge.   - O2 requirements increasing on 9/16 requiring HFNC. Pulm notified, will take patient to ICU.

## 2022-09-16 NOTE — SUBJECTIVE & OBJECTIVE
Past Medical History:   Diagnosis Date    Endocarditis     Pacemaker     Pneumonia      Past Surgical History:   Procedure Laterality Date    ANGIOGRAPHY OF LOWER EXTREMITY Right 8/19/2022    Procedure: ANGIOGRAM, LOWER EXTREMITY;  Surgeon: Thomas Nicolas MD;  Location: [address];  Service: Peripheral Vascular;  Laterality: Right;  30.0 min  315.10 mGy  26.1755 Gycm2  84 ml dye    HIP RESURFACING Right 8/23/2022    Procedure: cemontoplasty;  Surgeon: Yung Flores MD;  [address];  Service: Orthopedics;  Laterality: Right;    RADIOFREQUENCY ABLATION, BONE, PERCUTANEOUS Right 8/23/2022    Procedure: RADIOFREQUENCY ABLATION,BONE;  Surgeon: Yung Flores MD;  Location: [address];  Service: Orthopedics;  Laterality: Right;    REPAIR, PSEUDOANEURYSM, ARTERY, FEMORAL Right 8/19/2022    Procedure: REPAIR, PSEUDOANEURYSM, ARTERY, FEMORAL;  Surgeon: Thomas Nicolas MD;  Location: [address];  Service: Peripheral Vascular;  Laterality: Right;  R PFA (3rd branch) pseudoaneurysm     TREATMENT OF CARDIAC ARRHYTHMIA N/A 9/8/2022    Procedure: Cardioversion or Defibrillation;  Surgeon: Lan Mccollum MD;  Location: [facility] EP LAB;  Service: Cardiology;  Laterality: N/A;  AFL, DCCV, ANES, SK, RM [id number]     Review of patient's allergies indicates:   Allergen Reactions    Ambien [zolpidem] Other (See Comments)     Disturbing dreams     Family History    None       Tobacco Use    Smoking status: Every Day     Packs/day: 1.00     Types: Cigarettes    Smokeless tobacco: Never   Substance and Sexual Activity    Alcohol use: Not on file    Drug use: Not on file    Sexual activity: Not on file      Review of Systems   Constitutional:  Positive for fatigue. Negative for chills and fever.   HENT:  Positive for voice change. Negative for congestion and rhinorrhea.    Eyes:  Negative for photophobia and visual disturbance.   Respiratory:  Positive for cough, shortness of breath and wheezing.     Cardiovascular:  Negative for chest pain, palpitations and leg swelling.   Gastrointestinal:  Negative for abdominal pain, nausea and vomiting.   Genitourinary:  Negative for dysuria and hematuria.   Neurological:  Positive for weakness (generalized). Negative for dizziness, light-headedness and headaches.   Psychiatric/Behavioral:  Negative for agitation and confusion.    Objective:     Vital Signs (Most Recent):  Temp: 97.9 °F (36.6 °C) (09/16/22 1300)  Pulse: 60 (09/16/22 1400)  Resp: (!) 27 (09/16/22 1400)  BP: (!) 154/89 (09/16/22 1400)  SpO2: 96 % (09/16/22 1400)   Vital Signs (24h Range):  Temp:  [97.5 °F (36.4 °C)-98.5 °F (36.9 °C)] 97.9 °F (36.6 °C)  Pulse:  [59-87] 60  Resp:  [16-32] 27  SpO2:  [76 %-100 %] 96 %  BP: (135-186)/(68-89) 154/89   Weight: 57.2 kg (126 lb)  Body mass index is 18.61 kg/m².      Intake/Output Summary (Last 24 hours) at 9/16/2022 1421  Last data filed at 9/16/2022 0600  Gross per 24 hour   Intake 600 ml   Output 825 ml   Net -225 ml       Physical Exam  Vitals and nursing note reviewed.   Constitutional:       General: He is not in acute distress.     Appearance: He is ill-appearing. He is not diaphoretic.   HENT:      Head: Normocephalic and atraumatic.      Mouth/Throat:      Mouth: Mucous membranes are moist.   Eyes:      Extraocular Movements: Extraocular movements intact.      Pupils: Pupils are equal, round, and reactive to light.   Cardiovascular:      Rate and Rhythm: Normal rate and regular rhythm.      Pulses: Normal pulses.      Heart sounds: Murmur (mechanical click) heard.   Pulmonary:      Effort: Respiratory distress present.      Breath sounds: Wheezing and rhonchi present.      Comments: On bipap  Abdominal:      General: Bowel sounds are normal. There is no distension.      Palpations: Abdomen is soft.      Tenderness: There is no abdominal tenderness. There is no right CVA tenderness or left CVA tenderness.   Musculoskeletal:         General: Swelling (right  arm) present. No tenderness.      Right lower leg: No edema.      Left lower leg: No edema.   Skin:     General: Skin is warm.      Capillary Refill: Capillary refill takes less than 2 seconds.      Findings: No erythema or rash.   Neurological:      General: No focal deficit present.      Mental Status: He is alert and oriented to person, place, and time.   Psychiatric:         Mood and Affect: Mood normal.         Thought Content: Thought content normal.       Vents:  Oxygen Concentration (%): 100 (09/16/22 1228)  Lines/Drains/Airways       Peripherally Inserted Central Catheter Line  Duration             PICC Double Lumen 09/07/22 1734 right basilic 8 days              Peripheral Intravenous Line  Duration                  Peripheral IV - Single Lumen 09/11/22 1354 20 G Anterior;Left Upper Arm 5 days                  Significant Labs:    CBC/Anemia Profile:  Recent Labs   Lab 09/15/22  0428 09/16/22  0340   WBC 1.69* 0.23*   HGB 10.2* 9.2*   HCT 31.6* 28.6*   PLT 71* 65*   MCV 92 91   RDW 19.6* 18.9*        Chemistries:  Recent Labs   Lab 09/15/22  0428 09/16/22  0340    134*   K 4.2 3.9    104   CO2 23 24   BUN 18 18   CREATININE 0.7 0.7   CALCIUM 8.3* 8.6*   ALBUMIN 1.9* 1.9*   PROT 4.5* 4.3*   BILITOT 0.7 0.6   ALKPHOS 111 102   ALT 13 11   AST 20 14       Blood Culture: No results for input(s): LABBLOO in the last 48 hours.  BMP:   Recent Labs   Lab 09/16/22  0340      *   K 3.9      CO2 24   BUN 18   CREATININE 0.7   CALCIUM 8.6*     Lactic Acid: No results for input(s): LACTATE in the last 48 hours.  Respiratory Culture: No results for input(s): GSRESP, RESPIRATORYC in the last 48 hours.  Urine Studies: No results for input(s): COLORU, APPEARANCEUA, PHUR, SPECGRAV, PROTEINUA, GLUCUA, KETONESU, BILIRUBINUA, OCCULTUA, NITRITE, UROBILINOGEN, LEUKOCYTESUR, RBCUA, WBCUA, BACTERIA, SQUAMEPITHEL, HYALINECASTS in the last 48 hours.    Invalid input(s): WRIGHTSUR  Recent Lab Results          09/16/22  0340        Albumin 1.9       Alkaline Phosphatase 102       ALT 11       Anion Gap 6       aPTT 62.1  Comment: aPTT therapeutic range = 39-69 seconds       AST 14       Baso # 0.00       Basophil % 0.0       BILIRUBIN TOTAL 0.6  Comment: For infants and newborns, interpretation of results should be based  on gestational age, weight and in agreement with clinical  observations.    Premature Infant recommended reference ranges:  Up to 24 hours.............<8.0 mg/dL  Up to 48 hours............<12.0 mg/dL  3-5 days..................<15.0 mg/dL  6-29 days.................<15.0 mg/dL         BUN 18       Calcium 8.6       Chloride 104       CO2 24       Creatinine 0.7       Differential Method Automated       eGFR >60.0       Eos # 0.0       Eosinophil % 4.3       Glucose 100       Gran # (ANC) 0.0       Gran % 13.2       Hematocrit 28.6       Hemoglobin 9.2       Hypo Occasional       Immature Grans (Abs) 0.01  Comment: Mild elevation in immature granulocytes is non specific and   can be seen in a variety of conditions including stress response,   acute inflammation, trauma and pregnancy. Correlation with other   laboratory and clinical findings is essential.         Immature Granulocytes 4.3       INR 1.6  Comment: Coumadin Therapy:  2.0 - 3.0 for INR for all indicators except mechanical heart valves  and antiphospholipid syndromes which should use 2.5 - 3.5.         Lymph # 0.2       Lymph % 73.9       MCH 29.2       MCHC 32.2       MCV 91       Mono # 0.0       Mono % 4.3       MPV 12.7       nRBC 0       Platelet Estimate Decreased       Platelets 65       Potassium 3.9       PROTEIN TOTAL 4.3       Protime 16.2       RBC 3.15       RDW 18.9       Sodium 134       WBC 0.23  Comment: WBCs    critical result(s) called and verbal readback obtained from   Dacia Olsen RN. by RAMILA 09/16/2022 05:33                 Significant Imaging: I have reviewed all pertinent imaging results/findings within the  past 24 hours.

## 2022-09-16 NOTE — PLAN OF CARE
Plan of care reviewed with patient at start of shift. Pt requesting pain medication frequently. Pt calm and cooperative with care. Pt denies SOB. IV heparin infusing without difficulty in to PIV. VSS and afebrile. No signs of bleeding noted. Pt asking few questions about his diagnosis. Labs monitored. TLM show sinus bradycardia. Will continue to monitor pt.

## 2022-09-16 NOTE — SUBJECTIVE & OBJECTIVE
Interval History: Patient in respiratory distress on morning rounds, satting low 90s w/ non-rebreather. Complaining of chest tightness, congestion. Anxious on exam as well. On chart review, he dessatted into the 70s before increasing     Oncology Treatment Plan:   OP SCLC CARBOPLATIN (AUC) ETOPOSIDE DURVALUMAB Q3W FOLLOWED BY MAINTENANCE DURVALUMAB 1500 MG Q4W    Medications:  Continuous Infusions:  Scheduled Meds:   albuterol-ipratropium  3 mL Nebulization Q8H    amiodarone  400 mg Oral BID    Followed by    [START ON 9/26/2022] amiodarone  400 mg Oral Daily    Followed by    [START ON 10/3/2022] amiodarone  200 mg Oral Daily    EScitalopram oxalate  20 mg Oral QHS    LIDOcaine HCL 10 mg/ml (1%)  10 mL Intradermal Once    morphine  30 mg Oral Q12H    mupirocin   Nasal BID    pantoprazole  40 mg Oral Daily    polyethylene glycol  17 g Oral Daily    pregabalin  75 mg Oral BID    senna-docusate 8.6-50 mg  1 tablet Oral BID    sodium chloride 0.9% flush bag IVPB   Intravenous 1 time in Clinic/HOD    sodium chloride 0.9%  10 mL Intravenous Q6H    sodium chloride 3%  4 mL Nebulization Q8H     PRN Meds:sodium chloride, acetaminophen, alteplase, diphenhydrAMINE, EPINEPHrine, heparin, porcine (PF), HYDROmorphone, LORazepam, melatonin, methocarbamoL, ondansetron, Flushing PICC Protocol **AND** sodium chloride 0.9% **AND** sodium chloride 0.9%, sumatriptan       Objective:     Vital Signs (Most Recent):  Temp: 97.7 °F (36.5 °C) (09/16/22 1141)  Pulse: 70 (09/16/22 1141)  Resp: (!) 22 (09/16/22 1141)  BP: (!) 186/83 (09/16/22 1141)  SpO2: (!) 88 % (09/16/22 1141)   Vital Signs (24h Range):  Temp:  [97.5 °F (36.4 °C)-98.5 °F (36.9 °C)] 97.7 °F (36.5 °C)  Pulse:  [59-87] 70  Resp:  [16-24] 22  SpO2:  [76 %-95 %] 88 %  BP: (135-186)/(68-84) 186/83     Weight: 57.2 kg (126 lb)  Body mass index is 18.61 kg/m².  Body surface area is 1.67 meters squared.      Intake/Output Summary (Last 24 hours) at 9/16/2022 1213  Last data filed at  9/16/2022 0600  Gross per 24 hour   Intake 960 ml   Output 1225 ml   Net -265 ml       Physical Exam  Constitutional:       General: He is in acute distress.   HENT:      Head: Normocephalic.      Nose: Nose normal.      Mouth/Throat:      Mouth: Mucous membranes are moist.   Eyes:      Pupils: Pupils are equal, round, and reactive to light.   Cardiovascular:      Rate and Rhythm: Tachycardia present. Rhythm irregular.   Pulmonary:      Breath sounds: Stridor (diffuse) present.      Comments: In respiratory distress, using accessory muscles   Musculoskeletal:         General: Normal range of motion.   Skin:     General: Skin is warm.   Neurological:      Mental Status: He is alert and oriented to person, place, and time.       Significant Labs:   All pertinent labs from the last 24 hours have been reviewed.    Diagnostic Results:  I have reviewed and interpreted all pertinent imaging results/findings within the past 24 hours.

## 2022-09-16 NOTE — ASSESSMENT & PLAN NOTE
Patient had concerns for tumor lysis syndrome given active chemo with SCLC, treated prophylactically  Uric acid/LDH downtrending, no need to repeat  Resolved

## 2022-09-16 NOTE — PROGRESS NOTES
[facility]  Hematology/Oncology  Progress Note    Patient Name: Donis Jimenez  Admission Date: 9/4/2022  Hospital Length of Stay: 12 days  Code Status: DNR     Subjective:     HPI:  61 year old man with newly diagnosed metastatic small cell lung cancer complicated by acute hypoxic respiratory failure.  Was previously treated for pathologic right femoral neck fracture.  Pathology finalized as small cell carcinoma. Over his stay in ICU, his O2 requirments were steadily reduced, he is now on nasal cannula. Patient went into afib with RVR and needed cardioversion. He is currently undergoing inpatient chemotherapy with carboplatin (day 1) and etoposide (day 1,2,3). Will need GCSF on day 4.     Patient now feeling anxious about new diagnosis. Psych onc saw patient, will consider consulting psych for new onset anxiety and panic attacks if patient does not improve.       Interval History: Patient in respiratory distress on morning rounds, satting low 90s w/ non-rebreather. Complaining of chest tightness, congestion. Anxious on exam as well. On chart review, he dessatted into the 70s before increasing     Oncology Treatment Plan:   OP SCLC CARBOPLATIN (AUC) ETOPOSIDE DURVALUMAB Q3W FOLLOWED BY MAINTENANCE DURVALUMAB 1500 MG Q4W    Medications:  Continuous Infusions:  Scheduled Meds:   albuterol-ipratropium  3 mL Nebulization Q8H    amiodarone  400 mg Oral BID    Followed by    [START ON 9/26/2022] amiodarone  400 mg Oral Daily    Followed by    [START ON 10/3/2022] amiodarone  200 mg Oral Daily    EScitalopram oxalate  20 mg Oral QHS    LIDOcaine HCL 10 mg/ml (1%)  10 mL Intradermal Once    morphine  30 mg Oral Q12H    mupirocin   Nasal BID    pantoprazole  40 mg Oral Daily    polyethylene glycol  17 g Oral Daily    pregabalin  75 mg Oral BID    senna-docusate 8.6-50 mg  1 tablet Oral BID    sodium chloride 0.9% flush bag IVPB   Intravenous 1 time in Clinic/HOD    sodium chloride 0.9%  10 mL  Intravenous Q6H    sodium chloride 3%  4 mL Nebulization Q8H     PRN Meds:sodium chloride, acetaminophen, alteplase, diphenhydrAMINE, EPINEPHrine, heparin, porcine (PF), HYDROmorphone, LORazepam, melatonin, methocarbamoL, ondansetron, Flushing PICC Protocol **AND** sodium chloride 0.9% **AND** sodium chloride 0.9%, sumatriptan       Objective:     Vital Signs (Most Recent):  Temp: 97.7 °F (36.5 °C) (09/16/22 1141)  Pulse: 70 (09/16/22 1141)  Resp: (!) 22 (09/16/22 1141)  BP: (!) 186/83 (09/16/22 1141)  SpO2: (!) 88 % (09/16/22 1141)   Vital Signs (24h Range):  Temp:  [97.5 °F (36.4 °C)-98.5 °F (36.9 °C)] 97.7 °F (36.5 °C)  Pulse:  [59-87] 70  Resp:  [16-24] 22  SpO2:  [76 %-95 %] 88 %  BP: (135-186)/(68-84) 186/83     Weight: 57.2 kg (126 lb)  Body mass index is 18.61 kg/m².  Body surface area is 1.67 meters squared.      Intake/Output Summary (Last 24 hours) at 9/16/2022 1213  Last data filed at 9/16/2022 0600  Gross per 24 hour   Intake 960 ml   Output 1225 ml   Net -265 ml       Physical Exam  Constitutional:       General: He is in acute distress.   HENT:      Head: Normocephalic.      Nose: Nose normal.      Mouth/Throat:      Mouth: Mucous membranes are moist.   Eyes:      Pupils: Pupils are equal, round, and reactive to light.   Cardiovascular:      Rate and Rhythm: Tachycardia present. Rhythm irregular.   Pulmonary:      Breath sounds: Stridor (diffuse) present.      Comments: In respiratory distress, using accessory muscles   Musculoskeletal:         General: Normal range of motion.   Skin:     General: Skin is warm.   Neurological:      Mental Status: He is alert and oriented to person, place, and time.       Significant Labs:   All pertinent labs from the last 24 hours have been reviewed.    Diagnostic Results:  I have reviewed and interpreted all pertinent imaging results/findings within the past 24 hours.    Assessment/Plan:     * Small cell carcinoma of lung  [facility] pathology report confirms small cell  lung cancer from bone specimen.  Explained to him and his  that he is in an unfortunate situation.  He will most certainly die of this cancer rather quickly if not given chemotherapy.  While our intent is to improve his tumor burden causing his hypoxic respiratory failure with chemotherapy, it is also possible that we may potentially hasten his death unintentionally due to chemo toxicity. He and his  understand the risks.  - Patient currently on day 2 of inpatient chemotherapy, so far tolerating well  -labs reviewed; carboplatin renally dosed and will proceed with carboplatin and etoposide.  Etoposide given day 2 and day 3.  We will plan to provide GCSF starting day 4 and will do so for at least 7 doses.      - GCSF starting day 4 (9/11) for WBC<10  - agree with oncology psychology consult    Pain  MMT  - MS Contin 30mg bid  - Dilaudid 1mg q4 prn, will transition to po  - Robaxin 500 q6h prn  - Ativan 1mg tid prn    Tumor lysis syndrome  Resolved  Patient now with concerns for tumor lysis syndrome given active chemo with SCLC, will treat prophylactically    - holding allopurinol 300 mg daily   - Continue daily cbc/cmp  - Uric acid/LDH downtrending, no need to repeat    Adjustment disorder with mixed anxiety and depressed mood  Will consult psychiatry for patient tomorrow. Talked to palliative and they stated that patient would not benefit from more pain medications given current emotional state    - Psych consulted, appreciate recs  - Does not want additional medications at this time for anxiety, wants to continue 20mg lexapro  - Ativan for breakthrough anxiety    Exudative pleural effusion  Chest tube placed 9/6, removed 9/14. Hopefully will improve w/ drainage and chemotherapy   - pulmonology recommending removal of chest tube, monitor respiratory status  - Daily CXR, monitor for malignant effusion     Debility  PT/OT Eval, will need home health    Acute hypoxemic respiratory failure  Secondary to  extensive small cell lung cancer. Currently on 2L O2, satting 92%  - Failed 6MWT, dessatted to 85% when O2 removed. Will need home oxygen on discharge.   - O2 requirements increasing on 9/16 requiring HFNC. Pulm notified, will take patient to ICU.    Atrial flutter  Patient had afib with RVR this admission was successfully cardioverted. IV amio load x72 hours completed and transitioned to PO.    - amio  BID for 2 weeks followed by:    - amio 400 daily for 1 week    - amio 200 daily lifetime    - He is on low intensity heparin drip currently for the atrial flutter,   - Warfarin started 9/13 (mechanical heart valve)  - Daily PT/INR, transition from heparin drip to warfarin once therapeutic. Increased warfarin dose to 7.5 (9/15)    Malignant neoplasm metastatic to bone  See small cell lung cancer    Pathologic fracture of neck of right femur s/p hemiarthroplasty on 8/23/2022  S/p right hip hemiarthroplasty with ablation, cementoplasty, and percutaneous fixation of pelvis 8/23. Patient is weight bearing as tolerated with posterior hip precautions to right lower extremity as per orthopedics    - Management per primary and ortho  - When more stable consider postop radiation             Steven Silva, DO  Hematology/Oncology  [facility]

## 2022-09-16 NOTE — ASSESSMENT & PLAN NOTE
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 Pt has metastatic lung cancer. Pt is alert, oriented to person, place, and situation. Pt is a DNR. Pt is on 3 L O2.     Reason for consult: GOC/ACP. Communicated with MAJOR Camp fellow with [facility].     Met with pt who stated his goal at this time is to be comfortable and receive treatment. Goal is to be back home if possible and have more quality and quantity of life.     9/9/22: Pt very tearful today. Pt says "I know I am dying. " Pt kept apologizing.  Explained to pt there is not a need to apoligize and that that I am glad he felt comfortable to express his feelings. Pt repots he is having a bad day and everything starting to sink in about his health. Support given.  seeing. Will consult Oncology/Psycology for pt. Pt to step down to Hem/onc as soon as bed available.     9/6/22  Goals of care/ACP: Pt to have tracheal stenting tomorrow. Pt reports he is in agreement to procedure and would like CC team to speak to Aston about procedure when he visits today. Per pt, he relays information to Aston but he feels like he forgets some of information to tell him. Pt reports his goal is medical management at this time to see if he can improve enough to get back home. Pt reports his goal is to be at home with his four dogs and , Aston. Pt is aware of his severity of his illness.     Today: Met with pt along with Tracie Kidd LCSW.     9/6/22  Introduced role of Palliative care to pt.   Met with pt who is aware of his medical issues. Per pt he is aware he has metastatic cancer. Pt is aware that any therapies/procedures offered to him is palliative and will not cure cancer. Per pt, he is still trying to come to " " with his dx. He learned about his issues in August. Pt reports goal at time is to see if anything can be done to help with symptoms and "give him some more time." Spoke to pt about amount of O2 he is on at this time. He verbalized understanding.  Per pt he has spoken to Oncology and XRT MDs.   CTS has been consulted concerning tracheal stenting.      Pt open to continued visits with [facility] care for care planning and symptom management needs.  Support given pt.      Pt reports he is legally  to Aston Collins and he would want him to be hi medical decision-maker if he cannot make his own medical decisions. MPOA paperwork left with pt. Education provided. Per Pt, Aston works during day but can be reached by phone.      Code status: DNR.      Symptom management:     FPN=[id number]    Pain: Pt reports he has pain to back chest area that can get up to 9/10. Pt reports throbbing/shooting pain. Pt reports adding long-acting has improved pain.     Pt is on MSContin 30 mg bid.   Pt is currently on Dilaudid 1 mg IVP q 4 hrs prn pain.     Recs:   Continue with Dilaudid IV.   Will need to convert to PO pain meds when pt gets closer to going home. (Pt is aware of this)     Dyspnea r/t to mediastinal mass, pleural effusion.   Pt is on O2  Pt receiving breathing treatment.   Pt is on Dilaudid 1 mg IVP q 4 hrs prn.       Debility r/t to pathological femoral neck fracture s/p ight hip hemiarthroplasty with ablation, cementoplasty  Pt was using walker prior to admit.   Would resume PT/OT when pt stable to participate.      Anxiety r/t to new dx and dyspnea.   Pt has Ativan 1 mg tid prn.         Plan:   Will continue to meet with pt to reinforce realistic expectations/assit with GOC.   See above symptom recs.   Communicated with Hem/ONC team  Support given.   Oncology/ Psychology seeing  Will follow.         "

## 2022-09-16 NOTE — NURSING
Pt with new orders to be transferred to ICU RRT at  pt prepared for transport. Report called to ICU nurse Alexandre. Pt transported by chg nurse Stein and RRT.

## 2022-09-16 NOTE — ASSESSMENT & PLAN NOTE
[facility] pathology report confirms small cell lung cancer from bone specimen. Situation explained to Pt and his   Started on inpatient palliative chemotherapy with intent to improve his tumor burden causing his hypoxic respiratory failure

## 2022-09-16 NOTE — CONSULTS
Patient accepted to MICU. Full H&P to follow.    Jhonny Jacobs M.D.  [facility]

## 2022-09-16 NOTE — ASSESSMENT & PLAN NOTE
Admitted with acute hypoxic respiratory failure likely secondary to extensive small cell lung cancer  Has BL pleural effusions; had chest tube which was removed on 9/14  Stepped up to ICU from oncology on 9/16 for increasing oxygen requirements  Bedside US BL lung did not show anything that could be tapped  CT chest pending  Remain on bipap and wean as able  Although Pt is neutropenic from his inpatient chemotherapy, he is afebrile and hemodynamically stable so will hold off on abx for now  If fevers or becomes hypotensive, will need to complete septic screen and start broad-spectrum abx

## 2022-09-16 NOTE — H&P
[facility] - Cardiac Medical ICU  Critical Care Medicine  History & Physical    Patient Name: Donis Jimenez  MRN: [id number]  Admission Date: 9/4/2022  Hospital Length of Stay: 12 days  Code Status: DNR  Attending Physician: Laxmi Dee DO   Primary Care Provider: Elio Farias MD   Principal Problem: Acute hypoxemic respiratory failure    Subjective:     HPI:  61 year old male with bacterial endocarditis, s/p AV and MV mechanical valve replacement (on warfarin), CKD, tobacco abuse, recent diagnosis of metastatic SCLC (extensive diseases including mediastinal mass, mets to brain, bone) with recent hospital admission for pathologic right femoral neck fracture and right femoral artery pseudoaneurysm requiring hemiarthroplasty with ortho and embolization by vascular surgery presenting with acute hypoxic respiratory failure. He was initially admitted to ICU for bipap and was stepped down to oncology once oxygen requirements improved. Received inpatient chemotherapy with carboplatin and etoposide on 9/7.     Critical Care Medicine was re-consulted on 9/16 for worsening hypoxemic respiratory failure.    Hospital/ICU Course:  Admitted to [facility] evening of 9/4 for worsening hypoxemic respiratory failure 2/2 PNA in setting of lung cancer with extensive metastatic disease. GOC discussed with Pt and  who agree that they would not want extreme measures such as CPR and mechanical ventilation given overall poor prognosis. Oxygen requirements improved and Pt stepped down to oncology. Received inpatient chemotherapy. Had BL pleural effusions; chest tube placed and removed on 9/14. Stepped back up to ICU on 9/16 for increasing oxygen requirements - went from NC to non-breather, now requiring continuous bipap. CT chest pending. Bedside US BL did not show significant pleural effusion that can be tapped.     Past Medical History:   Diagnosis Date    Endocarditis     Pacemaker     Pneumonia      Past Surgical History:    Procedure Laterality Date    ANGIOGRAPHY OF LOWER EXTREMITY Right 8/19/2022    Procedure: ANGIOGRAM, LOWER EXTREMITY;  Surgeon: Thomas Nicolas MD;  Location: [facility] OR [address] [place] [address];  Service: Peripheral Vascular;  Laterality: Right;  30.0 min  315.10 mGy  26.1755 Gycm2  84 ml dye    HIP RESURFACING Right 8/23/2022    Procedure: cemontoplasty;  Surgeon: Yung Flores MD;  [facility];  Service: Orthopedics;  Laterality: Right;    RADIOFREQUENCY ABLATION, BONE, PERCUTANEOUS Right 8/23/2022    Procedure: RADIOFREQUENCY ABLATION,BONE;  Surgeon: Yung Flores MD;  [facility];  Service: Orthopedics;  Laterality: Right;    REPAIR, PSEUDOANEURYSM, ARTERY, FEMORAL Right 8/19/2022    Procedure: REPAIR, PSEUDOANEURYSM, ARTERY, FEMORAL;  Surgeon: Thomas Nicolas MD;  [facility];  Service: Peripheral Vascular;  Laterality: Right;  R PFA (3rd branch) pseudoaneurysm     TREATMENT OF CARDIAC ARRHYTHMIA N/A 9/8/2022    Procedure: Cardioversion or Defibrillation;  Surgeon: Lan Mccollum MD;  Location: [facility] EP LAB;  Service: Cardiology;  Laterality: N/A;  AFL, DCCV, ANES, SK, RM [id number]     Review of patient's allergies indicates:   Allergen Reactions    Ambien [zolpidem] Other (See Comments)     Disturbing dreams     Family History    None       Tobacco Use    Smoking status: Every Day     Packs/day: 1.00     Types: Cigarettes    Smokeless tobacco: Never   Substance and Sexual Activity    Alcohol use: Not on file    Drug use: Not on file    Sexual activity: Not on file      Review of Systems   Constitutional:  Positive for fatigue. Negative for chills and fever.   HENT:  Positive for voice change. Negative for congestion and rhinorrhea.    Eyes:  Negative for photophobia and visual disturbance.   Respiratory:  Positive for cough, shortness of breath and wheezing.    Cardiovascular:  Negative for chest pain, palpitations and leg swelling.   Gastrointestinal:  Negative for  abdominal pain, nausea and vomiting.   Genitourinary:  Negative for dysuria and hematuria.   Neurological:  Positive for weakness (generalized). Negative for dizziness, light-headedness and headaches.   Psychiatric/Behavioral:  Negative for agitation and confusion.    Objective:     Vital Signs (Most Recent):  Temp: 97.9 °F (36.6 °C) (09/16/22 1300)  Pulse: 60 (09/16/22 1400)  Resp: (!) 27 (09/16/22 1400)  BP: (!) 154/89 (09/16/22 1400)  SpO2: 96 % (09/16/22 1400)   Vital Signs (24h Range):  Temp:  [97.5 °F (36.4 °C)-98.5 °F (36.9 °C)] 97.9 °F (36.6 °C)  Pulse:  [59-87] 60  Resp:  [16-32] 27  SpO2:  [76 %-100 %] 96 %  BP: (135-186)/(68-89) 154/89   Weight: 57.2 kg (126 lb)  Body mass index is 18.61 kg/m².      Intake/Output Summary (Last 24 hours) at 9/16/2022 1421  Last data filed at 9/16/2022 0600  Gross per 24 hour   Intake 600 ml   Output 825 ml   Net -225 ml       Physical Exam  Vitals and nursing note reviewed.   Constitutional:       General: He is not in acute distress.     Appearance: He is ill-appearing. He is not diaphoretic.   HENT:      Head: Normocephalic and atraumatic.      Mouth/Throat:      Mouth: Mucous membranes are moist.   Eyes:      Extraocular Movements: Extraocular movements intact.      Pupils: Pupils are equal, round, and reactive to light.   Cardiovascular:      Rate and Rhythm: Normal rate and regular rhythm.      Pulses: Normal pulses.      Heart sounds: Murmur (mechanical click) heard.   Pulmonary:      Effort: Respiratory distress present.      Breath sounds: Wheezing and rhonchi present.      Comments: On bipap  Abdominal:      General: Bowel sounds are normal. There is no distension.      Palpations: Abdomen is soft.      Tenderness: There is no abdominal tenderness. There is no right CVA tenderness or left CVA tenderness.   Musculoskeletal:         General: Swelling (right arm) present. No tenderness.      Right lower leg: No edema.      Left lower leg: No edema.   Skin:      General: Skin is warm.      Capillary Refill: Capillary refill takes less than 2 seconds.      Findings: No erythema or rash.   Neurological:      General: No focal deficit present.      Mental Status: He is alert and oriented to person, place, and time.   Psychiatric:         Mood and Affect: Mood normal.         Thought Content: Thought content normal.       Vents:  Oxygen Concentration (%): 100 (09/16/22 1228)  Lines/Drains/Airways       Peripherally Inserted Central Catheter Line  Duration             PICC Double Lumen 09/07/22 1734 right basilic 8 days              Peripheral Intravenous Line  Duration                  Peripheral IV - Single Lumen 09/11/22 1354 20 G Anterior;Left Upper Arm 5 days                  Significant Labs:    CBC/Anemia Profile:  Recent Labs   Lab 09/15/22  0428 09/16/22  0340   WBC 1.69* 0.23*   HGB 10.2* 9.2*   HCT 31.6* 28.6*   PLT 71* 65*   MCV 92 91   RDW 19.6* 18.9*        Chemistries:  Recent Labs   Lab 09/15/22  0428 09/16/22  0340    134*   K 4.2 3.9    104   CO2 23 24   BUN 18 18   CREATININE 0.7 0.7   CALCIUM 8.3* 8.6*   ALBUMIN 1.9* 1.9*   PROT 4.5* 4.3*   BILITOT 0.7 0.6   ALKPHOS 111 102   ALT 13 11   AST 20 14       Blood Culture: No results for input(s): LABBLOO in the last 48 hours.  BMP:   Recent Labs   Lab 09/16/22  0340      *   K 3.9      CO2 24   BUN 18   CREATININE 0.7   CALCIUM 8.6*     Lactic Acid: No results for input(s): LACTATE in the last 48 hours.  Respiratory Culture: No results for input(s): GSRESP, RESPIRATORYC in the last 48 hours.  Urine Studies: No results for input(s): COLORU, APPEARANCEUA, PHUR, SPECGRAV, PROTEINUA, GLUCUA, KETONESU, BILIRUBINUA, OCCULTUA, NITRITE, UROBILINOGEN, LEUKOCYTESUR, RBCUA, WBCUA, BACTERIA, SQUAMEPITHEL, HYALINECASTS in the last 48 hours.    Invalid input(s): WRIGHTSUR  Recent Lab Results         09/16/22  0340        Albumin 1.9       Alkaline Phosphatase 102       ALT 11       Anion Gap 6        aPTT 62.1  Comment: aPTT therapeutic range = 39-69 seconds       AST 14       Baso # 0.00       Basophil % 0.0       BILIRUBIN TOTAL 0.6  Comment: For infants and newborns, interpretation of results should be based  on gestational age, weight and in agreement with clinical  observations.    Premature Infant recommended reference ranges:  Up to 24 hours.............<8.0 mg/dL  Up to 48 hours............<12.0 mg/dL  3-5 days..................<15.0 mg/dL  6-29 days.................<15.0 mg/dL         BUN 18       Calcium 8.6       Chloride 104       CO2 24       Creatinine 0.7       Differential Method Automated       eGFR >60.0       Eos # 0.0       Eosinophil % 4.3       Glucose 100       Gran # (ANC) 0.0       Gran % 13.2       Hematocrit 28.6       Hemoglobin 9.2       Hypo Occasional       Immature Grans (Abs) 0.01  Comment: Mild elevation in immature granulocytes is non specific and   can be seen in a variety of conditions including stress response,   acute inflammation, trauma and pregnancy. Correlation with other   laboratory and clinical findings is essential.         Immature Granulocytes 4.3       INR 1.6  Comment: Coumadin Therapy:  2.0 - 3.0 for INR for all indicators except mechanical heart valves  and antiphospholipid syndromes which should use 2.5 - 3.5.         Lymph # 0.2       Lymph % 73.9       MCH 29.2       MCHC 32.2       MCV 91       Mono # 0.0       Mono % 4.3       MPV 12.7       nRBC 0       Platelet Estimate Decreased       Platelets 65       Potassium 3.9       PROTEIN TOTAL 4.3       Protime 16.2       RBC 3.15       RDW 18.9       Sodium 134       WBC 0.23  Comment: WBCs    critical result(s) called and verbal readback obtained from   Dacia Olsen RN. by RAMILA 09/16/2022 05:33                 Significant Imaging: I have reviewed all pertinent imaging results/findings within the past 24 hours.    Assessment/Plan:     Psychiatric  Adjustment disorder with mixed anxiety and depressed  mood  Adjustment disorder with mixed anxiety and depressed mood  May need to consult psychiatry   Talked to palliative and they stated that patient would not benefit from more pain medications given current emotional state    Plan:  - continue escitalopram 20mg daily  - ativan TID PRN for breakthrough anxiety    Pulmonary  * Acute hypoxemic respiratory failure  Admitted with acute hypoxic respiratory failure likely secondary to extensive small cell lung cancer  Has BL pleural effusions; had chest tube which was removed on 9/14  Stepped up to ICU from oncology on 9/16 for increasing oxygen requirements  Bedside US BL lung did not show anything that could be tapped  CT chest pending  Remain on bipap and wean as able  Although Pt is neutropenic from his inpatient chemotherapy, he is afebrile and hemodynamically stable so will hold off on abx for now  If fevers or becomes hypotensive, will need to complete septic screen and start broad-spectrum abx         Exudative pleural effusion  Chest tube placed 9/6, removed 9/14  Had worsening hypoxia on 9/16 but US BL did not show large pleural effusion that could be drained  Will obtain CT chest to further assess        Cardiac/Vascular  Atrial flutter  Patient had afib with RVR this admission was successfully cardioverted. IV amio load x72 hours completed and transitioned to PO.    Plan:  - amio  BID for 2 weeks followed by:           - amio 400 daily for 1 week           - amio 200 daily lifetime  - on low intensity heparin drip currently for the atrial flutter  - Warfarin started 9/13 (mechanical heart valve)  - Daily PT/INR, transition from heparin drip to warfarin once therapeutic. Increased warfarin dose to 7.5 (9/15)      H/O mitral valve replacement with mechanical valve  Hx endocarditis s/p MVR and AVR  Previously on warfarin  Was on heparin gtt bridging while INR subtherapeutic  Will hold warfarin for now but continue heparin gtt      Pseudoaneurysm of right  femoral artery  - S/p embolization of right profunda femoral artery pseudoaneurysm with 5 mm coil 8/20    Oncology  Tumor lysis syndrome  Patient had concerns for tumor lysis syndrome given active chemo with SCLC, treated prophylactically  Uric acid/LDH downtrending, no need to repeat  Resolved    Small cell carcinoma of lung  OS pathology report confirms small cell lung cancer from bone specimen. Situation explained to Pt and his   Started on inpatient palliative chemotherapy with intent to improve his tumor burden causing his hypoxic respiratory failure      Malignant neoplasm metastatic to bone  See small cell lung cancer       Endocrine  Body mass index (BMI) less than 19  in setting of malignancy and decreased appetite.    - nutrition consulted    Orthopedic  Swelling of upper extremity  Noted on previous hospitalization, possibly 2/2 to vascular compression and mass encasing mediastinal structures noted on CT  - non-occlusive thrombus in L cephalic vein  - also noted to have right upper extremity swelling, US doppler pending     Pathologic fracture of neck of right femur s/p hemiarthroplasty on 8/23/2022  S/p right hip hemiarthroplasty with ablation, cementoplasty, and percutaneous fixation of pelvis 8/23  Patient is weight bearing as tolerated with posterior hip precautions to right lower extremity as per orthopedics  When more stable consider postop radiation    Palliative Care  Palliative care encounter  Palliative care consulted for GOC discussion; following Pt     ACP (advance care planning)  Advance Care Planning   Previously engaged in advanced care planning during previous ICU stay  Code status has not changed and Pt is still DNR            Other  Pain  MMT  - MS Contin 30mg bid  - Dilaudid 1mg q4 prn  - Robaxin 500 q6h prn  - Ativan 1mg tid prn    Debility  PT/OT eval, will need home health        Critical Care Daily Checklist:    A: Awake: RASS Goal/Actual Goal: RASS Goal: 0-->alert and  calm  Actual: Garcia Agitation Sedation Scale (RASS): Restless   B: Spontaneous Breathing Trial Performed?     C: SAT & SBT Coordinated?  N/A                      D: Delirium: CAM-ICU Overall CAM-ICU: Negative   E: Early Mobility Performed? Yes   F: Feeding Goal: Goals: Meet % EEN, EPN by RD f/u date  Status: Nutrition Goal Status: new   Current Diet Order   Procedures    Diet Low Phosphorus      AS: Analgesia/Sedation N/A   T: Thromboembolic Prophylaxis Heparin gtt bridging   H: HOB > 300 Yes   U: Stress Ulcer Prophylaxis (if needed) pantoprazole   G: Glucose Control Controlled    B: Bowel Function Stool Occurrence: 1   I: Indwelling Catheter (Lines & Rodgers) Necessity PICC right arm, PIVC   D: De-escalation of Antimicrobials/Pharmacotherapies N/A    Plan for the day/ETD Bipap, monitor resp status, CT chest, US doppler right upper arm    Code Status:  Family/Goals of Care: DNR       Critical secondary to Patient has a condition that poses threat to life and bodily function: Severe Respiratory Distress     Critical care was time spent personally by me on the following activities: development of treatment plan with patient or surrogate and bedside caregivers, discussions with consultants, evaluation of patient's response to treatment, examination of patient, ordering and performing treatments and interventions, ordering and review of laboratory studies, ordering and review of radiographic studies, pulse oximetry, re-evaluation of patient's condition. This critical care time did not overlap with that of any other provider or involve time for any procedures.     Shena Nash MD  Critical Care Medicine  [facility] - Cardiac Medical ICU

## 2022-09-16 NOTE — ASSESSMENT & PLAN NOTE
S/p right hip hemiarthroplasty with ablation, cementoplasty, and percutaneous fixation of pelvis 8/23  Patient is weight bearing as tolerated with posterior hip precautions to right lower extremity as per orthopedics  When more stable consider postop radiation

## 2022-09-16 NOTE — PROGRESS NOTES
"[facility] - Oncology ([facility])  Palliative Medicine  Progress Note    Patient Name: Donis Jimenez  MRN: [id number]  Admission Date: 9/4/2022  Hospital Length of Stay: 12 days  Code Status: DNR   Attending Provider: Kash Boo MD  Consulting Provider: LOREE Dean  Primary Care Physician: Elio Farias MD  Principal Problem:Small cell carcinoma of lung    Patient information was obtained from patient and primary team.      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 Pt has metastatic lung cancer. Pt is alert, oriented to person, place, and situation. Pt is a DNR. Pt is on 3 L O2.     Reason for consult: GOC/ACP. Communicated with MAJOR Camp fellow with [facility].     Met with pt who stated his goal at this time is to be comfortable and receive treatment. Goal is to be back home if possible and have more quality and quantity of life.     9/9/22: Pt very tearful today. Pt says "I know I am dying. " Pt kept apologizing.  Explained to pt there is not a need to apoligize and that that I am glad he felt comfortable to express his feelings. Pt repots he is having a bad day and everything starting to sink in about his health. Support given.  seeing. Will consult Oncology/Psycology for pt. Pt to step down to Hem/onc as soon as bed available.     9/6/22  Goals of care/ACP: Pt to have tracheal stenting tomorrow. Pt reports he is in agreement to procedure and would like CC team to speak to Aston about procedure when he visits today. Per pt, he relays information to Aston but he feels like he forgets some of information to tell him. Pt reports his goal is medical management at this time to see if " "he can improve enough to get back home. Pt reports his goal is to be at home with his four dogs and , Aston. Pt is aware of his severity of his illness.     Today: Met with pt along with Tracie Kidd LCSW.     9/6/22  Introduced role of Palliative care to pt.   Met with pt who is aware of his medical issues. Per pt he is aware he has metastatic cancer. Pt is aware that any therapies/procedures offered to him is palliative and will not cure cancer. Per pt, he is still trying to come to  with his dx. He learned about his issues in August. Pt reports goal at time is to see if anything can be done to help with symptoms and "give him some more time." Spoke to pt about amount of O2 he is on at this time. He verbalized understanding.  Per pt he has spoken to Oncology and XRT MDs.   CTS has been consulted concerning tracheal stenting.      Pt open to continued visits with pal care for care planning and symptom management needs.  Support given pt.      Pt reports he is legally  to Aston Collins and he would want him to be hi medical decision-maker if he cannot make his own medical decisions. MPOA paperwork left with pt. Education provided. Per Pt, Aston works during day but can be reached by phone.      Code status: DNR.      Symptom management:     PHN=[id number]    Pain: Pt reports he has pain to back chest area that can get up to 9/10. Pt reports throbbing/shooting pain. Pt reports adding long-acting has improved pain.     Pt is on MSContin 30 mg bid.   Pt is currently on Dilaudid 1 mg IVP q 4 hrs prn pain.     Recs:   Continue with Dilaudid IV.   Will need to convert to PO pain meds when pt gets closer to going home. (Pt is aware of this)     Dyspnea r/t to mediastinal mass, pleural effusion.   Pt is on O2  Pt receiving breathing treatment.   Pt is on Dilaudid 1 mg IVP q 4 hrs prn.       Debility r/t to pathological femoral neck fracture s/p ight hip hemiarthroplasty with ablation, cementoplasty  Pt was using " walker prior to admit.   Would resume PT/OT when pt stable to participate.      Anxiety r/t to new dx and dyspnea.   Pt has Ativan 1 mg tid prn.         Plan:   Will continue to meet with pt to reinforce realistic expectations/assit with GOC.   See above symptom recs.   Communicated with Hem/ONC team  Support given.   Oncology/ Psychology seeing  Will follow.               I will follow-up with patient. Please contact us if you have any additional questions.    Subjective:     Chief Complaint:   Chief Complaint   Patient presents with    Shortness of Breath     Pt brought to ED from home via [facility]. Per family pt SOB and productive cough. Pt had double valve replacement, pacemaker and hip surgery 1 week ago.        HPI:   Pt is a 61-year-old male with PMH significant for bacterial endocarditis, s/p AV and MV mechanical valve replacement (on warfarin), CKD, tobacco abuse with recent complicated hospital course who presented to the emergency department with shortness of breath.  Difficult for patient to provide history due to tachypnea; spouse at bedside assisting with history.      Per chart review, patient was admitted 8/18-8/27/22 after sustaining a pathologic right femoral neck fracture and right femoral artery pseudoaneurysm from a fall at home. Patient taken to OR on 8/19 by Vascular Surgery and had embolization of 3rd order right profunda femoral artery pseudoaneurysm with N-BCA glue and 5mm coil aneurysm repair. Pt underwent right hip hemiarthroplasty with ablation, cementoplasty, and percutaneous fixation of pelvis for pathologic fracture and metastatic disease with Ortho on 8/23. 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 Metastatic work-up done  with CT scan of  chest/abdomen and pelvis showing extensive disease including mediastinal mass with encasement of mediastinal vascular structures and airways, MARTHA pulmonary mass, bilateral suprarenal and left renal masses, L1 pathologic fx and rib & skull metastatic disease.      Per chart review, pt's spouse stated that pt had been ambulatory with a walker post-discharge, without c/o SOB, lightheadedness or dizziness. SOB started ~ 9/2 with productive cough. He denies fever/chills, chest pain, abd pain, N/V/D.      In the ED patient was in a flutter and was cardioverted with some improvement in symptoms. He was found to have right sided pneumonia and was started on vanc and cefepime and admitted to Hospital Medicine for further management.      During his time in the ED, the patient had escalating FiO2 requirements, now on 45L 100% comfort flow.      Critical Care Medicine was consulted for worsening hypoxemic respiratory failure    Pt is DNR. Pt on 40 L  @ 60 %      Hospital Course:  No notes on file    Interval History: Pt DNR.    Past Medical History:   Diagnosis Date    Endocarditis     Pacemaker     Pneumonia        Past Surgical History:   Procedure Laterality Date    ANGIOGRAPHY OF LOWER EXTREMITY Right 8/19/2022    Procedure: ANGIOGRAM, LOWER EXTREMITY;  Surgeon: Thomas Nicolas MD;  Location: [facility] OR [address] [place] [address];  Service: Peripheral Vascular;  Laterality: Right;  30.0 min  315.10 mGy  26.1755 Gycm2  84 ml dye    HIP RESURFACING Right 8/23/2022    Procedure: cemontoplasty;  Surgeon: Yung Flores MD;  [facility];  Service: Orthopedics;  Laterality: Right;    RADIOFREQUENCY ABLATION, BONE, PERCUTANEOUS Right 8/23/2022    Procedure: RADIOFREQUENCY ABLATION,BONE;  Surgeon: Yung Flores MD;  Location: [address];  Service: Orthopedics;  Laterality: Right;    REPAIR, PSEUDOANEURYSM, ARTERY, FEMORAL Right 8/19/2022    Procedure: REPAIR, PSEUDOANEURYSM, ARTERY, FEMORAL;  Surgeon: Thomas Nicolas,  MD;  Location: [facility] OR [facility] FLR;  Service: Peripheral Vascular;  Laterality: Right;  R PFA (3rd branch) pseudoaneurysm     TREATMENT OF CARDIAC ARRHYTHMIA N/A 9/8/2022    Procedure: Cardioversion or Defibrillation;  Surgeon: Lan Mccollum MD;  Location: [facility] EP LAB;  Service: Cardiology;  Laterality: N/A;  AFL, DCCV, ANES, SK, RM [id number]       Review of patient's allergies indicates:   Allergen Reactions    Ambien [zolpidem] Other (See Comments)     Disturbing dreams       Medications:  Continuous Infusions:   heparin (porcine) in D5W 16 Units/kg/hr (09/16/22 0219)     Scheduled Meds:   albuterol-ipratropium  3 mL Nebulization Q8H    amiodarone  400 mg Oral BID    Followed by    [START ON 9/26/2022] amiodarone  400 mg Oral Daily    Followed by    [START ON 10/3/2022] amiodarone  200 mg Oral Daily    EScitalopram oxalate  20 mg Oral QHS    morphine  30 mg Oral Q12H    mupirocin   Nasal BID    pantoprazole  40 mg Oral Daily    polyethylene glycol  17 g Oral Daily    pregabalin  75 mg Oral BID    senna-docusate 8.6-50 mg  1 tablet Oral BID    sodium chloride 0.9% flush bag IVPB   Intravenous 1 time in Clinic/HOD    sodium chloride 0.9%  10 mL Intravenous Q6H    sodium chloride 3%  4 mL Nebulization Q8H    warfarin  5 mg Oral Daily     PRN Meds:sodium chloride, acetaminophen, alteplase, diphenhydrAMINE, EPINEPHrine, heparin, porcine (PF), HYDROmorphone, LORazepam, melatonin, methocarbamoL, ondansetron, Flushing PICC Protocol **AND** sodium chloride 0.9% **AND** sodium chloride 0.9%, sumatriptan    Family History    None       Tobacco Use    Smoking status: Every Day     Packs/day: 1.00     Types: Cigarettes    Smokeless tobacco: Never   Substance and Sexual Activity    Alcohol use: Not on file    Drug use: Not on file    Sexual activity: Not on file       Review of Systems   Constitutional:  Positive for activity change, fatigue and unexpected weight change.   Respiratory:  Positive for shortness of  breath.    Cardiovascular:  Negative for leg swelling.   Gastrointestinal:  Negative for nausea and vomiting.   Musculoskeletal:  Positive for gait problem.   Skin: Negative.    Neurological:  Positive for weakness.   Psychiatric/Behavioral: Negative.     Objective:     Vital Signs (Most Recent):  Temp: 97.9 °F (36.6 °C) (09/16/22 0818)  Pulse: 69 (09/16/22 0839)  Resp: 20 (09/16/22 0957)  BP: (!) 176/79 (09/16/22 0839)  SpO2: (!) 93 % (09/16/22 0839)   Vital Signs (24h Range):  Temp:  [97.5 °F (36.4 °C)-98.5 °F (36.9 °C)] 97.9 °F (36.6 °C)  Pulse:  [59-87] 69  Resp:  [16-24] 20  SpO2:  [76 %-95 %] 93 %  BP: (135-184)/(68-84) 176/79     Weight: 57.2 kg (126 lb)  Body mass index is 18.61 kg/m².    Physical Exam  Constitutional:       Comments: Pt on 3L O2   HENT:      Head: Normocephalic and atraumatic.   Eyes:      General: Lids are normal.      Conjunctiva/sclera: Conjunctivae normal.   Cardiovascular:      Rate and Rhythm: Tachycardia present.   Pulmonary:      Effort: Tachypnea present. No accessory muscle usage or respiratory distress.      Comments: Pt on 3L O2.  Abdominal:      General: Abdomen is flat.   Musculoskeletal:      Cervical back: Full passive range of motion without pain and normal range of motion.      Comments: Edema to left arm.    Skin:     General: Skin is warm and dry.   Neurological:      Mental Status: He is alert and oriented to person, place, and time.   Psychiatric:         Attention and Perception: Attention normal.         Speech: Speech normal.      Comments: Crying       Review of Symptoms      Symptom Assessment (ESAS 0-10 Scale)  Pain:  0  Dyspnea:  0  Anxiety:  0  Nausea:  0  Depression:  0  Anorexia:  0  Fatigue:  0  Insomnia:  0  Restlessness:  0  Agitation:  0         ECOG Performance Status thGthrthathdtheth:th th4th Living Arrangements:  Lives with family    Psychosocial/Cultural: Pt legally  to Aston Collins. Per pt Aston works during the day. Per pt, Aston is his care-giver.        Advance Care Planning   Advance Directives:   Living Will: No    Do Not Resuscitate Status: Yes    Medical Power of : No    Agent's Name:  Aston Collins    Decision Making:  Patient answered questions       Significant Labs: All pertinent labs within the past 24 hours have been reviewed.  CBC:   Recent Labs   Lab 09/16/22  0340   WBC 0.23*   HGB 9.2*   HCT 28.6*   MCV 91   PLT 65*       BMP:  Recent Labs   Lab 09/16/22  0340      *   K 3.9      CO2 24   BUN 18   CREATININE 0.7   CALCIUM 8.6*       LFT:  Lab Results   Component Value Date    AST 14 09/16/2022    ALKPHOS 102 09/16/2022    BILITOT 0.6 09/16/2022     Albumin:   Albumin   Date Value Ref Range Status   09/16/2022 1.9 (L) 3.5 - 5.2 g/dL Final     Protein:   Total Protein   Date Value Ref Range Status   09/16/2022 4.3 (L) 6.0 - 8.4 g/dL Final     Lactic acid:   Lab Results   Component Value Date    LACTATE 1.7 09/05/2022    LACTATE 2.4 (H) 09/04/2022       Significant Imaging: I have reviewed all pertinent imaging results/findings within the past 24 hours.      > 50% of 35  min visit spent in chart review, face to face discussion of goals of care,  symptom assessment, coordination of care and emotional support      Zoe Potter, CNS  Palliative Medicine  [facility] - Oncology ([facility])

## 2022-09-17 NOTE — SUBJECTIVE & OBJECTIVE
Interval History/Significant Events: Remained on continuous bipap overnight. Desatted to 80s if taken off of bipap. Charted PO anixolytics however Pt desatts when taken off of bipap - will need to look at changing PO meds to IV    Review of Systems   Unable to perform ROS: Other (On continuous bipap)   Constitutional:  Negative for fever.   Respiratory:  Positive for shortness of breath and wheezing.    Psychiatric/Behavioral:  The patient is nervous/anxious.    Objective:     Vital Signs (Most Recent):  Temp: 97 °F (36.1 °C) (09/17/22 0400)  Pulse: 60 (09/17/22 0752)  Resp: (!) 22 (09/17/22 0752)  BP: 132/62 (09/17/22 0600)  SpO2: 98 % (09/17/22 0752)   Vital Signs (24h Range):  Temp:  [96.7 °F (35.9 °C)-97.9 °F (36.6 °C)] 97 °F (36.1 °C)  Pulse:  [60-73] 60  Resp:  [17-34] 22  SpO2:  [88 %-100 %] 98 %  BP: (124-186)/(61-89) 132/62   Weight: 57.2 kg (126 lb)  Body mass index is 18.61 kg/m².      Intake/Output Summary (Last 24 hours) at 9/17/2022 0857  Last data filed at 9/17/2022 0700  Gross per 24 hour   Intake 178.87 ml   Output 1150 ml   Net -971.13 ml     Physical Exam  Vitals and nursing note reviewed.   Constitutional:       General: He is in acute distress.      Appearance: He is cachectic. He is ill-appearing.      Comments: Sleepy but recently received diluadid  Opening his eyes and able to answer questions with nods/shakes of his head    HENT:      Head: Normocephalic and atraumatic.      Mouth/Throat:      Mouth: Mucous membranes are moist.   Eyes:      Extraocular Movements: Extraocular movements intact.      Pupils: Pupils are equal, round, and reactive to light.   Cardiovascular:      Rate and Rhythm: Normal rate and regular rhythm.      Pulses: Normal pulses.      Heart sounds: Murmur (mechanical click) heard.   Pulmonary:      Breath sounds: Rhonchi and rales present.      Comments: On continuous bipap satting 95%  Abdominal:      General: Bowel sounds are normal. There is no distension.       Palpations: Abdomen is soft.      Tenderness: There is no abdominal tenderness.   Musculoskeletal:         General: No tenderness.      Right lower leg: No edema.      Left lower leg: No edema.   Skin:     General: Skin is warm.      Capillary Refill: Capillary refill takes less than 2 seconds.      Findings: No erythema or rash.   Neurological:      General: No focal deficit present.      Mental Status: He is alert and oriented to person, place, and time.   Psychiatric:         Mood and Affect: Mood normal.         Thought Content: Thought content normal.       Vents:  Oxygen Concentration (%): 60 (09/17/22 0752)  Lines/Drains/Airways       Peripherally Inserted Central Catheter Line  Duration             PICC Double Lumen 09/07/22 1734 right basilic 9 days              Peripheral Intravenous Line  Duration                  Peripheral IV - Single Lumen 09/11/22 1354 20 G Anterior;Left Upper Arm 5 days                  Significant Labs:    CBC/Anemia Profile:  Recent Labs   Lab 09/16/22  0340 09/17/22  0444   WBC 0.23* 0.17*   HGB 9.2* 9.9*   HCT 28.6* 30.6*   PLT 65* 47*   MCV 91 90   RDW 18.9* 18.4*        Chemistries:  Recent Labs   Lab 09/16/22  0340 09/17/22  0444   * 134*   K 3.9 3.7    105   CO2 24 22*   BUN 18 17   CREATININE 0.7 0.6   CALCIUM 8.6* 8.3*   ALBUMIN 1.9* 1.9*   PROT 4.3* 4.7*   BILITOT 0.6 0.4   ALKPHOS 102 111   ALT 11 11   AST 14 14   MG  --  1.7   PHOS  --  3.2       ABGs: No results for input(s): PH, PCO2, HCO3, POCSATURATED, BE in the last 48 hours.  BMP:   Recent Labs   Lab 09/17/22  0444   GLU 78   *   K 3.7      CO2 22*   BUN 17   CREATININE 0.6   CALCIUM 8.3*   MG 1.7     Lactic Acid: No results for input(s): LACTATE in the last 48 hours.  Urine Culture: No results for input(s): LABURIN in the last 48 hours.  Recent Lab Results         09/17/22  0444   09/17/22  0151   09/16/22  1811        Albumin 1.9           Alkaline Phosphatase 111           ALT 11            Anion Gap 7           Aniso Slight           aPTT   58.1  Comment: aPTT therapeutic range = 39-69 seconds   49.5  Comment: aPTT therapeutic range = 39-69 seconds       AST 14           Baso # 0.00           Basophil % 0.0           BILIRUBIN TOTAL 0.4  Comment: For infants and newborns, interpretation of results should be based  on gestational age, weight and in agreement with clinical  observations.    Premature Infant recommended reference ranges:  Up to 24 hours.............<8.0 mg/dL  Up to 48 hours............<12.0 mg/dL  3-5 days..................<15.0 mg/dL  6-29 days.................<15.0 mg/dL             BUN 17           [place]/Echinocytes Occasional           Calcium 8.3           Chloride 105           CO2 22           Creatinine 0.6           Differential Method Automated           eGFR >60.0           Eos # 0.0           Eosinophil % 5.9           Glucose 78           Gran # (ANC) 0.0           Gran % 11.7           Hematocrit 30.6           Hemoglobin 9.9           Immature Grans (Abs) 0.00  Comment: Mild elevation in immature granulocytes is non specific and   can be seen in a variety of conditions including stress response,   acute inflammation, trauma and pregnancy. Correlation with other   laboratory and clinical findings is essential.             Immature Granulocytes 0.0           INR 2.6  Comment: Coumadin Therapy:  2.0 - 3.0 for INR for all indicators except mechanical heart valves  and antiphospholipid syndromes which should use 2.5 - 3.5.             Lymph # 0.1           Lymph % 82.4           Magnesium 1.7           MCH 29.2           MCHC 32.4           MCV 90           Mono # 0.0           Mono % 0.0           MPV 11.8           nRBC 0           Phosphorus 3.2           Platelet Estimate Decreased           Platelets 47           Poikilocytosis Slight           Potassium 3.7           PROTEIN TOTAL 4.7           Protime 25.2           RBC 3.39           RDW 18.4           Sodium 134            WBC 0.17  Comment: WBC  critical result(s) called and verbal readback obtained from   DANIKA BELLO RN by [facility] 09/17/2022 05:29                     Significant Imaging:  I have reviewed all pertinent imaging results/findings within the past 24 hours.

## 2022-09-17 NOTE — PROGRESS NOTES
[facility] - Cardiac Medical [facility]  Critical Care Medicine  Progress Note    Patient Name: Donis Jimenez  MRN: [id number]  Admission Date: 9/4/2022  Hospital Length of Stay: 13 days  Code Status: DNR  Attending Provider: Frankie Chilel MD  Primary Care Provider: Elio Farias MD   Principal Problem: Acute hypoxemic respiratory failure    Subjective:     HPI:  61 year old male with bacterial endocarditis, s/p AV and MV mechanical valve replacement (on warfarin), CKD, tobacco abuse, recent diagnosis of metastatic SCLC (extensive diseases including mediastinal mass, mets to brain, bone) with recent hospital admission for pathologic right femoral neck fracture and right femoral artery pseudoaneurysm requiring hemiarthroplasty with ortho and embolization by vascular surgery presenting with acute hypoxic respiratory failure. He was initially admitted to ICU for bipap and was stepped down to oncology once oxygen requirements improved. Received inpatient chemotherapy with carboplatin and etoposide on 9/7.     Critical Care Medicine was re-consulted on 9/16 for worsening hypoxemic respiratory failure.    Hospital/ICU Course:  Initially admitted to [facility] 9/4 for worsening hypoxemic respiratory failure 2/2 PNA in setting of lung cancer with extensive metastatic disease. GOC discussed with Pt and  who agree that they would not want extreme measures such as CPR and mechanical ventilation given overall poor prognosis. Oxygen requirements improved and Pt stepped down to oncology. Received inpatient chemotherapy. Had BL pleural effusions; chest tube placed and later removed on 9/14. Stepped back up to ICU on 9/16 for increasing oxygen requirements - went from NC to non-breather, now requiring continuous bipap. CTA chest negative for PE, showed ongoing RLL consolidation, and BL pleural effusions which are stable. Bedside US BL did not show significant pleural effusion that can be tapped. Pt desats and becomes very anxious if  taken off of bipap.     Interval History/Significant Events: Remained on continuous bipap overnight. Desatted to 80s if taken off of bipap. Charted PO anixolytics however Pt desatts when taken off of bipap - will need to look at changing PO meds to IV    Review of Systems   Unable to perform ROS: Other (On continuous bipap)   Constitutional:  Negative for fever.   Respiratory:  Positive for shortness of breath and wheezing.    Psychiatric/Behavioral:  The patient is nervous/anxious.    Objective:     Vital Signs (Most Recent):  Temp: 97 °F (36.1 °C) (09/17/22 0400)  Pulse: 60 (09/17/22 0752)  Resp: (!) 22 (09/17/22 0752)  BP: 132/62 (09/17/22 0600)  SpO2: 98 % (09/17/22 0752)   Vital Signs (24h Range):  Temp:  [96.7 °F (35.9 °C)-97.9 °F (36.6 °C)] 97 °F (36.1 °C)  Pulse:  [60-73] 60  Resp:  [17-34] 22  SpO2:  [88 %-100 %] 98 %  BP: (124-186)/(61-89) 132/62   Weight: 57.2 kg (126 lb)  Body mass index is 18.61 kg/m².      Intake/Output Summary (Last 24 hours) at 9/17/2022 0857  Last data filed at 9/17/2022 0700  Gross per 24 hour   Intake 178.87 ml   Output 1150 ml   Net -971.13 ml     Physical Exam  Vitals and nursing note reviewed.   Constitutional:       General: He is in acute distress.      Appearance: He is cachectic. He is ill-appearing.      Comments: Sleepy but recently received diluadid  Opening his eyes and able to answer questions with nods/shakes of his head    HENT:      Head: Normocephalic and atraumatic.      Mouth/Throat:      Mouth: Mucous membranes are moist.   Eyes:      Extraocular Movements: Extraocular movements intact.      Pupils: Pupils are equal, round, and reactive to light.   Cardiovascular:      Rate and Rhythm: Normal rate and regular rhythm.      Pulses: Normal pulses.      Heart sounds: Murmur (mechanical click) heard.   Pulmonary:      Breath sounds: Rhonchi and rales present.      Comments: On continuous bipap satting 95%  Abdominal:      General: Bowel sounds are normal. There is no  distension.      Palpations: Abdomen is soft.      Tenderness: There is no abdominal tenderness.   Musculoskeletal:         General: No tenderness.      Right lower leg: No edema.      Left lower leg: No edema.   Skin:     General: Skin is warm.      Capillary Refill: Capillary refill takes less than 2 seconds.      Findings: No erythema or rash.   Neurological:      General: No focal deficit present.      Mental Status: He is alert and oriented to person, place, and time.   Psychiatric:         Mood and Affect: Mood normal.         Thought Content: Thought content normal.       Vents:  Oxygen Concentration (%): 60 (09/17/22 0752)  Lines/Drains/Airways       Peripherally Inserted Central Catheter Line  Duration             PICC Double Lumen 09/07/22 1734 right basilic 9 days              Peripheral Intravenous Line  Duration                  Peripheral IV - Single Lumen 09/11/22 1354 20 G Anterior;Left Upper Arm 5 days                  Significant Labs:    CBC/Anemia Profile:  Recent Labs   Lab 09/16/22  0340 09/17/22  0444   WBC 0.23* 0.17*   HGB 9.2* 9.9*   HCT 28.6* 30.6*   PLT 65* 47*   MCV 91 90   RDW 18.9* 18.4*        Chemistries:  Recent Labs   Lab 09/16/22  0340 09/17/22  0444   * 134*   K 3.9 3.7    105   CO2 24 22*   BUN 18 17   CREATININE 0.7 0.6   CALCIUM 8.6* 8.3*   ALBUMIN 1.9* 1.9*   PROT 4.3* 4.7*   BILITOT 0.6 0.4   ALKPHOS 102 111   ALT 11 11   AST 14 14   MG  --  1.7   PHOS  --  3.2       ABGs: No results for input(s): PH, PCO2, HCO3, POCSATURATED, BE in the last 48 hours.  BMP:   Recent Labs   Lab 09/17/22  0444   GLU 78   *   K 3.7      CO2 22*   BUN 17   CREATININE 0.6   CALCIUM 8.3*   MG 1.7     Lactic Acid: No results for input(s): LACTATE in the last 48 hours.  Urine Culture: No results for input(s): LABURIN in the last 48 hours.  Recent Lab Results         09/17/22  0444   09/17/22  0151   09/16/22  1811        Albumin 1.9           Alkaline Phosphatase 111            ALT 11           Anion Gap 7           Aniso Slight           aPTT   58.1  Comment: aPTT therapeutic range = 39-69 seconds   49.5  Comment: aPTT therapeutic range = 39-69 seconds       AST 14           Baso # 0.00           Basophil % 0.0           BILIRUBIN TOTAL 0.4  Comment: For infants and newborns, interpretation of results should be based  on gestational age, weight and in agreement with clinical  observations.    Premature Infant recommended reference ranges:  Up to 24 hours.............<8.0 mg/dL  Up to 48 hours............<12.0 mg/dL  3-5 days..................<15.0 mg/dL  6-29 days.................<15.0 mg/dL             BUN 17           Gama/Echinocytes Occasional           Calcium 8.3           Chloride 105           CO2 22           Creatinine 0.6           Differential Method Automated           eGFR >60.0           Eos # 0.0           Eosinophil % 5.9           Glucose 78           Gran # (ANC) 0.0           Gran % 11.7           Hematocrit 30.6           Hemoglobin 9.9           Immature Grans (Abs) 0.00  Comment: Mild elevation in immature granulocytes is non specific and   can be seen in a variety of conditions including stress response,   acute inflammation, trauma and pregnancy. Correlation with other   laboratory and clinical findings is essential.             Immature Granulocytes 0.0           INR 2.6  Comment: Coumadin Therapy:  2.0 - 3.0 for INR for all indicators except mechanical heart valves  and antiphospholipid syndromes which should use 2.5 - 3.5.             Lymph # 0.1           Lymph % 82.4           Magnesium 1.7           MCH 29.2           MCHC 32.4           MCV 90           Mono # 0.0           Mono % 0.0           MPV 11.8           nRBC 0           Phosphorus 3.2           Platelet Estimate Decreased           Platelets 47           Poikilocytosis Slight           Potassium 3.7           PROTEIN TOTAL 4.7           Protime 25.2           RBC 3.39           RDW 18.4            Sodium 134           WBC 0.17  Comment: WBC  critical result(s) called and verbal readback obtained from   DANIKA BELLO RN by [facility] 09/17/2022 05:29                     Significant Imaging:  I have reviewed all pertinent imaging results/findings within the past 24 hours.      ABG  No results for input(s): PH, PO2, PCO2, HCO3, BE in the last 168 hours.  Assessment/Plan:     Psychiatric  Adjustment disorder with mixed anxiety and depressed mood  Adjustment disorder with mixed anxiety and depressed mood  May need to consult psychiatry   Talked to palliative and they stated that patient would not benefit from more pain medications given current emotional state    Plan:  - continue escitalopram 20mg daily  - ativan TID PRN for breakthrough anxiety    Pulmonary  * Acute hypoxemic respiratory failure  Admitted with acute hypoxic respiratory failure likely secondary to extensive small cell lung cancer  Has BL pleural effusions; had chest tube which was removed on 9/14  Stepped up to ICU from oncology on 9/16 for increasing oxygen requirements  Bedside US BL lung did not show anything that could be tapped  CT chest pending  Remain on bipap and wean as able  Although Pt is neutropenic from his inpatient chemotherapy, he is afebrile and hemodynamically stable so will hold off on abx for now  If fevers or becomes hypotensive, will need to complete septic screen and start broad-spectrum abx         Exudative pleural effusion  Chest tube placed 9/6, removed 9/14  Had worsening hypoxia on 9/16 but US BL did not show large pleural effusion that could be drained  CTA chest showing stable BL pleural effusions      Cardiac/Vascular  Atrial flutter  Patient had afib with RVR this admission was successfully cardioverted. IV amio load x72 hours completed and transitioned to PO.    Plan:  - amio  BID for 2 weeks followed by:           - amio 400 daily for 1 week           - amio 200 daily lifetime  - on low intensity heparin drip  currently for the atrial flutter  - Warfarin started 9/13 (mechanical heart valve)  - Daily PT/INR, transition from heparin drip to warfarin once therapeutic. Increased warfarin dose to 7.5 (9/15)    H/O mitral valve replacement with mechanical valve  Hx endocarditis s/p MVR and AVR  Previously on warfarin  Was on heparin gtt bridging while INR subtherapeutic  Will hold warfarin for now but continue heparin gtt      Pseudoaneurysm of right femoral artery  S/p embolization of right profunda femoral artery pseudoaneurysm with 5 mm coil 8/20    Oncology  Tumor lysis syndrome  Patient had concerns for tumor lysis syndrome given active chemo with SCLC, treated prophylactically  Uric acid/LDH downtrending, no need to repeat    RESOLVED    Small cell carcinoma of lung  OS pathology report confirms small cell lung cancer from bone specimen. Situation explained to Pt and his   Started on inpatient palliative chemotherapy with intent to improve his tumor burden causing his hypoxic respiratory failure      Malignant neoplasm metastatic to bone  See small cell lung cancer       Endocrine  Body mass index (BMI) less than 19  in setting of malignancy and decreased appetite.    - nutrition consulted    Orthopedic  Swelling of upper extremity  Noted on previous hospitalization, possibly 2/2 to vascular compression and mass encasing mediastinal structures noted on CT  - non-occlusive thrombus in L cephalic vein  - also noted to have right upper extremity swelling, US doppler pending     Pathologic fracture of neck of right femur s/p hemiarthroplasty on 8/23/2022  S/p right hip hemiarthroplasty with ablation, cementoplasty, and percutaneous fixation of pelvis 8/23  Patient is weight bearing as tolerated with posterior hip precautions to right lower extremity as per orthopedics  When more stable consider postop radiation    Palliative Care  Palliative care encounter  Palliative care consulted for GOC discussion; following Pt      ACP (advance care planning)  Advance Care Planning   Previously engaged in advanced care planning during previous ICU stay  Code status has not changed and Pt is still DNR            Other  Pain  MMT  - MS Contin 30mg bid  - Dilaudid 1mg q4 prn  - Robaxin 500 q6h prn  - versed 1mg 4x daily prn    Debility  PT/OT eval, will need home health       Critical Care Daily Checklist:    A: Awake: RASS Goal/Actual Goal: RASS Goal: 0-->alert and calm  Actual: [place] Agitation Sedation Scale (RASS): Alert and calm   B: Spontaneous Breathing Trial Performed?     C: SAT & SBT Coordinated?  N/A                      D: Delirium: CAM-ICU Overall CAM-ICU: Negative   E: Early Mobility Performed? Yes   F: Feeding Goal: Goals: Meet % EEN, EPN by RD f/u date  Status: Nutrition Goal Status: new   Current Diet Order   Procedures    Diet Low Phosphorus      AS: Analgesia/Sedation MS contin, diluadid prn, versed prn   T: Thromboembolic Prophylaxis Heparin gtt   H: HOB > 300 Yes   U: Stress Ulcer Prophylaxis (if needed) pantoprazole   G: Glucose Control controlled   B: Bowel Function Stool Occurrence: 1   I: Indwelling Catheter (Lines & Rodgers) Necessity PIVC, PICC   D: De-escalation of Antimicrobials/Pharmacotherapies N/A    Plan for the day/ETD Monitor resp status, continuous bipap, change meds from PO to IV, update family     Code Status:  Family/Goals of Care: DNR         Critical secondary to Patient has a condition that poses threat to life and bodily function: Severe Respiratory Distress      Critical care was time spent personally by me on the following activities: development of treatment plan with patient or surrogate and bedside caregivers, discussions with consultants, evaluation of patient's response to treatment, examination of patient, ordering and performing treatments and interventions, ordering and review of laboratory studies, ordering and review of radiographic studies, pulse oximetry, re-evaluation of  patient's condition. This critical care time did not overlap with that of any other provider or involve time for any procedures.     Shena Nash MD  Critical Care Medicine  [facility] - Cardiac Medical ICU

## 2022-09-17 NOTE — ASSESSMENT & PLAN NOTE
MMT  - MS Contin 30mg bid  - Dilaudid 1mg q4 prn  - Robaxin 500 q6h prn  - versed 1mg 4x daily prn

## 2022-09-17 NOTE — ASSESSMENT & PLAN NOTE
Chest tube placed 9/6, removed 9/14  Had worsening hypoxia on 9/16 but US BL did not show large pleural effusion that could be drained  CTA chest showing stable BL pleural effusions

## 2022-09-17 NOTE — PROGRESS NOTES
Pharmacokinetic Initial Assessment: IV Vancomycin    Assessment/Plan:    Initiate intravenous vancomycin with loading dose of 1250 mg once followed by a maintenance dose of vancomycin 750 mg IV every 12 hours.  - Mr. Jimenez's renal function appears stable and at baseline.  - Desired empiric serum trough concentration is 10 to 20 mcg/mL.  - Draw vancomycin trough level 60 min prior to fourth dose on 9/19 at approximately 0000.  - Please draw random level sooner than scheduled trough if renal function changes significantly.    Pharmacy will continue to follow and monitor vancomycin.      Please contact pharmacy at extension [phone] with any questions regarding this assessment.     Thank you for the consult,   Anisa Minaya       Patient brief summary:  Donis Jimenez is a 61 y.o. male initiated on antimicrobial therapy with IV Vancomycin for treatment of suspected lower respiratory infection    Actual Body Weight:   57.2 kg    Renal Function:   Estimated Creatinine Clearance: 105.9 mL/min (based on SCr of 0.6 mg/dL).    Dialysis Method (if applicable):  N/A

## 2022-09-17 NOTE — ASSESSMENT & PLAN NOTE
Adjustment disorder with mixed anxiety and depressed mood  May need to consult psychiatry   Talked to palliative and they stated that patient would not benefit from more pain medications given current emotional state    Plan:  - continue escitalopram 20mg daily  - ativan TID PRN for breakthrough anxiety

## 2022-09-17 NOTE — ASSESSMENT & PLAN NOTE
[facility] pathology report confirms small cell lung cancer from bone specimen. Situation explained to Pt and his   Started on inpatient palliative chemotherapy with intent to improve his tumor burden causing his hypoxic respiratory failure

## 2022-09-17 NOTE — PLAN OF CARE
Pt remains free from falls and injuries. PICC, PIV, continuous bipap remain. Heparin gtt infusing as ordered, therapeutic X1. Multiple periods of anxiety; PRN PO ativan switched to PRN IV versed this am. Otherwise, no acute issues overnight. Plan of care reviewed with pt and pt's , who remains at bedside.

## 2022-09-18 NOTE — ASSESSMENT & PLAN NOTE
Hx endocarditis s/p MVR and AVR  Previously on warfarin  Was on heparin gtt bridging while INR subtherapeutic  Warfarin held in preparation of possible chest tube insertion; INR became therapeutic on 9/17 so held on further anticoagulation (was going to switch to full dose lovenox)  INR supratherapeutic on 9/18 but no obvious source of bleed and given mechanical valves will hold off on reversing warfarin

## 2022-09-18 NOTE — ASSESSMENT & PLAN NOTE
[facility] pathology report confirms small cell lung cancer from bone specimen. Situation explained to Pt and his   Started on inpatient palliative chemotherapy with intent to improve his tumor burden causing his hypoxic respiratory failure

## 2022-09-18 NOTE — ASSESSMENT & PLAN NOTE
Adjustment disorder with mixed anxiety and depressed mood  May need to consult psychiatry   Talked to palliative and they stated that patient would not benefit from more pain medications given current emotional state    Plan:  - continue escitalopram 20mg daily  - versed 4x daily PRN for breakthrough anxiety

## 2022-09-18 NOTE — SUBJECTIVE & OBJECTIVE
Interval History/Significant Events: Successfully weaned to HFNC on 9/17; went back on bipap overnight, will wean again this AM. Received prn meds for anxiety. Started on broad spectrum abx.  and Pt concerned that Pt is at times disoriented - explained that this is normal given long hospital admission/now admitted to ICU + infection + meds given for pain/anxiety but if it worsens to notify team    Review of Systems   Unable to perform ROS: Other (On continuous bipap)   Constitutional:  Negative for fever.   Respiratory:  Positive for shortness of breath and wheezing.    Psychiatric/Behavioral:  The patient is nervous/anxious.    Objective:     Vital Signs (Most Recent):  Temp: 96.1 °F (35.6 °C) (09/18/22 0700)  Pulse: 60 (09/18/22 0752)  Resp: (!) 25 (09/18/22 0752)  BP: 121/60 (09/18/22 0700)  SpO2: 99 % (09/18/22 0752)   Vital Signs (24h Range):  Temp:  [96.1 °F (35.6 °C)-97.4 °F (36.3 °C)] 96.1 °F (35.6 °C)  Pulse:  [60-72] 60  Resp:  [11-36] 25  SpO2:  [89 %-100 %] 99 %  BP: (107-166)/(53-70) 121/60   Weight: 57.2 kg (126 lb)  Body mass index is 18.61 kg/m².      Intake/Output Summary (Last 24 hours) at 9/18/2022 0904  Last data filed at 9/17/2022 2100  Gross per 24 hour   Intake 729.68 ml   Output 250 ml   Net 479.68 ml       Physical Exam  Vitals and nursing note reviewed.   Constitutional:       General: He is in acute distress.      Appearance: He is cachectic. He is ill-appearing.      Comments: Sleepy but recently received diluadid  Opening his eyes and able to answer questions with nods/shakes of his head    HENT:      Head: Normocephalic and atraumatic.      Mouth/Throat:      Mouth: Mucous membranes are moist.   Eyes:      Extraocular Movements: Extraocular movements intact.      Pupils: Pupils are equal, round, and reactive to light.   Cardiovascular:      Rate and Rhythm: Normal rate and regular rhythm.      Pulses: Normal pulses.      Heart sounds: Murmur (mechanical click) heard.   Pulmonary:       Breath sounds: Rhonchi and rales present.      Comments: On continuous bipap satting 95%; will wean to HF again today  Abdominal:      General: Bowel sounds are normal. There is no distension.      Palpations: Abdomen is soft.      Tenderness: There is no abdominal tenderness.   Musculoskeletal:         General: Swelling (right upper extremity; appears to be dependent edema, a little improved from yesterday) present. No tenderness.      Right lower leg: No edema.      Left lower leg: No edema.   Skin:     General: Skin is warm.      Capillary Refill: Capillary refill takes less than 2 seconds.      Findings: No erythema or rash.   Neurological:      General: No focal deficit present.      Mental Status: He is alert and oriented to person, place, and time.   Psychiatric:         Mood and Affect: Mood normal.         Thought Content: Thought content normal.       Vents:  Oxygen Concentration (%): 65 (09/18/22 0752)  Lines/Drains/Airways       Peripherally Inserted Central Catheter Line  Duration             PICC Double Lumen 09/07/22 1734 right basilic 10 days              Peripheral Intravenous Line  Duration                  Peripheral IV - Single Lumen 09/11/22 1354 20 G Anterior;Left Upper Arm 6 days                  Significant Labs:    CBC/Anemia Profile:  Recent Labs   Lab 09/17/22  0444 09/18/22  0432   WBC 0.17* 0.11*   HGB 9.9* 9.0*   HCT 30.6* 28.5*   PLT 47* 30*   MCV 90 93   RDW 18.4* 18.4*          Chemistries:  Recent Labs   Lab 09/17/22  0444 09/18/22  0432   * 132*   K 3.7 3.7    103   CO2 22* 24   BUN 17 19   CREATININE 0.6 0.7   CALCIUM 8.3* 8.0*   ALBUMIN 1.9* 1.6*   PROT 4.7* 4.3*   BILITOT 0.4 0.4   ALKPHOS 111 101   ALT 11 12   AST 14 10   MG 1.7 1.9   PHOS 3.2 3.2         ABGs: No results for input(s): PH, PCO2, HCO3, POCSATURATED, BE in the last 48 hours.  BMP:   Recent Labs   Lab 09/18/22  0432   *   *   K 3.7      CO2 24   BUN 19   CREATININE 0.7   CALCIUM  8.0*   MG 1.9       Lactic Acid: No results for input(s): LACTATE in the last 48 hours.  Urine Culture: No results for input(s): LABURIN in the last 48 hours.  Recent Lab Results         09/18/22  0616   09/18/22  0432   09/17/22  1350   09/17/22  1349        Albumin   1.6           Alkaline Phosphatase   101           ALT   12           Anion Gap   5           Aniso   Slight           AST   10           Baso #   0.00           Basophil %   0.0           BILIRUBIN TOTAL   0.4  Comment: For infants and newborns, interpretation of results should be based  on gestational age, weight and in agreement with clinical  observations.    Premature Infant recommended reference ranges:  Up to 24 hours.............<8.0 mg/dL  Up to 48 hours............<12.0 mg/dL  3-5 days..................<15.0 mg/dL  6-29 days.................<15.0 mg/dL             Blood Culture, Routine     No Growth to date  [P]   No Growth to date  [P]       BUN   19           Upham/Echinocytes   Occasional           Calcium   8.0           Chloride   103           CO2   24           Creatinine   0.7           Differential Method   Automated           eGFR   >60.0           Eos #   0.0           Eosinophil %   0.0           Glucose   152           Gran # (ANC)   0.0           Gran %   9.1           Hematocrit   28.5           Hemoglobin   9.0           Hypo   Occasional           Immature Grans (Abs)   0.00  Comment: Mild elevation in immature granulocytes is non specific and   can be seen in a variety of conditions including stress response,   acute inflammation, trauma and pregnancy. Correlation with other   laboratory and clinical findings is essential.             Immature Granulocytes   0.0           INR 5.8  Comment: Coumadin Therapy:  2.0 - 3.0 for INR for all indicators except mechanical heart valves  and antiphospholipid syndromes which should use 2.5 - 3.5.  INR  critical result(s) called and verbal readback obtained from   HAIR KEATING RN by [facility]  09/18/2022 06:35               Lymph #   0.1           Lymph %   81.8           Magnesium   1.9           MCH   29.4           MCHC   31.6           MCV   93           Mono #   0.0           Mono %   9.1           MPV   12.1           nRBC   0           Ovalocytes   Occasional           Phosphorus   3.2           Platelet Estimate   Decreased           Platelets   30  Comment: platelets    critical result(s) called and verbal readback obtained   from Jacklyn Crandall RN. by [email or web address] 09/18/2022 05:24             Poikilocytosis   Slight           Potassium   3.7           PROTEIN TOTAL   4.3           Protime 55.0             RBC   3.06           RDW   18.4           Sodium   132           WBC   0.11  Comment: WBCs and prelim platelets    critical result(s) called and verbal   readback obtained from Jacklyn Crandall RN. by RAMILA 09/18/2022 05:08                      [P] - Preliminary Result               Significant Imaging:  I have reviewed all pertinent imaging results/findings within the past 24 hours.

## 2022-09-18 NOTE — PLAN OF CARE
CM[facility] DAILY GOALS       A: Awake    RASS: Goal - RASS Goal: 0-->alert and calm  Actual - RASS (Garcia Agitation-Sedation Scale): 0-->alert and calm   Restraint necessity:    B: Breathe   SBT: Not intubated   C: Coordinate A & B, analgesics/sedatives   Pain: managed    SAT: Not intubated  D: Delirium   CAM-ICU: Overall CAM-ICU: Negative  E: Early(intubated/ Progressive (non-intubated) Mobility   MOVE Screen: Fail   Activity: Activity Management: Arm raise - L1  FAS: Feeding/Nutrition   Diet order: Diet/Nutrition Received: clear liquid, Specialty Diet/Nutrition Received: renal diet  T: Thrombus   DVT prophylaxis: VTE Required Core Measure: Pharmacological prophylaxis initiated/maintained  H: HOB Elevation   Head of Bed (HOB) Positioning: HOB at 30-45 degrees  U: Ulcer Prophylaxis   GI: yes  G: Glucose control   managed    S: Skin   Bathing/Skin Care: incontinence care  Device Skin Pressure Protection: absorbent pad utilized/changed  Pressure Reduction Devices: foam padding utilized  Pressure Reduction Techniques: weight shift assistance provided  Skin Protection: adhesive use limited, tubing/devices free from skin contact  B: Bowel Function   no issues   I: Indwelling Catheters   Rodgers necessity:     CVC necessity: No  D: De-escalation Antibiotics   Yes    Family/Goals of care/Code Status   Code Status: DNR    24H Vital Sign Range  Temp:  [97.2 °F (36.2 °C)-97.4 °F (36.3 °C)]   Pulse:  [60-72]   Resp:  [11-36]   BP: (107-166)/(53-70)   SpO2:  [89 %-99 %]      Shift Events   Pt given prn pain medications per request at the start of shift. IV ABTS continued as ordered.Labs drawn with a critical wbc, PLT and PT/INR. MD notified . No new orders     VS and assessment per flow sheet, patient progressing towards goals as tolerated, plan of care reviewed with family, all concerns addressed, will continue to monitor.    Problem: Infection  Goal: Absence of Infection Signs and Symptoms  Outcome: Ongoing, Progressing      Problem: Oral Mucositis (Chemotherapy Effects)  Goal: Improved Oral Mucous Membrane Integrity  Outcome: Ongoing, Progressing

## 2022-09-18 NOTE — ASSESSMENT & PLAN NOTE
Noted on previous hospitalization, possibly 2/2 to vascular compression and mass encasing mediastinal structures noted on CT  - non-occlusive thrombus in L cephalic vein  - also noted to have right upper extremity swelling, US doppler negative for DVT

## 2022-09-18 NOTE — ASSESSMENT & PLAN NOTE
Admitted with acute hypoxic respiratory failure likely secondary to extensive small cell lung cancer  Has BL pleural effusions; had chest tube which was removed on 9/14  Stepped up to ICU from oncology on 9/16 for increasing oxygen requirements  Bedside US BL lung did not show anything that could be tapped  CT chest showing RLL consolidation and stable BL pleural effusions; negative for PE  BCx NTD; will follow-up  Started on broad spectrum abx and steroids  Wean O2 as able; weaned from bipap to HFNC

## 2022-09-18 NOTE — PROGRESS NOTES
[facility] - Cardiac Medical ICU  Critical Care Medicine  Progress Note    Patient Name: Donis Jimenez  MRN: [id number]  Admission Date: 9/4/2022  Hospital Length of Stay: 14 days  Code Status: DNR  Attending Provider: Frankie Chilel MD  Primary Care Provider: Elio Farias MD   Principal Problem: Acute hypoxemic respiratory failure    Subjective:     HPI:  61 year old male with bacterial endocarditis, s/p AV and MV mechanical valve replacement (on warfarin), CKD, tobacco abuse, recent diagnosis of metastatic SCLC (extensive diseases including mediastinal mass, mets to brain, bone) with recent hospital admission for pathologic right femoral neck fracture and right femoral artery pseudoaneurysm requiring hemiarthroplasty with ortho and embolization by vascular surgery presenting with acute hypoxic respiratory failure. He was initially admitted to ICU for bipap and was stepped down to oncology once oxygen requirements improved. Received inpatient chemotherapy with carboplatin and etoposide on 9/7.     Critical Care Medicine was re-consulted on 9/16 for worsening hypoxemic respiratory failure.    Hospital/ICU Course:  Initially admitted to [facility] 9/4 for worsening hypoxemic respiratory failure 2/2 PNA in setting of lung cancer with extensive metastatic disease. GOC discussed with Pt and  who agree that they would not want extreme measures such as CPR and mechanical ventilation given overall poor prognosis. Oxygen requirements improved and Pt stepped down to oncology. Received inpatient chemotherapy. Had BL pleural effusions; chest tube placed and later removed on 9/14. Stepped back up to ICU on 9/16 for increasing oxygen requirements - went from NC to non-breather. CTA chest negative for PE, showed ongoing RLL consolidation, and BL pleural effusions which are stable. Bedside US BL did not show significant pleural effusion that can be tapped. Pt was initially desatting when taken off of bipap but was successfully  weaned to HFNC saturating 88-92%. Started on broad spectrum abx and steroids for PNA. INR therapeutic on 9/17 so further anticoagulation held. Supratherapeutic on 9/18 without obvious source of bleed so will continue to hold AC.    Interval History/Significant Events: Successfully weaned to HFNC on 9/17; went back on bipap overnight, will wean again this AM. Received prn meds for anxiety. Started on broad spectrum abx.  and Pt concerned that Pt is at times disoriented - explained that this is normal given long hospital admission/now admitted to ICU + infection + meds given for pain/anxiety but if it worsens to notify team    Review of Systems   Unable to perform ROS: Other (On continuous bipap)   Constitutional:  Negative for fever.   Respiratory:  Positive for shortness of breath and wheezing.    Psychiatric/Behavioral:  The patient is nervous/anxious.    Objective:     Vital Signs (Most Recent):  Temp: 96.1 °F (35.6 °C) (09/18/22 0700)  Pulse: 60 (09/18/22 0752)  Resp: (!) 25 (09/18/22 0752)  BP: 121/60 (09/18/22 0700)  SpO2: 99 % (09/18/22 0752)   Vital Signs (24h Range):  Temp:  [96.1 °F (35.6 °C)-97.4 °F (36.3 °C)] 96.1 °F (35.6 °C)  Pulse:  [60-72] 60  Resp:  [11-36] 25  SpO2:  [89 %-100 %] 99 %  BP: (107-166)/(53-70) 121/60   Weight: 57.2 kg (126 lb)  Body mass index is 18.61 kg/m².      Intake/Output Summary (Last 24 hours) at 9/18/2022 0904  Last data filed at 9/17/2022 2100  Gross per 24 hour   Intake 729.68 ml   Output 250 ml   Net 479.68 ml       Physical Exam  Vitals and nursing note reviewed.   Constitutional:       General: He is in acute distress.      Appearance: He is cachectic. He is ill-appearing.      Comments: Sleepy but recently received diluadid  Opening his eyes and able to answer questions with nods/shakes of his head    HENT:      Head: Normocephalic and atraumatic.      Mouth/Throat:      Mouth: Mucous membranes are moist.   Eyes:      Extraocular Movements: Extraocular movements  intact.      Pupils: Pupils are equal, round, and reactive to light.   Cardiovascular:      Rate and Rhythm: Normal rate and regular rhythm.      Pulses: Normal pulses.      Heart sounds: Murmur (mechanical click) heard.   Pulmonary:      Breath sounds: Rhonchi and rales present.      Comments: On continuous bipap satting 95%; will wean to HF again today  Abdominal:      General: Bowel sounds are normal. There is no distension.      Palpations: Abdomen is soft.      Tenderness: There is no abdominal tenderness.   Musculoskeletal:         General: Swelling (right upper extremity; appears to be dependent edema, a little improved from yesterday) present. No tenderness.      Right lower leg: No edema.      Left lower leg: No edema.   Skin:     General: Skin is warm.      Capillary Refill: Capillary refill takes less than 2 seconds.      Findings: No erythema or rash.   Neurological:      General: No focal deficit present.      Mental Status: He is alert and oriented to person, place, and time.   Psychiatric:         Mood and Affect: Mood normal.         Thought Content: Thought content normal.       Vents:  Oxygen Concentration (%): 65 (09/18/22 0752)  Lines/Drains/Airways       Peripherally Inserted Central Catheter Line  Duration             PICC Double Lumen 09/07/22 1734 right basilic 10 days              Peripheral Intravenous Line  Duration                  Peripheral IV - Single Lumen 09/11/22 1354 20 G Anterior;Left Upper Arm 6 days                  Significant Labs:    CBC/Anemia Profile:  Recent Labs   Lab 09/17/22  0444 09/18/22  0432   WBC 0.17* 0.11*   HGB 9.9* 9.0*   HCT 30.6* 28.5*   PLT 47* 30*   MCV 90 93   RDW 18.4* 18.4*          Chemistries:  Recent Labs   Lab 09/17/22 0444 09/18/22  0432   * 132*   K 3.7 3.7    103   CO2 22* 24   BUN 17 19   CREATININE 0.6 0.7   CALCIUM 8.3* 8.0*   ALBUMIN 1.9* 1.6*   PROT 4.7* 4.3*   BILITOT 0.4 0.4   ALKPHOS 111 101   ALT 11 12   AST 14 10   MG 1.7  1.9   PHOS 3.2 3.2         ABGs: No results for input(s): PH, PCO2, HCO3, POCSATURATED, BE in the last 48 hours.  BMP:   Recent Labs   Lab 09/18/22  0432   *   *   K 3.7      CO2 24   BUN 19   CREATININE 0.7   CALCIUM 8.0*   MG 1.9       Lactic Acid: No results for input(s): LACTATE in the last 48 hours.  Urine Culture: No results for input(s): LABURIN in the last 48 hours.  Recent Lab Results         09/18/22  0616   09/18/22  0432   09/17/22  1350   09/17/22  1349        Albumin   1.6           Alkaline Phosphatase   101           ALT   12           Anion Gap   5           Aniso   Slight           AST   10           Baso #   0.00           Basophil %   0.0           BILIRUBIN TOTAL   0.4  Comment: For infants and newborns, interpretation of results should be based  on gestational age, weight and in agreement with clinical  observations.    Premature Infant recommended reference ranges:  Up to 24 hours.............<8.0 mg/dL  Up to 48 hours............<12.0 mg/dL  3-5 days..................<15.0 mg/dL  6-29 days.................<15.0 mg/dL             Blood Culture, Routine     No Growth to date  [P]   No Growth to date  [P]       BUN   19           Gama/Echinocytes   Occasional           Calcium   8.0           Chloride   103           CO2   24           Creatinine   0.7           Differential Method   Automated           eGFR   >60.0           Eos #   0.0           Eosinophil %   0.0           Glucose   152           Gran # (ANC)   0.0           Gran %   9.1           Hematocrit   28.5           Hemoglobin   9.0           Hypo   Occasional           Immature Grans (Abs)   0.00  Comment: Mild elevation in immature granulocytes is non specific and   can be seen in a variety of conditions including stress response,   acute inflammation, trauma and pregnancy. Correlation with other   laboratory and clinical findings is essential.             Immature Granulocytes   0.0           INR 5.8  Comment:  Coumadin Therapy:  2.0 - 3.0 for INR for all indicators except mechanical heart valves  and antiphospholipid syndromes which should use 2.5 - 3.5.  INR  critical result(s) called and verbal readback obtained from   HAIR CRANDALL RN by [facility] 09/18/2022 06:35               Lymph #   0.1           Lymph %   81.8           Magnesium   1.9           MCH   29.4           MCHC   31.6           MCV   93           Mono #   0.0           Mono %   9.1           MPV   12.1           nRBC   0           Ovalocytes   Occasional           Phosphorus   3.2           Platelet Estimate   Decreased           Platelets   30  Comment: platelets    critical result(s) called and verbal readback obtained   from Hair Crandall RN. by NR 09/18/2022 05:24             Poikilocytosis   Slight           Potassium   3.7           PROTEIN TOTAL   4.3           Protime 55.0             RBC   3.06           RDW   18.4           Sodium   132           WBC   0.11  Comment: WBCs and prelim platelets    critical result(s) called and verbal   readback obtained from Hair Crandall RN. by [facility] 09/18/2022 05:08                      [P] - Preliminary Result               Significant Imaging:  I have reviewed all pertinent imaging results/findings within the past 24 hours.      ABG  No results for input(s): PH, PO2, PCO2, HCO3, BE in the last 168 hours.  Assessment/Plan:     Psychiatric  Adjustment disorder with mixed anxiety and depressed mood  Adjustment disorder with mixed anxiety and depressed mood  May need to consult psychiatry   Talked to palliative and they stated that patient would not benefit from more pain medications given current emotional state    Plan:  - continue escitalopram 20mg daily  - versed 4x daily PRN for breakthrough anxiety    Pulmonary  * Acute hypoxemic respiratory failure  Admitted with acute hypoxic respiratory failure likely secondary to extensive small cell lung cancer  Has BL pleural effusions; had chest tube which was removed  on 9/14  Stepped up to ICU from oncology on 9/16 for increasing oxygen requirements  Bedside US BL lung did not show anything that could be tapped  CT chest showing RLL consolidation and stable BL pleural effusions; negative for PE  BCx NTD; will follow-up  Started on broad spectrum abx and steroids  Wean O2 as able; weaned from bipap to HFNC    Exudative pleural effusion  Chest tube placed 9/6, removed 9/14  Had worsening hypoxia on 9/16 but US BL did not show large pleural effusion that could be drained  CTA chest showing stable BL pleural effusions      Cardiac/Vascular  Atrial flutter  Patient had afib with RVR this admission was successfully cardioverted. IV amio load x72 hours completed and transitioned to PO.    Plan:  - amio  BID for 2 weeks followed by:           - amio 400 daily for 1 week           - amio 200 daily lifetime  - Warfarin started 9/13 (mechanical heart valve) with bridging heparin gtt; held warfarin from 9/16 in preparation of possible chest tube insertion  - heparin gtt stopped on 9/17 and further AC was held   - Daily PT/INR    H/O mitral valve replacement with mechanical valve  Hx endocarditis s/p MVR and AVR  Previously on warfarin  Was on heparin gtt bridging while INR subtherapeutic  Warfarin held in preparation of possible chest tube insertion; INR became therapeutic on 9/17 so held on further anticoagulation (was going to switch to full dose lovenox)  INR supratherapeutic on 9/18 but no obvious source of bleed and given mechanical valves will hold off on reversing warfarin      Pseudoaneurysm of right femoral artery  S/p embolization of right profunda femoral artery pseudoaneurysm with 5 mm coil 8/20    Oncology  Tumor lysis syndrome  Patient had concerns for tumor lysis syndrome given active chemo with SCLC, treated prophylactically  Uric acid/LDH downtrending, no need to repeat    RESOLVED    Small cell carcinoma of lung  [facility] pathology report confirms small cell lung cancer  from bone specimen. Situation explained to Pt and his   Started on inpatient palliative chemotherapy with intent to improve his tumor burden causing his hypoxic respiratory failure      Malignant neoplasm metastatic to bone  See small cell lung cancer       Endocrine  Body mass index (BMI) less than 19  in setting of malignancy and decreased appetite.    - nutrition consulted    Orthopedic  Swelling of upper extremity  Noted on previous hospitalization, possibly 2/2 to vascular compression and mass encasing mediastinal structures noted on CT  - non-occlusive thrombus in L cephalic vein  - also noted to have right upper extremity swelling, US doppler negative for DVT    Pathologic fracture of neck of right femur s/p hemiarthroplasty on 8/23/2022  S/p right hip hemiarthroplasty with ablation, cementoplasty, and percutaneous fixation of pelvis 8/23  Patient is weight bearing as tolerated with posterior hip precautions to right lower extremity as per orthopedics  When more stable consider postop radiation    Palliative Care  Palliative care encounter  Palliative care consulted for GOC discussion; following Pt     ACP (advance care planning)  Advance Care Planning   Previously engaged in advanced care planning during previous ICU stay  Code status has not changed and Pt is still DNR            Other  Pain  MMT  - MS Contin 30mg bid  - Dilaudid 1mg q4 prn  - Robaxin 500 q6h prn  - versed 1mg 4x daily prn    Debility  PT/OT eval, will need home health       Critical Care Daily Checklist:    A: Awake: RASS Goal/Actual Goal: RASS Goal: 0-->alert and calm  Actual: Garcia Agitation Sedation Scale (RASS): Alert and calm   B: Spontaneous Breathing Trial Performed?     C: SAT & SBT Coordinated?  N/A                      D: Delirium: CAM-ICU Overall CAM-ICU: Negative   E: Early Mobility Performed? No   F: Feeding Goal: Goals: Meet % EEN, EPN by RD f/u date  Status: Nutrition Goal Status: new   Current Diet Order    Procedures    Diet clear liquid      AS: Analgesia/Sedation diluadid prn, versed prn, MS contin   T: Thromboembolic Prophylaxis No, supra therapeutic INR   H: HOB > 300 Yes   U: Stress Ulcer Prophylaxis (if needed) pantoprazole   G: Glucose Control controlled   B: Bowel Function Stool Occurrence: 1   I: Indwelling Catheter (Lines & Rodgers) Necessity PIVC, PICC   D: De-escalation of Antimicrobials/Pharmacotherapies Broad spectrum abx    Plan for the day/ETD Monitor resp status, wean back to HFNC    Code Status:  Family/Goals of Care: DNR         Critical secondary to Patient has a condition that poses threat to life and bodily function: Severe Respiratory Distress      Critical care was time spent personally by me on the following activities: development of treatment plan with patient or surrogate and bedside caregivers, discussions with consultants, evaluation of patient's response to treatment, examination of patient, ordering and performing treatments and interventions, ordering and review of laboratory studies, ordering and review of radiographic studies, pulse oximetry, re-evaluation of patient's condition. This critical care time did not overlap with that of any other provider or involve time for any procedures.     Shena Nash MD  Critical Care Medicine  [facility] - Cardiac Medical ICU

## 2022-09-18 NOTE — ASSESSMENT & PLAN NOTE
Patient had afib with RVR this admission was successfully cardioverted. IV amio load x72 hours completed and transitioned to PO.    Plan:  - amio  BID for 2 weeks followed by:           - amio 400 daily for 1 week           - amio 200 daily lifetime  - Warfarin started 9/13 (mechanical heart valve) with bridging heparin gtt; held warfarin from 9/16 in preparation of possible chest tube insertion  - heparin gtt stopped on 9/17 and further AC was held   - Daily PT/INR

## 2022-09-19 NOTE — PROGRESS NOTES
Pharmacokinetic Assessment Follow Up: IV Vancomycin    Vancomycin serum concentration assessment(s):    The trough level was drawn correctly and can be used to guide therapy at this time. The measurement is within the desired definitive target range of 10 to 20 mcg/mL.  Patient creatinine increased 0.7 to 0.1 mL/min in 24 hours    Vancomycin Regimen Plan:    Discontinue the scheduled vancomycin regimen and re-dose when the random level is less than 20 mcg/mL, next level to be drawn at 1400 on 9/19.    Drug levels (last 3 results):  Recent Labs   Lab Result Units 09/19/22  0031   Vancomycin-Trough ug/mL 17.8       Pharmacy will continue to follow and monitor vancomycin.    Please contact pharmacy at extension [phone] for questions regarding this assessment.    Thank you for the consult,   Meenakshi Golden       Patient brief summary:  Donis Jimenez is a 61 y.o. male initiated on antimicrobial therapy with IV Vancomycin for treatment of lower respiratory infection    Drug Allergies:   Review of patient's allergies indicates:   Allergen Reactions    Ambien [zolpidem] Other (See Comments)     Disturbing dreams       Actual Body Weight:   57.2 kg    Renal Function:   Estimated Creatinine Clearance: 63.6 mL/min (based on SCr of 1 mg/dL).     Dialysis Method (if applicable):  N/A    CBC (last 72 hours):  Recent Labs   Lab Result Units 09/17/22 0444 09/18/22 0432 09/19/22  0314   WBC K/uL 0.17* 0.11* 0.14*   Hemoglobin g/dL 9.9* 9.0* 8.5*   Hematocrit % 30.6* 28.5* 26.9*   Platelets K/uL 47* 30* 20*   Gran % % 11.7* 9.1* 35.7*   Lymph % % 82.4* 81.8* 42.9   Mono % % 0.0* 9.1 21.4*   Eosinophil % % 5.9 0.0 0.0   Basophil % % 0.0 0.0 0.0   Differential Method  Automated Automated Automated       Metabolic Panel (last 72 hours):  Recent Labs   Lab Result Units 09/17/22 0444 09/18/22 0432 09/19/22  0314   Sodium mmol/L 134* 132* 136   Potassium mmol/L 3.7 3.7 3.7   Chloride mmol/L 105 103 106   CO2 mmol/L 22* 24 24   Glucose  mg/dL 78 152* 130*   BUN mg/dL 17 19 23*   Creatinine mg/dL 0.6 0.7 1.0   Albumin g/dL 1.9* 1.6* 1.6*   Total Bilirubin mg/dL 0.4 0.4 0.3   Alkaline Phosphatase U/L 111 101 91   AST U/L 14 10 9*   ALT U/L 11 12 13   Magnesium mg/dL 1.7 1.9 2.0   Phosphorus mg/dL 3.2 3.2 2.8       Vancomycin Administrations:  vancomycin given in the last 96 hours                     vancomycin 750 mg in dextrose 5 % 250 mL IVPB (ready to mix system) (mg) 750 mg New Bag 09/19/22 0158     750 mg New Bag 09/18/22 1315     750 mg New Bag  0144    vancomycin 1.25 g in dextrose 5% 250 mL IVPB (ready to mix) (mg) 1,250 mg New Bag 09/17/22 1351                    Microbiologic Results:  Microbiology Results (last 7 days)       Procedure Component Value Units Date/Time    Blood culture [[id number]] Collected: 09/17/22 1349    Order Status: Completed Specimen: Blood from Wrist, Left Updated: 09/18/22 1612     Blood Culture, Routine No Growth to date      No Growth to date    Blood culture [[id number]] Collected: 09/17/22 1350    Order Status: Completed Specimen: Blood from Peripheral, Forearm, Left Updated: 09/18/22 1612     Blood Culture, Routine No Growth to date      No Growth to date

## 2022-09-19 NOTE — PLAN OF CARE
Recommendations     Continue current Dysphagia soft diet, encourage PO intake as tolerated.  RD to monitor & follow-up.     Goals: Meet % EEN, EPN by RD f/u date  Nutrition Goal Status: progressing towards goal  Communication of RD Recs: reviewed with RN

## 2022-09-19 NOTE — NURSING
Pt is unable to come off BiPap at this time. Team made aware. Unable to receive scheduled AM meds.

## 2022-09-19 NOTE — PROGRESS NOTES
[facility]  Critical Care Medicine  Progress Note    Patient Name: Donis Jimenez  MRN: [id number]  Admission Date: 9/4/2022  Hospital Length of Stay: 15 days  Code Status: DNR  Attending Provider: Moses Xiao MD  Primary Care Provider: Elio Farias MD   Principal Problem: Acute hypoxemic respiratory failure    Subjective:     HPI:  61 year old male with bacterial endocarditis, s/p AV and MV mechanical valve replacement (on warfarin), CKD, tobacco abuse, recent diagnosis of metastatic SCLC (extensive diseases including mediastinal mass, mets to brain, bone) with recent hospital admission for pathologic right femoral neck fracture and right femoral artery pseudoaneurysm requiring hemiarthroplasty with ortho and embolization by vascular surgery presenting with acute hypoxic respiratory failure. He was initially admitted to ICU for bipap and was stepped down to oncology once oxygen requirements improved. Received inpatient chemotherapy with carboplatin and etoposide on 9/7.     Critical Care Medicine was re-consulted on 9/16 for worsening hypoxemic respiratory failure.    Hospital/ICU Course:  Initially admitted to [facility] 9/4 for worsening hypoxemic respiratory failure 2/2 PNA in setting of lung cancer with extensive metastatic disease. GOC discussed with Pt and  who agree that they would not want extreme measures such as CPR and mechanical ventilation given overall poor prognosis. Oxygen requirements improved and Pt stepped down to oncology. Received inpatient chemotherapy. Had BL pleural effusions; chest tube placed and later removed on 9/14. Stepped back up to ICU on 9/16 for increasing oxygen requirements - went from NC to non-breather. CTA chest negative for PE, showed ongoing RLL consolidation, and BL pleural effusions which are stable. Bedside US BL did not show significant pleural effusion that can be tapped. Pt was initially desatting when taken off of bipap but was  successfully weaned to HFNC saturating 88-92%. Started on broad spectrum abx and steroids for PNA. INR therapeutic on 9/17 so further anticoagulation held. Supratherapeutic from 9/18 without obvious source of bleed so will continue to hold AC. Held amiodarone as Pt has been in NSR and amiodarone combined with abx likely contributing to subtherapeutic INR. Had issues weaning from bipap on morning of 9/19; changed to IV solumedrol and Q4h duonebs. Repeat CXR without significant change.     Interval History/Significant Events: Remains on bipap this AM. Tried to wean 2x but desatted to low 80s. Pt would like to try to wean to HFNC. Will change steroids back to IV, increase duonebs, and repeat CXR.     Review of Systems   Unable to perform ROS: Other (On continuous bipap)   Constitutional:  Negative for fever.   Respiratory:  Positive for shortness of breath and wheezing.    Psychiatric/Behavioral:  The patient is nervous/anxious.    Objective:     Vital Signs (Most Recent):  Temp: 96.4 °F (35.8 °C) (09/19/22 1100)  Pulse: 72 (09/19/22 1614)  Resp: (!) 24 (09/19/22 1656)  BP: (!) 125/57 (09/19/22 1400)  SpO2: (!) 86 % (09/19/22 1614)   Vital Signs (24h Range):  Temp:  [96.2 °F (35.7 °C)-97.7 °F (36.5 °C)] 96.4 °F (35.8 °C)  Pulse:  [60-88] 72  Resp:  [17-40] 24  SpO2:  [86 %-100 %] 86 %  BP: ()/(51-66) 125/57   Weight: 57.2 kg (126 lb 1.7 oz)  Body mass index is 18.62 kg/m².      Intake/Output Summary (Last 24 hours) at 9/19/2022 1656  Last data filed at 9/19/2022 1300  Gross per 24 hour   Intake 1273.63 ml   Output 650 ml   Net 623.63 ml       Physical Exam  Vitals and nursing note reviewed.   Constitutional:       General: He is in acute distress.      Appearance: He is cachectic. He is ill-appearing.   HENT:      Head: Normocephalic and atraumatic.      Mouth/Throat:      Mouth: Mucous membranes are moist.   Eyes:      Extraocular Movements: Extraocular movements intact.      Pupils: Pupils are equal, round, and  reactive to light.   Cardiovascular:      Rate and Rhythm: Normal rate and regular rhythm.      Pulses: Normal pulses.      Heart sounds: Murmur (mechanical click) heard.   Pulmonary:      Breath sounds: Rhonchi and rales present.      Comments: On bipap, satting 93%  Abdominal:      General: Bowel sounds are normal. There is no distension.      Palpations: Abdomen is soft.      Tenderness: There is no abdominal tenderness.   Musculoskeletal:         General: Swelling (right upper extremity; appears to be dependent edema, a little improved from yesterday) present. No tenderness.      Right lower leg: No edema.      Left lower leg: No edema.   Skin:     General: Skin is warm.      Capillary Refill: Capillary refill takes less than 2 seconds.      Findings: No erythema or rash.   Neurological:      General: No focal deficit present.      Mental Status: He is alert and oriented to person, place, and time.   Psychiatric:         Mood and Affect: Mood normal.         Thought Content: Thought content normal.     Vents:  Oxygen Concentration (%): 65 (09/19/22 1614)  Lines/Drains/Airways       Peripherally Inserted Central Catheter Line  Duration             PICC Double Lumen 09/07/22 1734 right basilic 11 days              Peripheral Intravenous Line  Duration                  Peripheral IV - Single Lumen 09/11/22 1354 20 G Anterior;Left Upper Arm 8 days                  Significant Labs:    CBC/Anemia Profile:  Recent Labs   Lab 09/18/22  0432 09/19/22  0314   WBC 0.11* 0.14*   HGB 9.0* 8.5*   HCT 28.5* 26.9*   PLT 30* 20*   MCV 93 93   RDW 18.4* 18.4*          Chemistries:  Recent Labs   Lab 09/18/22  0432 09/19/22  0314   * 136   K 3.7 3.7    106   CO2 24 24   BUN 19 23*   CREATININE 0.7 1.0   CALCIUM 8.0* 7.9*   ALBUMIN 1.6* 1.6*   PROT 4.3* 4.2*   BILITOT 0.4 0.3   ALKPHOS 101 91   ALT 12 13   AST 10 9*   MG 1.9 2.0   PHOS 3.2 2.8         ABGs: No results for input(s): PH, PCO2, HCO3, POCSATURATED, BE in  the last 48 hours.  BMP:   Recent Labs   Lab 09/19/22  0314   *      K 3.7      CO2 24   BUN 23*   CREATININE 1.0   CALCIUM 7.9*   MG 2.0       Lactic Acid: No results for input(s): LACTATE in the last 48 hours.  Urine Culture: No results for input(s): LABURIN in the last 48 hours.  Recent Lab Results         09/19/22  0314   09/19/22  0031        Albumin 1.6         Alkaline Phosphatase 91         ALT 13         Anion Gap 6         Aniso Slight         AST 9         Baso # 0.00         Basophil % 0.0         BILIRUBIN TOTAL 0.3  Comment: For infants and newborns, interpretation of results should be based  on gestational age, weight and in agreement with clinical  observations.    Premature Infant recommended reference ranges:  Up to 24 hours.............<8.0 mg/dL  Up to 48 hours............<12.0 mg/dL  3-5 days..................<15.0 mg/dL  6-29 days.................<15.0 mg/dL           BUN 23         Gama/Echinocytes Occasional         Calcium 7.9         Chloride 106         CO2 24         Creatinine 1.0         Differential Method Automated         eGFR >60.0         Eos # 0.0         Eosinophil % 0.0         Glucose 130         Gran # (ANC) 0.1         Gran % 35.7         Hematocrit 26.9         Hemoglobin 8.5         Hypo Occasional         Immature Grans (Abs) 0.00  Comment: Mild elevation in immature granulocytes is non specific and   can be seen in a variety of conditions including stress response,   acute inflammation, trauma and pregnancy. Correlation with other   laboratory and clinical findings is essential.           Immature Granulocytes 0.0         INR 7.0  Comment: Coumadin Therapy:  2.0 - 3.0 for INR for all indicators except mechanical heart valves  and antiphospholipid syndromes which should use 2.5 - 3.5.  INR    critical result(s) called and verbal readback obtained from   Geetha Stapleton RN. by RAMILA 09/19/2022 03:56           Lymph # 0.1         Lymph % 42.9          Magnesium 2.0         MCH 29.5         MCHC 31.6         MCV 93         Mono # 0.0         Mono % 21.4         MPV SEE COMMENT  Comment: Result not available.         nRBC 0         Ovalocytes Occasional         Phosphorus 2.8         Platelet Estimate Decreased         Platelets 20  Comment: PLT   critical result(s) called and verbal readback obtained from   STEVEN JACOBSON RN by [facility] 09/19/2022 04:06           Poikilocytosis Slight         Poly Occasional         Potassium 3.7         PROTEIN TOTAL 4.2         Protime 66.2         RBC 2.88         RDW 18.4         Sodium 136         Spherocytes Occasional         Vancomycin-Trough   17.8       WBC 0.14  Comment: WBC  critical result(s) called and verbal readback obtained from   STEVEN JACOBSON RN by [facility] 09/19/2022 03:39                   Significant Imaging:  I have reviewed all pertinent imaging results/findings within the past 24 hours.      ABG  Recent Labs   Lab 09/16/22  0944   PH 7.357   PO2 69*   PCO2 39.9   HCO3 22.4*   BE -3     Assessment/Plan:     Psychiatric  Adjustment disorder with mixed anxiety and depressed mood  Adjustment disorder with mixed anxiety and depressed mood  May need to consult psychiatry   Talked to palliative and they stated that patient would not benefit from more pain medications given current emotional state    Plan:  - continue escitalopram 20mg daily  - versed 4x daily PRN for breakthrough anxiety    Pulmonary  * Acute hypoxemic respiratory failure  Admitted with acute hypoxic respiratory failure likely secondary to extensive small cell lung cancer  Has BL pleural effusions; had chest tube which was removed on 9/14  Stepped up to ICU from oncology on 9/16 for increasing oxygen requirements  Bedside US BL lung did not show anything that could be tapped  CT chest showing RLL consolidation and stable BL pleural effusions; negative for PE  BCx NTD; will follow-up  Started on broad spectrum abx and steroids  Wean O2 as able; wean from  bipap to HFNC    Exudative pleural effusion  Chest tube placed 9/6, removed 9/14  Had worsening hypoxia on 9/16 but US BL did not show large pleural effusion that could be drained  CTA chest showing stable BL pleural effusions      Cardiac/Vascular  Atrial flutter  Had afib with RVR this admission was successfully cardioverted. IV amio load x72 hours completed and transitioned to PO.  Has remained in NSR since  Concerns that the amiodarone combined with abx is leading to a supratherapeutic INR    Plan:  - hold amiodarone   - remain on telemetry   - warfarin started 9/13 (mechanical heart valve) with bridging heparin gtt; held warfarin from 9/16 in preparation of possible chest tube insertion  - heparin gtt stopped on 9/17 and further AC was held due to therapeutic INR  - INR supratherapeutic as of 9/18  - Daily PT/INR    H/O mitral valve replacement with mechanical valve  Hx endocarditis s/p MVR and AVR  Previously on warfarin  Was on heparin gtt bridging while INR subtherapeutic  Warfarin held in preparation of possible chest tube insertion; INR became therapeutic on 9/17 so held on further anticoagulation (was going to switch to full dose lovenox)  INR supratherapeutic from 9/18 but no obvious source of bleed and given mechanical valves will hold off on reversing warfarin      Pseudoaneurysm of right femoral artery  S/p embolization of right profunda femoral artery pseudoaneurysm with 5 mm coil 8/20    Oncology  Tumor lysis syndrome  Patient had concerns for tumor lysis syndrome given active chemo with SCLC, treated prophylactically  Uric acid/LDH downtrending, no need to repeat    RESOLVED    Small cell carcinoma of lung  OS pathology report confirms small cell lung cancer from bone specimen. Situation explained to Pt and his   Started on inpatient palliative chemotherapy with intent to improve his tumor burden causing his hypoxic respiratory failure      Malignant neoplasm metastatic to bone  See small  cell lung cancer       Endocrine  Body mass index (BMI) less than 19  in setting of malignancy and decreased appetite.    - nutrition consulted    Orthopedic  Swelling of upper extremity  Noted on previous hospitalization, possibly 2/2 to vascular compression and mass encasing mediastinal structures noted on CT  - non-occlusive thrombus in L cephalic vein  - also noted to have right upper extremity swelling, US doppler negative for DVT    Pathologic fracture of neck of right femur s/p hemiarthroplasty on 8/23/2022  S/p right hip hemiarthroplasty with ablation, cementoplasty, and percutaneous fixation of pelvis 8/23  Patient is weight bearing as tolerated with posterior hip precautions to right lower extremity as per orthopedics  When more stable consider postop radiation    Palliative Care  Palliative care encounter  Palliative care consulted for GOC discussion; following Pt     ACP (advance care planning)  Advance Care Planning   Previously engaged in advanced care planning during previous ICU stay  Code status has not changed and Pt is still DNR         Other  Pain  MMT  - MS Contin 30mg bid  - Dilaudid 1mg q4 prn  - Robaxin 500 q6h prn  - versed 1mg 4x daily prn    Debility  PT/OT evaluation  Sit out of bed often       Critical Care Daily Checklist:    A: Awake: RASS Goal/Actual Goal: RASS Goal: 0-->alert and calm  Actual: Garcia Agitation Sedation Scale (RASS): Alert and calm   B: Spontaneous Breathing Trial Performed?     C: SAT & SBT Coordinated?  N/A                      D: Delirium: CAM-ICU Overall CAM-ICU: Negative   E: Early Mobility Performed? Yes   F: Feeding Goal: Goals: Meet % EEN, EPN by RD f/u date  Status: Nutrition Goal Status: progressing towards goal   Current Diet Order   Procedures    Diet Dysphagia Mechanical Soft (IDDSI Level 5)      AS: Analgesia/Sedation Morphine, versed   T: Thromboembolic Prophylaxis No supratherapeutic INR   H: HOB > 300 Yes   U: Stress Ulcer Prophylaxis (if  needed) pantoprazole   G: Glucose Control controlled   B: Bowel Function Stool Occurrence: 1   I: Indwelling Catheter (Lines & Rodgers) Necessity PIVC, PICC   D: De-escalation of Antimicrobials/Pharmacotherapies Broad spectrum abx    Plan for the day/ETD Increase duonebs to q4h, change steroids to IV, repeat CXR, wean from bipap    Code Status:  Family/Goals of Care: DNR       Critical secondary to Patient has a condition that poses threat to life and bodily function: Severe Respiratory Distress      Critical care was time spent personally by me on the following activities: development of treatment plan with patient or surrogate and bedside caregivers, discussions with consultants, evaluation of patient's response to treatment, examination of patient, ordering and performing treatments and interventions, ordering and review of laboratory studies, ordering and review of radiographic studies, pulse oximetry, re-evaluation of patient's condition. This critical care time did not overlap with that of any other provider or involve time for any procedures.     Shena Nash MD  Critical Care Medicine  [facility] - Cardiac Medical ICU

## 2022-09-19 NOTE — RESPIRATORY THERAPY
Pt taken off BIPAP and placed on Airvo 60L 70%.  He became tachypnic and SOB saturation dropped to 80% and I placed him back on BIPAP

## 2022-09-19 NOTE — PROGRESS NOTES
[facility]  Hematology/Oncology  Progress Note    Patient Name: Donis Jimenez  Admission Date: 9/4/2022  Hospital Length of Stay: 15 days  Code Status: DNR     Subjective:     HPI:  61 year old man with newly diagnosed metastatic small cell lung cancer complicated by acute hypoxic respiratory failure.  Was previously treated for pathologic right femoral neck fracture.  Pathology finalized as small cell carcinoma. Over his stay in ICU, his O2 requirments were steadily reduced, he is now on nasal cannula. Patient went into afib with RVR and needed cardioversion. He is currently undergoing inpatient chemotherapy with carboplatin (day 1) and etoposide (day 1,2,3). Will need GCSF on day 4.     Patient now feeling anxious about new diagnosis. Psych onc saw patient, will consider consulting psych for new onset anxiety and panic attacks if patient does not improve.       Interval History: Patient remains on bipap at time of exam, was on high flow NC the previous day.     Oncology Treatment Plan:   OP SCLC CARBOPLATIN (AUC) ETOPOSIDE DURVALUMAB Q3W FOLLOWED BY MAINTENANCE DURVALUMAB 1500 MG Q4W    Medications:  Continuous Infusions:  Scheduled Meds:   albuterol-ipratropium  3 mL Nebulization Q8H    ceFEPime (MAXIPIME) IVPB  1 g Intravenous Q8H    EScitalopram oxalate  20 mg Oral QHS    morphine  30 mg Oral Q12H    mupirocin   Nasal BID    pantoprazole  40 mg Intravenous Daily    polyethylene glycol  17 g Oral BID    predniSONE  60 mg Oral Daily    pregabalin  75 mg Oral BID    senna-docusate 8.6-50 mg  1 tablet Oral BID    sodium chloride 0.9%  10 mL Intravenous Q6H    sodium chloride 3%  4 mL Nebulization Q8H    [START ON 9/20/2022] vancomycin (VANCOCIN) IVPB  750 mg Intravenous Q24H     PRN Meds:acetaminophen, alteplase, diphenhydrAMINE, EPINEPHrine, heparin, porcine (PF), HYDROmorphone, melatonin, methocarbamoL, midazolam, ondansetron, Flushing PICC Protocol **AND** sodium chloride 0.9%  **AND** sodium chloride 0.9%, sumatriptan       Objective:     Vital Signs (Most Recent):  Temp: 96.4 °F (35.8 °C) (09/19/22 1100)  Pulse: 82 (09/19/22 1157)  Resp: (!) 31 (09/19/22 1157)  BP: (!) 111/57 (09/19/22 1100)  SpO2: (!) 89 % (09/19/22 1157)   Vital Signs (24h Range):  Temp:  [96.2 °F (35.7 °C)-97.7 °F (36.5 °C)] 96.4 °F (35.8 °C)  Pulse:  [60-88] 82  Resp:  [16-36] 31  SpO2:  [86 %-100 %] 89 %  BP: ()/(51-66) 111/57     Weight: 57.2 kg (126 lb)  Body mass index is 18.61 kg/m².  Body surface area is 1.67 meters squared.      Intake/Output Summary (Last 24 hours) at 9/19/2022 1243  Last data filed at 9/19/2022 0900  Gross per 24 hour   Intake 1027.76 ml   Output 300 ml   Net 727.76 ml       Physical Exam  Constitutional:       General: He is not in acute distress.     Appearance: He is well-developed. He is ill-appearing. He is not toxic-appearing.   HENT:      Head: Normocephalic and atraumatic.      Right Ear: External ear normal.      Left Ear: External ear normal.      Nose: Nose normal.      Mouth/Throat:      Comments: On bipap  Eyes:      General: No scleral icterus.     Extraocular Movements: Extraocular movements intact.      Conjunctiva/sclera: Conjunctivae normal.   Cardiovascular:      Rate and Rhythm: Normal rate and regular rhythm.      Heart sounds: Murmur heard.   Pulmonary:      Effort: Pulmonary effort is normal.      Breath sounds: Rhonchi and rales present.   Abdominal:      General: Bowel sounds are normal.      Palpations: Abdomen is soft.   Musculoskeletal:         General: Normal range of motion.      Cervical back: Normal range of motion and neck supple.   Neurological:      Mental Status: He is alert and oriented to person, place, and time.   Psychiatric:         Behavior: Behavior normal.       Significant Labs:   All pertinent labs from the last 24 hours have been reviewed.    Diagnostic Results:  I have reviewed all pertinent imaging results/findings within the past 24  hours.    Assessment/Plan:     Small cell carcinoma of lung  Acute hypoxemic respiratory failure  OS pathology report confirms small cell lung cancer from bone specimen.  Explained to him and his  that he is in an unfortunate situation.  He will most certainly die of this cancer rather quickly if not given chemotherapy.  While our intent is to improve his tumor burden causing his hypoxic respiratory failure with chemotherapy, it is also possible that we may potentially hasten his death unintentionally due to chemo toxicity. He and his  understand the risks. Patient was on day 2 of inpatient chemotherapy with carboplatin etoposide when his respiratory status became worse and he had to be stepped up to ICU.     - Discussed with primary team, attempting to wean oxygen today  - CT chest reviewed, tumour burden appears reduced, however patient still has bilateral plural effusion, increased consolidation and emphysematous change.  - Primary  Team to attempt diuresis today         Frandy Ward MD  Hematology/Oncology  [facility] - Cardiac Medical [facility]

## 2022-09-19 NOTE — ASSESSMENT & PLAN NOTE
[facility] pathology report confirms small cell lung cancer from bone specimen. Situation explained to Pt and his   Started on inpatient palliative chemotherapy with intent to improve his tumor burden causing his hypoxic respiratory failure

## 2022-09-19 NOTE — ASSESSMENT & PLAN NOTE
Hx endocarditis s/p MVR and AVR  Previously on warfarin  Was on heparin gtt bridging while INR subtherapeutic  Warfarin held in preparation of possible chest tube insertion; INR became therapeutic on 9/17 so held on further anticoagulation (was going to switch to full dose lovenox)  INR supratherapeutic from 9/18 but no obvious source of bleed and given mechanical valves will hold off on reversing warfarin

## 2022-09-19 NOTE — ASSESSMENT & PLAN NOTE
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 Pt has metastatic lung cancer. Pt is alert, oriented to person, place, and situation. Pt is a DNR. Pt is on Bi-PAP     Reason for consult: GOC/ACP. Communicated with MAJOR Camp fellow with [facility].     Today: Met with pt and pt's , Aston who is at bedside. Pt sheri Bi-PAP this morning. No c/o of agitation, pain. Bedside nurse had given pt Dilaudid IVP.     9/16/22  Met with pt who stated his goal at this time is to be comfortable and receive treatment. Goal is to be back home if possible and have more quality and quantity of life.     9/9/22: Pt very tearful today. Pt says "I know I am dying. " Pt kept apologizing.  Explained to pt there is not a need to apoligize and that that I am glad he felt comfortable to express his feelings. Pt repots he is having a bad day and everything starting to sink in about his health. Support given.  seeing. Will consult Oncology/Psycology for pt. Pt to step down to Hem/onc as soon as bed available.     9/6/22  Goals of care/ACP: Pt to have tracheal stenting tomorrow. Pt reports he is in agreement to procedure and would like CC team to speak to Aston about procedure when he visits today. Per pt, he relays information to Aston but he feels like he forgets some of information to tell him. Pt reports his goal is medical management at this time to see if he can improve enough to get back home. Pt reports his goal is to be at home with his four dogs and , Aston. Pt is aware of his severity of his illness.     Today: Met with pt along with Tracie Kidd LCSW.     9/6/22  Introduced role of Palliative care to pt.   Met with pt who is aware of his medical issues. Per pt " "he is aware he has metastatic cancer. Pt is aware that any therapies/procedures offered to him is palliative and will not cure cancer. Per pt, he is still trying to come to  with his dx. He learned about his issues in August. Pt reports goal at time is to see if anything can be done to help with symptoms and "give him some more time." Spoke to pt about amount of O2 he is on at this time. He verbalized understanding.  Per pt he has spoken to Oncology and XRT MDs.   CTS has been consulted concerning tracheal stenting.      Pt open to continued visits with [facility] care for care planning and symptom management needs.  Support given pt.      Pt reports he is legally  to Aston Collins and he would want him to be hi medical decision-maker if he cannot make his own medical decisions. MPOA paperwork left with pt. Education provided. Per Pt, Aston works during day but can be reached by phone.      Code status: DNR.      Symptom management:       Pain: Pt has reported he has pain to back chest area that can get up to 9/10.  Pt reported adding long-acting has improved pain.   At this time, pt reports he is comfortable     Pt is on MSContin 30 mg bid.   Pt is currently on Dilaudid 1 mg IVP q 4 hrs prn pain.     Recs:   Continue with Dilaudid IV.   Will need to convert to PO pain meds when pt gets closer to going home. (Pt is aware of this)     Dyspnea r/t to mediastinal mass, pleural effusion.   Pt is on Bi-PAP  Pt receiving breathing treatment.   Pt is on Dilaudid 1 mg IVP q 4 hrs prn.       Debility r/t to pathological femoral neck fracture s/p ight hip hemiarthroplasty with ablation, cementoplasty  Pt was using walker prior to admit.   Would resume PT/OT when pt stable to participate.     Pt still has stitches to hip and ABD area.      Anxiety r/t to new dx and dyspnea.   Pt has Ativan 1 mg tid prn.         Plan:   Will continue to meet with pt to reinforce realistic expectations/assit with GOC.   See above symptom " recs.  Support given.   Oncology/ Psychology seeing  Will follow.

## 2022-09-19 NOTE — SUBJECTIVE & OBJECTIVE
Interval History: Patient remains on bipap at time of exam, was on high flow NC the previous day.     Oncology Treatment Plan:   OP SCLC CARBOPLATIN (AUC) ETOPOSIDE DURVALUMAB Q3W FOLLOWED BY MAINTENANCE DURVALUMAB 1500 MG Q4W    Medications:  Continuous Infusions:  Scheduled Meds:   albuterol-ipratropium  3 mL Nebulization Q8H    ceFEPime (MAXIPIME) IVPB  1 g Intravenous Q8H    EScitalopram oxalate  20 mg Oral QHS    morphine  30 mg Oral Q12H    mupirocin   Nasal BID    pantoprazole  40 mg Intravenous Daily    polyethylene glycol  17 g Oral BID    predniSONE  60 mg Oral Daily    pregabalin  75 mg Oral BID    senna-docusate 8.6-50 mg  1 tablet Oral BID    sodium chloride 0.9%  10 mL Intravenous Q6H    sodium chloride 3%  4 mL Nebulization Q8H    [START ON 9/20/2022] vancomycin (VANCOCIN) IVPB  750 mg Intravenous Q24H     PRN Meds:acetaminophen, alteplase, diphenhydrAMINE, EPINEPHrine, heparin, porcine (PF), HYDROmorphone, melatonin, methocarbamoL, midazolam, ondansetron, Flushing PICC Protocol **AND** sodium chloride 0.9% **AND** sodium chloride 0.9%, sumatriptan       Objective:     Vital Signs (Most Recent):  Temp: 96.4 °F (35.8 °C) (09/19/22 1100)  Pulse: 82 (09/19/22 1157)  Resp: (!) 31 (09/19/22 1157)  BP: (!) 111/57 (09/19/22 1100)  SpO2: (!) 89 % (09/19/22 1157)   Vital Signs (24h Range):  Temp:  [96.2 °F (35.7 °C)-97.7 °F (36.5 °C)] 96.4 °F (35.8 °C)  Pulse:  [60-88] 82  Resp:  [16-36] 31  SpO2:  [86 %-100 %] 89 %  BP: ()/(51-66) 111/57     Weight: 57.2 kg (126 lb)  Body mass index is 18.61 kg/m².  Body surface area is 1.67 meters squared.      Intake/Output Summary (Last 24 hours) at 9/19/2022 1243  Last data filed at 9/19/2022 0900  Gross per 24 hour   Intake 1027.76 ml   Output 300 ml   Net 727.76 ml       Physical Exam  Constitutional:       General: He is not in acute distress.     Appearance: He is well-developed. He is ill-appearing. He is not toxic-appearing.   HENT:      Head: Normocephalic  and atraumatic.      Right Ear: External ear normal.      Left Ear: External ear normal.      Nose: Nose normal.      Mouth/Throat:      Comments: On bipap  Eyes:      General: No scleral icterus.     Extraocular Movements: Extraocular movements intact.      Conjunctiva/sclera: Conjunctivae normal.   Cardiovascular:      Rate and Rhythm: Normal rate and regular rhythm.      Heart sounds: Murmur heard.   Pulmonary:      Effort: Pulmonary effort is normal.      Breath sounds: Rhonchi and rales present.   Abdominal:      General: Bowel sounds are normal.      Palpations: Abdomen is soft.   Musculoskeletal:         General: Normal range of motion.      Cervical back: Normal range of motion and neck supple.   Neurological:      Mental Status: He is alert and oriented to person, place, and time.   Psychiatric:         Behavior: Behavior normal.       Significant Labs:   All pertinent labs from the last 24 hours have been reviewed.    Diagnostic Results:  I have reviewed all pertinent imaging results/findings within the past 24 hours.   Discharged

## 2022-09-19 NOTE — ASSESSMENT & PLAN NOTE
Had afib with RVR this admission was successfully cardioverted. IV amio load x72 hours completed and transitioned to PO.  Has remained in NSR since  Concerns that the amiodarone combined with abx is leading to a supratherapeutic INR    Plan:  - hold amiodarone   - remain on telemetry   - warfarin started 9/13 (mechanical heart valve) with bridging heparin gtt; held warfarin from 9/16 in preparation of possible chest tube insertion  - heparin gtt stopped on 9/17 and further AC was held due to therapeutic INR  - INR supratherapeutic as of 9/18  - Daily PT/INR

## 2022-09-19 NOTE — PROGRESS NOTES
"[facility] - Cardiac Medical ICU  Adult Nutrition  Consult Note    SUMMARY     Recommendations    Continue current Dysphagia soft diet, encourage PO intake as tolerated.  RD to monitor & follow-up.    Goals: Meet % EEN, EPN by RD f/u date  Nutrition Goal Status: progressing towards goal  Communication of RD Recs: reviewed with RN    Assessment and Plan    Nutrition Problem:  Increased nutrient needs     Related to (etiology):   Physiological causes      Signs and Symptoms (as evidenced by):   Stage 4 lung ca     Interventions(treatment strategy):  Collaboration of nutrition care w/ other providers     Nutrition Diagnosis Status:   Continues    Reason for Assessment    Reason For Assessment: RD follow-up  Diagnosis: other (see comments) (Sepsis, Lung ca)  Relevant Medical History: CKD, Tobacco abuse  Interdisciplinary Rounds: attended    General Information Comments: Pt on BiPAP this AM during assessment. Per RN documentation - pt tolerating diet w/ varying PO intake (%). Noted palliative care is following. At initial RD assessment - PTA; pt reports fair appetite & UBW of 125#. Pt appears to have severe fat & muscle wasting, however not enough information to diagnosis malnutrition. Will monitor. Pt doesn't want ONS.  Nutrition Discharge Planning: Adequate PO intake    Nutrition/Diet History    Spiritual, Cultural Beliefs, Hinduism Practices, Values that Affect Care: no  Factors Affecting Nutritional Intake: decreased appetite    Anthropometrics    Temp: 96.4 °F (35.8 °C)  Height: 5' 9" (175.3 cm)  Height (inches): 69 in  Weight Method: Bed Scale  Weight: 57.2 kg (126 lb 1.7 oz)  Weight (lb): 126.1 lb  Ideal Body Weight (IBW), Male: 160 lb  % Ideal Body Weight, Male (lb): 78.81 %  BMI (Calculated): 18.6  BMI Grade: 18.5-24.9 - normal    Lab/Procedures/Meds    Pertinent Labs Reviewed: reviewed  Pertinent Labs Comments: -  Pertinent Medications Reviewed: reviewed  Pertinent Medications Comments: " -    Estimated/Assessed Needs    Weight Used For Calorie Calculations: 57.2 kg (126 lb 1.7 oz)    Energy Calorie Requirements (kcal): 8305-7738 kcal/d  Energy Need Method: Kcal/kg (30-35 kcal/kg)    Protein Requirements: 75-86 g/d (1.3-1.5 g/kg)  Weight Used For Protein Calculations: 57.2 kg (126 lb 1.7 oz)    Estimated Fluid Requirement Method: other (see comments) (Per MD or 1 mL/kcal)  RDA Method (mL): 1716    Nutrition Prescription Ordered    Current Diet Order: Dysphagia soft    Evaluation of Received Nutrient/Fluid Intake    I/O: -2.9L since 9/5    Comments: LBM: 9/16    Tolerance: tolerating    Nutrition Risk    Level of Risk/Frequency of Follow-up:  (1x/week)     Monitor and Evaluation    Food and Nutrient Intake: energy intake, food and beverage intake  Food and Nutrient Adminstration: diet order  Physical Activity and Function: nutrition-related ADLs and IADLs  Anthropometric Measurements: weight, weight change  Biochemical Data, Medical Tests and Procedures: inflammatory profile, lipid profile, glucose/endocrine profile, gastrointestinal profile, electrolyte and renal panel  Nutrition-Focused Physical Findings: overall appearance     Nutrition Follow-Up    RD Follow-up?: Yes

## 2022-09-19 NOTE — SUBJECTIVE & OBJECTIVE
Interval History: Pt DNR. Stepped up to ICU for respiratory failure.     Past Medical History:   Diagnosis Date    Endocarditis     Pacemaker     Pneumonia        Past Surgical History:   Procedure Laterality Date    ANGIOGRAPHY OF LOWER EXTREMITY Right 8/19/2022    Procedure: ANGIOGRAM, LOWER EXTREMITY;  Surgeon: Thomas Nicolas MD;  Location: [facility] OR [address] [place] [address];  Service: Peripheral Vascular;  Laterality: Right;  30.0 min  315.10 mGy  26.1755 Gycm2  84 ml dye    HIP RESURFACING Right 8/23/2022    Procedure: cemontoplasty;  Surgeon: Yung Flores MD;  [facility];  Service: Orthopedics;  Laterality: Right;    RADIOFREQUENCY ABLATION, BONE, PERCUTANEOUS Right 8/23/2022    Procedure: RADIOFREQUENCY ABLATION,BONE;  Surgeon: Yung Flores MD;  [address]  Service: Orthopedics;  Laterality: Right;    REPAIR, PSEUDOANEURYSM, ARTERY, FEMORAL Right 8/19/2022    Procedure: REPAIR, PSEUDOANEURYSM, ARTERY, FEMORAL;  Surgeon: Thomas Nicolas MD;  [address]  Service: Peripheral Vascular;  Laterality: Right;  R PFA (3rd branch) pseudoaneurysm     TREATMENT OF CARDIAC ARRHYTHMIA N/A 9/8/2022    Procedure: Cardioversion or Defibrillation;  Surgeon: Lan Mccollum MD;  Location: [facility] EP LAB;  Service: Cardiology;  Laterality: N/A;  AFL, DCCV, ANES, SK, RM [id number]       Review of patient's allergies indicates:   Allergen Reactions    Ambien [zolpidem] Other (See Comments)     Disturbing dreams       Medications:  Continuous Infusions:    Scheduled Meds:   albuterol-ipratropium  3 mL Nebulization Q8H    amiodarone  400 mg Oral BID    Followed by    [START ON 9/26/2022] amiodarone  400 mg Oral Daily    Followed by    [START ON 10/3/2022] amiodarone  200 mg Oral Daily    EScitalopram oxalate  20 mg Oral QHS    LIDOcaine HCL 10 mg/ml (1%)  10 mL Intradermal Once    morphine  30 mg Oral Q12H    mupirocin   Nasal BID    pantoprazole  40 mg Intravenous Daily     piperacillin-tazobactam (ZOSYN) IVPB  4.5 g Intravenous Q8H    polyethylene glycol  17 g Oral BID    predniSONE  60 mg Oral Daily    pregabalin  75 mg Oral BID    senna-docusate 8.6-50 mg  1 tablet Oral BID    sodium chloride 0.9%  10 mL Intravenous Q6H    sodium chloride 3%  4 mL Nebulization Q8H    [START ON 9/20/2022] vancomycin (VANCOCIN) IVPB  750 mg Intravenous Q24H     PRN Meds:acetaminophen, alteplase, diphenhydrAMINE, EPINEPHrine, heparin, porcine (PF), HYDROmorphone, melatonin, methocarbamoL, midazolam, ondansetron, Flushing PICC Protocol **AND** sodium chloride 0.9% **AND** sodium chloride 0.9%, sumatriptan    Family History    None       Tobacco Use    Smoking status: Every Day     Packs/day: 1.00     Types: Cigarettes    Smokeless tobacco: Never   Substance and Sexual Activity    Alcohol use: Not on file    Drug use: Not on file    Sexual activity: Not on file       Review of Systems   Constitutional:  Positive for activity change, fatigue and unexpected weight change.   Respiratory:  Positive for shortness of breath.    Cardiovascular:  Negative for leg swelling.   Gastrointestinal:  Negative for nausea and vomiting.   Musculoskeletal:  Positive for gait problem.   Skin: Negative.    Neurological:  Positive for weakness.   Psychiatric/Behavioral: Negative.     Objective:     Vital Signs (Most Recent):  Temp: 96.2 °F (35.7 °C) (09/19/22 0706)  Pulse: 64 (09/19/22 0900)  Resp: (!) 25 (09/19/22 0931)  BP: (!) 113/57 (09/19/22 0900)  SpO2: 98 % (09/19/22 0900)   Vital Signs (24h Range):  Temp:  [96.2 °F (35.7 °C)-97.7 °F (36.5 °C)] 96.2 °F (35.7 °C)  Pulse:  [60-88] 64  Resp:  [10-36] 25  SpO2:  [85 %-100 %] 98 %  BP: ()/(52-66) 113/57     Weight: 57.2 kg (126 lb)  Body mass index is 18.61 kg/m².    Physical Exam  Constitutional:       Comments: Pt    HENT:      Head: Normocephalic and atraumatic.   Eyes:      General: Lids are normal.      Conjunctiva/sclera: Conjunctivae normal.    Cardiovascular:      Rate and Rhythm: Tachycardia present.   Pulmonary:      Effort: Tachypnea present. No accessory muscle usage or respiratory distress.      Comments: Pt on Bi-Pap  Abdominal:      General: Abdomen is flat.   Musculoskeletal:      Cervical back: Full passive range of motion without pain and normal range of motion.      Comments: Edema to left arm.    Skin:     General: Skin is warm and dry.   Neurological:      Mental Status: He is alert and oriented to person, place, and time.   Psychiatric:         Attention and Perception: Attention normal.         Speech: Speech normal.       Review of Symptoms      Symptom Assessment (ESAS 0-10 Scale)  Pain:  0  Dyspnea:  0  Anxiety:  0  Nausea:  0  Depression:  0  Anorexia:  0  Fatigue:  0  Insomnia:  0  Restlessness:  0  Agitation:  0         ECOG Performance Status ndGndrndanddndend:nd nd2nd Living Arrangements:  Lives with family    Psychosocial/Cultural: Pt legally  to Aston Collins. Per pt Aston works during the day. Per pt, Aston is his care-giver.       Advance Care Planning   Advance Directives:   Living Will: No    Do Not Resuscitate Status: Yes    Medical Power of : No    Agent's Name:  Aston Collins    Decision Making:  Patient answered questions       Significant Labs: All pertinent labs within the past 24 hours have been reviewed.  CBC:   Recent Labs   Lab 09/19/22 0314   WBC 0.14*   HGB 8.5*   HCT 26.9*   MCV 93   PLT 20*       BMP:  Recent Labs   Lab 09/19/22 0314   *      K 3.7      CO2 24   BUN 23*   CREATININE 1.0   CALCIUM 7.9*   MG 2.0       LFT:  Lab Results   Component Value Date    AST 9 (L) 09/19/2022    ALKPHOS 91 09/19/2022    BILITOT 0.3 09/19/2022     Albumin:   Albumin   Date Value Ref Range Status   09/19/2022 1.6 (L) 3.5 - 5.2 g/dL Final     Protein:   Total Protein   Date Value Ref Range Status   09/19/2022 4.2 (L) 6.0 - 8.4 g/dL Final     Lactic acid:   Lab Results   Component Value Date    LACTATE 1.7  09/05/2022    LACTATE 2.4 (H) 09/04/2022       Significant Imaging: I have reviewed all pertinent imaging results/findings within the past 24 hours.

## 2022-09-19 NOTE — PROGRESS NOTES
"[facility] - [facility]  Palliative Medicine  Progress Note    Patient Name: Donis Jimenez  MRN: [id number]  Admission Date: 9/4/2022  Hospital Length of Stay: 15 days  Code Status: DNR   Attending Provider: Moses Xiao MD  Consulting Provider: LOREE Dean  Primary Care Physician: Elio Farias MD  Principal Problem:Acute hypoxemic respiratory failure    Patient information was obtained from patient and primary team.      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 Pt has metastatic lung cancer. Pt is alert, oriented to person, place, and situation. Pt is a DNR. Pt is on Bi-PAP     Reason for consult: GOC/ACP. Communicated with MAJOR Camp fellow with [facility].     Today: Met with pt and pt's , Aston who is at bedside. Pt sheri Bi-PAP this morning. No c/o of agitation, pain. Bedside nurse had given pt Dilaudid IVP.     9/16/22  Met with pt who stated his goal at this time is to be comfortable and receive treatment. Goal is to be back home if possible and have more quality and quantity of life.     9/9/22: Pt very tearful today. Pt says "I know I am dying. " Pt kept apologizing.  Explained to pt there is not a need to apoligize and that that I am glad he felt comfortable to express his feelings. Pt repots he is having a bad day and everything starting to sink in about his health. Support given.  seeing. Will consult Oncology/Psycology for pt. Pt to step down to Hem/onc as soon as bed available.     9/6/22  Goals of care/ACP: Pt to have tracheal stenting tomorrow. Pt reports he is in agreement to procedure and would like CC team to speak to Aston about procedure when he " "visits today. Per pt, he relays information to Aston but he feels like he forgets some of information to tell him. Pt reports his goal is medical management at this time to see if he can improve enough to get back home. Pt reports his goal is to be at home with his four dogs and , Aston. Pt is aware of his severity of his illness.     Today: Met with pt along with Tracie Kidd LCSW.     9/6/22  Introduced role of Palliative care to pt.   Met with pt who is aware of his medical issues. Per pt he is aware he has metastatic cancer. Pt is aware that any therapies/procedures offered to him is palliative and will not cure cancer. Per pt, he is still trying to come to  with his dx. He learned about his issues in August. Pt reports goal at time is to see if anything can be done to help with symptoms and "give him some more time." Spoke to pt about amount of O2 he is on at this time. He verbalized understanding.  Per pt he has spoken to Oncology and XRT MDs.   CTS has been consulted concerning tracheal stenting.      Pt open to continued visits with [facility] care for care planning and symptom management needs.  Support given pt.      Pt reports he is legally  to Aston Collins and he would want him to be hi medical decision-maker if he cannot make his own medical decisions. MPOA paperwork left with pt. Education provided. Per Pt, Aston works during day but can be reached by phone.      Code status: DNR.      Symptom management:       Pain: Pt has reported he has pain to back chest area that can get up to 9/10.  Pt reported adding long-acting has improved pain.   At this time, pt reports he is comfortable     Pt is on MSContin 30 mg bid.   Pt is currently on Dilaudid 1 mg IVP q 4 hrs prn pain.     Recs:   Continue with Dilaudid IV.   Will need to convert to PO pain meds when pt gets closer to going home. (Pt is aware of this)     Dyspnea r/t to mediastinal mass, pleural effusion.   Pt is on Bi-PAP  Pt receiving " breathing treatment.   Pt is on Dilaudid 1 mg IVP q 4 hrs prn.       Debility r/t to pathological femoral neck fracture s/p ight hip hemiarthroplasty with ablation, cementoplasty  Pt was using walker prior to admit.   Would resume PT/OT when pt stable to participate.     Pt still has stitches to hip and ABD area.      Anxiety r/t to new dx and dyspnea.   Pt has Ativan 1 mg tid prn.         Plan:   Will continue to meet with pt to reinforce realistic expectations/assit with GOC.   See above symptom recs.  Support given.   Oncology/ Psychology seeing  Will follow.               I will follow-up with patient. Please contact us if you have any additional questions.    Subjective:     Chief Complaint:   Chief Complaint   Patient presents with    Shortness of Breath     Pt brought to ED from home via [facility]. Per family pt SOB and productive cough. Pt had double valve replacement, pacemaker and hip surgery 1 week ago.        HPI:   Pt is a 61-year-old male with PMH significant for bacterial endocarditis, s/p AV and MV mechanical valve replacement (on warfarin), CKD, tobacco abuse with recent complicated hospital course who presented to the emergency department with shortness of breath.  Difficult for patient to provide history due to tachypnea; spouse at bedside assisting with history.      Per chart review, patient was admitted 8/18-8/27/22 after sustaining a pathologic right femoral neck fracture and right femoral artery pseudoaneurysm from a fall at home. Patient taken to OR on 8/19 by Vascular Surgery and had embolization of 3rd order right profunda femoral artery pseudoaneurysm with N-BCA glue and 5mm coil aneurysm repair. Pt underwent right hip hemiarthroplasty with ablation, cementoplasty, and percutaneous fixation of pelvis for pathologic fracture and metastatic disease with Ortho on 8/23. 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 Metastatic work-up done  with CT scan of chest/abdomen and pelvis showing extensive disease including mediastinal mass with encasement of mediastinal vascular structures and airways, MARTHA pulmonary mass, bilateral suprarenal and left renal masses, L1 pathologic fx and rib & skull metastatic disease.      Per chart review, pt's spouse stated that pt had been ambulatory with a walker post-discharge, without c/o SOB, lightheadedness or dizziness. SOB started ~ 9/2 with productive cough. He denies fever/chills, chest pain, abd pain, N/V/D.      In the ED patient was in a flutter and was cardioverted with some improvement in symptoms. He was found to have right sided pneumonia and was started on vanc and cefepime and admitted to Hospital Medicine for further management.      During his time in the ED, the patient had escalating FiO2 requirements, now on 45L 100% comfort flow.      Critical Care Medicine was consulted for worsening hypoxemic respiratory failure    Pt is DNR. Pt on 40 L  @ 60 %      Hospital Course:  No notes on file    Interval History: Pt DNR. Stepped up to ICU for respiratory failure.     Past Medical History:   Diagnosis Date    Endocarditis     Pacemaker     Pneumonia        Past Surgical History:   Procedure Laterality Date    ANGIOGRAPHY OF LOWER EXTREMITY Right 8/19/2022    Procedure: ANGIOGRAM, LOWER EXTREMITY;  Surgeon: Thomas Nicolas MD;  Location: [facility] OR [address] [place] [address];  Service: Peripheral Vascular;  Laterality: Right;  30.0 min  315.10 mGy  26.1755 Gycm2  84 ml dye    HIP RESURFACING Right 8/23/2022    Procedure: cemontoplasty;  Surgeon: Yung Flores MD;  Location: [facility] OR [address] [place] [address];  Service: Orthopedics;  Laterality: Right;    RADIOFREQUENCY ABLATION, BONE, PERCUTANEOUS Right 8/23/2022    Procedure: RADIOFREQUENCY ABLATION,BONE;   Surgeon: Yung Flores MD;  [facility];  Service: Orthopedics;  Laterality: Right;    REPAIR, PSEUDOANEURYSM, ARTERY, FEMORAL Right 8/19/2022    Procedure: REPAIR, PSEUDOANEURYSM, ARTERY, FEMORAL;  Surgeon: Thomas Nicolas MD;  Location: [facility] OR 2ND FLR;  Service: Peripheral Vascular;  Laterality: Right;  R PFA (3rd branch) pseudoaneurysm     TREATMENT OF CARDIAC ARRHYTHMIA N/A 9/8/2022    Procedure: Cardioversion or Defibrillation;  Surgeon: Lan Mccollum MD;  Location: [facility] EP LAB;  Service: Cardiology;  Laterality: N/A;  AFL, DCCV, ANES, SK, RM [id number]       Review of patient's allergies indicates:   Allergen Reactions    Ambien [zolpidem] Other (See Comments)     Disturbing dreams       Medications:  Continuous Infusions:    Scheduled Meds:   albuterol-ipratropium  3 mL Nebulization Q8H    amiodarone  400 mg Oral BID    Followed by    [START ON 9/26/2022] amiodarone  400 mg Oral Daily    Followed by    [START ON 10/3/2022] amiodarone  200 mg Oral Daily    EScitalopram oxalate  20 mg Oral QHS    LIDOcaine HCL 10 mg/ml (1%)  10 mL Intradermal Once    morphine  30 mg Oral Q12H    mupirocin   Nasal BID    pantoprazole  40 mg Intravenous Daily    piperacillin-tazobactam (ZOSYN) IVPB  4.5 g Intravenous Q8H    polyethylene glycol  17 g Oral BID    predniSONE  60 mg Oral Daily    pregabalin  75 mg Oral BID    senna-docusate 8.6-50 mg  1 tablet Oral BID    sodium chloride 0.9%  10 mL Intravenous Q6H    sodium chloride 3%  4 mL Nebulization Q8H    [START ON 9/20/2022] vancomycin (VANCOCIN) IVPB  750 mg Intravenous Q24H     PRN Meds:acetaminophen, alteplase, diphenhydrAMINE, EPINEPHrine, heparin, porcine (PF), HYDROmorphone, melatonin, methocarbamoL, midazolam, ondansetron, Flushing PICC Protocol **AND** sodium chloride 0.9% **AND** sodium chloride 0.9%, sumatriptan    Family History    None       Tobacco Use    Smoking status: Every Day     Packs/day: 1.00     Types: Cigarettes     Smokeless tobacco: Never   Substance and Sexual Activity    Alcohol use: Not on file    Drug use: Not on file    Sexual activity: Not on file       Review of Systems   Constitutional:  Positive for activity change, fatigue and unexpected weight change.   Respiratory:  Positive for shortness of breath.    Cardiovascular:  Negative for leg swelling.   Gastrointestinal:  Negative for nausea and vomiting.   Musculoskeletal:  Positive for gait problem.   Skin: Negative.    Neurological:  Positive for weakness.   Psychiatric/Behavioral: Negative.     Objective:     Vital Signs (Most Recent):  Temp: 96.2 °F (35.7 °C) (09/19/22 0706)  Pulse: 64 (09/19/22 0900)  Resp: (!) 25 (09/19/22 0931)  BP: (!) 113/57 (09/19/22 0900)  SpO2: 98 % (09/19/22 0900)   Vital Signs (24h Range):  Temp:  [96.2 °F (35.7 °C)-97.7 °F (36.5 °C)] 96.2 °F (35.7 °C)  Pulse:  [60-88] 64  Resp:  [10-36] 25  SpO2:  [85 %-100 %] 98 %  BP: ()/(52-66) 113/57     Weight: 57.2 kg (126 lb)  Body mass index is 18.61 kg/m².    Physical Exam  Constitutional:       Comments: Pt    HENT:      Head: Normocephalic and atraumatic.   Eyes:      General: Lids are normal.      Conjunctiva/sclera: Conjunctivae normal.   Cardiovascular:      Rate and Rhythm: Tachycardia present.   Pulmonary:      Effort: Tachypnea present. No accessory muscle usage or respiratory distress.      Comments: Pt on Bi-Pap  Abdominal:      General: Abdomen is flat.   Musculoskeletal:      Cervical back: Full passive range of motion without pain and normal range of motion.      Comments: Edema to left arm.    Skin:     General: Skin is warm and dry.   Neurological:      Mental Status: He is alert and oriented to person, place, and time.   Psychiatric:         Attention and Perception: Attention normal.         Speech: Speech normal.       Review of Symptoms      Symptom Assessment (ESAS 0-10 Scale)  Pain:  0  Dyspnea:  0  Anxiety:  0  Nausea:  0  Depression:  0  Anorexia:  0  Fatigue:   0  Insomnia:  0  Restlessness:  0  Agitation:  0         ECOG Performance Status thGthrthathdtheth:th th4th Living Arrangements:  Lives with family    Psychosocial/Cultural: Pt legally  to Aston Collins. Per pt Aston works during the day. Per pt, Aston is his care-giver.       Advance Care Planning   Advance Directives:   Living Will: No    Do Not Resuscitate Status: Yes    Medical Power of : No    Agent's Name:  Aston Collins    Decision Making:  Patient answered questions       Significant Labs: All pertinent labs within the past 24 hours have been reviewed.  CBC:   Recent Labs   Lab 09/19/22 0314   WBC 0.14*   HGB 8.5*   HCT 26.9*   MCV 93   PLT 20*       BMP:  Recent Labs   Lab 09/19/22 0314   *      K 3.7      CO2 24   BUN 23*   CREATININE 1.0   CALCIUM 7.9*   MG 2.0       LFT:  Lab Results   Component Value Date    AST 9 (L) 09/19/2022    ALKPHOS 91 09/19/2022    BILITOT 0.3 09/19/2022     Albumin:   Albumin   Date Value Ref Range Status   09/19/2022 1.6 (L) 3.5 - 5.2 g/dL Final     Protein:   Total Protein   Date Value Ref Range Status   09/19/2022 4.2 (L) 6.0 - 8.4 g/dL Final     Lactic acid:   Lab Results   Component Value Date    LACTATE 1.7 09/05/2022    LACTATE 2.4 (H) 09/04/2022       Significant Imaging: I have reviewed all pertinent imaging results/findings within the past 24 hours.      > 50% of  35 min visit spent in chart review, face to face discussion of goals of care,  symptom assessment, coordination of care and emotional support    Zoe Potter, CNS  Palliative Medicine  [facility] - Cardiac Medical ICU

## 2022-09-19 NOTE — ASSESSMENT & PLAN NOTE
Admitted with acute hypoxic respiratory failure likely secondary to extensive small cell lung cancer  Has BL pleural effusions; had chest tube which was removed on 9/14  Stepped up to ICU from oncology on 9/16 for increasing oxygen requirements  Bedside US BL lung did not show anything that could be tapped  CT chest showing RLL consolidation and stable BL pleural effusions; negative for PE  BCx NTD; will follow-up  Started on broad spectrum abx and steroids  Wean O2 as able; wean from bipap to HFNC

## 2022-09-19 NOTE — PLAN OF CARE
CMICU DAILY GOALS       A: Awake    RASS: Goal - RASS Goal: 0-->alert and calm  Actual - RASS (Garcia Agitation-Sedation Scale): 0-->alert and calm   Restraint necessity:    B: Breathe   SBT: Not intubated   C: Coordinate A & B, analgesics/sedatives   Pain: managed    SAT: Not intubated  D: Delirium   CAM-ICU: Overall CAM-ICU: Negative  E: Early(intubated/ Progressive (non-intubated) Mobility   MOVE Screen: Fail   Activity: Activity Management: Arm raise - L1  FAS: Feeding/Nutrition   Diet order: Diet/Nutrition Received: clear liquid, Specialty Diet/Nutrition Received: renal diet  T: Thrombus   DVT prophylaxis: VTE Required Core Measure: Pharmacological prophylaxis initiated/maintained  H: HOB Elevation   Head of Bed (HOB) Positioning: HOB at 30-45 degrees  U: Ulcer Prophylaxis   GI: yes  G: Glucose control   managed    S: Skin   Bathing/Skin Care: bath, complete  Device Skin Pressure Protection: positioning supports utilized  Pressure Reduction Devices: foam padding utilized  Pressure Reduction Techniques: weight shift assistance provided  Skin Protection: adhesive use limited, tubing/devices free from skin contact  B: Bowel Function   no issues   I: Indwelling Catheters   Rodgers necessity:     CVC necessity: Yes  D: De-escalation Antibiotics   No    Family/Goals of care/Code Status   Code Status: DNR    24H Vital Sign Range  Temp:  [96.1 °F (35.6 °C)-97.7 °F (36.5 °C)]   Pulse:  [60-88]   Resp:  [10-36]   BP: ()/(52-66)   SpO2:  [85 %-100 %]      Shift Events   Pt requested BIPAP throughout the night. PRN anxiety meds given upon request from pt. IV ABTs continued as ordered. Labs drawn with some criticals. MD notified. No new orders. Pt in bed resting quietly. 0 s/s of pain or distress.    VS and assessment per flow sheet, patient progressing towards goals as tolerated, plan of care reviewed with family, all concerns addressed, will continue to monitor.

## 2022-09-19 NOTE — ASSESSMENT & PLAN NOTE
[facility] pathology report confirms small cell lung cancer from bone specimen.  Explained to him and his  that he is in an unfortunate situation.  He will most certainly die of this cancer rather quickly if not given chemotherapy.  While our intent is to improve his tumor burden causing his hypoxic respiratory failure with chemotherapy, it is also possible that we may potentially hasten his death unintentionally due to chemo toxicity. He and his  understand the risks. Patient was on day 2 of inpatient chemotherapy with carboplatin etoposide when his respiratory status became worse and he had to be stepped up to ICU.     - Discussed with primary team, attempting to wean oxygen today  - CT chest reviewed, tumour burden appears reduced, however patient still has bilateral plural effusion, increased consolidation and emphysematous change.  - Primary  Team to attempt diuresis today

## 2022-09-19 NOTE — SUBJECTIVE & OBJECTIVE
Interval History/Significant Events: Remains on bipap this AM. Tried to wean 2x but desatted to low 80s. Pt would like to try to wean to HFNC. Will change steroids back to IV, increase duonebs, and repeat CXR.     Review of Systems   Unable to perform ROS: Other (On continuous bipap)   Constitutional:  Negative for fever.   Respiratory:  Positive for shortness of breath and wheezing.    Psychiatric/Behavioral:  The patient is nervous/anxious.    Objective:     Vital Signs (Most Recent):  Temp: 96.4 °F (35.8 °C) (09/19/22 1100)  Pulse: 72 (09/19/22 1614)  Resp: (!) 24 (09/19/22 1656)  BP: (!) 125/57 (09/19/22 1400)  SpO2: (!) 86 % (09/19/22 1614)   Vital Signs (24h Range):  Temp:  [96.2 °F (35.7 °C)-97.7 °F (36.5 °C)] 96.4 °F (35.8 °C)  Pulse:  [60-88] 72  Resp:  [17-40] 24  SpO2:  [86 %-100 %] 86 %  BP: ()/(51-66) 125/57   Weight: 57.2 kg (126 lb 1.7 oz)  Body mass index is 18.62 kg/m².      Intake/Output Summary (Last 24 hours) at 9/19/2022 1656  Last data filed at 9/19/2022 1300  Gross per 24 hour   Intake 1273.63 ml   Output 650 ml   Net 623.63 ml       Physical Exam  Vitals and nursing note reviewed.   Constitutional:       General: He is in acute distress.      Appearance: He is cachectic. He is ill-appearing.   HENT:      Head: Normocephalic and atraumatic.      Mouth/Throat:      Mouth: Mucous membranes are moist.   Eyes:      Extraocular Movements: Extraocular movements intact.      Pupils: Pupils are equal, round, and reactive to light.   Cardiovascular:      Rate and Rhythm: Normal rate and regular rhythm.      Pulses: Normal pulses.      Heart sounds: Murmur (mechanical click) heard.   Pulmonary:      Breath sounds: Rhonchi and rales present.      Comments: On bipap, satting 93%  Abdominal:      General: Bowel sounds are normal. There is no distension.      Palpations: Abdomen is soft.      Tenderness: There is no abdominal tenderness.   Musculoskeletal:         General: Swelling (right upper  extremity; appears to be dependent edema, a little improved from yesterday) present. No tenderness.      Right lower leg: No edema.      Left lower leg: No edema.   Skin:     General: Skin is warm.      Capillary Refill: Capillary refill takes less than 2 seconds.      Findings: No erythema or rash.   Neurological:      General: No focal deficit present.      Mental Status: He is alert and oriented to person, place, and time.   Psychiatric:         Mood and Affect: Mood normal.         Thought Content: Thought content normal.     Vents:  Oxygen Concentration (%): 65 (09/19/22 1614)  Lines/Drains/Airways       Peripherally Inserted Central Catheter Line  Duration             PICC Double Lumen 09/07/22 1734 right basilic 11 days              Peripheral Intravenous Line  Duration                  Peripheral IV - Single Lumen 09/11/22 1354 20 G Anterior;Left Upper Arm 8 days                  Significant Labs:    CBC/Anemia Profile:  Recent Labs   Lab 09/18/22  0432 09/19/22  0314   WBC 0.11* 0.14*   HGB 9.0* 8.5*   HCT 28.5* 26.9*   PLT 30* 20*   MCV 93 93   RDW 18.4* 18.4*          Chemistries:  Recent Labs   Lab 09/18/22  0432 09/19/22  0314   * 136   K 3.7 3.7    106   CO2 24 24   BUN 19 23*   CREATININE 0.7 1.0   CALCIUM 8.0* 7.9*   ALBUMIN 1.6* 1.6*   PROT 4.3* 4.2*   BILITOT 0.4 0.3   ALKPHOS 101 91   ALT 12 13   AST 10 9*   MG 1.9 2.0   PHOS 3.2 2.8         ABGs: No results for input(s): PH, PCO2, HCO3, POCSATURATED, BE in the last 48 hours.  BMP:   Recent Labs   Lab 09/19/22  0314   *      K 3.7      CO2 24   BUN 23*   CREATININE 1.0   CALCIUM 7.9*   MG 2.0       Lactic Acid: No results for input(s): LACTATE in the last 48 hours.  Urine Culture: No results for input(s): LABURIN in the last 48 hours.  Recent Lab Results         09/19/22  0314   09/19/22  0031        Albumin 1.6         Alkaline Phosphatase 91         ALT 13         Anion Gap 6         Aniso Slight         AST 9          Baso # 0.00         Basophil % 0.0         BILIRUBIN TOTAL 0.3  Comment: For infants and newborns, interpretation of results should be based  on gestational age, weight and in agreement with clinical  observations.    Premature Infant recommended reference ranges:  Up to 24 hours.............<8.0 mg/dL  Up to 48 hours............<12.0 mg/dL  3-5 days..................<15.0 mg/dL  6-29 days.................<15.0 mg/dL           BUN 23         Gama/Echinocytes Occasional         Calcium 7.9         Chloride 106         CO2 24         Creatinine 1.0         Differential Method Automated         eGFR >60.0         Eos # 0.0         Eosinophil % 0.0         Glucose 130         Gran # (ANC) 0.1         Gran % 35.7         Hematocrit 26.9         Hemoglobin 8.5         Hypo Occasional         Immature Grans (Abs) 0.00  Comment: Mild elevation in immature granulocytes is non specific and   can be seen in a variety of conditions including stress response,   acute inflammation, trauma and pregnancy. Correlation with other   laboratory and clinical findings is essential.           Immature Granulocytes 0.0         INR 7.0  Comment: Coumadin Therapy:  2.0 - 3.0 for INR for all indicators except mechanical heart valves  and antiphospholipid syndromes which should use 2.5 - 3.5.  INR    critical result(s) called and verbal readback obtained from   Geetha Jacobson RN. by RAMILA 09/19/2022 03:56           Lymph # 0.1         Lymph % 42.9         Magnesium 2.0         MCH 29.5         MCHC 31.6         MCV 93         Mono # 0.0         Mono % 21.4         MPV SEE COMMENT  Comment: Result not available.         nRBC 0         Ovalocytes Occasional         Phosphorus 2.8         Platelet Estimate Decreased         Platelets 20  Comment: PLT   critical result(s) called and verbal readback obtained from   STEVEN JACOBSON RN by PRETTY 09/19/2022 04:06           Poikilocytosis Slight         Poly Occasional         Potassium 3.7          PROTEIN TOTAL 4.2         Protime 66.2         RBC 2.88         RDW 18.4         Sodium 136         Spherocytes Occasional         Vancomycin-Trough   17.8       WBC 0.14  Comment: WBC  critical result(s) called and verbal readback obtained from   STEVEN JACOBSON RN by [facility] 09/19/2022 03:39                   Significant Imaging:  I have reviewed all pertinent imaging results/findings within the past 24 hours.

## 2022-09-19 NOTE — PT/OT/SLP PROGRESS
Occupational Therapy      Patient Name:  Donis Jimenez   MRN:  [id number]    Patient declined skilled OT services today. Pt. Presented with c/o of SOB and SpO2 was ~ 90 at rest seated up right in bed. Pt. Was educated on the importance of engaging in OOB and EOB activities. Will follow-up 9/20/22.    9/19/2022

## 2022-09-20 NOTE — PROGRESS NOTES
[facility]  Skin Integrity YASEMIN  Progress Note    Patient Name: Donis Jimenez  MRN: [id number]  Admission Date: 9/4/2022  Hospital Length of Stay: 16 days  Attending Physician: Moses Xiao MD  Primary Care Provider: Elio Farias MD         Subjective:     HPI:  Donis Jimenez is a 61 year old mael with PMH significant for bacterial endocarditis, s/p AV and MV mechanical valve replacement (on warfarin), CKD, tobacco abuse with recent complicated hospital course who presents to the emergency department with shortness of breath.  Difficult for patient to provide history due to tachypnea; spouse at bedside assisting with history.     Patient with a flutter in emergency department.  He was cardioverted with return to normal sinus rhythm and improvement of shortness of breath.  While he is still tachypneic following restoration of normal sinus rhythm, per spouse, this is essentially his recent baseline. Skin integrity YASEMIN consulted for evaluation skin injuries to sacrum.    Hospital Course:   No notes on file      Follow-up For: Procedure(s) (LRB):  Cardioversion or Defibrillation (N/A)  Transesophageal echo (MARIE) intra-procedure log documentation    Post-Operative Day: 12 Days Post-Op    Scheduled Meds:   albuterol-ipratropium  3 mL Nebulization Q4H    ceFEPime (MAXIPIME) IVPB  1 g Intravenous Q8H    EScitalopram oxalate  20 mg Oral QHS    methylPREDNISolone sodium succinate injection  80 mg Intravenous BID    morphine  30 mg Oral Q12H    pantoprazole  40 mg Intravenous Daily    polyethylene glycol  17 g Oral BID    pregabalin  75 mg Oral BID    senna-docusate 8.6-50 mg  1 tablet Oral BID    sodium chloride 0.9%  10 mL Intravenous Q6H    sodium chloride 3%  4 mL Nebulization Q8H     Continuous Infusions:   sodium chloride 0.9% 5 mL/hr at 09/20/22 1200     PRN Meds:acetaminophen, alteplase, heparin, porcine (PF), HYDROmorphone, melatonin, methocarbamoL, midazolam, ondansetron,  Flushing PICC Protocol **AND** sodium chloride 0.9% **AND** sodium chloride 0.9%, sumatriptan    Review of Systems   Skin:  Positive for wound.   Objective:     Vital Signs (Most Recent):  Temp: 97.5 °F (36.4 °C) (09/20/22 1101)  Pulse: 65 (09/20/22 1200)  Resp: (!) 25 (09/20/22 1200)  BP: (!) 132/57 (09/20/22 1200)  SpO2: (!) 88 % (09/20/22 1200)   Vital Signs (24h Range):  Temp:  [96.3 °F (35.7 °C)-98.1 °F (36.7 °C)] 97.5 °F (36.4 °C)  Pulse:  [60-97] 65  Resp:  [17-56] 25  SpO2:  [86 %-97 %] 88 %  BP: (109-132)/(51-68) 132/57     Weight: 57.2 kg (126 lb 1.7 oz)  Body mass index is 18.62 kg/m².    Physical Exam  Constitutional:       Appearance: Normal appearance.   Skin:     General: Skin is warm and dry.      Findings: Lesion present.   Neurological:      Mental Status: He is alert.       Laboratory:  All pertinent labs reviewed within the last 24 hours.    Diagnostic Results:  None      Assessment/Plan:          YASEMIN Skin Integrity Evaluation    Skin Integrity YASEMIN evaluation of patient as part of the comprehensive skin care team.     He has been admitted for 16 days. Skin injury was noted on 8/24/22. POA yes.                Dermatitis associated with moisture  - follow up evaluation of skin breakdown.  - pt admitted with sob.  - previous injury to sacral/buttocks area.  - lesions with pink wound base likely related to moisture dermatitis.  - wound with more purple discoloration and surrounding redness.  - continue Triad bid/prn soiling.  - On a Neha surface. Will order Immerse mattress.  - pillows for offloading. Foam dressing intact over bony prominence.   - pt encouraged to turn q2h.  - nursing to maintain pressure injury prevention measures and continue wound care per orders.      Will follow up weekly.     Radha Caba NP  Skin Integrity YASEMIN  [facility]

## 2022-09-20 NOTE — PROGRESS NOTES
[facility]  Critical Care Medicine  Progress Note    Patient Name: Donis Jimenez  MRN: [id number]  Admission Date: 9/4/2022  Hospital Length of Stay: 16 days  Code Status: DNR  Attending Provider: Moses Xiao MD  Primary Care Provider: Elio Farias MD   Principal Problem: Acute hypoxemic respiratory failure    Subjective:     HPI:  61 year old male with bacterial endocarditis, s/p AV and MV mechanical valve replacement (on warfarin), CKD, tobacco abuse, recent diagnosis of metastatic SCLC (extensive diseases including mediastinal mass, mets to brain, bone) with recent hospital admission for pathologic right femoral neck fracture and right femoral artery pseudoaneurysm requiring hemiarthroplasty with ortho and embolization by vascular surgery presenting with acute hypoxic respiratory failure. He was initially admitted to ICU for bipap and was stepped down to oncology once oxygen requirements improved. Received inpatient chemotherapy with carboplatin and etoposide on 9/7.     Critical Care Medicine was re-consulted on 9/16 for worsening hypoxemic respiratory failure.    Hospital/ICU Course:  Initially admitted to [facility] 9/4 for worsening hypoxemic respiratory failure 2/2 PNA in setting of lung cancer with extensive metastatic disease. GOC discussed with Pt and  who agree that they would not want extreme measures such as CPR and mechanical ventilation given overall poor prognosis. Oxygen requirements improved and Pt stepped down to oncology. Received inpatient chemotherapy. Had BL pleural effusions; chest tube placed and later removed on 9/14. Stepped back up to ICU on 9/16 for increasing oxygen requirements - went from NC to non-breather. CTA chest negative for PE, showed ongoing RLL consolidation, and BL pleural effusions which are stable. Bedside US BL did not show significant pleural effusion that can be tapped. Pt was initially desatting when taken off of bipap but was  successfully weaned to HFNC saturating 88-92%. Started on broad spectrum abx and steroids for PNA. INR therapeutic on 9/17 so further anticoagulation held. Supratherapeutic from 9/18 without obvious source of bleed so will continue to hold AC. Held amiodarone as Pt has been in NSR and amiodarone combined with abx likely contributing to subtherapeutic INR. Had issues weaning from bipap on morning of 9/19; changed to IV solumedrol and Q4h duonebs. Repeat CXR without significant change.     Interval History/Significant Events: Weaned off from bipap, on HFNC. Sats ~91%. Reports he feels better this AM, eating breakfast.     Review of Systems   Constitutional:  Positive for fatigue. Negative for chills and fever.   HENT:  Negative for congestion and rhinorrhea.    Eyes:  Negative for photophobia and visual disturbance.   Respiratory:  Positive for shortness of breath and wheezing.    Cardiovascular:  Negative for chest pain and palpitations.   Gastrointestinal:  Positive for constipation. Negative for abdominal pain, nausea and vomiting.   Genitourinary:  Negative for dysuria and hematuria.   Musculoskeletal:  Negative for arthralgias and myalgias.   Skin:  Negative for rash and wound.   Neurological:  Negative for dizziness, light-headedness and headaches.   Psychiatric/Behavioral:  The patient is nervous/anxious.    Objective:     Vital Signs (Most Recent):  Temp: 97.5 °F (36.4 °C) (09/20/22 1101)  Pulse: 70 (09/20/22 1400)  Resp: (!) 26 (09/20/22 1400)  BP: 137/63 (09/20/22 1400)  SpO2: (!) 86 % (09/20/22 1400)   Vital Signs (24h Range):  Temp:  [96.3 °F (35.7 °C)-98.1 °F (36.7 °C)] 97.5 °F (36.4 °C)  Pulse:  [60-97] 70  Resp:  [17-56] 26  SpO2:  [86 %-97 %] 86 %  BP: (109-137)/(51-68) 137/63   Weight: 57.2 kg (126 lb 1.7 oz)  Body mass index is 18.62 kg/m².      Intake/Output Summary (Last 24 hours) at 9/20/2022 9097  Last data filed at 9/20/2022 1400  Gross per 24 hour   Intake 465.44 ml   Output 775 ml   Net  -309.56 ml       Physical Exam  Vitals and nursing note reviewed.   Constitutional:       General: He is not in acute distress.     Appearance: He is cachectic. He is ill-appearing.   HENT:      Head: Normocephalic and atraumatic.      Mouth/Throat:      Mouth: Mucous membranes are moist.   Eyes:      Extraocular Movements: Extraocular movements intact.      Pupils: Pupils are equal, round, and reactive to light.   Cardiovascular:      Rate and Rhythm: Normal rate and regular rhythm.      Pulses: Normal pulses.      Heart sounds: Murmur (mechanical click) heard.   Pulmonary:      Breath sounds: Rhonchi and rales present.      Comments: On HFNC, satting 91%  Abdominal:      General: Bowel sounds are normal. There is no distension.      Palpations: Abdomen is soft.      Tenderness: There is no abdominal tenderness.   Musculoskeletal:         General: Swelling (right upper extremity; appears to be dependent edema, improving) present. No tenderness.      Right lower leg: No edema.      Left lower leg: No edema.   Skin:     General: Skin is warm.      Capillary Refill: Capillary refill takes less than 2 seconds.      Findings: No erythema or rash.   Neurological:      General: No focal deficit present.      Mental Status: He is alert and oriented to person, place, and time.   Psychiatric:         Mood and Affect: Mood normal.         Thought Content: Thought content normal.     Vents:  Oxygen Concentration (%): 100 (09/20/22 1141)  Lines/Drains/Airways       Peripherally Inserted Central Catheter Line  Duration             PICC Double Lumen 09/07/22 1734 right basilic 12 days              Peripheral Intravenous Line  Duration                  Peripheral IV - Single Lumen 09/19/22 1354 20 G Anterior;Left Upper Arm 1 day                  Significant Labs:    CBC/Anemia Profile:  Recent Labs   Lab 09/19/22  0314 09/20/22  0342   WBC 0.14* 0.37*   HGB 8.5* 8.8*   HCT 26.9* 27.5*   PLT 20* 18*   MCV 93 93   RDW 18.4* 18.7*           Chemistries:  Recent Labs   Lab 09/19/22  0314 09/20/22  0342    139   K 3.7 3.7    107   CO2 24 25   BUN 23* 26*   CREATININE 1.0 1.0   CALCIUM 7.9* 8.1*   ALBUMIN 1.6* 1.6*   PROT 4.2* 4.4*   BILITOT 0.3 0.4   ALKPHOS 91 109   ALT 13 19   AST 9* 11   MG 2.0 2.0   PHOS 2.8 2.6*         ABGs: No results for input(s): PH, PCO2, HCO3, POCSATURATED, BE in the last 48 hours.  BMP:   Recent Labs   Lab 09/20/22  0342   *      K 3.7      CO2 25   BUN 26*   CREATININE 1.0   CALCIUM 8.1*   MG 2.0       Lactic Acid: No results for input(s): LACTATE in the last 48 hours.  Urine Culture: No results for input(s): LABURIN in the last 48 hours.  Recent Lab Results         09/20/22  0342   09/20/22  0022        Albumin 1.6         Alkaline Phosphatase 109         ALT 19         Anion Gap 7         AST 11         Bands 12.0         Baso # Test Not Performed  Comment: CORRECTED RESULT; previously reported as 0.00 on %DDDDDDDD% at %TTT%.  [C]         Basophil % 0.0         BILIRUBIN TOTAL 0.4  Comment: For infants and newborns, interpretation of results should be based  on gestational age, weight and in agreement with clinical  observations.    Premature Infant recommended reference ranges:  Up to 24 hours.............<8.0 mg/dL  Up to 48 hours............<12.0 mg/dL  3-5 days..................<15.0 mg/dL  6-29 days.................<15.0 mg/dL           BUN 26         Calcium 8.1         Chloride 107         CO2 25         Creatinine 1.0         Differential Method Manual         Dohle Bodies Present         eGFR >60.0         Eos # Test Not Performed  Comment: CORRECTED RESULT; previously reported as 0.0 on %DDDDDDDD% at %TTT%.  [C]         Eosinophil % 0.0         Glucose 158         Gran % 36.0  Comment: CORRECTED RESULT; previously reported as 56.8 on %DDDDDDDD% at %TTT%.  [C]         Hematocrit 27.5         Hemoglobin 8.8         Immature Grans (Abs) CANCELED  Comment: Mild elevation in  immature granulocytes is non specific and   can be seen in a variety of conditions including stress response,   acute inflammation, trauma and pregnancy. Correlation with other   laboratory and clinical findings is essential.    Result canceled by the ancillary.           Immature Granulocytes CANCELED  Comment: Result canceled by the ancillary.         INR 3.7  Comment: Coumadin Therapy:  2.0 - 3.0 for INR for all indicators except mechanical heart valves  and antiphospholipid syndromes which should use 2.5 - 3.5.           Lymph # Test Not Performed  Comment: CORRECTED RESULT; previously reported as 0.1 on %DDDDDDDD% at %TTT%.  [C]         Lymph % 20.0  Comment: CORRECTED RESULT; previously reported as 16.2 on %DDDDDDDD% at %TTT%.  [C]         Magnesium 2.0         MCH 29.6         MCHC 32.0         MCV 93         Metamyelocytes 12.0         Mono # Test Not Performed  Comment: CORRECTED RESULT; previously reported as 0.1 on %DDDDDDDD% at %TTT%.  [C]         Mono % 12.0  Comment: CORRECTED RESULT; previously reported as 18.9 on %DDDDDDDD% at %TTT%.  [C]         MPV SEE COMMENT  Comment: Result not available.         Myelocytes 8.0         nRBC 0         Ovalocytes Occasional         Phosphorus 2.6         Platelet Estimate Decreased         Platelets 18  Comment: PLT CRITICA; CALLED TO TERE NORMAN RN by [facility] 09/20/2022 06:31         Poikilocytosis Slight         Potassium 3.7         PROTEIN TOTAL 4.4         Protime 36.1         RBC 2.97         RDW 18.7         Sodium 139         Toxic Granulation Present         Vancomycin-Trough   10.6       WBC 0.37  Comment: WBCs and prelim platelets    critical result(s) called and verbal   readback obtained from Rogers Norman RN. by R 09/20/2022 05:04                    [C] - Corrected Result               Significant Imaging:  I have reviewed all pertinent imaging results/findings within the past 24 hours.      ABG  Recent Labs   Lab 09/16/22  0944   PH 7.357   PO2 69*    PCO2 39.9   HCO3 22.4*   BE -3     Assessment/Plan:     Psychiatric  Adjustment disorder with mixed anxiety and depressed mood  Adjustment disorder with mixed anxiety and depressed mood  May need to consult psychiatry   Talked to palliative and they stated that patient would not benefit from more pain medications given current emotional state    Plan:  - continue escitalopram 20mg daily  - versed 4x daily PRN for breakthrough anxiety    Pulmonary  * Acute hypoxemic respiratory failure  Admitted with acute hypoxic respiratory failure likely secondary to extensive small cell lung cancer  Has BL pleural effusions; had chest tube which was removed on 9/14  Stepped up to ICU from oncology on 9/16 for increasing oxygen requirements  Bedside US BL lung did not show anything that could be tapped  CT chest showing RLL consolidation and stable BL pleural effusions; negative for PE    Plan:  - BCx NTD; will follow-up  - Started on broad spectrum abx (vanc and cefepime); d/c'd vancomycin on 9/20  - continue steroids; steroids changed from PO to IV on 9/20  - Repeat CXR 9/19 unchanged from prior  - Increased frequency of duonebs to Q4h  - Wean O2 as able; wean from bipap to HFNC    Exudative pleural effusion  Chest tube placed 9/6, removed 9/14  Had worsening hypoxia on 9/16 but US BL did not show large pleural effusion that could be drained  CTA chest showing stable BL pleural effusions      Cardiac/Vascular  Atrial flutter  Had afib with RVR this admission was successfully cardioverted. IV amio load x72 hours completed and transitioned to PO.  Has remained in NSR since  Concerns that the amiodarone combined with abx is leading to a supratherapeutic INR    Plan:  - hold amiodarone   - remain on telemetry   - warfarin started 9/13 (mechanical heart valve) with bridging heparin gtt; held warfarin from 9/16 in preparation of possible chest tube insertion  - heparin gtt stopped on 9/17 and further AC was held due to therapeutic  INR  - INR supratherapeutic as of 9/18 but improving  - Daily PT/INR  - will check PM INR on 9/20 and if level becomes subtherpeutic, will start heparin gtt     H/O mitral valve replacement with mechanical valve  Hx endocarditis s/p MVR and AVR  Previously on warfarin  Was on heparin gtt bridging while INR subtherapeutic  Warfarin held in preparation of possible chest tube insertion  INR became therapeutic on 9/17 so held on further anticoagulation (was going to switch to full dose lovenox)  INR supratherapeutic from 9/18 but no obvious source of bleed and given mechanical valves will hold off on reversing warfarin  INR remains supratherapeutic 3.7; will repeat INR in PM given large drop from 7 to 3 in 24 hours; may need heparin gtt if level becomes subtherapeutic       Pseudoaneurysm of right femoral artery  S/p embolization of right profunda femoral artery pseudoaneurysm with 5 mm coil 8/20    Oncology  Tumor lysis syndrome  Patient had concerns for tumor lysis syndrome given active chemo with SCLC, treated prophylactically  Uric acid/LDH downtrending, no need to repeat    RESOLVED    Small cell carcinoma of lung  [facility] pathology report confirms small cell lung cancer from bone specimen. Situation explained to Pt and his   Started on inpatient palliative chemotherapy with intent to improve his tumor burden causing his hypoxic respiratory failure      Malignant neoplasm metastatic to bone  See small cell lung cancer       Endocrine  Body mass index (BMI) less than 19  in setting of malignancy and decreased appetite.    - nutrition consulted    Orthopedic  Swelling of upper extremity  Noted on previous hospitalization, possibly 2/2 to vascular compression and mass encasing mediastinal structures noted on CT  - non-occlusive thrombus in L cephalic vein  - also noted to have right upper extremity swelling, US doppler negative for DVT  - elevate arms    Pathologic fracture of neck of right femur s/p  hemiarthroplasty on 8/23/2022  S/p right hip hemiarthroplasty with ablation, cementoplasty, and percutaneous fixation of pelvis 8/23  Patient is weight bearing as tolerated with posterior hip precautions to right lower extremity as per orthopedics  When more stable consider postop radiation    Palliative Care  Palliative care encounter  Palliative care consulted for GOC discussion; following Pt     ACP (advance care planning)  Advance Care Planning   Previously engaged in advanced care planning during previous ICU stay  Code status has not changed and Pt is still DNR         Other  Pain  MMT  - MS Contin 30mg bid  - Dilaudid 1mg q4 prn  - Robaxin 500 q6h prn  - versed 1mg 4x daily prn    Debility  PT/OT evaluation  Sit out of bed often       Critical Care Daily Checklist:    A: Awake: RASS Goal/Actual Goal: RASS Goal: 0-->alert and calm  Actual: Garcia Agitation Sedation Scale (RASS): Drowsy   B: Spontaneous Breathing Trial Performed?     C: SAT & SBT Coordinated?  N/A                      D: Delirium: CAM-ICU Overall CAM-ICU: Negative   E: Early Mobility Performed? Yes   F: Feeding Goal: Goals: Meet % EEN, EPN by RD f/u date  Status: Nutrition Goal Status: progressing towards goal   Current Diet Order   Procedures    Diet Dysphagia Mechanical Soft (IDDSI Level 5)      AS: Analgesia/Sedation Morphine, versed, MS contin   T: Thromboembolic Prophylaxis No supratherapeutic INR   H: HOB > 300 Yes   U: Stress Ulcer Prophylaxis (if needed) pantoprazole   G: Glucose Control controlled   B: Bowel Function Stool Occurrence: 0   I: Indwelling Catheter (Lines & Rodgers) Necessity PIVC, port   D: De-escalation of Antimicrobials/Pharmacotherapies D/c vancomycin, continue cefepime    Plan for the day/ETD Monitor resp status, continue IV steroids, continue duonebs, wean bipap    Code Status:  Family/Goals of Care: DNR       Critical secondary to Patient has a condition that poses threat to life and bodily function: Severe  Respiratory Distress      Critical care was time spent personally by me on the following activities: development of treatment plan with patient or surrogate and bedside caregivers, discussions with consultants, evaluation of patient's response to treatment, examination of patient, ordering and performing treatments and interventions, ordering and review of laboratory studies, ordering and review of radiographic studies, pulse oximetry, re-evaluation of patient's condition. This critical care time did not overlap with that of any other provider or involve time for any procedures.     Shena Nash MD  Critical Care Medicine  [facility] - Cardiac Medical [facility]

## 2022-09-20 NOTE — SUBJECTIVE & OBJECTIVE
Interval History: Pt DNR. Stepped up to ICU for respiratory failure.     Past Medical History:   Diagnosis Date    Endocarditis     Pacemaker     Pneumonia        Past Surgical History:   Procedure Laterality Date    ANGIOGRAPHY OF LOWER EXTREMITY Right 8/19/2022    Procedure: ANGIOGRAM, LOWER EXTREMITY;  Surgeon: Thomas Nicolas MD;  Location: [facility] OR [address] [place] [address];  Service: Peripheral Vascular;  Laterality: Right;  30.0 min  315.10 mGy  26.1755 Gycm2  84 ml dye    HIP RESURFACING Right 8/23/2022    Procedure: cemontoplasty;  Surgeon: Yung Flores MD;  [facility];  Service: Orthopedics;  Laterality: Right;    RADIOFREQUENCY ABLATION, BONE, PERCUTANEOUS Right 8/23/2022    Procedure: RADIOFREQUENCY ABLATION,BONE;  Surgeon: Yung Flores MD;  Location: [address];  Service: Orthopedics;  Laterality: Right;    REPAIR, PSEUDOANEURYSM, ARTERY, FEMORAL Right 8/19/2022    Procedure: REPAIR, PSEUDOANEURYSM, ARTERY, FEMORAL;  Surgeon: Thomas Nicolas MD;  [address];  Service: Peripheral Vascular;  Laterality: Right;  R PFA (3rd branch) pseudoaneurysm     TREATMENT OF CARDIAC ARRHYTHMIA N/A 9/8/2022    Procedure: Cardioversion or Defibrillation;  Surgeon: Lan Mccollum MD;  Location: [facility] EP LAB;  Service: Cardiology;  Laterality: N/A;  AFL, DCCV, ANES, SK, RM [id number]       Review of patient's allergies indicates:   Allergen Reactions    Ambien [zolpidem] Other (See Comments)     Disturbing dreams       Medications:  Continuous Infusions:   sodium chloride 0.9% 5 mL/hr at 09/20/22 1000       Scheduled Meds:   albuterol-ipratropium  3 mL Nebulization Q4H    ceFEPime (MAXIPIME) IVPB  1 g Intravenous Q8H    EScitalopram oxalate  20 mg Oral QHS    methylPREDNISolone sodium succinate injection  80 mg Intravenous BID    morphine  30 mg Oral Q12H    pantoprazole  40 mg Intravenous Daily    polyethylene glycol  17 g Oral BID    pregabalin  75 mg Oral BID    senna-docusate 8.6-50 mg  1 tablet  Oral BID    sodium chloride 0.9%  10 mL Intravenous Q6H    sodium chloride 3%  4 mL Nebulization Q8H     PRN Meds:acetaminophen, alteplase, heparin, porcine (PF), HYDROmorphone, melatonin, methocarbamoL, midazolam, ondansetron, Flushing PICC Protocol **AND** sodium chloride 0.9% **AND** sodium chloride 0.9%, sumatriptan    Family History    None       Tobacco Use    Smoking status: Every Day     Packs/day: 1.00     Types: Cigarettes    Smokeless tobacco: Never   Substance and Sexual Activity    Alcohol use: Not on file    Drug use: Not on file    Sexual activity: Not on file       Review of Systems   Constitutional:  Positive for activity change, fatigue and unexpected weight change.   Respiratory:  Positive for shortness of breath.    Cardiovascular:  Negative for leg swelling.   Gastrointestinal:  Negative for nausea and vomiting.   Musculoskeletal:  Positive for gait problem.   Skin: Negative.    Neurological:  Positive for weakness.   Psychiatric/Behavioral: Negative.     Objective:     Vital Signs (Most Recent):  Temp: 96.3 °F (35.7 °C) (09/20/22 0701)  Pulse: 64 (09/20/22 1000)  Resp: (!) 34 (09/20/22 1000)  BP: (!) 126/59 (09/20/22 1000)  SpO2: (!) 92 % (09/20/22 1000)   Vital Signs (24h Range):  Temp:  [96.3 °F (35.7 °C)-98.1 °F (36.7 °C)] 96.3 °F (35.7 °C)  Pulse:  [60-97] 64  Resp:  [17-56] 34  SpO2:  [86 %-97 %] 92 %  BP: (109-132)/(51-68) 126/59     Weight: 57.2 kg (126 lb 1.7 oz)  Body mass index is 18.62 kg/m².    Physical Exam  Constitutional:       Comments: Pt    HENT:      Head: Normocephalic and atraumatic.   Eyes:      General: Lids are normal.      Conjunctiva/sclera: Conjunctivae normal.   Cardiovascular:      Rate and Rhythm: Tachycardia present.   Pulmonary:      Effort: Tachypnea present. No accessory muscle usage or respiratory distress.      Comments: Pt on Bi-Pap  Abdominal:      General: Abdomen is flat.   Musculoskeletal:      Cervical back: Full passive range of motion without pain and  normal range of motion.      Comments: Edema to left arm.    Skin:     General: Skin is warm and dry.   Neurological:      Mental Status: He is alert and oriented to person, place, and time.   Psychiatric:         Attention and Perception: Attention normal.         Speech: Speech normal.       Review of Symptoms      Symptom Assessment (ESAS 0-10 Scale)  Pain:  0  Dyspnea:  0  Anxiety:  0  Nausea:  0  Depression:  0  Anorexia:  0  Fatigue:  0  Insomnia:  0  Restlessness:  0  Agitation:  0         ECOG Performance Status ndGndrndanddndend:nd nd2nd Living Arrangements:  Lives with family    Psychosocial/Cultural: Pt legally  to Aston Collins. Per pt Aston works during the day. Per pt, Aston is his care-giver.       Advance Care Planning   Advance Directives:   Living Will: No    Do Not Resuscitate Status: Yes    Medical Power of : No    Agent's Name:  Aston Collins    Decision Making:  Patient answered questions       Significant Labs: All pertinent labs within the past 24 hours have been reviewed.  CBC:   Recent Labs   Lab 09/20/22 0342   WBC 0.37*   HGB 8.8*   HCT 27.5*   MCV 93   PLT 18*       BMP:  Recent Labs   Lab 09/20/22 0342   *      K 3.7      CO2 25   BUN 26*   CREATININE 1.0   CALCIUM 8.1*   MG 2.0       LFT:  Lab Results   Component Value Date    AST 11 09/20/2022    ALKPHOS 109 09/20/2022    BILITOT 0.4 09/20/2022     Albumin:   Albumin   Date Value Ref Range Status   09/20/2022 1.6 (L) 3.5 - 5.2 g/dL Final     Protein:   Total Protein   Date Value Ref Range Status   09/20/2022 4.4 (L) 6.0 - 8.4 g/dL Final     Lactic acid:   Lab Results   Component Value Date    LACTATE 1.7 09/05/2022    LACTATE 2.4 (H) 09/04/2022       Significant Imaging: I have reviewed all pertinent imaging results/findings within the past 24 hours.

## 2022-09-20 NOTE — PLAN OF CARE
[facility] - Card[facility] Medical [facility]  Discharge Reassessment    Primary Care Provider: Elio Farias MD    Expected Discharge Date: 9/23/2022    Reassessment (most recent)       Discharge Reassessment - 09/20/22 1642          Discharge Reassessment    Assessment Type Discharge Planning Reassessment     Did the patient's condition or plan change since previous assessment? Yes     Discharge Plan discussed with: Spouse/sig other     Name(s) and Number(s) Aston Collins, spouse/[phone]     Communicated VIKI with patient/caregiver Date not available/Unable to determine     Discharge Plan A Other     Discharge Plan B Home with family     DME Needed Upon Discharge  other (see comments)   TBD    Discharge Barriers Identified None     Why the patient remains in the hospital Requires continued medical care        Post-Acute Status    Post-Acute Authorization Placement     Coverage Medicaid - [employer] ([employer])     Discharge Delays None known at this time                   Patient not stable for discharge @ this time.  SW will continue to follow patient's progress to discharge from MICU.  SW remains available for any family concerns or needs.    Daphney Pryor, PER  [facility]  [phone]

## 2022-09-20 NOTE — PROGRESS NOTES
"[facility] - Cardiac Medical ICU  Palliative Medicine  Progress Note    Patient Name: Donis Jimenez  MRN: [id number]  Admission Date: 9/4/2022  Hospital Length of Stay: 16 days  Code Status: DNR   Attending Provider: Moses Xiao MD  Consulting Provider: LOREE Dean  Primary Care Physician: Elio Farias MD  Principal Problem:Acute hypoxemic respiratory failure    Patient information was obtained from patient and primary team.      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 Pt has metastatic lung cancer. Pt is alert, oriented to person, place, and situation. Pt is a DNR. Pt is on comfort flow 60 L 80%.     Reason for consult: GOC/ACP. Communicated with MAJOR Camp fellow with [facility].     Today:Met wit pt who is on comfort flow today. Pt is aware of current situation and that he is having respiratory issues and back on comfort flow. Support given. .     9/16/22  Met with pt who stated his goal at this time is to be comfortable and receive treatment. Goal is to be back home if possible and have more quality and quantity of life.     9/9/22: Pt very tearful today. Pt says "I know I am dying. " Pt kept apologizing.  Explained to pt there is not a need to apoligize and that that I am glad he felt comfortable to express his feelings. Pt repots he is having a bad day and everything starting to sink in about his health. Support given.  seeing. Will consult Oncology/Psycology for pt. Pt to step down to Hem/onc as soon as bed available.     9/6/22  Goals of care/ACP: Pt to have tracheal stenting tomorrow. Pt reports he is in agreement to procedure and would like CC team to speak to Aston " "about procedure when he visits today. Per pt, he relays information to Aston but he feels like he forgets some of information to tell him. Pt reports his goal is medical management at this time to see if he can improve enough to get back home. Pt reports his goal is to be at home with his four dogs and , Aston. Pt is aware of his severity of his illness.     Today: Met with pt along with Tracie Kidd LCSW.     9/6/22  Introduced role of Palliative care to pt.   Met with pt who is aware of his medical issues. Per pt he is aware he has metastatic cancer. Pt is aware that any therapies/procedures offered to him is palliative and will not cure cancer. Per pt, he is still trying to come to  with his dx. He learned about his issues in August. Pt reports goal at time is to see if anything can be done to help with symptoms and "give him some more time." Spoke to pt about amount of O2 he is on at this time. He verbalized understanding.  Per pt he has spoken to Oncology and XRT MDs.   CTS has been consulted concerning tracheal stenting.      Pt open to continued visits with \[facility]\"" care for care planning and symptom management needs.  Support given pt.      Pt reports he is legally  to Aston Collins and he would want him to be hi medical decision-maker if he cannot make his own medical decisions. MPOA paperwork left with pt. Education provided. Per Pt, Aston works during day but can be reached by phone.      Code status: DNR.      Symptom management:       Pain: Pt has reported he has pain to back chest area that can get up to 9/10.  Pt reported adding long-acting has improved pain.   At this time, pt reports he is comfortable     Pt is on MSContin 30 mg bid.   Pt is currently on Dilaudid 1 mg IVP q 4 hrs prn pain.     Recs:   Continue with Dilaudid IV.   Will need to convert to PO pain meds when pt gets closer to going home. (Pt is aware of this)     Dyspnea r/t to mediastinal mass, pleural effusion.   Pt is " on Bi-PAP  Pt receiving breathing treatment.   Pt is on Dilaudid 1 mg IVP q 4 hrs prn.       Debility r/t to pathological femoral neck fracture s/p ight hip hemiarthroplasty with ablation, cementoplasty  Pt was using walker prior to admit.   Would resume PT/OT when pt stable to participate.     Pt still has stitches to hip and ABD area.      Anxiety r/t to new dx and dyspnea.   Pt  On versed  1 mg qid prn anxiety.   Pt reports he would like med for anxiety.   Let bedside nurse know.        Plan:   Will continue to meet with pt to reinforce realistic expectations/assit with GOC.   See above symptom recs.  Support given.   Oncology/ Psychology seeing  Will follow.               I will follow-up with patient. Please contact us if you have any additional questions.    Subjective:     Chief Complaint:   Chief Complaint   Patient presents with    Shortness of Breath     Pt brought to ED from home via [facility]. Per family pt SOB and productive cough. Pt had double valve replacement, pacemaker and hip surgery 1 week ago.        HPI:   Pt is a 61-year-old male with PMH significant for bacterial endocarditis, s/p AV and MV mechanical valve replacement (on warfarin), CKD, tobacco abuse with recent complicated hospital course who presented to the emergency department with shortness of breath.  Difficult for patient to provide history due to tachypnea; spouse at bedside assisting with history.      Per chart review, patient was admitted 8/18-8/27/22 after sustaining a pathologic right femoral neck fracture and right femoral artery pseudoaneurysm from a fall at home. Patient taken to OR on 8/19 by Vascular Surgery and had embolization of 3rd order right profunda femoral artery pseudoaneurysm with N-BCA glue and 5mm coil aneurysm repair. Pt underwent right hip hemiarthroplasty with ablation, cementoplasty, and percutaneous fixation of pelvis for pathologic fracture and metastatic disease with Ortho on 8/23. 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 Metastatic work-up done  with CT scan of chest/abdomen and pelvis showing extensive disease including mediastinal mass with encasement of mediastinal vascular structures and airways, MARTHA pulmonary mass, bilateral suprarenal and left renal masses, L1 pathologic fx and rib & skull metastatic disease.      Per chart review, pt's spouse stated that pt had been ambulatory with a walker post-discharge, without c/o SOB, lightheadedness or dizziness. SOB started ~ 9/2 with productive cough. He denies fever/chills, chest pain, abd pain, N/V/D.      In the ED patient was in a flutter and was cardioverted with some improvement in symptoms. He was found to have right sided pneumonia and was started on vanc and cefepime and admitted to Hospital Medicine for further management.      During his time in the ED, the patient had escalating FiO2 requirements, now on 45L 100% comfort flow.      Critical Care Medicine was consulted for worsening hypoxemic respiratory failure    Pt is DNR. Pt on 40 L  @ 60 %      Hospital Course:  No notes on file    Interval History: Pt DNR. Stepped up to ICU for respiratory failure.     Past Medical History:   Diagnosis Date    Endocarditis     Pacemaker     Pneumonia        Past Surgical History:   Procedure Laterality Date    ANGIOGRAPHY OF LOWER EXTREMITY Right 8/19/2022    Procedure: ANGIOGRAM, LOWER EXTREMITY;  Surgeon: Thomas Nicolas MD;  Location: [facility] OR [address] [place] [address];  Service: Peripheral Vascular;  Laterality: Right;  30.0 min  315.10 mGy  26.1755 Gycm2  84 ml dye    HIP RESURFACING Right 8/23/2022    Procedure: cemontoplasty;  Surgeon: Yung Flores MD;  Location: [facility] OR [address] [place] [address];  Service: Orthopedics;  Laterality: Right;     RADIOFREQUENCY ABLATION, BONE, PERCUTANEOUS Right 8/23/2022    Procedure: RADIOFREQUENCY ABLATION,BONE;  Surgeon: Yung Flores MD;  Location: [facility] OR [facility];  Service: Orthopedics;  Laterality: Right;    REPAIR, PSEUDOANEURYSM, ARTERY, FEMORAL Right 8/19/2022    Procedure: REPAIR, PSEUDOANEURYSM, ARTERY, FEMORAL;  Surgeon: Thomas Nicolas MD;  Location: [facility] OR [facility];  Service: Peripheral Vascular;  Laterality: Right;  R PFA (3rd branch) pseudoaneurysm     TREATMENT OF CARDIAC ARRHYTHMIA N/A 9/8/2022    Procedure: Cardioversion or Defibrillation;  Surgeon: Lan Mccollum MD;  Location: [facility] EP LAB;  Service: Cardiology;  Laterality: N/A;  AFL, DCCV, ANES, SK, RM 6076       Review of patient's allergies indicates:   Allergen Reactions    Ambien [zolpidem] Other (See Comments)     Disturbing dreams       Medications:  Continuous Infusions:   sodium chloride 0.9% 5 mL/hr at 09/20/22 1000       Scheduled Meds:   albuterol-ipratropium  3 mL Nebulization Q4H    ceFEPime (MAXIPIME) IVPB  1 g Intravenous Q8H    EScitalopram oxalate  20 mg Oral QHS    methylPREDNISolone sodium succinate injection  80 mg Intravenous BID    morphine  30 mg Oral Q12H    pantoprazole  40 mg Intravenous Daily    polyethylene glycol  17 g Oral BID    pregabalin  75 mg Oral BID    senna-docusate 8.6-50 mg  1 tablet Oral BID    sodium chloride 0.9%  10 mL Intravenous Q6H    sodium chloride 3%  4 mL Nebulization Q8H     PRN Meds:acetaminophen, alteplase, heparin, porcine (PF), HYDROmorphone, melatonin, methocarbamoL, midazolam, ondansetron, Flushing PICC Protocol **AND** sodium chloride 0.9% **AND** sodium chloride 0.9%, sumatriptan    Family History    None       Tobacco Use    Smoking status: Every Day     Packs/day: 1.00     Types: Cigarettes    Smokeless tobacco: Never   Substance and Sexual Activity    Alcohol use: Not on file    Drug use: Not on file    Sexual activity: Not on file       Review of Systems    Constitutional:  Positive for activity change, fatigue and unexpected weight change.   Respiratory:  Positive for shortness of breath.    Cardiovascular:  Negative for leg swelling.   Gastrointestinal:  Negative for nausea and vomiting.   Musculoskeletal:  Positive for gait problem.   Skin: Negative.    Neurological:  Positive for weakness.   Psychiatric/Behavioral: Negative.     Objective:     Vital Signs (Most Recent):  Temp: 96.3 °F (35.7 °C) (09/20/22 0701)  Pulse: 64 (09/20/22 1000)  Resp: (!) 34 (09/20/22 1000)  BP: (!) 126/59 (09/20/22 1000)  SpO2: (!) 92 % (09/20/22 1000)   Vital Signs (24h Range):  Temp:  [96.3 °F (35.7 °C)-98.1 °F (36.7 °C)] 96.3 °F (35.7 °C)  Pulse:  [60-97] 64  Resp:  [17-56] 34  SpO2:  [86 %-97 %] 92 %  BP: (109-132)/(51-68) 126/59     Weight: 57.2 kg (126 lb 1.7 oz)  Body mass index is 18.62 kg/m².    Physical Exam  Constitutional:       Comments: Pt    HENT:      Head: Normocephalic and atraumatic.   Eyes:      General: Lids are normal.      Conjunctiva/sclera: Conjunctivae normal.   Cardiovascular:      Rate and Rhythm: Tachycardia present.   Pulmonary:      Effort: Tachypnea present. No accessory muscle usage or respiratory distress.      Comments: Pt on Bi-Pap  Abdominal:      General: Abdomen is flat.   Musculoskeletal:      Cervical back: Full passive range of motion without pain and normal range of motion.      Comments: Edema to left arm.    Skin:     General: Skin is warm and dry.   Neurological:      Mental Status: He is alert and oriented to person, place, and time.   Psychiatric:         Attention and Perception: Attention normal.         Speech: Speech normal.       Review of Symptoms      Symptom Assessment (ESAS 0-10 Scale)  Pain:  0  Dyspnea:  0  Anxiety:  0  Nausea:  0  Depression:  0  Anorexia:  0  Fatigue:  0  Insomnia:  0  Restlessness:  0  Agitation:  0         ECOG Performance Status thGthrthathdtheth:th th4th Living Arrangements:  Lives with family    Psychosocial/Cultural:  Pt legally  to Aston Collins. Per pt Aston works during the day. Per pt, Aston is his care-giver.       Advance Care Planning   Advance Directives:   Living Will: No    Do Not Resuscitate Status: Yes    Medical Power of : No    Agent's Name:  Aston Collins    Decision Making:  Patient answered questions       Significant Labs: All pertinent labs within the past 24 hours have been reviewed.  CBC:   Recent Labs   Lab 09/20/22 0342   WBC 0.37*   HGB 8.8*   HCT 27.5*   MCV 93   PLT 18*       BMP:  Recent Labs   Lab 09/20/22 0342   *      K 3.7      CO2 25   BUN 26*   CREATININE 1.0   CALCIUM 8.1*   MG 2.0       LFT:  Lab Results   Component Value Date    AST 11 09/20/2022    ALKPHOS 109 09/20/2022    BILITOT 0.4 09/20/2022     Albumin:   Albumin   Date Value Ref Range Status   09/20/2022 1.6 (L) 3.5 - 5.2 g/dL Final     Protein:   Total Protein   Date Value Ref Range Status   09/20/2022 4.4 (L) 6.0 - 8.4 g/dL Final     Lactic acid:   Lab Results   Component Value Date    LACTATE 1.7 09/05/2022    LACTATE 2.4 (H) 09/04/2022       Significant Imaging: I have reviewed all pertinent imaging results/findings within the past 24 hours.      > 50% of  35 min visit spent in chart review, face to face discussion of goals of care,  symptom assessment, coordination of care and emotional support    Zoe Potter, CNS  Palliative Medicine  [facility] - Cardiac Medical ICU

## 2022-09-20 NOTE — ASSESSMENT & PLAN NOTE
Hx endocarditis s/p MVR and AVR  Previously on warfarin  Was on heparin gtt bridging while INR subtherapeutic  Warfarin held in preparation of possible chest tube insertion  INR became therapeutic on 9/17 so held on further anticoagulation (was going to switch to full dose lovenox)  INR supratherapeutic from 9/18 but no obvious source of bleed and given mechanical valves will hold off on reversing warfarin  INR remains supratherapeutic 3.7; will repeat INR in PM given large drop from 7 to 3 in 24 hours; may need heparin gtt if level becomes subtherapeutic

## 2022-09-20 NOTE — PT/OT/SLP PROGRESS
Physical Therapy      Patient Name:  Donis Jimenez   MRN:  [id number]    Patient not seen today secondary to  (pt refused evaluation. Pt O2 sats 88% in bed with comfort flow 60Lat 82%. pt has O2 sat goal of 88%). Will follow-up at a later date.    9/20/2022  .

## 2022-09-20 NOTE — SUBJECTIVE & OBJECTIVE
Interval History/Significant Events: Weaned off from bipap, on HFNC. Sats ~91%. Reports he feels better this AM, eating breakfast.     Review of Systems   Constitutional:  Positive for fatigue. Negative for chills and fever.   HENT:  Negative for congestion and rhinorrhea.    Eyes:  Negative for photophobia and visual disturbance.   Respiratory:  Positive for shortness of breath and wheezing.    Cardiovascular:  Negative for chest pain and palpitations.   Gastrointestinal:  Positive for constipation. Negative for abdominal pain, nausea and vomiting.   Genitourinary:  Negative for dysuria and hematuria.   Musculoskeletal:  Negative for arthralgias and myalgias.   Skin:  Negative for rash and wound.   Neurological:  Negative for dizziness, light-headedness and headaches.   Psychiatric/Behavioral:  The patient is nervous/anxious.    Objective:     Vital Signs (Most Recent):  Temp: 97.5 °F (36.4 °C) (09/20/22 1101)  Pulse: 70 (09/20/22 1400)  Resp: (!) 26 (09/20/22 1400)  BP: 137/63 (09/20/22 1400)  SpO2: (!) 86 % (09/20/22 1400)   Vital Signs (24h Range):  Temp:  [96.3 °F (35.7 °C)-98.1 °F (36.7 °C)] 97.5 °F (36.4 °C)  Pulse:  [60-97] 70  Resp:  [17-56] 26  SpO2:  [86 %-97 %] 86 %  BP: (109-137)/(51-68) 137/63   Weight: 57.2 kg (126 lb 1.7 oz)  Body mass index is 18.62 kg/m².      Intake/Output Summary (Last 24 hours) at 9/20/2022 1447  Last data filed at 9/20/2022 1400  Gross per 24 hour   Intake 465.44 ml   Output 775 ml   Net -309.56 ml       Physical Exam  Vitals and nursing note reviewed.   Constitutional:       General: He is not in acute distress.     Appearance: He is cachectic. He is ill-appearing.   HENT:      Head: Normocephalic and atraumatic.      Mouth/Throat:      Mouth: Mucous membranes are moist.   Eyes:      Extraocular Movements: Extraocular movements intact.      Pupils: Pupils are equal, round, and reactive to light.   Cardiovascular:      Rate and Rhythm: Normal rate and regular rhythm.       Pulses: Normal pulses.      Heart sounds: Murmur (mechanical click) heard.   Pulmonary:      Breath sounds: Rhonchi and rales present.      Comments: On HFNC, satting 91%  Abdominal:      General: Bowel sounds are normal. There is no distension.      Palpations: Abdomen is soft.      Tenderness: There is no abdominal tenderness.   Musculoskeletal:         General: Swelling (right upper extremity; appears to be dependent edema, improving) present. No tenderness.      Right lower leg: No edema.      Left lower leg: No edema.   Skin:     General: Skin is warm.      Capillary Refill: Capillary refill takes less than 2 seconds.      Findings: No erythema or rash.   Neurological:      General: No focal deficit present.      Mental Status: He is alert and oriented to person, place, and time.   Psychiatric:         Mood and Affect: Mood normal.         Thought Content: Thought content normal.     Vents:  Oxygen Concentration (%): 100 (09/20/22 1141)  Lines/Drains/Airways       Peripherally Inserted Central Catheter Line  Duration             PICC Double Lumen 09/07/22 1734 right basilic 12 days              Peripheral Intravenous Line  Duration                  Peripheral IV - Single Lumen 09/19/22 1354 20 G Anterior;Left Upper Arm 1 day                  Significant Labs:    CBC/Anemia Profile:  Recent Labs   Lab 09/19/22  0314 09/20/22  0342   WBC 0.14* 0.37*   HGB 8.5* 8.8*   HCT 26.9* 27.5*   PLT 20* 18*   MCV 93 93   RDW 18.4* 18.7*          Chemistries:  Recent Labs   Lab 09/19/22  0314 09/20/22  0342    139   K 3.7 3.7    107   CO2 24 25   BUN 23* 26*   CREATININE 1.0 1.0   CALCIUM 7.9* 8.1*   ALBUMIN 1.6* 1.6*   PROT 4.2* 4.4*   BILITOT 0.3 0.4   ALKPHOS 91 109   ALT 13 19   AST 9* 11   MG 2.0 2.0   PHOS 2.8 2.6*         ABGs: No results for input(s): PH, PCO2, HCO3, POCSATURATED, BE in the last 48 hours.  BMP:   Recent Labs   Lab 09/20/22  0342   *      K 3.7      CO2 25   BUN 26*    CREATININE 1.0   CALCIUM 8.1*   MG 2.0       Lactic Acid: No results for input(s): LACTATE in the last 48 hours.  Urine Culture: No results for input(s): LABURIN in the last 48 hours.  Recent Lab Results         09/20/22  0342   09/20/22  0022        Albumin 1.6         Alkaline Phosphatase 109         ALT 19         Anion Gap 7         AST 11         Bands 12.0         Baso # Test Not Performed  Comment: CORRECTED RESULT; previously reported as 0.00 on %DDDDDDDD% at %TTT%.  [C]         Basophil % 0.0         BILIRUBIN TOTAL 0.4  Comment: For infants and newborns, interpretation of results should be based  on gestational age, weight and in agreement with clinical  observations.    Premature Infant recommended reference ranges:  Up to 24 hours.............<8.0 mg/dL  Up to 48 hours............<12.0 mg/dL  3-5 days..................<15.0 mg/dL  6-29 days.................<15.0 mg/dL           BUN 26         Calcium 8.1         Chloride 107         CO2 25         Creatinine 1.0         Differential Method Manual         Dohle Bodies Present         eGFR >60.0         Eos # Test Not Performed  Comment: CORRECTED RESULT; previously reported as 0.0 on %DDDDDDDD% at %TTT%.  [C]         Eosinophil % 0.0         Glucose 158         Gran % 36.0  Comment: CORRECTED RESULT; previously reported as 56.8 on %DDDDDDDD% at %TTT%.  [C]         Hematocrit 27.5         Hemoglobin 8.8         Immature Grans (Abs) CANCELED  Comment: Mild elevation in immature granulocytes is non specific and   can be seen in a variety of conditions including stress response,   acute inflammation, trauma and pregnancy. Correlation with other   laboratory and clinical findings is essential.    Result canceled by the ancillary.           Immature Granulocytes CANCELED  Comment: Result canceled by the ancillary.         INR 3.7  Comment: Coumadin Therapy:  2.0 - 3.0 for INR for all indicators except mechanical heart valves  and antiphospholipid syndromes  which should use 2.5 - 3.5.           Lymph # Test Not Performed  Comment: CORRECTED RESULT; previously reported as 0.1 on %DDDDDDDD% at %TTT%.  [C]         Lymph % 20.0  Comment: CORRECTED RESULT; previously reported as 16.2 on %DDDDDDDD% at %TTT%.  [C]         Magnesium 2.0         MCH 29.6         MCHC 32.0         MCV 93         Metamyelocytes 12.0         Mono # Test Not Performed  Comment: CORRECTED RESULT; previously reported as 0.1 on %DDDDDDDD% at %TTT%.  [C]         Mono % 12.0  Comment: CORRECTED RESULT; previously reported as 18.9 on %DDDDDDDD% at %TTT%.  [C]         MPV SEE COMMENT  Comment: Result not available.         Myelocytes 8.0         nRBC 0         Ovalocytes Occasional         Phosphorus 2.6         Platelet Estimate Decreased         Platelets 18  Comment: PLT CRITICA; CALLED TO TERE NORMAN RN by [facility] 09/20/2022 06:31         Poikilocytosis Slight         Potassium 3.7         PROTEIN TOTAL 4.4         Protime 36.1         RBC 2.97         RDW 18.7         Sodium 139         Toxic Granulation Present         Vancomycin-Trough   10.6       WBC 0.37  Comment: WBCs and prelim platelets    critical result(s) called and verbal   readback obtained from Rogers Norman RN. by R 09/20/2022 05:04                    [C] - Corrected Result               Significant Imaging:  I have reviewed all pertinent imaging results/findings within the past 24 hours.

## 2022-09-20 NOTE — ASSESSMENT & PLAN NOTE
Noted on previous hospitalization, possibly 2/2 to vascular compression and mass encasing mediastinal structures noted on CT  - non-occlusive thrombus in L cephalic vein  - also noted to have right upper extremity swelling, US doppler negative for DVT  - elevate arms

## 2022-09-20 NOTE — PROGRESS NOTES
Therapy with vancomycin was discontinued by the provider.  Pharmacy will sign off, please re-consult as needed.    Thank you for the consult,   Angelita Nelson, PharmD, [facility]  [phone]

## 2022-09-20 NOTE — ASSESSMENT & PLAN NOTE
- follow up evaluation of skin breakdown.  - pt admitted with sob.  - previous injury to sacral/buttocks area.  - lesions with pink wound base likely related to moisture dermatitis.  - wound with more purple discoloration and surrounding redness.  - continue Triad bid/prn soiling.  - On a Neha surface. Will order Immerse mattress.  - pillows for offloading. Foam dressing intact over bony prominence.   - pt encouraged to turn q2h.  - nursing to maintain pressure injury prevention measures and continue wound care per orders.

## 2022-09-20 NOTE — PT/OT/SLP PROGRESS
"Occupational Therapy      Patient Name:  Donis Jimenez   MRN:  [id number]    Patient declined skilled OT services today. Pt. Expressed he "can't breathe still" via dry ease board. Pt. Presented with SOB and with a SpO2 was ~ 88 at rest seated up right in bed. Pt. Is currently on comfort flow of 60 L 82%. Will follow-up 9/22/22.    9/20/2022    "

## 2022-09-20 NOTE — ASSESSMENT & PLAN NOTE
[facility] pathology report confirms small cell lung cancer from bone specimen. Situation explained to Pt and his   Started on inpatient palliative chemotherapy with intent to improve his tumor burden causing his hypoxic respiratory failure

## 2022-09-20 NOTE — ASSESSMENT & PLAN NOTE
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 Pt has metastatic lung cancer. Pt is alert, oriented to person, place, and situation. Pt is a DNR. Pt is on comfort flow 60 L 80%.     Reason for consult: GOC/ACP. Communicated with MAJOR Camp fellow with [facility].     Today:Met wit pt who is on comfort flow today. Pt is aware of current situation and that he is having respiratory issues and back on comfort flow. Support given. .     9/16/22  Met with pt who stated his goal at this time is to be comfortable and receive treatment. Goal is to be back home if possible and have more quality and quantity of life.     9/9/22: Pt very tearful today. Pt says "I know I am dying. " Pt kept apologizing.  Explained to pt there is not a need to apoligize and that that I am glad he felt comfortable to express his feelings. Pt repots he is having a bad day and everything starting to sink in about his health. Support given.  seeing. Will consult Oncology/Psycology for pt. Pt to step down to Hem/onc as soon as bed available.     9/6/22  Goals of care/ACP: Pt to have tracheal stenting tomorrow. Pt reports he is in agreement to procedure and would like CC team to speak to Aston about procedure when he visits today. Per pt, he relays information to Aston but he feels like he forgets some of information to tell him. Pt reports his goal is medical management at this time to see if he can improve enough to get back home. Pt reports his goal is to be at home with his four dogs and , Aston. Pt is aware of his severity of his illness.     Today: Met with pt along with Tracie Kidd LCSW.     9/6/22  Introduced role of Palliative care to pt.   Met with pt who is aware of his " "medical issues. Per pt he is aware he has metastatic cancer. Pt is aware that any therapies/procedures offered to him is palliative and will not cure cancer. Per pt, he is still trying to come to  with his dx. He learned about his issues in August. Pt reports goal at time is to see if anything can be done to help with symptoms and "give him some more time." Spoke to pt about amount of O2 he is on at this time. He verbalized understanding.  Per pt he has spoken to Oncology and XRT MDs.   CTS has been consulted concerning tracheal stenting.      Pt open to continued visits with [facility] care for care planning and symptom management needs.  Support given pt.      Pt reports he is legally  to Aston Collins and he would want him to be hi medical decision-maker if he cannot make his own medical decisions. MPOA paperwork left with pt. Education provided. Per Pt, Aston works during day but can be reached by phone.      Code status: DNR.      Symptom management:       Pain: Pt has reported he has pain to back chest area that can get up to 9/10.  Pt reported adding long-acting has improved pain.   At this time, pt reports he is comfortable     Pt is on MSContin 30 mg bid.   Pt is currently on Dilaudid 1 mg IVP q 4 hrs prn pain.     Recs:   Continue with Dilaudid IV.   Will need to convert to PO pain meds when pt gets closer to going home. (Pt is aware of this)     Dyspnea r/t to mediastinal mass, pleural effusion.   Pt is on Bi-PAP  Pt receiving breathing treatment.   Pt is on Dilaudid 1 mg IVP q 4 hrs prn.       Debility r/t to pathological femoral neck fracture s/p ight hip hemiarthroplasty with ablation, cementoplasty  Pt was using walker prior to admit.   Would resume PT/OT when pt stable to participate.     Pt still has stitches to hip and ABD area.      Anxiety r/t to new dx and dyspnea.   Pt  On versed  1 mg qid prn anxiety.   Pt reports he would like med for anxiety.   Let bedside nurse know.        Plan:   Will " continue to meet with pt to reinforce realistic expectations/assit with GOC.   See above symptom recs.  Support given.   Oncology/ Psychology seeing  Will follow.

## 2022-09-20 NOTE — ASSESSMENT & PLAN NOTE
Admitted with acute hypoxic respiratory failure likely secondary to extensive small cell lung cancer  Has BL pleural effusions; had chest tube which was removed on 9/14  Stepped up to ICU from oncology on 9/16 for increasing oxygen requirements  Bedside US BL lung did not show anything that could be tapped  CT chest showing RLL consolidation and stable BL pleural effusions; negative for PE    Plan:  - BCx NTD; will follow-up  - Started on broad spectrum abx (vanc and cefepime); d/c'd vancomycin on 9/20  - continue steroids; steroids changed from PO to IV on 9/20  - Repeat CXR 9/19 unchanged from prior  - Increased frequency of duonebs to Q4h  - Wean O2 as able; wean from bipap to HFNC

## 2022-09-20 NOTE — ASSESSMENT & PLAN NOTE
Had afib with RVR this admission was successfully cardioverted. IV amio load x72 hours completed and transitioned to PO.  Has remained in NSR since  Concerns that the amiodarone combined with abx is leading to a supratherapeutic INR    Plan:  - hold amiodarone   - remain on telemetry   - warfarin started 9/13 (mechanical heart valve) with bridging heparin gtt; held warfarin from 9/16 in preparation of possible chest tube insertion  - heparin gtt stopped on 9/17 and further AC was held due to therapeutic INR  - INR supratherapeutic as of 9/18 but improving  - Daily PT/INR  - will check PM INR on 9/20 and if level becomes subtherpeutic, will start heparin gtt

## 2022-09-20 NOTE — SUBJECTIVE & OBJECTIVE
Subjective:     HPI:  Donis Jimenez is a 61 year old mael with PMH significant for bacterial endocarditis, s/p AV and MV mechanical valve replacement (on warfarin), CKD, tobacco abuse with recent complicated hospital course who presents to the emergency department with shortness of breath.  Difficult for patient to provide history due to tachypnea; spouse at bedside assisting with history.     Patient with a flutter in emergency department.  He was cardioverted with return to normal sinus rhythm and improvement of shortness of breath.  While he is still tachypneic following restoration of normal sinus rhythm, per spouse, this is essentially his recent baseline. Skin integrity YASEMIN consulted for evaluation skin injuries to sacrum.    Hospital Course:   No notes on file      Follow-up For: Procedure(s) (LRB):  Cardioversion or Defibrillation (N/A)  Transesophageal echo (MARIE) intra-procedure log documentation    Post-Operative Day: 12 Days Post-Op    Scheduled Meds:   albuterol-ipratropium  3 mL Nebulization Q4H    ceFEPime (MAXIPIME) IVPB  1 g Intravenous Q8H    EScitalopram oxalate  20 mg Oral QHS    methylPREDNISolone sodium succinate injection  80 mg Intravenous BID    morphine  30 mg Oral Q12H    pantoprazole  40 mg Intravenous Daily    polyethylene glycol  17 g Oral BID    pregabalin  75 mg Oral BID    senna-docusate 8.6-50 mg  1 tablet Oral BID    sodium chloride 0.9%  10 mL Intravenous Q6H    sodium chloride 3%  4 mL Nebulization Q8H     Continuous Infusions:   sodium chloride 0.9% 5 mL/hr at 09/20/22 1200     PRN Meds:acetaminophen, alteplase, heparin, porcine (PF), HYDROmorphone, melatonin, methocarbamoL, midazolam, ondansetron, Flushing PICC Protocol **AND** sodium chloride 0.9% **AND** sodium chloride 0.9%, sumatriptan    Review of Systems   Skin:  Positive for wound.   Objective:     Vital Signs (Most Recent):  Temp: 97.5 °F (36.4 °C) (09/20/22 1101)  Pulse: 65 (09/20/22 1200)  Resp: (!) 25 (09/20/22  1200)  BP: (!) 132/57 (09/20/22 1200)  SpO2: (!) 88 % (09/20/22 1200)   Vital Signs (24h Range):  Temp:  [96.3 °F (35.7 °C)-98.1 °F (36.7 °C)] 97.5 °F (36.4 °C)  Pulse:  [60-97] 65  Resp:  [17-56] 25  SpO2:  [86 %-97 %] 88 %  BP: (109-132)/(51-68) 132/57     Weight: 57.2 kg (126 lb 1.7 oz)  Body mass index is 18.62 kg/m².    Physical Exam  Constitutional:       Appearance: Normal appearance.   Skin:     General: Skin is warm and dry.      Findings: Lesion present.   Neurological:      Mental Status: He is alert.       Laboratory:  All pertinent labs reviewed within the last 24 hours.    Diagnostic Results:  None

## 2022-09-20 NOTE — PLAN OF CARE
CMICU DAILY GOALS       A: Awake    RASS: Goal - RASS Goal: 0-->alert and calm  Actual - RASS (Garcia Agitation-Sedation Scale): -1-->drowsy   Restraint necessity:    B: Breathe   SBT: Not intubated   C: Coordinate A & B, analgesics/sedatives   Pain: managed    SAT: Not intubated  D: Delirium   CAM-ICU: Overall CAM-ICU: Negative  E: Early(intubated/ Progressive (non-intubated) Mobility   MOVE Screen: Fail   Activity: Activity Management: Arm raise - L1  FAS: Feeding/Nutrition   Diet order: Diet/Nutrition Received: mechanical/dental soft, Specialty Diet/Nutrition Received: renal diet  T: Thrombus   DVT prophylaxis: VTE Required Core Measure: Pharmacological prophylaxis initiated/maintained  H: HOB Elevation   Head of Bed (HOB) Positioning: HOB elevated  U: Ulcer Prophylaxis   GI: yes  G: Glucose control   managed    S: Skin   Bathing/Skin Care: bath, complete  Device Skin Pressure Protection: absorbent pad utilized/changed, adhesive use limited, pressure points protected, skin-to-device areas padded, skin-to-skin areas padded  Pressure Reduction Devices: positioning supports utilized, pressure-redistributing mattress utilized, specialty bed utilized  Pressure Reduction Techniques: frequent weight shift encouraged  Skin Protection: adhesive use limited, incontinence pads utilized, skin-to-device areas padded, skin-to-skin areas padded, transparent dressing maintained, tubing/devices free from skin contact, silicone foam dressing in place, skin sealant/moisture barrier applied  B: Bowel Function   constipation   I: Indwelling Catheters   Rodgers necessity:     CVC necessity: No  D: De-escalation Antibiotics   Yes    Family/Goals of care/Code Status   Code Status: DNR    24H Vital Sign Range  Temp:  [96.3 °F (35.7 °C)-98.1 °F (36.7 °C)]   Pulse:  [60-97]   Resp:  [17-56]   BP: (109-137)/(51-65)   SpO2:  [86 %-98 %]      Shift Events   No acute events throughout shift. Patient weaned to HFNC for a few hours then put back  on BIPAP. No bowel movement.     VS and assessment per flow sheet, patient progressing towards goals as tolerated, plan of care reviewed with family, all concerns addressed, will continue to monitor.    Laisha Feliz

## 2022-09-21 PROBLEM — Z51.5 COMFORT MEASURES ONLY STATUS: Status: ACTIVE | Noted: 2022-01-01

## 2022-09-21 NOTE — ASSESSMENT & PLAN NOTE
MMT  - MS Contin 30mg bid  - Dilaudid 1mg q4 prn  - Robaxin 500 q6h prn  - versed 1mg 4x daily prn  Given Pt is shifting to comfort focused care, can up-titrate medication doses as indicated

## 2022-09-21 NOTE — ASSESSMENT & PLAN NOTE
[facility] pathology report confirms small cell lung cancer from bone specimen. Situation explained to Pt and his   Started on inpatient palliative chemotherapy with intent to improve his tumor burden causing his hypoxic respiratory failure

## 2022-09-21 NOTE — SUBJECTIVE & OBJECTIVE
Interval History/Significant Events: Weaned off from bipap this morning and was saturating well on HFNC but had agonal breathing. VBG showing acidosis so switched back to bipap and given a dose of lasix but resp status did not improve.  at bedside - spoke with him and agreed that we would shift to comfort focused care.    Review of Systems   Constitutional:  Positive for fatigue. Negative for chills and fever.   HENT:  Negative for congestion and rhinorrhea.    Eyes:  Negative for photophobia and visual disturbance.   Respiratory:  Positive for shortness of breath and wheezing.    Cardiovascular:  Negative for chest pain and palpitations.   Gastrointestinal:  Negative for abdominal pain, constipation, nausea and vomiting.   Genitourinary:  Negative for dysuria and hematuria.   Musculoskeletal:  Negative for arthralgias and myalgias.   Skin:  Negative for rash and wound.   Neurological:  Negative for dizziness, light-headedness and headaches.   Psychiatric/Behavioral:  The patient is nervous/anxious.    Objective:     Vital Signs (Most Recent):  Temp: 97.4 °F (36.3 °C) (09/21/22 1500)  Pulse: 60 (09/21/22 1527)  Resp: (!) 27 (09/21/22 1527)  BP: (!) 69/36 (09/21/22 1500)  SpO2: 95 % (09/21/22 1527)   Vital Signs (24h Range):  Temp:  [94 °F (34.4 °C)-97.4 °F (36.3 °C)] 97.4 °F (36.3 °C)  Pulse:  [60-82] 60  Resp:  [27-58] 27  SpO2:  [90 %-97 %] 95 %  BP: ()/(36-61) 69/36   Weight: 57.2 kg (126 lb 1.7 oz)  Body mass index is 18.62 kg/m².      Intake/Output Summary (Last 24 hours) at 9/21/2022 1547  Last data filed at 9/21/2022 1100  Gross per 24 hour   Intake 14.93 ml   Output 300 ml   Net -285.07 ml       Physical Exam  Vitals and nursing note reviewed.   Constitutional:       General: He is in acute distress.      Appearance: He is cachectic. He is ill-appearing.   HENT:      Head: Normocephalic and atraumatic.      Mouth/Throat:      Mouth: Mucous membranes are moist.   Eyes:      Extraocular Movements:  Extraocular movements intact.      Pupils: Pupils are equal, round, and reactive to light.   Cardiovascular:      Rate and Rhythm: Normal rate and regular rhythm.      Pulses: Normal pulses.      Heart sounds: Murmur (mechanical click) heard.   Pulmonary:      Breath sounds: Rhonchi and rales present.      Comments: Agonal breathing, initially on HF then on bipap  Abdominal:      General: Bowel sounds are normal. There is no distension.      Palpations: Abdomen is soft.      Tenderness: There is no abdominal tenderness.   Musculoskeletal:         General: Swelling (right upper extremity; appears to be dependent edema, improving) present. No tenderness.      Right lower leg: No edema.      Left lower leg: No edema.   Skin:     General: Skin is warm.      Capillary Refill: Capillary refill takes less than 2 seconds.      Findings: No erythema or rash.   Neurological:      General: No focal deficit present.      Mental Status: He is alert and oriented to person, place, and time.   Psychiatric:         Mood and Affect: Mood normal.         Thought Content: Thought content normal.     Vents:  Oxygen Concentration (%): 80 (09/21/22 0900)  Lines/Drains/Airways       Peripherally Inserted Central Catheter Line  Duration             PICC Double Lumen 09/07/22 1734 right basilic 13 days              Drain  Duration             Male External Urinary Catheter 09/21/22 1110 <1 day              Peripheral Intravenous Line  Duration                  Peripheral IV - Single Lumen 09/19/22 1354 20 G Anterior;Left Upper Arm 2 days                  Significant Labs:    CBC/Anemia Profile:  Recent Labs   Lab 09/20/22  0342 09/21/22  0358   WBC 0.37* 1.12*   HGB 8.8* 8.9*   HCT 27.5* 29.1*   PLT 18* 19*   MCV 93 96   RDW 18.7* 19.3*          Chemistries:  Recent Labs   Lab 09/20/22  0342 09/21/22  0358    142   K 3.7 4.2    107   CO2 25 26   BUN 26* 37*   CREATININE 1.0 1.6*   CALCIUM 8.1* 8.1*   ALBUMIN 1.6* 1.6*   PROT 4.4*  4.5*   BILITOT 0.4 0.4   ALKPHOS 109 102   ALT 19 20   AST 11 10   MG 2.0 2.1   PHOS 2.6* 4.5         ABGs:   Recent Labs   Lab 09/21/22  1005   PH 7.013*   PCO2 111.3*   HCO3 28.3*   POCSATURATED 71*   BE -3     BMP:   Recent Labs   Lab 09/21/22  0358   *      K 4.2      CO2 26   BUN 37*   CREATININE 1.6*   CALCIUM 8.1*   MG 2.1       Lactic Acid: No results for input(s): LACTATE in the last 48 hours.  Urine Culture: No results for input(s): LABURIN in the last 48 hours.  Recent Lab Results         09/21/22  1005   09/21/22  0358   09/20/22  1615        Albumin   1.6         Alkaline Phosphatase   102         Allens Test N/A           ALT   20         Anion Gap   9         Aniso   Slight         AST   10         Baso #   0.01         Basophil %   0.9         BILIRUBIN TOTAL   0.4  Comment: For infants and newborns, interpretation of results should be based  on gestational age, weight and in agreement with clinical  observations.    Premature Infant recommended reference ranges:  Up to 24 hours.............<8.0 mg/dL  Up to 48 hours............<12.0 mg/dL  3-5 days..................<15.0 mg/dL  6-29 days.................<15.0 mg/dL           Site Other           BUN   37         Gama/Echinocytes   Occasional         Calcium   8.1         Chloride   107         CO2   26         Creatinine   1.6         DelSys CPAP/BiPAP           Differential Method   Automated         eGFR   49.0         Eos #   0.0         Eosinophil %   0.0         EP 8           FiO2 80           Glucose   116         Gran # (ANC)   0.8         Gran %   70.5         Hematocrit   29.1         Hemoglobin   8.9         Hypo   Occasional         Immature Grans (Abs)   0.02  Comment: Mild elevation in immature granulocytes is non specific and   can be seen in a variety of conditions including stress response,   acute inflammation, trauma and pregnancy. Correlation with other   laboratory and clinical findings is essential.            Immature Granulocytes   1.8         INR   4.0  Comment: Coumadin Therapy:  2.0 - 3.0 for INR for all indicators except mechanical heart valves  and antiphospholipid syndromes which should use 2.5 - 3.5.     4.0  Comment: Coumadin Therapy:  2.0 - 3.0 for INR for all indicators except mechanical heart valves  and antiphospholipid syndromes which should use 2.5 - 3.5.         IP 16           Lymph #   0.1         Lymph %   11.6         Magnesium   2.1         MCH   29.3         MCHC   30.6         MCV   96         Mode BiPAP           Mono #   0.2         Mono %   15.2         MPV   SEE COMMENT  Comment: Result not available.         nRBC   0         Ovalocytes   Occasional         Phosphorus   4.5         Platelets   19  Comment: PLT   critical result(s) called and verbal readback obtained from   KAREN WAGONER,NURSE by [facility] 09/21/2022 05:07           POC BE -3           POC HCO3 28.3           POC PCO2 111.3           POC PH 7.013           POC PO2 57           POC SATURATED O2 71           POC TCO2 32           Poikilocytosis   Slight         Poly   Occasional         Potassium   4.2         PROTEIN TOTAL   4.5         Protime   38.3   38.3       Rate 16           RBC   3.04         RDW   19.3         Sample VENOUS           Sodium   142         WBC   1.12  Comment: WBC  critical result(s) called and verbal readback obtained from   DONNIE WAGONER RN by [facility] 09/21/2022 04:30                 Significant Imaging:  I have reviewed all pertinent imaging results/findings within the past 24 hours.

## 2022-09-21 NOTE — PLAN OF CARE
Per ITD meeting, poor prognosis, patient likely won't survive this hospitalization.     Daphney Pryor, PER  [facility]  [phone]

## 2022-09-21 NOTE — PROGRESS NOTES
[facility] - Card[facility]  Critical Care Medicine  Progress Note    Patient Name: Donis Jimenez  MRN: [id number]  Admission Date: 9/4/2022  Hospital Length of Stay: 17 days  Code Status: DNR  Attending Provider: Moses Xiao MD  Primary Care Provider: Elio Farias MD   Principal Problem: Acute hypoxemic respiratory failure    Subjective:     HPI:  61 year old male with bacterial endocarditis, s/p AV and MV mechanical valve replacement (on warfarin), CKD, tobacco abuse, recent diagnosis of metastatic SCLC (extensive diseases including mediastinal mass, mets to brain, bone) with recent hospital admission for pathologic right femoral neck fracture and right femoral artery pseudoaneurysm requiring hemiarthroplasty with ortho and embolization by vascular surgery presenting with acute hypoxic respiratory failure. He was initially admitted to ICU for bipap and was stepped down to oncology once oxygen requirements improved. Received inpatient chemotherapy with carboplatin and etoposide on 9/7.     Critical Care Medicine was re-consulted on 9/16 for worsening hypoxemic respiratory failure.    Hospital/ICU Course:  Initially admitted to [facility] [place] 9/4 for worsening hypoxemic respiratory failure 2/2 PNA in setting of lung cancer with extensive metastatic disease. GOC discussed with Pt and  who agree that they would not want extreme measures such as CPR and mechanical ventilation given overall poor prognosis. Oxygen requirements improved and Pt stepped down to oncology. Received inpatient chemotherapy. Had BL pleural effusions; chest tube placed and later removed on 9/14. Stepped back up to ICU on 9/16 for increasing oxygen requirements - went from NC to non-breather. CTA chest negative for PE, showed ongoing RLL consolidation, and BL pleural effusions which are stable. Bedside US BL did not show significant pleural effusion that can be tapped. Pt was initially desatting when taken off of bipap but was  successfully weaned to HFNC saturating 88-92%. Started on broad spectrum abx and steroids for PNA. INR therapeutic on 9/17 so further anticoagulation held. Supratherapeutic from 9/18 without obvious source of bleed so will continue to hold AC. Held amiodarone as Pt has been in NSR and amiodarone combined with abx likely contributing to subtherapeutic INR. Had issues weaning from bipap on morning of 9/19; changed to IV solumedrol and Q4h duonebs. Repeat CXR without significant change. Respiratory status declined on 9/21 - saturating well on HFNC but had more agonal breathing concerning for acidosis. Switched to bipap and given a dose of 40mg IV lasix however respiratory status did not improve. His mentation is also worsening. Spoke with  at bedside at advised him that this deterioration will likely lead to him passing.     Interval History/Significant Events: Weaned off from bipap this morning and was saturating well on HFNC but had agonal breathing. VBG showing acidosis so switched back to bipap and given a dose of lasix but resp status did not improve.  at bedside - spoke with him and agreed that we would shift to comfort focused care.    Review of Systems   Constitutional:  Positive for fatigue. Negative for chills and fever.   HENT:  Negative for congestion and rhinorrhea.    Eyes:  Negative for photophobia and visual disturbance.   Respiratory:  Positive for shortness of breath and wheezing.    Cardiovascular:  Negative for chest pain and palpitations.   Gastrointestinal:  Negative for abdominal pain, constipation, nausea and vomiting.   Genitourinary:  Negative for dysuria and hematuria.   Musculoskeletal:  Negative for arthralgias and myalgias.   Skin:  Negative for rash and wound.   Neurological:  Negative for dizziness, light-headedness and headaches.   Psychiatric/Behavioral:  The patient is nervous/anxious.    Objective:     Vital Signs (Most Recent):  Temp: 97.4 °F (36.3 °C) (09/21/22  1500)  Pulse: 60 (09/21/22 1527)  Resp: (!) 27 (09/21/22 1527)  BP: (!) 69/36 (09/21/22 1500)  SpO2: 95 % (09/21/22 1527)   Vital Signs (24h Range):  Temp:  [94 °F (34.4 °C)-97.4 °F (36.3 °C)] 97.4 °F (36.3 °C)  Pulse:  [60-82] 60  Resp:  [27-58] 27  SpO2:  [90 %-97 %] 95 %  BP: ()/(36-61) 69/36   Weight: 57.2 kg (126 lb 1.7 oz)  Body mass index is 18.62 kg/m².      Intake/Output Summary (Last 24 hours) at 9/21/2022 1547  Last data filed at 9/21/2022 1100  Gross per 24 hour   Intake 14.93 ml   Output 300 ml   Net -285.07 ml       Physical Exam  Vitals and nursing note reviewed.   Constitutional:       General: He is in acute distress.      Appearance: He is cachectic. He is ill-appearing.   HENT:      Head: Normocephalic and atraumatic.      Mouth/Throat:      Mouth: Mucous membranes are moist.   Eyes:      Extraocular Movements: Extraocular movements intact.      Pupils: Pupils are equal, round, and reactive to light.   Cardiovascular:      Rate and Rhythm: Normal rate and regular rhythm.      Pulses: Normal pulses.      Heart sounds: Murmur (mechanical click) heard.   Pulmonary:      Breath sounds: Rhonchi and rales present.      Comments: Agonal breathing, initially on HF then on bipap  Abdominal:      General: Bowel sounds are normal. There is no distension.      Palpations: Abdomen is soft.      Tenderness: There is no abdominal tenderness.   Musculoskeletal:         General: Swelling (right upper extremity; appears to be dependent edema, improving) present. No tenderness.      Right lower leg: No edema.      Left lower leg: No edema.   Skin:     General: Skin is warm.      Capillary Refill: Capillary refill takes less than 2 seconds.      Findings: No erythema or rash.   Neurological:      General: No focal deficit present.      Mental Status: He is alert and oriented to person, place, and time.   Psychiatric:         Mood and Affect: Mood normal.         Thought Content: Thought content normal.      Vents:  Oxygen Concentration (%): 80 (09/21/22 0900)  Lines/Drains/Airways       Peripherally Inserted Central Catheter Line  Duration             PICC Double Lumen 09/07/22 1734 right basilic 13 days              Drain  Duration             Male External Urinary Catheter 09/21/22 1110 <1 day              Peripheral Intravenous Line  Duration                  Peripheral IV - Single Lumen 09/19/22 1354 20 G Anterior;Left Upper Arm 2 days                  Significant Labs:    CBC/Anemia Profile:  Recent Labs   Lab 09/20/22  0342 09/21/22  0358   WBC 0.37* 1.12*   HGB 8.8* 8.9*   HCT 27.5* 29.1*   PLT 18* 19*   MCV 93 96   RDW 18.7* 19.3*          Chemistries:  Recent Labs   Lab 09/20/22  0342 09/21/22  0358    142   K 3.7 4.2    107   CO2 25 26   BUN 26* 37*   CREATININE 1.0 1.6*   CALCIUM 8.1* 8.1*   ALBUMIN 1.6* 1.6*   PROT 4.4* 4.5*   BILITOT 0.4 0.4   ALKPHOS 109 102   ALT 19 20   AST 11 10   MG 2.0 2.1   PHOS 2.6* 4.5         ABGs:   Recent Labs   Lab 09/21/22  1005   PH 7.013*   PCO2 111.3*   HCO3 28.3*   POCSATURATED 71*   BE -3     BMP:   Recent Labs   Lab 09/21/22  0358   *      K 4.2      CO2 26   BUN 37*   CREATININE 1.6*   CALCIUM 8.1*   MG 2.1       Lactic Acid: No results for input(s): LACTATE in the last 48 hours.  Urine Culture: No results for input(s): LABURIN in the last 48 hours.  Recent Lab Results         09/21/22  1005   09/21/22  0358   09/20/22  1615        Albumin   1.6         Alkaline Phosphatase   102         Allens Test N/A           ALT   20         Anion Gap   9         Aniso   Slight         AST   10         Baso #   0.01         Basophil %   0.9         BILIRUBIN TOTAL   0.4  Comment: For infants and newborns, interpretation of results should be based  on gestational age, weight and in agreement with clinical  observations.    Premature Infant recommended reference ranges:  Up to 24 hours.............<8.0 mg/dL  Up to 48 hours............<12.0  mg/dL  3-5 days..................<15.0 mg/dL  6-29 days.................<15.0 mg/dL           Site Other           BUN   37         Gama/Echinocytes   Occasional         Calcium   8.1         Chloride   107         CO2   26         Creatinine   1.6         DelSys CPAP/BiPAP           Differential Method   Automated         eGFR   49.0         Eos #   0.0         Eosinophil %   0.0         EP 8           FiO2 80           Glucose   116         Gran # (ANC)   0.8         Gran %   70.5         Hematocrit   29.1         Hemoglobin   8.9         Hypo   Occasional         Immature Grans (Abs)   0.02  Comment: Mild elevation in immature granulocytes is non specific and   can be seen in a variety of conditions including stress response,   acute inflammation, trauma and pregnancy. Correlation with other   laboratory and clinical findings is essential.           Immature Granulocytes   1.8         INR   4.0  Comment: Coumadin Therapy:  2.0 - 3.0 for INR for all indicators except mechanical heart valves  and antiphospholipid syndromes which should use 2.5 - 3.5.     4.0  Comment: Coumadin Therapy:  2.0 - 3.0 for INR for all indicators except mechanical heart valves  and antiphospholipid syndromes which should use 2.5 - 3.5.         IP 16           Lymph #   0.1         Lymph %   11.6         Magnesium   2.1         MCH   29.3         MCHC   30.6         MCV   96         Mode BiPAP           Mono #   0.2         Mono %   15.2         MPV   SEE COMMENT  Comment: Result not available.         nRBC   0         Ovalocytes   Occasional         Phosphorus   4.5         Platelets   19  Comment: PLT   critical result(s) called and verbal readback obtained from   KAREN WAGONER,NURSE by ANAHY 09/21/2022 05:07           POC BE -3           POC HCO3 28.3           POC PCO2 111.3           POC PH 7.013           POC PO2 57           POC SATURATED O2 71           POC TCO2 32           Poikilocytosis   Slight         Poly   Occasional          Potassium   4.2         PROTEIN TOTAL   4.5         Protime   38.3   38.3       Rate 16           RBC   3.04         RDW   19.3         Sample VENOUS           Sodium   142         WBC   1.12  Comment: WBC  critical result(s) called and verbal readback obtained from   DONNIE WAGONER RN by [facility] 09/21/2022 04:30                 Significant Imaging:  I have reviewed all pertinent imaging results/findings within the past 24 hours.      ABG  Recent Labs   Lab 09/21/22  1005   PH 7.013*   PO2 57   PCO2 111.3*   HCO3 28.3*   BE -3     Assessment/Plan:     Psychiatric  Adjustment disorder with mixed anxiety and depressed mood  Adjustment disorder with mixed anxiety and depressed mood  May need to consult psychiatry   Talked to palliative and they stated that patient would not benefit from more pain medications given current emotional state    Plan:  - continue escitalopram 20mg daily  - versed 4x daily PRN for breakthrough anxiety    Pulmonary  * Acute hypoxemic respiratory failure  Admitted with acute hypoxic respiratory failure likely secondary to extensive small cell lung cancer  Has BL pleural effusions; had chest tube which was removed on 9/14  Stepped up to ICU from oncology on 9/16 for increasing oxygen requirements  Bedside US BL lung did not show anything that could be tapped  CT chest showing RLL consolidation and stable BL pleural effusions; negative for PE    Plan:  - BCx NTD  - Started on broad spectrum abx (vanc and cefepime); d/c'd vancomycin on 9/20; however will stop abx now that Pt is comfort focused care  - continue steroids; steroids changed from PO to IV on 9/20  - Repeat CXR 9/19 unchanged from prior  - Increased frequency of duonebs to Q4h  - supplemental O2 as needed, unlikely to be able to wean given Pt's clinical deterioration and current state     Exudative pleural effusion  Chest tube placed 9/6, removed 9/14  Had worsening hypoxia on 9/16 but US BL did not show large pleural effusion that could be  drained  CTA chest showing stable BL pleural effusions      Cardiac/Vascular  Atrial flutter  Had afib with RVR this admission was successfully cardioverted. IV amio load x72 hours completed and transitioned to PO.  Has remained in NSR since  Concerns that the amiodarone combined with abx is leading to a supratherapeutic INR    Plan:  - hold amiodarone   - warfarin started 9/13 (mechanical heart valve) with bridging heparin gtt; held warfarin from 9/16 in preparation of possible chest tube insertion  - heparin gtt stopped on 9/17 and further AC was held due to therapeutic INR  - INR supratherapeutic as of 9/18 but improving  - will stop lab draws as Pt is now comfort focused care    H/O mitral valve replacement with mechanical valve  Hx endocarditis s/p MVR and AVR  Previously on warfarin  Was on heparin gtt bridging while INR subtherapeutic  Warfarin held in preparation of possible chest tube insertion  INR became therapeutic on 9/17 so held on further anticoagulation (was going to switch to full dose lovenox)  INR supratherapeutic from 9/18 but no obvious source of bleed and given mechanical valves will hold off on reversing warfarin  INR remains supratherapeutic      Pseudoaneurysm of right femoral artery  S/p embolization of right profunda femoral artery pseudoaneurysm with 5 mm coil 8/20    Oncology  Tumor lysis syndrome  Patient had concerns for tumor lysis syndrome given active chemo with SCLC, treated prophylactically  Uric acid/LDH downtrending, no need to repeat    RESOLVED    Small cell carcinoma of lung  [facility] pathology report confirms small cell lung cancer from bone specimen. Situation explained to Pt and his   Started on inpatient palliative chemotherapy with intent to improve his tumor burden causing his hypoxic respiratory failure      Malignant neoplasm metastatic to bone  See small cell lung cancer       Endocrine  Body mass index (BMI) less than 19  in setting of malignancy and decreased  appetite.    - nutrition consulted    Orthopedic  Swelling of upper extremity  Noted on previous hospitalization, possibly 2/2 to vascular compression and mass encasing mediastinal structures noted on CT  - non-occlusive thrombus in L cephalic vein  - also noted to have right upper extremity swelling, US doppler negative for DVT  - elevate arms    Pathologic fracture of neck of right femur s/p hemiarthroplasty on 8/23/2022  S/p right hip hemiarthroplasty with ablation, cementoplasty, and percutaneous fixation of pelvis 8/23    Palliative Care  Comfort measures only status  Pt's respiratory status declined significantly on 9/21 with agonal breathing   Concerned that this deterioration is the Pt dying  Expressed this concern with his  at the bedside  Will switch to comfort focused care     Palliative care encounter  Palliative care consulted for GOC discussion; following Pt     ACP (advance care planning)  Advance Care Planning   Previously engaged in advanced care planning during previous ICU stay  Code status has not changed and Pt is still DNR         Other  Pain  MMT  - MS Contin 30mg bid  - Dilaudid 1mg q4 prn  - Robaxin 500 q6h prn  - versed 1mg 4x daily prn  Given Pt is shifting to comfort focused care, can up-titrate medication doses as indicated     Debility  PT/OT consulted for evaluation however Pt is now comfort focused care        Critical Care Daily Checklist:    A: Awake: RASS Goal/Actual Goal: RASS Goal: 0-->alert and calm  Actual: Garcia Agitation Sedation Scale (RASS): Moderate sedation   B: Spontaneous Breathing Trial Performed?     C: SAT & SBT Coordinated?  N/A                      D: Delirium: CAM-ICU Overall CAM-ICU: Positive   E: Early Mobility Performed? No   F: Feeding Goal: Goals: Meet % EEN, EPN by RD f/u date  Status: Nutrition Goal Status: progressing towards goal   Current Diet Order   Procedures    Diet Dysphagia Mechanical Soft (IDDSI Level 5)      AS:  Analgesia/Sedation Morphine, MS contin, versed   T: Thromboembolic Prophylaxis No supratherapeutic INR   H: HOB > 300 Yes   U: Stress Ulcer Prophylaxis (if needed) pantoprazole   G: Glucose Control controlled   B: Bowel Function Stool Occurrence: 1   I: Indwelling Catheter (Lines & Rodgers) Necessity PIVC   D: De-escalation of Antimicrobials/Pharmacotherapies Deescalate given comfort cares    Plan for the day/ETD Switch to comfort care, increase prns as needed    Code Status:  Family/Goals of Care: DNR       Critical secondary to Patient has a condition that poses threat to life and bodily function: Severe Respiratory Distress      Critical care was time spent personally by me on the following activities: development of treatment plan with patient or surrogate and bedside caregivers, discussions with consultants, evaluation of patient's response to treatment, examination of patient, ordering and performing treatments and interventions, ordering and review of laboratory studies, ordering and review of radiographic studies, pulse oximetry, re-evaluation of patient's condition. This critical care time did not overlap with that of any other provider or involve time for any procedures.     Shena Nash MD  Critical Care Medicine  [facility] - Cardiac Medical ICU

## 2022-09-21 NOTE — ASSESSMENT & PLAN NOTE
Hx endocarditis s/p MVR and AVR  Previously on warfarin  Was on heparin gtt bridging while INR subtherapeutic  Warfarin held in preparation of possible chest tube insertion  INR became therapeutic on 9/17 so held on further anticoagulation (was going to switch to full dose lovenox)  INR supratherapeutic from 9/18 but no obvious source of bleed and given mechanical valves will hold off on reversing warfarin  INR remains supratherapeutic

## 2022-09-21 NOTE — ASSESSMENT & PLAN NOTE
Pt's respiratory status declined significantly on 9/21 with agonal breathing   Concerned that this deterioration is the Pt dying  Expressed this concern with his  at the bedside  Will switch to comfort focused care

## 2022-09-21 NOTE — PROGRESS NOTES
"[facility] - Cardiac Medical [facility]  Palliative Medicine  Progress Note    Patient Name: Donis Jimenez  MRN: [id number]  Admission Date: 9/4/2022  Hospital Length of Stay: 17 days  Code Status: DNR   Attending Provider: Moses Xiao MD  Consulting Provider: LOREE Dean  Primary Care Physician: Elio Farias MD  Principal Problem:Acute hypoxemic respiratory failure    Patient information was obtained from patient and primary team.      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 Pt has metastatic lung cancer. Pt is alert, oriented to person, place, and situation. Pt is a DNR. Pt is on comfort flow 60 L 80%.     Reason for consult: GOC/ACP. Communicated with MAJOR Camp fellow with [facility].     Today: Dr. Xiao speaking to pt's . Pt decompensated and on Bi-PAP, somnolent.     Spoke to Dr. Xiao. Plan is comfort care. Bi-PAP to remain on for now.     9/20/22: Met wit pt who is on comfort flow today. Pt is aware of current situation and that he is having respiratory issues and back on comfort flow. Support given. .     9/16/22  Met with pt who stated his goal at this time is to be comfortable and receive treatment. Goal is to be back home if possible and have more quality and quantity of life.     9/9/22: Pt very tearful today. Pt says "I know I am dying. " Pt kept apologizing.  Explained to pt there is not a need to apoligize and that that I am glad he felt comfortable to express his feelings. Pt repots he is having a bad day and everything starting to sink in about his health. Support given.  seeing. Will consult Oncology/Psycology for pt. Pt to step down to Hem/onc as soon as " "bed available.     9/6/22  Goals of care/ACP: Pt to have tracheal stenting tomorrow. Pt reports he is in agreement to procedure and would like CC team to speak to Aston about procedure when he visits today. Per pt, he relays information to Aston but he feels like he forgets some of information to tell him. Pt reports his goal is medical management at this time to see if he can improve enough to get back home. Pt reports his goal is to be at home with his four dogs and , Aston. Pt is aware of his severity of his illness.     Today: Met with pt along with Tracie Kidd LCSW.     9/6/22  Introduced role of Palliative care to pt.   Met with pt who is aware of his medical issues. Per pt he is aware he has metastatic cancer. Pt is aware that any therapies/procedures offered to him is palliative and will not cure cancer. Per pt, he is still trying to come to  with his dx. He learned about his issues in August. Pt reports goal at time is to see if anything can be done to help with symptoms and "give him some more time." Spoke to pt about amount of O2 he is on at this time. He verbalized understanding.  Per pt he has spoken to Oncology and XRT MDs.   CTS has been consulted concerning tracheal stenting.      Pt open to continued visits with [facility] care for care planning and symptom management needs.  Support given pt.      Pt reports he is legally  to Aston Collins and he would want him to be hi medical decision-maker if he cannot make his own medical decisions. MPOA paperwork left with pt. Education provided. Per Pt, Aston works during day but can be reached by phone.      Code status: DNR.      Symptom management:   Pt nearing EOL. Pt on comfort meds.       Pain/dyspnea:   Pt on Dilaudid 1 mg IVP q 2 hrs prn.     Anxiety:   Pt on Versed 1 mg qid prn.   Pt on Haldol 1 mg IVP q 4 hrs prn.        Debility r/t to pathological femoral neck fracture s/p ight hip hemiarthroplasty with ablation, cementoplasty  Pt was " using walker prior to admit.   Would resume PT/OT when pt stable to participate.     Pt still has stitches to hip and ABD area.         Plan:   Pt is DNR.   Communicated with Dr. Xiao.   Pt to be put on comfort care.   Will follow         I will follow-up with patient. Please contact us if you have any additional questions.    Subjective:     Chief Complaint:   Chief Complaint   Patient presents with    Shortness of Breath     Pt brought to ED from home via [facility]. Per family pt SOB and productive cough. Pt had double valve replacement, pacemaker and hip surgery 1 week ago.        HPI:   Pt is a 61-year-old male with PMH significant for bacterial endocarditis, s/p AV and MV mechanical valve replacement (on warfarin), CKD, tobacco abuse with recent complicated hospital course who presented to the emergency department with shortness of breath.  Difficult for patient to provide history due to tachypnea; spouse at bedside assisting with history.      Per chart review, patient was admitted 8/18-8/27/22 after sustaining a pathologic right femoral neck fracture and right femoral artery pseudoaneurysm from a fall at home. Patient taken to OR on 8/19 by Vascular Surgery and had embolization of 3rd order right profunda femoral artery pseudoaneurysm with N-BCA glue and 5mm coil aneurysm repair. Pt underwent right hip hemiarthroplasty with ablation, cementoplasty, and percutaneous fixation of pelvis for pathologic fracture and metastatic disease with Ortho on 8/23. 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 Metastatic work-up done  with CT scan of chest/abdomen and pelvis showing extensive disease including mediastinal mass with encasement of mediastinal vascular structures and airways, MARTHA  pulmonary mass, bilateral suprarenal and left renal masses, L1 pathologic fx and rib & skull metastatic disease.      Per chart review, pt's spouse stated that pt had been ambulatory with a walker post-discharge, without c/o SOB, lightheadedness or dizziness. SOB started ~ 9/2 with productive cough. He denies fever/chills, chest pain, abd pain, N/V/D.      In the ED patient was in a flutter and was cardioverted with some improvement in symptoms. He was found to have right sided pneumonia and was started on vanc and cefepime and admitted to Hospital Medicine for further management.      During his time in the ED, the patient had escalating FiO2 requirements, now on 45L 100% comfort flow.      Critical Care Medicine was consulted for worsening hypoxemic respiratory failure    Pt is DNR. Pt on 40 L  @ 60 %      Hospital Course:  No notes on file    Interval History: Pt DNR. Plan is comfort care.    Past Medical History:   Diagnosis Date    Endocarditis     Pacemaker     Pneumonia        Past Surgical History:   Procedure Laterality Date    ANGIOGRAPHY OF LOWER EXTREMITY Right 8/19/2022    Procedure: ANGIOGRAM, LOWER EXTREMITY;  Surgeon: Thomas Nicolas MD;  Location: [facility] OR [address] [place] [address];  Service: Peripheral Vascular;  Laterality: Right;  30.0 min  315.10 mGy  26.1755 Gycm2  84 ml dye    HIP RESURFACING Right 8/23/2022    Procedure: cemontoplasty;  Surgeon: Yung Flores MD;  Location: [facility] OR [address] [place] [address];  Service: Orthopedics;  Laterality: Right;    RADIOFREQUENCY ABLATION, BONE, PERCUTANEOUS Right 8/23/2022    Procedure: RADIOFREQUENCY ABLATION,BONE;  Surgeon: Yung Flores MD;  Location: [address];  Service: Orthopedics;  Laterality: Right;    REPAIR, PSEUDOANEURYSM, ARTERY, FEMORAL Right 8/19/2022    Procedure: REPAIR, PSEUDOANEURYSM, ARTERY, FEMORAL;  Surgeon: Thomas Nicolas MD;  Location: [facility] OR [address] [place] [address];  Service: Peripheral Vascular;  Laterality: Right;  R PFA (3rd branch) pseudoaneurysm      TREATMENT OF CARDIAC ARRHYTHMIA N/A 9/8/2022    Procedure: Cardioversion or Defibrillation;  Surgeon: Lan Mccollum MD;  Location: [facility] LAB;  Service: Cardiology;  Laterality: N/A;  AFL, DCCV, ANES, SK, RM [id number]       Review of patient's allergies indicates:   Allergen Reactions    Ambien [zolpidem] Other (See Comments)     Disturbing dreams       Medications:  Continuous Infusions:   sodium chloride 0.9% 5 mL/hr at 09/20/22 1800       Scheduled Meds:   albuterol-ipratropium  3 mL Nebulization Q4H    ceFEPime (MAXIPIME) IVPB  1 g Intravenous Q12H    EScitalopram oxalate  20 mg Oral QHS    methylPREDNISolone sodium succinate injection  80 mg Intravenous BID    morphine  30 mg Oral Q12H    pantoprazole  40 mg Intravenous Daily    polyethylene glycol  17 g Oral BID    pregabalin  75 mg Oral Daily    senna-docusate 8.6-50 mg  1 tablet Oral BID    sodium chloride 0.9%  10 mL Intravenous Q6H    sodium chloride 3%  4 mL Nebulization Q8H     PRN Meds:acetaminophen, alteplase, haloperidol lactate, heparin, porcine (PF), HYDROmorphone, melatonin, methocarbamoL, midazolam, ondansetron, Flushing PICC Protocol **AND** sodium chloride 0.9% **AND** sodium chloride 0.9%, sumatriptan    Family History    None       Tobacco Use    Smoking status: Every Day     Packs/day: 1.00     Types: Cigarettes    Smokeless tobacco: Never   Substance and Sexual Activity    Alcohol use: Not on file    Drug use: Not on file    Sexual activity: Not on file       Review of Systems   Constitutional:  Positive for activity change, fatigue and unexpected weight change.   Respiratory:  Positive for shortness of breath.    Cardiovascular:  Negative for leg swelling.   Gastrointestinal:  Negative for nausea and vomiting.   Musculoskeletal:  Positive for gait problem.   Skin: Negative.    Neurological:  Positive for weakness.   Psychiatric/Behavioral: Negative.     Objective:     Vital Signs (Most Recent):  Temp: (!) 94.3 °F (34.6 °C)  (Evangelista zhou on) (09/21/22 1100)  Pulse: 60 (09/21/22 1200)  Resp: (!) 38 (09/21/22 1200)  BP: (!) 98/46 (09/21/22 1200)  SpO2: (!) 93 % (09/21/22 1200)   Vital Signs (24h Range):  Temp:  [94 °F (34.4 °C)-97.1 °F (36.2 °C)] 94.3 °F (34.6 °C)  Pulse:  [60-82] 60  Resp:  [26-57] 38  SpO2:  [86 %-98 %] 93 %  BP: ()/(43-63) 98/46     Weight: 57.2 kg (126 lb 1.7 oz)  Body mass index is 18.62 kg/m².    Physical Exam  Constitutional:       Comments: Pt on Bi-PAP, somnolent   HENT:      Head: Normocephalic and atraumatic.   Eyes:      General: Lids are normal.      Conjunctiva/sclera: Conjunctivae normal.   Cardiovascular:      Rate and Rhythm: Tachycardia present.   Pulmonary:      Effort: Tachypnea present. No accessory muscle usage or respiratory distress.      Comments: Pt on Bi-Pap  Abdominal:      General: Abdomen is flat.   Musculoskeletal:      Cervical back: Full passive range of motion without pain and normal range of motion.      Comments: Edema to left arm.    Skin:     General: Skin is warm and dry.   Neurological:      Mental Status: He is oriented to person, place, and time.       Review of Symptoms      Symptom Assessment (ESAS 0-10 Scale)  Pain:  0  Dyspnea:  0  Anxiety:  0  Nausea:  0  Depression:  0  Anorexia:  0  Fatigue:  0  Insomnia:  0  Restlessness:  0  Agitation:  0         ECOG Performance Status ndGndrndanddndend:nd nd2nd Living Arrangements:  Lives with family    Psychosocial/Cultural: Pt legally  to Aston Collins. Per pt Aston works during the day. Per pt, Aston is his care-giver.       Advance Care Planning   Advance Directives:   Living Will: No    Do Not Resuscitate Status: Yes    Medical Power of : No    Agent's Name:  Aston Collins    Decision Making:  Patient answered questions       Significant Labs: All pertinent labs within the past 24 hours have been reviewed.  CBC:   Recent Labs   Lab 09/21/22  0358   WBC 1.12*   HGB 8.9*   HCT 29.1*   MCV 96   PLT 19*       BMP:  Recent Labs   Lab  09/21/22  0358   *      K 4.2      CO2 26   BUN 37*   CREATININE 1.6*   CALCIUM 8.1*   MG 2.1       LFT:  Lab Results   Component Value Date    AST 10 09/21/2022    ALKPHOS 102 09/21/2022    BILITOT 0.4 09/21/2022     Albumin:   Albumin   Date Value Ref Range Status   09/21/2022 1.6 (L) 3.5 - 5.2 g/dL Final     Protein:   Total Protein   Date Value Ref Range Status   09/21/2022 4.5 (L) 6.0 - 8.4 g/dL Final     Lactic acid:   Lab Results   Component Value Date    LACTATE 1.7 09/05/2022    LACTATE 2.4 (H) 09/04/2022       Significant Imaging: I have reviewed all pertinent imaging results/findings within the past 24 hours.      > 50% of  35 min visit spent in chart review, face to face discussion of goals of care,  symptom assessment, coordination of care and emotional support    Zoe Potter, CNS  Palliative Medicine  [facility] - Cardiac Medical ICU

## 2022-09-21 NOTE — ASSESSMENT & PLAN NOTE
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 Pt has metastatic lung cancer. Pt is alert, oriented to person, place, and situation. Pt is a DNR. Pt is on comfort flow 60 L 80%.     Reason for consult: GOC/ACP. Communicated with MAJOR Camp fellow with [facility].     Today: Dr. Xiao speaking to pt's . Pt decompensated and on Bi-PAP, somnolent.     Spoke to Dr. Xiao. Plan is comfort care. Bi-PAP to remain on for now.     9/20/22: Met wit pt who is on comfort flow today. Pt is aware of current situation and that he is having respiratory issues and back on comfort flow. Support given. .     9/16/22  Met with pt who stated his goal at this time is to be comfortable and receive treatment. Goal is to be back home if possible and have more quality and quantity of life.     9/9/22: Pt very tearful today. Pt says "I know I am dying. " Pt kept apologizing.  Explained to pt there is not a need to apoligize and that that I am glad he felt comfortable to express his feelings. Pt repots he is having a bad day and everything starting to sink in about his health. Support given.  seeing. Will consult Oncology/Psycology for pt. Pt to step down to Hem/onc as soon as bed available.     9/6/22  Goals of care/ACP: Pt to have tracheal stenting tomorrow. Pt reports he is in agreement to procedure and would like CC team to speak to Aston about procedure when he visits today. Per pt, he relays information to Aston but he feels like he forgets some of information to tell him. Pt reports his goal is medical management at this time to see if he can improve enough to get back home. Pt reports his goal is to be at home with his four dogs and , Aston. Pt is aware of his " "severity of his illness.     Today: Met with pt along with Tracie Kidd LCSW.     9/6/22  Introduced role of Palliative care to pt.   Met with pt who is aware of his medical issues. Per pt he is aware he has metastatic cancer. Pt is aware that any therapies/procedures offered to him is palliative and will not cure cancer. Per pt, he is still trying to come to  with his dx. He learned about his issues in August. Pt reports goal at time is to see if anything can be done to help with symptoms and "give him some more time." Spoke to pt about amount of O2 he is on at this time. He verbalized understanding.  Per pt he has spoken to Oncology and XRT MDs.   CTS has been consulted concerning tracheal stenting.      Pt open to continued visits with [facility] care for care planning and symptom management needs.  Support given pt.      Pt reports he is legally  to Aston Collins and he would want him to be hi medical decision-maker if he cannot make his own medical decisions. MPOA paperwork left with pt. Education provided. Per Pt, Aston works during day but can be reached by phone.      Code status: DNR.      Symptom management:   Pt nearing EOL. Pt on comfort meds.       Pain/dyspnea:   Pt on Dilaudid 1 mg IVP q 2 hrs prn.     Anxiety:   Pt on Versed 1 mg qid prn.   Pt on Haldol 1 mg IVP q 4 hrs prn.        Debility r/t to pathological femoral neck fracture s/p ight hip hemiarthroplasty with ablation, cementoplasty  Pt was using walker prior to admit.   Would resume PT/OT when pt stable to participate.     Pt still has stitches to hip and ABD area.         Plan:   Pt is DNR.   Communicated with Dr. Xiao.   Pt to be put on comfort care.   Will follow   "

## 2022-09-21 NOTE — ASSESSMENT & PLAN NOTE
S/p right hip hemiarthroplasty with ablation, cementoplasty, and percutaneous fixation of pelvis 8/23

## 2022-09-21 NOTE — SUBJECTIVE & OBJECTIVE
Interval History: Pt DNR. Plan is comfort care.    Past Medical History:   Diagnosis Date    Endocarditis     Pacemaker     Pneumonia        Past Surgical History:   Procedure Laterality Date    ANGIOGRAPHY OF LOWER EXTREMITY Right 8/19/2022    Procedure: ANGIOGRAM, LOWER EXTREMITY;  Surgeon: Thomas Nicolas MD;  Location: [facility] OR [address] [place] [address];  Service: Peripheral Vascular;  Laterality: Right;  30.0 min  315.10 mGy  26.1755 Gycm2  84 ml dye    HIP RESURFACING Right 8/23/2022    Procedure: cemontoplasty;  Surgeon: Yung Flores MD;  [facility];  Service: Orthopedics;  Laterality: Right;    RADIOFREQUENCY ABLATION, BONE, PERCUTANEOUS Right 8/23/2022    Procedure: RADIOFREQUENCY ABLATION,BONE;  Surgeon: Yung Flores MD;  [address];  Service: Orthopedics;  Laterality: Right;    REPAIR, PSEUDOANEURYSM, ARTERY, FEMORAL Right 8/19/2022    Procedure: REPAIR, PSEUDOANEURYSM, ARTERY, FEMORAL;  Surgeon: Thomas Nicolas MD;  [address];  Service: Peripheral Vascular;  Laterality: Right;  R PFA (3rd branch) pseudoaneurysm     TREATMENT OF CARDIAC ARRHYTHMIA N/A 9/8/2022    Procedure: Cardioversion or Defibrillation;  Surgeon: Lan Mccollum MD;  Location: [facility] EP LAB;  Service: Cardiology;  Laterality: N/A;  AFL, DCCV, ANES, SK, RM [id number]       Review of patient's allergies indicates:   Allergen Reactions    Ambien [zolpidem] Other (See Comments)     Disturbing dreams       Medications:  Continuous Infusions:   sodium chloride 0.9% 5 mL/hr at 09/20/22 1800       Scheduled Meds:   albuterol-ipratropium  3 mL Nebulization Q4H    ceFEPime (MAXIPIME) IVPB  1 g Intravenous Q12H    EScitalopram oxalate  20 mg Oral QHS    methylPREDNISolone sodium succinate injection  80 mg Intravenous BID    morphine  30 mg Oral Q12H    pantoprazole  40 mg Intravenous Daily    polyethylene glycol  17 g Oral BID    pregabalin  75 mg Oral Daily    senna-docusate 8.6-50 mg  1 tablet Oral BID    sodium  chloride 0.9%  10 mL Intravenous Q6H    sodium chloride 3%  4 mL Nebulization Q8H     PRN Meds:acetaminophen, alteplase, haloperidol lactate, heparin, porcine (PF), HYDROmorphone, melatonin, methocarbamoL, midazolam, ondansetron, Flushing PICC Protocol **AND** sodium chloride 0.9% **AND** sodium chloride 0.9%, sumatriptan    Family History    None       Tobacco Use    Smoking status: Every Day     Packs/day: 1.00     Types: Cigarettes    Smokeless tobacco: Never   Substance and Sexual Activity    Alcohol use: Not on file    Drug use: Not on file    Sexual activity: Not on file       Review of Systems   Constitutional:  Positive for activity change, fatigue and unexpected weight change.   Respiratory:  Positive for shortness of breath.    Cardiovascular:  Negative for leg swelling.   Gastrointestinal:  Negative for nausea and vomiting.   Musculoskeletal:  Positive for gait problem.   Skin: Negative.    Neurological:  Positive for weakness.   Psychiatric/Behavioral: Negative.     Objective:     Vital Signs (Most Recent):  Temp: (!) 94.3 °F (34.6 °C) (Evangelista hugger on) (09/21/22 1100)  Pulse: 60 (09/21/22 1200)  Resp: (!) 38 (09/21/22 1200)  BP: (!) 98/46 (09/21/22 1200)  SpO2: (!) 93 % (09/21/22 1200)   Vital Signs (24h Range):  Temp:  [94 °F (34.4 °C)-97.1 °F (36.2 °C)] 94.3 °F (34.6 °C)  Pulse:  [60-82] 60  Resp:  [26-57] 38  SpO2:  [86 %-98 %] 93 %  BP: ()/(43-63) 98/46     Weight: 57.2 kg (126 lb 1.7 oz)  Body mass index is 18.62 kg/m².    Physical Exam  Constitutional:       Comments: Pt on Bi-PAP, somnolent   HENT:      Head: Normocephalic and atraumatic.   Eyes:      General: Lids are normal.      Conjunctiva/sclera: Conjunctivae normal.   Cardiovascular:      Rate and Rhythm: Tachycardia present.   Pulmonary:      Effort: Tachypnea present. No accessory muscle usage or respiratory distress.      Comments: Pt on Bi-Pap  Abdominal:      General: Abdomen is flat.   Musculoskeletal:      Cervical back: Full  passive range of motion without pain and normal range of motion.      Comments: Edema to left arm.    Skin:     General: Skin is warm and dry.   Neurological:      Mental Status: He is oriented to person, place, and time.       Review of Symptoms      Symptom Assessment (ESAS 0-10 Scale)  Pain:  0  Dyspnea:  0  Anxiety:  0  Nausea:  0  Depression:  0  Anorexia:  0  Fatigue:  0  Insomnia:  0  Restlessness:  0  Agitation:  0         ECOG Performance Status ndGndrndanddndend:nd nd2nd Living Arrangements:  Lives with family    Psychosocial/Cultural: Pt legally  to Aston Collins. Per pt Aston works during the day. Per pt, Aston is his care-giver.       Advance Care Planning   Advance Directives:   Living Will: No    Do Not Resuscitate Status: Yes    Medical Power of : No    Agent's Name:  Aston Collins    Decision Making:  Patient answered questions       Significant Labs: All pertinent labs within the past 24 hours have been reviewed.  CBC:   Recent Labs   Lab 09/21/22 0358   WBC 1.12*   HGB 8.9*   HCT 29.1*   MCV 96   PLT 19*       BMP:  Recent Labs   Lab 09/21/22 0358   *      K 4.2      CO2 26   BUN 37*   CREATININE 1.6*   CALCIUM 8.1*   MG 2.1       LFT:  Lab Results   Component Value Date    AST 10 09/21/2022    ALKPHOS 102 09/21/2022    BILITOT 0.4 09/21/2022     Albumin:   Albumin   Date Value Ref Range Status   09/21/2022 1.6 (L) 3.5 - 5.2 g/dL Final     Protein:   Total Protein   Date Value Ref Range Status   09/21/2022 4.5 (L) 6.0 - 8.4 g/dL Final     Lactic acid:   Lab Results   Component Value Date    LACTATE 1.7 09/05/2022    LACTATE 2.4 (H) 09/04/2022       Significant Imaging: I have reviewed all pertinent imaging results/findings within the past 24 hours.

## 2022-09-21 NOTE — ASSESSMENT & PLAN NOTE
Admitted with acute hypoxic respiratory failure likely secondary to extensive small cell lung cancer  Has BL pleural effusions; had chest tube which was removed on 9/14  Stepped up to ICU from oncology on 9/16 for increasing oxygen requirements  Bedside US BL lung did not show anything that could be tapped  CT chest showing RLL consolidation and stable BL pleural effusions; negative for PE    Plan:  - BCx NTD  - Started on broad spectrum abx (vanc and cefepime); d/c'd vancomycin on 9/20; however will stop abx now that Pt is comfort focused care  - continue steroids; steroids changed from PO to IV on 9/20  - Repeat CXR 9/19 unchanged from prior  - Increased frequency of duonebs to Q4h  - supplemental O2 as needed, unlikely to be able to wean given Pt's clinical deterioration and current state

## 2022-09-21 NOTE — ASSESSMENT & PLAN NOTE
Had afib with RVR this admission was successfully cardioverted. IV amio load x72 hours completed and transitioned to PO.  Has remained in NSR since  Concerns that the amiodarone combined with abx is leading to a supratherapeutic INR    Plan:  - hold amiodarone   - warfarin started 9/13 (mechanical heart valve) with bridging heparin gtt; held warfarin from 9/16 in preparation of possible chest tube insertion  - heparin gtt stopped on 9/17 and further AC was held due to therapeutic INR  - INR supratherapeutic as of 9/18 but improving  - will stop lab draws as Pt is now comfort focused care

## 2022-09-21 NOTE — PT/OT/SLP PROGRESS
Physical Therapy      Patient Name:  Donis Jimenez   MRN:  [id number]    Patient not seen today secondary to  (pt not seen and orders discharged due to decline in pt medical status. No skilled PT is needed.).     9/21/2022

## 2022-09-21 NOTE — PT/OT/SLP DISCHARGE
Occupational Therapy Discharge Summary    Donis Jimenez  MRN: [id number]   Principal Problem: Acute hypoxemic respiratory failure      Patient Discharged from acute Occupational Therapy on 9/21 2* decline in medical status.    Assessment:      Patient was discharged unexpectedly.  Information required to complete an accurate discharge summary is unknown.  Refer to therapy initial evaluation and last progress note for initial and most recent functional status and goal achievement.  Recommendations made may be found in medical record.    Objective:     GOALS:   Multidisciplinary Problems       Occupational Therapy Goals          Problem: Occupational Therapy    Goal Priority Disciplines Outcome Interventions   Occupational Therapy Goal     OT, PT/OT Ongoing, Progressing    Description: Goals set on 9/7 with expiration date 9/21:  Patient will increase functional independence with ADLs by performing:    Supine <> Sit with Stand-by Assistance.  Feeding with set up  Grooming while seated at sink with Stand-by Assistance.  UB Dressing with Stand-by Assistanceset up at bed level..  LB Dressing with Stand-by Assistance set up at bed level.  Stand pivot transfer with Min Assistance with DME as needed.  Pt will demonstrate understanding of education provided regarding energy conservation and task modification through teach-back method.                            Reasons for Discontinuation of Therapy Services  Patient is unable to continue work toward goals because of medical or psychosocial complications.      Plan:     Patient Discharged to:  ICU    9/21/2022

## 2022-09-21 NOTE — SIGNIFICANT EVENT
Death Note  Hospital Medicine  [facility]          I was called to bedside on 9/21/2022 at 18:20. Nursing at beside, nursing supervisor notified. Family at bedside.     Patient is not responding to verbal or tactile stimuli. No heart or breath sounds on auscultation. No respirations. No palpable pulses. Patient does not have a pupillary or corneal reflex. Patient's pupils are fixed and dilated.    Time of death is 9/21/2022  at 18:26    Preliminary cause of Death acute hypoxemic respiratory failure, metastatic small cell lung cancer     The family has been informed of the death and condolences given. The  has been notified  for further support.     Shena Nash MD   [facility]

## 2022-09-22 LAB
BACTERIA BLD CULT: NORMAL
BACTERIA BLD CULT: NORMAL

## 2022-09-22 NOTE — DISCHARGE SUMMARY
[facility] Card[facility] Medical [facility]  Critical Care Medicine  Discharge Summary      Patient Name: Donis Jimenez  MRN: [id number]  Admission Date: 9/4/2022  Hospital Length of Stay: 17 days  Discharge Date and Time:  09/21/2022 7:30 PM  Attending Physician: Moses Xiao MD   Discharging Provider: Shena Nash MD  Primary Care Provider: Elio Farias MD  Reason for Admission: Acute hypoxemic respiratory failure     HPI:   61 year old male with bacterial endocarditis, s/p AV and MV mechanical valve replacement (on warfarin), CKD, tobacco abuse, recent diagnosis of metastatic SCLC (extensive diseases including mediastinal mass, mets to brain, bone) with recent hospital admission for pathologic right femoral neck fracture and right femoral artery pseudoaneurysm requiring hemiarthroplasty with ortho and embolization by vascular surgery presenting with acute hypoxic respiratory failure. He was initially admitted to ICU for bipap and was stepped down to oncology once oxygen requirements improved. Received inpatient chemotherapy with carboplatin and etoposide on 9/7.     Critical Care Medicine was re-consulted on 9/16 for worsening hypoxemic respiratory failure.    Procedure(s) (LRB):  Cardioversion or Defibrillation (N/A)  Transesophageal echo (MARIE) intra-procedure log documentation    Indwelling Lines/Drains at Time of Discharge:   Lines/Drains/Airways     Peripherally Inserted Central Catheter Line  Duration           PICC Double Lumen 09/07/22 1734 right basilic 14 days          Drain  Duration           Male External Urinary Catheter 09/21/22 1110 <1 day              Hospital Course:   Initially admitted to [facility] 9/4 for worsening hypoxemic respiratory failure 2/2 PNA in setting of lung cancer with extensive metastatic disease. GOC discussed with Pt and  who agree that they would not want extreme measures such as CPR and mechanical ventilation given overall poor prognosis. Oxygen requirements improved  and Pt stepped down to oncology. Received inpatient chemotherapy. Had BL pleural effusions; chest tube placed and later removed on 9/14. Stepped back up to ICU on 9/16 for increasing oxygen requirements - went from NC to non-breather. CTA chest negative for PE, showed ongoing RLL consolidation, and BL pleural effusions which are stable. Bedside US BL did not show significant pleural effusion that can be tapped. Pt was initially desatting when taken off of bipap but was successfully weaned to HFNC saturating 88-92%. Started on broad spectrum abx and steroids for PNA. INR therapeutic on 9/17 so further anticoagulation held. Supratherapeutic from 9/18 without obvious source of bleed so will continue to hold AC. Held amiodarone as Pt has been in NSR and amiodarone combined with abx likely contributing to subtherapeutic INR. Had issues weaning from bipap on morning of 9/19; changed to IV solumedrol and Q4h duonebs. Repeat CXR without significant change. Respiratory status declined on 9/21 - saturating well on HFNC but had more agonal breathing concerning for acidosis. Switched to bipap and given a dose of 40mg IV lasix however respiratory status did not improve. His mentation is also worsening. Spoke with  at bedside at advised him that this deterioration will likely lead to him passing. Pt passed at 18:26 on 9/21/22.    Consults (From admission, onward)        Status Ordering Provider     Inpatient consult to Critical Care Medicine  Once        Provider:  (Not yet assigned)    Completed TEGAN JAVED     Inpatient consult to Physical Medicine Rehab  Once        Provider:  (Not yet assigned)    Completed KHANH MANRIQUEZ     Inpatient consult to Pulmonology  Once        Provider:  (Not yet assigned)    Completed KHANH MANRIQUEZ     Inpatient consult to Social Work  Once        Provider:  (Not yet assigned)    Acknowledged ABDOULAYE FLORES     Inpatient consult to Psychiatry  Once        Provider:  (Not yet  assigned)    Completed TANIA OY     Inpatient consult to Hematology/Oncology Psychology  Once        Provider:  Boubacar Hatfield, PhD    Completed LEELEE DOOLEY     Inpatient consult to PICC team ([facility])  Once        Provider:  (Not yet assigned)    Completed STEPHANIE ZEPEDA     Inpatient consult to Spiritual Care  Once        Provider:  (Not yet assigned)    Completed STEPHANIE ZEPEDA     Inpatient consult to Cardiothoracic Surgery  Once        Provider:  (Not yet assigned)    Completed STEPHANIE ZEPEDA     Inpatient consult to Palliative Care  Once        Provider:  (Not yet assigned)    Completed TERRY SOLOMON     Inpatient consult to Radiation Oncology  Once        Provider:  (Not yet assigned)    Completed TERRY SOLOMON     Inpatient consult to Hematology/Oncology  Once        Provider:  (Not yet assigned)    Completed SHERIN NIXON     Inpatient consult to Critical Care Medicine  Once        Provider:  (Not yet assigned)    Completed SERA ROMERO     Inpatient consult to Critical Care Medicine  Once        Provider:  (Not yet assigned)    Completed SELENE JOHNSON     Inpatient consult to Registered Dietitian/Nutritionist  Once        Provider:  (Not yet assigned)    Completed SELENE JOHNSON     Inpatient consult to Cardiology  Once        Provider:  (Not yet assigned)    Completed SELENE JOHNSON        Significant Labs:  CMP:   Recent Labs   Lab 09/20/22  0342 09/21/22  0358    142   K 3.7 4.2    107   CO2 25 26   * 116*   BUN 26* 37*   CREATININE 1.0 1.6*   CALCIUM 8.1* 8.1*   PROT 4.4* 4.5*   ALBUMIN 1.6* 1.6*   BILITOT 0.4 0.4   ALKPHOS 109 102   AST 11 10   ALT 19 20   ANIONGAP 7* 9     All pertinent labs within the past 24 hours have been reviewed.    Significant Imaging:  All significant imaging has been reviewed.     Pending Diagnostic Studies:     Procedure Component Value Units Date/Time    Protime-INR [[id number]] Collected: 09/12/22 0442     Order Status: Sent Lab Status: In process Updated: 22 0451    Specimen: Blood         Final Active Diagnoses:    Diagnosis Date Noted POA    PRINCIPAL PROBLEM:  Acute hypoxemic respiratory failure [J96.01] 2022 Yes    Comfort measures only status [Z51.5] 2022 Not Applicable    Anxiety [F41.9] 2022 Yes    Pain [R52] 2022 Yes    Tumor lysis syndrome [E88.3] 09/10/2022 Yes    Adjustment disorder with mixed anxiety and depressed mood [F43.23] 2022 Yes    Small cell carcinoma of lung [C34.90] 2022 Yes    Goals of care, counseling/discussion [Z71.89] 2022 Not Applicable    Debility [R53.81] 2022 Yes    Advance care planning [Z71.89] 2022 Not Applicable    Palliative care encounter [Z51.5] 2022 Not Applicable    Dermatitis associated with moisture [L30.8] 2022 Yes    Exudative pleural effusion [J90] 2022 Yes    Atrial flutter [I48.92] 2022 Yes    Swelling of upper extremity [M79.89] 2022 Yes    ACP (advance care planning) [Z71.89] 2022 Not Applicable    Malignant neoplasm metastatic to bone [C79.51] 2022 Yes    H/O mitral valve replacement with mechanical valve [Z95.2] 2022 Not Applicable    Body mass index (BMI) less than 19 [Z68.1] 2022 Not Applicable    Pseudoaneurysm of right femoral artery [I72.4]  Yes    Pathologic fracture of neck of right femur s/p hemiarthroplasty on 2022 [M84.451A] 2022 Yes      Problems Resolved During this Admission:    Diagnosis Date Noted Date Resolved POA    Sepsis [A41.9] 2022 Yes     No new Assessment & Plan notes have been filed under this hospital service since the last note was generated.  Service: Critical Care Medicine    Discharged Condition:     Patient Instructions:   No discharge procedures on file.  Medications:  Reconciled Home Medications:      Medication List      STOP taking these medications     acetaminophen 500 MG tablet  Commonly known as: TYLENOL     EScitalopram oxalate 20 MG tablet  Commonly known as: LEXAPRO     esomeprazole 20 MG capsule  Commonly known as: NEXIUM     ferrous gluconate 324 MG tablet  Commonly known as: FERGON     LORazepam 1 MG tablet  Commonly known as: ATIVAN     methocarbamoL 500 MG Tab  Commonly known as: ROBAXIN     oxyCODONE 5 MG immediate release tablet  Commonly known as: ROXICODONE     pravastatin 40 MG tablet  Commonly known as: PRAVACHOL     pregabalin 75 MG capsule  Commonly known as: LYRICA     sumatriptan 25 MG Tab  Commonly known as: IMITREX     vitamin D 1000 units Tab  Commonly known as: VITAMIN D3     warfarin 3 MG tablet  Commonly known as: COUMADIN          Sehna Nash MD  Critical Care Medicine  [facility] - Cardiac Medical ICU

## 2022-09-22 NOTE — PLAN OF CARE
[facility]  Discharge Final Note    Primary Care Provider: Elio Farias MD    Expected Discharge Date: 2022    Date of Death: 2022  Time of Death:   Preliminary Cause of Death: Acute Hypoxemic Respiratory Failure, Metastatic Small Cell Lung Cancer      Final Discharge Note (most recent)       Final Note - 22 0937          Final Note    Assessment Type Final Discharge Note     Anticipated Discharge Disposition                    Daphney Pryor LMSW  [facility]  [phone]      Important Message from Medicare

## 2023-09-09 NOTE — ED NOTES
Patient reminded about urine sample. Urinal provided. Pt adamant about not having straight cath.    Severe aortic stenosis

## (undated) DEVICE — DRAPE C-ARM ELAS CLIP 42X120IN

## (undated) DEVICE — DRESSING TRANS 4X4 TEGADERM

## (undated) DEVICE — DEVICE KYPHON BONE BIOPSY 13G

## (undated) DEVICE — GUIDEWIRE FATHOM .016 X 180

## (undated) DEVICE — INSERTS STEALTH FIBRA SIZE 1.

## (undated) DEVICE — NDL HYPO REG 25G X 1 1/2

## (undated) DEVICE — Device

## (undated) DEVICE — CARTRIDGE KYPHON CEMENT DEL

## (undated) DEVICE — SUT SILK 3-0 SH 18IN BLACK

## (undated) DEVICE — TRAY CATH FOL SIL URIMTR 16FR

## (undated) DEVICE — GAUZE SPONGE 4X4 12PLY

## (undated) DEVICE — SUT 7/0 18IN PROLENE BL MO

## (undated) DEVICE — SUT ETHIBOND EXCEL 5-0 V-40

## (undated) DEVICE — KIT INTRO MICRO NIT VSI 4FR

## (undated) DEVICE — DEVICE CLOSURE MYNX GRIP 5FR

## (undated) DEVICE — GUIDEWIRE ADVNTG 018X180CM ANG

## (undated) DEVICE — COVER LIGHT HANDLE 80/CA

## (undated) DEVICE — SUT 4-0 12-18IN SILK BLACK

## (undated) DEVICE — SEE MEDLINE ITEM 156911

## (undated) DEVICE — PACK DRAPE UNIVERSAL CONVERTOR

## (undated) DEVICE — DRAPE THREE-QTR REINF 53X77IN

## (undated) DEVICE — SPONGE DERMACEA 4X4IN 12PLY

## (undated) DEVICE — BIT DRILL ASNIS III 3.2X300MM

## (undated) DEVICE — GAUZE SPONGE PEANUT STRL

## (undated) DEVICE — GUIDEWIRE ADVNTG 035X180CM ANG

## (undated) DEVICE — SHEATH ANSEL FLEXOR 5FRX45CM

## (undated) DEVICE — SUT VICRYL CTD 2-0 GI 27 SH

## (undated) DEVICE — MANIFOLD WIDE 2 PORT 200PSI

## (undated) DEVICE — SUT VICRYL 3-0 27 SH

## (undated) DEVICE — DRESSING TRANS 2X2 TEGADERM

## (undated) DEVICE — PRESSURIZER HI VAC HIP KIT

## (undated) DEVICE — DRAPE INCISE IOBAN 2 23X33IN

## (undated) DEVICE — VALVE CONTROL COPILOT

## (undated) DEVICE — DRESSING AQUACEL AG 3.5X10IN

## (undated) DEVICE — SUT 3-0 12-18IN SILK

## (undated) DEVICE — BLADE SCALP OPHTL BEVEL STR

## (undated) DEVICE — RETRIEVER SUTURE HEWSON DISP

## (undated) DEVICE — BRUSH INTRAMEDULLARY

## (undated) DEVICE — SET FLUID ADMIN 72 L SPK LG

## (undated) DEVICE — SUT MCRYL PLUS 4-0 PS2 27IN

## (undated) DEVICE — SUT VICRYL PLUS 3-0 SH 18IN

## (undated) DEVICE — CATH PROGREAT 2.8FR 150CM

## (undated) DEVICE — INTRODUCER VASC RADPQ 5FRX10CM

## (undated) DEVICE — GUIDEWIRE STF .035X180CM ANG

## (undated) DEVICE — TAPE SURG DURAPORE 2 X10YD

## (undated) DEVICE — SUT PROLENE 6-0 BV-1 30IN

## (undated) DEVICE — SUT VICRYL PLUS 0 CT1 18IN

## (undated) DEVICE — DRESSING AQUACEL AG ADV 3.5X6

## (undated) DEVICE — SUT 2-0 12-18IN SILK

## (undated) DEVICE — CATH 5FR OMNIFLUSH 65CM .038

## (undated) DEVICE — TAMP KYPHON EXPRESS II 20MM

## (undated) DEVICE — BAG COLL CLR TUBE 48IN 1400ML

## (undated) DEVICE — LOOP VESSEL BLUE MAXI

## (undated) DEVICE — SUT VICRYL PLUS 2-0 CT1 18

## (undated) DEVICE — CLOSURE SKIN STERI STRIP 1/2X4

## (undated) DEVICE — DRESSING TELFA STRL 4X3 LF

## (undated) DEVICE — CANNULA VESSEL

## (undated) DEVICE — SYR CNTRL MALE LL 10ML

## (undated) DEVICE — SUT 2-0 VICRYL / SH (J417)

## (undated) DEVICE — TUBING CNTRST INJ ADPT 72IN

## (undated) DEVICE — CATH GLIDE ANGLED 5FR 65CM

## (undated) DEVICE — CLIP MED TICALL

## (undated) DEVICE — CLIPPER BLADE MOD 4406 (CAREF)

## (undated) DEVICE — DRAPE BAG ISOLATION 20 X 20

## (undated) DEVICE — VISE RADIFOCUS MULTI TORQUE

## (undated) DEVICE — BIT DRILL TWST 4.9 FOR 6.5 SCR

## (undated) DEVICE — HEMOSTAT SURGICEL 4X8IN

## (undated) DEVICE — DRAPE IOBAN 2 STERI

## (undated) DEVICE — WIRE BENTSON 035/180

## (undated) DEVICE — SET DECANTER MEDICHOICE

## (undated) DEVICE — SUT ETHICON 3-0 BLK MONO PS

## (undated) DEVICE — SUT ETHILON 3/0 18IN PS-1

## (undated) DEVICE — DRESSING AQUACEL AG SILVER 4X4

## (undated) DEVICE — TOWEL OR DISP STRL BLUE 4/PK

## (undated) DEVICE — SUT 2-0 VICRYL / CT-1

## (undated) DEVICE — PAD DEFIB CADENCE ADULT R2

## (undated) DEVICE — DRAPE C-ARMOR EQUIPMENT COVER

## (undated) DEVICE — FLOWSWITCH HP 1-W W/O LL

## (undated) DEVICE — FILLER KYPHON CEM GUN BONE SZ2

## (undated) DEVICE — SEE L#156916

## (undated) DEVICE — ELECTRODE REM PLYHSV RETURN 9

## (undated) DEVICE — BLADE SAG 18.0X1.27X100